# Patient Record
Sex: FEMALE | Race: WHITE | NOT HISPANIC OR LATINO | Employment: OTHER | URBAN - METROPOLITAN AREA
[De-identification: names, ages, dates, MRNs, and addresses within clinical notes are randomized per-mention and may not be internally consistent; named-entity substitution may affect disease eponyms.]

---

## 2017-01-17 ENCOUNTER — OFFICE VISIT (OUTPATIENT)
Dept: URGENT CARE | Facility: PHYSICIAN GROUP | Age: 77
End: 2017-01-17
Payer: MEDICARE

## 2017-01-17 VITALS
HEART RATE: 70 BPM | OXYGEN SATURATION: 95 % | DIASTOLIC BLOOD PRESSURE: 58 MMHG | TEMPERATURE: 98.6 F | RESPIRATION RATE: 16 BRPM | BODY MASS INDEX: 19.63 KG/M2 | WEIGHT: 104 LBS | SYSTOLIC BLOOD PRESSURE: 106 MMHG | HEIGHT: 61 IN

## 2017-01-17 DIAGNOSIS — J20.9 ACUTE BRONCHITIS, UNSPECIFIED ORGANISM: ICD-10-CM

## 2017-01-17 PROCEDURE — 99214 OFFICE O/P EST MOD 30 MIN: CPT | Performed by: NURSE PRACTITIONER

## 2017-01-17 RX ORDER — ALBUTEROL SULFATE 2.5 MG/3ML
2.5 SOLUTION RESPIRATORY (INHALATION) EVERY 4 HOURS PRN
Qty: 75 ML | Refills: 1 | Status: SHIPPED | OUTPATIENT
Start: 2017-01-17 | End: 2017-11-17

## 2017-01-17 RX ORDER — PREDNISONE 20 MG/1
TABLET ORAL
Qty: 10 TAB | Refills: 0 | Status: SHIPPED | OUTPATIENT
Start: 2017-01-17 | End: 2017-02-15

## 2017-01-17 NOTE — PROGRESS NOTES
"Subjective:      Angie Root is a 76 y.o. female who presents with Bronchitis            HPI New problem. 76 year old female with cough for 2 days. She denies nasal congestion, runny nose, sore throat, fever or chills. She states felt a tickle in throat 5 days ago and then cough started. No wheezing or shortness of breath. Not taking anything for this. She is a pack a day smoker, not interested in quitting.  Allergies, medications and history reviewed by me today      ROS  Please see HPI       Objective:     /58 mmHg  Pulse 70  Temp(Src) 37 °C (98.6 °F)  Resp 16  Ht 1.549 m (5' 0.98\")  Wt 47.174 kg (104 lb)  BMI 19.66 kg/m2  SpO2 95%     Physical Exam   Constitutional: She is oriented to person, place, and time. She appears well-developed and well-nourished. No distress.   HENT:   Head: Normocephalic and atraumatic.   Right Ear: External ear and ear canal normal. Tympanic membrane is not injected and not perforated. No middle ear effusion.   Left Ear: External ear and ear canal normal. Tympanic membrane is not injected and not perforated.  No middle ear effusion.   Nose: Mucosal edema present.   Mouth/Throat: Posterior oropharyngeal erythema present. No oropharyngeal exudate.   Eyes: Conjunctivae are normal. Right eye exhibits no discharge. Left eye exhibits no discharge.   Neck: Normal range of motion. Neck supple.   Cardiovascular: Normal rate, regular rhythm and normal heart sounds.    No murmur heard.  Pulmonary/Chest: Effort normal and breath sounds normal. No respiratory distress. She has no wheezes. She has no rales.   Musculoskeletal: Normal range of motion.   Normal movement of all 4 extremities.   Lymphadenopathy:     She has no cervical adenopathy.        Right: No supraclavicular adenopathy present.        Left: No supraclavicular adenopathy present.   Neurological: She is alert and oriented to person, place, and time. Gait normal.   Skin: Skin is warm and dry.   Psychiatric: She has a normal " mood and affect. Her behavior is normal. Thought content normal.   Nursing note and vitals reviewed.              Assessment/Plan:     1. Acute bronchitis, unspecified organism    - predniSONE (DELTASONE) 20 MG Tab; Take 2 tabs daily for 5 days.  Dispense: 10 Tab; Refill: 0  - albuterol (PROVENTIL) 2.5mg/3ml Nebu Soln solution for nebulization; 3 mL by Nebulization route every four hours as needed for Shortness of Breath.  Dispense: 75 mL; Refill: 1  - Hydrocod Polst-CPM Polst ER (TUSSIONEX) 10-8 MG/5ML Suspension Extended Release; Take 5 mL by mouth every 12 hours.  Dispense: 140 mL; Refill: 0  Patient is cautioned on sedation potential of narcotic medication; no drinking, driving or operating heavy machinery while on this medication.  [place]  checked, no red flags. She will take this only at night.  Differential diagnosis, natural history, supportive care, and indications for immediate follow-up discussed at length.

## 2017-02-15 ENCOUNTER — OFFICE VISIT (OUTPATIENT)
Dept: MEDICAL GROUP | Facility: PHYSICIAN GROUP | Age: 77
End: 2017-02-15
Payer: MEDICARE

## 2017-02-15 VITALS
SYSTOLIC BLOOD PRESSURE: 116 MMHG | WEIGHT: 100 LBS | HEIGHT: 61 IN | TEMPERATURE: 98.5 F | RESPIRATION RATE: 16 BRPM | BODY MASS INDEX: 18.88 KG/M2 | DIASTOLIC BLOOD PRESSURE: 68 MMHG | HEART RATE: 66 BPM | OXYGEN SATURATION: 97 %

## 2017-02-15 DIAGNOSIS — F41.9 ANXIETY: ICD-10-CM

## 2017-02-15 PROCEDURE — 4040F PNEUMOC VAC/ADMIN/RCVD: CPT | Mod: 8P | Performed by: INTERNAL MEDICINE

## 2017-02-15 PROCEDURE — 1101F PT FALLS ASSESS-DOCD LE1/YR: CPT | Mod: 8P | Performed by: INTERNAL MEDICINE

## 2017-02-15 PROCEDURE — 4004F PT TOBACCO SCREEN RCVD TLK: CPT | Performed by: INTERNAL MEDICINE

## 2017-02-15 PROCEDURE — G8484 FLU IMMUNIZE NO ADMIN: HCPCS | Performed by: INTERNAL MEDICINE

## 2017-02-15 PROCEDURE — G8432 DEP SCR NOT DOC, RNG: HCPCS | Performed by: INTERNAL MEDICINE

## 2017-02-15 PROCEDURE — G8419 CALC BMI OUT NRM PARAM NOF/U: HCPCS | Performed by: INTERNAL MEDICINE

## 2017-02-15 PROCEDURE — 99213 OFFICE O/P EST LOW 20 MIN: CPT | Performed by: INTERNAL MEDICINE

## 2017-02-15 PROCEDURE — G8598 ASA/ANTIPLAT THER USED: HCPCS | Performed by: INTERNAL MEDICINE

## 2017-02-15 RX ORDER — ALPRAZOLAM 0.25 MG/1
TABLET ORAL
Qty: 30 TAB | Refills: 1 | Status: SHIPPED
Start: 2017-02-15 | End: 2017-04-12 | Stop reason: SDUPTHER

## 2017-02-15 NOTE — MR AVS SNAPSHOT
"Angie Root   2/15/2017 9:20 AM   Office Visit   MRN: [id number]    Department:  [facility]   Dept Phone:  [phone]    Description:  Female : 1940   Provider:  Noris Parker M.D.           Reason for Visit     Anxiety medication management       Allergies as of 2/15/2017     Allergen Noted Reactions    Codeine 2015   Swelling    Iodine 2015   Swelling    Bee Venom 2015       Latex 2015       Pcn [Penicillins] 2015       Shellfish Allergy 2015       Tetanus Antitoxin 2015         You were diagnosed with     Anxiety   [229470]         Vital Signs     Blood Pressure Pulse Temperature Respirations Height Weight    116/68 mmHg 66 36.9 °C (98.5 °F) 16 1.549 m (5' 0.98\") 45.36 kg (100 lb)    Body Mass Index Oxygen Saturation Smoking Status             18.90 kg/m2 97% Current Every Day Smoker         Basic Information     Date Of Birth Sex Race Ethnicity Preferred Language    1940 Female White Non- English      Your appointments     Mar 14, 2017  9:45 AM   FOLLOW UP with Johnathan Morgan M.D.   [facility] (--)    [address], [address]  [place] [address] [phone]            Mar 20, 2017  9:55 AM   NEW TO YOU with PPEE Hurt   [facility]    [address], [address]  [place] [address] [phone]              Problem List              ICD-10-CM Priority Class Noted - Resolved    Gastroesophageal reflux disease without esophagitis K21.9   2016 - Present    Dyslipidemia E78.5   2016 - Present    Hiatal hernia K44.9   2016 - Present    Coronary artery disease due to lipid rich plaque I25.10, I25.83   2016 - Present    Paroxysmal a-fib (CMS-HCC) I48.0   2016 - Present    Prediabetes R73.03   2016 - Present    Health care maintenance Z00.00   2016 - Present    History of panic attacks Z86.59   " 1/20/2016 - Present    PAD (peripheral artery disease) (CMS-HCC) I73.9   3/1/2016 - Present    Cigarette nicotine dependence with nicotine-induced disorder F17.219   3/1/2016 - Present    Left ankle swelling M25.472   5/23/2016 - Present    Vitamin D deficiency E55.9   7/26/2016 - Present    Insomnia G47.00   7/26/2016 - Present    Anxiety F41.9   2/15/2017 - Present      Health Maintenance        Date Due Completion Dates    IMM DTaP/Tdap/Td Vaccine (1 - Tdap) 2/26/1958 ---    MAMMOGRAM 2/26/1979 ---    IMM ZOSTER VACCINE 2/26/1999 ---    BONE DENSITY 2/26/2004 ---    IMM PNEUMOCOCCAL 65+ (ADULT) LOW/MEDIUM RISK SERIES (1 of 2 - PCV13) 2/26/2004 ---    IMM INFLUENZA (1) 9/1/2016 ---    COLONOSCOPY 3/19/2024 3/19/2014            Current Immunizations     No immunizations on file.      Below and/or attached are the medications your provider expects you to take. Review all of your home medications and newly ordered medications with your provider and/or pharmacist. Follow medication instructions as directed by your provider and/or pharmacist. Please keep your medication list with you and share with your provider. Update the information when medications are discontinued, doses are changed, or new medications (including over-the-counter products) are added; and carry medication information at all times in the event of emergency situations     Allergies:  CODEINE - Swelling     IODINE - Swelling     BEE VENOM - (reactions not documented)     LATEX - (reactions not documented)     PCN - (reactions not documented)     SHELLFISH ALLERGY - (reactions not documented)     TETANUS ANTITOXIN - (reactions not documented)               Medications  Valid as of: February 15, 2017 -  9:47 AM    Generic Name Brand Name Tablet Size Instructions for use    Albuterol Sulfate (Aero Soln) albuterol 108 (90 BASE) MCG/ACT Inhale 2 Puffs by mouth every four hours as needed for Shortness of Breath.        Albuterol Sulfate (Nebu Soln)  PROVENTIL 2.5mg/3ml 3 mL by Nebulization route every four hours as needed for Shortness of Breath.        ALPRAZolam (Tab) XANAX 0.25 MG TAKE ONE TABLET BY MOUTH DAILY AS NEEDED FOR SLEEP OR ANXIETY.        Aspirin (Tablet Delayed Response) ECOTRIN 81 MG Take 81 mg by mouth every day.        Cholecalciferol (Cap) Vitamin D 2000 UNITS Take 1 Cap by mouth every day.        Clopidogrel Bisulfate (Tab) PLAVIX 75 MG four times a week        Diclofenac Sodium (Gel) Diclofenac Sodium 1 % Apply over affected area bid prn for pain        DiltiaZEM HCl Coated Beads (CAPSULE SR 24 HR) CARDIZEM  MG Take 1 Cap by mouth every day.        Fluticasone Propionate (Suspension) FLONASE 50 MCG/ACT Spray 1 Spray in nose every day.        Hydrocod Polst-Chlorphen Polst (Suspension Extended Release) TUSSIONEX 10-8 MG/5ML Take 5 mL by mouth every 12 hours.        Lovastatin (TABLET SR 24 HR) ALTOPREV 40 MG Take 40 mg by mouth every evening.        Pantoprazole Sodium (Tablet Delayed Response) PROTONIX 40 MG Take 1 Tab by mouth every day. Indications: Gastroesophageal Reflux Disease        Sotalol HCl (Tab) BETAPACE 80 MG Take 0.5 Tabs by mouth 2 times a day.        Sucralfate (Tab) CARAFATE 1 GM Take 1 g by mouth 2 Times a Day.        .                 Medicines prescribed today were sent to:     [id number] - [place] - [address] [place] [place] [address]    Phone: [phone] Fax: [phone]    Open 24 Hours?: No    [facility] - [facility] [address]    [address] [address]    Phone: [phone] Fax: [phone]    Open 24 Hours?: No      Medication refill instructions:       If your prescription bottle indicates you have medication refills left, it is not necessary to call your provider’s office. Please contact your pharmacy and they will refill your medication.    If your prescription bottle indicates you do not have any refills left, you may request  refills at any time through one of the following ways: The online Branchly system (except Urgent Care), by calling your provider’s office, or by asking your pharmacy to contact your provider’s office with a refill request. Medication refills are processed only during regular business hours and may not be available until the next business day. Your provider may request additional information or to have a follow-up visit with you prior to refilling your medication.   *Please Note: Medication refills are assigned a new Rx number when refilled electronically. Your pharmacy may indicate that no refills were authorized even though a new prescription for the same medication is available at the pharmacy. Please request the medicine by name with the pharmacy before contacting your provider for a refill.           Branchly Access Code: Activation code not generated  Current Branchly Status: Active

## 2017-02-15 NOTE — PROGRESS NOTES
Chief Complaint   Patient presents with   • Anxiety     medication management        HISTORY OF PRESENT ILLNESS: Patient is a 76 y.o. female patient who presents today for refill of her Xanax prescription.    1. Anxiety    Patient has a long history of anxiety and insomnia. She takes Xanax 0.25 mg daily. She is going to be going down to [place] for a wedding in to visit friends and family down there and will be gone for over one month and is quite concerned that she have her prescription with refill prior to leaving. Otherwise patient is doing well she has no other complaints today.      Patient Active Problem List    Diagnosis Date Noted   • Anxiety 02/15/2017   • Vitamin D deficiency 07/26/2016   • Insomnia 07/26/2016   • Left ankle swelling 05/23/2016   • PAD (peripheral artery disease) (CMS-HCC) 03/01/2016   • Cigarette nicotine dependence with nicotine-induced disorder 03/01/2016   • Gastroesophageal reflux disease without esophagitis 01/20/2016   • Dyslipidemia 01/20/2016   • Hiatal hernia 01/20/2016   • Coronary artery disease due to lipid rich plaque 01/20/2016   • Paroxysmal a-fib (CMS-HCC) 01/20/2016   • Prediabetes 01/20/2016   • Health care maintenance 01/20/2016   • History of panic attacks 01/20/2016      Allergies:Codeine; Iodine; Bee venom; Latex; Pcn; Shellfish allergy; and Tetanus antitoxin    Current meds including changes today  Current Outpatient Prescriptions   Medication Sig Dispense Refill   • alprazolam (XANAX) 0.25 MG Tab TAKE ONE TABLET BY MOUTH DAILY AS NEEDED FOR SLEEP OR ANXIETY. 30 Tab 1   • lovastatin (ALTOPREV) 40 MG ER tablet Take 40 mg by mouth every evening.     • diltiazem CD (CARDIZEM CD) 240 MG CAPSULE SR 24 HR Take 1 Cap by mouth every day. 90 Cap 2   • clopidogrel (PLAVIX) 75 MG Tab four times a week 64 Tab 3   • Cholecalciferol (VITAMIN D) 2000 UNITS Cap Take 1 Cap by mouth every day.     • albuterol 108 (90 BASE) MCG/ACT Aero Soln inhalation aerosol Inhale 2 Puffs  by mouth every four hours as needed for Shortness of Breath. 1 Inhaler 1   • pantoprazole (PROTONIX) 40 MG Tablet Delayed Response Take 1 Tab by mouth every day. Indications: Gastroesophageal Reflux Disease 90 Tab 2   • sotalol (BETAPACE) 80 MG Tab Take 0.5 Tabs by mouth 2 times a day. 60 Tab    • sucralfate (CARAFATE) 1 GM Tab Take 1 g by mouth 2 Times a Day.     • aspirin EC (ECOTRIN) 81 MG Tablet Delayed Response Take 81 mg by mouth every day.     • fluticasone (FLONASE) 50 MCG/ACT nasal spray Spray 1 Spray in nose every day.     • albuterol (PROVENTIL) 2.5mg/3ml Nebu Soln solution for nebulization 3 mL by Nebulization route every four hours as needed for Shortness of Breath. 75 mL 1   • Hydrocod Polst-CPM Polst ER (TUSSIONEX) 10-8 MG/5ML Suspension Extended Release Take 5 mL by mouth every 12 hours. 140 mL 0   • Diclofenac Sodium 1 % Gel Apply over affected area bid prn for pain 1 Tube 1     No current facility-administered medications for this visit.     Social History   Substance Use Topics   • Smoking status: Current Every Day Smoker -- 1.00 packs/day for 60 years     Types: Cigarettes   • Smokeless tobacco: Never Used   • Alcohol Use: No     Social History     Social History Narrative    .     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       Family History   Problem Relation Age of Onset   • Cancer Maternal Grandmother      tongue   • Cancer Maternal Uncle      x 3, pancreas   • Cancer Maternal Grandfather      unknown   • Cancer Paternal Grandmother      unknown   • Cancer Paternal Grandfather      unknown   • Diabetes Maternal Uncle    • Heart Disease Maternal Uncle    • Hypertension Sister    • Hypertension Mother    • Hyperlipidemia Mother    • Hyperlipidemia Sister    • Psychiatry Neg Hx    • Stroke Neg Hx    • Thyroid Sister        Review of Systems:  No chest pain, No shortness of breath, No dyspnea on exertion  Gastrointestinal ROS: No abdominal pain, No nausea, vomiting, diarrhea, or  "constipation        Exam:    Pleasant slender female in no distress somewhat pressured speech  Blood pressure 116/68, pulse 66, temperature 36.9 °C (98.5 °F), resp. rate 16, height 1.549 m (5' 0.98\"), weight 45.36 kg (100 lb), SpO2 97 %.  General:  Well nourished, well developed female in NAD affect and mood within normal limits  Head is grossly normal.  Neck: Supple without adenopathy  Pulmonary: Clear to ausculation.  Normal effort. No rales, rhonchi, or wheezing.  Cardiovascular: Regular rate and rhythm without murmur.   Extremities: no clubbing, cyanosis, or edema.  Neuro: moves all extremities symmetrically      Please note that this dictation was created using voice recognition software. I have made every reasonable attempt to correct obvious errors, but I expect that there are errors of grammar and possibly content that I did not discover before finalizing the note.    Assessment/Plan:  1. Anxiety    Patient has an appointment with Renae Trevizo following her return in late March.   - alprazolam (XANAX) 0.25 MG Tab; TAKE ONE TABLET BY MOUTH DAILY AS NEEDED FOR SLEEP OR ANXIETY.  Dispense: 30 Tab; Refill: 1    Followup: No Follow-up on file.            "

## 2017-03-14 ENCOUNTER — OFFICE VISIT (OUTPATIENT)
Dept: CARDIOLOGY | Facility: MEDICAL CENTER | Age: 77
End: 2017-03-14
Payer: MEDICARE

## 2017-03-14 VITALS
WEIGHT: 102 LBS | SYSTOLIC BLOOD PRESSURE: 120 MMHG | BODY MASS INDEX: 18.77 KG/M2 | OXYGEN SATURATION: 96 % | HEIGHT: 62 IN | DIASTOLIC BLOOD PRESSURE: 70 MMHG | HEART RATE: 66 BPM

## 2017-03-14 DIAGNOSIS — I25.83 CORONARY ARTERY DISEASE DUE TO LIPID RICH PLAQUE: ICD-10-CM

## 2017-03-14 DIAGNOSIS — I48.0 PAROXYSMAL A-FIB (HCC): ICD-10-CM

## 2017-03-14 DIAGNOSIS — F17.219 CIGARETTE NICOTINE DEPENDENCE WITH NICOTINE-INDUCED DISORDER: ICD-10-CM

## 2017-03-14 DIAGNOSIS — I25.10 CORONARY ARTERY DISEASE DUE TO LIPID RICH PLAQUE: ICD-10-CM

## 2017-03-14 DIAGNOSIS — I73.9 PAD (PERIPHERAL ARTERY DISEASE) (HCC): ICD-10-CM

## 2017-03-14 LAB — EKG IMPRESSION: NORMAL

## 2017-03-14 PROCEDURE — G8419 CALC BMI OUT NRM PARAM NOF/U: HCPCS | Performed by: INTERNAL MEDICINE

## 2017-03-14 PROCEDURE — 4004F PT TOBACCO SCREEN RCVD TLK: CPT | Performed by: INTERNAL MEDICINE

## 2017-03-14 PROCEDURE — 99214 OFFICE O/P EST MOD 30 MIN: CPT | Performed by: INTERNAL MEDICINE

## 2017-03-14 PROCEDURE — G8432 DEP SCR NOT DOC, RNG: HCPCS | Performed by: INTERNAL MEDICINE

## 2017-03-14 PROCEDURE — 4040F PNEUMOC VAC/ADMIN/RCVD: CPT | Mod: 8P | Performed by: INTERNAL MEDICINE

## 2017-03-14 PROCEDURE — G8598 ASA/ANTIPLAT THER USED: HCPCS | Performed by: INTERNAL MEDICINE

## 2017-03-14 PROCEDURE — 93000 ELECTROCARDIOGRAM COMPLETE: CPT | Performed by: INTERNAL MEDICINE

## 2017-03-14 PROCEDURE — G8484 FLU IMMUNIZE NO ADMIN: HCPCS | Performed by: INTERNAL MEDICINE

## 2017-03-14 PROCEDURE — 1101F PT FALLS ASSESS-DOCD LE1/YR: CPT | Mod: 8P | Performed by: INTERNAL MEDICINE

## 2017-03-14 RX ORDER — SOTALOL HYDROCHLORIDE 80 MG/1
40 TABLET ORAL 2 TIMES DAILY
Qty: 90 TAB | Refills: 3 | Status: SHIPPED | OUTPATIENT
Start: 2017-03-14 | End: 2017-03-14 | Stop reason: SDUPTHER

## 2017-03-14 RX ORDER — CLOPIDOGREL BISULFATE 75 MG/1
75 TABLET ORAL DAILY
COMMUNITY
End: 2017-03-14 | Stop reason: SDUPTHER

## 2017-03-14 RX ORDER — CLOPIDOGREL BISULFATE 75 MG/1
75 TABLET ORAL DAILY
Qty: 90 TAB | Refills: 3 | Status: SHIPPED | OUTPATIENT
Start: 2017-03-14 | End: 2017-11-29

## 2017-03-14 RX ORDER — SOTALOL HYDROCHLORIDE 80 MG/1
40 TABLET ORAL 2 TIMES DAILY
Qty: 90 TAB | Refills: 3 | Status: SHIPPED | OUTPATIENT
Start: 2017-03-14 | End: 2019-03-12 | Stop reason: SDUPTHER

## 2017-03-14 ASSESSMENT — ENCOUNTER SYMPTOMS
LOSS OF CONSCIOUSNESS: 1
PALPITATIONS: 0
PND: 0
CLAUDICATION: 1
ORTHOPNEA: 0
DEPRESSION: 1

## 2017-03-14 NOTE — Clinical Note
Name:          Angie Root   YOB: 1940  Date:     3/14/2017      Renae Trevizo, CINTHIARIvoneNIvone  [address]  [facility] [address]     Johnathan Morgan MD  [address], [address]  [place] [address]  Phone: [phone]  Back Line: [phone]  Fax: [phone]  E-mail: [email or web address]   Dear Dr. Trevizo,    We had the pleasure of seeing your patient, Angie Root, in Cardiology Clinic at [facility] today.    As you know, she is a 77-year-old woman with a history of PAD status post PCI to her left leg in 2008, CAD with 2 stents placed in her LAD in 2009, nicotine dependence, hypertension, dyslipidemia, and paroxysmal atrial fibrillation on rhythm control strategy.    She is doing well today, and has no cardiovascular complaint. I did refill her medications including sotalol, and Plavix. We again discussed at some length her fairly high risk of stroke and she declined oral anticoagulants. She did have an episode of orthostasis with low blood pressure and see systolic of 100 mmHg) a few days ago, which resolved with some hydration. I told her that if she had increasingly frequent episodes of the same I would cut her diltiazem from 240-180 mmHg. She told me she would call her office and let us know if that is the case. Otherwise, I have not changed her cardiovascular regimen.    We will have the patient follow-up in 6 months.    Thank you for the referral and please do not hesitate to contact me at any time. My contact information is listed above.    This note was dictated using Dragon speech recognition software.     A full note including my physical examination and a full list of rectified medications is available in our medical record, and can be faxed as well.    Johnathan Morgan MD  Cardiologist  [facility]

## 2017-03-14 NOTE — PROGRESS NOTES
Name:          Angie Root   YOB: 1940  Date:     3/14/2017      Renae Trevizo, CINTHIARIvoneNIvone  [address]  [place] [facility] [address]     Johnathan Morgan MD  [address], [address]  [place] [address]  Phone: [phone]  Back Line: [phone]  Fax: [phone]  E-mail: [email or web address]   Dear Dr. Trevizo,    We had the pleasure of seeing your patient, Angie Root, in Cardiology Clinic at [facility] today.    As you know, she is a 77-year-old woman with a history of PAD status post PCI to her left leg in 2008, CAD with 2 stents placed in her LAD in 2009, nicotine dependence, hypertension, dyslipidemia, and paroxysmal atrial fibrillation on rhythm control strategy.    She is doing well today, and has no cardiovascular complaint. I did refill her medications including sotalol, and Plavix. We again discussed at some length her fairly high risk of stroke and she declined oral anticoagulants. She did have an episode of orthostasis with low blood pressure and see systolic of 100 mmHg) a few days ago, which resolved with some hydration. I told her that if she had increasingly frequent episodes of the same I would cut her diltiazem from 240-180 mmHg. She told me she would call her office and let us know if that is the case. Otherwise, I have not changed her cardiovascular regimen.    We will have the patient follow-up in 6 months.    Thank you for the referral and please do not hesitate to contact me at any time. My contact information is listed above.    This note was dictated using Dragon speech recognition software.     A full note including my physical examination and a full list of rectified medications is available in our medical record, and can be faxed as well.    Johnathan Morgan MD  Cardiologist  [facility]

## 2017-03-14 NOTE — PROGRESS NOTES
Subjective:   Angie Root is a 77-year-old woman with a history of PAD status post PCI to her left leg in 2008, CAD with 2 stents placed in her LAD in 2009, nicotine dependence, hypertension, dyslipidemia, and paroxysmal atrial fibrillation on rhythm control strategy.    She is doing well today from the cardiovascular perspective with no complaints. Unfortunately, she does appear to have sustained an ankle injury during the holiday season around [place]. Her ankle did get x-rays at that time, but does continue to be swollen still.    She is tolerating her medications well, and comes in for refills.    She brings in her blood pressure log documenting blood pressures mostly in the 120s mmHg systolic range (CT scan block). She did have one episode though 3 days ago when her systolic blood pressure was 100 mmHg at which time she had a bit of orthostasis. She took some Powerade, and the episode resolved. It sounds as though her mail-order pharmacy felt that her dose of diltiazem may be too high.    She has cut back significantly on her smoking, and is currently smoking about 6 cigarettes per day.    Past Medical History   Diagnosis Date   • CAD (coronary artery disease)    • COPD (chronic obstructive pulmonary disease) (CMS-HCC)    • Hiatal hernia    • GERD (gastroesophageal reflux disease)    • Diverticulosis    • Dyslipidemia    • Prediabetes    • PAD (peripheral artery disease) (CMS-HCC)    • Gastroesophageal reflux disease without esophagitis 1/20/2016   • Coronary artery disease due to lipid rich plaque 1/20/2016     Status post PCI ×2 in 3/27/09 ([facility] x 2 to proximal LAD and mid LAD) in [place]    • Paroxysmal a-fib (CMS-HCC) 1/20/2016     Symptomatic, on a rhythm control strategy with sotalol 40 mg by mouth twice a day.      Past Surgical History   Procedure Laterality Date   • Tonsillectomy  1945   • Cholecystectomy  1993   • Cardiac cath, right heart  2008     stent   • Wide excision melanoma, leg,  w/poss.stsg  10/2015     Family History   Problem Relation Age of Onset   • Cancer Maternal Grandmother      tongue   • Cancer Maternal Uncle      x 3, pancreas   • Cancer Maternal Grandfather      unknown   • Cancer Paternal Grandmother      unknown   • Cancer Paternal Grandfather      unknown   • Diabetes Maternal Uncle    • Heart Disease Maternal Uncle    • Hypertension Sister    • Hypertension Mother    • Hyperlipidemia Mother    • Hyperlipidemia Sister    • Psychiatry Neg Hx    • Stroke Neg Hx    • Thyroid Sister      History   Smoking status   • Current Every Day Smoker -- 0.25 packs/day for 60 years   • Types: Cigarettes   Smokeless tobacco   • Never Used     Allergies   Allergen Reactions   • Codeine Swelling   • Iodine Swelling   • Bee Venom    • Latex    • Pcn [Penicillins]    • Shellfish Allergy    • Tetanus Antitoxin      Outpatient Encounter Prescriptions as of 3/14/2017   Medication Sig Dispense Refill   • sotalol (BETAPACE) 80 MG Tab Take 0.5 Tabs by mouth 2 times a day. 90 Tab 3   • clopidogrel (PLAVIX) 75 MG Tab Take 1 Tab by mouth every day. 90 Tab 3   • alprazolam (XANAX) 0.25 MG Tab TAKE ONE TABLET BY MOUTH DAILY AS NEEDED FOR SLEEP OR ANXIETY. 30 Tab 1   • albuterol (PROVENTIL) 2.5mg/3ml Nebu Soln solution for nebulization 3 mL by Nebulization route every four hours as needed for Shortness of Breath. 75 mL 1   • lovastatin (ALTOPREV) 40 MG ER tablet Take 40 mg by mouth every evening.     • Diclofenac Sodium 1 % Gel Apply over affected area bid prn for pain 1 Tube 1   • diltiazem CD (CARDIZEM CD) 240 MG CAPSULE SR 24 HR Take 1 Cap by mouth every day. 90 Cap 2   • Cholecalciferol (VITAMIN D) 2000 UNITS Cap Take 1 Cap by mouth every day.     • albuterol 108 (90 BASE) MCG/ACT Aero Soln inhalation aerosol Inhale 2 Puffs by mouth every four hours as needed for Shortness of Breath. 1 Inhaler 1   • pantoprazole (PROTONIX) 40 MG Tablet Delayed Response Take 1 Tab by mouth every day. Indications:  "Gastroesophageal Reflux Disease 90 Tab 2   • sucralfate (CARAFATE) 1 GM Tab Take 1 g by mouth 2 Times a Day.     • aspirin EC (ECOTRIN) 81 MG Tablet Delayed Response Take 81 mg by mouth every day.     • fluticasone (FLONASE) 50 MCG/ACT nasal spray Spray 1 Spray in nose every day.     • [DISCONTINUED] clopidogrel (PLAVIX) 75 MG Tab Take 75 mg by mouth every day.     • [DISCONTINUED] sotalol (BETAPACE) 80 MG Tab Take 0.5 Tabs by mouth 2 times a day. 90 Tab 3   • Hydrocod Polst-CPM Polst ER (TUSSIONEX) 10-8 MG/5ML Suspension Extended Release Take 5 mL by mouth every 12 hours. (Patient not taking: Reported on 3/14/2017) 140 mL 0   • clopidogrel (PLAVIX) 75 MG Tab four times a week (Patient taking differently: No sig reported) 64 Tab 3   • [DISCONTINUED] sotalol (BETAPACE) 80 MG Tab Take 0.5 Tabs by mouth 2 times a day. 60 Tab      No facility-administered encounter medications on file as of 3/14/2017.     Review of Systems   HENT: Positive for hearing loss.         Prior sudden deafness of unclear etiology. This was worked up with a MRI, no eardrum rupture noted by an ENT who examined her. She was placed on antibiotics and steroids and it resolved.   Cardiovascular: Positive for claudication. Negative for chest pain, palpitations, orthopnea, leg swelling and PND.   Musculoskeletal: Positive for joint pain (resolving right ankle pain after a traumatic episode 2-1/2 months ago).   Neurological: Positive for loss of consciousness (previously diagnosed with vasovagal syncope).   Psychiatric/Behavioral: Positive for depression. Negative for suicidal ideas.   All other systems reviewed and are negative.       Objective:   /70 mmHg  Pulse 66  Ht 1.575 m (5' 2\")  Wt 46.267 kg (102 lb)  BMI 18.65 kg/m2  SpO2 96%    Physical Exam   Constitutional: She is oriented to person, place, and time. She appears well-developed and well-nourished. No distress.   HENT:   Head: Normocephalic and atraumatic.   Eyes: Conjunctivae and " EOM are normal. Pupils are equal, round, and reactive to light. No scleral icterus.   Neck: Neck supple. No JVD present. No tracheal deviation present.   Cardiovascular: Normal rate, regular rhythm, normal heart sounds and intact distal pulses.  Exam reveals no gallop and no friction rub.    No murmur heard.  Pulses:       Dorsalis pedis pulses are 1+ on the right side, and 1+ on the left side.   No carotid bruits   Pulmonary/Chest: Effort normal and breath sounds normal. No stridor. No respiratory distress. She has no wheezes. She has no rales.   Abdominal: Soft. Bowel sounds are normal. She exhibits no distension.   Musculoskeletal: She exhibits edema (trace on the left, 1+ on the right).   1+ woody edema of her lower extremities bilaterally   Neurological: She is alert and oriented to person, place, and time.   Skin: Skin is warm and dry. She is not diaphoretic. No erythema. No pallor.   Senile purpura noted, chronic venous stasis changes of her lower extremities also noted   Psychiatric: She has a normal mood and affect. Judgment and thought content normal.   Vitals reviewed.    Her EKG from today shows sinus bradycardia and a QTC of 448 ms       4/14/2016 08:52 12/1/2016 09:37   Cholesterol,Tot 170 166   Triglycerides 65 61   HDL 80 84   LDL 77 70       Assessment:     1. Paroxysmal a-fib (CMS-HCC)  EKG    sotalol (BETAPACE) 80 MG Tab    DISCONTINUED: sotalol (BETAPACE) 80 MG Tab   2. Coronary artery disease due to lipid rich plaque  EKG   3. Cigarette nicotine dependence with nicotine-induced disorder  REFERRAL TO LUNG CANCER SCREENING PROGRAM   4. PAD (peripheral artery disease) (CMS-HCC)  clopidogrel (PLAVIX) 75 MG Tab       Medical Decision Making:  Today's Assessment / Status / Plan:     Medical Decision Making:    She is doing well today, and has no cardiovascular complaint. I did refill her medications including sotalol, and Plavix. We again discussed at some length her fairly high risk of stroke and she  declined oral anticoagulants. She did have an episode of orthostasis with low blood pressure and see systolic of 100 mmHg) a few days ago, which resolved with some hydration. I told her that if she had increasingly frequent episodes of the same I would cut her diltiazem from 240-180 mmHg. She told me she would call her office and let us know if that is the case. Otherwise, I have not changed her cardiovascular regimen.    Johnathan Morgan MD  Cardiologist, [facility] [place]

## 2017-03-14 NOTE — MR AVS SNAPSHOT
"Angie Root   3/14/2017 9:45 AM   Office Visit   MRN: [id number]    Department:  [facility]   Dept Phone:  [phone]    Description:  Female : 1940   Provider:  Johnathan Morgan M.D.           Reason for Visit     Follow-Up           Allergies as of 3/14/2017     Allergen Noted Reactions    Codeine 2015   Swelling    Iodine 2015   Swelling    Bee Venom 2015       Latex 2015       Pcn [Penicillins] 2015       Shellfish Allergy 2015       Tetanus Antitoxin 2015         You were diagnosed with     Paroxysmal a-fib (CMS-HCC)   [438908]       Coronary artery disease due to lipid rich plaque   [6972096]       Cigarette nicotine dependence with nicotine-induced disorder   [358724]       PAD (peripheral artery disease) (CMS-HCC)   [433042]         Vital Signs     Blood Pressure Pulse Height Weight Body Mass Index Oxygen Saturation    120/70 mmHg 66 1.575 m (5' 2\") 46.267 kg (102 lb) 18.65 kg/m2 96%    Smoking Status                   Current Every Day Smoker           Basic Information     Date Of Birth Sex Race Ethnicity Preferred Language    1940 Female White Non- English      Your appointments     Mar 20, 2017  9:55 AM   NEW TO YOU with PEPE Hurt   [facility])    [address] [place] [address], [address]  [facility] [address] [phone]              Problem List              ICD-10-CM Priority Class Noted - Resolved    Gastroesophageal reflux disease without esophagitis K21.9   2016 - Present    Dyslipidemia E78.5   2016 - Present    Hiatal hernia K44.9   2016 - Present    Coronary artery disease due to lipid rich plaque I25.10, I25.83   2016 - Present    Paroxysmal a-fib (CMS-HCC) I48.0   2016 - Present    Prediabetes R73.03   2016 - Present    Health care maintenance Z00.00   2016 - Present    History of panic attacks Z86.59   2016 - Present    PAD (peripheral " artery disease) (CMS-HCC) I73.9   3/1/2016 - Present    Cigarette nicotine dependence with nicotine-induced disorder F17.219   3/1/2016 - Present    Left ankle swelling M25.472   5/23/2016 - Present    Vitamin D deficiency E55.9   7/26/2016 - Present    Insomnia G47.00   7/26/2016 - Present    Anxiety F41.9   2/15/2017 - Present      Health Maintenance        Date Due Completion Dates    IMM DTaP/Tdap/Td Vaccine (1 - Tdap) 2/26/1958 ---    MAMMOGRAM 2/26/1979 ---    IMM ZOSTER VACCINE 2/26/1999 ---    BONE DENSITY 2/26/2004 ---    IMM PNEUMOCOCCAL 65+ (ADULT) LOW/MEDIUM RISK SERIES (1 of 2 - PCV13) 2/26/2004 ---    IMM INFLUENZA (1) 9/1/2016 ---    COLONOSCOPY 3/19/2024 3/19/2014            Results       Current Immunizations     No immunizations on file.      Below and/or attached are the medications your provider expects you to take. Review all of your home medications and newly ordered medications with your provider and/or pharmacist. Follow medication instructions as directed by your provider and/or pharmacist. Please keep your medication list with you and share with your provider. Update the information when medications are discontinued, doses are changed, or new medications (including over-the-counter products) are added; and carry medication information at all times in the event of emergency situations     Allergies:  CODEINE - Swelling     IODINE - Swelling     BEE VENOM - (reactions not documented)     LATEX - (reactions not documented)     PCN - (reactions not documented)     SHELLFISH ALLERGY - (reactions not documented)     TETANUS ANTITOXIN - (reactions not documented)               Medications  Valid as of: March 14, 2017 - 10:28 AM    Generic Name Brand Name Tablet Size Instructions for use    Albuterol Sulfate (Aero Soln) albuterol 108 (90 BASE) MCG/ACT Inhale 2 Puffs by mouth every four hours as needed for Shortness of Breath.        Albuterol Sulfate (Nebu Soln) PROVENTIL 2.5mg/3ml 3 mL by  Nebulization route every four hours as needed for Shortness of Breath.        ALPRAZolam (Tab) XANAX 0.25 MG TAKE ONE TABLET BY MOUTH DAILY AS NEEDED FOR SLEEP OR ANXIETY.        Aspirin (Tablet Delayed Response) ECOTRIN 81 MG Take 81 mg by mouth every day.        Cholecalciferol (Cap) Vitamin D 2000 UNITS Take 1 Cap by mouth every day.        Clopidogrel Bisulfate (Tab) PLAVIX 75 MG four times a week        Clopidogrel Bisulfate (Tab) PLAVIX 75 MG Take 1 Tab by mouth every day.        Diclofenac Sodium (Gel) Diclofenac Sodium 1 % Apply over affected area bid prn for pain        DiltiaZEM HCl Coated Beads (CAPSULE SR 24 HR) CARDIZEM  MG Take 1 Cap by mouth every day.        Fluticasone Propionate (Suspension) FLONASE 50 MCG/ACT Spray 1 Spray in nose every day.        Hydrocod Polst-Chlorphen Polst (Suspension Extended Release) TUSSIONEX 10-8 MG/5ML Take 5 mL by mouth every 12 hours.        Lovastatin (TABLET SR 24 HR) ALTOPREV 40 MG Take 40 mg by mouth every evening.        Pantoprazole Sodium (Tablet Delayed Response) PROTONIX 40 MG Take 1 Tab by mouth every day. Indications: Gastroesophageal Reflux Disease        Sotalol HCl (Tab) BETAPACE 80 MG Take 0.5 Tabs by mouth 2 times a day.        Sucralfate (Tab) CARAFATE 1 GM Take 1 g by mouth 2 Times a Day.        .                 Medicines prescribed today were sent to:     [facility][id number] [place] [address]    Phone: [phone] Fax: [phone]    Open 24 Hours?: No    [facility] - [facility] [address]    [address] [facility] [address]    Phone: [phone] Fax: [phone]    Open 24 Hours?: No      Medication refill instructions:       If your prescription bottle indicates you have medication refills left, it is not necessary to call your provider’s office. Please contact your pharmacy and they will refill your medication.    If your prescription bottle indicates  you do not have any refills left, you may request refills at any time through one of the following ways: The online [employer] system (except Urgent Care), by calling your provider’s office, or by asking your pharmacy to contact your provider’s office with a refill request. Medication refills are processed only during regular business hours and may not be available until the next business day. Your provider may request additional information or to have a follow-up visit with you prior to refilling your medication.   *Please Note: Medication refills are assigned a new Rx number when refilled electronically. Your pharmacy may indicate that no refills were authorized even though a new prescription for the same medication is available at the pharmacy. Please request the medicine by name with the pharmacy before contacting your provider for a refill.           [employer] Access Code: Activation code not generated  Current [employer] Status: Active          Quit Tobacco Information     Do you want to quit using tobacco?    Quitting tobacco decreases risks of cancer, heart and lung disease, increases life expectancy, improves sense of taste and smell, and increases spending money, among other benefits.    If you are thinking about quitting, we can help.  • [facility] Quit Tobacco Program: [phone]  o Program occurs weekly for four weeks and includes pharmacist consultation on products to support quitting smoking or chewing tobacco. A provider referral is needed for pharmacist consultation.  • Tobacco Users Help Hotline: 5-426-QUIT-NOW (609-6479) or https://[place].quitlogix.org/  o Free, confidential telephone and online coaching for [place] residents. Sessions are designed on a schedule that is convenient for you. Eligible clients receive free nicotine replacement therapy.  • Nationally: www.smokefree.gov  o Information and professional assistance to support both immediate and long-term needs as you become, and remain, a non-smoker.  Smokefree.gov allows you to choose the help that best fits your needs.

## 2017-03-20 ENCOUNTER — OFFICE VISIT (OUTPATIENT)
Dept: MEDICAL GROUP | Facility: PHYSICIAN GROUP | Age: 77
End: 2017-03-20
Payer: MEDICARE

## 2017-03-20 VITALS
WEIGHT: 100 LBS | OXYGEN SATURATION: 96 % | DIASTOLIC BLOOD PRESSURE: 72 MMHG | HEIGHT: 62 IN | HEART RATE: 60 BPM | RESPIRATION RATE: 16 BRPM | SYSTOLIC BLOOD PRESSURE: 122 MMHG | TEMPERATURE: 98.2 F | BODY MASS INDEX: 18.4 KG/M2

## 2017-03-20 DIAGNOSIS — I48.0 PAROXYSMAL A-FIB (HCC): ICD-10-CM

## 2017-03-20 DIAGNOSIS — F17.219 CIGARETTE NICOTINE DEPENDENCE WITH NICOTINE-INDUCED DISORDER: ICD-10-CM

## 2017-03-20 DIAGNOSIS — I25.83 CORONARY ARTERY DISEASE DUE TO LIPID RICH PLAQUE: ICD-10-CM

## 2017-03-20 DIAGNOSIS — Z12.31 ENCOUNTER FOR SCREENING MAMMOGRAM FOR BREAST CANCER: ICD-10-CM

## 2017-03-20 DIAGNOSIS — K21.9 GASTROESOPHAGEAL REFLUX DISEASE WITHOUT ESOPHAGITIS: ICD-10-CM

## 2017-03-20 DIAGNOSIS — Z76.89 ENCOUNTER TO ESTABLISH CARE: ICD-10-CM

## 2017-03-20 DIAGNOSIS — F41.8 DEPRESSION WITH ANXIETY: ICD-10-CM

## 2017-03-20 DIAGNOSIS — R73.03 PREDIABETES: ICD-10-CM

## 2017-03-20 DIAGNOSIS — I73.9 PAD (PERIPHERAL ARTERY DISEASE) (HCC): ICD-10-CM

## 2017-03-20 DIAGNOSIS — G47.9 SLEEP DIFFICULTIES: ICD-10-CM

## 2017-03-20 DIAGNOSIS — K44.9 HIATAL HERNIA: ICD-10-CM

## 2017-03-20 DIAGNOSIS — I25.10 CORONARY ARTERY DISEASE DUE TO LIPID RICH PLAQUE: ICD-10-CM

## 2017-03-20 PROCEDURE — G8484 FLU IMMUNIZE NO ADMIN: HCPCS | Performed by: NURSE PRACTITIONER

## 2017-03-20 PROCEDURE — G8598 ASA/ANTIPLAT THER USED: HCPCS | Performed by: NURSE PRACTITIONER

## 2017-03-20 PROCEDURE — 99215 OFFICE O/P EST HI 40 MIN: CPT | Performed by: NURSE PRACTITIONER

## 2017-03-20 PROCEDURE — 4040F PNEUMOC VAC/ADMIN/RCVD: CPT | Mod: 8P | Performed by: NURSE PRACTITIONER

## 2017-03-20 PROCEDURE — 4004F PT TOBACCO SCREEN RCVD TLK: CPT | Performed by: NURSE PRACTITIONER

## 2017-03-20 PROCEDURE — G8419 CALC BMI OUT NRM PARAM NOF/U: HCPCS | Performed by: NURSE PRACTITIONER

## 2017-03-20 PROCEDURE — 1101F PT FALLS ASSESS-DOCD LE1/YR: CPT | Performed by: NURSE PRACTITIONER

## 2017-03-20 PROCEDURE — G8432 DEP SCR NOT DOC, RNG: HCPCS | Performed by: NURSE PRACTITIONER

## 2017-03-20 RX ORDER — TRAZODONE HYDROCHLORIDE 50 MG/1
25-50 TABLET ORAL NIGHTLY PRN
Qty: 30 TAB | Refills: 1 | Status: SHIPPED | OUTPATIENT
Start: 2017-03-20 | End: 2017-05-08 | Stop reason: SDUPTHER

## 2017-03-20 RX ORDER — PANTOPRAZOLE SODIUM 40 MG/1
40 TABLET, DELAYED RELEASE ORAL DAILY
Qty: 90 TAB | Refills: 2 | Status: SHIPPED | OUTPATIENT
Start: 2017-03-20 | End: 2017-11-08 | Stop reason: SDUPTHER

## 2017-03-20 RX ORDER — SUCRALFATE 1 G/1
1 TABLET ORAL
Qty: 120 TAB | Refills: 5 | Status: SHIPPED | OUTPATIENT
Start: 2017-03-20 | End: 2017-03-20 | Stop reason: SDUPTHER

## 2017-03-20 RX ORDER — SUCRALFATE 1 G/1
1 TABLET ORAL
Qty: 360 TAB | Refills: 1 | Status: SHIPPED | OUTPATIENT
Start: 2017-03-20 | End: 2017-08-08 | Stop reason: SDUPTHER

## 2017-03-20 ASSESSMENT — PATIENT HEALTH QUESTIONNAIRE - PHQ9: CLINICAL INTERPRETATION OF PHQ2 SCORE: 0

## 2017-03-20 NOTE — ASSESSMENT & PLAN NOTE
Started smoking age 16-18. Smoked 1 ppd at most. Stopped twice x 1 year. Now only smokes 5 per day.

## 2017-03-20 NOTE — PROGRESS NOTES
Chief Complaint   Patient presents with   • Establish Care     multiple chronic problems      Angie Root is a 77 y.o. female here to establish care and for evaluation and management of the following:  HPI:    Previous PCP Martina Gonzalez MD   Coronary artery disease due to lipid rich plaque  Status post PCI ×2 in 2009 in [place]  Followed by cardiology.    Paroxysmal a-fib (CMS-[facility])  Symptomatic, on a rhythm control strategy with sotalol 40 mg by mouth twice a day.  Followed by cardiology.      PAD (peripheral artery disease) (CMS-[facility])  Status post PCI to her left leg in 2008 in [place].  Followed by cardiology.    Prediabetes  2016 A1c 6.4 then 6.1    Gastroesophageal reflux disease without esophagitis  Managed with Protonix 40 mg daily and sucralfate 1 g bid, both for many years. Has history of ulcers, hiatal hernia, GERD.  Initially followed very strict diet - Now avoids etoh, limits coffee, soda, acidic juices, spicy food.     Cigarette nicotine dependence with nicotine-induced disorder  Started smoking age 16-18. Smoked 1 ppd at most. Stopped twice x 1 year. Now only smokes 5 per day.        Current medicines (including changes today)  Current Outpatient Prescriptions   Medication Sig Dispense Refill   • pantoprazole (PROTONIX) 40 MG Tablet Delayed Response Take 1 Tab by mouth every day. Indications: Gastroesophageal Reflux Disease 90 Tab 2   • trazodone (DESYREL) 50 MG Tab Take 0.5-1 Tabs by mouth at bedtime as needed for Sleep. 30 Tab 1   • sucralfate (CARAFATE) 1 GM Tab Take 1 Tab by mouth 4 Times a Day,Before Meals and at Bedtime. 360 Tab 1   • sotalol (BETAPACE) 80 MG Tab Take 0.5 Tabs by mouth 2 times a day. 90 Tab 3   • clopidogrel (PLAVIX) 75 MG Tab Take 1 Tab by mouth every day. 90 Tab 3   • alprazolam (XANAX) 0.25 MG Tab TAKE ONE TABLET BY MOUTH DAILY AS NEEDED FOR SLEEP OR ANXIETY. 30 Tab 1   • albuterol (PROVENTIL) 2.5mg/3ml Nebu Soln solution for nebulization 3 mL by Nebulization route  every four hours as needed for Shortness of Breath. 75 mL 1   • lovastatin (ALTOPREV) 40 MG ER tablet Take 40 mg by mouth every evening.     • diltiazem CD (CARDIZEM CD) 240 MG CAPSULE SR 24 HR Take 1 Cap by mouth every day. 90 Cap 2   • Cholecalciferol (VITAMIN D) 2000 UNITS Cap Take 1 Cap by mouth every day.     • albuterol 108 (90 BASE) MCG/ACT Aero Soln inhalation aerosol Inhale 2 Puffs by mouth every four hours as needed for Shortness of Breath. 1 Inhaler 1   • aspirin EC (ECOTRIN) 81 MG Tablet Delayed Response Take 81 mg by mouth every day.     • fluticasone (FLONASE) 50 MCG/ACT nasal spray Spray 1 Spray in nose every day.     • Diclofenac Sodium 1 % Gel Apply over affected area bid prn for pain 1 Tube 1     No current facility-administered medications for this visit.     She  has a past medical history of CAD (coronary artery disease); COPD (chronic obstructive pulmonary disease) (CMS-HCC); Hiatal hernia; GERD (gastroesophageal reflux disease); Diverticulosis; Dyslipidemia; Prediabetes; PAD (peripheral artery disease) (CMS-HCC); Gastroesophageal reflux disease without esophagitis (1/20/2016); Coronary artery disease due to lipid rich plaque (1/20/2016); and Paroxysmal a-fib (CMS-HCC) (1/20/2016).  She  has past surgical history that includes tonsillectomy (1945); cholecystectomy (1993); cardiac cath, right heart (2008); and wide excision melanoma, leg, w/poss.stsg (10/2015).  Social History     Social History   • Marital Status:      Spouse Name: N/A   • Number of Children: 0   • Years of Education: N/A     Social History Main Topics   • Smoking status: Current Every Day Smoker -- 0.25 packs/day for 60 years     Types: Cigarettes     Start date: 03/20/1957   • Smokeless tobacco: Never Used   • Alcohol Use: No   • Drug Use: No   • Sexual Activity:     Partners: Male     Other Topics Concern   • None     Social History Narrative    .     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 [employer]     Family  "History   Problem Relation Age of Onset   • Cancer Maternal Grandmother      tongue   • Cancer Maternal Uncle      x 3, pancreas   • Cancer Maternal Grandfather      unknown   • Cancer Paternal Grandmother      unknown   • Cancer Paternal Grandfather      unknown   • Diabetes Maternal Uncle    • Heart Disease Maternal Uncle    • Hypertension Sister    • Hypertension Mother    • Hyperlipidemia Mother    • Hyperlipidemia Sister    • Psychiatry Neg Hx    • Stroke Neg Hx    • Thyroid Sister      Family Status   Relation Status Death Age   • Mother  94   • Father  56     murdered 1967   • Sister Alive      84 in 2017       Health maintenance reviewed and updated.     ROS  Constitutional: Negative for fever, chills, and unexplained weight loss.   HENT: Negative for ear pain, nosebleeds, and sore throat.  Eyes: Negative for blurred vision.   Respiratory: Negative for cough, sputum production, and wheezing.   Cardiovascular: Negative for chest pain, palpitations.   Gastrointestinal: Negative for nausea, vomiting, abdominal pain, constipation, diarrhea.   Genitourinary: Negative for dysuria, urgency, and frequency.   Musculoskeletal: Negative for myalgias and unusual joint pain.   Skin: Negative for rash and itching.   Neurological: Negative for dizziness, tremors, focal weakness.   Endo/Heme/Allergies: Does not bruise/bleed easily.   All other systems reviewed and are negative except as in HPI.     Objective:   Blood pressure 122/72, pulse 60, temperature 36.8 °C (98.2 °F), resp. rate 16, height 1.581 m (5' 2.25\"), weight 45.36 kg (100 lb), SpO2 96 %. Body mass index is 18.15 kg/(m^2).  Physical Exam:  Constitutional: Alert, oriented, in no acute distress.  Pleasant and cooperative with the examination.  Psych: Eye contact is good, speech goal directed, affect calm, normal judgement and insight.  Skin: Warm, dry, no rashes in visible areas.  HEENT: PERRL, conjunctiva and sclera clear.  Nares patent with no " significant congestion or drainage.  Normal pinnae and external auditory canals.   Neck: Supple, trachea midline.   Lungs: Normal effort and respirations.   CV: Regular rate.  Lower extremities: no edema.      Assessment and Plan:   The following treatment plan was discussed    1. Coronary artery disease due to lipid rich plaque  2. Paroxysmal a-fib (CMS-HCC)  3. PAD (peripheral artery disease) (CMS-[facility])  Stable. Continue current medicines. Monitor labs regularly. Follow-up with specialist as directed.    4. Prediabetes  Reviewed lifestyle modifications, will continue to monitor.    5. Gastroesophageal reflux disease without esophagitis  6. Hiatal hernia  Stable. Continue current medicines. Monitor labs regularly.    - sucralfate (CARAFATE) 1 GM Tab; Take 1 Tab by mouth 4 Times a Day,Before Meals and at Bedtime.  Dispense: 120 Tab; Refill: 5  - pantoprazole (PROTONIX) 40 MG Tablet Delayed Response; Take 1 Tab by mouth every day. Indications: Gastroesophageal Reflux Disease  Dispense: 90 Tab; Refill: 2    7. Cigarette nicotine dependence with nicotine-induced disorder  Advised to quit.    8. Encounter for screening mammogram for breast cancer    - MA-SCREEN MAMMO W/CAD-BILAT    9. Sleep difficulties  Not well controlled. Trial of trazodone 25 mg at hs, if not effective she will take 50 mg.reviewed fall risk reduction.  - trazodone (DESYREL) 50 MG Tab; Take 0.5-1 Tabs by mouth at bedtime as needed for Sleep.  Dispense: 30 Tab; Refill: 1    10. Depression with anxiety  She'll continue current medications and schedule appointment to evaluate depression and anxiety. Meantime she will continue current use of xanax.    11. Encounter to establish care      Followup: Return in about 1 month (around 4/20/2017) for Long anx/dep/sleep.  Sooner if needed or with any new problems.       Patient was seen for 40 minutes, more than 50% of time spent in face to face review, consultation, counseling, and arranging future evaluation  and follow up of medical conditions and care.     Take all medications as directed.  Report any side effects of medications.   Report any new symptoms.  Follow up as advised.  Have labs or other studies as ordered done as directed prior to follow up.  Please keep all appointments for any referrals given.    Please note this dictation was created using voice recognition software. Every reasonable attempt has been made to correct obvious errors, however there may be errors of grammar and possibly content that were not discovered before finalizing the note.

## 2017-03-20 NOTE — MR AVS SNAPSHOT
"        Angie Root   3/20/2017 9:55 AM   Office Visit   MRN: [id number]    Department:  [facility]   Dept Phone:  [phone]    Description:  Female : 1940   Provider:  PEPE Hurt           Reason for Visit     Establish Care multiple chronic problems       Allergies as of 3/20/2017     Allergen Noted Reactions    Codeine 2015   Swelling    Iodine 2015   Swelling    Bee Venom 2015       Latex 2015       Pcn [Penicillins] 2015       Shellfish Allergy 2015       Tetanus Antitoxin 2015         You were diagnosed with     Coronary artery disease due to lipid rich plaque   [0785872]       Paroxysmal a-fib (CMS-[facility])   [959206]       PAD (peripheral artery disease) (CMS-HCC)   [659379]       Prediabetes   [507323]       Gastroesophageal reflux disease without esophagitis   [130185]       Hiatal hernia   [406392]       Cigarette nicotine dependence with nicotine-induced disorder   [641765]       Encounter for screening mammogram for breast cancer   [1445010]       Encounter to establish care   [498591]       Sleep difficulties   [504260]       Depression with anxiety   [256459]         Vital Signs     Blood Pressure Pulse Temperature Respirations Height Weight    122/72 mmHg 60 36.8 °C (98.2 °F) 16 1.581 m (5' 2.25\") 45.36 kg (100 lb)    Body Mass Index Oxygen Saturation Smoking Status             18.15 kg/m2 96% Current Every Day Smoker         Basic Information     Date Of Birth Sex Race Ethnicity Preferred Language    1940 Female White Non- English      Your appointments     2017  8:35 AM   Established Patient with PEPE Hurt   [facility])    [address], [address]  [place] [address] [phone]           You will be receiving a confirmation call a few days before your appointment from our automated call confirmation system.            Sep 15, 2017  9:30 AM   "   FOLLOW UP with Johnathan Morgan M.D.   [facility] (--)    [address], [address]  [place] [id number] [phone]              Problem List              ICD-10-CM Priority Class Noted - Resolved    Gastroesophageal reflux disease without esophagitis K21.9   1/20/2016 - Present    Dyslipidemia E78.5   1/20/2016 - Present    Hiatal hernia K44.9   1/20/2016 - Present    Coronary artery disease due to lipid rich plaque I25.10, I25.83   1/20/2016 - Present    Paroxysmal a-fib (CMS-HCC) I48.0   1/20/2016 - Present    Prediabetes R73.03   1/20/2016 - Present    Health care maintenance Z00.00   1/20/2016 - Present    History of panic attacks Z86.59   1/20/2016 - Present    PAD (peripheral artery disease) (CMS-HCC) I73.9   3/1/2016 - Present    Cigarette nicotine dependence with nicotine-induced disorder F17.219   3/1/2016 - Present    Left ankle swelling M25.472   5/23/2016 - Present    Vitamin D deficiency E55.9   7/26/2016 - Present    Insomnia G47.00   7/26/2016 - Present    Depression with anxiety F41.8   2/15/2017 - Present      Health Maintenance        Date Due Completion Dates    IMM DTaP/Tdap/Td Vaccine (1 - Tdap) 2/26/1958 ---    MAMMOGRAM 2/26/1979 ---    IMM ZOSTER VACCINE 2/26/1999 ---    BONE DENSITY 2/26/2004 ---    IMM PNEUMOCOCCAL 65+ (ADULT) LOW/MEDIUM RISK SERIES (1 of 2 - PCV13) 2/26/2004 ---    IMM INFLUENZA (1) 9/1/2016 ---    COLONOSCOPY 3/19/2024 3/19/2014            Current Immunizations     No immunizations on file.      Below and/or attached are the medications your provider expects you to take. Review all of your home medications and newly ordered medications with your provider and/or pharmacist. Follow medication instructions as directed by your provider and/or pharmacist. Please keep your medication list with you and share with your provider. Update the information when medications are discontinued, doses are changed, or new medications (including  over-the-counter products) are added; and carry medication information at all times in the event of emergency situations     Allergies:  CODEINE - Swelling     IODINE - Swelling     BEE VENOM - (reactions not documented)     LATEX - (reactions not documented)     PCN - (reactions not documented)     SHELLFISH ALLERGY - (reactions not documented)     TETANUS ANTITOXIN - (reactions not documented)               Medications  Valid as of: March 20, 2017 - 11:06 AM    Generic Name Brand Name Tablet Size Instructions for use    Albuterol Sulfate (Aero Soln) albuterol 108 (90 BASE) MCG/ACT Inhale 2 Puffs by mouth every four hours as needed for Shortness of Breath.        Albuterol Sulfate (Nebu Soln) PROVENTIL 2.5mg/3ml 3 mL by Nebulization route every four hours as needed for Shortness of Breath.        ALPRAZolam (Tab) XANAX 0.25 MG TAKE ONE TABLET BY MOUTH DAILY AS NEEDED FOR SLEEP OR ANXIETY.        Aspirin (Tablet Delayed Response) ECOTRIN 81 MG Take 81 mg by mouth every day.        Cholecalciferol (Cap) Vitamin D 2000 UNITS Take 1 Cap by mouth every day.        Clopidogrel Bisulfate (Tab) PLAVIX 75 MG Take 1 Tab by mouth every day.        Diclofenac Sodium (Gel) Diclofenac Sodium 1 % Apply over affected area bid prn for pain        DiltiaZEM HCl Coated Beads (CAPSULE SR 24 HR) CARDIZEM  MG Take 1 Cap by mouth every day.        Fluticasone Propionate (Suspension) FLONASE 50 MCG/ACT Spray 1 Spray in nose every day.        Lovastatin (TABLET SR 24 HR) ALTOPREV 40 MG Take 40 mg by mouth every evening.        Pantoprazole Sodium (Tablet Delayed Response) PROTONIX 40 MG Take 1 Tab by mouth every day. Indications: Gastroesophageal Reflux Disease        Sotalol HCl (Tab) BETAPACE 80 MG Take 0.5 Tabs by mouth 2 times a day.        Sucralfate (Tab) CARAFATE 1 GM Take 1 Tab by mouth 4 Times a Day,Before Meals and at Bedtime.        TraZODone HCl (Tab) DESYREL 50 MG Take 0.5-1 Tabs by mouth at bedtime as needed for Sleep.          .                 Medicines prescribed today were sent to:     [facility] - [place], [place] - [address]    [address] [address]    Phone: [phone] Fax: [phone]    Open 24 Hours?: No    [facility] #[id number] - [place] - [address] [place] [address]    Phone: [phone] Fax: [phone]    Open 24 Hours?: No      Medication refill instructions:       If your prescription bottle indicates you have medication refills left, it is not necessary to call your provider’s office. Please contact your pharmacy and they will refill your medication.    If your prescription bottle indicates you do not have any refills left, you may request refills at any time through one of the following ways: The online [employer] system (except Urgent Care), by calling your provider’s office, or by asking your pharmacy to contact your provider’s office with a refill request. Medication refills are processed only during regular business hours and may not be available until the next business day. Your provider may request additional information or to have a follow-up visit with you prior to refilling your medication.   *Please Note: Medication refills are assigned a new Rx number when refilled electronically. Your pharmacy may indicate that no refills were authorized even though a new prescription for the same medication is available at the pharmacy. Please request the medicine by name with the pharmacy before contacting your provider for a refill.           [employer] Access Code: Activation code not generated  Current [employer] Status: Active          Quit Tobacco Information     Do you want to quit using tobacco?    Quitting tobacco decreases risks of cancer, heart and lung disease, increases life expectancy, improves sense of taste and smell, and increases spending money, among other benefits.    If you are thinking about quitting, we can help.  • [facility] Quit Tobacco  Program: 275.771.3353  o Program occurs weekly for four weeks and includes pharmacist consultation on products to support quitting smoking or chewing tobacco. A provider referral is needed for pharmacist consultation.  • Tobacco Users Help Hotline: 5-800-QUIT-NOW (686-1706) or https://[place].quitlogix.org/  o Free, confidential telephone and online coaching for [place] residents. Sessions are designed on a schedule that is convenient for you. Eligible clients receive free nicotine replacement therapy.  • Nationally: www.smokefree.gov  o Information and professional assistance to support both immediate and long-term needs as you become, and remain, a non-smoker. Smokefree.gov allows you to choose the help that best fits your needs.

## 2017-03-20 NOTE — ASSESSMENT & PLAN NOTE
Managed with Protonix 40 mg daily and sucralfate 1 g bid, both for many years. Has history of ulcers, hiatal hernia, GERD.  Initially followed very strict diet - Now avoids etoh, limits coffee, soda, acidic juices, spicy food.

## 2017-03-20 NOTE — ASSESSMENT & PLAN NOTE
Symptomatic, on a rhythm control strategy with sotalol 40 mg by mouth twice a day.  Followed by cardiology.

## 2017-03-21 ENCOUNTER — DOCUMENTATION (OUTPATIENT)
Dept: HEMATOLOGY ONCOLOGY | Facility: MEDICAL CENTER | Age: 77
End: 2017-03-21

## 2017-03-21 NOTE — PROGRESS NOTES
Received order for LCSP.  Chart review to assess for lung cancer screening program eligibility.   Age 55-80 yrs of age -77  30 pack year hx of smoking, or greater -30 pack year smoking history  Current smoker or if quit, must have quit within last 15 yrs -current  Asymptomatic (no signs or symptoms of lung cancer) -  none  No previous history of lung cancer -none  No Chest CT within past 12 mos. - none  Patient does   meet eligibility criteria - LCSP scheduling notified to schedule the shared decision making visit.       Luisa pérez np

## 2017-03-31 DIAGNOSIS — L03.115 CELLULITIS OF RIGHT LOWER LEG: ICD-10-CM

## 2017-03-31 DIAGNOSIS — S90.31XA CONTUSION OF RIGHT FOOT, INITIAL ENCOUNTER: ICD-10-CM

## 2017-03-31 DIAGNOSIS — E78.5 DYSLIPIDEMIA: ICD-10-CM

## 2017-03-31 DIAGNOSIS — Z76.0 MEDICATION REFILL: ICD-10-CM

## 2017-03-31 DIAGNOSIS — E78.5 HYPERLIPIDEMIA, UNSPECIFIED HYPERLIPIDEMIA TYPE: ICD-10-CM

## 2017-03-31 RX ORDER — DILTIAZEM HYDROCHLORIDE 240 MG/1
240 CAPSULE, COATED, EXTENDED RELEASE ORAL DAILY
Qty: 90 CAP | Refills: 3 | Status: SHIPPED | OUTPATIENT
Start: 2017-03-31 | End: 2018-06-01 | Stop reason: SDUPTHER

## 2017-04-03 ENCOUNTER — APPOINTMENT (OUTPATIENT)
Dept: HEMATOLOGY ONCOLOGY | Facility: MEDICAL CENTER | Age: 77
End: 2017-04-03
Payer: MEDICARE

## 2017-04-04 ENCOUNTER — TELEPHONE (OUTPATIENT)
Dept: CARDIOLOGY | Facility: MEDICAL CENTER | Age: 77
End: 2017-04-04

## 2017-04-04 DIAGNOSIS — E78.5 DYSLIPIDEMIA: ICD-10-CM

## 2017-04-04 DIAGNOSIS — Z00.00 HEALTH CARE MAINTENANCE: ICD-10-CM

## 2017-04-04 DIAGNOSIS — F17.219 CIGARETTE NICOTINE DEPENDENCE WITH NICOTINE-INDUCED DISORDER: ICD-10-CM

## 2017-04-04 RX ORDER — LOVASTATIN 40 MG/1
40 TABLET ORAL EVERY EVENING
Qty: 90 TAB | Refills: 3 | Status: SHIPPED | OUTPATIENT
Start: 2017-04-04 | End: 2018-05-14 | Stop reason: SDUPTHER

## 2017-04-04 NOTE — TELEPHONE ENCOUNTER
----- Message from Teressa Pacheco sent at 4/4/2017  2:04 PM PDT -----  Regarding: can't afford the Losartan prescription  IA/Amy      Patient said she can't take the Altoprev ER (losartan) medication. It's $2000 for a 3 month supply, and she can't afford it. She can be reached at [phone].

## 2017-04-04 NOTE — TELEPHONE ENCOUNTER
CT scan and chest xray ordered. Notified patient via [employer] (formerly [employer])t.    SANDEE KAYE

## 2017-04-04 NOTE — TELEPHONE ENCOUNTER
Called patient. Her co-pay for brand name Altoprev is $2000 for a 90-day supply.    Patient states that there is no reason that she cannot take generic Lovastatin.    Prescription for generic Lovastatin 40 mg every evening sent to [facility], Altoprev discontinued.    SANDEE RN

## 2017-04-04 NOTE — TELEPHONE ENCOUNTER
----- Message from Johnathan Morgan M.D. sent at 4/4/2017  3:30 PM PDT -----  Regarding: RE: MyChart Message from Patient  I think that is fine, then we should do a low-dose CT non-contrast CT chest please.    Thanks    GENE BECKHAM    ----- Message -----     From: Amy Townsend R.N.     Sent: 4/3/2017  12:39 PM       To: Johnathan Morgan M.D.  Subject: MyChart Message from Patient                     Hi Dr. Morgan,    I got a MyChart message from Angie Root, she was told that she doesn't qualify for the Lung Cancer Screening Program that you referred her to. She wanted to know if she should just have a chest xray done for now?    SANDEE KAYE

## 2017-04-06 ENCOUNTER — HOSPITAL ENCOUNTER (OUTPATIENT)
Dept: RADIOLOGY | Facility: MEDICAL CENTER | Age: 77
End: 2017-04-06

## 2017-04-10 ENCOUNTER — RX ONLY (OUTPATIENT)
Age: 77
Setting detail: RX ONLY
End: 2017-04-10

## 2017-04-12 ENCOUNTER — OFFICE VISIT (OUTPATIENT)
Dept: MEDICAL GROUP | Facility: PHYSICIAN GROUP | Age: 77
End: 2017-04-12
Payer: MEDICARE

## 2017-04-12 VITALS
SYSTOLIC BLOOD PRESSURE: 124 MMHG | RESPIRATION RATE: 16 BRPM | TEMPERATURE: 98.2 F | WEIGHT: 100 LBS | BODY MASS INDEX: 18.4 KG/M2 | HEART RATE: 70 BPM | DIASTOLIC BLOOD PRESSURE: 72 MMHG | HEIGHT: 62 IN | OXYGEN SATURATION: 97 %

## 2017-04-12 DIAGNOSIS — F41.8 DEPRESSION WITH ANXIETY: ICD-10-CM

## 2017-04-12 DIAGNOSIS — F17.219 CIGARETTE NICOTINE DEPENDENCE WITH NICOTINE-INDUCED DISORDER: ICD-10-CM

## 2017-04-12 DIAGNOSIS — F41.9 ANXIETY: ICD-10-CM

## 2017-04-12 DIAGNOSIS — Z86.59 HISTORY OF PANIC ATTACKS: ICD-10-CM

## 2017-04-12 DIAGNOSIS — F41.0 PANIC ATTACKS: ICD-10-CM

## 2017-04-12 DIAGNOSIS — S99.921D RIGHT FOOT INJURY, SUBSEQUENT ENCOUNTER: ICD-10-CM

## 2017-04-12 DIAGNOSIS — G47.00 INSOMNIA, UNSPECIFIED TYPE: ICD-10-CM

## 2017-04-12 PROCEDURE — 4004F PT TOBACCO SCREEN RCVD TLK: CPT | Performed by: NURSE PRACTITIONER

## 2017-04-12 PROCEDURE — G8598 ASA/ANTIPLAT THER USED: HCPCS | Performed by: NURSE PRACTITIONER

## 2017-04-12 PROCEDURE — G8432 DEP SCR NOT DOC, RNG: HCPCS | Performed by: NURSE PRACTITIONER

## 2017-04-12 PROCEDURE — 99215 OFFICE O/P EST HI 40 MIN: CPT | Performed by: NURSE PRACTITIONER

## 2017-04-12 PROCEDURE — 1101F PT FALLS ASSESS-DOCD LE1/YR: CPT | Performed by: NURSE PRACTITIONER

## 2017-04-12 PROCEDURE — G8419 CALC BMI OUT NRM PARAM NOF/U: HCPCS | Performed by: NURSE PRACTITIONER

## 2017-04-12 PROCEDURE — 4040F PNEUMOC VAC/ADMIN/RCVD: CPT | Mod: 8P | Performed by: NURSE PRACTITIONER

## 2017-04-12 RX ORDER — ALPRAZOLAM 0.25 MG/1
TABLET ORAL
Qty: 30 TAB | Refills: 1 | Status: SHIPPED | OUTPATIENT
Start: 2017-04-12 | End: 2017-04-12 | Stop reason: SDUPTHER

## 2017-04-12 RX ORDER — ALPRAZOLAM 0.25 MG/1
TABLET ORAL
Qty: 30 TAB | Refills: 1 | Status: SHIPPED
Start: 2017-04-12 | End: 2017-06-22 | Stop reason: SDUPTHER

## 2017-04-12 ASSESSMENT — PATIENT HEALTH QUESTIONNAIRE - PHQ9
2. FEELING DOWN, DEPRESSED, IRRITABLE, OR HOPELESS: 0
9. THOUGHTS THAT YOU WOULD BE BETTER OFF DEAD, OR OF HURTING YOURSELF: 0
3. TROUBLE FALLING OR STAYING ASLEEP OR SLEEPING TOO MUCH: 2
5. POOR APPETITE OR OVEREATING: 0
6. FEELING BAD ABOUT YOURSELF - OR THAT YOU ARE A FAILURE OR HAVE LET YOURSELF OR YOUR FAMILY DOWN: 0
1. LITTLE INTEREST OR PLEASURE IN DOING THINGS: 0
8. MOVING OR SPEAKING SO SLOWLY THAT OTHER PEOPLE COULD HAVE NOTICED. OR THE OPPOSITE, BEING SO FIGETY OR RESTLESS THAT YOU HAVE BEEN MOVING AROUND A LOT MORE THAN USUAL: 0
7. TROUBLE CONCENTRATING ON THINGS, SUCH AS READING THE NEWSPAPER OR WATCHING TELEVISION: 0
4. FEELING TIRED OR HAVING LITTLE ENERGY: 0
SUM OF ALL RESPONSES TO PHQ9 QUESTIONS 1 AND 2: 0
SUM OF ALL RESPONSES TO PHQ QUESTIONS 1-9: 2

## 2017-04-12 NOTE — ASSESSMENT & PLAN NOTE
Falls asleep between 830-900. Dog wakes her at 1 to go out, back to sleep at 3 then up at 530 to 6. Always needed about 6 hours of sleep. This has become her pattern for sleeping for many years.  Tried trazodone 25 mg and 50 mg no improvement.

## 2017-04-12 NOTE — PROGRESS NOTES
"  Subjective:     Chief Complaint   Patient presents with   • Anxiety   • Depression   • Insomnia        Angie Root is a 77 y.o. female here today for evaluation and management of:    Cigarette nicotine dependence with nicotine-induced disorder  Started smoking age [age]. Smoked 1 ppd at most. Stopped twice x 1 year. Now only smokes 5-6 per day.  Did not tolerate chantix, night valdes.      Depression with anxiety  Feels unhappy, not depressed. Keeps busy but is not happy in [place]. Bored  Stopped drinking a few years ago. Denies HI and SI. stopped drinking alcohol years ago since it was causing her to feel down.  She was taking xanax every morning with other meds, has \"attacks\" in elevators, sometimes stores, MRI  Also has trouble sleeping, wakes at 2 am.  Since last visit she tried 1/2 xanax in the morning but the other day had a panic attack and took the other half, now taking the other half 630 to 7 in the evening.  PHQ score was 2 today.      Panic attacks  Had panic attack the other day when she got lost on her way to medical appointment.    Insomnia  Falls asleep between 830-900. Dog wakes her at 1 to go out, back to sleep at 3 then up at 530 to 6. Always needed about 6 hours of sleep. This has become her pattern for sleeping for many years.  Tried trazodone 25 mg and 50 mg no improvement.            Pain and swelling of right foot improving, injured in December, imaging at  negative for fracture.        ROS  Denies HA, chest pain, shortness of breath, abdominal pain, bladder or bowel changes, lower extremity edema.    Current medicines (including changes today)  Current Outpatient Prescriptions   Medication Sig Dispense Refill   • alprazolam (XANAX) 0.25 MG Tab TAKE 1/2 to 1 TABLET BY MOUTH DAILY AS NEEDED FOR PANIC ATTACK. 30 Tab 1   • lovastatin (MEVACOR) 40 MG tablet Take 1 Tab by mouth every evening. 90 Tab 3   • diltiazem CD (CARDIZEM CD) 240 MG CAPSULE SR 24 HR Take 1 Cap by mouth every day. 90 Cap 3 "   • pantoprazole (PROTONIX) 40 MG Tablet Delayed Response Take 1 Tab by mouth every day. Indications: Gastroesophageal Reflux Disease 90 Tab 2   • sucralfate (CARAFATE) 1 GM Tab Take 1 Tab by mouth 4 Times a Day,Before Meals and at Bedtime. 360 Tab 1   • sotalol (BETAPACE) 80 MG Tab Take 0.5 Tabs by mouth 2 times a day. 90 Tab 3   • clopidogrel (PLAVIX) 75 MG Tab Take 1 Tab by mouth every day. 90 Tab 3   • albuterol (PROVENTIL) 2.5mg/3ml Nebu Soln solution for nebulization 3 mL by Nebulization route every four hours as needed for Shortness of Breath. 75 mL 1   • Cholecalciferol (VITAMIN D) 2000 UNITS Cap Take 1 Cap by mouth every day.     • albuterol 108 (90 BASE) MCG/ACT Aero Soln inhalation aerosol Inhale 2 Puffs by mouth every four hours as needed for Shortness of Breath. 1 Inhaler 1   • aspirin EC (ECOTRIN) 81 MG Tablet Delayed Response Take 81 mg by mouth every day.     • fluticasone (FLONASE) 50 MCG/ACT nasal spray Spray 1 Spray in nose every day.     • trazodone (DESYREL) 50 MG Tab Take 0.5-1 Tabs by mouth at bedtime as needed for Sleep. 30 Tab 1   • Diclofenac Sodium 1 % Gel Apply over affected area bid prn for pain 1 Tube 1     No current facility-administered medications for this visit.       She  has a past medical history of CAD (coronary artery disease); COPD (chronic obstructive pulmonary disease) (CMS-HCC); Hiatal hernia; GERD (gastroesophageal reflux disease); Diverticulosis; Dyslipidemia; Prediabetes; PAD (peripheral artery disease) (CMS-HCC); Gastroesophageal reflux disease without esophagitis (1/20/2016); Coronary artery disease due to lipid rich plaque (1/20/2016); and Paroxysmal a-fib (CMS-HCC) (1/20/2016).    Allergies Codeine; Iodine; Bee venom; Latex; Pcn; Shellfish allergy; and Tetanus antitoxin    Current medications, problem list, allergies, past medical history, and tobacco use history reviewed in [facility] today.    Health maintenance reviewed and updated.    Objective:   Blood pressure  "124/72, pulse 70, temperature 36.8 °C (98.2 °F), resp. rate 16, height 1.581 m (5' 2.25\"), weight 45.36 kg (100 lb), SpO2 97 %. Body mass index is 18.15 kg/(m^2).     Physical Exam   Constitutional: Alert, no acute distress. Pleasant and cooperative with the examination.  Skin:   Warm, dry, no rashes in visible areas.    Eyes:   Pupils equal, round. Conjunctiva and sclera clear,    Lids normal.  ENT:  Pinna normal.   Neck:   Supple, trachea midline.  Lungs:  Normal effort and respirations. Clear to auscultation bilaterally.  CV:  Regular rate and rhythm.  MS/Ext:  Steady gait, no edema.  Psych:  Eye contact is good, affect calm.    Assessment and Plan:   The following treatment plan was discussed    1. Insomnia, unspecified type     2. Depression with anxiety     3. Anxiety     4. Panic attacks     5. History of panic attacks  alprazolam (XANAX) 0.25 MG Tab   6. Cigarette nicotine dependence with nicotine-induced disorder     7. Right foot injury, subsequent encounter        Differential diagnosis, natural history, supportive care, and indications for immediate follow-up discussed at length   Counseled regarding medication use and lifestyle modifications.  Advised smoking cessation.  One of the medicines you are prescribed has risks including but not limited to:  BRAIN: sleepiness, difficulty thinking, confusion, impaired balance  LUNG: difficulty breathing, shortness of breath, slowing of breathing rate.  TOLERANCE: with regular use, the same dose may not produce the same pain relief  DEPENDENCE and ADDICTION: physical dependence can develop in 3-4 weeks of taking this medicine regularly. People with a history of drug or alcohol abuse are at increased risk of developing addiction (using drugs in a non-medically necessary manner)  WITHDRAWAL: if this medicine is stopped abruptly, symptoms of withdrawal may occur. These symptoms may include abdominal cramps, abnormal heartbeat, nausea, vomiting, sweating, flu-like " symptoms.    reviewed, she will call for additional medication if needed and f/u in 3 months.    Followup: Return in about 3 months (around 7/12/2017) for anxiety, panic attacks, sleep.  As scheduled, sooner if symptoms don't resolve or with any new problems.       Patient was seen for 40 minutes, more than 50% of time spent in face to face review, consultation, counseling, and arranging future evaluation and follow up of medical conditions and care.     Reviewed side effects, risks, and benefits of medications prescribed today.  Advised to take all medications as instructed and report any side effects.   The patient voices understanding and agrees.  Report any new or worsening symptoms.  Have labs or other diagnostic studies prior to follow up.  Keep all appointments for any referrals given.      Please note this dictation was created using voice recognition software. Every reasonable attempt has been made to correct obvious errors, however there may be errors of grammar and possibly content that were not discovered before finalizing the note.

## 2017-04-12 NOTE — ASSESSMENT & PLAN NOTE
Started smoking age [age]. Smoked 1 ppd at most. Stopped twice x 1 year. Now only smokes 5-6 per day.  Did not tolerate chantix, night valdes.

## 2017-04-12 NOTE — MR AVS SNAPSHOT
"Angie Root   2017 8:35 AM   Office Visit   MRN: [id number]    Department:  [facility]   Dept Phone:  [phone]    Description:  Female : 1940   Provider:  PEPE Hurt           Reason for Visit     Anxiety     Depression     Insomnia           Allergies as of 2017     Allergen Noted Reactions    Codeine 2015   Swelling    Iodine 2015   Swelling    Bee Venom 2015       Latex 2015       Pcn [Penicillins] 2015       Shellfish Allergy 2015       Tetanus Antitoxin 2015         You were diagnosed with     Insomnia, unspecified type   [5255946]       Depression with anxiety   [087845]       History of panic attacks   [2289627]       Cigarette nicotine dependence with nicotine-induced disorder   [531233]       Right foot injury, subsequent encounter   [239247]       Anxiety   [619929]         Vital Signs     Blood Pressure Pulse Temperature Respirations Height Weight    124/72 mmHg 70 36.8 °C (98.2 °F) 16 1.581 m (5' 2.25\") 45.36 kg (100 lb)    Body Mass Index Oxygen Saturation Smoking Status             18.15 kg/m2 97% Current Every Day Smoker         Basic Information     Date Of Birth Sex Race Ethnicity Preferred Language    1940 Female White Non- English      Your appointments     2017 12:50 PM   MA SCRN10 with [facility] MG 2   [facility] ([address])    [address]  [place] [address]   [phone]           No deodorant, powder, perfume or lotion under the arm or breast area.            2017 10:00 AM   CT BODY WO 30 with [address]   [facility] [place] - [address]    [address]  [place] [address] [phone]           Taking medications as regularly scheduled is strongly encouraged.            2017  8:15 AM   Established Patient with PEPE Hurt   [facility] ([place])    [address], [address]  [place] [address]   [phone]           You will be receiving a confirmation call a few days before your appointment from our automated call confirmation system.            Sep 15, 2017  9:30 AM   FOLLOW UP with Johnathan Morgan M.D.   [facility] (--)    [address], [address]  [place] [id number] [phone]              Problem List              ICD-10-CM Priority Class Noted - Resolved    Gastroesophageal reflux disease without esophagitis K21.9   1/20/2016 - Present    Dyslipidemia E78.5   1/20/2016 - Present    Hiatal hernia K44.9   1/20/2016 - Present    Coronary artery disease due to lipid rich plaque I25.10, I25.83   1/20/2016 - Present    Paroxysmal a-fib (CMS-HCC) I48.0   1/20/2016 - Present    Prediabetes R73.03   1/20/2016 - Present    Health care maintenance Z00.00   1/20/2016 - Present    History of panic attacks Z86.59   1/20/2016 - Present    PAD (peripheral artery disease) (CMS-HCC) I73.9   3/1/2016 - Present    Cigarette nicotine dependence with nicotine-induced disorder F17.219   3/1/2016 - Present    Left ankle swelling M25.472   5/23/2016 - Present    Vitamin D deficiency E55.9   7/26/2016 - Present    Insomnia G47.00   7/26/2016 - Present    Depression with anxiety F41.8   2/15/2017 - Present      Health Maintenance        Date Due Completion Dates    IMM DTaP/Tdap/Td Vaccine (1 - Tdap) 2/26/1958 ---    IMM ZOSTER VACCINE 2/26/1999 ---    BONE DENSITY 2/26/2004 ---    IMM PNEUMOCOCCAL 65+ (ADULT) LOW/MEDIUM RISK SERIES (1 of 2 - PCV13) 2/26/2004 ---    MAMMOGRAM 4/6/2018 4/6/2017, 4/6/2017, 4/6/2017    COLONOSCOPY 3/19/2024 3/19/2014            Current Immunizations     No immunizations on file.      Below and/or attached are the medications your provider expects you to take. Review all of your home medications and newly ordered medications with your provider and/or pharmacist. Follow medication instructions as directed by  your provider and/or pharmacist. Please keep your medication list with you and share with your provider. Update the information when medications are discontinued, doses are changed, or new medications (including over-the-counter products) are added; and carry medication information at all times in the event of emergency situations     Allergies:  CODEINE - Swelling     IODINE - Swelling     BEE VENOM - (reactions not documented)     LATEX - (reactions not documented)     PCN - (reactions not documented)     SHELLFISH ALLERGY - (reactions not documented)     TETANUS ANTITOXIN - (reactions not documented)               Medications  Valid as of: April 12, 2017 -  9:36 AM    Generic Name Brand Name Tablet Size Instructions for use    Albuterol Sulfate (Aero Soln) albuterol 108 (90 BASE) MCG/ACT Inhale 2 Puffs by mouth every four hours as needed for Shortness of Breath.        Albuterol Sulfate (Nebu Soln) PROVENTIL 2.5mg/3ml 3 mL by Nebulization route every four hours as needed for Shortness of Breath.        ALPRAZolam (Tab) XANAX 0.25 MG TAKE 1/2 to 1 TABLET BY MOUTH DAILY AS NEEDED FOR PANIC ATTACK.        Aspirin (Tablet Delayed Response) ECOTRIN 81 MG Take 81 mg by mouth every day.        Cholecalciferol (Cap) Vitamin D 2000 UNITS Take 1 Cap by mouth every day.        Clopidogrel Bisulfate (Tab) PLAVIX 75 MG Take 1 Tab by mouth every day.        Diclofenac Sodium (Gel) Diclofenac Sodium 1 % Apply over affected area bid prn for pain        DiltiaZEM HCl Coated Beads (CAPSULE SR 24 HR) CARDIZEM  MG Take 1 Cap by mouth every day.        Fluticasone Propionate (Suspension) FLONASE 50 MCG/ACT Spray 1 Spray in nose every day.        Lovastatin (Tab) MEVACOR 40 MG Take 1 Tab by mouth every evening.        Pantoprazole Sodium (Tablet Delayed Response) PROTONIX 40 MG Take 1 Tab by mouth every day. Indications: Gastroesophageal Reflux Disease        Sotalol HCl (Tab) BETAPACE 80 MG Take 0.5 Tabs by mouth 2 times a day.         Sucralfate (Tab) CARAFATE 1 GM Take 1 Tab by mouth 4 Times a Day,Before Meals and at Bedtime.        TraZODone HCl (Tab) DESYREL 50 MG Take 0.5-1 Tabs by mouth at bedtime as needed for Sleep.        .                 Medicines prescribed today were sent to:     [facility] - [place], [place] - [address]    [address] [address]    Phone: [phone] Fax: [phone]    Open 24 Hours?: No    [facility]"" [facility] #[id number] - [place] - [address] [place] [address]    Phone: [phone] Fax: [phone]    Open 24 Hours?: No      Medication refill instructions:       If your prescription bottle indicates you have medication refills left, it is not necessary to call your provider’s office. Please contact your pharmacy and they will refill your medication.    If your prescription bottle indicates you do not have any refills left, you may request refills at any time through one of the following ways: The online Slidebean system (except Urgent Care), by calling your provider’s office, or by asking your pharmacy to contact your provider’s office with a refill request. Medication refills are processed only during regular business hours and may not be available until the next business day. Your provider may request additional information or to have a follow-up visit with you prior to refilling your medication.   *Please Note: Medication refills are assigned a new Rx number when refilled electronically. Your pharmacy may indicate that no refills were authorized even though a new prescription for the same medication is available at the pharmacy. Please request the medicine by name with the pharmacy before contacting your provider for a refill.           Slidebean Access Code: Activation code not generated  Current Slidebean Status: Active          Quit Tobacco Information     Do you want to quit using tobacco?    Quitting tobacco decreases risks of cancer, heart and lung  disease, increases life expectancy, improves sense of taste and smell, and increases spending money, among other benefits.    If you are thinking about quitting, we can help.  • [facility] Quit Tobacco Program: 106.481.3778  o Program occurs weekly for four weeks and includes pharmacist consultation on products to support quitting smoking or chewing tobacco. A provider referral is needed for pharmacist consultation.  • Tobacco Users Help Hotline: 7-128-QUIT-NOW (554-1681) or https://[place].quitlogix.org/  o Free, confidential telephone and online coaching for [place] residents. Sessions are designed on a schedule that is convenient for you. Eligible clients receive free nicotine replacement therapy.  • Nationally: www.smokefree.gov  o Information and professional assistance to support both immediate and long-term needs as you become, and remain, a non-smoker. Smokefree.gov allows you to choose the help that best fits your needs.

## 2017-04-12 NOTE — ASSESSMENT & PLAN NOTE
"Feels unhappy, not depressed. Keeps busy but is not happy in [place]. Bored  Stopped drinking a few years ago. Denies HI and SI. stopped drinking alcohol years ago since it was causing her to feel down.  She was taking xanax every morning with other meds, has \"attacks\" in elevators, sometimes stores, MRI  Also has trouble sleeping, wakes at 2 am.  Since last visit she tried 1/2 xanax in the morning but the other day had a panic attack and took the other half, now taking the other half 630 to 7 in the evening.  PHQ score was 2 today.    "

## 2017-04-26 ENCOUNTER — TELEPHONE (OUTPATIENT)
Dept: MEDICAL GROUP | Facility: PHYSICIAN GROUP | Age: 77
End: 2017-04-26

## 2017-04-26 NOTE — TELEPHONE ENCOUNTER
Call patient ;  Most recent labs were done in December 2016, nothing due at this time. Does she has specific concern?  KADY Hurt.

## 2017-04-26 NOTE — TELEPHONE ENCOUNTER
1. Caller Name: Angie                                         Call Back Number: [phone]        Patient approves a detailed voicemail message: yes    Patient came in today requesting the usual lab work.   Please advise.

## 2017-04-28 ENCOUNTER — TELEPHONE (OUTPATIENT)
Dept: CARDIOLOGY | Facility: MEDICAL CENTER | Age: 77
End: 2017-04-28

## 2017-04-28 ENCOUNTER — HOSPITAL ENCOUNTER (OUTPATIENT)
Dept: RADIOLOGY | Facility: MEDICAL CENTER | Age: 77
End: 2017-04-28
Attending: INTERNAL MEDICINE
Payer: MEDICARE

## 2017-04-28 DIAGNOSIS — Z00.00 HEALTH CARE MAINTENANCE: ICD-10-CM

## 2017-04-28 DIAGNOSIS — F17.219 CIGARETTE NICOTINE DEPENDENCE WITH NICOTINE-INDUCED DISORDER: ICD-10-CM

## 2017-04-28 PROCEDURE — 71010 DX-CHEST-LIMITED (1 VIEW): CPT

## 2017-04-28 PROCEDURE — 71250 CT THORAX DX C-: CPT

## 2017-04-28 NOTE — TELEPHONE ENCOUNTER
Called patient and advised him of Dr. Johnathan Morgan's CT-chest interpretation. Patient is thankful for the good news.    SANDEE KAYE

## 2017-04-28 NOTE — TELEPHONE ENCOUNTER
----- Message from Johnathan Morgan M.D. sent at 4/28/2017  4:35 PM PDT -----  No lung nodules concerning for cancer!  Great news.    GENE BECKHAM

## 2017-05-01 ENCOUNTER — HOSPITAL ENCOUNTER (OUTPATIENT)
Dept: RADIOLOGY | Facility: MEDICAL CENTER | Age: 77
End: 2017-05-01
Attending: NURSE PRACTITIONER
Payer: MEDICARE

## 2017-05-01 PROCEDURE — 77063 BREAST TOMOSYNTHESIS BI: CPT

## 2017-05-09 RX ORDER — TRAZODONE HYDROCHLORIDE 50 MG/1
TABLET ORAL
Qty: 30 TAB | Refills: 1 | Status: SHIPPED | OUTPATIENT
Start: 2017-05-09 | End: 2017-11-17

## 2017-05-09 NOTE — TELEPHONE ENCOUNTER
Was the patient seen in the last year in this department? Yes     Does patient have an active prescription for medications requested? No     Received Request Via: Pharmacy      Pt met protocol?: Yes pt last ov 4/2017

## 2017-06-05 ENCOUNTER — OFFICE VISIT (OUTPATIENT)
Dept: MEDICAL GROUP | Facility: PHYSICIAN GROUP | Age: 77
End: 2017-06-05
Payer: MEDICARE

## 2017-06-05 ENCOUNTER — HOSPITAL ENCOUNTER (OUTPATIENT)
Dept: LAB | Facility: MEDICAL CENTER | Age: 77
End: 2017-06-05
Attending: FAMILY MEDICINE
Payer: MEDICARE

## 2017-06-05 VITALS
DIASTOLIC BLOOD PRESSURE: 64 MMHG | RESPIRATION RATE: 16 BRPM | HEIGHT: 62 IN | HEART RATE: 72 BPM | WEIGHT: 98 LBS | TEMPERATURE: 97.6 F | BODY MASS INDEX: 18.03 KG/M2 | SYSTOLIC BLOOD PRESSURE: 120 MMHG | OXYGEN SATURATION: 96 %

## 2017-06-05 DIAGNOSIS — Z78.0 MENOPAUSE: ICD-10-CM

## 2017-06-05 DIAGNOSIS — F17.219 CIGARETTE NICOTINE DEPENDENCE WITH NICOTINE-INDUCED DISORDER: ICD-10-CM

## 2017-06-05 DIAGNOSIS — R10.9 ABDOMINAL PAIN, UNSPECIFIED LOCATION: ICD-10-CM

## 2017-06-05 DIAGNOSIS — R73.01 IFG (IMPAIRED FASTING GLUCOSE): ICD-10-CM

## 2017-06-05 LAB
ALBUMIN SERPL BCP-MCNC: 4.1 G/DL (ref 3.2–4.9)
ALBUMIN/GLOB SERPL: 1.4 G/DL
ALP SERPL-CCNC: 91 U/L (ref 30–99)
ALT SERPL-CCNC: 15 U/L (ref 2–50)
ANION GAP SERPL CALC-SCNC: 5 MMOL/L (ref 0–11.9)
AST SERPL-CCNC: 22 U/L (ref 12–45)
BASOPHILS # BLD AUTO: 0.9 % (ref 0–1.8)
BASOPHILS # BLD: 0.09 K/UL (ref 0–0.12)
BILIRUB SERPL-MCNC: 0.5 MG/DL (ref 0.1–1.5)
BUN SERPL-MCNC: 16 MG/DL (ref 8–22)
CALCIUM SERPL-MCNC: 9.6 MG/DL (ref 8.5–10.5)
CHLORIDE SERPL-SCNC: 106 MMOL/L (ref 96–112)
CO2 SERPL-SCNC: 27 MMOL/L (ref 20–33)
CREAT SERPL-MCNC: 0.72 MG/DL (ref 0.5–1.4)
EOSINOPHIL # BLD AUTO: 0.15 K/UL (ref 0–0.51)
EOSINOPHIL NFR BLD: 1.6 % (ref 0–6.9)
ERYTHROCYTE [DISTWIDTH] IN BLOOD BY AUTOMATED COUNT: 47 FL (ref 35.9–50)
EST. AVERAGE GLUCOSE BLD GHB EST-MCNC: 123 MG/DL
GFR SERPL CREATININE-BSD FRML MDRD: >60 ML/MIN/1.73 M 2
GLOBULIN SER CALC-MCNC: 3 G/DL (ref 1.9–3.5)
GLUCOSE SERPL-MCNC: 92 MG/DL (ref 65–99)
HBA1C MFR BLD: 5.9 % (ref 0–5.6)
HCT VFR BLD AUTO: 46.5 % (ref 37–47)
HGB BLD-MCNC: 14.6 G/DL (ref 12–16)
IMM GRANULOCYTES # BLD AUTO: 0.02 K/UL (ref 0–0.11)
IMM GRANULOCYTES NFR BLD AUTO: 0.2 % (ref 0–0.9)
LYMPHOCYTES # BLD AUTO: 3.31 K/UL (ref 1–4.8)
LYMPHOCYTES NFR BLD: 34.6 % (ref 22–41)
MCH RBC QN AUTO: 28.1 PG (ref 27–33)
MCHC RBC AUTO-ENTMCNC: 31.4 G/DL (ref 33.6–35)
MCV RBC AUTO: 89.6 FL (ref 81.4–97.8)
MONOCYTES # BLD AUTO: 0.77 K/UL (ref 0–0.85)
MONOCYTES NFR BLD AUTO: 8 % (ref 0–13.4)
NEUTROPHILS # BLD AUTO: 5.23 K/UL (ref 2–7.15)
NEUTROPHILS NFR BLD: 54.7 % (ref 44–72)
NRBC # BLD AUTO: 0 K/UL
NRBC BLD AUTO-RTO: 0 /100 WBC
PLATELET # BLD AUTO: 260 K/UL (ref 164–446)
PMV BLD AUTO: 10.6 FL (ref 9–12.9)
POTASSIUM SERPL-SCNC: 3.8 MMOL/L (ref 3.6–5.5)
PROT SERPL-MCNC: 7.1 G/DL (ref 6–8.2)
RBC # BLD AUTO: 5.19 M/UL (ref 4.2–5.4)
SODIUM SERPL-SCNC: 138 MMOL/L (ref 135–145)
WBC # BLD AUTO: 9.6 K/UL (ref 4.8–10.8)

## 2017-06-05 PROCEDURE — 99214 OFFICE O/P EST MOD 30 MIN: CPT | Performed by: FAMILY MEDICINE

## 2017-06-05 PROCEDURE — 4040F PNEUMOC VAC/ADMIN/RCVD: CPT | Mod: 8P | Performed by: FAMILY MEDICINE

## 2017-06-05 PROCEDURE — 36415 COLL VENOUS BLD VENIPUNCTURE: CPT

## 2017-06-05 PROCEDURE — G8432 DEP SCR NOT DOC, RNG: HCPCS | Performed by: FAMILY MEDICINE

## 2017-06-05 PROCEDURE — 4004F PT TOBACCO SCREEN RCVD TLK: CPT | Performed by: FAMILY MEDICINE

## 2017-06-05 PROCEDURE — 80053 COMPREHEN METABOLIC PANEL: CPT

## 2017-06-05 PROCEDURE — G8419 CALC BMI OUT NRM PARAM NOF/U: HCPCS | Performed by: FAMILY MEDICINE

## 2017-06-05 PROCEDURE — 1101F PT FALLS ASSESS-DOCD LE1/YR: CPT | Performed by: FAMILY MEDICINE

## 2017-06-05 PROCEDURE — G8598 ASA/ANTIPLAT THER USED: HCPCS | Performed by: FAMILY MEDICINE

## 2017-06-05 PROCEDURE — 83036 HEMOGLOBIN GLYCOSYLATED A1C: CPT | Mod: GA

## 2017-06-05 PROCEDURE — 85025 COMPLETE CBC W/AUTO DIFF WBC: CPT

## 2017-06-05 NOTE — PROGRESS NOTES
"Subjective:     Chief Complaint   Patient presents with   • RLQ Pain     x 5 days     Angie Root is a 77 y.o. female here today for issues listed below   5 days of right sided abd pain. Gallbladder is removed. Still has appendix.  No nausea. Feels like a side stitch. Initially diffuse and now more localized to mid right side..   Has known acid reflux. Sleeps in sitting position at baseline.   Went to  over weekend but wait was too long so went home. Better today. No change with walking, eating, Pressing over are maybe makes a bit worse.   No prior hx of the same.  Has regular BMs.  Has had diverticulitis in past.   Sees GI: Takes PPI and sucralfate every day. Saw GI once in 2015.   No choking or gagging. No pain when swallows.    Has done heel bone density screen and told was \"low\" but has not had DEXA     Metabolic syndrome/impaired fasting glucose.   No current meds or monitoring. Denies post-prandial nausea or unusual fatigue.   Denies unusual polydipsia or polyuria.   Current diet: generally healthy.  Lab Results   Component Value Date/Time    GLYCOHEMOGLOBIN 6.1* 12/01/2016 09:37 AM      Continues to smoke. Not ready to quit. Believes her shingles and pneumonia vaccines are current. Does not have records available.     Allergies: Codeine; Iodine; Bee venom; Latex; Pcn; Shellfish allergy; and Tetanus antitoxin  Current medicines (including changes today)  Current Outpatient Prescriptions   Medication Sig Dispense Refill   • alprazolam (XANAX) 0.25 MG Tab TAKE 1/2 to 1 TABLET BY MOUTH DAILY AS NEEDED FOR PANIC ATTACK. 30 Tab 1   • lovastatin (MEVACOR) 40 MG tablet Take 1 Tab by mouth every evening. 90 Tab 3   • diltiazem CD (CARDIZEM CD) 240 MG CAPSULE SR 24 HR Take 1 Cap by mouth every day. 90 Cap 3   • pantoprazole (PROTONIX) 40 MG Tablet Delayed Response Take 1 Tab by mouth every day. Indications: Gastroesophageal Reflux Disease 90 Tab 2   • sucralfate (CARAFATE) 1 GM Tab Take 1 Tab by mouth 4 Times a " Day,Before Meals and at Bedtime. 360 Tab 1   • sotalol (BETAPACE) 80 MG Tab Take 0.5 Tabs by mouth 2 times a day. 90 Tab 3   • clopidogrel (PLAVIX) 75 MG Tab Take 1 Tab by mouth every day. 90 Tab 3   • albuterol (PROVENTIL) 2.5mg/3ml Nebu Soln solution for nebulization 3 mL by Nebulization route every four hours as needed for Shortness of Breath. 75 mL 1   • Diclofenac Sodium 1 % Gel Apply over affected area bid prn for pain 1 Tube 1   • Cholecalciferol (VITAMIN D) 2000 UNITS Cap Take 1 Cap by mouth every day.     • albuterol 108 (90 BASE) MCG/ACT Aero Soln inhalation aerosol Inhale 2 Puffs by mouth every four hours as needed for Shortness of Breath. 1 Inhaler 1   • aspirin EC (ECOTRIN) 81 MG Tablet Delayed Response Take 81 mg by mouth every day.     • fluticasone (FLONASE) 50 MCG/ACT nasal spray Spray 1 Spray in nose every day.     • trazodone (DESYREL) 50 MG Tab TAKE 1/2 TO 1 TABLET AT BEDTIME AS NEEDED FOR SLEEP (Patient not taking: Reported on 6/5/2017) 30 Tab 1     No current facility-administered medications for this visit.       She  has a past medical history of CAD (coronary artery disease); COPD (chronic obstructive pulmonary disease) (CMS-HCC); Hiatal hernia; GERD (gastroesophageal reflux disease); Diverticulosis; Dyslipidemia; Prediabetes; PAD (peripheral artery disease) (CMS-HCC); Gastroesophageal reflux disease without esophagitis (1/20/2016); Coronary artery disease due to lipid rich plaque (1/20/2016); and Paroxysmal a-fib (CMS-HCC) (1/20/2016).  Health Maintenance: vaccines reported as current. MA will try to obtain more info.  ROS  Constitutional: Negative for fever, chills/sweats   Respiratory: Negative for shortness of breath, VIDAL  Cardiovascular: Negative for chest pain or pressure  GI/: Negative for diarrhea/constipation / urinary difficulty  All other systems reviewed and are negative except as per HPI.        Objective:     Blood pressure 120/64, pulse 72, temperature 36.4 °C (97.6 °F),  "resp. rate 16, height 1.575 m (5' 2.01\"), weight 44.453 kg (98 lb), SpO2 96 %. Body mass index is 17.92 kg/(m^2).  Physical Exam:  Alert, oriented in no acute distress.  Eye contact is good, speech goal directed, affect calm  Oral mucous membranes pink and moist with no lesions.  Neck No adenopathy or masses in the neck or supraclavicular regions.  No carotid bruits. No thyromegaly  Abdomen No CVAT. Soft, ND, mildly tender right mid abdomen. No guarding. No HSM. No suprapubic tenderness.  Ext: no edema, no tenderness to palpation over shins      Assessment and Plan:     Right sided abd pain. Now 5 days out and improving.   Medical decision making: discussed possible appendicitis or colitis. Labs ordered to help determine if add'l imaging is needed.  Diet as tolerated.    Angie was seen today for rlq pain.    Diagnoses and all orders for this visit:    Abdominal pain, unspecified location  Comments:  improving. Check CBC. If elevated WBC or abnormal CMP, imaging will be ordered. ER precautions reviewed.   Orders:  -     CBC WITH DIFFERENTIAL; Future    Menopause  Comments:  at risk for osteoporosis. DEXA ordered.   Orders:  -     DS-BONE DENSITY STUDY (DEXA); Future    IFG (impaired fasting glucose)  Comments:  labs ordered to monitor. Discussed healthy diet.   Orders:  -     HEMOGLOBIN A1C; Future  -     COMP METABOLIC PANEL; Future    Cigarette nicotine dependence with nicotine-induced disorder  Comments:  pt advised to quit smoking        Followup: Return if symptoms worsen or fail to improve. sooner should new symptoms or problems arise.           "

## 2017-06-05 NOTE — MR AVS SNAPSHOT
"        Angie Dornsife   2017 9:40 AM   Office Visit   MRN: [id number]    Department:  [facility]   Dept Phone:  [phone]    Description:  Female : 1940   Provider:  Jenny Fields M.D.           Reason for Visit     RLQ Pain x 5 days      Allergies as of 2017     Allergen Noted Reactions    Codeine 2015   Swelling    Iodine 2015   Swelling    Bee Venom 2015       Chantix [Varenicline] 2017       disoriented    Latex 2015       Pcn [Penicillins] 2015       Shellfish Allergy 2015       Tetanus Antitoxin 2015         You were diagnosed with     Abdominal pain, unspecified location   [8464368]       Menopause   [683747]       IFG (impaired fasting glucose)   [149806]         Vital Signs     Blood Pressure Pulse Temperature Respirations Height Weight    120/64 mmHg 72 36.4 °C (97.6 °F) 16 1.575 m (5' 2.01\") 44.453 kg (98 lb)    Body Mass Index Oxygen Saturation Smoking Status             17.92 kg/m2 96% Current Every Day Smoker         Basic Information     Date Of Birth Sex Race Ethnicity Preferred Language    1940 Female White Non- English      Your appointments     2017  8:15 AM   Established Patient with PEPE Hurt   [facility] [place]    [address], [address]  [place] [address] [phone]           You will be receiving a confirmation call a few days before your appointment from our automated call confirmation system.            Sep 15, 2017  9:30 AM   FOLLOW UP with Johnathan Morgan M.D.   [facility] (--)    [address], [address]  [place] [address] [phone]              Problem List              ICD-10-CM Priority Class Noted - Resolved    Gastroesophageal reflux disease without esophagitis K21.9   2016 - Present    Dyslipidemia E78.5   2016 - Present    Hiatal hernia K44.9   2016 - Present   " Coronary artery disease due to lipid rich plaque I25.10, I25.83   1/20/2016 - Present    Paroxysmal a-fib (CMS-HCC) I48.0   1/20/2016 - Present    Prediabetes R73.03   1/20/2016 - Present    Health care maintenance Z00.00   1/20/2016 - Present    Panic attacks F41.0   1/20/2016 - Present    PAD (peripheral artery disease) (CMS-HCC) I73.9   3/1/2016 - Present    Cigarette nicotine dependence with nicotine-induced disorder F17.219   3/1/2016 - Present    Left ankle swelling M25.472   5/23/2016 - Present    Vitamin D deficiency E55.9   7/26/2016 - Present    Insomnia G47.00   7/26/2016 - Present    Depression with anxiety F41.8   2/15/2017 - Present      Health Maintenance        Date Due Completion Dates    IMM DTaP/Tdap/Td Vaccine (1 - Tdap) 2/26/1958 ---    IMM ZOSTER VACCINE 2/26/1999 ---    BONE DENSITY 2/26/2004 ---    IMM PNEUMOCOCCAL 65+ (ADULT) LOW/MEDIUM RISK SERIES (1 of 2 - PCV13) 2/26/2004 ---    MAMMOGRAM 5/1/2018 5/1/2017    COLONOSCOPY 3/19/2024 3/19/2014            Current Immunizations     No immunizations on file.      Below and/or attached are the medications your provider expects you to take. Review all of your home medications and newly ordered medications with your provider and/or pharmacist. Follow medication instructions as directed by your provider and/or pharmacist. Please keep your medication list with you and share with your provider. Update the information when medications are discontinued, doses are changed, or new medications (including over-the-counter products) are added; and carry medication information at all times in the event of emergency situations     Allergies:  CODEINE - Swelling     IODINE - Swelling     BEE VENOM - (reactions not documented)     CHANTIX - (reactions not documented)     LATEX - (reactions not documented)     PCN - (reactions not documented)     SHELLFISH ALLERGY - (reactions not documented)     TETANUS ANTITOXIN - (reactions not documented)                  Medications  Valid as of: June 05, 2017 - 10:27 AM    Generic Name Brand Name Tablet Size Instructions for use    Albuterol Sulfate (Aero Soln) albuterol 108 (90 BASE) MCG/ACT Inhale 2 Puffs by mouth every four hours as needed for Shortness of Breath.        Albuterol Sulfate (Nebu Soln) PROVENTIL 2.5mg/3ml 3 mL by Nebulization route every four hours as needed for Shortness of Breath.        ALPRAZolam (Tab) XANAX 0.25 MG TAKE 1/2 to 1 TABLET BY MOUTH DAILY AS NEEDED FOR PANIC ATTACK.        Aspirin (Tablet Delayed Response) ECOTRIN 81 MG Take 81 mg by mouth every day.        Cholecalciferol (Cap) Vitamin D 2000 UNITS Take 1 Cap by mouth every day.        Clopidogrel Bisulfate (Tab) PLAVIX 75 MG Take 1 Tab by mouth every day.        Diclofenac Sodium (Gel) Diclofenac Sodium 1 % Apply over affected area bid prn for pain        DilTIAZem HCl Coated Beads (CAPSULE SR 24 HR) CARDIZEM  MG Take 1 Cap by mouth every day.        Fluticasone Propionate (Suspension) FLONASE 50 MCG/ACT Spray 1 Spray in nose every day.        Lovastatin (Tab) MEVACOR 40 MG Take 1 Tab by mouth every evening.        Pantoprazole Sodium (Tablet Delayed Response) PROTONIX 40 MG Take 1 Tab by mouth every day. Indications: Gastroesophageal Reflux Disease        Sotalol HCl (Tab) BETAPACE 80 MG Take 0.5 Tabs by mouth 2 times a day.        Sucralfate (Tab) CARAFATE 1 GM Take 1 Tab by mouth 4 Times a Day,Before Meals and at Bedtime.        TraZODone HCl (Tab) DESYREL 50 MG TAKE 1/2 TO 1 TABLET AT BEDTIME AS NEEDED FOR SLEEP        .                 Medicines prescribed today were sent to:     [facility] - [place], [place] - [address]    [address] [address]    Phone: [phone] Fax: [phone]    Open 24 Hours?: No    [id number] [place] [address]    Phone: [phone] Fax: [phone]    Open 24 Hours?: No      Medication refill instructions:        If your prescription bottle indicates you have medication refills left, it is not necessary to call your provider’s office. Please contact your pharmacy and they will refill your medication.    If your prescription bottle indicates you do not have any refills left, you may request refills at any time through one of the following ways: The online [employer] system (except Urgent Care), by calling your provider’s office, or by asking your pharmacy to contact your provider’s office with a refill request. Medication refills are processed only during regular business hours and may not be available until the next business day. Your provider may request additional information or to have a follow-up visit with you prior to refilling your medication.   *Please Note: Medication refills are assigned a new Rx number when refilled electronically. Your pharmacy may indicate that no refills were authorized even though a new prescription for the same medication is available at the pharmacy. Please request the medicine by name with the pharmacy before contacting your provider for a refill.        Your To Do List     Future Labs/Procedures Complete By Expires    CBC WITH DIFFERENTIAL  As directed 6/6/2018    COMP METABOLIC PANEL  As directed 12/4/2017    DS-BONE DENSITY STUDY (DEXA)  As directed 12/6/2017    HEMOGLOBIN A1C  As directed 6/6/2018         Baobab Access Code: Activation code not generated  Current Baobab Status: Active          Quit Tobacco Information     Do you want to quit using tobacco?    Quitting tobacco decreases risks of cancer, heart and lung disease, increases life expectancy, improves sense of taste and smell, and increases spending money, among other benefits.    If you are thinking about quitting, we can help.  • [facility] Quit Tobacco Program: 511-951-7818  o Program occurs weekly for four weeks and includes pharmacist consultation on products to support quitting smoking or chewing tobacco. A provider  referral is needed for pharmacist consultation.  • Tobacco Users Help Hotline: 2-631-QUIT-NOW (956-6686) or https://[place].quitlogix.org/  o Free, confidential telephone and online coaching for [place] residents. Sessions are designed on a schedule that is convenient for you. Eligible clients receive free nicotine replacement therapy.  • Nationally: www.smokefree.gov  o Information and professional assistance to support both immediate and long-term needs as you become, and remain, a non-smoker. Smokefree.gov allows you to choose the help that best fits your needs.

## 2017-06-09 ENCOUNTER — NON-PROVIDER VISIT (OUTPATIENT)
Dept: MEDICAL GROUP | Facility: PHYSICIAN GROUP | Age: 77
End: 2017-06-09
Payer: MEDICARE

## 2017-06-09 NOTE — MR AVS SNAPSHOT
Angie [place]   2017 9:45 AM   Non-Provider Visit   MRN: [id number]    Department:  [facility]   Dept Phone:  [phone]    Description:  Female : 1940   Provider:  [place] [facility]           Reason for Visit     Hypertension BP check      Allergies as of 2017     Allergen Noted Reactions    Codeine 2015   Swelling    Iodine 2015   Swelling    Bee Venom 2015       Chantix [Varenicline] 2017       disoriented    Latex 2015       Pcn [Penicillins] 2015       Shellfish Allergy 2015       Tetanus Antitoxin 2015         Vital Signs     Smoking Status                   Current Every Day Smoker           Basic Information     Date Of Birth Sex Race Ethnicity Preferred Language    1940 Female White Non- English      Your appointments     2017  9:30 AM   BONE DENSITY (DEXASCAN) with [facility]   [facility] ([address])    [address] [place] [address] [address]  [place] [address] [phone]           No calcium supplements 24 hours prior to exam, including antacids or multivitamins w/calcium.  Pt may eat and drink normally.  No injection procedures prior to Dexa on the same day.  No barium contrast, no CTs with IV or oral contrast, no Pet/CTs and no Nuc Med injections for 7 days prior to a Dexa (including Barium Swallow, Upper GI, Small Bowel Series).  If scheduling a Dexa on the same day as a CT with IV or oral contrast, any test with barium, Pet/CT or a Nuc Med with injection, always schedule the Dexa before the other study.            2017  8:15 AM   Established Patient with PEPE Hurt   [facility] ([place])    [address], [address]  [place] [address] [phone]           You will be receiving a confirmation call a few days before your appointment from our automated call confirmation system.            Sep 15, 2017  9:30 AM   FOLLOW UP with Johnathan  SLY Morgan M.D.   [facility] (--)    [address], [address]  [place] [address] [phone]              Problem List              ICD-10-CM Priority Class Noted - Resolved    Gastroesophageal reflux disease without esophagitis K21.9   1/20/2016 - Present    Dyslipidemia E78.5   1/20/2016 - Present    Hiatal hernia K44.9   1/20/2016 - Present    Coronary artery disease due to lipid rich plaque I25.10, I25.83   1/20/2016 - Present    Paroxysmal a-fib (CMS-HCC) I48.0   1/20/2016 - Present    Prediabetes R73.03   1/20/2016 - Present    Health care maintenance Z00.00   1/20/2016 - Present    Panic attacks F41.0   1/20/2016 - Present    PAD (peripheral artery disease) (CMS-HCC) I73.9   3/1/2016 - Present    Cigarette nicotine dependence with nicotine-induced disorder F17.219   3/1/2016 - Present    Left ankle swelling M25.472   5/23/2016 - Present    Vitamin D deficiency E55.9   7/26/2016 - Present    Insomnia G47.00   7/26/2016 - Present    Depression with anxiety F41.8   2/15/2017 - Present      Health Maintenance        Date Due Completion Dates    IMM DTaP/Tdap/Td Vaccine (1 - Tdap) 2/26/1958 ---    IMM ZOSTER VACCINE 2/26/1999 ---    BONE DENSITY 2/26/2004 ---    IMM PNEUMOCOCCAL 65+ (ADULT) LOW/MEDIUM RISK SERIES (1 of 2 - PCV13) 2/26/2004 ---    MAMMOGRAM 5/1/2018 5/1/2017    COLONOSCOPY 3/19/2024 3/19/2014            Current Immunizations     Influenza Vaccine Adult HD 9/12/2016      Below and/or attached are the medications your provider expects you to take. Review all of your home medications and newly ordered medications with your provider and/or pharmacist. Follow medication instructions as directed by your provider and/or pharmacist. Please keep your medication list with you and share with your provider. Update the information when medications are discontinued, doses are changed, or new medications (including over-the-counter products) are added; and carry  medication information at all times in the event of emergency situations     Allergies:  CODEINE - Swelling     IODINE - Swelling     BEE VENOM - (reactions not documented)     CHANTIX - (reactions not documented)     LATEX - (reactions not documented)     PCN - (reactions not documented)     SHELLFISH ALLERGY - (reactions not documented)     TETANUS ANTITOXIN - (reactions not documented)               Medications  Valid as of: June 09, 2017 - 10:44 AM    Generic Name Brand Name Tablet Size Instructions for use    Albuterol Sulfate (Aero Soln) albuterol 108 (90 BASE) MCG/ACT Inhale 2 Puffs by mouth every four hours as needed for Shortness of Breath.        Albuterol Sulfate (Nebu Soln) PROVENTIL 2.5mg/3ml 3 mL by Nebulization route every four hours as needed for Shortness of Breath.        ALPRAZolam (Tab) XANAX 0.25 MG TAKE 1/2 to 1 TABLET BY MOUTH DAILY AS NEEDED FOR PANIC ATTACK.        Aspirin (Tablet Delayed Response) ECOTRIN 81 MG Take 81 mg by mouth every day.        Cholecalciferol (Cap) Vitamin D 2000 UNITS Take 1 Cap by mouth every day.        Clopidogrel Bisulfate (Tab) PLAVIX 75 MG Take 1 Tab by mouth every day.        Diclofenac Sodium (Gel) Diclofenac Sodium 1 % Apply over affected area bid prn for pain        DilTIAZem HCl Coated Beads (CAPSULE SR 24 HR) CARDIZEM  MG Take 1 Cap by mouth every day.        Fluticasone Propionate (Suspension) FLONASE 50 MCG/ACT Spray 1 Spray in nose every day.        Lovastatin (Tab) MEVACOR 40 MG Take 1 Tab by mouth every evening.        Pantoprazole Sodium (Tablet Delayed Response) PROTONIX 40 MG Take 1 Tab by mouth every day. Indications: Gastroesophageal Reflux Disease        Sotalol HCl (Tab) BETAPACE 80 MG Take 0.5 Tabs by mouth 2 times a day.        Sucralfate (Tab) CARAFATE 1 GM Take 1 Tab by mouth 4 Times a Day,Before Meals and at Bedtime.        TraZODone HCl (Tab) DESYREL 50 MG TAKE 1/2 TO 1 TABLET AT BEDTIME AS NEEDED FOR SLEEP        .                    Medicines prescribed today were sent to:     [facility] - [place], [place] - [address]    [address] [address]    Phone: [phone] Fax: [phone]    Open 24 Hours?: No    [facility] #[id number] - [place] - [address] [place] [address]    Phone: [phone] Fax: [phone]    Open 24 Hours?: No      Medication refill instructions:       If your prescription bottle indicates you have medication refills left, it is not necessary to call your provider’s office. Please contact your pharmacy and they will refill your medication.    If your prescription bottle indicates you do not have any refills left, you may request refills at any time through one of the following ways: The online [employer]KT system (except Urgent Care), by calling your provider’s office, or by asking your pharmacy to contact your provider’s office with a refill request. Medication refills are processed only during regular business hours and may not be available until the next business day. Your provider may request additional information or to have a follow-up visit with you prior to refilling your medication.   *Please Note: Medication refills are assigned a new Rx number when refilled electronically. Your pharmacy may indicate that no refills were authorized even though a new prescription for the same medication is available at the pharmacy. Please request the medicine by name with the pharmacy before contacting your provider for a refill.           YR.MRKT Access Code: Activation code not generated  Current YR.MRKT Status: Active          Quit Tobacco Information     Do you want to quit using tobacco?    Quitting tobacco decreases risks of cancer, heart and lung disease, increases life expectancy, improves sense of taste and smell, and increases spending money, among other benefits.    If you are thinking about quitting, we can help.  • [facility] Quit Tobacco Program:  [phone]  o Program occurs weekly for four weeks and includes pharmacist consultation on products to support quitting smoking or chewing tobacco. A provider referral is needed for pharmacist consultation.  • Tobacco Users Help Hotline: 6-800QUIT-NOW (123-6881) or https://[place].quitlogix.org/  o Free, confidential telephone and online coaching for [place] residents. Sessions are designed on a schedule that is convenient for you. Eligible clients receive free nicotine replacement therapy.  • Nationally: www.smokefree.gov  o Information and professional assistance to support both immediate and long-term needs as you become, and remain, a non-smoker. Smokefree.gov allows you to choose the help that best fits your needs.

## 2017-06-09 NOTE — PROGRESS NOTES
Angie Root is a 77 y.o. female here for a non-provider visit for BP check to compare to machine at home.    There were no vitals filed for this visit.  If abnormal, was an in office provider notified today? Not Indicated  Routed to PCP? No

## 2017-06-22 DIAGNOSIS — Z86.59 HISTORY OF PANIC ATTACKS: ICD-10-CM

## 2017-06-22 RX ORDER — ALPRAZOLAM 0.25 MG/1
TABLET ORAL
Qty: 30 TAB | Refills: 0 | Status: SHIPPED
Start: 2017-06-22 | End: 2017-07-13 | Stop reason: SDUPTHER

## 2017-06-22 NOTE — TELEPHONE ENCOUNTER
viewed  Requested Prescriptions     Signed Prescriptions Disp Refills   • alprazolam (XANAX) 0.25 MG Tab 30 Tab 0     Sig: TAKE 1/2 to 1 TABLET BY MOUTH DAILY AS NEEDED FOR PANIC ATTACK.     Authorizing Provider: MARY RAMIREZ     RX ready to fax to pharmacy.  KADY Hurt.

## 2017-07-07 ENCOUNTER — APPOINTMENT (OUTPATIENT)
Dept: RADIOLOGY | Facility: MEDICAL CENTER | Age: 77
End: 2017-07-07
Attending: FAMILY MEDICINE
Payer: MEDICARE

## 2017-07-13 ENCOUNTER — OFFICE VISIT (OUTPATIENT)
Dept: MEDICAL GROUP | Facility: PHYSICIAN GROUP | Age: 77
End: 2017-07-13
Payer: MEDICARE

## 2017-07-13 VITALS
SYSTOLIC BLOOD PRESSURE: 136 MMHG | BODY MASS INDEX: 17.85 KG/M2 | TEMPERATURE: 98 F | RESPIRATION RATE: 14 BRPM | HEART RATE: 60 BPM | WEIGHT: 97 LBS | DIASTOLIC BLOOD PRESSURE: 62 MMHG | OXYGEN SATURATION: 96 % | HEIGHT: 62 IN

## 2017-07-13 DIAGNOSIS — Z00.00 HEALTH CARE MAINTENANCE: ICD-10-CM

## 2017-07-13 DIAGNOSIS — Z86.59 HISTORY OF PANIC ATTACKS: ICD-10-CM

## 2017-07-13 DIAGNOSIS — I48.0 PAROXYSMAL A-FIB (HCC): ICD-10-CM

## 2017-07-13 DIAGNOSIS — F41.8 DEPRESSION WITH ANXIETY: ICD-10-CM

## 2017-07-13 DIAGNOSIS — I25.83 CORONARY ARTERY DISEASE DUE TO LIPID RICH PLAQUE: ICD-10-CM

## 2017-07-13 DIAGNOSIS — K21.9 GASTROESOPHAGEAL REFLUX DISEASE WITHOUT ESOPHAGITIS: ICD-10-CM

## 2017-07-13 DIAGNOSIS — F43.21 GRIEF REACTION: ICD-10-CM

## 2017-07-13 DIAGNOSIS — F41.0 PANIC ATTACKS: ICD-10-CM

## 2017-07-13 DIAGNOSIS — I25.10 CORONARY ARTERY DISEASE DUE TO LIPID RICH PLAQUE: ICD-10-CM

## 2017-07-13 DIAGNOSIS — R73.03 PREDIABETES: ICD-10-CM

## 2017-07-13 DIAGNOSIS — E55.9 VITAMIN D DEFICIENCY: ICD-10-CM

## 2017-07-13 PROCEDURE — 99215 OFFICE O/P EST HI 40 MIN: CPT | Performed by: NURSE PRACTITIONER

## 2017-07-13 RX ORDER — ALPRAZOLAM 0.25 MG/1
TABLET ORAL
Qty: 30 TAB | Refills: 5 | Status: SHIPPED
Start: 2017-07-13 | End: 2017-11-17

## 2017-07-13 NOTE — MR AVS SNAPSHOT
"        Angie Root   2017 8:15 AM   Office Visit   MRN: [id number]    Department:  [facility]   Dept Phone:  [phone]    Description:  Female : 1940   Provider:  PEPE Hurt           Reason for Visit     Chronic Care Management     Insomnia     Anxiety     Results           Allergies as of 2017     Allergen Noted Reactions    Codeine 2015   Swelling    Iodine 2015   Swelling    Bee Venom 2015       Chantix [Varenicline] 2017       disoriented    Latex 2015       Pcn [Penicillins] 2015       Shellfish Allergy 2015       Tetanus Antitoxin 2015         You were diagnosed with     Grief reaction   [300284]       Panic attacks   [328577]       Depression with anxiety   [081963]       History of panic attacks   [6533397]       Paroxysmal a-fib (CMS-HCC)   [829799]       Coronary artery disease due to lipid rich plaque   [7723200]       Prediabetes   [270035]       History of panic attacks   [2591438]   previous rx did not print    Vitamin D deficiency   [6475082]       Gastroesophageal reflux disease without esophagitis   [817038]       Health care maintenance   [386625]         Vital Signs     Blood Pressure Pulse Temperature Respirations Height Weight    136/62 mmHg 60 36.7 °C (98 °F) 14 1.575 m (5' 2.01\") 43.999 kg (97 lb)    Body Mass Index Oxygen Saturation Smoking Status             17.74 kg/m2 96% Current Every Day Smoker         Basic Information     Date Of Birth Sex Race Ethnicity Preferred Language    1940 Female White Non- English      Your appointments     Aug 22, 2017 10:00 AM   BONE DENSITY (DEXASCAN) with RB BD 1   [facility] ([address])    [address]  [address]  [facility] [address] [phone]           No calcium supplements 24 hours prior to exam, including antacids or multivitamins w/calcium.  Pt may eat and drink normally.  No injection procedures prior to Dexa " on the same day.  No barium contrast, no CTs with IV or oral contrast, no Pet/CTs and no Nuc Med injections for 7 days prior to a Dexa (including Barium Swallow, Upper GI, Small Bowel Series).  If scheduling a Dexa on the same day as a CT with IV or oral contrast, any test with barium, Pet/CT or a Nuc Med with injection, always schedule the Dexa before the other study.            Sep 15, 2017  9:30 AM   FOLLOW UP with Johnathan Morgan M.D.   [facility] (--)    [address], [address]  [place] [address]   [phone]            Nov 16, 2017  8:15 AM   Established Patient with PEPE Hurt   Formerly [facility])    [address]  [place] [address] [phone]           You will be receiving a confirmation call a few days before your appointment from our automated call confirmation system.              Problem List              ICD-10-CM Priority Class Noted - Resolved    Gastroesophageal reflux disease without esophagitis K21.9   1/20/2016 - Present    Dyslipidemia E78.5   1/20/2016 - Present    Hiatal hernia K44.9   1/20/2016 - Present    Coronary artery disease due to lipid rich plaque I25.10, I25.83   1/20/2016 - Present    Paroxysmal a-fib (CMS-HCC) I48.0   1/20/2016 - Present    Prediabetes R73.03   1/20/2016 - Present    Health care maintenance Z00.00   1/20/2016 - Present    Panic attacks F41.0   1/20/2016 - Present    PAD (peripheral artery disease) (CMS-HCC) I73.9   3/1/2016 - Present    Cigarette nicotine dependence with nicotine-induced disorder F17.219   3/1/2016 - Present    Left ankle swelling M25.472   5/23/2016 - Present    Vitamin D deficiency E55.9   7/26/2016 - Present    Insomnia G47.00   7/26/2016 - Present    Depression with anxiety F41.8   2/15/2017 - Present      Health Maintenance        Date Due Completion Dates    IMM DTaP/Tdap/Td Vaccine (1 - Tdap) 2/26/1958 ---    IMM ZOSTER VACCINE  2/26/1999 ---    BONE DENSITY 2/26/2004 ---    IMM PNEUMOCOCCAL 65+ (ADULT) LOW/MEDIUM RISK SERIES (1 of 2 - PCV13) 2/26/2004 ---    IMM INFLUENZA (1) 9/1/2017 9/12/2016    MAMMOGRAM 5/1/2018 5/1/2017    COLONOSCOPY 3/19/2024 3/19/2014            Current Immunizations     Influenza Vaccine Adult HD 9/12/2016      Below and/or attached are the medications your provider expects you to take. Review all of your home medications and newly ordered medications with your provider and/or pharmacist. Follow medication instructions as directed by your provider and/or pharmacist. Please keep your medication list with you and share with your provider. Update the information when medications are discontinued, doses are changed, or new medications (including over-the-counter products) are added; and carry medication information at all times in the event of emergency situations     Allergies:  CODEINE - Swelling     IODINE - Swelling     BEE VENOM - (reactions not documented)     CHANTIX - (reactions not documented)     LATEX - (reactions not documented)     PCN - (reactions not documented)     SHELLFISH ALLERGY - (reactions not documented)     TETANUS ANTITOXIN - (reactions not documented)               Medications  Valid as of: July 13, 2017 -  9:20 AM    Generic Name Brand Name Tablet Size Instructions for use    Albuterol Sulfate (Aero Soln) albuterol 108 (90 BASE) MCG/ACT Inhale 2 Puffs by mouth every four hours as needed for Shortness of Breath.        Albuterol Sulfate (Nebu Soln) PROVENTIL 2.5mg/3ml 3 mL by Nebulization route every four hours as needed for Shortness of Breath.        ALPRAZolam (Tab) XANAX 0.25 MG TAKE 1/2 to 1 TABLET BY MOUTH DAILY AS NEEDED FOR PANIC ATTACK.        Aspirin (Tablet Delayed Response) ECOTRIN 81 MG Take 81 mg by mouth every day.        Cholecalciferol (Cap) Vitamin D 2000 UNITS Take 1 Cap by mouth every day.        Clopidogrel Bisulfate (Tab) PLAVIX 75 MG Take 1 Tab by mouth every day.           Diclofenac Sodium (Gel) Diclofenac Sodium 1 % Apply over affected area bid prn for pain        DilTIAZem HCl Coated Beads (CAPSULE SR 24 HR) CARDIZEM  MG Take 1 Cap by mouth every day.        Fluticasone Propionate (Suspension) FLONASE 50 MCG/ACT Spray 1 Spray in nose every day.        Lovastatin (Tab) MEVACOR 40 MG Take 1 Tab by mouth every evening.        Pantoprazole Sodium (Tablet Delayed Response) PROTONIX 40 MG Take 1 Tab by mouth every day. Indications: Gastroesophageal Reflux Disease        Sotalol HCl (Tab) BETAPACE 80 MG Take 0.5 Tabs by mouth 2 times a day.        Sucralfate (Tab) CARAFATE 1 GM Take 1 Tab by mouth 4 Times a Day,Before Meals and at Bedtime.        TraZODone HCl (Tab) DESYREL 50 MG TAKE 1/2 TO 1 TABLET AT BEDTIME AS NEEDED FOR SLEEP        .                 Medicines prescribed today were sent to:     [facility] - [facility] [address]    [address] [address]    Phone: [phone] Fax: [phone]    Open 24 Hours?: No    [facility][id number] - [place] - [address]    [address] [place] [address]    Phone: [phone] Fax: [phone]    Open 24 Hours?: No      Medication refill instructions:       If your prescription bottle indicates you have medication refills left, it is not necessary to call your provider’s office. Please contact your pharmacy and they will refill your medication.    If your prescription bottle indicates you do not have any refills left, you may request refills at any time through one of the following ways: The online [employer] system (except Urgent Care), by calling your provider’s office, or by asking your pharmacy to contact your provider’s office with a refill request. Medication refills are processed only during regular business hours and may not be available until the next business day. Your provider may request additional information or to have a follow-up visit with you prior to refilling your  medication.   *Please Note: Medication refills are assigned a new Rx number when refilled electronically. Your pharmacy may indicate that no refills were authorized even though a new prescription for the same medication is available at the pharmacy. Please request the medicine by name with the pharmacy before contacting your provider for a refill.           MyChart Access Code: Activation code not generated  Current MyChart Status: Active          Quit Tobacco Information     Do you want to quit using tobacco?    Quitting tobacco decreases risks of cancer, heart and lung disease, increases life expectancy, improves sense of taste and smell, and increases spending money, among other benefits.    If you are thinking about quitting, we can help.  • [facility] Quit Tobacco Program: [phone]  o Program occurs weekly for four weeks and includes pharmacist consultation on products to support quitting smoking or chewing tobacco. A provider referral is needed for pharmacist consultation.  • Tobacco Users Help Hotline: 8-877-QUIT-NOW (276-6521) or https://[place].quitlogix.org/  o Free, confidential telephone and online coaching for [place] residents. Sessions are designed on a schedule that is convenient for you. Eligible clients receive free nicotine replacement therapy.  • Nationally: www.smokefree.gov  o Information and professional assistance to support both immediate and long-term needs as you become, and remain, a non-smoker. Smokefree.gov allows you to choose the help that best fits your needs.

## 2017-07-16 NOTE — PROGRESS NOTES
Subjective:     Chief Complaint   Patient presents with   • Chronic Care Management   • Insomnia   • Anxiety   • Results     Angie Root is a 77 y.o. female here today for evaluation and management of issues listed below.    Prediabetes  2016 A1c 6.4 then 6.1, recent A1c improved to 5.9    Panic attacks  Continues to have panic attacks, worsened anxiety since death of sister and other life stressors. Xanax one daily, no or very rare alcohol use.  reviewed.    Vitamin D deficiency  Managed with vitamin D  1000 iu 2 po qd. Recent level 38    Gastroesophageal reflux disease without esophagitis  Managed with Protonix 40 mg daily and sucralfate 1 g bid, both for many years. Has history of ulcers, hiatal hernia, GERD.  Initially followed very strict diet - Now avoids etoh, limits coffee, soda, acidic juices, spicy food.       grief and increased anxiety since unexpected death of her sister in [place]. Step daughter has been living with her for a while. She does bruise easily, still taking plavix and reports cardiologist wanted her to take an additional  medication but she declined due to bruising.   1. Grief reaction    2. Panic attacks    3. Depression with anxiety    4. History of panic attacks    5. Paroxysmal a-fib (CMS-HCC)    6. Coronary artery disease due to lipid rich plaque    7. Prediabetes    8. History of panic attacks    9. Vitamin D deficiency    10. Gastroesophageal reflux disease without esophagitis    11. Health care maintenance        Allergies: Codeine; Iodine; Bee venom; Chantix; Latex; Pcn; Shellfish allergy; and Tetanus antitoxin  Current medicines (including changes today)  Current Outpatient Prescriptions   Medication Sig Dispense Refill   • alprazolam (XANAX) 0.25 MG Tab TAKE 1/2 to 1 TABLET BY MOUTH DAILY AS NEEDED FOR PANIC ATTACK. 30 Tab 5   • lovastatin (MEVACOR) 40 MG tablet Take 1 Tab by mouth every evening. 90 Tab 3   • diltiazem CD (CARDIZEM CD) 240 MG CAPSULE SR 24 HR Take 1 Cap by  mouth every day. 90 Cap 3   • pantoprazole (PROTONIX) 40 MG Tablet Delayed Response Take 1 Tab by mouth every day. Indications: Gastroesophageal Reflux Disease 90 Tab 2   • sucralfate (CARAFATE) 1 GM Tab Take 1 Tab by mouth 4 Times a Day,Before Meals and at Bedtime. 360 Tab 1   • sotalol (BETAPACE) 80 MG Tab Take 0.5 Tabs by mouth 2 times a day. 90 Tab 3   • clopidogrel (PLAVIX) 75 MG Tab Take 1 Tab by mouth every day. 90 Tab 3   • albuterol (PROVENTIL) 2.5mg/3ml Nebu Soln solution for nebulization 3 mL by Nebulization route every four hours as needed for Shortness of Breath. 75 mL 1   • Cholecalciferol (VITAMIN D) 2000 UNITS Cap Take 1 Cap by mouth every day.     • albuterol 108 (90 BASE) MCG/ACT Aero Soln inhalation aerosol Inhale 2 Puffs by mouth every four hours as needed for Shortness of Breath. 1 Inhaler 1   • aspirin EC (ECOTRIN) 81 MG Tablet Delayed Response Take 81 mg by mouth every day.     • fluticasone (FLONASE) 50 MCG/ACT nasal spray Spray 1 Spray in nose every day.     • trazodone (DESYREL) 50 MG Tab TAKE 1/2 TO 1 TABLET AT BEDTIME AS NEEDED FOR SLEEP (Patient not taking: Reported on 6/5/2017) 30 Tab 1   • Diclofenac Sodium 1 % Gel Apply over affected area bid prn for pain 1 Tube 1     No current facility-administered medications for this visit.       She  has a past medical history of CAD (coronary artery disease); COPD (chronic obstructive pulmonary disease) (CMS-HCC); Hiatal hernia; GERD (gastroesophageal reflux disease); Diverticulosis; Dyslipidemia; Prediabetes; PAD (peripheral artery disease) (CMS-HCC); Gastroesophageal reflux disease without esophagitis (1/20/2016); Coronary artery disease due to lipid rich plaque (1/20/2016); and Paroxysmal a-fib (CMS-HCC) (1/20/2016).    ROS   Denies TRAN, chest pain, shortness of breath, abdominal pain, bladder or bowel changes, lower extremity edema.    Health Maintenance: Reviewed and updated.      Objective:   Blood pressure 136/62, pulse 60, temperature  "36.7 °C (98 °F), resp. rate 14, height 1.575 m (5' 2.01\"), weight 43.999 kg (97 lb), SpO2 96 %. Body mass index is 17.74 kg/(m^2).  Physical Exam   Alert, no acute distress. Pleasant and cooperative with the examination.  Eye contact is good, affect calm.  Skin: Warm, dry, no rashes in visible areas.    Eyes: Pupils equal, round. Conjunctiva and sclera clear, lids normal.  ENT: Pinna normal. Neck: Supple, trachea midline.  Lungs: Normal effort and respirations. Clear to auscultation bilaterally. Abdomen: No CVAT  CV: Regular rate and rhythm.  MS/Ext: Steady gait, no edema.    Results reviewed  Recent labs     Assessment and Plan:   Angie was seen today for chronic care management, insomnia, anxiety and results.    Diagnoses and all orders for this visit:    Grief reaction    Panic attacks    Depression with anxiety    History of panic attacks  -     alprazolam (XANAX) 0.25 MG Tab; TAKE 1/2 to 1 TABLET BY MOUTH DAILY AS NEEDED FOR PANIC ATTACK.    Paroxysmal a-fib (CMS-HCC)    Coronary artery disease due to lipid rich plaque    Prediabetes    History of panic attacks  Comments:  previous rx did not print  Orders:  -     alprazolam (XANAX) 0.25 MG Tab; TAKE 1/2 to 1 TABLET BY MOUTH DAILY AS NEEDED FOR PANIC ATTACK.    Vitamin D deficiency    Gastroesophageal reflux disease without esophagitis    Health care maintenance        Treatment Changes: none    Continue current medications and monitoring. Counseled regarding grief and stress management.      Followup: Return in about 4 months (around 11/13/2017). sooner should new symptoms or problems arise, or as previously scheduled.       Patient was seen for 40 minutes, more than 50% of time spent in face to face review, consultation, counseling, and arranging future evaluation and follow up of medical conditions and care.     Reviewed side effects, risks, and benefits of medications prescribed today.  Advised to take all medications as instructed and report any side " effects.   The patient voices understanding and agrees.  Report any new or worsening symptoms.  Have labs or other diagnostic studies prior to follow up.  Keep all appointments for any referrals given.    Please note this dictation was created using voice recognition software. Every reasonable attempt has been made to correct obvious errors, however there may be errors of grammar and possibly content that were not discovered before finalizing the note.

## 2017-08-08 DIAGNOSIS — K21.9 GASTROESOPHAGEAL REFLUX DISEASE WITHOUT ESOPHAGITIS: ICD-10-CM

## 2017-08-08 RX ORDER — SUCRALFATE 1 G/1
1 TABLET ORAL
Qty: 360 TAB | Refills: 0 | Status: SHIPPED | OUTPATIENT
Start: 2017-08-08 | End: 2017-10-10 | Stop reason: SDUPTHER

## 2017-08-22 ENCOUNTER — HOSPITAL ENCOUNTER (OUTPATIENT)
Dept: RADIOLOGY | Facility: MEDICAL CENTER | Age: 77
End: 2017-08-22
Attending: FAMILY MEDICINE
Payer: MEDICARE

## 2017-08-22 DIAGNOSIS — Z78.0 MENOPAUSE: ICD-10-CM

## 2017-08-22 PROCEDURE — 77080 DXA BONE DENSITY AXIAL: CPT

## 2017-08-24 PROBLEM — M81.0 OSTEOPOROSIS, POSTMENOPAUSAL: Status: ACTIVE | Noted: 2017-08-24

## 2017-09-19 ENCOUNTER — OFFICE VISIT (OUTPATIENT)
Dept: CARDIOLOGY | Facility: MEDICAL CENTER | Age: 77
End: 2017-09-19
Payer: MEDICARE

## 2017-09-19 VITALS
BODY MASS INDEX: 17.48 KG/M2 | OXYGEN SATURATION: 94 % | WEIGHT: 95 LBS | HEART RATE: 68 BPM | SYSTOLIC BLOOD PRESSURE: 160 MMHG | HEIGHT: 62 IN | DIASTOLIC BLOOD PRESSURE: 70 MMHG

## 2017-09-19 DIAGNOSIS — I73.9 PAD (PERIPHERAL ARTERY DISEASE) (HCC): ICD-10-CM

## 2017-09-19 DIAGNOSIS — I25.10 CORONARY ARTERY DISEASE DUE TO LIPID RICH PLAQUE: ICD-10-CM

## 2017-09-19 DIAGNOSIS — K57.32 DIVERTICULITIS OF LARGE INTESTINE WITHOUT PERFORATION OR ABSCESS WITHOUT BLEEDING: ICD-10-CM

## 2017-09-19 DIAGNOSIS — I10 ESSENTIAL HYPERTENSION, BENIGN: ICD-10-CM

## 2017-09-19 DIAGNOSIS — E78.5 DYSLIPIDEMIA: ICD-10-CM

## 2017-09-19 DIAGNOSIS — I48.0 PAROXYSMAL A-FIB (HCC): Primary | ICD-10-CM

## 2017-09-19 DIAGNOSIS — K57.32 DIVERTICULITIS OF LARGE INTESTINE WITHOUT PERFORATION OR ABSCESS WITHOUT BLEEDING: Primary | ICD-10-CM

## 2017-09-19 DIAGNOSIS — I25.83 CORONARY ARTERY DISEASE DUE TO LIPID RICH PLAQUE: ICD-10-CM

## 2017-09-19 DIAGNOSIS — F17.219 CIGARETTE NICOTINE DEPENDENCE WITH NICOTINE-INDUCED DISORDER: ICD-10-CM

## 2017-09-19 PROCEDURE — 99214 OFFICE O/P EST MOD 30 MIN: CPT | Performed by: INTERNAL MEDICINE

## 2017-09-19 ASSESSMENT — ENCOUNTER SYMPTOMS
DEPRESSION: 1
ORTHOPNEA: 0
PALPITATIONS: 0
LOSS OF CONSCIOUSNESS: 1
CLAUDICATION: 1
PND: 0

## 2017-09-19 NOTE — PROGRESS NOTES
Subjective:   Angie Root is a 77 -year-old woman with a history of PAD status post PCI to her left leg in 2008, CAD with 2 stents placed in her LAD in 2009, nicotine dependence, hypertension, dyslipidemia, and paroxysmal atrial fibrillation on rhythm control strategy.    She continues to do well today with no cardiovascular complaints tolerating her medications well. She tells me her home blood pressures are in the 110s to 120s over 70s mmHg range despite her number which is quite a bit higher than that today as below.    She does tell me she has had increased anxiety and frustration around road construction, but mostly around some issues in her family in conflict from that perspective.    Past Medical History:   Diagnosis Date   • CAD (coronary artery disease)    • COPD (chronic obstructive pulmonary disease) (CMS-HCC)    • Coronary artery disease due to lipid rich plaque 1/20/2016    Status post PCI ×2 in 3/27/09 ([place] SEUN x 2 to proximal LAD and mid LAD) in [place]    • Diverticulosis    • Dyslipidemia    • Gastroesophageal reflux disease without esophagitis 1/20/2016   • GERD (gastroesophageal reflux disease)    • Hiatal hernia    • PAD (peripheral artery disease) (CMS-HCC)    • Paroxysmal a-fib (CMS-HCC) 1/20/2016    Symptomatic, on a rhythm control strategy with sotalol 40 mg by mouth twice a day.    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     Family History   Problem Relation Age of Onset   • Hypertension Mother    • Hyperlipidemia Mother    • Cancer Maternal Grandmother      tongue   • Cancer Maternal Uncle      x 3, pancreas   • Cancer Maternal Grandfather      unknown   • Cancer Paternal Grandmother      unknown   • Cancer Paternal Grandfather      unknown   • Diabetes Maternal  Uncle    • Heart Disease Maternal Uncle    • Hypertension Sister    • Hyperlipidemia Sister    • Thyroid Sister    • Psychiatry Neg Hx    • Stroke Neg Hx      History   Smoking Status   • Current Every Day Smoker   • Packs/day: 0.25   • Years: 60.00   • Types: Cigarettes   • Start date: 3/20/1957   Smokeless Tobacco   • Never Used     Comment: 6 per day     Allergies   Allergen Reactions   • Codeine Swelling   • Iodine Swelling   • Bee Venom    • Chantix [Varenicline]      disoriented   • Latex    • Pcn [Penicillins]    • Shellfish Allergy    • Tetanus Antitoxin      Outpatient Encounter Prescriptions as of 9/19/2017   Medication Sig Dispense Refill   • Influenza Vac Split Quad (FLUZONE/FLUARIX) 0.5 ML Suspension Prefilled Syringe injection 0.5 mL by Intramuscular route Once.     • sucralfate (CARAFATE) 1 GM Tab Take 1 Tab by mouth 4 Times a Day,Before Meals and at Bedtime. 360 Tab 0   • alprazolam (XANAX) 0.25 MG Tab TAKE 1/2 to 1 TABLET BY MOUTH DAILY AS NEEDED FOR PANIC ATTACK. 30 Tab 5   • lovastatin (MEVACOR) 40 MG tablet Take 1 Tab by mouth every evening. 90 Tab 3   • diltiazem CD (CARDIZEM CD) 240 MG CAPSULE SR 24 HR Take 1 Cap by mouth every day. 90 Cap 3   • pantoprazole (PROTONIX) 40 MG Tablet Delayed Response Take 1 Tab by mouth every day. Indications: Gastroesophageal Reflux Disease 90 Tab 2   • sotalol (BETAPACE) 80 MG Tab Take 0.5 Tabs by mouth 2 times a day. 90 Tab 3   • clopidogrel (PLAVIX) 75 MG Tab Take 1 Tab by mouth every day. 90 Tab 3   • albuterol (PROVENTIL) 2.5mg/3ml Nebu Soln solution for nebulization 3 mL by Nebulization route every four hours as needed for Shortness of Breath. 75 mL 1   • Diclofenac Sodium 1 % Gel Apply over affected area bid prn for pain 1 Tube 1   • Cholecalciferol (VITAMIN D) 2000 UNITS Cap Take 1 Cap by mouth every day.     • albuterol 108 (90 BASE) MCG/ACT Aero Soln inhalation aerosol Inhale 2 Puffs by mouth every four hours as needed for Shortness of Breath. 1  "Inhaler 1   • aspirin EC (ECOTRIN) 81 MG Tablet Delayed Response Take 81 mg by mouth every day.     • fluticasone (FLONASE) 50 MCG/ACT nasal spray Spray 1 Spray in nose every day.     • trazodone (DESYREL) 50 MG Tab TAKE 1/2 TO 1 TABLET AT BEDTIME AS NEEDED FOR SLEEP (Patient not taking: Reported on 6/5/2017) 30 Tab 1     No facility-administered encounter medications on file as of 9/19/2017.      Review of Systems   HENT: Positive for hearing loss.         Prior sudden deafness of unclear etiology. This was worked up with a MRI, no eardrum rupture noted by an ENT who examined her. She was placed on antibiotics and steroids and it resolved.   Cardiovascular: Positive for claudication. Negative for chest pain, palpitations, orthopnea, leg swelling and PND.   Musculoskeletal: Positive for joint pain (resolving right ankle pain after a traumatic episode 2-1/2 months ago).   Neurological: Positive for loss of consciousness (previously diagnosed with vasovagal syncope).   Psychiatric/Behavioral: Positive for depression. Negative for suicidal ideas.   All other systems reviewed and are negative.       Objective:   /70   Pulse 68   Ht 1.575 m (5' 2\")   Wt 43.1 kg (95 lb)   SpO2 94%   BMI 17.38 kg/m²     Physical Exam   Constitutional: She is oriented to person, place, and time. She appears well-developed and well-nourished. No distress.   HENT:   Head: Normocephalic and atraumatic.   Eyes: Conjunctivae and EOM are normal. Pupils are equal, round, and reactive to light. No scleral icterus.   Neck: Neck supple. No JVD present. No tracheal deviation present.   Cardiovascular: Normal rate, regular rhythm, normal heart sounds and intact distal pulses.  Exam reveals no gallop and no friction rub.    No murmur heard.  Pulses:       Dorsalis pedis pulses are 1+ on the right side, and 1+ on the left side.   No carotid bruits   Pulmonary/Chest: Effort normal and breath sounds normal. No stridor. No respiratory distress. " "She has no wheezes. She has no rales.   Abdominal: Soft. Bowel sounds are normal. She exhibits no distension.   Musculoskeletal: She exhibits edema (trace on the left, 1+ on the right).   1+ woody edema of her lower extremities bilaterally   Neurological: She is alert and oriented to person, place, and time.   Skin: Skin is warm and dry. She is not diaphoretic. No erythema. No pallor.   Senile purpura noted, chronic venous stasis changes of her lower extremities also noted   Psychiatric: She has a normal mood and affect. Judgment and thought content normal.   Vitals reviewed.    Lab Results   Component Value Date/Time    WBC 9.6 06/05/2017 10:36 AM    RBC 5.19 06/05/2017 10:36 AM    HEMOGLOBIN 14.6 06/05/2017 10:36 AM    HEMATOCRIT 46.5 06/05/2017 10:36 AM    MCV 89.6 06/05/2017 10:36 AM    MCH 28.1 06/05/2017 10:36 AM    MCHC 31.4 (L) 06/05/2017 10:36 AM    MPV 10.6 06/05/2017 10:36 AM        Lab Results   Component Value Date/Time    SODIUM 138 06/05/2017 10:36 AM    POTASSIUM 3.8 06/05/2017 10:36 AM    CHLORIDE 106 06/05/2017 10:36 AM    CO2 27 06/05/2017 10:36 AM    GLUCOSE 92 06/05/2017 10:36 AM    BUN 16 06/05/2017 10:36 AM    CREATININE 0.72 06/05/2017 10:36 AM        Lab Results   Component Value Date/Time    ASTSGOT 22 06/05/2017 10:36 AM    ALTSGPT 15 06/05/2017 10:36 AM        Lab Results   Component Value Date/Time    CHOLSTRLTOT 166 12/01/2016 09:37 AM    LDL 70 12/01/2016 09:37 AM    HDL 84 12/01/2016 09:37 AM    TRIGLYCERIDE 61 12/01/2016 09:37 AM       Echocardiogram, 6/27/2016:  \"CONCLUSIONS  Normal left ventricular size, thickness, systolic function EF 60%.   Mitral annular calcification.  Aortic sclerosis without stenosis.   Mild tricuspid regurgitation.  Right ventricular systolic pressure is estimated to be 33 mmHg.   No prior study for comparison\"    Assessment:     1. Paroxysmal a-fib (CMS-HCC)     2. Coronary artery disease due to lipid rich plaque  LIPID PANEL   3. PAD (peripheral artery " disease) (CMS-HCC)     4. Diverticulitis of large intestine without perforation or abscess without bleeding  REFERRAL TO PEDIATRIC GASTROENTEROLOGY   5. Dyslipidemia     6. Cigarette nicotine dependence with nicotine-induced disorder     7. Essential hypertension, benign         Medical Decision Making:  Today's Assessment / Status / Plan:     She is doing well today from a cardiovascular perspective with good control of her blood pressure on low she has not been taking it for the last couple of weeks. She is stable on her medical regimen. I reviewed again with her her high risk of stroke with atrial fibrillation complicated by previous diverticulitis related to which she is extremely hasn't and 2 attempt an oral anticoagulant. I did refer her to gastroenterology to evaluate her diverticulitis as she does not have a local gastroenterologist at this time. Otherwise, I did not change her medical regimen. I did order a lipid panel, which is due in December.    Johnathan Morgan MD  Cardiologist, [facility] [place]     I spent 30 minutes with Ms. Angie Root, over fifty percent was spent counseling the patient on their condition, best management practices, reviewing test results, risks and benefits of treatment and coordinating care.

## 2017-09-19 NOTE — LETTER
Name:          Angie Root   YOB: 1940  Date:     9/19/2017      Renae Trevizo, CINTHIARIvoneNIvone  [address]  [facility] [address]     Johnathan Morgan MD  [address], [address]  [place] [address]  Phone: [phone]  Back Line: [phone]  Fax: [phone]  E-mail: [email or web address]   Dear Dr. Trevizo,    We had the pleasure of seeing your patient, Angie Root, in Cardiology Clinic at [facility] and [facility] today.    As you know, she is a 77-year-old woman with a history of PAD status post PCI to her left leg in 2008, CAD with 2 stents placed in her LAD in 2009, nicotine dependence, hypertension, dyslipidemia, and paroxysmal atrial fibrillation on rhythm control strategy.    She is doing well today from a cardiovascular perspective with good control of her blood pressure on low she has not been taking it for the last couple of weeks. She is stable on her medical regimen. I reviewed again with her her high risk of stroke with atrial fibrillation complicated by previous diverticulitis related to which she is extremely hasn't and 2 attempt an oral anticoagulant. I did refer her to gastroenterology to evaluate her diverticulitis as she does not have a local gastroenterologist at this time. Otherwise, I did not change her medical regimen. I did order a lipid panel, which is due in December.    Return in about 6 months (around 3/19/2018).    Thank you for the referral and please do not hesitate to contact me at any time. My contact information is listed above.    This note was dictated using Dragon speech recognition software.     A full note including my physical examination and a full list of rectified medications is available in our medical record, and can be faxed as well.    Johnathan Morgan MD  Cardiologist  [facility]

## 2017-09-21 ENCOUNTER — TELEPHONE (OUTPATIENT)
Dept: CARDIOLOGY | Facility: MEDICAL CENTER | Age: 77
End: 2017-09-21

## 2017-10-10 DIAGNOSIS — K21.9 GASTROESOPHAGEAL REFLUX DISEASE WITHOUT ESOPHAGITIS: ICD-10-CM

## 2017-10-11 NOTE — TELEPHONE ENCOUNTER
Was the patient seen in the last year in this department? Yes     Does patient have an active prescription for medications requested? No     Received Request Via: Pharmacy      Pt met protocol?: Yes pt last ov 7/17

## 2017-10-12 RX ORDER — SUCRALFATE 1 G/1
TABLET ORAL
Qty: 360 TAB | Refills: 0 | Status: SHIPPED | OUTPATIENT
Start: 2017-10-12 | End: 2017-11-17

## 2017-11-08 DIAGNOSIS — K21.9 GASTROESOPHAGEAL REFLUX DISEASE WITHOUT ESOPHAGITIS: ICD-10-CM

## 2017-11-09 RX ORDER — PANTOPRAZOLE SODIUM 40 MG/1
TABLET, DELAYED RELEASE ORAL
Qty: 90 TAB | Refills: 0 | Status: ON HOLD | OUTPATIENT
Start: 2017-11-09 | End: 2017-12-06

## 2017-11-09 NOTE — TELEPHONE ENCOUNTER
Was the patient seen in the last year in this department? Yes     Does patient have an active prescription for medications requested? No     Received Request Via: Pharmacy      Pt met protocol?: Yes    O.V 7/17

## 2017-11-17 ENCOUNTER — APPOINTMENT (OUTPATIENT)
Dept: RADIOLOGY | Facility: MEDICAL CENTER | Age: 77
DRG: 394 | End: 2017-11-17
Attending: EMERGENCY MEDICINE
Payer: MEDICARE

## 2017-11-17 ENCOUNTER — RESOLUTE PROFESSIONAL BILLING HOSPITAL PROF FEE (OUTPATIENT)
Dept: HOSPITALIST | Facility: MEDICAL CENTER | Age: 77
End: 2017-11-17
Payer: MEDICARE

## 2017-11-17 ENCOUNTER — HOSPITAL ENCOUNTER (INPATIENT)
Facility: MEDICAL CENTER | Age: 77
LOS: 3 days | DRG: 394 | End: 2017-11-20
Attending: EMERGENCY MEDICINE | Admitting: HOSPITALIST
Payer: MEDICARE

## 2017-11-17 DIAGNOSIS — K52.9 COLITIS: ICD-10-CM

## 2017-11-17 DIAGNOSIS — I48.0 PAROXYSMAL A-FIB (HCC): ICD-10-CM

## 2017-11-17 PROBLEM — E87.6 HYPOKALEMIA: Status: ACTIVE | Noted: 2017-11-17

## 2017-11-17 LAB
ALBUMIN SERPL BCP-MCNC: 3.9 G/DL (ref 3.2–4.9)
ALBUMIN/GLOB SERPL: 1.3 G/DL
ALP SERPL-CCNC: 93 U/L (ref 30–99)
ALT SERPL-CCNC: 11 U/L (ref 2–50)
ANION GAP SERPL CALC-SCNC: 8 MMOL/L (ref 0–11.9)
APPEARANCE UR: CLEAR
AST SERPL-CCNC: 18 U/L (ref 12–45)
BACTERIA #/AREA URNS HPF: NEGATIVE /HPF
BASOPHILS # BLD AUTO: 0.3 % (ref 0–1.8)
BASOPHILS # BLD: 0.05 K/UL (ref 0–0.12)
BILIRUB SERPL-MCNC: 0.7 MG/DL (ref 0.1–1.5)
BILIRUB UR QL STRIP.AUTO: NEGATIVE
BUN SERPL-MCNC: 19 MG/DL (ref 8–22)
CALCIUM SERPL-MCNC: 9.8 MG/DL (ref 8.5–10.5)
CHLORIDE SERPL-SCNC: 107 MMOL/L (ref 96–112)
CO2 SERPL-SCNC: 24 MMOL/L (ref 20–33)
COLOR UR: YELLOW
CREAT SERPL-MCNC: 0.64 MG/DL (ref 0.5–1.4)
EKG IMPRESSION: NORMAL
EOSINOPHIL # BLD AUTO: 0.04 K/UL (ref 0–0.51)
EOSINOPHIL NFR BLD: 0.2 % (ref 0–6.9)
EPI CELLS #/AREA URNS HPF: NEGATIVE /HPF
ERYTHROCYTE [DISTWIDTH] IN BLOOD BY AUTOMATED COUNT: 45 FL (ref 35.9–50)
GFR SERPL CREATININE-BSD FRML MDRD: >60 ML/MIN/1.73 M 2
GLOBULIN SER CALC-MCNC: 3.1 G/DL (ref 1.9–3.5)
GLUCOSE SERPL-MCNC: 100 MG/DL (ref 65–99)
GLUCOSE UR STRIP.AUTO-MCNC: NEGATIVE MG/DL
HCT VFR BLD AUTO: 46.5 % (ref 37–47)
HGB BLD-MCNC: 15.3 G/DL (ref 12–16)
HYALINE CASTS #/AREA URNS LPF: ABNORMAL /LPF
IMM GRANULOCYTES # BLD AUTO: 0.07 K/UL (ref 0–0.11)
IMM GRANULOCYTES NFR BLD AUTO: 0.4 % (ref 0–0.9)
KETONES UR STRIP.AUTO-MCNC: NEGATIVE MG/DL
LACTATE BLD-SCNC: 1 MMOL/L (ref 0.5–2)
LACTATE BLD-SCNC: 1.1 MMOL/L (ref 0.5–2)
LDH SERPL-CCNC: 149 U/L (ref 107–266)
LEUKOCYTE ESTERASE UR QL STRIP.AUTO: ABNORMAL
LIPASE SERPL-CCNC: 32 U/L (ref 11–82)
LYMPHOCYTES # BLD AUTO: 3.25 K/UL (ref 1–4.8)
LYMPHOCYTES NFR BLD: 19.9 % (ref 22–41)
MAGNESIUM SERPL-MCNC: 1.8 MG/DL (ref 1.5–2.5)
MCH RBC QN AUTO: 29.2 PG (ref 27–33)
MCHC RBC AUTO-ENTMCNC: 32.9 G/DL (ref 33.6–35)
MCV RBC AUTO: 88.7 FL (ref 81.4–97.8)
MICRO URNS: ABNORMAL
MONOCYTES # BLD AUTO: 1.37 K/UL (ref 0–0.85)
MONOCYTES NFR BLD AUTO: 8.4 % (ref 0–13.4)
NEUTROPHILS # BLD AUTO: 11.55 K/UL (ref 2–7.15)
NEUTROPHILS NFR BLD: 70.8 % (ref 44–72)
NITRITE UR QL STRIP.AUTO: NEGATIVE
NRBC # BLD AUTO: 0 K/UL
NRBC BLD AUTO-RTO: 0 /100 WBC
PH UR STRIP.AUTO: 6.5 [PH]
PLATELET # BLD AUTO: 205 K/UL (ref 164–446)
PMV BLD AUTO: 10.2 FL (ref 9–12.9)
POTASSIUM SERPL-SCNC: 3.1 MMOL/L (ref 3.6–5.5)
PROT SERPL-MCNC: 7 G/DL (ref 6–8.2)
PROT UR QL STRIP: NEGATIVE MG/DL
RBC # BLD AUTO: 5.24 M/UL (ref 4.2–5.4)
RBC # URNS HPF: ABNORMAL /HPF
RBC UR QL AUTO: ABNORMAL
SODIUM SERPL-SCNC: 139 MMOL/L (ref 135–145)
SP GR UR STRIP.AUTO: 1.02
UROBILINOGEN UR STRIP.AUTO-MCNC: 0.2 MG/DL
WBC # BLD AUTO: 16.3 K/UL (ref 4.8–10.8)
WBC #/AREA URNS HPF: ABNORMAL /HPF

## 2017-11-17 PROCEDURE — 93005 ELECTROCARDIOGRAM TRACING: CPT | Performed by: STUDENT IN AN ORGANIZED HEALTH CARE EDUCATION/TRAINING PROGRAM

## 2017-11-17 PROCEDURE — 700105 HCHG RX REV CODE 258: Performed by: INTERNAL MEDICINE

## 2017-11-17 PROCEDURE — 36415 COLL VENOUS BLD VENIPUNCTURE: CPT

## 2017-11-17 PROCEDURE — 80053 COMPREHEN METABOLIC PANEL: CPT

## 2017-11-17 PROCEDURE — 700117 HCHG RX CONTRAST REV CODE 255: Performed by: EMERGENCY MEDICINE

## 2017-11-17 PROCEDURE — 83605 ASSAY OF LACTIC ACID: CPT

## 2017-11-17 PROCEDURE — 700111 HCHG RX REV CODE 636 W/ 250 OVERRIDE (IP): Performed by: EMERGENCY MEDICINE

## 2017-11-17 PROCEDURE — 96365 THER/PROPH/DIAG IV INF INIT: CPT

## 2017-11-17 PROCEDURE — 83735 ASSAY OF MAGNESIUM: CPT

## 2017-11-17 PROCEDURE — 99285 EMERGENCY DEPT VISIT HI MDM: CPT

## 2017-11-17 PROCEDURE — 770020 HCHG ROOM/CARE - TELE (206)

## 2017-11-17 PROCEDURE — 700102 HCHG RX REV CODE 250 W/ 637 OVERRIDE(OP): Performed by: ANESTHESIOLOGY

## 2017-11-17 PROCEDURE — A9270 NON-COVERED ITEM OR SERVICE: HCPCS | Performed by: ANESTHESIOLOGY

## 2017-11-17 PROCEDURE — 700102 HCHG RX REV CODE 250 W/ 637 OVERRIDE(OP): Performed by: STUDENT IN AN ORGANIZED HEALTH CARE EDUCATION/TRAINING PROGRAM

## 2017-11-17 PROCEDURE — 99223 1ST HOSP IP/OBS HIGH 75: CPT | Mod: AI,GC | Performed by: HOSPITALIST

## 2017-11-17 PROCEDURE — 93010 ELECTROCARDIOGRAM REPORT: CPT | Performed by: INTERNAL MEDICINE

## 2017-11-17 PROCEDURE — 83615 LACTATE (LD) (LDH) ENZYME: CPT

## 2017-11-17 PROCEDURE — 93005 ELECTROCARDIOGRAM TRACING: CPT | Performed by: ANESTHESIOLOGY

## 2017-11-17 PROCEDURE — 83690 ASSAY OF LIPASE: CPT

## 2017-11-17 PROCEDURE — 96375 TX/PRO/DX INJ NEW DRUG ADDON: CPT

## 2017-11-17 PROCEDURE — 700111 HCHG RX REV CODE 636 W/ 250 OVERRIDE (IP): Performed by: INTERNAL MEDICINE

## 2017-11-17 PROCEDURE — 700105 HCHG RX REV CODE 258: Performed by: EMERGENCY MEDICINE

## 2017-11-17 PROCEDURE — 85025 COMPLETE CBC W/AUTO DIFF WBC: CPT

## 2017-11-17 PROCEDURE — 93010 ELECTROCARDIOGRAM REPORT: CPT | Mod: 77 | Performed by: INTERNAL MEDICINE

## 2017-11-17 PROCEDURE — 81001 URINALYSIS AUTO W/SCOPE: CPT

## 2017-11-17 PROCEDURE — 74177 CT ABD & PELVIS W/CONTRAST: CPT

## 2017-11-17 PROCEDURE — 700101 HCHG RX REV CODE 250: Performed by: STUDENT IN AN ORGANIZED HEALTH CARE EDUCATION/TRAINING PROGRAM

## 2017-11-17 PROCEDURE — 700101 HCHG RX REV CODE 250: Performed by: EMERGENCY MEDICINE

## 2017-11-17 PROCEDURE — A9270 NON-COVERED ITEM OR SERVICE: HCPCS | Performed by: STUDENT IN AN ORGANIZED HEALTH CARE EDUCATION/TRAINING PROGRAM

## 2017-11-17 RX ORDER — POTASSIUM CHLORIDE 20 MEQ/1
40 TABLET, EXTENDED RELEASE ORAL DAILY
Status: DISCONTINUED | OUTPATIENT
Start: 2017-11-17 | End: 2017-11-19

## 2017-11-17 RX ORDER — ALBUTEROL SULFATE 90 UG/1
2 AEROSOL, METERED RESPIRATORY (INHALATION) EVERY 4 HOURS PRN
Status: DISCONTINUED | OUTPATIENT
Start: 2017-11-17 | End: 2017-11-17

## 2017-11-17 RX ORDER — ALBUTEROL SULFATE 2.5 MG/3ML
2.5 SOLUTION RESPIRATORY (INHALATION) EVERY 4 HOURS PRN
Status: DISCONTINUED | OUTPATIENT
Start: 2017-11-17 | End: 2017-11-17

## 2017-11-17 RX ORDER — LOVASTATIN 20 MG/1
40 TABLET ORAL EVERY EVENING
Status: DISCONTINUED | OUTPATIENT
Start: 2017-11-17 | End: 2017-11-20 | Stop reason: HOSPADM

## 2017-11-17 RX ORDER — SUCRALFATE 1 G/1
1 TABLET ORAL 2 TIMES DAILY
COMMUNITY
End: 2019-03-12

## 2017-11-17 RX ORDER — DIPHENHYDRAMINE HYDROCHLORIDE 50 MG/ML
25 INJECTION INTRAMUSCULAR; INTRAVENOUS ONCE
Status: COMPLETED | OUTPATIENT
Start: 2017-11-17 | End: 2017-11-17

## 2017-11-17 RX ORDER — SODIUM CHLORIDE AND POTASSIUM CHLORIDE 150; 900 MG/100ML; MG/100ML
INJECTION, SOLUTION INTRAVENOUS CONTINUOUS
Status: DISCONTINUED | OUTPATIENT
Start: 2017-11-17 | End: 2017-11-18

## 2017-11-17 RX ORDER — SODIUM CHLORIDE 9 MG/ML
INJECTION, SOLUTION INTRAVENOUS CONTINUOUS
Status: DISCONTINUED | OUTPATIENT
Start: 2017-11-17 | End: 2017-11-17

## 2017-11-17 RX ORDER — SOTALOL HYDROCHLORIDE 80 MG/1
40 TABLET ORAL TWICE DAILY
Status: DISCONTINUED | OUTPATIENT
Start: 2017-11-17 | End: 2017-11-17

## 2017-11-17 RX ORDER — ALPRAZOLAM 0.25 MG/1
0.25 TABLET ORAL
Status: DISCONTINUED | OUTPATIENT
Start: 2017-11-17 | End: 2017-11-20 | Stop reason: HOSPADM

## 2017-11-17 RX ORDER — SOTALOL HYDROCHLORIDE 80 MG/1
40 TABLET ORAL TWICE DAILY
Status: DISCONTINUED | OUTPATIENT
Start: 2017-11-17 | End: 2017-11-20 | Stop reason: HOSPADM

## 2017-11-17 RX ORDER — ALPRAZOLAM 0.25 MG/1
0.25 TABLET ORAL 2 TIMES DAILY PRN
COMMUNITY
End: 2022-02-23 | Stop reason: SDUPTHER

## 2017-11-17 RX ORDER — OMEPRAZOLE 20 MG/1
20 CAPSULE, DELAYED RELEASE ORAL DAILY
Status: DISCONTINUED | OUTPATIENT
Start: 2017-11-18 | End: 2017-11-20 | Stop reason: HOSPADM

## 2017-11-17 RX ORDER — SOTALOL HYDROCHLORIDE 80 MG/1
40 TABLET ORAL 2 TIMES DAILY
Status: DISCONTINUED | OUTPATIENT
Start: 2017-11-17 | End: 2017-11-17

## 2017-11-17 RX ORDER — MORPHINE SULFATE 4 MG/ML
1-2 INJECTION, SOLUTION INTRAMUSCULAR; INTRAVENOUS EVERY 4 HOURS PRN
Status: DISCONTINUED | OUTPATIENT
Start: 2017-11-17 | End: 2017-11-20 | Stop reason: HOSPADM

## 2017-11-17 RX ORDER — DILTIAZEM HYDROCHLORIDE 240 MG/1
240 CAPSULE, COATED, EXTENDED RELEASE ORAL DAILY
Status: DISCONTINUED | OUTPATIENT
Start: 2017-11-18 | End: 2017-11-20 | Stop reason: HOSPADM

## 2017-11-17 RX ORDER — ACETAMINOPHEN 325 MG/1
650 TABLET ORAL EVERY 6 HOURS PRN
Status: DISCONTINUED | OUTPATIENT
Start: 2017-11-17 | End: 2017-11-20 | Stop reason: HOSPADM

## 2017-11-17 RX ORDER — POTASSIUM CHLORIDE 20 MEQ/1
40 TABLET, EXTENDED RELEASE ORAL ONCE
Status: COMPLETED | OUTPATIENT
Start: 2017-11-17 | End: 2017-11-17

## 2017-11-17 RX ORDER — CLOPIDOGREL BISULFATE 75 MG/1
75 TABLET ORAL DAILY
Status: DISCONTINUED | OUTPATIENT
Start: 2017-11-18 | End: 2017-11-18

## 2017-11-17 RX ORDER — NICOTINE 21 MG/24HR
14 PATCH, TRANSDERMAL 24 HOURS TRANSDERMAL
Status: DISCONTINUED | OUTPATIENT
Start: 2017-11-17 | End: 2017-11-20 | Stop reason: HOSPADM

## 2017-11-17 RX ORDER — MAGNESIUM SULFATE HEPTAHYDRATE 40 MG/ML
2 INJECTION, SOLUTION INTRAVENOUS ONCE
Status: COMPLETED | OUTPATIENT
Start: 2017-11-17 | End: 2017-11-17

## 2017-11-17 RX ORDER — SODIUM CHLORIDE 9 MG/ML
1000 INJECTION, SOLUTION INTRAVENOUS ONCE
Status: COMPLETED | OUTPATIENT
Start: 2017-11-17 | End: 2017-11-17

## 2017-11-17 RX ORDER — CEFTRIAXONE 1 G/1
1 INJECTION, POWDER, FOR SOLUTION INTRAMUSCULAR; INTRAVENOUS ONCE
Status: COMPLETED | OUTPATIENT
Start: 2017-11-17 | End: 2017-11-17

## 2017-11-17 RX ORDER — SUCRALFATE 1 G/1
1 TABLET ORAL 2 TIMES DAILY
Status: DISCONTINUED | OUTPATIENT
Start: 2017-11-17 | End: 2017-11-20 | Stop reason: HOSPADM

## 2017-11-17 RX ADMIN — POTASSIUM CHLORIDE AND SODIUM CHLORIDE: 900; 150 INJECTION, SOLUTION INTRAVENOUS at 18:56

## 2017-11-17 RX ADMIN — SODIUM CHLORIDE 1000 ML: 9 INJECTION, SOLUTION INTRAVENOUS at 14:32

## 2017-11-17 RX ADMIN — IOHEXOL 100 ML: 350 INJECTION, SOLUTION INTRAVENOUS at 14:15

## 2017-11-17 RX ADMIN — METRONIDAZOLE 500 MG: 500 INJECTION, SOLUTION INTRAVENOUS at 16:15

## 2017-11-17 RX ADMIN — MAGNESIUM SULFATE IN WATER 2 G: 40 INJECTION, SOLUTION INTRAVENOUS at 20:57

## 2017-11-17 RX ADMIN — POTASSIUM CHLORIDE 40 MEQ: 1500 TABLET, EXTENDED RELEASE ORAL at 17:15

## 2017-11-17 RX ADMIN — HYDROCORTISONE SODIUM SUCCINATE 100 MG: 100 INJECTION, POWDER, FOR SOLUTION INTRAMUSCULAR; INTRAVENOUS at 12:25

## 2017-11-17 RX ADMIN — CEFTRIAXONE SODIUM 1 G: 1 INJECTION, POWDER, FOR SOLUTION INTRAMUSCULAR; INTRAVENOUS at 16:00

## 2017-11-17 RX ADMIN — SOTALOL HYDROCHLORIDE 40 MG: 80 TABLET ORAL at 21:19

## 2017-11-17 RX ADMIN — POTASSIUM CHLORIDE 40 MEQ: 1500 TABLET, EXTENDED RELEASE ORAL at 20:57

## 2017-11-17 RX ADMIN — SODIUM CHLORIDE 1000 ML: 9 INJECTION, SOLUTION INTRAVENOUS at 18:00

## 2017-11-17 RX ADMIN — SUCRALFATE 1 G: 1 TABLET ORAL at 20:57

## 2017-11-17 RX ADMIN — METRONIDAZOLE 500 MG: 500 INJECTION, SOLUTION INTRAVENOUS at 20:57

## 2017-11-17 RX ADMIN — DIPHENHYDRAMINE HYDROCHLORIDE 25 MG: 50 INJECTION, SOLUTION INTRAMUSCULAR; INTRAVENOUS at 12:25

## 2017-11-17 RX ADMIN — LOVASTATIN 40 MG: 20 TABLET ORAL at 20:57

## 2017-11-17 ASSESSMENT — ENCOUNTER SYMPTOMS
DIAPHORESIS: 0
NAUSEA: 0
CHILLS: 0
COUGH: 0
FEVER: 0
BLURRED VISION: 0
DIARRHEA: 0
HEARTBURN: 1
VOMITING: 0
CONSTIPATION: 0
SORE THROAT: 0
ORTHOPNEA: 0
DOUBLE VISION: 0
PHOTOPHOBIA: 0
POLYDIPSIA: 0
SPEECH CHANGE: 0
DIZZINESS: 1
DEPRESSION: 0
FOCAL WEAKNESS: 0
SPUTUM PRODUCTION: 0
NECK PAIN: 0
SHORTNESS OF BREATH: 0
STRIDOR: 0
BLOOD IN STOOL: 0
SENSORY CHANGE: 0
MYALGIAS: 0
HEADACHES: 0
PALPITATIONS: 0
ABDOMINAL PAIN: 1

## 2017-11-17 ASSESSMENT — PATIENT HEALTH QUESTIONNAIRE - PHQ9
SUM OF ALL RESPONSES TO PHQ QUESTIONS 1-9: 0
1. LITTLE INTEREST OR PLEASURE IN DOING THINGS: NOT AT ALL
2. FEELING DOWN, DEPRESSED, IRRITABLE, OR HOPELESS: NOT AT ALL
SUM OF ALL RESPONSES TO PHQ9 QUESTIONS 1 AND 2: 0

## 2017-11-17 ASSESSMENT — LIFESTYLE VARIABLES
SUBSTANCE_ABUSE: 0
ALCOHOL_USE: NO
PACK_YEARS: 300
EVER_SMOKED: YES

## 2017-11-17 ASSESSMENT — PAIN SCALES - GENERAL
PAINLEVEL_OUTOF10: 10
PAINLEVEL_OUTOF10: 6
PAINLEVEL_OUTOF10: 0
PAINLEVEL_OUTOF10: 6

## 2017-11-17 NOTE — ED NOTES
Pt ambulatory to [facility] 17 w/ steady gait accompanied by .  Pt changing into a gown at this time.  Up for ERP evaluation.

## 2017-11-17 NOTE — ED NOTES
".  Chief Complaint   Patient presents with   • RLQ Pain     9/10 RLQ pain since 1100 last night, denies N/V     ./62   Pulse 73   Temp 36.5 °C (97.7 °F) (Temporal)   Resp 14   Ht 1.6 m (5' 3\")   Wt 44.1 kg (97 lb 3.6 oz)   SpO2 97%   BMI 17.22 kg/m²     Ambulatory to triage with above complaints, hx of diverticulosis, educated on triage process, placed in lobby with family member, told to inform staff of any changes in condition.    "

## 2017-11-17 NOTE — ED NOTES
Creatinine resulted.  CT aware and wants pt pre-medicated at this time.  They plan to scan pt in approx 20 minutes.  Report given to Sherin KAYE.

## 2017-11-17 NOTE — ED PROVIDER NOTES
ED Provider Note    Scribed for Bk Dubois M.D. by Yamil Ordonez. 11/17/2017  10:57 AM    Primary care provider: PEPE Hurt  Means of arrival: walk in  History obtained from: patient  History limited by: none    CHIEF COMPLAINT  Chief Complaint   Patient presents with   • RLQ Pain     9/10 RLQ pain since 1100 last night, denies N/V       HPI  Angie Root is a 77 y.o. female who presents to the Emergency Department complaining of sudden and severe right lower quadrant abdominal pain starting 12 hours ago. Patient describes her pain as 10/10 severity that is exacerbated with touch. She states that this pain is not the same as any previous pain. Patient states that her pain came on suddenly waking her from sleep. She reports that her last bowel movement was last night and was normal. She denies hematochezia or melena. Patient reports a history of cardiac stents, vascular dysfunction, GERD, diverticulitis, chronic history of cigarette use, pack per day for several decades. Patient denies nausea, vomiting, headache, diarrhea, fever, bloody stool, history of kidney stone, history of kidney infection.    REVIEW OF SYSTEMS  Pertinent positives include: abdominal pain.  Pertinent negatives include: nausea, vomiting, headache, diarrhea, fever, bloody stool.  10+ systems reviewed and negative.    C.    PAST MEDICAL HISTORY  Past Medical History:   Diagnosis Date   • CAD (coronary artery disease)    • COPD (chronic obstructive pulmonary disease) (CMS-HCC)    • Coronary artery disease due to lipid rich plaque 1/20/2016    Status post PCI ×2 in 3/27/09 ([facility] SEUN x 2 to proximal LAD and mid LAD) in [place]    • Diverticulosis    • Dyslipidemia    • Gastroesophageal reflux disease without esophagitis 1/20/2016   • GERD (gastroesophageal reflux disease)    • Hiatal hernia    • PAD (peripheral artery disease) (CMS-HCC)    • Paroxysmal a-fib (CMS-HCC) 1/20/2016    Symptomatic, on a rhythm control strategy  with sotalol 40 mg by mouth twice a day.    • Prediabetes        FAMILY HISTORY  Family History   Problem Relation Age of Onset   • Hypertension Mother    • Hyperlipidemia Mother    • Cancer Maternal Grandmother      tongue   • Cancer Maternal Uncle      x 3, pancreas   • Cancer Maternal Grandfather      unknown   • Cancer Paternal Grandmother      unknown   • Cancer Paternal Grandfather      unknown   • Diabetes Maternal Uncle    • Heart Disease Maternal Uncle    • Hypertension Sister    • Hyperlipidemia Sister    • Thyroid Sister    • Psychiatry Neg Hx    • Stroke Neg Hx        SOCIAL HISTORY  Social History   Substance Use Topics   • Smoking status: Current Every Day Smoker     Packs/day: 0.25     Years: 60.00     Types: Cigarettes     Start date: 3/20/1957   • Smokeless tobacco: Never Used      Comment: 6 per day   • Alcohol use No     History   Drug Use No       SURGICAL HISTORY  Past Surgical History:   Procedure Laterality Date   • WIDE EXCISION MELANOMA, LEG, W/POSS.STSG  10/2015    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     Current Outpatient Prescriptions:   •  pantoprazole (PROTONIX) 40 MG Tablet Delayed Response, TAKE 1 TABLET BY MOUTH EVERY DAY. INDICATIONS: GASTROESOPHAGEAL REFLUX DISEASE, Disp: 90 Tab, Rfl: 0  •  sucralfate (CARAFATE) 1 GM Tab, TAKE 1 TABLET FOUR TIMES DAILY BEFORE MEALS AND AT BEDTIME., Disp: 360 Tab, Rfl: 0  •  Influenza Vac Split Quad (FLUZONE/FLUARIX) 0.5 ML Suspension Prefilled Syringe injection, 0.5 mL by Intramuscular route Once., Disp: , Rfl:   •  alprazolam (XANAX) 0.25 MG Tab, TAKE 1/2 to 1 TABLET BY MOUTH DAILY AS NEEDED FOR PANIC ATTACK., Disp: 30 Tab, Rfl: 5  •  trazodone (DESYREL) 50 MG Tab, TAKE 1/2 TO 1 TABLET AT BEDTIME AS NEEDED FOR SLEEP (Patient not taking: Reported on 6/5/2017),  "Disp: 30 Tab, Rfl: 1  •  lovastatin (MEVACOR) 40 MG tablet, Take 1 Tab by mouth every evening., Disp: 90 Tab, Rfl: 3  •  diltiazem CD (CARDIZEM CD) 240 MG CAPSULE SR 24 HR, Take 1 Cap by mouth every day., Disp: 90 Cap, Rfl: 3  •  sotalol (BETAPACE) 80 MG Tab, Take 0.5 Tabs by mouth 2 times a day., Disp: 90 Tab, Rfl: 3  •  clopidogrel (PLAVIX) 75 MG Tab, Take 1 Tab by mouth every day., Disp: 90 Tab, Rfl: 3  •  albuterol (PROVENTIL) 2.5mg/3ml Nebu Soln solution for nebulization, 3 mL by Nebulization route every four hours as needed for Shortness of Breath., Disp: 75 mL, Rfl: 1  •  Diclofenac Sodium 1 % Gel, Apply over affected area bid prn for pain, Disp: 1 Tube, Rfl: 1  •  Cholecalciferol (VITAMIN D) 2000 UNITS Cap, Take 1 Cap by mouth every day., Disp: , Rfl:   •  albuterol 108 (90 BASE) MCG/ACT Aero Soln inhalation aerosol, Inhale 2 Puffs by mouth every four hours as needed for Shortness of Breath., Disp: 1 Inhaler, Rfl: 1  •  aspirin EC (ECOTRIN) 81 MG Tablet Delayed Response, Take 81 mg by mouth every day., Disp: , Rfl:   •  fluticasone (FLONASE) 50 MCG/ACT nasal spray, Spray 1 Spray in nose every day., Disp: , Rfl:     ALLERGIES  Allergies   Allergen Reactions   • Codeine Swelling   • Iodine Swelling   • Bee Venom    • Chantix [Varenicline]      disoriented   • Latex    • Pcn [Penicillins]    • Shellfish Allergy    • Tetanus Antitoxin        PHYSICAL EXAM  VITAL SIGNS: /62   Pulse 73   Temp 36.5 °C (97.7 °F) (Temporal)   Resp 14   Ht 1.6 m (5' 3\")   Wt 44.1 kg (97 lb 3.6 oz)   SpO2 97%   BMI 17.22 kg/m²   Reviewed andHypertensive  Constitutional: Well developed, Well nourished, No apparent distress.  HENT: Normocephalic, atraumatic, bilateral external ears normal, oropharynx moist, No exudates or erythema.   Eyes: PERRLA, conjunctiva pink, no scleral icterus.   Cardiovascular: Regular rate and rhythm. No murmurs, rubs or gallops.   Respiratory: Lungs clear to auscultation bilaterally. No wheezes, " rales, or rhonchi.   Abdominal:  Abdomen soft, non distended. No rebound. Guarding on palpation. Right lower quadrant   tenderness.  Positive Rovsing's sign. Hyperactive bowel sounds.   Skin: No erythema, no rash. Skin tears that are healing on lower bilateral shins  Genitourinary: No costovertebral angle tenderness.   Musculoskeletal: None noted edema.   Neurologic: Alert & oriented x 3, cranial nerves 2-12 intact by passive exam.  No focal deficit noted.  Psychiatric: Affect normal, Judgment normal, Mood normal.     DIFFERENTIAL DIAGNOSIS:  Abdominal ischemia, diverticulitis, perforated diverticula, appendicitis.    RADIOLOGY/PROCEDURES  CT-ABDOMEN-PELVIS WITH   Final Result      1.  Marked wall thickening of the cecum and ascending colon with pericolonic inflammatory changes, most likely inflammatory or infectious colitis.  Underlying etiology is uncertain.  Ischemic processes and tumor are not excluded.   2.  No CT evidence for acute appendicitis.   3.  Increased colonic stool.   4.  No bowel obstruction or evidence for perforation.      Radiologist interpretation have been reviewed by me.     LABORATORY:  Results for orders placed or performed during the hospital encounter of 11/17/17   CBC WITH DIFFERENTIAL   Result Value Ref Range    WBC 16.3 (H) 4.8 - 10.8 K/uL    RBC 5.24 4.20 - 5.40 M/uL    Hemoglobin 15.3 12.0 - 16.0 g/dL    Hematocrit 46.5 37.0 - 47.0 %    MCV 88.7 81.4 - 97.8 fL    MCH 29.2 27.0 - 33.0 pg    MCHC 32.9 (L) 33.6 - 35.0 g/dL    RDW 45.0 35.9 - 50.0 fL    Platelet Count 205 164 - 446 K/uL    MPV 10.2 9.0 - 12.9 fL    Neutrophils-Polys 70.80 44.00 - 72.00 %    Lymphocytes 19.90 (L) 22.00 - 41.00 %    Monocytes 8.40 0.00 - 13.40 %    Eosinophils 0.20 0.00 - 6.90 %    Basophils 0.30 0.00 - 1.80 %    Immature Granulocytes 0.40 0.00 - 0.90 %    Nucleated RBC 0.00 /100 WBC    Neutrophils (Absolute) 11.55 (H) 2.00 - 7.15 K/uL    Lymphs (Absolute) 3.25 1.00 - 4.80 K/uL    Monos (Absolute) 1.37 (H)  0.00 - 0.85 K/uL    Eos (Absolute) 0.04 0.00 - 0.51 K/uL    Baso (Absolute) 0.05 0.00 - 0.12 K/uL    Immature Granulocytes (abs) 0.07 0.00 - 0.11 K/uL    NRBC (Absolute) 0.00 K/uL   COMP METABOLIC PANEL   Result Value Ref Range    Sodium 139 135 - 145 mmol/L    Potassium 3.1 (L) 3.6 - 5.5 mmol/L    Chloride 107 96 - 112 mmol/L    Co2 24 20 - 33 mmol/L    Anion Gap 8.0 0.0 - 11.9    Glucose 100 (H) 65 - 99 mg/dL    Bun 19 8 - 22 mg/dL    Creatinine 0.64 0.50 - 1.40 mg/dL    Calcium 9.8 8.5 - 10.5 mg/dL    AST(SGOT) 18 12 - 45 U/L    ALT(SGPT) 11 2 - 50 U/L    Alkaline Phosphatase 93 30 - 99 U/L    Total Bilirubin 0.7 0.1 - 1.5 mg/dL    Albumin 3.9 3.2 - 4.9 g/dL    Total Protein 7.0 6.0 - 8.2 g/dL    Globulin 3.1 1.9 - 3.5 g/dL    A-G Ratio 1.3 g/dL   URINALYSIS   Result Value Ref Range    Color Yellow     Character Clear     Specific Gravity 1.018 <1.035    Ph 6.5 5.0 - 8.0    Glucose Negative Negative mg/dL    Ketones Negative Negative mg/dL    Protein Negative Negative mg/dL    Bilirubin Negative Negative    Urobilinogen, Urine 0.2 Negative    Nitrite Negative Negative    Leukocyte Esterase Trace (A) Negative    Occult Blood Small (A) Negative    Micro Urine Req Microscopic    LIPASE   Result Value Ref Range    Lipase 32 11 - 82 U/L   LACTIC ACID   Result Value Ref Range    Lactic Acid 1.0 0.5 - 2.0 mmol/L   URINE MICROSCOPIC (W/UA)   Result Value Ref Range    WBC 5-10 (A) /hpf    RBC 2-5 (A) /hpf    Bacteria Negative None /hpf    Epithelial Cells Negative /hpf    Hyaline Cast 0-2 /lpf   Lab results reviewed by me.     INTERVENTIONS:  Medications   cefTRIAXone (ROCEPHIN) injection 1 g (not administered)   metronidazole (FLAGYL) IVPB 500 mg (not administered)   hydrocortisone sodium succinate PF (SOLU-CORTEF) 100 MG injection 100 mg (100 mg Intravenous Given 11/17/17 1225)   diphenhydrAMINE (BENADRYL) injection 25 mg (25 mg Intravenous Given 11/17/17 1225)   NS infusion 1,000 mL (1,000 mL Intravenous New Bag  11/17/17 1432)   iohexol (OMNIPAQUE) 350 mg/mL (100 mL Intravenous Given 11/17/17 1415)       ED COURSE:    10:57 AM - Patient seen and examined at bedside. Patient will have urine, blood and cat scan performed to rule out appendicitis and other emergent processes. Patient verbalizes understanding and agreement to this plan of care. Patient will be treated with Benadryl 25 mg, Solu-cortef 100 mg for her symptoms. Ordered CT abdomen pelvis, Urine microscope, Lipase, CBC with differential, CMP, Urinalysis, Estimated GFR, Lactic acid to evaluate.     1:19 PM - Patient reevaluated at bedside. Patient is sleeping. She was roused and reports that she is still in severe pain. Discussed results thus far. Patient will be treated with NS 1000 ml for fluid resuscitation.     3:03 PM - Recheck: Patient re-evaluated at beside. Patient reports feeling continued pain. Discussed admission for antibiotic treatment. Patient verbalizes understanding and agreement to this plan of care. Patient will be treated with Rocephin 1 g, Flagyl 500 mg.     3:07 PM - I, Dr. Beryl Piedra, discussed the patient's case and the above findings with [facility] internal medicine who agrees to admit the patient.     MEDICAL DECISION MAKING:  This patient presents with sudden onset right lower quadrant abdominal pain and has colitis which may be infectious or ischemic given her history. There is no perforation or definite sepsis. There is no appendicitis or diverticulitis. In her age and leukocytosis and risk of bowel ischemia she will require admission for observation and IV antibiotics.    DISPOSITION:  Patient will be admitted to hospital in guarded condition.    FINAL IMPRESSION  1. Colitis       I evaluated the patient with resident Dr. Beryl Piedra. I independently evaluated the patient and repeated the important components of the history and physical. I discussed the management with the resident. I have reviewed and agree with the pertinent clinical  information above including history, exam, study findings, and recommendations.      IYamil (Scribe), am scribing for, and in the presence of, Bk Dubois M.D..    Electronically signed by: Yamil Ordonez (Scribe), 11/17/2017    IBk M.D. personally performed the services described in this documentation, as scribed by Yamil Ordonez in my presence, and it is both accurate and complete.    The note accurately reflects work and decisions made by me.  Bk Dubois  11/17/2017  6:17 PM

## 2017-11-18 ENCOUNTER — APPOINTMENT (OUTPATIENT)
Dept: RADIOLOGY | Facility: MEDICAL CENTER | Age: 77
DRG: 394 | End: 2017-11-18
Attending: STUDENT IN AN ORGANIZED HEALTH CARE EDUCATION/TRAINING PROGRAM
Payer: MEDICARE

## 2017-11-18 LAB
ALBUMIN SERPL BCP-MCNC: 2.9 G/DL (ref 3.2–4.9)
ALBUMIN/GLOB SERPL: 1.3 G/DL
ALP SERPL-CCNC: 62 U/L (ref 30–99)
ALT SERPL-CCNC: 8 U/L (ref 2–50)
ANION GAP SERPL CALC-SCNC: 5 MMOL/L (ref 0–11.9)
AST SERPL-CCNC: 12 U/L (ref 12–45)
BASOPHILS # BLD AUTO: 0.3 % (ref 0–1.8)
BASOPHILS # BLD AUTO: 0.6 % (ref 0–1.8)
BASOPHILS # BLD: 0.03 K/UL (ref 0–0.12)
BASOPHILS # BLD: 0.06 K/UL (ref 0–0.12)
BILIRUB SERPL-MCNC: 0.5 MG/DL (ref 0.1–1.5)
BUN SERPL-MCNC: 12 MG/DL (ref 8–22)
CALCIUM SERPL-MCNC: 8.4 MG/DL (ref 8.5–10.5)
CHLORIDE SERPL-SCNC: 114 MMOL/L (ref 96–112)
CO2 SERPL-SCNC: 20 MMOL/L (ref 20–33)
CREAT SERPL-MCNC: 0.52 MG/DL (ref 0.5–1.4)
EKG IMPRESSION: NORMAL
EKG IMPRESSION: NORMAL
EOSINOPHIL # BLD AUTO: 0.01 K/UL (ref 0–0.51)
EOSINOPHIL # BLD AUTO: 0.14 K/UL (ref 0–0.51)
EOSINOPHIL NFR BLD: 0.1 % (ref 0–6.9)
EOSINOPHIL NFR BLD: 1.4 % (ref 0–6.9)
ERYTHROCYTE [DISTWIDTH] IN BLOOD BY AUTOMATED COUNT: 45.6 FL (ref 35.9–50)
ERYTHROCYTE [DISTWIDTH] IN BLOOD BY AUTOMATED COUNT: 46.4 FL (ref 35.9–50)
GFR SERPL CREATININE-BSD FRML MDRD: >60 ML/MIN/1.73 M 2
GLOBULIN SER CALC-MCNC: 2.2 G/DL (ref 1.9–3.5)
GLUCOSE SERPL-MCNC: 88 MG/DL (ref 65–99)
HCT VFR BLD AUTO: 36.1 % (ref 37–47)
HCT VFR BLD AUTO: 37.2 % (ref 37–47)
HEMOCCULT STL QL: NEGATIVE
HGB BLD-MCNC: 11.9 G/DL (ref 12–16)
HGB BLD-MCNC: 12.2 G/DL (ref 12–16)
IMM GRANULOCYTES # BLD AUTO: 0.03 K/UL (ref 0–0.11)
IMM GRANULOCYTES # BLD AUTO: 0.06 K/UL (ref 0–0.11)
IMM GRANULOCYTES NFR BLD AUTO: 0.3 % (ref 0–0.9)
IMM GRANULOCYTES NFR BLD AUTO: 0.5 % (ref 0–0.9)
LACTATE BLD-SCNC: 0.9 MMOL/L (ref 0.5–2)
LACTATE BLD-SCNC: 1 MMOL/L (ref 0.5–2)
LV EJECT FRACT  99904: 60
LV EJECT FRACT MOD 2C 99903: 55.52
LV EJECT FRACT MOD 4C 99902: 57.7
LV EJECT FRACT MOD BP 99901: 56.16
LYMPHOCYTES # BLD AUTO: 2.33 K/UL (ref 1–4.8)
LYMPHOCYTES # BLD AUTO: 3.57 K/UL (ref 1–4.8)
LYMPHOCYTES NFR BLD: 19.5 % (ref 22–41)
LYMPHOCYTES NFR BLD: 35.2 % (ref 22–41)
MCH RBC QN AUTO: 29.3 PG (ref 27–33)
MCH RBC QN AUTO: 29.5 PG (ref 27–33)
MCHC RBC AUTO-ENTMCNC: 32.8 G/DL (ref 33.6–35)
MCHC RBC AUTO-ENTMCNC: 33 G/DL (ref 33.6–35)
MCV RBC AUTO: 89.4 FL (ref 81.4–97.8)
MCV RBC AUTO: 89.4 FL (ref 81.4–97.8)
MONOCYTES # BLD AUTO: 0.72 K/UL (ref 0–0.85)
MONOCYTES # BLD AUTO: 0.81 K/UL (ref 0–0.85)
MONOCYTES NFR BLD AUTO: 6.8 % (ref 0–13.4)
MONOCYTES NFR BLD AUTO: 7.1 % (ref 0–13.4)
NEUTROPHILS # BLD AUTO: 5.61 K/UL (ref 2–7.15)
NEUTROPHILS # BLD AUTO: 8.73 K/UL (ref 2–7.15)
NEUTROPHILS NFR BLD: 55.4 % (ref 44–72)
NEUTROPHILS NFR BLD: 72.8 % (ref 44–72)
NRBC # BLD AUTO: 0 K/UL
NRBC # BLD AUTO: 0 K/UL
NRBC BLD AUTO-RTO: 0 /100 WBC
NRBC BLD AUTO-RTO: 0 /100 WBC
PLATELET # BLD AUTO: 158 K/UL (ref 164–446)
PLATELET # BLD AUTO: 173 K/UL (ref 164–446)
PMV BLD AUTO: 10.2 FL (ref 9–12.9)
PMV BLD AUTO: 9.8 FL (ref 9–12.9)
POTASSIUM SERPL-SCNC: 4.1 MMOL/L (ref 3.6–5.5)
PROT SERPL-MCNC: 5.1 G/DL (ref 6–8.2)
RBC # BLD AUTO: 4.04 M/UL (ref 4.2–5.4)
RBC # BLD AUTO: 4.16 M/UL (ref 4.2–5.4)
SODIUM SERPL-SCNC: 139 MMOL/L (ref 135–145)
WBC # BLD AUTO: 10.1 K/UL (ref 4.8–10.8)
WBC # BLD AUTO: 12 K/UL (ref 4.8–10.8)
WBC STL QL MICRO: NORMAL

## 2017-11-18 PROCEDURE — 700111 HCHG RX REV CODE 636 W/ 250 OVERRIDE (IP): Performed by: INTERNAL MEDICINE

## 2017-11-18 PROCEDURE — 99233 SBSQ HOSP IP/OBS HIGH 50: CPT | Mod: GC | Performed by: HOSPITALIST

## 2017-11-18 PROCEDURE — 700102 HCHG RX REV CODE 250 W/ 637 OVERRIDE(OP): Performed by: ANESTHESIOLOGY

## 2017-11-18 PROCEDURE — A9577 INJ MULTIHANCE: HCPCS | Performed by: STUDENT IN AN ORGANIZED HEALTH CARE EDUCATION/TRAINING PROGRAM

## 2017-11-18 PROCEDURE — C8902 MRA W/O FOL W/CONT, ABD: HCPCS

## 2017-11-18 PROCEDURE — 700117 HCHG RX CONTRAST REV CODE 255: Performed by: STUDENT IN AN ORGANIZED HEALTH CARE EDUCATION/TRAINING PROGRAM

## 2017-11-18 PROCEDURE — A9270 NON-COVERED ITEM OR SERVICE: HCPCS | Performed by: ANESTHESIOLOGY

## 2017-11-18 PROCEDURE — 83735 ASSAY OF MAGNESIUM: CPT

## 2017-11-18 PROCEDURE — 85025 COMPLETE CBC W/AUTO DIFF WBC: CPT

## 2017-11-18 PROCEDURE — 93010 ELECTROCARDIOGRAM REPORT: CPT | Performed by: INTERNAL MEDICINE

## 2017-11-18 PROCEDURE — 93005 ELECTROCARDIOGRAM TRACING: CPT | Performed by: HOSPITALIST

## 2017-11-18 PROCEDURE — 700102 HCHG RX REV CODE 250 W/ 637 OVERRIDE(OP): Performed by: STUDENT IN AN ORGANIZED HEALTH CARE EDUCATION/TRAINING PROGRAM

## 2017-11-18 PROCEDURE — 700105 HCHG RX REV CODE 258: Performed by: STUDENT IN AN ORGANIZED HEALTH CARE EDUCATION/TRAINING PROGRAM

## 2017-11-18 PROCEDURE — 770020 HCHG ROOM/CARE - TELE (206)

## 2017-11-18 PROCEDURE — 89055 LEUKOCYTE ASSESSMENT FECAL: CPT

## 2017-11-18 PROCEDURE — 700101 HCHG RX REV CODE 250: Performed by: STUDENT IN AN ORGANIZED HEALTH CARE EDUCATION/TRAINING PROGRAM

## 2017-11-18 PROCEDURE — 82272 OCCULT BLD FECES 1-3 TESTS: CPT

## 2017-11-18 PROCEDURE — 80048 BASIC METABOLIC PNL TOTAL CA: CPT

## 2017-11-18 PROCEDURE — 93306 TTE W/DOPPLER COMPLETE: CPT

## 2017-11-18 PROCEDURE — 83605 ASSAY OF LACTIC ACID: CPT

## 2017-11-18 PROCEDURE — 80053 COMPREHEN METABOLIC PANEL: CPT

## 2017-11-18 PROCEDURE — 700111 HCHG RX REV CODE 636 W/ 250 OVERRIDE (IP): Performed by: STUDENT IN AN ORGANIZED HEALTH CARE EDUCATION/TRAINING PROGRAM

## 2017-11-18 PROCEDURE — A9270 NON-COVERED ITEM OR SERVICE: HCPCS | Performed by: STUDENT IN AN ORGANIZED HEALTH CARE EDUCATION/TRAINING PROGRAM

## 2017-11-18 PROCEDURE — 93306 TTE W/DOPPLER COMPLETE: CPT | Mod: 26 | Performed by: INTERNAL MEDICINE

## 2017-11-18 PROCEDURE — 36415 COLL VENOUS BLD VENIPUNCTURE: CPT

## 2017-11-18 RX ORDER — LORAZEPAM 0.5 MG/1
0.5 TABLET ORAL ONCE
Status: COMPLETED | OUTPATIENT
Start: 2017-11-18 | End: 2017-11-18

## 2017-11-18 RX ORDER — MAGNESIUM SULFATE HEPTAHYDRATE 40 MG/ML
2 INJECTION, SOLUTION INTRAVENOUS ONCE
Status: COMPLETED | OUTPATIENT
Start: 2017-11-18 | End: 2017-11-18

## 2017-11-18 RX ORDER — SODIUM CHLORIDE 9 MG/ML
INJECTION, SOLUTION INTRAVENOUS CONTINUOUS
Status: DISCONTINUED | OUTPATIENT
Start: 2017-11-18 | End: 2017-11-20 | Stop reason: HOSPADM

## 2017-11-18 RX ORDER — ONDANSETRON 2 MG/ML
4 INJECTION INTRAMUSCULAR; INTRAVENOUS
Status: DISCONTINUED | OUTPATIENT
Start: 2017-11-18 | End: 2017-11-18

## 2017-11-18 RX ORDER — ONDANSETRON 2 MG/ML
4 INJECTION INTRAMUSCULAR; INTRAVENOUS EVERY 4 HOURS PRN
Status: DISCONTINUED | OUTPATIENT
Start: 2017-11-18 | End: 2017-11-18

## 2017-11-18 RX ADMIN — METRONIDAZOLE 500 MG: 500 INJECTION, SOLUTION INTRAVENOUS at 21:31

## 2017-11-18 RX ADMIN — SUCRALFATE 1 G: 1 TABLET ORAL at 07:52

## 2017-11-18 RX ADMIN — VITAMIN D, TAB 1000IU (100/BT) 2000 UNITS: 25 TAB at 07:51

## 2017-11-18 RX ADMIN — SODIUM CHLORIDE: 9 INJECTION, SOLUTION INTRAVENOUS at 07:47

## 2017-11-18 RX ADMIN — SOTALOL HYDROCHLORIDE 40 MG: 80 TABLET ORAL at 07:51

## 2017-11-18 RX ADMIN — SODIUM CHLORIDE: 9 INJECTION, SOLUTION INTRAVENOUS at 22:45

## 2017-11-18 RX ADMIN — ALPRAZOLAM 0.25 MG: 0.25 TABLET ORAL at 07:52

## 2017-11-18 RX ADMIN — SOTALOL HYDROCHLORIDE 40 MG: 80 TABLET ORAL at 21:28

## 2017-11-18 RX ADMIN — CEFTRIAXONE 1 G: 1 INJECTION, SOLUTION INTRAVENOUS at 07:47

## 2017-11-18 RX ADMIN — DILTIAZEM HYDROCHLORIDE 240 MG: 240 CAPSULE, COATED, EXTENDED RELEASE ORAL at 07:52

## 2017-11-18 RX ADMIN — GADOBENATE DIMEGLUMINE 10 ML: 529 INJECTION, SOLUTION INTRAVENOUS at 14:28

## 2017-11-18 RX ADMIN — METRONIDAZOLE 500 MG: 500 INJECTION, SOLUTION INTRAVENOUS at 05:57

## 2017-11-18 RX ADMIN — OMEPRAZOLE 20 MG: 20 CAPSULE, DELAYED RELEASE ORAL at 07:52

## 2017-11-18 RX ADMIN — MORPHINE SULFATE 2 MG: 4 INJECTION INTRAVENOUS at 13:09

## 2017-11-18 RX ADMIN — ENOXAPARIN SODIUM 40 MG: 100 INJECTION SUBCUTANEOUS at 07:51

## 2017-11-18 RX ADMIN — POTASSIUM CHLORIDE AND SODIUM CHLORIDE: 900; 150 INJECTION, SOLUTION INTRAVENOUS at 03:53

## 2017-11-18 RX ADMIN — LORAZEPAM 0.5 MG: 0.5 TABLET ORAL at 12:45

## 2017-11-18 RX ADMIN — POTASSIUM CHLORIDE 40 MEQ: 1500 TABLET, EXTENDED RELEASE ORAL at 07:53

## 2017-11-18 RX ADMIN — SUCRALFATE 1 G: 1 TABLET ORAL at 21:28

## 2017-11-18 RX ADMIN — MAGNESIUM SULFATE HEPTAHYDRATE 2 G: 40 INJECTION, SOLUTION INTRAVENOUS at 07:50

## 2017-11-18 RX ADMIN — METRONIDAZOLE 500 MG: 500 INJECTION, SOLUTION INTRAVENOUS at 13:10

## 2017-11-18 RX ADMIN — LOVASTATIN 40 MG: 20 TABLET ORAL at 21:29

## 2017-11-18 RX ADMIN — ASPIRIN 81 MG: 81 TABLET, COATED ORAL at 07:51

## 2017-11-18 ASSESSMENT — ENCOUNTER SYMPTOMS
SPEECH CHANGE: 0
VOMITING: 0
CHILLS: 0
HEMOPTYSIS: 0
SPUTUM PRODUCTION: 0
ABDOMINAL PAIN: 1
NAUSEA: 0
FALLS: 0
FEVER: 0
PALPITATIONS: 0
STRIDOR: 0
SENSORY CHANGE: 0
HEARTBURN: 0
DOUBLE VISION: 0
SORE THROAT: 0
BLURRED VISION: 0
SHORTNESS OF BREATH: 0
DEPRESSION: 0
BRUISES/BLEEDS EASILY: 1
DIZZINESS: 0
COUGH: 0
EYE DISCHARGE: 0
MYALGIAS: 0
CONSTIPATION: 0
DIARRHEA: 0
BLOOD IN STOOL: 0
LOSS OF CONSCIOUSNESS: 0

## 2017-11-18 ASSESSMENT — PAIN SCALES - GENERAL
PAINLEVEL_OUTOF10: 0
PAINLEVEL_OUTOF10: 0

## 2017-11-18 ASSESSMENT — LIFESTYLE VARIABLES: SUBSTANCE_ABUSE: 0

## 2017-11-18 NOTE — PROGRESS NOTES
Report received at bedside, assumed care. Family at bedside. Pt is resting in bed. A&O x4. Pain at 0/10. Abdomen has been assessed with no tenderness present when palpated. No other concerns, complains or distress. Tele box on. Chart reviewed. Bed in lowest position, treaded slipper sock on, and call light within reach.

## 2017-11-18 NOTE — ASSESSMENT & PLAN NOTE
- [facility] - 5   - Patient on diltiazem and sotalol . Had not been on anticoagulation, on admission.   - Patient is currently in sinus rhythm .   - Continue diltiazem and sotalol  - adding rivaroxaban (stopping plavix), and continue baby-aspirin.

## 2017-11-18 NOTE — H&P
Internal Medicine Admitting History and Physical    Note Author: Berry Moser M.D.       Name Angie Root 1940   Age/Sex 77 y.o. female   MRN [id number]   Code Status Full code     After 5PM or if no immediate response to page, please call for cross-coverage  Attending/Team: Dr. Shafer / paulo See Patient List for primary contact information  Call [phone] to page    1st Call - Day Intern (R1):   Dr. Moser 2nd Call - Day Sr. Resident (R2/R3):   Dr. Kev Sellers       Chief Complaint:  Right lower quadrant pain    HPI:  Patient is a 77 year old female with past medical history of diverticulitis, CAD with 2 stents placed in LAD (), PAD s/p PCI to left leg () , paroxysmal atrial fibrillation ( not on anticoagulation ), hypertension, dyslipidemia, GERD and COPD who presents with complaints of abdominal pain. Patient reports sudden onset abdominal pain last night around 11 pm which woke her up from sleep. She describes the pain as sharp, 10/10 on onset, non radiating, aggravated by movement, no relieving factors. The pain has been constant since onset. She denies any diarrhea, hematochezia, nausea, vomiting, fever, chills. Patient had a normal bowel movement prior to the onset of pain. Patient reports dizziness and low BP measurements over the past 2 days.  She reports having pain in the same location about 5 months back, but it was not as intense. She had seen her PCP at that time , CBC was ordered which was normal, no imaging was done.  She reports having a colonoscopy in , she was told to return in 10 years.   Patient has a history of atrial fibrillation but is hesitant to try anticoagulants. She is on aspirin and plavix.    Review of Systems   Constitutional: Negative for chills, diaphoresis, fever and malaise/fatigue.   HENT: Negative for hearing loss and sore throat.    Eyes: Negative for blurred vision, double vision and photophobia.   Respiratory: Negative for cough, sputum  production, shortness of breath and stridor.    Cardiovascular: Negative for chest pain, palpitations, orthopnea and leg swelling.   Gastrointestinal: Positive for abdominal pain and heartburn. Negative for blood in stool, constipation, diarrhea, melena, nausea and vomiting.   Genitourinary: Negative for dysuria, frequency and urgency.   Musculoskeletal: Negative for myalgias and neck pain.   Skin: Negative for itching and rash.   Neurological: Positive for dizziness. Negative for sensory change, speech change, focal weakness and headaches.   Endo/Heme/Allergies: Negative for environmental allergies and polydipsia.   Psychiatric/Behavioral: Negative for depression, substance abuse and suicidal ideas.             Past Medical History:   Past Medical History:   Diagnosis Date   • CAD (coronary artery disease)    • COPD (chronic obstructive pulmonary disease) (CMS-HCC)    • Coronary artery disease due to lipid rich plaque 1/20/2016    Status post PCI ×2 in 3/27/09 ([facility] SE[facility] x 2 to proximal LAD and mid LAD) in [place]    • Diverticulosis    • Dyslipidemia    • Gastroesophageal reflux disease without esophagitis 1/20/2016   • GERD (gastroesophageal reflux disease)    • Hiatal hernia    • PAD (peripheral artery disease) (CMS-HCC)    • Paroxysmal a-fib (CMS-HCC) 1/20/2016    Symptomatic, on a rhythm control strategy with sotalol 40 mg by mouth twice a day.    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   • CARDIAC CATH, RIGHT HEART  2008    stent   • CHOLECYSTECTOMY  1993   • TONSILLECTOMY  1945       Current Outpatient Medications:  Home Medications     Reviewed by Jerry De La Paz (Pharmacy Tech) on 11/17/17 at 1637  Med List Status: Complete   Medication Last Dose Status   alprazolam (XANAX) 0.25 MG Tab 11/17/2017 Active   aspirin EC (ECOTRIN) 81 MG Tablet  Delayed Response 11/16/2017 Active   Cholecalciferol (VITAMIN D) 2000 UNITS Cap 11/17/2017 Active   clopidogrel (PLAVIX) 75 MG Tab 11/17/2017 Active   diltiazem CD (CARDIZEM CD) 240 MG CAPSULE SR 24 HR 11/17/2017 Active   lovastatin (MEVACOR) 40 MG tablet 11/16/2017 Active   pantoprazole (PROTONIX) 40 MG Tablet Delayed Response 11/17/2017 Active   sotalol (BETAPACE) 80 MG Tab 11/17/2017 Active   sucralfate (CARAFATE) 1 GM Tab 11/17/2017 Active                Medication Allergy/Sensitivities:  Allergies   Allergen Reactions   • Codeine Swelling   • Iodine Swelling   • Bee Venom    • Chantix [Varenicline]      disoriented   • Latex    • Pcn [Penicillins]    • Shellfish Allergy    • Tetanus Antitoxin          Family History:  Family History   Problem Relation Age of Onset   • Hypertension Mother    • Hyperlipidemia Mother    • Cancer Maternal Grandmother      tongue   • Cancer Maternal Uncle      x 3, pancreas   • Cancer Maternal Grandfather      unknown   • Cancer Paternal Grandmother      unknown   • Cancer Paternal Grandfather      unknown   • Diabetes Maternal Uncle    • Heart Disease Maternal Uncle    • Hypertension Sister    • Hyperlipidemia Sister    • Thyroid Sister    • Psychiatry Neg Hx    • Stroke Neg Hx        Social History:  Social History     Social History   • Marital status:      Spouse name: N/A   • Number of children: 0   • Years of education: N/A     Occupational History   • Not on file.     Social History Main Topics   • Smoking status: Current Every Day Smoker     Packs/day: 0.25     Years: 60.00     Types: Cigarettes     Start date: 3/20/1957   • Smokeless tobacco: Never Used      Comment: 6 per day   • Alcohol use No   • Drug use: No   • Sexual activity: Not Currently     Partners: Male     Other Topics Concern   • Not on file     Social History Narrative    .     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        Physical Exam     Vitals:    11/17/17 1330 11/17/17 1430 11/17/17 1433 11/17/17 1500   BP:       Pulse: 61 64  (!) 59   Resp: 19 (!) 21  18   Temp:   37.6 °C (99.7 °F)    TempSrc:       SpO2: 90% 92%  92%   Weight:       Height:         Body mass index is 17.22 kg/m².  /62   Pulse (!) 59   Temp 37.6 °C (99.7 °F)   Resp 18   Ht 1.6 m (5' 3\")   Wt 44.1 kg (97 lb 3.6 oz)   SpO2 92%   BMI 17.22 kg/m²   O2 therapy: Pulse Oximetry: 92 %, O2 Delivery: None (Room Air)    Physical Exam   Constitutional: She is oriented to person, place, and time. No distress.   Poorly nourished   HENT:   Head: Normocephalic and atraumatic.   Mouth/Throat: No oropharyngeal exudate.   Eyes: EOM are normal. Pupils are equal, round, and reactive to light. No scleral icterus.   Neck: Normal range of motion. Neck supple.   Cardiovascular: Normal rate, regular rhythm and normal heart sounds.    No murmur heard.  Pulmonary/Chest: Effort normal and breath sounds normal. No respiratory distress. She has no wheezes. She has no rales.   Abdominal: Soft. Bowel sounds are normal. She exhibits no distension. There is tenderness. There is rebound.   Tenderness present in the right lower quadrant and suprapubic areas.  Palpation of LLQ results in pain in RLQ- positive Rovsing's sign   Genitourinary: Rectal exam shows guaiac negative stool.   Genitourinary Comments: Rectal exam: No visible hemorrhoids or blood, sphincter tone normal, no mass palpated   Musculoskeletal: She exhibits no edema.   Neurological: She is alert and oriented to person, place, and time. No cranial nerve deficit.   Skin: No rash noted. No erythema.   Psychiatric: Memory, affect and judgment normal.             Data Review       Old Records Request:   Completed  Current Records review and summary: Completed    Lab Data Review:  Recent Results (from the past 24 hour(s))   CBC WITH DIFFERENTIAL    Collection Time: 11/17/17 10:31 AM   Result Value Ref Range    " WBC 16.3 (H) 4.8 - 10.8 K/uL    RBC 5.24 4.20 - 5.40 M/uL    Hemoglobin 15.3 12.0 - 16.0 g/dL    Hematocrit 46.5 37.0 - 47.0 %    MCV 88.7 81.4 - 97.8 fL    MCH 29.2 27.0 - 33.0 pg    MCHC 32.9 (L) 33.6 - 35.0 g/dL    RDW 45.0 35.9 - 50.0 fL    Platelet Count 205 164 - 446 K/uL    MPV 10.2 9.0 - 12.9 fL    Neutrophils-Polys 70.80 44.00 - 72.00 %    Lymphocytes 19.90 (L) 22.00 - 41.00 %    Monocytes 8.40 0.00 - 13.40 %    Eosinophils 0.20 0.00 - 6.90 %    Basophils 0.30 0.00 - 1.80 %    Immature Granulocytes 0.40 0.00 - 0.90 %    Nucleated RBC 0.00 /100 WBC    Neutrophils (Absolute) 11.55 (H) 2.00 - 7.15 K/uL    Lymphs (Absolute) 3.25 1.00 - 4.80 K/uL    Monos (Absolute) 1.37 (H) 0.00 - 0.85 K/uL    Eos (Absolute) 0.04 0.00 - 0.51 K/uL    Baso (Absolute) 0.05 0.00 - 0.12 K/uL    Immature Granulocytes (abs) 0.07 0.00 - 0.11 K/uL    NRBC (Absolute) 0.00 K/uL   COMP METABOLIC PANEL    Collection Time: 11/17/17 10:31 AM   Result Value Ref Range    Sodium 139 135 - 145 mmol/L    Potassium 3.1 (L) 3.6 - 5.5 mmol/L    Chloride 107 96 - 112 mmol/L    Co2 24 20 - 33 mmol/L    Anion Gap 8.0 0.0 - 11.9    Glucose 100 (H) 65 - 99 mg/dL    Bun 19 8 - 22 mg/dL    Creatinine 0.64 0.50 - 1.40 mg/dL    Calcium 9.8 8.5 - 10.5 mg/dL    AST(SGOT) 18 12 - 45 U/L    ALT(SGPT) 11 2 - 50 U/L    Alkaline Phosphatase 93 30 - 99 U/L    Total Bilirubin 0.7 0.1 - 1.5 mg/dL    Albumin 3.9 3.2 - 4.9 g/dL    Total Protein 7.0 6.0 - 8.2 g/dL    Globulin 3.1 1.9 - 3.5 g/dL    A-G Ratio 1.3 g/dL   LIPASE    Collection Time: 11/17/17 10:31 AM   Result Value Ref Range    Lipase 32 11 - 82 U/L   ESTIMATED GFR    Collection Time: 11/17/17 10:31 AM   Result Value Ref Range    GFR If African American >60 >60 mL/min/1.73 m 2    GFR If Non African American >60 >60 mL/min/1.73 m 2   LACTIC ACID    Collection Time: 11/17/17 12:03 PM   Result Value Ref Range    Lactic Acid 1.0 0.5 - 2.0 mmol/L   URINALYSIS    Collection Time: 11/17/17  1:57 PM   Result Value Ref  Range    Color Yellow     Character Clear     Specific Gravity 1.018 <1.035    Ph 6.5 5.0 - 8.0    Glucose Negative Negative mg/dL    Ketones Negative Negative mg/dL    Protein Negative Negative mg/dL    Bilirubin Negative Negative    Urobilinogen, Urine 0.2 Negative    Nitrite Negative Negative    Leukocyte Esterase Trace (A) Negative    Occult Blood Small (A) Negative    Micro Urine Req Microscopic    URINE MICROSCOPIC (W/UA)    Collection Time: 11/17/17  1:57 PM   Result Value Ref Range    WBC 5-10 (A) /hpf    RBC 2-5 (A) /hpf    Bacteria Negative None /hpf    Epithelial Cells Negative /hpf    Hyaline Cast 0-2 /lpf       Imaging/Procedures Review:    ndependant Imaging Review: Completed  CT-ABDOMEN-PELVIS WITH   Final Result      1.  Marked wall thickening of the cecum and ascending colon with pericolonic inflammatory changes, most likely inflammatory or infectious colitis.  Underlying etiology is uncertain.  Ischemic processes and tumor are not excluded.   2.  No CT evidence for acute appendicitis.   3.  Increased colonic stool.   4.  No bowel obstruction or evidence for perforation.               EKG:   EKG Independant Review: Deferred  Ordered     Records reviewed and summarized in current documentation             Assessment/Plan     * Colitis   Assessment & Plan    77 year old patient with extensive history of vascular disease (CAD s/p stents, PAD s/p stents, hypertension), atrial fibrillation not on any anticoagulation and diverticulosis/ deverticulitis  presents with sudden onset pain in the RLQ. No diarrhea or hematochezia.   - Labs show leukocytosis 16.3 . Normal lactic acid - 1.0  - guaiac negative   - CT abdomen/pelvis with contrast shows marked wall thickening of cecum and ascending colon with pericolonic inflammatory changes, most likely inflammatory or infectious colitis. No signs of bowel obstruction, perforation or appendicitis.  - She received a dose of ceftriaxone and flagyl in the ER and 1  litre bolus NS  Plan:  - We will manage the patient as ischemic colitis given her risk factors and acute onset of symptoms  - Supportive care  - NPO for bowel rest  - IVF 1 L bolus followed by maintenance @125 ml/ hr  - Will continue empiric antibiotics- ceftriaxone and  Flagyl  - Stool WBCs ordered to r/o infectious etiology   - F/u LDH and repeat lactic acid  - If there are clinical signs of deterioration, surgery may need to be consulted            Hypokalemia   Assessment & Plan    Potassium 3.1- repleted  IVF with KCL  Will monitor and replete as needed        Essential hypertension, benign- (present on admission)   Assessment & Plan    - Patient on diltiazem, has already taken today's dose  - Will monitor clinical symptoms and signs and consider holding diltiazem if she deteriorates        Paroxysmal a-fib (CMS-HCC)- (present on admission)   Assessment & Plan    - CHADSVAsc 5   - Patient on diltiazem and sotalol . No anti coagulation as patient is hesitant to try. Followed by cardiology as outpatient  - Patient is currently in sinus rhythm . HR 54-73  - Patient has already taken her diltiazem for the day.  - Will order EKG and magnesium before resuming sotalol.  - Potassium low- 3.1- repleted  - Will  the patient about the need for anti coagulation          Coronary artery disease due to lipid rich plaque- (present on admission)   Assessment & Plan    - History of CAD with 2 stents in LAD (2009)  - Patient on both aspirin and plavix. Asymptomatic at this time  - Continue aspirin, plavix and statin        Dyslipidemia- (present on admission)   Assessment & Plan    - continue lovastatin        Gastroesophageal reflux disease without esophagitis- (present on admission)   Assessment & Plan    - continue omeprazole and sucralfate            Anticipated Hospital stay:  >2 midnights        Quality Measures    Reviewed items::  Labs reviewed, Medications reviewed and Radiology images reviewed  Bhakta catheter::   No Bhakta  DVT prophylaxis pharmacological::  Enoxaparin (Lovenox)  Ulcer Prophylaxis: On omeprazole and sucralfate for GERD.  Antibiotics:  Treating active infection/contamination beyond 24 hours perioperative coverage

## 2017-11-18 NOTE — PROGRESS NOTES
Internal Medicine Interval Note  Note Author: Berry Moser M.D.     Name Angie Root 1940   Age/Sex 77 y.o. female   MRN [id number]   Code Status Full code     After 5PM or if no immediate response to page, please call for cross-coverage  Attending/Team: Dr. Shafer / Clemente See Patient List for primary contact information  Call [phone] to page    1st Call - Day Intern (R1):   Dr. Moser 2nd Call - Day Sr. Resident (R2/R3):   Dr. Kev Sellers         Reason for interval visit  (Principal Problem)   Colitis    Interval Problem Daily Status Update  (24 hours)   Reports improvement in abdominal pain, no diarrhea or hematochezia. RLQ tenderness with rebound present  Vascular surgery consulted  MR angiogram of mesenteric arteries ordered  Continue NPO and IV fluids  Lactic acid Q 12 hours  If there is clinical deterioration, surgery will need to be called.    Review of Systems   Constitutional: Negative for chills, fever and malaise/fatigue.   HENT: Negative for hearing loss and sore throat.    Eyes: Negative for blurred vision, double vision and discharge.   Respiratory: Negative for cough, hemoptysis, sputum production, shortness of breath and stridor.    Cardiovascular: Negative for chest pain, palpitations and leg swelling.   Gastrointestinal: Positive for abdominal pain. Negative for blood in stool, constipation, diarrhea, heartburn, nausea and vomiting.   Genitourinary: Negative for dysuria, frequency and urgency.   Musculoskeletal: Negative for falls and myalgias.   Skin: Negative for itching and rash.   Neurological: Negative for dizziness, sensory change, speech change and loss of consciousness.   Endo/Heme/Allergies: Negative for environmental allergies. Bruises/bleeds easily.   Psychiatric/Behavioral: Negative for depression, substance abuse and suicidal ideas.       Consultants/Specialty  Vascular surgery    Disposition  Inpatient    Quality Measures    Reviewed items::  EKG reviewed,  Labs reviewed and Medications reviewed  Bhakta catheter::  No Bhakta  DVT prophylaxis pharmacological::  Enoxaparin (Lovenox)  Ulcer Prophylaxis: On omeprazole and sucralfate.          Physical Exam       Vitals:    11/18/17 0000 11/18/17 0400 11/18/17 0753 11/18/17 1200   BP: 104/53 112/50 126/69 146/65   Pulse: 87 61 (!) 58 61   Resp: 18 18 18 19   Temp: 36.3 °C (97.3 °F) 36.8 °C (98.3 °F) 36.3 °C (97.3 °F) (!) 35.6 °C (96.1 °F)   TempSrc:       SpO2: 97% 94% 95% 96%   Weight:       Height:         Body mass index is 17.14 kg/m². Weight: 43.9 kg (96 lb 12.5 oz)  Oxygen Therapy:  Pulse Oximetry: 96 %, O2 (LPM): 0, O2 Delivery: None (Room Air)    Physical Exam   Constitutional: She is oriented to person, place, and time.   Poorly nourished   HENT:   Head: Normocephalic and atraumatic.   Mouth/Throat: No oropharyngeal exudate.   Eyes: EOM are normal. Pupils are equal, round, and reactive to light.   Neck: Normal range of motion. Neck supple.   Cardiovascular: Normal rate, regular rhythm and normal heart sounds.    No murmur heard.  Pulmonary/Chest: Effort normal and breath sounds normal. No respiratory distress. She has no wheezes. She has no rales.   Abdominal: Soft. Bowel sounds are normal. She exhibits no distension. There is tenderness. There is rebound. There is no guarding.   RLQ tenderness present   Musculoskeletal: She exhibits no edema.   Lymphadenopathy:     She has no cervical adenopathy.   Neurological: She is alert and oriented to person, place, and time. No cranial nerve deficit.   Skin: No rash noted. No erythema.   Psychiatric: Memory, affect and judgment normal.         Lab Data Review:         11/18/2017  2:02 PM    Recent Labs      11/17/17   1031  11/17/17   1846  11/18/17   0328   SODIUM  139   --   139   POTASSIUM  3.1*   --   4.1   CHLORIDE  107   --   114*   CO2  24   --   20   BUN  19   --   12   CREATININE  0.64   --   0.52   MAGNESIUM   --   1.8   --    CALCIUM  9.8   --   8.4*       Recent  Labs      11/17/17   1031  11/18/17   0328   ALTSGPT  11  8   ASTSGOT  18  12   ALKPHOSPHAT  93  62   TBILIRUBIN  0.7  0.5   LIPASE  32   --    GLUCOSE  100*  88       Recent Labs      11/17/17   1031  11/18/17   0328   RBC  5.24  4.16*   HEMOGLOBIN  15.3  12.2   HEMATOCRIT  46.5  37.2   PLATELETCT  205  173       Recent Labs      11/17/17   1031  11/18/17   0328   WBC  16.3*  12.0*   NEUTSPOLYS  70.80  72.80*   LYMPHOCYTES  19.90*  19.50*   MONOCYTES  8.40  6.80   EOSINOPHILS  0.20  0.10   BASOPHILS  0.30  0.30   ASTSGOT  18  12   ALTSGPT  11  8   ALKPHOSPHAT  93  62   TBILIRUBIN  0.7  0.5           Assessment/Plan     * Colitis   Assessment & Plan    77 year old patient with extensive history of vascular disease (CAD s/p stents, PAD s/p stents, hypertension), atrial fibrillation not on any anticoagulation and diverticulosis/ deverticulitis  presented with sudden onset pain in the RLQ. No diarrhea or hematochezia. Labs on admission showed leukocytosis 16.3 . Normal lactic acid - 1.0, guaiac negative and CT abdomen/pelvis with contrast shows marked wall thickening of cecum and ascending colon with pericolonic inflammatory changes, most likely inflammatory or infectious colitis. No signs of bowel obstruction, perforation or appendicitis.   - Repeat lactic acid remained normal, patient had no events overnight.  - Hemoglobin today decreased to 12, lactic acid 1.0, stool WBCs negative  - reports improvement in abdominal pain, continues to have RLQ tenderness  Plan:  - Vascular surgery consulted- appreciate recs  - MR angiogram of mesenteric vessels ordered- f/u results  - Lactic acid Q12 hours  - continue NPO for bowel rest and IV fluids  - Will continue empiric antibiotics- ceftriaxone and  Flagyl  - If there are clinical signs of deterioration, surgery will need to be called            Hypokalemia   Assessment & Plan    - Resolving  - Potassium today- 4.1  - Will monitor and replete as needed        Essential  hypertension, benign- (present on admission)   Assessment & Plan    - Patient on diltiazem as home medication  - BP has remained stable  - continue diltiazem        Paroxysmal a-fib (CMS-HCC)- (present on admission)   Assessment & Plan    - CHADSVAsc 5   - Patient on diltiazem and sotalol . No anti coagulation as patient is hesitant to try. Followed by cardiology as outpatient  - Patient is currently in sinus rhythm . HR 54-87  - Will  the patient about the need for anti coagulation  - Continue diltiazem and sotalol- potassium 4.1, mag 1.8-repleted          Coronary artery disease due to lipid rich plaque- (present on admission)   Assessment & Plan    - History of CAD with 2 stents in LAD (2009)  - Patient on both aspirin and plavix. Asymptomatic at this time  - Continue aspirin, and statin. Plavix held        Dyslipidemia- (present on admission)   Assessment & Plan    - continue lovastatin        Gastroesophageal reflux disease without esophagitis- (present on admission)   Assessment & Plan    - continue omeprazole and sucralfate

## 2017-11-18 NOTE — PROGRESS NOTES
Pt arrived on floor from ED. Shift report received and assessment completed. Spouse at bedside. Pt indicates no distress. Call light placed within reach, bed in lowest position, and pot educated to call. Will continue to monitor patient.

## 2017-11-18 NOTE — PROGRESS NOTES
Surgical Progress Note    Author: Jamaal Ortiz Date & Time created: 2017   12:58 PM     Interval Events:  ASCVD from smoking and several days of RLQ pain and CT evidence for ischemic colitis.  No acuity to exam at present time  ROS  Hemodynamics:  Temp (24hrs), Av.3 °C (97.4 °F), Min:35.6 °C (96 °F), Max:37.6 °C (99.7 °F)  Temperature: (!) 35.6 °C (96.1 °F)  Pulse  Av.8  Min: 54  Max: 87Heart Rate (Monitored): (!) 55  Blood Pressure : 146/65, NIBP: 112/48     Respiratory:    Respiration: 19, Pulse Oximetry: 96 %           Neuro:  GCS       Fluids:    Intake/Output Summary (Last 24 hours) at 17 1258  Last data filed at 17 0644   Gross per 24 hour   Intake             2650 ml   Output             1000 ml   Net             1650 ml     Weight: 43.9 kg (96 lb 12.5 oz)  Current Diet Order   Procedures   • Diet NPO     Physical Exam  Labs:  Recent Results (from the past 24 hour(s))   URINALYSIS    Collection Time: 17  1:57 PM   Result Value Ref Range    Color Yellow     Character Clear     Specific Gravity 1.018 <1.035    Ph 6.5 5.0 - 8.0    Glucose Negative Negative mg/dL    Ketones Negative Negative mg/dL    Protein Negative Negative mg/dL    Bilirubin Negative Negative    Urobilinogen, Urine 0.2 Negative    Nitrite Negative Negative    Leukocyte Esterase Trace (A) Negative    Occult Blood Small (A) Negative    Micro Urine Req Microscopic    URINE MICROSCOPIC (W/UA)    Collection Time: 17  1:57 PM   Result Value Ref Range    WBC 5-10 (A) /hpf    RBC 2-5 (A) /hpf    Bacteria Negative None /hpf    Epithelial Cells Negative /hpf    Hyaline Cast 0-2 /lpf   LACTIC ACID    Collection Time: 17  6:46 PM   Result Value Ref Range    Lactic Acid 1.1 0.5 - 2.0 mmol/L   LDH    Collection Time: 17  6:46 PM   Result Value Ref Range    LDH Total 149 107 - 266 U/L   MAGNESIUM    Collection Time: 17  6:46 PM   Result Value Ref Range    Magnesium 1.8 1.5 - 2.5 mg/dL   EKG     Collection Time: 17  6:57 PM   Result Value Ref Range    Report       [facility]    Test Date:  2017  Pt Name:    MAY GOMEZ                   Department: ER  MRN:        [id number]                      Room:       T724  Gender:     F                            Technician: WILL  :        [id number]                   Requested By:ANN-MARIE BALL  Order #:    [id number]                    Reading MD: Edward Philip MD    Measurements  Intervals                                Axis  Rate:       56                           P:          81  SD:         172                          QRS:        -53  QRSD:       96                           T:          53  QT:         476  QTc:        460    Interpretive Statements  SINUS BRADYCARDIA  PROBABLE LEFT ATRIAL ABNORMALITY  LAD, CONSIDER LEFT ANTERIOR FASCICULAR BLOCK  RSR' IN V1 OR V2, PROBABLY NORMAL VARIANT  ABNRM R PROG, CONSIDER ASMI OR LEAD PLACEMENT  Compared to ECG 2017 09:22:32  RSR' in V1 or V2 now present  Myocardial infarct finding now present  Left-axis deviation no longer present    El ectronically Signed On 2017 21:59:13 PST by Edward Philip MD     EKG    Collection Time: 17  9:34 PM   Result Value Ref Range    Report       [facility]    Test Date:  2017  Pt Name:    MAY GOMEZ                   Department: 171  MRN:        [id number]                      Room:       T724  Gender:     F                            Technician: [facility]  :        [id number]                   Requested By:JEANNA RANGEL  Order #:    [id number]                    Reading MD: Verito Combs MD    Measurements  Intervals                                Axis  Rate:       57                           P:          81  SD:         160                          QRS:        -28  QRSD:       96                           T:          56  QT:         468  QTc:        456    Interpretive Statements  SINUS BRADYCARDIA  PROBABLE LEFT ATRIAL  ABNORMALITY  BORDERLINE LEFT AXIS DEVIATION  RSR' IN V1 OR V2, PROBABLY NORMAL VARIANT  Compared to ECG 11/17/2017 18:57:01  Myocardial infarct finding no longer present    Electronically Signed On 11- 8:40:25 PST by Verito Combs MD     Comp Metabolic Panel (CMP)    Collection Time: 11/18/17  3:28 AM   Result Value Ref Range    Sodium 139 135 - 145 mmol/L    Potassium 4.1 3.6 - 5.5 mmol/L    Chloride 114 (H) 96 - 112 mmol/L    Co2 20 20 - 33 mmol/L    Anion Gap 5.0 0.0 - 11.9    Glucose 88 65 - 99 mg/dL    Bun 12 8 - 22 mg/dL    Creatinine 0.52 0.50 - 1.40 mg/dL    Calcium 8.4 (L) 8.5 - 10.5 mg/dL    AST(SGOT) 12 12 - 45 U/L    ALT(SGPT) 8 2 - 50 U/L    Alkaline Phosphatase 62 30 - 99 U/L    Total Bilirubin 0.5 0.1 - 1.5 mg/dL    Albumin 2.9 (L) 3.2 - 4.9 g/dL    Total Protein 5.1 (L) 6.0 - 8.2 g/dL    Globulin 2.2 1.9 - 3.5 g/dL    A-G Ratio 1.3 g/dL   CBC with Differential    Collection Time: 11/18/17  3:28 AM   Result Value Ref Range    WBC 12.0 (H) 4.8 - 10.8 K/uL    RBC 4.16 (L) 4.20 - 5.40 M/uL    Hemoglobin 12.2 12.0 - 16.0 g/dL    Hematocrit 37.2 37.0 - 47.0 %    MCV 89.4 81.4 - 97.8 fL    MCH 29.3 27.0 - 33.0 pg    MCHC 32.8 (L) 33.6 - 35.0 g/dL    RDW 45.6 35.9 - 50.0 fL    Platelet Count 173 164 - 446 K/uL    MPV 10.2 9.0 - 12.9 fL    Neutrophils-Polys 72.80 (H) 44.00 - 72.00 %    Lymphocytes 19.50 (L) 22.00 - 41.00 %    Monocytes 6.80 0.00 - 13.40 %    Eosinophils 0.10 0.00 - 6.90 %    Basophils 0.30 0.00 - 1.80 %    Immature Granulocytes 0.50 0.00 - 0.90 %    Nucleated RBC 0.00 /100 WBC    Neutrophils (Absolute) 8.73 (H) 2.00 - 7.15 K/uL    Lymphs (Absolute) 2.33 1.00 - 4.80 K/uL    Monos (Absolute) 0.81 0.00 - 0.85 K/uL    Eos (Absolute) 0.01 0.00 - 0.51 K/uL    Baso (Absolute) 0.03 0.00 - 0.12 K/uL    Immature Granulocytes (abs) 0.06 0.00 - 0.11 K/uL    NRBC (Absolute) 0.00 K/uL   ESTIMATED GFR    Collection Time: 11/18/17  3:28 AM   Result Value Ref Range    GFR If  >60 >60  mL/min/1.73 m 2    GFR If Non African American >60 >60 mL/min/1.73 m 2   OCCULT BLOOD STOOL    Collection Time: 17  9:23 AM   Result Value Ref Range    Occult Blood Feces Negative Negative   STOOL WBC'S    Collection Time: 17  9:23 AM   Result Value Ref Range    Stool WBC's None seen None seen   TIKOSYN EKG    Collection Time: 17 10:15 AM   Result Value Ref Range    Report       [facility]    Test Date:  2017  Pt Name:    MAY GOMEZ                   Department: 171  MRN:        [id number]                      Room:       24  Gender:     F                            Technician: LUIS  :        1940                   Requested By:ANNABELLE RODRIGUEZ  Order #:    [id number]                    Reading MD:    Measurements  Intervals                                Axis  Rate:       52                           P:          74  AL:         172                          QRS:        -3  QRSD:       88                           T:          46  QT:         476  QTc:        443    Interpretive Statements  SINUS BRADYCARDIA  Compared to ECG 2017 21:34:46  No significant changes     LACTIC ACID    Collection Time: 17 11:45 AM   Result Value Ref Range    Lactic Acid 1.0 0.5 - 2.0 mmol/L     Medical Decision Making, by Problem:  Active Hospital Problems    Diagnosis   • Colitis [K52.9]   • Hypokalemia [E87.6]   • Essential hypertension, benign [I10]   • Gastroesophageal reflux disease without esophagitis [K21.9]     Managed with Protonix 40 mg daily and sucralfate 1 g bid, both for many years. Has history of ulcers, hiatal hernia, GERD.  Initially followed very strict diet - Now avoids etoh, limits coffee, soda, acidic juices, spicy food.     • Paroxysmal a-fib (CMS-HCC) [I48.0]     Symptomatic, on a rhythm control strategy with sotalol 40 mg by mouth twice a day.  Followed by cardiology.     • Dyslipidemia [E78.5]   • Coronary artery disease due to lipid rich plaque [I25.10, I25.83]      Status post PCI ×2 in 2009 in [place].  Followed by cardiology.       Plan:  Antibiotics; fluids; NPO.  Will follow further testing    Quality Measures:  Quality-Core Measures    Discussed patient condition with RN

## 2017-11-18 NOTE — ASSESSMENT & PLAN NOTE
77 year old patient with extensive history of vascular disease (CAD s/p stents, PAD s/p stents, hypertension), atrial fibrillation not on any anticoagulation and diverticulosis/ deverticulitis  presented with sudden onset pain in the RLQ. No diarrhea or hematochezia. Labs on admission showed leukocytosis 16.3 . Normal lactic acid - 1.0, guaiac negative and CT abdomen/pelvis with contrast shows marked wall thickening of cecum and ascending colon with pericolonic inflammatory changes, most likely inflammatory or infectious colitis. No signs of bowel obstruction, perforation or appendicitis.   - WBC today was normal , lactic acid was in normal limits, stool WBCs negative  - reports improvement / resolution of abdominal pain  - MR angiogram showed grossly patent celiac and superior mesenteric artery origins.         But Distal portions of these vessels, however, were not well visualized.  - Vascular surgery on board  Plan:  - Was on clear liquids / advanced,  And was on IVF.  - Lactic acid remained in normal range.   - Was on empiric antibiotics- ceftriaxone and  Flagyl  - discharge on ciprofloxacin and flagyl.

## 2017-11-18 NOTE — ED NOTES
Pt report to receiving nurse, all questions answered, bedside report to nurse transporting pt, pt transported to room at this time in stable condition

## 2017-11-18 NOTE — SENIOR ADMIT NOTE
HPI: Patient is a 77-year-old female with past medical history of coronary artery disease, peripheral vascular disease, atrial fibrillation, tobacco abuse and GERD who presents with right lower quadrant abdominal pain onset 12 hours prior to presentation as a sudden sharp pain with no radiation and no associated nausea vomiting or diarrhea. Patient reported a bowel movement few hours before the pain that showed no blood. Pain is 10/10 in severity at the onset but by the time of the evaluation was about 5. Patient denied any other symptoms and denied any similar history in the past.    Physical exam:  Vital signs stable  Gen: Patient lying comfortable in bed in no acute distress.  Cardiovascular: Regular rate and rhythm. No murmurs rubs or gallops  Abdomen: Soft, nondistended, tenderness of the right lower quadrant to superficial palpation. No guarding or rigidity  Rectal: Guaiac-negative stool    Workup: White blood cells 16.3, hemoglobin 15.3, hematocrit 46.5, sodium 139, potassium 3.1, chloride 107, CO2 24, BUN 19, creatinine 0.64, lactic acid one. CT abdomen with contrast showed marked wall thickening of the cecum and ascending colon with pericolonic inflammatory changes. Possible etiologies include inflammatory, infectious or ischemic colitis.   EKG pending.    Assessment and plan: 77-year-old female with past medical history significant for peripheral vascular disease, atrial fibrillation (on no anticoagulation) and tobacco abuse presents with sudden onset right-sided lower quadrant pain. CT scan showed colonic wall thickening concerning for infection, inflammation or ischemia. Ischemia is highly possible in this patient with multiple risk factors and imaging evidence. No evidence of perforation or necrosis at this point. We'll treat conservatively    1- abdominal pain likely secondary to ischemic colitis  2- leukocytosis  3- hypokalemia    Plan:  -Telemetry monitoring  -IV fluids, patient given a bolus in the  ED. Will continue on maintenance at 125 per hour.  -Pain control  -Bowel rest with nothing by mouth status  -We'll consider NG tube insertion if illus   -Empiric antibiotics  -We'll also obtain echocardiogram  -We'll continue home medications except antihypertensives to maintain perfusion pressures  - Patient needs to consider anticoagulation for her afib  -We'll check FOBT and stool white blood cells  -We'll consider consulting surgery if any signs of perforation, bowel gangrene or necrosis. No evidence of any at this point.  -Patient is full code

## 2017-11-18 NOTE — PROGRESS NOTES
2 RN skin check completed. Back of head, ears, shoulders, elbows, wrists, spinal column, sacrum, knees, and heels all assessed with no signs of excoriation or redness. Minimal scabs on bilateral legs.

## 2017-11-18 NOTE — ED NOTES
Med rec complete per pt at bedside  Allergies reviewed   No ABX in last 30 days   Pt knows she took her morning medications today, but does not remember what time

## 2017-11-18 NOTE — CARE PLAN
Problem: Safety  Goal: Will remain free from falls  Outcome: PROGRESSING AS EXPECTED  Pt was educated appropriately on indication of bed alarm. Pt understood education and refuses use of bed alarm. Pt understands proper use of call light when needing assistance.    Problem: Bowel/Gastric:  Goal: Normal bowel function is maintained or improved  Outcome: PROGRESSING SLOWER THAN EXPECTED  Pt states she has not had a bowel movement since hospital admission. Pt believes that this may be due to her NPO status. Pt was educated on the reason for her NPO status and the MD was also consulted regarding status and plan for tomorrow. Pt understands and agrees with plan of care.    Problem: Knowledge Deficit  Goal: Knowledge of disease process/condition, treatment plan, diagnostic tests, and medications will improve  Outcome: PROGRESSING AS EXPECTED  Pt was educated on first dose of Magnesium and Potassium supplements. Pt also educated on what risks are included when these lab values are not within normal limits. Pt understood and agrees with plan of care.

## 2017-11-18 NOTE — ASSESSMENT & PLAN NOTE
- History of CAD with 2 stents in LAD (2009)  - Patient had been on both aspirin and plavix. No heart symptoms.   - Replacing plavix with rivaroxaban.   - also take baby-aspirin.   - continue lovastatin.

## 2017-11-19 LAB
ANION GAP SERPL CALC-SCNC: 6 MMOL/L (ref 0–11.9)
BUN SERPL-MCNC: 9 MG/DL (ref 8–22)
CALCIUM SERPL-MCNC: 8.6 MG/DL (ref 8.5–10.5)
CHLORIDE SERPL-SCNC: 106 MMOL/L (ref 96–112)
CO2 SERPL-SCNC: 22 MMOL/L (ref 20–33)
CREAT SERPL-MCNC: 0.61 MG/DL (ref 0.5–1.4)
GFR SERPL CREATININE-BSD FRML MDRD: >60 ML/MIN/1.73 M 2
GLUCOSE SERPL-MCNC: 81 MG/DL (ref 65–99)
LACTATE BLD-SCNC: 1 MMOL/L (ref 0.5–2)
MAGNESIUM SERPL-MCNC: 2 MG/DL (ref 1.5–2.5)
POTASSIUM SERPL-SCNC: 4.4 MMOL/L (ref 3.6–5.5)
SODIUM SERPL-SCNC: 134 MMOL/L (ref 135–145)

## 2017-11-19 PROCEDURE — A9270 NON-COVERED ITEM OR SERVICE: HCPCS | Performed by: STUDENT IN AN ORGANIZED HEALTH CARE EDUCATION/TRAINING PROGRAM

## 2017-11-19 PROCEDURE — 700102 HCHG RX REV CODE 250 W/ 637 OVERRIDE(OP): Performed by: ANESTHESIOLOGY

## 2017-11-19 PROCEDURE — 85025 COMPLETE CBC W/AUTO DIFF WBC: CPT

## 2017-11-19 PROCEDURE — 36415 COLL VENOUS BLD VENIPUNCTURE: CPT

## 2017-11-19 PROCEDURE — 700101 HCHG RX REV CODE 250: Performed by: STUDENT IN AN ORGANIZED HEALTH CARE EDUCATION/TRAINING PROGRAM

## 2017-11-19 PROCEDURE — 83605 ASSAY OF LACTIC ACID: CPT | Mod: 91

## 2017-11-19 PROCEDURE — A9270 NON-COVERED ITEM OR SERVICE: HCPCS | Performed by: ANESTHESIOLOGY

## 2017-11-19 PROCEDURE — 80048 BASIC METABOLIC PNL TOTAL CA: CPT

## 2017-11-19 PROCEDURE — 83735 ASSAY OF MAGNESIUM: CPT

## 2017-11-19 PROCEDURE — 700111 HCHG RX REV CODE 636 W/ 250 OVERRIDE (IP): Performed by: STUDENT IN AN ORGANIZED HEALTH CARE EDUCATION/TRAINING PROGRAM

## 2017-11-19 PROCEDURE — 770020 HCHG ROOM/CARE - TELE (206)

## 2017-11-19 PROCEDURE — 99233 SBSQ HOSP IP/OBS HIGH 50: CPT | Mod: GC | Performed by: HOSPITALIST

## 2017-11-19 PROCEDURE — 700102 HCHG RX REV CODE 250 W/ 637 OVERRIDE(OP): Performed by: STUDENT IN AN ORGANIZED HEALTH CARE EDUCATION/TRAINING PROGRAM

## 2017-11-19 PROCEDURE — 700105 HCHG RX REV CODE 258: Performed by: STUDENT IN AN ORGANIZED HEALTH CARE EDUCATION/TRAINING PROGRAM

## 2017-11-19 RX ADMIN — SOTALOL HYDROCHLORIDE 40 MG: 80 TABLET ORAL at 08:37

## 2017-11-19 RX ADMIN — OMEPRAZOLE 20 MG: 20 CAPSULE, DELAYED RELEASE ORAL at 08:28

## 2017-11-19 RX ADMIN — METRONIDAZOLE 500 MG: 500 INJECTION, SOLUTION INTRAVENOUS at 13:48

## 2017-11-19 RX ADMIN — METRONIDAZOLE 500 MG: 500 INJECTION, SOLUTION INTRAVENOUS at 04:57

## 2017-11-19 RX ADMIN — ENOXAPARIN SODIUM 40 MG: 100 INJECTION SUBCUTANEOUS at 08:27

## 2017-11-19 RX ADMIN — SUCRALFATE 1 G: 1 TABLET ORAL at 08:27

## 2017-11-19 RX ADMIN — SUCRALFATE 1 G: 1 TABLET ORAL at 21:35

## 2017-11-19 RX ADMIN — LOVASTATIN 40 MG: 20 TABLET ORAL at 21:35

## 2017-11-19 RX ADMIN — CEFTRIAXONE 1 G: 1 INJECTION, SOLUTION INTRAVENOUS at 08:32

## 2017-11-19 RX ADMIN — VITAMIN D, TAB 1000IU (100/BT) 2000 UNITS: 25 TAB at 08:28

## 2017-11-19 RX ADMIN — DILTIAZEM HYDROCHLORIDE 240 MG: 240 CAPSULE, COATED, EXTENDED RELEASE ORAL at 08:28

## 2017-11-19 RX ADMIN — ASPIRIN 81 MG: 81 TABLET, COATED ORAL at 08:27

## 2017-11-19 RX ADMIN — SODIUM CHLORIDE: 9 INJECTION, SOLUTION INTRAVENOUS at 08:26

## 2017-11-19 RX ADMIN — SODIUM CHLORIDE: 9 INJECTION, SOLUTION INTRAVENOUS at 17:27

## 2017-11-19 RX ADMIN — SOTALOL HYDROCHLORIDE 40 MG: 80 TABLET ORAL at 21:36

## 2017-11-19 RX ADMIN — METRONIDAZOLE 500 MG: 500 INJECTION, SOLUTION INTRAVENOUS at 21:35

## 2017-11-19 RX ADMIN — ALPRAZOLAM 0.25 MG: 0.25 TABLET ORAL at 08:37

## 2017-11-19 ASSESSMENT — ENCOUNTER SYMPTOMS
DIARRHEA: 0
CONSTIPATION: 0
EYE DISCHARGE: 0
CHILLS: 0
FALLS: 0
SHORTNESS OF BREATH: 0
SPEECH CHANGE: 0
BLURRED VISION: 0
COUGH: 0
HEMOPTYSIS: 0
BRUISES/BLEEDS EASILY: 1
PALPITATIONS: 0
MYALGIAS: 0
BLOOD IN STOOL: 0
SENSORY CHANGE: 0
SORE THROAT: 0
DOUBLE VISION: 0
STRIDOR: 0
SPUTUM PRODUCTION: 0
DEPRESSION: 0
LOSS OF CONSCIOUSNESS: 0
ABDOMINAL PAIN: 1
NAUSEA: 0
HEARTBURN: 0
VOMITING: 0
DIZZINESS: 0
FEVER: 0

## 2017-11-19 ASSESSMENT — PAIN SCALES - GENERAL
PAINLEVEL_OUTOF10: 1
PAINLEVEL_OUTOF10: 0
PAINLEVEL_OUTOF10: 2
PAINLEVEL_OUTOF10: 0

## 2017-11-19 ASSESSMENT — LIFESTYLE VARIABLES: SUBSTANCE_ABUSE: 0

## 2017-11-19 NOTE — PROGRESS NOTES
Received bedside report on patient. Patient is sitting up in bed has minor complaints of abdominal pain but denies any intervention. No indications of distress, has NS infusing 125/hr, and call light and personal belongings within reach.

## 2017-11-19 NOTE — PROGRESS NOTES
Internal Medicine Interval Note  Note Author: Berry Moser M.D.     Name Angie Root 1940   Age/Sex 77 y.o. female   MRN [id number]   Code Status Full code     After 5PM or if no immediate response to page, please call for cross-coverage  Attending/Team: Dr. Shafer / Clement[place] See Patient List for primary contact information  Call [phone] to page    1st Call - Day Intern (R1):   Dr. Moser 2nd Call - Day Sr. Resident (R2/R3):   Dr. Kev Sellers         Reason for interval visit  (Principal Problem)   Colitis    Interval Problem Daily Status Update  (24 hours)   Reports improvement in abdominal pain, no diarrhea or hematochezia. RLQ tenderness still present  Vascular surgery on board  Clear liquid diet started  Lactic acid Q12 hours    Review of Systems   Constitutional: Negative for chills, fever and malaise/fatigue.   HENT: Negative for hearing loss and sore throat.    Eyes: Negative for blurred vision, double vision and discharge.   Respiratory: Negative for cough, hemoptysis, sputum production, shortness of breath and stridor.    Cardiovascular: Negative for chest pain, palpitations and leg swelling.   Gastrointestinal: Positive for abdominal pain. Negative for blood in stool, constipation, diarrhea, heartburn, nausea and vomiting.   Genitourinary: Negative for dysuria, frequency and urgency.   Musculoskeletal: Negative for falls and myalgias.   Skin: Negative for itching and rash.   Neurological: Negative for dizziness, sensory change, speech change and loss of consciousness.   Endo/Heme/Allergies: Negative for environmental allergies. Bruises/bleeds easily.   Psychiatric/Behavioral: Negative for depression, substance abuse and suicidal ideas.       Consultants/Specialty  Vascular surgery    Disposition  Inpatient    Quality Measures    Reviewed items::  EKG reviewed, Labs reviewed and Medications reviewed  Bhakta catheter::  No Bhakta  DVT prophylaxis pharmacological::  Enoxaparin  (Lovenox)  Ulcer Prophylaxis: On omeprazole and sucralfate.          Physical Exam       Vitals:    11/18/17 2000 11/19/17 0000 11/19/17 0400 11/19/17 0800   BP: 118/61 121/59 122/77 121/59   Pulse: 61 (!) 53 64 (!) 56   Resp: 20 18 18 12   Temp: (!) 35.7 °C (96.2 °F) 36 °C (96.8 °F) 36.3 °C (97.4 °F) 36.6 °C (97.9 °F)   TempSrc:       SpO2: 95% 94% 95% 95%   Weight: 48.5 kg (106 lb 14.8 oz)      Height:         Body mass index is 18.94 kg/m². Weight: 48.5 kg (106 lb 14.8 oz)  Oxygen Therapy:  Pulse Oximetry: 95 %, O2 (LPM): 0, O2 Delivery: None (Room Air)    Physical Exam   Constitutional: She is oriented to person, place, and time.   Poorly nourished   HENT:   Head: Normocephalic and atraumatic.   Mouth/Throat: No oropharyngeal exudate.   Eyes: EOM are normal. Pupils are equal, round, and reactive to light.   Neck: Normal range of motion. Neck supple.   Cardiovascular: Normal rate, regular rhythm and normal heart sounds.    No murmur heard.  Pulmonary/Chest: Effort normal and breath sounds normal. No respiratory distress. She has no wheezes. She has no rales.   Abdominal: Soft. Bowel sounds are normal. She exhibits no distension. There is tenderness. There is no rebound and no guarding.   RLQ tenderness present   Musculoskeletal: She exhibits no edema.   Lymphadenopathy:     She has no cervical adenopathy.   Neurological: She is alert and oriented to person, place, and time. No cranial nerve deficit.   Skin: No rash noted. No erythema.   Psychiatric: Memory, affect and judgment normal.         Lab Data Review:         11/18/2017  2:02 PM    Recent Labs      11/17/17   1031  11/17/17   1846  11/18/17   0328  11/18/17   2341   SODIUM  139   --   139  134*   POTASSIUM  3.1*   --   4.1  4.4   CHLORIDE  107   --   114*  106   CO2  24   --   20  22   BUN  19   --   12  9   CREATININE  0.64   --   0.52  0.61   MAGNESIUM   --   1.8   --   2.0   CALCIUM  9.8   --   8.4*  8.6       Recent Labs      11/17/17   1386   11/18/17   0328  11/18/17   2341   ALTSGPT  11  8   --    ASTSGOT  18  12   --    ALKPHOSPHAT  93  62   --    TBILIRUBIN  0.7  0.5   --    LIPASE  32   --    --    GLUCOSE  100*  88  81       Recent Labs      11/17/17   1031  11/18/17   0328  11/18/17   2341   RBC  5.24  4.16*  4.04*   HEMOGLOBIN  15.3  12.2  11.9*   HEMATOCRIT  46.5  37.2  36.1*   PLATELETCT  205  173  158*       Recent Labs      11/17/17   1031  11/18/17   0328  11/18/17   2341   WBC  16.3*  12.0*  10.1   NEUTSPOLYS  70.80  72.80*  55.40   LYMPHOCYTES  19.90*  19.50*  35.20   MONOCYTES  8.40  6.80  7.10   EOSINOPHILS  0.20  0.10  1.40   BASOPHILS  0.30  0.30  0.60   ASTSGOT  18  12   --    ALTSGPT  11  8   --    ALKPHOSPHAT  93  62   --    TBILIRUBIN  0.7  0.5   --            Assessment/Plan     * Colitis   Assessment & Plan    77 year old patient with extensive history of vascular disease (CAD s/p stents, PAD s/p stents, hypertension), atrial fibrillation not on any anticoagulation and diverticulosis/ deverticulitis  presented with sudden onset pain in the RLQ. No diarrhea or hematochezia. Labs on admission showed leukocytosis 16.3 . Normal lactic acid - 1.0, guaiac negative and CT abdomen/pelvis with contrast shows marked wall thickening of cecum and ascending colon with pericolonic inflammatory changes, most likely inflammatory or infectious colitis. No signs of bowel obstruction, perforation or appendicitis.   - WBC today is normal , lactic acid 0.9, stool WBCs negative  - reports improvement in abdominal pain, continues to have RLQ tenderness  - MR angiogram showed grossly patent celiac and superior mesenteric artery origins. Distal portions of these vessels however were not well visualized.  - Vascular surgery on board  Plan:  - Will start clear liquids today  - continue IVF  - Lactic acid Q12 hours  - Will continue empiric antibiotics- ceftriaxone and  Flagyl            Hypokalemia   Assessment & Plan    - Resolved  - Potassium today-  4.4  - Will monitor and replete as needed        Essential hypertension, benign- (present on admission)   Assessment & Plan    - Patient on diltiazem as home medication  - BP has remained stable  - continue diltiazem        Paroxysmal a-fib (CMS-HCC)- (present on admission)   Assessment & Plan    - CHADSVAsc 5   - Patient on diltiazem and sotalol . No anti coagulation as patient is hesitant to try. Followed by cardiology as outpatient  - Patient is currently in sinus rhythm . HR 53-78  - Will  the patient about the need for anti coagulation  - Continue diltiazem and sotalol- potassium 4.4, mag 2          Coronary artery disease due to lipid rich plaque- (present on admission)   Assessment & Plan    - History of CAD with 2 stents in LAD (2009)  - Patient on both aspirin and plavix. Asymptomatic at this time  - Continue aspirin, and statin. Plavix held        Dyslipidemia- (present on admission)   Assessment & Plan    - continue lovastatin        Gastroesophageal reflux disease without esophagitis- (present on admission)   Assessment & Plan    - continue omeprazole and sucralfate

## 2017-11-19 NOTE — CARE PLAN
Problem: Communication  Goal: The ability to communicate needs accurately and effectively will improve  Outcome: PROGRESSING AS EXPECTED  Patient effectively communicates any questions or concerns that she has with her care, treatment, or medications.     Problem: Safety  Goal: Will remain free from injury  Outcome: PROGRESSING AS EXPECTED  Patient calls for assistance, is steady on her feet, wears threaded socks, bed in the lowest position, and call light and personal belongings within reach.

## 2017-11-19 NOTE — PROGRESS NOTES
Pt observed. Pt c/o mild abd pain at this time  Pt AAOx4. NPO at this time. No other signs or symptoms of distress, fall precautions in place, call light within reach, all questions answered, will continue to monitor.

## 2017-11-19 NOTE — DISCHARGE SUMMARY
Internal Medicine Discharge Summary      Admit Date:  11/17/2017       Discharge Date:   11/20/2017     Service:   [facility] Internal Medicine Green Team  Attending Physician(s):   Dr. Shafer / Dr. Mackey       Senior Resident(s):   Dr. Figueroa  Jose Resident(s):   Dr. Moser / Dr. Sneed      Primary Diagnosis:   Colitis    Secondary Diagnoses:                Principal Problem:    Colitis POA: Unknown  Active Problems:    Gastroesophageal reflux disease without esophagitis POA: Yes      Overview: Managed with Protonix 40 mg daily and sucralfate 1 g bid, both for many       years. Has history of ulcers, hiatal hernia, GERD.      Initially followed very strict diet - Now avoids etoh, limits coffee,       soda, acidic juices, spicy food.    Dyslipidemia POA: Yes    Coronary artery disease due to lipid rich plaque POA: Yes      Overview: Status post PCI ×2 in 2009 in [place].      Followed by cardiology.    Paroxysmal a-fib (CMS-HCC) POA: Yes      Overview: Symptomatic, on a rhythm control strategy with sotalol 40 mg by mouth       twice a day.      Followed by cardiology.    Essential hypertension, benign POA: Yes    Hypokalemia POA: Unknown  Resolved Problems:    * No resolved hospital problems. *      Hospital Summary (Brief Narrative):       Patient is a 77 year old female with past medical history of diverticulitis, CAD with 2 stents placed in LAD (2009), PAD s/p PCI to left leg (2008) , paroxysmal atrial fibrillation (not on anticoagulation), hypertension, dyslipidemia, GERD and COPD who presented with complaints of right lower quadrant abdominal pain which started acutely on the night prior to admission. No diarrhea, hematochezia, nausea, vomiting, fever or chills. On exam she had tenderness in the right lower quadrant with rebound and tenderness in RLQ on palpation of LLQ. Guaiac negative. Vitals were stable on arrival. Basic labs showed leukocytosis (16.3) and hypokalemia ( 3.1). Lactic acid  was normal (1.0). CT abdomen/ pelvis showed thickening of cecum and ascending colon with pericolonic inflammatory changes, findings suggestive of inflammatory or infectious colitis. However ischemic process could not be excluded. Given her risk factors , patient was managed as ischemic colitis with supportive care- bowel rest, IV fluids and empiric antibiotics. Vascular surgery was consulted. MR angiogram of mesenteric arteries showed patent origins of celiac and superior mesenteric arteries, but distal portions were not well visualized. She was continued on supportive care with improvement. Diet was advanced.  Patient continued to improve, and had resolution of her abdominal pain (and no further pain on palpation or rebound).  In light of her past stents (2 coronary, 1 peripheral), and history of A-fib, she will be discharged on statin, rivaroxaban, and baby aspirin.            Patient /Hospital Summary (Details -- Problem Oriented) :          * Colitis   Assessment & Plan    77 year old patient with extensive history of vascular disease (CAD s/p stents, PAD s/p stents, hypertension), atrial fibrillation not on any anticoagulation and diverticulosis/ deverticulitis  presented with sudden onset pain in the RLQ. No diarrhea or hematochezia. Labs on admission showed leukocytosis 16.3 . Normal lactic acid - 1.0, guaiac negative and CT abdomen/pelvis with contrast shows marked wall thickening of cecum and ascending colon with pericolonic inflammatory changes, most likely inflammatory or infectious colitis. No signs of bowel obstruction, perforation or appendicitis.   - WBC today was normal , lactic acid was in normal limits, stool WBCs negative  - reports improvement / resolution of abdominal pain  - MR angiogram showed grossly patent celiac and superior mesenteric artery origins.         But Distal portions of these vessels, however, were not well visualized.  - Vascular surgery on board  Plan:  - Was on clear liquids  / advanced,  And was on IVF.  - Lactic acid remained in normal range.   - Was on empiric antibiotics- ceftriaxone and  Flagyl  - discharge on ciprofloxacin and flagyl.           Paroxysmal a-fib (CMS-HCC)   Assessment & Plan    - CHADSVAsc - 5   - Patient on diltiazem and sotalol . Had not been on anticoagulation, on admission.   - Patient is currently in sinus rhythm .   - Continue diltiazem and sotalol  - adding rivaroxaban (stopping plavix), and continue baby-aspirin.           Coronary artery disease due to lipid rich plaque   Assessment & Plan    - History of CAD with 2 stents in [place] (2009)  - Patient had been on both aspirin and plavix. No heart symptoms.   - Replacing plavix with rivaroxaban.   - also take baby-aspirin.   - continue lovastatin.         Dyslipidemia   Assessment & Plan    - continue lovastatin        Hypokalemia   Assessment & Plan    - Resolved          Essential hypertension, benign   Assessment & Plan    - Patient on diltiazem as home medication  - BP has remained stable  - continue diltiazem        Gastroesophageal reflux disease without esophagitis   Assessment & Plan    - continue omeprazole and sucralfate            Consultants:     cardiology    Procedures:        none    Imaging/ Testing:      MRA - abdomen - w wo (11/18/17):  1.  Limited study secondary to respiratory motion artifact.  2.  Marked atherosclerotic changes of the aorta with apparent occlusion of the origin of the left common iliac artery which appears to reconstitute via collaterals. There is also apparent high-grade stenosis of the right common iliac artery.  3.  The celiac and superior mesenteric artery origins appear grossly patent. Distal portions of these vessels are not well visualized, occlusion and stenosis in these regions cannot be excluded.    Echo - (11/18/17):  Left ventricular ejection fraction is visually estimated to be 60%.   Grade II diastolic dysfunction.   Aortic sclerosis without stenosis.   Mitral  annular calcification.   Heavy calcification of the posterior mitral valve leaflet.   Mild mitral regurgitation.   Trace tricuspid regurgitation.   Normal pericardium without effusion.   Ascending aorta diameter is 2.4 cm.    CT - Abd/Pelvis - (11/17/17):  1.  Marked wall thickening of the cecum and ascending colon with pericolonic inflammatory changes, most likely inflammatory or infectious colitis.  Underlying etiology is uncertain.  Ischemic processes and tumor are not excluded.  2.  No CT evidence for acute appendicitis.  3.  Increased colonic stool.  4.  No bowel obstruction or evidence for perforation.      Discharge Medications:         Medication Reconciliation: Completed       Medication List      START taking these medications      Instructions   ciprofloxacin 750 MG Tabs  Commonly known as:  CIPRO   Take 1 Tab by mouth 2 times a day for 4 days.  Dose:  750 mg     metronidazole 500 MG Tabs  Commonly known as:  FLAGYL   Take 1 Tab by mouth every 8 hours for 4 days.  Dose:  500 mg     rivaroxaban 20 MG Tabs tablet  Commonly known as:  XARELTO   Take 1 Tab by mouth with dinner for 90 days.  Dose:  20 mg        CONTINUE taking these medications      Instructions   alprazolam 0.25 MG Tabs  Commonly known as:  XANAX   Take 0.25 mg by mouth every morning.  Dose:  0.25 mg     aspirin EC 81 MG Tbec  Commonly known as:  ECOTRIN   Take 81 mg by mouth every evening.  Dose:  81 mg       clopidogrel 75 MG Tabs  Commonly known as:  PLAVIX  !!!  Until you get the Rivaroxaban,        Then stop taking Plavix.    !!!       Take 1 Tab by mouth every day.  Dose:  75 mg  !!!  Until you get Rivaroxaban,     Then stop taking Plavix.    !!!   diltiazem  MG Cp24  Commonly known as:  CARDIZEM CD   Doctor's comments:  Ref#[id number]  Take 1 Cap by mouth every day.  Dose:  240 mg     lovastatin 40 MG tablet  Commonly known as:  MEVACOR   Take 1 Tab by mouth every evening.  Dose:  40 mg     pantoprazole 40 MG Tbec  Commonly  known as:  PROTONIX   TAKE 1 TABLET BY MOUTH EVERY DAY. INDICATIONS: GASTROESOPHAGEAL REFLUX DISEASE     sotalol 80 MG Tabs  Commonly known as:  BETAPACE   Take 0.5 Tabs by mouth 2 times a day.  Dose:  40 mg     sucralfate 1 GM Tabs  Commonly known as:  CARAFATE   Take 1 g by mouth 2 Times a Day.  Dose:  1 g     Vitamin D 2000 units Caps   Take 1 Cap by mouth every morning.  Dose:  1 Cap          Please note, that you are to only continue taking the Plavix (clopidogrel), until you get your prescription for rivaroxaban.  Then you are to take the rivaroxaban (with evening meal) and a baby-aspirin daily.       Disposition:   Discharge home    Diet:   cardiac    Activity:   moderate    Instructions:       Please note, that you are to only continue taking the Plavix (clopidogrel), until you get your prescription for rivaroxaban.  Then you are to take the rivaroxaban (with evening meal) and a baby-aspirin daily.     The patient was instructed to return to the ER in the event of worsening symptoms. I have counseled the patient on the importance of compliance and the patient has agreed to proceed with all medical recommendations and follow up plan indicated above.   The patient understands that all medications come with benefits and risks. Risks may include permanent injury or death and these risks can be minimized with close reassessment and monitoring.      She was specifically advised that her medications may cause a risk of bleeding, but that it was belt that the benefit of preventing strokes or other emboli, outweighed the risks of bleeding.        Primary Care Provider:    Patient want to establish a new PCP  She is requesting an appointment with Dr. Beryl aPng ([facility]),  Since she had a good experience with her in the ER.       Follow up appointment details :      She is to follow-up with a PCP within 1-2 weeks.  (She prefers Dr. Pang, but advised to see other PCP if not able to schedule in that  time-frame).   She is to follow-up with her regular cardiologist.  (preferably within 1-2 weeks).     Pending Studies:        none    Summary of follow up issues:   She should have long-term anticoagulation.  However, she has not wanted warfarin, and the novel oral anticoagulation medications may be expensive.  She was given a prescription for rivaroxaban (and working with  to try to find discounts), and provided with a discount card for 1-month free rivaroxaban.  This should provide enough coverage, to see PCP and/or cardiology to discuss further options.    She should also continue with a baby-aspirin and statin.     She should be monitored for further signs of ischemia or colitis.  If any sudden signs of ischemic colitis, she should return to the ER / hospital.      Time spent on discharge day patient visit, preparing discharge paperwork and arranging for patient follow up.  Discharge Time (Minutes) :    45 min.   Hospital Course Type:  Inpatient Stay >2 midnights      Condition on Discharge  - fair - good.   ______________________________________________________________________    Interval history/exam for day of discharge:     Patient states her abdominal pain has resolved.  No further pain or discomfort.    Passed bowel movement. Tolerating diet.    No nausea, vomiting, diarrhea.      Vitals:    11/18/17 1551 11/18/17 2000 11/19/17 0000 11/19/17 0400   BP: 111/55 118/61 121/59 122/77   Pulse: 78 61 (!) 53 64   Resp: 20 20 18 18   Temp: 35.9 °C (96.6 °F) (!) 35.7 °C (96.2 °F) 36 °C (96.8 °F) 36.3 °C (97.4 °F)   TempSrc:       SpO2: 93% 95% 94% 95%   Weight:  48.5 kg (106 lb 14.8 oz)     Height:         Weight/BMI: Body mass index is 18.94 kg/m².  Pulse Oximetry: 95 %, O2 (LPM): 0, O2 Delivery: None (Room Air)    General: AOx4.  Pleasant 77 y.o. Female, who appears younger than her age.   CVS: Regular rate and rhythm (during exam).  No murmur.   PULM: CTAB.  No wheezes or crackles.   GI:  +BS,   +BM,  No distension or tenderness.  Non-tender.  No rebound. No guarding.       Most Recent Labs:    Lab Results   Component Value Date/Time    WBC 6.8 11/19/2017 11:48 PM    RBC 4.13 (L) 11/19/2017 11:48 PM    HEMOGLOBIN 11.9 (L) 11/19/2017 11:48 PM    HEMATOCRIT 36.5 (L) 11/19/2017 11:48 PM    MCV 88.4 11/19/2017 11:48 PM    MCH 28.8 11/19/2017 11:48 PM    MCHC 32.6 (L) 11/19/2017 11:48 PM    MPV 9.7 11/19/2017 11:48 PM    NEUTSPOLYS 42.30 (L) 11/19/2017 11:48 PM    LYMPHOCYTES 43.50 (H) 11/19/2017 11:48 PM    MONOCYTES 11.00 11/19/2017 11:48 PM    EOSINOPHILS 2.20 11/19/2017 11:48 PM    BASOPHILS 0.90 11/19/2017 11:48 PM      Lab Results   Component Value Date/Time    SODIUM 137 11/19/2017 11:49 PM    POTASSIUM 3.9 11/19/2017 11:49 PM    CHLORIDE 110 11/19/2017 11:49 PM    CO2 22 11/19/2017 11:49 PM    GLUCOSE 93 11/19/2017 11:49 PM    BUN 7 (L) 11/19/2017 11:49 PM    CREATININE 0.63 11/19/2017 11:49 PM      Lab Results   Component Value Date/Time    ALTSGPT 8 11/18/2017 03:28 AM    ASTSGOT 12 11/18/2017 03:28 AM    ALKPHOSPHAT 62 11/18/2017 03:28 AM    TBILIRUBIN 0.5 11/18/2017 03:28 AM    LIPASE 32 11/17/2017 10:31 AM    ALBUMIN 2.9 (L) 11/18/2017 03:28 AM    GLOBULIN 2.2 11/18/2017 03:28 AM     No results found for: PROTHROMBTM, INR

## 2017-11-19 NOTE — PROGRESS NOTES
Patient is sleeping in bed no indications of pain and no indications of distress. Will continue to monitor.

## 2017-11-20 VITALS
BODY MASS INDEX: 18.95 KG/M2 | SYSTOLIC BLOOD PRESSURE: 121 MMHG | WEIGHT: 106.92 LBS | HEIGHT: 63 IN | RESPIRATION RATE: 18 BRPM | DIASTOLIC BLOOD PRESSURE: 60 MMHG | TEMPERATURE: 98 F | HEART RATE: 58 BPM | OXYGEN SATURATION: 98 %

## 2017-11-20 LAB
ANION GAP SERPL CALC-SCNC: 5 MMOL/L (ref 0–11.9)
BASOPHILS # BLD AUTO: 0.9 % (ref 0–1.8)
BASOPHILS # BLD: 0.06 K/UL (ref 0–0.12)
BUN SERPL-MCNC: 7 MG/DL (ref 8–22)
CALCIUM SERPL-MCNC: 8.4 MG/DL (ref 8.5–10.5)
CHLORIDE SERPL-SCNC: 110 MMOL/L (ref 96–112)
CO2 SERPL-SCNC: 22 MMOL/L (ref 20–33)
CREAT SERPL-MCNC: 0.63 MG/DL (ref 0.5–1.4)
EOSINOPHIL # BLD AUTO: 0.15 K/UL (ref 0–0.51)
EOSINOPHIL NFR BLD: 2.2 % (ref 0–6.9)
ERYTHROCYTE [DISTWIDTH] IN BLOOD BY AUTOMATED COUNT: 44.7 FL (ref 35.9–50)
GFR SERPL CREATININE-BSD FRML MDRD: >60 ML/MIN/1.73 M 2
GLUCOSE SERPL-MCNC: 93 MG/DL (ref 65–99)
HCT VFR BLD AUTO: 36.5 % (ref 37–47)
HGB BLD-MCNC: 11.9 G/DL (ref 12–16)
IMM GRANULOCYTES # BLD AUTO: 0.01 K/UL (ref 0–0.11)
IMM GRANULOCYTES NFR BLD AUTO: 0.1 % (ref 0–0.9)
LACTATE BLD-SCNC: 1.3 MMOL/L (ref 0.5–2)
LACTATE BLD-SCNC: 1.6 MMOL/L (ref 0.5–2)
LYMPHOCYTES # BLD AUTO: 2.96 K/UL (ref 1–4.8)
LYMPHOCYTES NFR BLD: 43.5 % (ref 22–41)
MAGNESIUM SERPL-MCNC: 1.7 MG/DL (ref 1.5–2.5)
MCH RBC QN AUTO: 28.8 PG (ref 27–33)
MCHC RBC AUTO-ENTMCNC: 32.6 G/DL (ref 33.6–35)
MCV RBC AUTO: 88.4 FL (ref 81.4–97.8)
MONOCYTES # BLD AUTO: 0.75 K/UL (ref 0–0.85)
MONOCYTES NFR BLD AUTO: 11 % (ref 0–13.4)
NEUTROPHILS # BLD AUTO: 2.87 K/UL (ref 2–7.15)
NEUTROPHILS NFR BLD: 42.3 % (ref 44–72)
NRBC # BLD AUTO: 0 K/UL
NRBC BLD AUTO-RTO: 0 /100 WBC
PLATELET # BLD AUTO: 174 K/UL (ref 164–446)
PMV BLD AUTO: 9.7 FL (ref 9–12.9)
POTASSIUM SERPL-SCNC: 3.9 MMOL/L (ref 3.6–5.5)
RBC # BLD AUTO: 4.13 M/UL (ref 4.2–5.4)
SODIUM SERPL-SCNC: 137 MMOL/L (ref 135–145)
WBC # BLD AUTO: 6.8 K/UL (ref 4.8–10.8)

## 2017-11-20 PROCEDURE — 700111 HCHG RX REV CODE 636 W/ 250 OVERRIDE (IP): Performed by: STUDENT IN AN ORGANIZED HEALTH CARE EDUCATION/TRAINING PROGRAM

## 2017-11-20 PROCEDURE — 700102 HCHG RX REV CODE 250 W/ 637 OVERRIDE(OP): Performed by: ANESTHESIOLOGY

## 2017-11-20 PROCEDURE — 700105 HCHG RX REV CODE 258: Performed by: STUDENT IN AN ORGANIZED HEALTH CARE EDUCATION/TRAINING PROGRAM

## 2017-11-20 PROCEDURE — A9270 NON-COVERED ITEM OR SERVICE: HCPCS | Performed by: ANESTHESIOLOGY

## 2017-11-20 PROCEDURE — A9270 NON-COVERED ITEM OR SERVICE: HCPCS | Performed by: STUDENT IN AN ORGANIZED HEALTH CARE EDUCATION/TRAINING PROGRAM

## 2017-11-20 PROCEDURE — 83605 ASSAY OF LACTIC ACID: CPT

## 2017-11-20 PROCEDURE — 700102 HCHG RX REV CODE 250 W/ 637 OVERRIDE(OP): Performed by: STUDENT IN AN ORGANIZED HEALTH CARE EDUCATION/TRAINING PROGRAM

## 2017-11-20 PROCEDURE — 36415 COLL VENOUS BLD VENIPUNCTURE: CPT

## 2017-11-20 PROCEDURE — 99239 HOSP IP/OBS DSCHRG MGMT >30: CPT | Mod: GC | Performed by: INTERNAL MEDICINE

## 2017-11-20 PROCEDURE — 700101 HCHG RX REV CODE 250: Performed by: STUDENT IN AN ORGANIZED HEALTH CARE EDUCATION/TRAINING PROGRAM

## 2017-11-20 RX ORDER — CIPROFLOXACIN 750 MG/1
750 TABLET, FILM COATED ORAL 2 TIMES DAILY
Qty: 8 TAB | Refills: 0 | Status: SHIPPED | OUTPATIENT
Start: 2017-11-20 | End: 2017-11-24

## 2017-11-20 RX ORDER — METRONIDAZOLE 500 MG/1
500 TABLET ORAL EVERY 8 HOURS
Qty: 12 TAB | Refills: 0 | Status: SHIPPED | OUTPATIENT
Start: 2017-11-20 | End: 2017-11-24

## 2017-11-20 RX ADMIN — ENOXAPARIN SODIUM 40 MG: 100 INJECTION SUBCUTANEOUS at 08:20

## 2017-11-20 RX ADMIN — METRONIDAZOLE 500 MG: 500 INJECTION, SOLUTION INTRAVENOUS at 13:27

## 2017-11-20 RX ADMIN — METRONIDAZOLE 500 MG: 500 INJECTION, SOLUTION INTRAVENOUS at 05:52

## 2017-11-20 RX ADMIN — ALPRAZOLAM 0.25 MG: 0.25 TABLET ORAL at 08:20

## 2017-11-20 RX ADMIN — OMEPRAZOLE 20 MG: 20 CAPSULE, DELAYED RELEASE ORAL at 08:20

## 2017-11-20 RX ADMIN — VITAMIN D, TAB 1000IU (100/BT) 2000 UNITS: 25 TAB at 08:20

## 2017-11-20 RX ADMIN — DILTIAZEM HYDROCHLORIDE 240 MG: 240 CAPSULE, COATED, EXTENDED RELEASE ORAL at 08:20

## 2017-11-20 RX ADMIN — CEFTRIAXONE 1 G: 1 INJECTION, SOLUTION INTRAVENOUS at 08:20

## 2017-11-20 RX ADMIN — SOTALOL HYDROCHLORIDE 40 MG: 80 TABLET ORAL at 08:20

## 2017-11-20 RX ADMIN — SODIUM CHLORIDE: 9 INJECTION, SOLUTION INTRAVENOUS at 02:51

## 2017-11-20 RX ADMIN — SUCRALFATE 1 G: 1 TABLET ORAL at 08:20

## 2017-11-20 ASSESSMENT — ENCOUNTER SYMPTOMS
FALLS: 0
FLANK PAIN: 0
DIAPHORESIS: 0
DEPRESSION: 0
PALPITATIONS: 0
DIZZINESS: 0
SHORTNESS OF BREATH: 0
ABDOMINAL PAIN: 1
SORE THROAT: 0
FEVER: 0
NAUSEA: 0
LOSS OF CONSCIOUSNESS: 0
CHILLS: 0
WEIGHT LOSS: 0
BLOOD IN STOOL: 0
HEADACHES: 0
FOCAL WEAKNESS: 0
VOMITING: 0
HALLUCINATIONS: 0
SPUTUM PRODUCTION: 0
CONSTIPATION: 0
MYALGIAS: 0
COUGH: 0
DIARRHEA: 0

## 2017-11-20 ASSESSMENT — PAIN SCALES - GENERAL
PAINLEVEL_OUTOF10: 0

## 2017-11-20 ASSESSMENT — LIFESTYLE VARIABLES: SUBSTANCE_ABUSE: 0

## 2017-11-20 NOTE — NON-PROVIDER
Internal Medicine Medical Student Note  Note Author: Atilio MIvone Henry, Student    Name Angie Root 1940   Age/Sex 77 y.o. female   MRN [id number]   Code Status Full code     After 5PM or if no immediate response to page, please call for cross-coverage  Attending/Team: Dr. Mackey/ Clemente See Patient List for primary contact information  Call [phone] to page after hours   1st Call - Day Intern (R1):   Dr. Sneed 2nd Call - Day Sr. Resident (R2/R3):   Dr. Figueroa     Reason for interval visit  (Principal Problem)   Colitis    Interval Problem Daily Status Update  (24 hours)   Patient reports no acute overnight events. She states that her abdomen is improving and reports having occasional pain with movement, but denies pain at rest. Patient was advanced to clear liquid diet yesterday and states that she is tolerating it well. She denies nausea, vomiting, diarrhea, hematochezia, and melena. No chest pain, palpitations, shortness of breath and dizziness.      Review of Systems   Constitutional: Negative for chills, diaphoresis, fever, malaise/fatigue and weight loss.   HENT: Negative for congestion and sore throat.    Respiratory: Negative for cough, sputum production and shortness of breath.    Cardiovascular: Negative for chest pain, palpitations and leg swelling.   Gastrointestinal: Positive for abdominal pain. Negative for blood in stool, constipation, diarrhea, melena, nausea and vomiting.   Genitourinary: Negative for flank pain and hematuria.   Musculoskeletal: Negative for falls and myalgias.   Skin: Negative for itching and rash.   Neurological: Negative for dizziness, focal weakness, loss of consciousness and headaches.   Psychiatric/Behavioral: Negative for depression, hallucinations, substance abuse and suicidal ideas.     Physical Exam     Vitals:    17 2359 17 0305 17 0800 17 1157   BP: 107/59 114/62 139/66 121/60   Pulse: (!) 58 62 69 (!) 58   Resp: 16  18 18 18   Temp: 36.4 °C (97.6 °F) 36.4 °C (97.5 °F) 37.2 °C (98.9 °F) 36.7 °C (98 °F)   TempSrc:       SpO2: 96% 92% 93% 98%   Weight:       Height:         Body mass index is 18.94 kg/m².    Oxygen Therapy:  Pulse Oximetry: 98 %, O2 (LPM): 0, O2 Delivery: None (Room Air)       Physical Exam   Constitutional: She is oriented to person, place, and time.   HENT:   Head: Normocephalic and atraumatic.   Eyes: Conjunctivae and EOM are normal. Pupils are equal, round, and reactive to light.   Neck: Normal range of motion. Neck supple.   Cardiovascular: Normal rate, regular rhythm and normal heart sounds.    Pulmonary/Chest: Effort normal and breath sounds normal.   Abdominal: Soft. She exhibits no distension. There is tenderness in the right lower quadrant. There is no rigidity, no rebound and no guarding.   Neurological: She is alert and oriented to person, place, and time. No cranial nerve deficit.   Skin: No rash noted. No erythema. No pallor.     Most Recent Labs:    Lab Results   Component Value Date/Time    WBC 6.8 11/19/2017 11:48 PM    RBC 4.13 (L) 11/19/2017 11:48 PM    HEMOGLOBIN 11.9 (L) 11/19/2017 11:48 PM    HEMATOCRIT 36.5 (L) 11/19/2017 11:48 PM    MCV 88.4 11/19/2017 11:48 PM    MCH 28.8 11/19/2017 11:48 PM    MCHC 32.6 (L) 11/19/2017 11:48 PM    MPV 9.7 11/19/2017 11:48 PM    NEUTSPOLYS 42.30 (L) 11/19/2017 11:48 PM    LYMPHOCYTES 43.50 (H) 11/19/2017 11:48 PM    MONOCYTES 11.00 11/19/2017 11:48 PM    EOSINOPHILS 2.20 11/19/2017 11:48 PM    BASOPHILS 0.90 11/19/2017 11:48 PM      Lab Results   Component Value Date/Time    SODIUM 137 11/19/2017 11:49 PM    POTASSIUM 3.9 11/19/2017 11:49 PM    CHLORIDE 110 11/19/2017 11:49 PM    CO2 22 11/19/2017 11:49 PM    GLUCOSE 93 11/19/2017 11:49 PM    BUN 7 (L) 11/19/2017 11:49 PM    CREATININE 0.63 11/19/2017 11:49 PM      Lab Results   Component Value Date/Time    ALTSGPT 8 11/18/2017 03:28 AM    ASTSGOT 12 11/18/2017 03:28 AM    ALKPHOSPHAT 62 11/18/2017 03:28 AM     TBILIRUBIN 0.5 11/18/2017 03:28 AM    LIPASE 32 11/17/2017 10:31 AM    ALBUMIN 2.9 (L) 11/18/2017 03:28 AM    GLOBULIN 2.2 11/18/2017 03:28 AM     Assessment/Plan     #Colitis  -MRI - showed evidence to stenosis in common iliac arteries; distal portions of celiac and SMA not well visualized, possibly due to occlusion  -Vascular surgery recommends discharge with follow up and oral antibiotics  -Patient tolerating clear liquid diet, will advance to soft solids  -Lactic acid q 12 hours  -Lactic acid - 1.6  -Discharge with Flagyl 500 mg tid x 4 days  -Discharge with Ciprofloxacin bid x 4 days    #Paroxysmal Atrial Fibrillation  -[id number] score - 5  -Patient on sotalol at home   -Patient currently in sinus rhythm  -Counseled patient about risks of not taking anticoagulation  -Discharge with Eliquis 5 mg bid    #Essential hypertension  -Patient on diltiazem at home  -Continue diltiazem  -Blood pressures have remained stable during admission    #CAD  -S/P placement of 2 stents in LAD - 2002/2003  -Continue home aspirin; patient's home Plavix held  -Patient reports being asymptomatic    #Dyslipidemia  -Continue home lovastatin    #GERD  -Continue home omeprazole and sucralfate

## 2017-11-20 NOTE — PROGRESS NOTES
Surgical Progress Note    Author: Jamaal Ortiz Date & Time created: 2017   5:51 AM     Interval Events:  No pain.  Eating; stooling  ROS  Hemodynamics:  Temp (24hrs), Av.6 °C (97.9 °F), Min:36.4 °C (97.5 °F), Max:36.9 °C (98.4 °F)  Temperature: 36.4 °C (97.5 °F)  Pulse  Av.1  Min: 53  Max: 87  Blood Pressure : 114/62     Respiratory:    Respiration: 18, Pulse Oximetry: 92 %        RUL Breath Sounds: Clear, RML Breath Sounds: Clear, RLL Breath Sounds: Diminished, ISRRAEL Breath Sounds: Clear, LLL Breath Sounds: Diminished  Neuro:  GCS       Fluids:    Intake/Output Summary (Last 24 hours) at 17 0551  Last data filed at 17 2000   Gross per 24 hour   Intake             2200 ml   Output                0 ml   Net             2200 ml        Current Diet Order   Procedures   • DIET ORDER     Physical Exam  Labs:  Recent Results (from the past 24 hour(s))   LACTIC ACID    Collection Time: 17 11:38 AM   Result Value Ref Range    Lactic Acid 1.0 0.5 - 2.0 mmol/L   CBC WITH DIFFERENTIAL    Collection Time: 17 11:48 PM   Result Value Ref Range    WBC 6.8 4.8 - 10.8 K/uL    RBC 4.13 (L) 4.20 - 5.40 M/uL    Hemoglobin 11.9 (L) 12.0 - 16.0 g/dL    Hematocrit 36.5 (L) 37.0 - 47.0 %    MCV 88.4 81.4 - 97.8 fL    MCH 28.8 27.0 - 33.0 pg    MCHC 32.6 (L) 33.6 - 35.0 g/dL    RDW 44.7 35.9 - 50.0 fL    Platelet Count 174 164 - 446 K/uL    MPV 9.7 9.0 - 12.9 fL    Neutrophils-Polys 42.30 (L) 44.00 - 72.00 %    Lymphocytes 43.50 (H) 22.00 - 41.00 %    Monocytes 11.00 0.00 - 13.40 %    Eosinophils 2.20 0.00 - 6.90 %    Basophils 0.90 0.00 - 1.80 %    Immature Granulocytes 0.10 0.00 - 0.90 %    Nucleated RBC 0.00 /100 WBC    Neutrophils (Absolute) 2.87 2.00 - 7.15 K/uL    Lymphs (Absolute) 2.96 1.00 - 4.80 K/uL    Monos (Absolute) 0.75 0.00 - 0.85 K/uL    Eos (Absolute) 0.15 0.00 - 0.51 K/uL    Baso (Absolute) 0.06 0.00 - 0.12 K/uL    Immature Granulocytes (abs) 0.01 0.00 - 0.11 K/uL    NRBC  (Absolute) 0.00 K/uL   BASIC METABOLIC PANEL    Collection Time: 11/19/17 11:49 PM   Result Value Ref Range    Sodium 137 135 - 145 mmol/L    Potassium 3.9 3.6 - 5.5 mmol/L    Chloride 110 96 - 112 mmol/L    Co2 22 20 - 33 mmol/L    Glucose 93 65 - 99 mg/dL    Bun 7 (L) 8 - 22 mg/dL    Creatinine 0.63 0.50 - 1.40 mg/dL    Calcium 8.4 (L) 8.5 - 10.5 mg/dL    Anion Gap 5.0 0.0 - 11.9   MAGNESIUM    Collection Time: 11/19/17 11:49 PM   Result Value Ref Range    Magnesium 1.7 1.5 - 2.5 mg/dL   LACTIC ACID    Collection Time: 11/19/17 11:49 PM   Result Value Ref Range    Lactic Acid 1.3 0.5 - 2.0 mmol/L   ESTIMATED GFR    Collection Time: 11/19/17 11:49 PM   Result Value Ref Range    GFR If African American >60 >60 mL/min/1.73 m 2    GFR If Non African American >60 >60 mL/min/1.73 m 2     Medical Decision Making, by Problem:  Active Hospital Problems    Diagnosis   • Colitis [K52.9]     Priority: High   • Hypokalemia [E87.6]   • Essential hypertension, benign [I10]   • Gastroesophageal reflux disease without esophagitis [K21.9]     Managed with Protonix 40 mg daily and sucralfate 1 g bid, both for many years. Has history of ulcers, hiatal hernia, GERD.  Initially followed very strict diet - Now avoids etoh, limits coffee, soda, acidic juices, spicy food.     • Paroxysmal a-fib (CMS-HCC) [I48.0]     Symptomatic, on a rhythm control strategy with sotalol 40 mg by mouth twice a day.  Followed by cardiology.     • Dyslipidemia [E78.5]   • Coronary artery disease due to lipid rich plaque [I25.10, I25.83]     Status post PCI ×2 in 2009 in [place].  Followed by cardiology.       Plan:  Ok for discharge to f/u with me; probably discharge on oral antibiotics.  Thx    Quality Measures:  Quality-Core Measures

## 2017-11-20 NOTE — PROGRESS NOTES
Received report on patient. She is is sitting up in bed eating dinner has no complaints of pain and no indications of distress. Bed in the lowest position and call light and personal belongings within reach.

## 2017-11-20 NOTE — CARE PLAN
Problem: Bowel/Gastric:  Goal: Normal bowel function is maintained or improved  Outcome: PROGRESSING AS EXPECTED  Patient has been having small bowel movements with no constipation or indications of bleeding.     Problem: Pain Management  Goal: Pain level will decrease to patient's comfort goal  Outcome: PROGRESSING AS EXPECTED  Patient has had no complaints of abdominal pain just mild discomfort with certain movements.

## 2017-11-20 NOTE — PROGRESS NOTES
Notified Dr. Sneed that pt will have to pay $483 out of pocket for Eliquis per .     Also, pt has an appt with Yaima CARSON in 2 weeks or would you like to have the pt see a PCP sooner? MD okay with the appt with Yaima CARSON in 2 weeks. Also, pt needs to have an appt with Dr. Thomas (cardiology) and with family medicine per MD. Read back by Ilsa KAYE.

## 2017-11-21 ENCOUNTER — PATIENT OUTREACH (OUTPATIENT)
Dept: HEALTH INFORMATION MANAGEMENT | Facility: OTHER | Age: 77
End: 2017-11-21

## 2017-11-21 ENCOUNTER — TELEPHONE (OUTPATIENT)
Dept: VASCULAR LAB | Facility: MEDICAL CENTER | Age: 77
End: 2017-11-21

## 2017-11-21 ENCOUNTER — TELEPHONE (OUTPATIENT)
Dept: CARDIOLOGY | Facility: MEDICAL CENTER | Age: 77
End: 2017-11-21

## 2017-11-21 NOTE — DISCHARGE PLANNING
Care Transition Team Assessment    Information Source  Orientation : Oriented x 4         Elopement Risk  Legal Hold: No  Ambulatory or Self Mobile in Wheelchair: Yes  Disoriented: No  Psychiatric Symptoms: None  History of Wandering: No  Elopement this Admit: No  Vocalizing Wanting to Leave: No  Displays Behaviors, Body Language Wanting to Leave: No-Not at Risk for Elopement  Elopement Risk: Not at Risk for Elopement    Interdisciplinary Discharge Planning  Does Admitting Nurse Feel This Could be a Complex Discharge?: No  Primary Care Physician: Jessica  Lives with - Patient's Self Care Capacity: Spouse  Patient or legal guardian wants to designate a caregiver (see row info): No  Support Systems: Family Member(s), Friends / Neighbors  Housing / Facility: [address]  Do You Take your Prescribed Medications Regularly: Yes  Able to Return to Previous ADL's: Yes  Mobility Issues: No  Prior Services: None  Assistance Needed: No  Durable Medical Equipment: Not Applicable    Discharge Preparedness  What is your plan after discharge?: Home with help  What are your discharge supports?: Spouse, Child  Prior Functional Level: Independent with Activities of Daily Living, Independent with Medication Management  Difficulity with ADLs: None  Difficulity with IADLs: None    Functional Assesment  Prior Functional Level: Independent with Activities of Daily Living, Independent with Medication Management    Finances  Financial Barriers to Discharge: No  Prescription Coverage: Yes    Vision / Hearing Impairment  Vision Impairment : No  Hearing Impairment : No    Values / Beliefs / Concerns  Values / Beliefs Concerns : No         Domestic Abuse  Have you ever been the victim of abuse or violence?: No  Physical Abuse or Sexual Abuse: No  Verbal Abuse or Emotional Abuse: No  Possible Abuse Reported to:: Not Applicable    Psychological Assessment  History of Substance Abuse: None  History of Psychiatric Problems: No  Non-compliant with  Treatment: No  Newly Diagnosed Illness: No    Discharge Risks or Barriers  Discharge risks or barriers?: No    Anticipated Discharge Information  Anticipated discharge disposition: Home

## 2017-11-21 NOTE — TELEPHONE ENCOUNTER
Message to Dr. Morgan to review D/C summary. Recommended she stop Plavix & start Xarelto. She couldn't afford Xarelto & refused warfarin. She plans on staying on Plavix until hospital [facility] with YAMILETH Bonilla on 11/29. Do You agree?

## 2017-11-21 NOTE — TELEPHONE ENCOUNTER
Called pt regarding referral for anticoagulation management. Pt states Xarelto is not affordable and she will NOT take warfarin. She is refusing anticoagulation at this time until she sees cardiology next week. Will await her visit with cardiology to see what they decide.    Odalis Gaines, Pharm D

## 2017-11-21 NOTE — DISCHARGE INSTRUCTIONS
Discharge Instructions    Discharged to home by car with relative. Discharged via wheelchair, hospital escort: Yes.  Special equipment needed: Not Applicable    Be sure to schedule a follow-up appointment with your primary care doctor or any specialists as instructed.     Discharge Plan:   Smoking Cessation Offered: Patient Refused  Influenza Vaccine Indication: Not indicated: Previously immunized this influenza season and > 8 years of age    I understand that a diet low in cholesterol, fat, and sodium is recommended for good health. Unless I have been given specific instructions below for another diet, I accept this instruction as my diet prescription.   Other diet: Low fiber, diabetic diet    Special Instructions: None    · Is patient discharged on Warfarin / Coumadin?   No     · Is patient Post Blood Transfusion?  No    Depression / Suicide Risk    As you are discharged from this [facility] facility, it is important to learn how to keep safe from harming yourself.    Recognize the warning signs:  · Abrupt changes in personality, positive or negative- including increase in energy   · Giving away possessions  · Change in eating patterns- significant weight changes-  positive or negative  · Change in sleeping patterns- unable to sleep or sleeping all the time   · Unwillingness or inability to communicate  · Depression  · Unusual sadness, discouragement and loneliness  · Talk of wanting to die  · Neglect of personal appearance   · Rebelliousness- reckless behavior  · Withdrawal from people/activities they love  · Confusion- inability to concentrate     If you or a loved one observes any of these behaviors or has concerns about self-harm, here's what you can do:  · Talk about it- your feelings and reasons for harming yourself  · Remove any means that you might use to hurt yourself (examples: pills, rope, extension cords, firearm)  · Get professional help from the community (Mental Health, Substance Abuse,  psychological counseling)  · Do not be alone:Call your Safe Contact- someone whom you trust who will be there for you.  · Call your local CRISIS HOTLINE 315-6706 or 838-329-8609  · Call your local Children's Mobile Crisis Response Team [place] (358) 513-1456 or www.MindFuse  · Call the toll free National Suicide Prevention Hotlines   · National Suicide Prevention Lifeline 883-342-FQPD (4551)  · [place] [facility] Line Network 800-SUICIDE (634-3332)    Rivaroxaban oral tablets  What is this medicine?  RIVAROXABAN (ri va NICOLASA a ban) is an anticoagulant (blood thinner). It is used to treat blood clots in the lungs or in the veins. It is also used after knee or hip surgeries to prevent blood clots. It is also used to lower the chance of stroke in people with a medical condition called atrial fibrillation.  This medicine may be used for other purposes; ask your health care provider or pharmacist if you have questions.  COMMON BRAND NAME(S): Xarelto  What should I tell my health care provider before I take this medicine?  They need to know if you have any of these conditions:  -bleeding disorders  -bleeding in the brain  -blood in your stools (black or tarry stools) or if you have blood in your vomit  -history of stomach bleeding  -kidney disease  -liver disease  -low blood counts, like low white cell, platelet, or red cell counts  -recent or planned spinal or epidural procedure  -take medicines that treat or prevent blood clots  -an unusual or allergic reaction to rivaroxaban, other medicines, foods, dyes, or preservatives  -pregnant or trying to get pregnant  -breast-feeding  How should I use this medicine?  Take this medicine by mouth with a glass of water. Follow the directions on the prescription label. Take your medicine at regular intervals. Do not take it more often than directed. Do not stop taking except on your doctor's advice.  If you are taking this medicine after hip or knee replacement surgery, take  it with or without food.  If you are taking this medicine for atrial fibrillation, take it with your evening meal. If you are taking this medicine to treat blood clots, take it with food at the same time each day. If you are unable to swallow your tablet, you may crush the tablet and mix it in applesauce. Then, immediately eat the applesauce. You should eat more food right after you eat the applesauce containing the crushed tablet.  Talk to your pediatrician regarding the use of this medicine in children. Special care may be needed.  Overdosage: If you think you've taken too much of this medicine contact a poison control center or emergency room at once.  Overdosage: If you think you have taken too much of this medicine contact a poison control center or emergency room at once.  NOTE: This medicine is only for you. Do not share this medicine with others.  What if I miss a dose?  If you take your medicine once a day and miss a dose, take the missed dose as soon as you remember. If you take your medicine twice a day and miss a dose, take the missed dose immediately. In this instance, 2 tablets may be taken at the same time. The next day you should take 1 tablet twice a day as directed.  What may interact with this medicine?  -aspirin and aspirin-like medicines  -certain antibiotics like erythromycin, azithromycin, and clarithromycin  -certain medicines for fungal infections like ketoconazole and itraconazole  -certain medicines for irregular heart beat like amiodarone, quinidine, dronedarone  -certain medicines for seizures like carbamazepine, phenytoin  -certain medicines that treat or prevent blood clots like warfarin, enoxaparin, and dalteparin   -conivaptan  -diltiazem  -felodipine  -indinavir  -lopinavir; ritonavir  -NSAIDS, medicines for pain and inflammation, like ibuprofen or naproxen  -ranolazine  -rifampin  -ritonavir  -Beaver's wort  -verapamil  This list may not describe all possible interactions. Give  your health care provider a list of all the medicines, herbs, non-prescription drugs, or dietary supplements you use. Also tell them if you smoke, drink alcohol, or use illegal drugs. Some items may interact with your medicine.  What should I watch for while using this medicine?  Do not stop taking this medicine without first talking to your doctor. Stopping this medicine may increase your risk of having a stroke. Be sure to refill your prescription before you run out of medicine.  This medicine may increase your risk to bruise or bleed. Call your doctor or health care professional if you notice any unusual bleeding.  Be careful brushing and flossing your teeth or using a toothpick because you may bleed more easily. If you have any dental work done, tell your dentist you are receiving this medicine.  What side effects may I notice from receiving this medicine?  Side effects that you should report to your doctor or health care professional as soon as possible:  -allergic reactions like skin rash, itching or hives, swelling of the face, lips, or tongue  -back pain  -bloody or black, tarry stools  -changes in vision  -confusion, trouble speaking or understanding  -red or dark-brown urine  -redness, blistering, peeling or loosening of the skin, including inside the mouth  -severe headaches  -spitting up blood or brown material that looks like coffee grounds  -sudden numbness or weakness of the face, arm or leg  -trouble walking, dizziness, loss of balance or coordination  -unusual bruising or bleeding from the eye, gums, or nose   Side effects that usually do not require medical attention (Report these to your doctor or health care professional if they continue or are bothersome.):  -dizziness  -muscle pain  This list may not describe all possible side effects. Call your doctor for medical advice about side effects. You may report side effects to FDA at 4-208-FDA-5266.  Where should I keep my medicine?  Keep out of the  reach of children.  Store at room temperature between 15 and 30 degrees C (59 and 86 degrees F). Throw away any unused medicine after the expiration date.  NOTE: This sheet is a summary. It may not cover all possible information. If you have questions about this medicine, talk to your doctor, pharmacist, or health care provider.  © 2014, Elsevier/Gold Standard. (3/17/2014 3:32:09 PM)    Gastroesophageal Reflux Disease, Adult  Gastroesophageal reflux disease (GERD) happens when acid from your stomach flows up into the esophagus. When acid comes in contact with the esophagus, the acid causes soreness (inflammation) in the esophagus. Over time, GERD may create small holes (ulcers) in the lining of the esophagus.  CAUSES   · Increased body weight. This puts pressure on the stomach, making acid rise from the stomach into the esophagus.  · Smoking. This increases acid production in the stomach.  · Drinking alcohol. This causes decreased pressure in the lower esophageal sphincter (valve or ring of muscle between the esophagus and stomach), allowing acid from the stomach into the esophagus.  · Late evening meals and a full stomach. This increases pressure and acid production in the stomach.  · A malformed lower esophageal sphincter.  Sometimes, no cause is found.  SYMPTOMS   · Burning pain in the lower part of the mid-chest behind the breastbone and in the mid-stomach area. This may occur twice a week or more often.  · Trouble swallowing.  · Sore throat.  · Dry cough.  · Asthma-like symptoms including chest tightness, shortness of breath, or wheezing.  DIAGNOSIS   Your caregiver may be able to diagnose GERD based on your symptoms. In some cases, X-rays and other tests may be done to check for complications or to check the condition of your stomach and esophagus.  TREATMENT   Your caregiver may recommend over-the-counter or prescription medicines to help decrease acid production. Ask your caregiver before starting or adding  any new medicines.   HOME CARE INSTRUCTIONS   · Change the factors that you can control. Ask your caregiver for guidance concerning weight loss, quitting smoking, and alcohol consumption.  · Avoid foods and drinks that make your symptoms worse, such as:  ¨ Caffeine or alcoholic drinks.  ¨ Chocolate.  ¨ Peppermint or mint flavorings.  ¨ Garlic and onions.  ¨ Spicy foods.  ¨ Citrus fruits, such as oranges, adrianna, or limes.  ¨ Tomato-based foods such as sauce, chili, salsa, and pizza.  ¨ Fried and fatty foods.  · Avoid lying down for the 3 hours prior to your bedtime or prior to taking a nap.  · Eat small, frequent meals instead of large meals.  · Wear loose-fitting clothing. Do not wear anything tight around your waist that causes pressure on your stomach.  · Raise the head of your bed 6 to 8 inches with wood blocks to help you sleep. Extra pillows will not help.  · Only take over-the-counter or prescription medicines for pain, discomfort, or fever as directed by your caregiver.  · Do not take aspirin, ibuprofen, or other nonsteroidal anti-inflammatory drugs (NSAIDs).  SEEK IMMEDIATE MEDICAL CARE IF:   · You have pain in your arms, neck, jaw, teeth, or back.  · Your pain increases or changes in intensity or duration.  · You develop nausea, vomiting, or sweating (diaphoresis).  · You develop shortness of breath, or you faint.  · Your vomit is green, yellow, black, or looks like coffee grounds or blood.  · Your stool is red, bloody, or black.  These symptoms could be signs of other problems, such as heart disease, gastric bleeding, or esophageal bleeding.  MAKE SURE YOU:   · Understand these instructions.  · Will watch your condition.  · Will get help right away if you are not doing well or get worse.     This information is not intended to replace advice given to you by your health care provider. Make sure you discuss any questions you have with your health care provider.     Document Released: 09/27/2006 Document  Revised: 01/08/2016 Document Reviewed: 04/13/2016  [employer] Interactive Patient Education ©2016 Elsevier Inc.  Colitis  Colitis is inflammation of the colon. Colitis can be a short-term or long-standing (chronic) illness. Crohn's disease and ulcerative colitis are 2 types of colitis which are chronic. They usually require lifelong treatment.  CAUSES   There are many different causes of colitis, including:  · Viruses.  · Germs (bacteria).  · Medicine reactions.  SYMPTOMS   · Diarrhea.  · Intestinal bleeding.  · Pain.  · Fever.  · Throwing up (vomiting).  · Tiredness (fatigue).  · Weight loss.  · Bowel blockage.  DIAGNOSIS   The diagnosis of colitis is based on examination and stool or blood tests. X-rays, CT scan, and colonoscopy may also be needed.  TREATMENT   Treatment may include:  · Fluids given through the vein (intravenously).  · Bowel rest (nothing to eat or drink for a period of time).  · Medicine for pain and diarrhea.  · Medicines (antibiotics) that kill germs.  · Cortisone medicines.  · Surgery.  HOME CARE INSTRUCTIONS   · Get plenty of rest.  · Drink enough water and fluids to keep your urine clear or pale yellow.  · Eat a well-balanced diet.  · Call your caregiver for follow-up as recommended.  SEEK IMMEDIATE MEDICAL CARE IF:   · You develop chills.  · You have an oral temperature above 102° F (38.9° C), not controlled by medicine.  · You have extreme weakness, fainting, or dehydration.  · You have repeated vomiting.  · You develop severe belly (abdominal) pain or are passing bloody or tarry stools.  MAKE SURE YOU:   · Understand these instructions.  · Will watch your condition.  · Will get help right away if you are not doing well or get worse.     This information is not intended to replace advice given to you by your health care provider. Make sure you discuss any questions you have with your health care provider.     Document Released: 01/25/2006 Document Revised: 03/11/2013 Document Reviewed:  04/11/2016  Elsevier Interactive Patient Education ©2016 Elsevier Inc.

## 2017-11-21 NOTE — TELEPHONE ENCOUNTER
----- Message from Teressa Pacheco sent at 11/21/2017 12:19 PM PST -----  Regarding: patient was discharged from the hospital  IA/Samreen      Patient was in ER, and was told she needs to stop the Plavix. She wants Dr. Morgan's opinion. She can be reached at [phone].

## 2017-11-21 NOTE — PROGRESS NOTES
Pt to be discharged home. Discharge instructions given and explained to pt. Medication scripts given to pt. IV and Telemetry monitor taken off. All belongings with pt. Pt waiting for ride from .

## 2017-11-21 NOTE — CONSULTS
DATE OF SERVICE:  11/18/2017    CHIEF COMPLAINT:  Abdominal pain.    HISTORY OF PRESENT ILLNESS:  This 77-year-old female is seen at the request of   the medical service.    This patient is a heavy smoker and has a longstanding history of  ASCVD.    She has had abdominal pain for several days and had a CT scan, which shows   thickening of the cecum with mild pneumatosis and no obvious perforation   consistent with ischemic colitis.  The patient complains of pain and denies   fever or chills.  She has not had a bowel movement in 2 days.    OPERATIONS:  Coronary stent placement, stent of lower extremity.    MEDICAL DISEASES:  COPD, ASCVD, history of diverticular disease, coronary   artery disease.    MEDICATIONS:  At the time of admission, diltiazem, sotalol.    ALLERGIES:  MULTIPLE.    FAMILY HISTORY:  Positive for heart disease.    SOCIAL HISTORY:  The patient continues to smoke and is retired.    REVIEW OF SYSTEMS:  The patient denies chest pain or shortness of breath.    PHYSICAL EXAMINATION:  VITAL SIGNS:  Temperature 98.8, blood pressure 142/82, pulse 75.  HEENT:  Without icterus.  Pupils are equal, reactive to light and   accommodation.  NECK:  Without masses.  No JVD or supraclavicular adenopathy.  No thyromegaly.  CHEST:  Coarse breath sounds.  CARDIAC:  Auscultation unremarkable.  ABDOMEN:  Scaphoid, mild tenderness with minimal guarding and no rebound,   right lower quadrant.  RECTAL AND GENITALIA:  Deferred.  EXTREMITIES:  Pulses 2+.    IMPRESSION:  1.  Probable ischemic colitis secondary to peripheral vascular disease.  2.  Atherosclerotic cardiovascular disease.  3.  Coronary stent placement.  4.  Lower extremity stent placement.  5.  Chronic obstructive pulmonary disease.  6.  Hypertension.  7.  Anticoagulation.    PLAN:  The patient was started on antibiotics, I think this is most   appropriate.    There is no sign on the CT scan, which I reviewed, or perforation necessary   for surgical intervention  at this time.    She has an MRA of her visceral vessels pending for tomorrow.    I would be happy to follow this patient with you.       ____________________________________     MD ROSINA SNYDER / JULIUS    DD:  11/21/2017 13:28:52  DT:  11/21/2017 14:51:22    D#:  [id number]  Job#:  [id number]

## 2017-11-21 NOTE — PROGRESS NOTES
Pt able to get a month supply of Xarelto free of charge (program) from . Pt to  med at her pharmacy.

## 2017-11-29 ENCOUNTER — OFFICE VISIT (OUTPATIENT)
Dept: CARDIOLOGY | Facility: MEDICAL CENTER | Age: 77
End: 2017-11-29
Payer: MEDICARE

## 2017-11-29 VITALS
BODY MASS INDEX: 18.4 KG/M2 | HEART RATE: 60 BPM | SYSTOLIC BLOOD PRESSURE: 118 MMHG | HEIGHT: 62 IN | DIASTOLIC BLOOD PRESSURE: 60 MMHG | WEIGHT: 100 LBS | OXYGEN SATURATION: 96 %

## 2017-11-29 DIAGNOSIS — I25.10 CORONARY ARTERY DISEASE DUE TO LIPID RICH PLAQUE: ICD-10-CM

## 2017-11-29 DIAGNOSIS — I25.83 CORONARY ARTERY DISEASE DUE TO LIPID RICH PLAQUE: ICD-10-CM

## 2017-11-29 DIAGNOSIS — I48.0 PAROXYSMAL A-FIB (HCC): ICD-10-CM

## 2017-11-29 DIAGNOSIS — E78.5 DYSLIPIDEMIA: ICD-10-CM

## 2017-11-29 DIAGNOSIS — I73.9 PAD (PERIPHERAL ARTERY DISEASE) (HCC): ICD-10-CM

## 2017-11-29 DIAGNOSIS — R73.03 PREDIABETES: ICD-10-CM

## 2017-11-29 PROBLEM — K52.9 COLITIS: Status: RESOLVED | Noted: 2017-11-17 | Resolved: 2017-11-29

## 2017-11-29 PROCEDURE — 99214 OFFICE O/P EST MOD 30 MIN: CPT | Performed by: NURSE PRACTITIONER

## 2017-11-29 RX ORDER — WARFARIN SODIUM 5 MG/1
5 TABLET ORAL DAILY
Qty: 30 TAB | Refills: 3 | Status: SHIPPED | OUTPATIENT
Start: 2017-11-29 | End: 2017-12-03

## 2017-11-29 ASSESSMENT — ENCOUNTER SYMPTOMS
ORTHOPNEA: 0
COUGH: 0
FEVER: 0
SHORTNESS OF BREATH: 0
CLAUDICATION: 1
PND: 0
DIZZINESS: 0
MYALGIAS: 0
ABDOMINAL PAIN: 0
PALPITATIONS: 0

## 2017-11-29 NOTE — LETTER
[facility]   [address] [facility] [address]  [place] [address]  Phone: [phone]  Fax: [phone]              Angie Root  1940    Encounter Date: 11/29/2017    PEPE Becker          PROGRESS NOTE:  Subjective:   Angie Root is a 77 y.o. female who presents today for hospital follow up and to discuss anticoagulation with PAF and [id number] score of 5.    She is a patient of Dr. Johnathan Morgan in our office. Hx of CAD with previous stenting to her LAD in '09, smoking hx, HTN, HLD, PAF, and PAD with L iliac stent in '08.    She feels good. She was admitted for abdominal pain and was found to have ischemic colitis with PAD. Not a surgical candidate per Dr. Ortiz at that time, he recommended outpatient follow up. She would rather see a different vascular surgeon, as she didn't like him very much.    After long discussion, she is agreeable to starting warfarin for her PAF. No recurrences since '09 she states.     She has no other symptoms but does get short of breath with heavy exertion. She was told she may have COPD before she moved to [place]. She has not been established with a doctor to manage this here yet.    She states she has some leg pain with walking but it gets better from time to time. She does have some edema in her lower extremities as well with a lorraine appearance.    She has had no episodes of chest pain, palpitations, dizziness/lightheadedness, or orthopnea.    Past Medical History:   Diagnosis Date   • CAD (coronary artery disease)    • COPD (chronic obstructive pulmonary disease) (CMS-HCC)    • Diverticulosis    • Dyslipidemia    • GERD (gastroesophageal reflux disease)    • Hiatal hernia    • Hypertension    • Paroxysmal a-fib (CMS-HCC) 1/20/2016    Symptomatic, on a rhythm control strategy with sotalol 40 mg by mouth twice a day.    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   • CARDIAC CATH, RIGHT HEART  2008    stent   • CHOLECYSTECTOMY  1993   • TONSILLECTOMY  1945   • STENT PLACEMENT      L iliac stent     Family History   Problem Relation Age of Onset   • Hypertension Mother    • Hyperlipidemia Mother    • Cancer Maternal Grandmother      tongue   • Cancer Maternal Uncle      x 3, pancreas   • Cancer Maternal Grandfather      unknown   • Cancer Paternal Grandmother      unknown   • Cancer Paternal Grandfather      unknown   • Diabetes Maternal Uncle    • Heart Disease Maternal Uncle    • Hypertension Sister    • Hyperlipidemia Sister    • Thyroid Sister    • Psychiatry Neg Hx    • Stroke Neg Hx      History   Smoking Status   • Current Every Day Smoker   • Packs/day: 0.25   • Years: 60.00   • Types: Cigarettes   • Start date: 3/20/1957   Smokeless Tobacco   • Never Used     Comment: 6 per day     Allergies   Allergen Reactions   • Codeine Swelling   • Iodine Swelling   • Bee Venom    • Chantix [Varenicline]      disoriented   • Latex    • Pcn [Penicillins]    • Shellfish Allergy    • Tetanus Antitoxin      Outpatient Encounter Prescriptions as of 11/29/2017   Medication Sig Dispense Refill   • warfarin (COUMADIN) 5 MG Tab Take 1 Tab by mouth every day. 30 Tab 3   • alprazolam (XANAX) 0.25 MG Tab Take 0.25 mg by mouth every morning.     • sucralfate (CARAFATE) 1 GM Tab Take 1 g by mouth 2 Times a Day.     • pantoprazole (PROTONIX) 40 MG Tablet Delayed Response TAKE 1 TABLET BY MOUTH EVERY DAY. INDICATIONS: GASTROESOPHAGEAL REFLUX DISEASE 90 Tab 0   • lovastatin (MEVACOR) 40 MG tablet Take 1 Tab by mouth every evening. 90 Tab 3   • diltiazem CD (CARDIZEM CD) 240 MG CAPSULE SR 24 HR Take 1 Cap by mouth every day. 90 Cap 3   • sotalol (BETAPACE) 80 MG Tab Take 0.5 Tabs by mouth 2 times a day. 90 Tab 3   • Cholecalciferol  "(VITAMIN D) 2000 UNITS Cap Take 1 Cap by mouth every morning.     • aspirin EC (ECOTRIN) 81 MG Tablet Delayed Response Take 81 mg by mouth every evening.     • [DISCONTINUED] rivaroxaban (XARELTO) 20 MG Tab tablet Take 1 Tab by mouth with dinner for 30 days. (Patient not taking: Reported on 11/29/2017) 30 Tab 0   • [DISCONTINUED] clopidogrel (PLAVIX) 75 MG Tab Take 1 Tab by mouth every day. 90 Tab 3     No facility-administered encounter medications on file as of 11/29/2017.      Review of Systems   Constitutional: Negative for fever and malaise/fatigue.   Respiratory: Negative for cough and shortness of breath.    Cardiovascular: Positive for claudication. Negative for chest pain, palpitations, orthopnea, leg swelling and PND.   Gastrointestinal: Negative for abdominal pain.   Musculoskeletal: Negative for myalgias.   Neurological: Negative for dizziness.   All other systems reviewed and are negative.       Objective:   /60   Pulse 60   Ht 1.575 m (5' 2\")   Wt 45.4 kg (100 lb)   SpO2 96%   BMI 18.29 kg/m²      Physical Exam   Constitutional: She is oriented to person, place, and time. She appears well-developed and well-nourished. No distress.   HENT:   Head: Normocephalic and atraumatic.   Eyes: EOM are normal.   Neck: Normal range of motion. No JVD present.   Cardiovascular: Normal rate, regular rhythm, normal heart sounds and intact distal pulses.    No murmur heard.  Pulmonary/Chest: Effort normal. No respiratory distress. She has decreased breath sounds in the right lower field and the left lower field.   Abdominal: Soft. Bowel sounds are normal.   Musculoskeletal: Normal range of motion. She exhibits edema.   Slight edema and lorraine appearance to bilateral LE   Neurological: She is alert and oriented to person, place, and time.   Skin: Skin is warm and dry.   Psychiatric: She has a normal mood and affect.   Nursing note and vitals reviewed.      Assessment:     1. Coronary artery disease due to " lipid rich plaque     2. Dyslipidemia  REFERRAL TO VASCULAR SURGERY   3. Paroxysmal a-fib (CMS-HCC)  REFERRAL TO ANTICOAGULATION MONITORING    PROTHROMBIN TIME   4. Prediabetes     5. PAD (peripheral artery disease) (CMS-HCC)  REFERRAL TO VASCULAR SURGERY     Medical Decision Making:  Today's Assessment / Status / Plan:     1. CAD with previous stenting to LAD in '09, no angina or VIDAL. Continue ASA and statin. Okay to stop plavix to start coumadin. Continue to follow clinically.    2. HLD, pending lab work. Recommend LDL <70 with vascular disease/CAD. Continue statin.    3. PAF, no recurrences per patient. Sotalol 40 mg BID. Now agreeable to OAC, start coumadin 5 mg QPM. INR tomorrow for baseline. Coumadin referral made for monitoring.     4. PAD, previous iliac stenting in '08. More stenosis noted on R iliac and mesenteric artery on MRI abdomen. Recommend vascular surgery review, she would like to be referred to Dr. Olmos. This referral was made today. Continue ASA, statin.    Recommend PCP review of GERD and medications alongside pulmonary disease    FU in clinic in 3 months with IA with review of labs    Patient verbalizes understanding and agrees with the plan of care.     Collaborating MD: To MD        No Recipients

## 2017-11-29 NOTE — PROGRESS NOTES
Subjective:   Angie Root is a 77 y.o. female who presents today for hospital follow up and to discuss anticoagulation with PAF and [id number] score of 5.    She is a patient of Dr. Johnathan Morgan in our office. Hx of CAD with previous stenting to her LAD in '09, smoking hx, HTN, HLD, PAF, and PAD with L iliac stent in '08.    She feels good. She was admitted for abdominal pain and was found to have ischemic colitis with PAD. Not a surgical candidate per Dr. Ortiz at that time, he recommended outpatient follow up. She would rather see a different vascular surgeon, as she didn't like him very much.    After long discussion, she is agreeable to starting warfarin for her PAF. No recurrences since '09 she states.     She has no other symptoms but does get short of breath with heavy exertion. She was told she may have COPD before she moved to [place]. She has not been established with a doctor to manage this here yet.    She states she has some leg pain with walking but it gets better from time to time. She does have some edema in her lower extremities as well with a lorraine appearance.    She has had no episodes of chest pain, palpitations, dizziness/lightheadedness, or orthopnea.    Past Medical History:   Diagnosis Date   • CAD (coronary artery disease)    • COPD (chronic obstructive pulmonary disease) (CMS-HCC)    • Diverticulosis    • Dyslipidemia    • GERD (gastroesophageal reflux disease)    • Hiatal hernia    • Hypertension    • Paroxysmal a-fib (CMS-HCC) 1/20/2016    Symptomatic, on a rhythm control strategy with sotalol 40 mg by mouth twice a day.    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   • CARDIAC CATH, RIGHT HEART  2008    stent   • CHOLECYSTECTOMY  1993   • TONSILLECTOMY  1945   • STENT  PLACEMENT      L iliac stent     Family History   Problem Relation Age of Onset   • Hypertension Mother    • Hyperlipidemia Mother    • Cancer Maternal Grandmother      tongue   • Cancer Maternal Uncle      x 3, pancreas   • Cancer Maternal Grandfather      unknown   • Cancer Paternal Grandmother      unknown   • Cancer Paternal Grandfather      unknown   • Diabetes Maternal Uncle    • Heart Disease Maternal Uncle    • Hypertension Sister    • Hyperlipidemia Sister    • Thyroid Sister    • Psychiatry Neg Hx    • Stroke Neg Hx      History   Smoking Status   • Current Every Day Smoker   • Packs/day: 0.25   • Years: 60.00   • Types: Cigarettes   • Start date: 3/20/1957   Smokeless Tobacco   • Never Used     Comment: 6 per day     Allergies   Allergen Reactions   • Codeine Swelling   • Iodine Swelling   • Bee Venom    • Chantix [Varenicline]      disoriented   • Latex    • Pcn [Penicillins]    • Shellfish Allergy    • Tetanus Antitoxin      Outpatient Encounter Prescriptions as of 11/29/2017   Medication Sig Dispense Refill   • warfarin (COUMADIN) 5 MG Tab Take 1 Tab by mouth every day. 30 Tab 3   • alprazolam (XANAX) 0.25 MG Tab Take 0.25 mg by mouth every morning.     • sucralfate (CARAFATE) 1 GM Tab Take 1 g by mouth 2 Times a Day.     • pantoprazole (PROTONIX) 40 MG Tablet Delayed Response TAKE 1 TABLET BY MOUTH EVERY DAY. INDICATIONS: GASTROESOPHAGEAL REFLUX DISEASE 90 Tab 0   • lovastatin (MEVACOR) 40 MG tablet Take 1 Tab by mouth every evening. 90 Tab 3   • diltiazem CD (CARDIZEM CD) 240 MG CAPSULE SR 24 HR Take 1 Cap by mouth every day. 90 Cap 3   • sotalol (BETAPACE) 80 MG Tab Take 0.5 Tabs by mouth 2 times a day. 90 Tab 3   • Cholecalciferol (VITAMIN D) 2000 UNITS Cap Take 1 Cap by mouth every morning.     • aspirin EC (ECOTRIN) 81 MG Tablet Delayed Response Take 81 mg by mouth every evening.     • [DISCONTINUED] rivaroxaban (XARELTO) 20 MG Tab tablet Take 1 Tab by mouth with dinner for 30 days. (Patient not  "taking: Reported on 11/29/2017) 30 Tab 0   • [DISCONTINUED] clopidogrel (PLAVIX) 75 MG Tab Take 1 Tab by mouth every day. 90 Tab 3     No facility-administered encounter medications on file as of 11/29/2017.      Review of Systems   Constitutional: Negative for fever and malaise/fatigue.   Respiratory: Negative for cough and shortness of breath.    Cardiovascular: Positive for claudication. Negative for chest pain, palpitations, orthopnea, leg swelling and PND.   Gastrointestinal: Negative for abdominal pain.   Musculoskeletal: Negative for myalgias.   Neurological: Negative for dizziness.   All other systems reviewed and are negative.       Objective:   /60   Pulse 60   Ht 1.575 m (5' 2\")   Wt 45.4 kg (100 lb)   SpO2 96%   BMI 18.29 kg/m²     Physical Exam   Constitutional: She is oriented to person, place, and time. She appears well-developed and well-nourished. No distress.   HENT:   Head: Normocephalic and atraumatic.   Eyes: EOM are normal.   Neck: Normal range of motion. No JVD present.   Cardiovascular: Normal rate, regular rhythm, normal heart sounds and intact distal pulses.    No murmur heard.  Pulmonary/Chest: Effort normal. No respiratory distress. She has decreased breath sounds in the right lower field and the left lower field.   Abdominal: Soft. Bowel sounds are normal.   Musculoskeletal: Normal range of motion. She exhibits edema.   Slight edema and lorraine appearance to bilateral LE   Neurological: She is alert and oriented to person, place, and time.   Skin: Skin is warm and dry.   Psychiatric: She has a normal mood and affect.   Nursing note and vitals reviewed.      Assessment:     1. Coronary artery disease due to lipid rich plaque     2. Dyslipidemia  REFERRAL TO VASCULAR SURGERY   3. Paroxysmal a-fib (CMS-HCC)  REFERRAL TO ANTICOAGULATION MONITORING    PROTHROMBIN TIME   4. Prediabetes     5. PAD (peripheral artery disease) (CMS-HCC)  REFERRAL TO VASCULAR SURGERY     Medical Decision " Making:  Today's Assessment / Status / Plan:     1. CAD with previous stenting to LAD in '09, no angina or VIDAL. Continue ASA and statin. Okay to stop plavix to start coumadin. Continue to follow clinically.    2. HLD, pending lab work. Recommend LDL <70 with vascular disease/CAD. Continue statin.    3. PAF, no recurrences per patient. Sotalol 40 mg BID. Now agreeable to OAC, start coumadin 5 mg QPM. INR tomorrow for baseline. Coumadin referral made for monitoring.     4. PAD, previous iliac stenting in '08. More stenosis noted on R iliac and mesenteric artery on MRI abdomen. Recommend vascular surgery review, she would like to be referred to Dr. Olmos. This referral was made today. Continue ASA, statin.    Recommend PCP review of GERD and medications alongside pulmonary disease    FU in clinic in 3 months with IA with review of labs    Patient verbalizes understanding and agrees with the plan of care.     Collaborating MD: To MD

## 2017-11-30 ENCOUNTER — HOSPITAL ENCOUNTER (OUTPATIENT)
Dept: LAB | Facility: MEDICAL CENTER | Age: 77
DRG: 371 | End: 2017-11-30
Attending: NURSE PRACTITIONER
Payer: MEDICARE

## 2017-11-30 ENCOUNTER — HOSPITAL ENCOUNTER (OUTPATIENT)
Dept: LAB | Facility: MEDICAL CENTER | Age: 77
DRG: 371 | End: 2017-11-30
Attending: INTERNAL MEDICINE
Payer: MEDICARE

## 2017-11-30 DIAGNOSIS — I48.0 PAROXYSMAL A-FIB (HCC): ICD-10-CM

## 2017-11-30 LAB
CHOLEST SERPL-MCNC: 176 MG/DL (ref 100–199)
HDLC SERPL-MCNC: 74 MG/DL
INR PPP: 0.99 (ref 0.87–1.13)
LDLC SERPL CALC-MCNC: 80 MG/DL
PROTHROMBIN TIME: 12.8 SEC (ref 12–14.6)
TRIGL SERPL-MCNC: 111 MG/DL (ref 0–149)

## 2017-11-30 PROCEDURE — 80061 LIPID PANEL: CPT

## 2017-11-30 PROCEDURE — 85610 PROTHROMBIN TIME: CPT

## 2017-11-30 PROCEDURE — 36415 COLL VENOUS BLD VENIPUNCTURE: CPT

## 2017-12-01 ENCOUNTER — TELEPHONE (OUTPATIENT)
Dept: CARDIOLOGY | Facility: MEDICAL CENTER | Age: 77
End: 2017-12-01

## 2017-12-01 DIAGNOSIS — I48.0 PAROXYSMAL A-FIB (HCC): ICD-10-CM

## 2017-12-01 NOTE — TELEPHONE ENCOUNTER
----- Message from Johnathan Morgan M.D. sent at 12/1/2017  8:13 AM PST -----  Reasonable control of her cholesterol, we will have to discuss further in light of her recent ischemic colitis goals for cholesterol at our next visit.    GENE BECKHAM

## 2017-12-01 NOTE — TELEPHONE ENCOUNTER
Pt informed via [employer].     Message     The referral has been faxed to       [facility]      Their number is [phone]

## 2017-12-03 ENCOUNTER — RESOLUTE PROFESSIONAL BILLING HOSPITAL PROF FEE (OUTPATIENT)
Dept: HOSPITALIST | Facility: MEDICAL CENTER | Age: 77
End: 2017-12-03
Payer: MEDICARE

## 2017-12-03 ENCOUNTER — HOSPITAL ENCOUNTER (INPATIENT)
Facility: MEDICAL CENTER | Age: 77
LOS: 3 days | DRG: 371 | End: 2017-12-06
Attending: EMERGENCY MEDICINE | Admitting: HOSPITALIST
Payer: MEDICARE

## 2017-12-03 ENCOUNTER — APPOINTMENT (OUTPATIENT)
Dept: RADIOLOGY | Facility: MEDICAL CENTER | Age: 77
DRG: 371 | End: 2017-12-03
Attending: EMERGENCY MEDICINE
Payer: MEDICARE

## 2017-12-03 DIAGNOSIS — D72.829 LEUKOCYTOSIS, UNSPECIFIED TYPE: ICD-10-CM

## 2017-12-03 DIAGNOSIS — R53.1 WEAKNESS: ICD-10-CM

## 2017-12-03 DIAGNOSIS — R10.30 LOWER ABDOMINAL PAIN: ICD-10-CM

## 2017-12-03 DIAGNOSIS — D68.32 WARFARIN-INDUCED COAGULOPATHY (HCC): ICD-10-CM

## 2017-12-03 DIAGNOSIS — N39.0 URINARY TRACT INFECTION WITHOUT HEMATURIA, SITE UNSPECIFIED: ICD-10-CM

## 2017-12-03 DIAGNOSIS — R19.7 DIARRHEA, UNSPECIFIED TYPE: ICD-10-CM

## 2017-12-03 DIAGNOSIS — I48.0 PAROXYSMAL A-FIB (HCC): ICD-10-CM

## 2017-12-03 DIAGNOSIS — T45.515A WARFARIN-INDUCED COAGULOPATHY (HCC): ICD-10-CM

## 2017-12-03 PROBLEM — K55.9 ISCHEMIC COLITIS (HCC): Status: ACTIVE | Noted: 2017-12-03

## 2017-12-03 LAB
ALBUMIN SERPL BCP-MCNC: 3.4 G/DL (ref 3.2–4.9)
ALBUMIN/GLOB SERPL: 1.3 G/DL
ALP SERPL-CCNC: 75 U/L (ref 30–99)
ALT SERPL-CCNC: 11 U/L (ref 2–50)
ANION GAP SERPL CALC-SCNC: 10 MMOL/L (ref 0–11.9)
APPEARANCE UR: CLEAR
AST SERPL-CCNC: 16 U/L (ref 12–45)
BACTERIA #/AREA URNS HPF: NEGATIVE /HPF
BASOPHILS # BLD AUTO: 0.6 % (ref 0–1.8)
BASOPHILS # BLD: 0.1 K/UL (ref 0–0.12)
BILIRUB SERPL-MCNC: 0.7 MG/DL (ref 0.1–1.5)
BILIRUB UR QL STRIP.AUTO: NEGATIVE
BUN SERPL-MCNC: 14 MG/DL (ref 8–22)
CALCIUM SERPL-MCNC: 9.1 MG/DL (ref 8.5–10.5)
CHLORIDE SERPL-SCNC: 104 MMOL/L (ref 96–112)
CO2 SERPL-SCNC: 22 MMOL/L (ref 20–33)
COLOR UR: YELLOW
CREAT SERPL-MCNC: 0.9 MG/DL (ref 0.5–1.4)
CULTURE IF INDICATED INDCX: YES UA CULTURE
EKG IMPRESSION: NORMAL
EOSINOPHIL # BLD AUTO: 0.01 K/UL (ref 0–0.51)
EOSINOPHIL NFR BLD: 0.1 % (ref 0–6.9)
EPI CELLS #/AREA URNS HPF: ABNORMAL /HPF
ERYTHROCYTE [DISTWIDTH] IN BLOOD BY AUTOMATED COUNT: 45.8 FL (ref 35.9–50)
EST. AVERAGE GLUCOSE BLD GHB EST-MCNC: 131 MG/DL
G LAMBLIA+C PARVUM AG STL QL RAPID: NORMAL
GFR SERPL CREATININE-BSD FRML MDRD: >60 ML/MIN/1.73 M 2
GLOBULIN SER CALC-MCNC: 2.6 G/DL (ref 1.9–3.5)
GLUCOSE SERPL-MCNC: 101 MG/DL (ref 65–99)
GLUCOSE UR STRIP.AUTO-MCNC: NEGATIVE MG/DL
HBA1C MFR BLD: 6.2 % (ref 0–5.6)
HCT VFR BLD AUTO: 41.1 % (ref 37–47)
HGB BLD-MCNC: 13.2 G/DL (ref 12–16)
HYALINE CASTS #/AREA URNS LPF: ABNORMAL /LPF
IMM GRANULOCYTES # BLD AUTO: 0.06 K/UL (ref 0–0.11)
IMM GRANULOCYTES NFR BLD AUTO: 0.4 % (ref 0–0.9)
INR PPP: 5.68 (ref 0.87–1.13)
KETONES UR STRIP.AUTO-MCNC: ABNORMAL MG/DL
LEUKOCYTE ESTERASE UR QL STRIP.AUTO: ABNORMAL
LIPASE SERPL-CCNC: 9 U/L (ref 11–82)
LYMPHOCYTES # BLD AUTO: 1.81 K/UL (ref 1–4.8)
LYMPHOCYTES NFR BLD: 11.5 % (ref 22–41)
MCH RBC QN AUTO: 28.6 PG (ref 27–33)
MCHC RBC AUTO-ENTMCNC: 32.1 G/DL (ref 33.6–35)
MCV RBC AUTO: 89 FL (ref 81.4–97.8)
MICRO URNS: ABNORMAL
MONOCYTES # BLD AUTO: 1.39 K/UL (ref 0–0.85)
MONOCYTES NFR BLD AUTO: 8.8 % (ref 0–13.4)
NEUTROPHILS # BLD AUTO: 12.39 K/UL (ref 2–7.15)
NEUTROPHILS NFR BLD: 78.6 % (ref 44–72)
NITRITE UR QL STRIP.AUTO: NEGATIVE
NRBC # BLD AUTO: 0 K/UL
NRBC BLD AUTO-RTO: 0 /100 WBC
PH UR STRIP.AUTO: 5.5 [PH]
PLATELET # BLD AUTO: 249 K/UL (ref 164–446)
PMV BLD AUTO: 9.9 FL (ref 9–12.9)
POTASSIUM SERPL-SCNC: 3.1 MMOL/L (ref 3.6–5.5)
PROT SERPL-MCNC: 6 G/DL (ref 6–8.2)
PROT UR QL STRIP: 30 MG/DL
PROTHROMBIN TIME: 51.2 SEC (ref 12–14.6)
RBC # BLD AUTO: 4.62 M/UL (ref 4.2–5.4)
RBC # URNS HPF: ABNORMAL /HPF
RBC UR QL AUTO: ABNORMAL
SIGNIFICANT IND 70042: NORMAL
SITE SITE: NORMAL
SODIUM SERPL-SCNC: 136 MMOL/L (ref 135–145)
SOURCE SOURCE: NORMAL
SP GR UR STRIP.AUTO: 1.02
TRANS CELLS #/AREA URNS HPF: ABNORMAL /HPF
UROBILINOGEN UR STRIP.AUTO-MCNC: 0.2 MG/DL
WBC # BLD AUTO: 15.8 K/UL (ref 4.8–10.8)
WBC #/AREA URNS HPF: ABNORMAL /HPF
WBC STL QL MICRO: ABNORMAL

## 2017-12-03 PROCEDURE — 87329 GIARDIA AG IA: CPT

## 2017-12-03 PROCEDURE — 80053 COMPREHEN METABOLIC PANEL: CPT

## 2017-12-03 PROCEDURE — 83690 ASSAY OF LIPASE: CPT

## 2017-12-03 PROCEDURE — 96375 TX/PRO/DX INJ NEW DRUG ADDON: CPT

## 2017-12-03 PROCEDURE — A9270 NON-COVERED ITEM OR SERVICE: HCPCS | Performed by: HOSPITALIST

## 2017-12-03 PROCEDURE — 700102 HCHG RX REV CODE 250 W/ 637 OVERRIDE(OP): Performed by: HOSPITALIST

## 2017-12-03 PROCEDURE — 87324 CLOSTRIDIUM AG IA: CPT

## 2017-12-03 PROCEDURE — 700117 HCHG RX CONTRAST REV CODE 255: Performed by: EMERGENCY MEDICINE

## 2017-12-03 PROCEDURE — 99223 1ST HOSP IP/OBS HIGH 75: CPT | Mod: AI | Performed by: HOSPITALIST

## 2017-12-03 PROCEDURE — 81001 URINALYSIS AUTO W/SCOPE: CPT

## 2017-12-03 PROCEDURE — 87086 URINE CULTURE/COLONY COUNT: CPT

## 2017-12-03 PROCEDURE — 770021 HCHG ROOM/CARE - ISO PRIVATE

## 2017-12-03 PROCEDURE — 85025 COMPLETE CBC W/AUTO DIFF WBC: CPT

## 2017-12-03 PROCEDURE — 85610 PROTHROMBIN TIME: CPT

## 2017-12-03 PROCEDURE — 87493 C DIFF AMPLIFIED PROBE: CPT

## 2017-12-03 PROCEDURE — 96374 THER/PROPH/DIAG INJ IV PUSH: CPT

## 2017-12-03 PROCEDURE — 770001 HCHG ROOM/CARE - MED/SURG/GYN PRIV*

## 2017-12-03 PROCEDURE — 89055 LEUKOCYTE ASSESSMENT FECAL: CPT

## 2017-12-03 PROCEDURE — 99285 EMERGENCY DEPT VISIT HI MDM: CPT

## 2017-12-03 PROCEDURE — 87328 CRYPTOSPORIDIUM AG IA: CPT

## 2017-12-03 PROCEDURE — 87230 TOXIN/ANTITOXIN ASY TISS CUL: CPT

## 2017-12-03 PROCEDURE — 83036 HEMOGLOBIN GLYCOSYLATED A1C: CPT

## 2017-12-03 PROCEDURE — 74177 CT ABD & PELVIS W/CONTRAST: CPT

## 2017-12-03 PROCEDURE — 93005 ELECTROCARDIOGRAM TRACING: CPT | Performed by: EMERGENCY MEDICINE

## 2017-12-03 PROCEDURE — 87046 STOOL CULTR AEROBIC BACT EA: CPT

## 2017-12-03 PROCEDURE — 700111 HCHG RX REV CODE 636 W/ 250 OVERRIDE (IP): Performed by: EMERGENCY MEDICINE

## 2017-12-03 PROCEDURE — 700105 HCHG RX REV CODE 258: Performed by: HOSPITALIST

## 2017-12-03 PROCEDURE — 87045 FECES CULTURE AEROBIC BACT: CPT

## 2017-12-03 RX ORDER — AMOXICILLIN 250 MG
2 CAPSULE ORAL 2 TIMES DAILY
Status: DISCONTINUED | OUTPATIENT
Start: 2017-12-03 | End: 2017-12-03

## 2017-12-03 RX ORDER — ALPRAZOLAM 0.25 MG/1
0.25 TABLET ORAL EVERY MORNING
Status: DISCONTINUED | OUTPATIENT
Start: 2017-12-04 | End: 2017-12-06 | Stop reason: HOSPADM

## 2017-12-03 RX ORDER — MORPHINE SULFATE 4 MG/ML
2 INJECTION, SOLUTION INTRAMUSCULAR; INTRAVENOUS
Status: DISCONTINUED | OUTPATIENT
Start: 2017-12-03 | End: 2017-12-06 | Stop reason: HOSPADM

## 2017-12-03 RX ORDER — DIPHENHYDRAMINE HYDROCHLORIDE 50 MG/ML
50 INJECTION INTRAMUSCULAR; INTRAVENOUS ONCE
Status: COMPLETED | OUTPATIENT
Start: 2017-12-03 | End: 2017-12-03

## 2017-12-03 RX ORDER — BISACODYL 10 MG
10 SUPPOSITORY, RECTAL RECTAL
Status: DISCONTINUED | OUTPATIENT
Start: 2017-12-03 | End: 2017-12-03

## 2017-12-03 RX ORDER — WARFARIN SODIUM 5 MG/1
5 TABLET ORAL EVERY EVENING
Status: ON HOLD | COMMUNITY
End: 2017-12-06

## 2017-12-03 RX ORDER — ONDANSETRON 2 MG/ML
4 INJECTION INTRAMUSCULAR; INTRAVENOUS EVERY 4 HOURS PRN
Status: DISCONTINUED | OUTPATIENT
Start: 2017-12-03 | End: 2017-12-06 | Stop reason: HOSPADM

## 2017-12-03 RX ORDER — POTASSIUM CHLORIDE 20 MEQ/1
40 TABLET, EXTENDED RELEASE ORAL ONCE
Status: COMPLETED | OUTPATIENT
Start: 2017-12-03 | End: 2017-12-03

## 2017-12-03 RX ORDER — ONDANSETRON 4 MG/1
4 TABLET, ORALLY DISINTEGRATING ORAL EVERY 4 HOURS PRN
Status: DISCONTINUED | OUTPATIENT
Start: 2017-12-03 | End: 2017-12-06 | Stop reason: HOSPADM

## 2017-12-03 RX ORDER — DILTIAZEM HYDROCHLORIDE 120 MG/1
240 CAPSULE, COATED, EXTENDED RELEASE ORAL DAILY
Status: DISCONTINUED | OUTPATIENT
Start: 2017-12-03 | End: 2017-12-06 | Stop reason: HOSPADM

## 2017-12-03 RX ORDER — METRONIDAZOLE 500 MG/1
500 TABLET ORAL 3 TIMES DAILY
Status: ON HOLD | COMMUNITY
End: 2017-12-06

## 2017-12-03 RX ORDER — ACETAMINOPHEN 325 MG/1
650 TABLET ORAL EVERY 6 HOURS PRN
Status: DISCONTINUED | OUTPATIENT
Start: 2017-12-03 | End: 2017-12-05

## 2017-12-03 RX ORDER — CIPROFLOXACIN 750 MG/1
750 TABLET, FILM COATED ORAL 2 TIMES DAILY
Status: ON HOLD | COMMUNITY
End: 2017-12-06

## 2017-12-03 RX ORDER — LOVASTATIN 20 MG/1
40 TABLET ORAL EVERY EVENING
Status: DISCONTINUED | OUTPATIENT
Start: 2017-12-04 | End: 2017-12-06 | Stop reason: HOSPADM

## 2017-12-03 RX ORDER — SODIUM CHLORIDE 9 MG/ML
INJECTION, SOLUTION INTRAVENOUS CONTINUOUS
Status: DISCONTINUED | OUTPATIENT
Start: 2017-12-03 | End: 2017-12-06 | Stop reason: HOSPADM

## 2017-12-03 RX ORDER — ONDANSETRON 2 MG/ML
4 INJECTION INTRAMUSCULAR; INTRAVENOUS ONCE
Status: COMPLETED | OUTPATIENT
Start: 2017-12-03 | End: 2017-12-03

## 2017-12-03 RX ORDER — OXYCODONE HYDROCHLORIDE 5 MG/1
5 TABLET ORAL
Status: DISCONTINUED | OUTPATIENT
Start: 2017-12-03 | End: 2017-12-06 | Stop reason: HOSPADM

## 2017-12-03 RX ORDER — POLYETHYLENE GLYCOL 3350 17 G/17G
1 POWDER, FOR SOLUTION ORAL
Status: DISCONTINUED | OUTPATIENT
Start: 2017-12-03 | End: 2017-12-03

## 2017-12-03 RX ORDER — HALOPERIDOL 5 MG/ML
5 INJECTION INTRAMUSCULAR EVERY 4 HOURS PRN
Status: DISCONTINUED | OUTPATIENT
Start: 2017-12-03 | End: 2017-12-03

## 2017-12-03 RX ORDER — SOTALOL HYDROCHLORIDE 80 MG/1
40 TABLET ORAL 2 TIMES DAILY
Status: DISCONTINUED | OUTPATIENT
Start: 2017-12-03 | End: 2017-12-04

## 2017-12-03 RX ORDER — OXYCODONE HYDROCHLORIDE 5 MG/1
2.5 TABLET ORAL
Status: DISCONTINUED | OUTPATIENT
Start: 2017-12-03 | End: 2017-12-06 | Stop reason: HOSPADM

## 2017-12-03 RX ADMIN — DIPHENHYDRAMINE HYDROCHLORIDE 50 MG: 50 INJECTION, SOLUTION INTRAMUSCULAR; INTRAVENOUS at 14:42

## 2017-12-03 RX ADMIN — ONDANSETRON 4 MG: 2 INJECTION INTRAMUSCULAR; INTRAVENOUS at 12:17

## 2017-12-03 RX ADMIN — HYDROCORTISONE SODIUM SUCCINATE 200 MG: 100 INJECTION, POWDER, FOR SOLUTION INTRAMUSCULAR; INTRAVENOUS at 14:42

## 2017-12-03 RX ADMIN — FENTANYL CITRATE 25 MCG: 50 INJECTION, SOLUTION INTRAMUSCULAR; INTRAVENOUS at 12:17

## 2017-12-03 RX ADMIN — IOHEXOL 100 ML: 350 INJECTION, SOLUTION INTRAVENOUS at 15:54

## 2017-12-03 RX ADMIN — ASPIRIN 81 MG: 81 TABLET, COATED ORAL at 23:01

## 2017-12-03 RX ADMIN — POTASSIUM CHLORIDE 40 MEQ: 1500 TABLET, EXTENDED RELEASE ORAL at 23:01

## 2017-12-03 RX ADMIN — SODIUM CHLORIDE: 9 INJECTION, SOLUTION INTRAVENOUS at 23:01

## 2017-12-03 ASSESSMENT — ENCOUNTER SYMPTOMS
COUGH: 0
DOUBLE VISION: 0
SPUTUM PRODUCTION: 0
SHORTNESS OF BREATH: 0
PALPITATIONS: 0
CHILLS: 0
DIARRHEA: 1
BLURRED VISION: 0
VOMITING: 0
NAUSEA: 0
FEVER: 0
HEADACHES: 0
CONSTIPATION: 0
ABDOMINAL PAIN: 1

## 2017-12-03 ASSESSMENT — PAIN SCALES - GENERAL: PAINLEVEL_OUTOF10: 1

## 2017-12-03 ASSESSMENT — PATIENT HEALTH QUESTIONNAIRE - PHQ9
SUM OF ALL RESPONSES TO PHQ QUESTIONS 1-9: 0
SUM OF ALL RESPONSES TO PHQ9 QUESTIONS 1 AND 2: 0
1. LITTLE INTEREST OR PLEASURE IN DOING THINGS: NOT AT ALL
2. FEELING DOWN, DEPRESSED, IRRITABLE, OR HOPELESS: NOT AT ALL

## 2017-12-03 ASSESSMENT — LIFESTYLE VARIABLES
ALCOHOL_USE: NO
EVER_SMOKED: YES

## 2017-12-03 NOTE — ED NOTES
C/o diarrhea x 7 days x 10-12/d s/p discharge for infection and reduced blood flow to colon, also lower abdominal cramping, no appetite , no vomiting feeling weaker and weaker. Hx of stent to lower abdo in the 2002 due to reduced flow, hx of afib ' heart problems'

## 2017-12-03 NOTE — ED PROVIDER NOTES
ED Provider Note    CHIEF COMPLAINT  Chief Complaint   Patient presents with   • Diarrhea   • Abdominal Pain       HPI  Angie Root is a 77 y.o. female who presentsWith ongoing diarrhea over the past 2 weeks.  She was admitted 2 weeks ago, received antibiotics for possible abdominal infection she states.  Upon discharge home she developed diarrhea.  This has progressed to where she has no appetite, difficulty eating.  There has been no vomiting.  She developed abdominal soreness greatest in the left lower quadrant of her abdomen.  She states this pain is similar to previous admission.  Patient denies blood in her stool.  She became weak enough today to where she could not get up without her 's help.  He states she cannot go home secondary to her condition.  No chest pain or shortness of breath.  She denies headache or neck stiffness.  Symptoms are gradual onset at home while at rest.    REVIEW OF SYSTEMS    Constitutional: Generalized weakness, no fever  Respiratory: Mild shortness of breath with exertion  Cardiac: No chest pain or syncope  Gastrointestinal: Lower abdominal pain.  Diarrhea.  Loss of appetite  Musculoskeletal: No acute neck or back pain  Neurologic: No headache.  No focal numbness or weakness       All other systems are negative.       PAST MEDICAL HISTORY  Past Medical History:   Diagnosis Date   • CAD (coronary artery disease)    • COPD (chronic obstructive pulmonary disease) (CMS-HCC)    • Diverticulosis    • Dyslipidemia    • GERD (gastroesophageal reflux disease)    • Hiatal hernia    • Hypertension    • Paroxysmal a-fib (CMS-HCC) 1/20/2016    Symptomatic, on a rhythm control strategy with sotalol 40 mg by mouth twice a day.    • Paroxysmal atrial fibrillation (CMS-HCC)    • Prediabetes    • PVD (peripheral vascular disease) ([facility] [place]        FAMILY HISTORY  Family History   Problem Relation Age of Onset   • Hypertension Mother    • Hyperlipidemia Mother    • Cancer Maternal Grandmother       tongue   • Cancer Maternal Uncle      x 3, pancreas   • Cancer Maternal Grandfather      unknown   • Cancer Paternal Grandmother      unknown   • Cancer Paternal Grandfather      unknown   • Diabetes Maternal Uncle    • Heart Disease Maternal Uncle    • Hypertension Sister    • Hyperlipidemia Sister    • Thyroid Sister    • Psychiatry Neg Hx    • Stroke Neg Hx        SOCIAL HISTORY  Social History     Social History   • Marital status:      Spouse name: N/A   • Number of children: 0   • Years of education: N/A     Social History Main Topics   • Smoking status: Current Every Day Smoker     Packs/day: 0.25     Years: 60.00     Types: Cigarettes     Start date: 3/20/1957   • Smokeless tobacco: Never Used      Comment: 6 per day   • Alcohol use No   • Drug use: No   • Sexual activity: Not Currently     Partners: Male     Other Topics Concern   • Not on file     Social History Narrative    .     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 [employer]       SURGICAL HISTORY  Past Surgical History:   Procedure Laterality Date   • WIDE EXCISION MELANOMA, LEG, W/POSS.STSG  10/2015    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   • CARDIAC CATH, RIGHT HEART  2008    stent   • CHOLECYSTECTOMY  1993   • TONSILLECTOMY  1945   • STENT PLACEMENT      L iliac stent       CURRENT MEDICATIONS  Home Medications     Reviewed by Jerry Chahal (Pharmacy Tech) on 12/03/17 at 1522  Med List Status: Complete   Medication Last Dose Status   alprazolam (XANAX) 0.25 MG Tab 12/3/2017 Active   aspirin EC (ECOTRIN) 81 MG Tablet Delayed Response 12/2/2017 Active   Cholecalciferol (VITAMIN D) 2000 UNITS Cap 12/3/2017 Active   ciprofloxacin (CIPRO) 750 MG Tab 11/24/2017 Active   diltiazem CD (CARDIZEM CD) 240 MG CAPSULE SR 24 HR 12/3/2017 Active   lovastatin (MEVACOR) 40 MG tablet 12/2/2017 Active   metronidazole (FLAGYL) 500 MG Tab 11/24/2017 Active   pantoprazole (PROTONIX) 40 MG Tablet Delayed Response 12/3/2017 Active   sotalol (BETAPACE) 80  "MG Tab 12/3/2017 Active   sucralfate (CARAFATE) 1 GM Tab 12/3/2017 Active   warfarin (COUMADIN) 5 MG Tab 12/2/2017 Active                ALLERGIES  Allergies   Allergen Reactions   • Codeine Swelling   • Iodine Swelling   • Bee Venom    • Chantix [Varenicline]      disoriented   • Latex    • Pcn [Penicillins]    • Shellfish Allergy    • Tetanus Antitoxin        PHYSICAL EXAM  VITAL SIGNS: /48   Pulse 80   Temp 36.8 °C (98.2 °F)   Resp (!) 26   Ht 1.575 m (5' 2\")   Wt 43.7 kg (96 lb 5.5 oz)   SpO2 90%   BMI 17.62 kg/m²   Constitutional:Ill appearance.   HENT: No facial swelling.  His memory and dry  Eyes: Anicteric, no conjunctivitis.     Cardiovascular: Normal heart rate,Regular rhythm  Pulmonary:  No wheezing, No rales.  Diminished air movement in the bases bilateral   Gastrointestinal: Soft, left lower quadrant tenderness, No masses.  No peritoneal signs.  Bowel sounds active  Skin: Warm, Dry, No cyanosis.  No jaundice  Neurologic: Speech is clear, follows commands, facial expression is symmetrical.  Strength is equal however diminished bilateral.  Sensation intact  Psychiatric: Affect normal,  Mood normal.  Patient is calm and cooperative  Musculoskeletal: No chest wall or flank tenderness    EKG/Labs  Results for orders placed or performed during the hospital encounter of 12/03/17   CBC WITH DIFFERENTIAL   Result Value Ref Range    WBC 15.8 (H) 4.8 - 10.8 K/uL    RBC 4.62 4.20 - 5.40 M/uL    Hemoglobin 13.2 12.0 - 16.0 g/dL    Hematocrit 41.1 37.0 - 47.0 %    MCV 89.0 81.4 - 97.8 fL    MCH 28.6 27.0 - 33.0 pg    MCHC 32.1 (L) 33.6 - 35.0 g/dL    RDW 45.8 35.9 - 50.0 fL    Platelet Count 249 164 - 446 K/uL    MPV 9.9 9.0 - 12.9 fL    Neutrophils-Polys 78.60 (H) 44.00 - 72.00 %    Lymphocytes 11.50 (L) 22.00 - 41.00 %    Monocytes 8.80 0.00 - 13.40 %    Eosinophils 0.10 0.00 - 6.90 %    Basophils 0.60 0.00 - 1.80 %    Immature Granulocytes 0.40 0.00 - 0.90 %    Nucleated RBC 0.00 /100 WBC    " Neutrophils (Absolute) 12.39 (H) 2.00 - 7.15 K/uL    Lymphs (Absolute) 1.81 1.00 - 4.80 K/uL    Monos (Absolute) 1.39 (H) 0.00 - 0.85 K/uL    Eos (Absolute) 0.01 0.00 - 0.51 K/uL    Baso (Absolute) 0.10 0.00 - 0.12 K/uL    Immature Granulocytes (abs) 0.06 0.00 - 0.11 K/uL    NRBC (Absolute) 0.00 K/uL   COMP METABOLIC PANEL   Result Value Ref Range    Sodium 136 135 - 145 mmol/L    Potassium 3.1 (L) 3.6 - 5.5 mmol/L    Chloride 104 96 - 112 mmol/L    Co2 22 20 - 33 mmol/L    Anion Gap 10.0 0.0 - 11.9    Glucose 101 (H) 65 - 99 mg/dL    Bun 14 8 - 22 mg/dL    Creatinine 0.90 0.50 - 1.40 mg/dL    Calcium 9.1 8.5 - 10.5 mg/dL    AST(SGOT) 16 12 - 45 U/L    ALT(SGPT) 11 2 - 50 U/L    Alkaline Phosphatase 75 30 - 99 U/L    Total Bilirubin 0.7 0.1 - 1.5 mg/dL    Albumin 3.4 3.2 - 4.9 g/dL    Total Protein 6.0 6.0 - 8.2 g/dL    Globulin 2.6 1.9 - 3.5 g/dL    A-G Ratio 1.3 g/dL   LIPASE   Result Value Ref Range    Lipase 9 (L) 11 - 82 U/L   ESTIMATED GFR   Result Value Ref Range    GFR If African American >60 >60 mL/min/1.73 m 2    GFR If Non African American >60 >60 mL/min/1.73 m 2   STOOL WBC'S   Result Value Ref Range    Stool WBC's Many (A) None seen   URINALYSIS,CULTURE IF INDICATED   Result Value Ref Range    Color Yellow     Character Clear     Specific Gravity 1.018 <1.035    Ph 5.5 5.0 - 8.0    Glucose Negative Negative mg/dL    Ketones Trace (A) Negative mg/dL    Protein 30 (A) Negative mg/dL    Bilirubin Negative Negative    Urobilinogen, Urine 0.2 Negative    Nitrite Negative Negative    Leukocyte Esterase Small (A) Negative    Occult Blood Moderate (A) Negative    Micro Urine Req Microscopic     Culture Indicated Yes UA Culture   URINE MICROSCOPIC (W/UA)   Result Value Ref Range    WBC 10-20 (A) /hpf    RBC 10-20 (A) /hpf    Bacteria Negative None /hpf    Epithelial Cells Moderate (A) /hpf    Trans Epithelial Cells Rare /hpf    Hyaline Cast 11-20 (A) /lpf   PROTHROMBIN TIME   Result Value Ref Range    PT 51.2  (H) 12.0 - 14.6 sec    INR 5.68 (H) 0.87 - 1.13         RADIOLOGY/PROCEDURES  CT-ABDOMEN-PELVIS WITH   Final Result      1.  Mild diffuse thickening in the colonic wall with mucosal hyperenhancement. Wall thickening remains most pronounced in the segment of ascending colon described previously. There are surrounding pericolonic inflammatory changes. No abscess or    pneumoperitoneum is identified. Findings are nonspecific and may be related to an infectious or inflammatory process.      2.  Diverticulosis.      3.  Severe atherosclerosis.            COURSE & MEDICAL DECISION MAKING  Pertinent Labs & Imaging studies reviewed. (See chart for details)  Patient has urinary tract infection however it is asymptomatic without dysuria.  This is one possible etiology for the elevated white blood cell count.  With diarrhea, C. diff infection is concerning.  Stool tests have been sent.  Patient is given IV fluid.  At her request she received a dose of fentanyl.  Patient's INR is supratherapeutic at 5.6.  Given the only White blood cell count and low 4 quadrant abdominal pain, CT scan has been ordered to assess for the possibility of diverticulitis or other intra-abdominal pathology.CT is largely unchanged from 2 weeks ago, with evidence of colitis greatest in the descending colon.  Diverticulitis part of the etiology.  There was no evidence of abscess or perforation.    FINAL IMPRESSION     1. Lower abdominal pain    2. Diarrhea, unspecified type    3. Leukocytosis, unspecified type    4. Urinary tract infection without hematuria, site unspecified    5. Weakness    6. Warfarin-induced coagulopathy (CMS-HCC)                    Electronically signed by: Jamaal Horne, 12/3/2017 3:51 PM

## 2017-12-03 NOTE — ED NOTES
Med rec updated and complete.  Allergies reviewed.  Pt finished a course of ciprofloxacin and metronidazole ON 11/24/17.  Pt denies taking xarelto as she was started on coumadin.    Pt stated that he insurance would not pay for xarelto.

## 2017-12-04 PROBLEM — I95.9 HYPOTENSION: Status: ACTIVE | Noted: 2017-12-04

## 2017-12-04 PROBLEM — D68.318 CIRCULATING ANTICOAGULANTS (HCC): Status: ACTIVE | Noted: 2017-12-04

## 2017-12-04 PROBLEM — R79.1 SUPRATHERAPEUTIC INR: Status: ACTIVE | Noted: 2017-12-04

## 2017-12-04 PROBLEM — E87.1 HYPONATREMIA: Status: ACTIVE | Noted: 2017-12-04

## 2017-12-04 PROBLEM — R62.7 FAILURE TO THRIVE IN ADULT: Status: ACTIVE | Noted: 2017-12-04

## 2017-12-04 LAB
ANION GAP SERPL CALC-SCNC: 7 MMOL/L (ref 0–11.9)
BASOPHILS # BLD AUTO: 0.4 % (ref 0–1.8)
BASOPHILS # BLD: 0.06 K/UL (ref 0–0.12)
BUN SERPL-MCNC: 25 MG/DL (ref 8–22)
CALCIUM SERPL-MCNC: 9.3 MG/DL (ref 8.5–10.5)
CHLORIDE SERPL-SCNC: 102 MMOL/L (ref 96–112)
CO2 SERPL-SCNC: 24 MMOL/L (ref 20–33)
CREAT SERPL-MCNC: 0.92 MG/DL (ref 0.5–1.4)
EOSINOPHIL # BLD AUTO: 0 K/UL (ref 0–0.51)
EOSINOPHIL NFR BLD: 0 % (ref 0–6.9)
ERYTHROCYTE [DISTWIDTH] IN BLOOD BY AUTOMATED COUNT: 45.8 FL (ref 35.9–50)
GFR SERPL CREATININE-BSD FRML MDRD: 59 ML/MIN/1.73 M 2
GLUCOSE SERPL-MCNC: 149 MG/DL (ref 65–99)
HCT VFR BLD AUTO: 39.5 % (ref 37–47)
HGB BLD-MCNC: 12.7 G/DL (ref 12–16)
IMM GRANULOCYTES # BLD AUTO: 0.12 K/UL (ref 0–0.11)
IMM GRANULOCYTES NFR BLD AUTO: 0.7 % (ref 0–0.9)
INR PPP: 7.55 (ref 0.87–1.13)
LYMPHOCYTES # BLD AUTO: 1.35 K/UL (ref 1–4.8)
LYMPHOCYTES NFR BLD: 8.3 % (ref 22–41)
MAGNESIUM SERPL-MCNC: 1.8 MG/DL (ref 1.5–2.5)
MCH RBC QN AUTO: 28.6 PG (ref 27–33)
MCHC RBC AUTO-ENTMCNC: 32.2 G/DL (ref 33.6–35)
MCV RBC AUTO: 89 FL (ref 81.4–97.8)
MONOCYTES # BLD AUTO: 1.4 K/UL (ref 0–0.85)
MONOCYTES NFR BLD AUTO: 8.6 % (ref 0–13.4)
NEUTROPHILS # BLD AUTO: 13.27 K/UL (ref 2–7.15)
NEUTROPHILS NFR BLD: 82 % (ref 44–72)
NRBC # BLD AUTO: 0 K/UL
NRBC BLD AUTO-RTO: 0 /100 WBC
PLATELET # BLD AUTO: 248 K/UL (ref 164–446)
PMV BLD AUTO: 9.9 FL (ref 9–12.9)
POTASSIUM SERPL-SCNC: 3.5 MMOL/L (ref 3.6–5.5)
PROTHROMBIN TIME: 64.2 SEC (ref 12–14.6)
RBC # BLD AUTO: 4.44 M/UL (ref 4.2–5.4)
SODIUM SERPL-SCNC: 133 MMOL/L (ref 135–145)
WBC # BLD AUTO: 16.2 K/UL (ref 4.8–10.8)

## 2017-12-04 PROCEDURE — 99233 SBSQ HOSP IP/OBS HIGH 50: CPT | Performed by: HOSPITALIST

## 2017-12-04 PROCEDURE — A9270 NON-COVERED ITEM OR SERVICE: HCPCS | Performed by: HOSPITALIST

## 2017-12-04 PROCEDURE — 700111 HCHG RX REV CODE 636 W/ 250 OVERRIDE (IP): Performed by: HOSPITALIST

## 2017-12-04 PROCEDURE — 80048 BASIC METABOLIC PNL TOTAL CA: CPT

## 2017-12-04 PROCEDURE — 83735 ASSAY OF MAGNESIUM: CPT

## 2017-12-04 PROCEDURE — 700102 HCHG RX REV CODE 250 W/ 637 OVERRIDE(OP): Performed by: HOSPITALIST

## 2017-12-04 PROCEDURE — 85025 COMPLETE CBC W/AUTO DIFF WBC: CPT

## 2017-12-04 PROCEDURE — 700105 HCHG RX REV CODE 258: Performed by: HOSPITALIST

## 2017-12-04 PROCEDURE — 36415 COLL VENOUS BLD VENIPUNCTURE: CPT

## 2017-12-04 PROCEDURE — 90471 IMMUNIZATION ADMIN: CPT

## 2017-12-04 PROCEDURE — 770021 HCHG ROOM/CARE - ISO PRIVATE

## 2017-12-04 PROCEDURE — 770001 HCHG ROOM/CARE - MED/SURG/GYN PRIV*

## 2017-12-04 PROCEDURE — 85610 PROTHROMBIN TIME: CPT

## 2017-12-04 PROCEDURE — 90670 PCV13 VACCINE IM: CPT | Performed by: HOSPITALIST

## 2017-12-04 RX ORDER — POTASSIUM CHLORIDE 20 MEQ/1
40 TABLET, EXTENDED RELEASE ORAL ONCE
Status: COMPLETED | OUTPATIENT
Start: 2017-12-04 | End: 2017-12-04

## 2017-12-04 RX ADMIN — VANCOMYCIN HYDROCHLORIDE 125 MG: 10 INJECTION, POWDER, LYOPHILIZED, FOR SOLUTION INTRAVENOUS at 17:35

## 2017-12-04 RX ADMIN — ASPIRIN 81 MG: 81 TABLET, COATED ORAL at 20:30

## 2017-12-04 RX ADMIN — SODIUM CHLORIDE: 9 INJECTION, SOLUTION INTRAVENOUS at 17:37

## 2017-12-04 RX ADMIN — SOTALOL HYDROCHLORIDE 40 MG: 80 TABLET ORAL at 08:41

## 2017-12-04 RX ADMIN — DILTIAZEM HYDROCHLORIDE 240 MG: 120 CAPSULE, COATED, EXTENDED RELEASE ORAL at 08:40

## 2017-12-04 RX ADMIN — SODIUM CHLORIDE: 9 INJECTION, SOLUTION INTRAVENOUS at 06:33

## 2017-12-04 RX ADMIN — OXYCODONE HYDROCHLORIDE 5 MG: 5 TABLET ORAL at 20:31

## 2017-12-04 RX ADMIN — OXYCODONE HYDROCHLORIDE 5 MG: 5 TABLET ORAL at 06:37

## 2017-12-04 RX ADMIN — OXYCODONE HYDROCHLORIDE 5 MG: 5 TABLET ORAL at 15:21

## 2017-12-04 RX ADMIN — POTASSIUM CHLORIDE 40 MEQ: 1500 TABLET, EXTENDED RELEASE ORAL at 08:40

## 2017-12-04 RX ADMIN — ALPRAZOLAM 0.25 MG: 0.25 TABLET ORAL at 08:40

## 2017-12-04 RX ADMIN — PNEUMOCOCCAL 13-VALENT CONJUGATE VACCINE 0.5 ML: 2.2; 2.2; 2.2; 2.2; 2.2; 4.4; 2.2; 2.2; 2.2; 2.2; 2.2; 2.2; 2.2 INJECTION, SUSPENSION INTRAMUSCULAR at 17:35

## 2017-12-04 RX ADMIN — LOVASTATIN 40 MG: 20 TABLET ORAL at 20:30

## 2017-12-04 ASSESSMENT — PAIN SCALES - GENERAL
PAINLEVEL_OUTOF10: 5
PAINLEVEL_OUTOF10: 4
PAINLEVEL_OUTOF10: 8

## 2017-12-04 ASSESSMENT — ENCOUNTER SYMPTOMS
MYALGIAS: 0
COUGH: 0
SHORTNESS OF BREATH: 0
FEVER: 0
EYES NEGATIVE: 1
WEAKNESS: 1
PSYCHIATRIC NEGATIVE: 1
NAUSEA: 0
DIZZINESS: 0
VOMITING: 0
ABDOMINAL PAIN: 1
DIARRHEA: 1
FLANK PAIN: 0
CONSTIPATION: 0
CHILLS: 0

## 2017-12-04 ASSESSMENT — LIFESTYLE VARIABLES: DO YOU DRINK ALCOHOL: NO

## 2017-12-04 NOTE — ASSESSMENT & PLAN NOTE
Hypotensive overnight, hold BB given creatinine clearance discussed with pharmacy; will need to monitor HR

## 2017-12-04 NOTE — CARE PLAN
Problem: Safety  Goal: Will remain free from injury  Outcome: PROGRESSING AS EXPECTED  Safety precautions in place. Bed in locked and in lowest position, treaded socks on. Call light and personal items within reach, pt calls appropriately. Hourly rounding practiced.     Problem: Knowledge Deficit  Goal: Knowledge of disease process/condition, treatment plan, diagnostic tests, and medications will improve  Outcome: PROGRESSING AS EXPECTED  Plan of care discussed. All questions and concerns addressed.

## 2017-12-04 NOTE — PROGRESS NOTES
[facility] Hospitalist Progress Note    Date of Service: 2017    Chief Complaint  77 y.o. female admitted 12/3/2017 with abd pain and diarrhea.    Interval Problem Update  Continued diarrhea, becoming more solid. Still w/ abd pain right sided radiating to the left. Not improved abd pain. Crampy pain comes and goes.     Consultants/Specialty  None    Disposition  Anticipate d/c in next 24 to 48hr        Review of Systems   Constitutional: Negative for chills and fever.   HENT: Negative.    Eyes: Negative.    Respiratory: Negative for cough and shortness of breath.    Cardiovascular: Negative for chest pain and leg swelling.   Gastrointestinal: Positive for abdominal pain and diarrhea. Negative for constipation, nausea and vomiting.   Genitourinary: Negative for dysuria and flank pain.   Musculoskeletal: Negative for joint pain and myalgias.   Skin: Negative.    Neurological: Positive for weakness. Negative for dizziness.   Endo/Heme/Allergies: Negative.    Psychiatric/Behavioral: Negative.       Physical Exam  Laboratory/Imaging   Hemodynamics  Temp (24hrs), Av.6 °C (97.8 °F), Min:35.7 °C (96.3 °F), Max:37.5 °C (99.5 °F)   Temperature: 37.5 °C (99.5 °F) (nurse nitifed)  Pulse  Av.5  Min: 59  Max: 97    Blood Pressure : 131/55, NIBP: 102/41      Respiratory      Respiration: 18, Pulse Oximetry: 98 %        RUL Breath Sounds: Clear, RML Breath Sounds: Diminished, RLL Breath Sounds: Diminished, ISRRAEL Breath Sounds: Clear, LLL Breath Sounds: Diminished    Fluids    Intake/Output Summary (Last 24 hours) at 17 1211  Last data filed at 17 0810   Gross per 24 hour   Intake             1476 ml   Output                0 ml   Net             1476 ml       Nutrition  Orders Placed This Encounter   Procedures   • Diet Order     Standing Status:   Standing     Number of Occurrences:   1     Order Specific Question:   Diet:     Answer:   Cardiac [6]     Physical Exam   Constitutional: She is oriented to person,  place, and time. She appears well-developed and well-nourished.   HENT:   Head: Normocephalic and atraumatic.   Eyes: Conjunctivae are normal. Pupils are equal, round, and reactive to light.   Neck: Normal range of motion. Neck supple. No JVD present.   Cardiovascular: Normal rate, regular rhythm, normal heart sounds and intact distal pulses.    Pulmonary/Chest: Effort normal and breath sounds normal. She exhibits no tenderness.   Abdominal: Soft.   TTP RLQ and LLQ worse on right side   Musculoskeletal: Normal range of motion. She exhibits no edema.   Neurological: She is alert and oriented to person, place, and time. She exhibits normal muscle tone.   Skin: Skin is warm and dry.   Psychiatric: She has a normal mood and affect. Her behavior is normal. Judgment and thought content normal.       Recent Labs      12/03/17   1119  12/04/17   0237   WBC  15.8*  16.2*   RBC  4.62  4.44   HEMOGLOBIN  13.2  12.7   HEMATOCRIT  41.1  39.5   MCV  89.0  89.0   MCH  28.6  28.6   MCHC  32.1*  32.2*   RDW  45.8  45.8   PLATELETCT  249  248   MPV  9.9  9.9     Recent Labs      12/03/17   1119  12/04/17   0237   SODIUM  136  133*   POTASSIUM  3.1*  3.5*   CHLORIDE  104  102   CO2  22  24   GLUCOSE  101*  149*   BUN  14  25*   CREATININE  0.90  0.92   CALCIUM  9.1  9.3     Recent Labs      12/03/17   1119  12/04/17   0237   INR  5.68*  7.55*                  Assessment/Plan     * Diarrhea   Assessment & Plan    Question infectious diarrhea, especially in light of receiving antibiotic therapy in the last several weeks.   Concern for Clostridium difficile. Patient will be placed on isolation for the same, test results are pending. Wbc's in stool, culture pending.  We will hold antibiotic therapy pending these results.        Paroxysmal a-fib (CMS-HCC)- (present on admission)   Assessment & Plan    Rate currently controlled, stopped sotalol given Cr clearance discussed with pharmacy   Will need to continue cardizem may have to tirate  after stopping sotalol  Hold warfarin         Coronary artery disease due to lipid rich plaque- (present on admission)   Assessment & Plan    Continue statin therapy, anticoagulation, monitor.        Dyslipidemia- (present on admission)   Assessment & Plan    Continue statin therapy        Failure to thrive in adult   Assessment & Plan    Nutrition consult        Hypotension   Assessment & Plan    Improving  Cont IVF for now         Hyponatremia   Assessment & Plan    Monitor repeat bmp am        Supratherapeutic INR   Assessment & Plan    High risk hold warfarin, monitor INR         Circulating anticoagulants (CMS-HCC)   Assessment & Plan    High risk, supratheraputic INR   Hold warfarin monitor daily INR         Ischemic colitis (CMS-HCC)   Assessment & Plan    With recent admission for the same, versus infectious colitis.   Patient was treated with anticoagulation as well as with antibiotics.          Hypokalemia- (present on admission)   Assessment & Plan    Replace repeat bmp am        Essential hypertension, benign- (present on admission)   Assessment & Plan    Hypotensive overnight, hold BB given creatinine clearance discussed with pharmacy; will need to monitor HR             Quality-Core Measures

## 2017-12-04 NOTE — ASSESSMENT & PLAN NOTE
Rate currently controlled, stopped sotalol given Cr clearance discussed with pharmacy   Will need to continue cardizem may have to tirate after stopping sotalol  Hold warfarin

## 2017-12-04 NOTE — H&P
Hospital Medicine History and Physical    Date of Service  12/3/2017    Chief Complaint  Chief Complaint   Patient presents with   • Diarrhea   • Abdominal Pain       History of Presenting Illness  77 y.o. female With recent admission for infectious versus ischemic colitis, was discharged, was in her usual state of health until the week prior to this admission, she began to notice weakness, as well as the development of hypogastric pain. She noted decreased ability to ambulate due to the pain, as well as decreased oral intake. She began also to have episodes of diarrhea, on some days having more than 4 or 5 bowel movements. Due to these symptoms she was brought to the emergency department, as her  was having trouble being able to care for her. She currently denies any headache or vision changes, she has no chest pain or shortness of breath, she does have the hypogastric abdominal pain, without nausea or vomiting currently. Her diarrhea has subsided since her admission, she denies any fever or chills. No other complaints.      Primary Care Physician  KADY Hurt.    Consultants  None    Code Status  Full code    Review of Systems  Review of Systems   Constitutional: Negative for chills and fever.   Eyes: Negative for blurred vision and double vision.   Respiratory: Negative for cough, sputum production and shortness of breath.    Cardiovascular: Negative for chest pain and palpitations.   Gastrointestinal: Positive for abdominal pain and diarrhea. Negative for constipation, nausea and vomiting.   Genitourinary: Negative for dysuria.   Neurological: Negative for headaches.        Past Medical History  Past Medical History:   Diagnosis Date   • Paroxysmal a-fib (CMS-HCC) 1/20/2016    Symptomatic, on a rhythm control strategy with sotalol 40 mg by mouth twice a day.    • CAD (coronary artery disease)    • COPD (chronic obstructive pulmonary disease) (CMS-HCC)    • Diverticulosis    • Dyslipidemia     • GERD (gastroesophageal reflux disease)    • Hiatal hernia    • Hypertension    • Paroxysmal atrial fibrillation (CMS-HCC)    • Prediabetes    • PVD (peripheral vascular disease) (CMS-HCC)        Surgical History  Past Surgical History:   Procedure Laterality Date   • WIDE EXCISION MELANOMA, LEG, W/POSS.STSG  10/2015    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   • CARDIAC CATH, RIGHT HEART  2008    stent   • CHOLECYSTECTOMY  1993   • TONSILLECTOMY  1945   • STENT PLACEMENT      L iliac stent       Medications  No current facility-administered medications on file prior to encounter.      Current Outpatient Prescriptions on File Prior to Encounter   Medication Sig Dispense Refill   • alprazolam (XANAX) 0.25 MG Tab Take 0.25 mg by mouth every morning.     • sucralfate (CARAFATE) 1 GM Tab Take 1 g by mouth 2 Times a Day.     • pantoprazole (PROTONIX) 40 MG Tablet Delayed Response TAKE 1 TABLET BY MOUTH EVERY DAY. INDICATIONS: GASTROESOPHAGEAL REFLUX DISEASE 90 Tab 0   • lovastatin (MEVACOR) 40 MG tablet Take 1 Tab by mouth every evening. 90 Tab 3   • diltiazem CD (CARDIZEM CD) 240 MG CAPSULE SR 24 HR Take 1 Cap by mouth every day. 90 Cap 3   • sotalol (BETAPACE) 80 MG Tab Take 0.5 Tabs by mouth 2 times a day. 90 Tab 3   • Cholecalciferol (VITAMIN D) 2000 UNITS Cap Take 1 Cap by mouth every morning.     • aspirin EC (ECOTRIN) 81 MG Tablet Delayed Response Take 81 mg by mouth every evening.         Family History  Family History   Problem Relation Age of Onset   • Hypertension Mother    • Hyperlipidemia Mother    • Cancer Maternal Grandmother      tongue   • Cancer Maternal Uncle      x 3, pancreas   • Cancer Maternal Grandfather      unknown   • Cancer Paternal Grandmother      unknown   • Cancer Paternal Grandfather      unknown   • Diabetes Maternal Uncle    • Heart Disease Maternal Uncle    • Hypertension Sister    • Hyperlipidemia Sister    • Thyroid Sister    • Psychiatry Neg Hx    • Stroke Neg Hx        Social  History  Social History   Substance Use Topics   • Smoking status: Current Every Day Smoker     Packs/day: 0.25     Years: 60.00     Types: Cigarettes     Start date: 3/20/1957   • Smokeless tobacco: Never Used      Comment: 6 per day   • Alcohol use No       Allergies  Allergies   Allergen Reactions   • Codeine Swelling   • Iodine Swelling   • Bee Venom    • Chantix [Varenicline]      disoriented   • Latex    • Pcn [Penicillins]    • Shellfish Allergy    • Tetanus Antitoxin         Physical Exam  Laboratory   Hemodynamics  Temp (24hrs), Av.4 °C (97.5 °F), Min:35.7 °C (96.3 °F), Max:36.8 °C (98.2 °F)   Temperature: 36.4 °C (97.6 °F)  Pulse  Av.5  Min: 59  Max: 82 Heart Rate (Monitored): 79  Blood Pressure : (!) 92/58, NIBP: 102/41      Respiratory      Respiration: 18, Pulse Oximetry: 94 %             Physical Exam   Constitutional: She is oriented to person, place, and time. She appears well-developed and well-nourished. No distress.   HENT:   Head: Normocephalic and atraumatic.   Eyes: Pupils are equal, round, and reactive to light.   Neck: Normal range of motion. Neck supple.   Cardiovascular: Normal rate, regular rhythm and normal heart sounds.  Exam reveals no gallop and no friction rub.    No murmur heard.  Pulmonary/Chest: Effort normal and breath sounds normal. No respiratory distress. She has no wheezes. She has no rales.   Abdominal: Soft. Bowel sounds are normal. She exhibits no distension. There is tenderness. There is no rebound.   Musculoskeletal: Normal range of motion. She exhibits no edema.   Neurological: She is alert and oriented to person, place, and time. No cranial nerve deficit.   Skin: Skin is warm and dry. She is not diaphoretic.   Nursing note and vitals reviewed.      Recent Labs      17   1119   WBC  15.8*   RBC  4.62   HEMOGLOBIN  13.2   HEMATOCRIT  41.1   MCV  89.0   MCH  28.6   MCHC  32.1*   RDW  45.8   PLATELETCT  249   MPV  9.9     Recent Labs      17   1119    SODIUM  136   POTASSIUM  3.1*   CHLORIDE  104   CO2  22   GLUCOSE  101*   BUN  14   CREATININE  0.90   CALCIUM  9.1     Recent Labs      12/03/17   1119   ALTSGPT  11   ASTSGOT  16   ALKPHOSPHAT  75   TBILIRUBIN  0.7   LIPASE  9*   GLUCOSE  101*     Recent Labs      12/03/17   1119   INR  5.68*             No results found for: TROPONINI  Urinalysis:    Lab Results  Component Value Date/Time   SPECGRAVITY 1.018 12/03/2017 1353   GLUCOSEUR Negative 12/03/2017 1353   KETONES Trace (A) 12/03/2017 1353   NITRITE Negative 12/03/2017 1353   WBCURINE 10-20 (A) 12/03/2017 1353   RBCURINE 10-20 (A) 12/03/2017 1353   BACTERIA Negative 12/03/2017 1353   EPITHELCELL Moderate (A) 12/03/2017 1353        Imaging  CT abdomen and pelvis with mild diffuse thickening of the colonic wall and mucosal hyperenhancement no evidence of abscess or pneumoperitoneum    Assessment/Plan     I anticipate this patient will require at least two midnights for appropriate medical management, necessitating inpatient admission.    * Diarrhea   Assessment & Plan    Question infectious diarrhea, especially in light of receiving antibiotic therapy in the last several weeks. Concern for Clostridium difficile. Patient will be placed on isolation for the same, test results are pending. We will hold antibiotic therapy pending these results.        Paroxysmal a-fib (CMS-HCC)- (present on admission)   Assessment & Plan    Rate currently controlled, anticoagulated with Coumadin, which will be dosed per pharmacy        Coronary artery disease due to lipid rich plaque- (present on admission)   Assessment & Plan    Continue statin therapy, anticoagulation, monitor.        Dyslipidemia- (present on admission)   Assessment & Plan    Continue statin therapy        Ischemic colitis (CMS-HCC)   Assessment & Plan    With recent admission for the same, versus infectious colitis. Patient was treated with anticoagulation as well as with antibiotics.        Essential  hypertension, benign- (present on admission)   Assessment & Plan    Currently controlled, monitor, continue home medication regimen            VTE prophylaxis: SCDS, on coumadin .

## 2017-12-04 NOTE — CARE PLAN
Problem: Safety  Goal: Will remain free from injury  Outcome: PROGRESSING AS EXPECTED  Safety precautions in place. Bed in locked/low position. 2 side rails up. Treaded socks. Call light in reach, calls appropriately. Hourly rounding practiced.    Problem: Knowledge Deficit  Goal: Knowledge of disease process/condition, treatment plan, diagnostic tests, and medications will improve  Outcome: PROGRESSING AS EXPECTED  Plan of care discussed. All questions and concerns addressed.

## 2017-12-04 NOTE — PROGRESS NOTES
Patient is A&Ox4.   Denies pain.   Generalized weakness, ALANIS, CMS intact, denies numbness and tingling.   On RA, denies SOB, chest pain.  Normoactive BS x 4. Denies nausea/vomiting. Tolerating cardiac diet.    + flatus, - BM at this time, positive void.   Updated on plan of care. Belongings and call light within reach. All needs met at this time.     Admit profile and med rec completed.   Requires PNA vaccines     2 RN skin check.   Scabbing noted on BLE, otherwise skin is intact.     Refuses BA, educated on risk to fall, verbalizes understanding and still declines. Call light in reach, calls appropriately for assistance.

## 2017-12-04 NOTE — DISCHARGE PLANNING
"Care Transition Team Assessment    IHD met with patient bedside. She stated she lives with her  and adult daughter. She is normally able to drive herself and does not use any DME, O2 or HHC. She does not expect to discharge with any new services.     Information Source  Orientation : Oriented x 4  Information Given By: Patient  Informant's Name: Angie  Who is responsible for making decisions for patient? : Patient    Readmission Evaluation  Is this a readmission?: Yes - unplanned readmission  Why do you think you were readmitted?: \"C. Diff, maybe\"  Was an appointment arranged for you prior to discharge?: Yes, attended appointment  Were there new prescriptions you were supposed to fill after you were discharged?: Yes, prescriptions filled  Did you understand your discharge instructions?: Yes  Did you have enough support after your last discharge?: Yes    Elopement Risk  Legal Hold: No  Ambulatory or Self Mobile in Wheelchair: Yes  Disoriented: No  Psychiatric Symptoms: None  History of Wandering: No  Elopement this Admit: No  Vocalizing Wanting to Leave: No  Displays Behaviors, Body Language Wanting to Leave: No-Not at Risk for Elopement  Elopement Risk: Not at Risk for Elopement    Interdisciplinary Discharge Planning  Does Admitting Nurse Feel This Could be a Complex Discharge?: No  Primary Care Physician:  (Dr. Beryl Piedra)  Lives with - Patient's Self Care Capacity: Spouse  Patient or legal guardian wants to designate a caregiver (see row info): No  Support Systems: Friends / Neighbors, Family Member(s)  Housing / Facility: [address]  Do You Take your Prescribed Medications Regularly: Yes  Able to Return to Previous ADL's: Yes  Mobility Issues: No  Prior Services: None  Assistance Needed: No  Durable Medical Equipment: Not Applicable    Discharge Preparedness  What is your plan after discharge?: Home with help  What are your discharge supports?: Spouse, Child  Prior Functional Level: Ambulatory, Drives " Self, Independent with Activities of Daily Living, Independent with Medication Management  Difficulity with ADLs: None  Difficulity with IADLs: None    Functional Assesment  Prior Functional Level: Ambulatory, Drives Self, Independent with Activities of Daily Living, Independent with Medication Management    Finances  Financial Barriers to Discharge: No  Prescription Coverage: Yes ([facility])    Vision / Hearing Impairment  Vision Impairment : Yes  Right Eye Vision: Wears Glasses  Left Eye Vision: Wears Glasses  Hearing Impairment : No    Values / Beliefs / Concerns  Values / Beliefs Concerns : No    Advance Directive  Advance Directive?: None    Domestic Abuse  Have you ever been the victim of abuse or violence?: No  Physical Abuse or Sexual Abuse: No  Verbal Abuse or Emotional Abuse: No  Possible Abuse Reported to:: Not Applicable    Psychological Assessment  History of Substance Abuse: None  History of Psychiatric Problems: No  Non-compliant with Treatment: No  Newly Diagnosed Illness: No    Discharge Risks or Barriers  Discharge risks or barriers?: No  Patient risk factors: Readmission    Anticipated Discharge Information  Anticipated discharge disposition: Discharge needs currently unknown  Discharge Address: [address] [place] [address] [place] [id number]  Discharge Contact Phone Number: [phone]

## 2017-12-04 NOTE — PROGRESS NOTES
Lab called with critical INR result at 7.55 . Critical lab result read back.  Dr. Lewis notified of critical lab result.  Ordered to hold Coumadin.

## 2017-12-04 NOTE — PROGRESS NOTES
Pt A&O x's 4, VSS, pain is at a 5/10 per pain scale, medicated per MAR. Normoactive BS, + flatus, + BM, more solid than previous, voiding with no complications and tolerating diet. Pt is unaware if she has received her PNA vaccine, her pharmacy was contacted and said that they would look into it and to call them back at a later time. POC has been discussed,  is at bedside, bed in lowest position, refusing bed alarm, educated on fall risks, pt calls appropriately, will continue monitoring.

## 2017-12-05 PROBLEM — R53.81 PHYSICAL DEBILITY: Status: ACTIVE | Noted: 2017-12-05

## 2017-12-05 PROBLEM — Z87.09 HISTORY OF COPD: Status: ACTIVE | Noted: 2017-12-05

## 2017-12-05 PROBLEM — I95.9 SEPSIS ASSOCIATED HYPOTENSION (HCC): Status: ACTIVE | Noted: 2017-12-05

## 2017-12-05 PROBLEM — A41.9 SEPSIS ASSOCIATED HYPOTENSION (HCC): Status: ACTIVE | Noted: 2017-12-05

## 2017-12-05 PROBLEM — E87.1 HYPONATREMIA: Status: RESOLVED | Noted: 2017-12-04 | Resolved: 2017-12-05

## 2017-12-05 PROBLEM — E87.6 HYPOKALEMIA: Status: RESOLVED | Noted: 2017-11-17 | Resolved: 2017-12-05

## 2017-12-05 LAB
ANION GAP SERPL CALC-SCNC: 8 MMOL/L (ref 0–11.9)
BACTERIA UR CULT: NORMAL
BUN SERPL-MCNC: 17 MG/DL (ref 8–22)
C DIFF DNA SPEC QL NAA+PROBE: NEGATIVE
C DIFF TOX A+B STL QL IA: POSITIVE
C DIFF TOX GENS STL QL NAA+PROBE: NORMAL
CALCIUM SERPL-MCNC: 8.8 MG/DL (ref 8.5–10.5)
CHLORIDE SERPL-SCNC: 108 MMOL/L (ref 96–112)
CO2 SERPL-SCNC: 22 MMOL/L (ref 20–33)
CREAT SERPL-MCNC: 0.92 MG/DL (ref 0.5–1.4)
ERYTHROCYTE [DISTWIDTH] IN BLOOD BY AUTOMATED COUNT: 49.1 FL (ref 35.9–50)
GFR SERPL CREATININE-BSD FRML MDRD: 59 ML/MIN/1.73 M 2
GLUCOSE SERPL-MCNC: 105 MG/DL (ref 65–99)
HCT VFR BLD AUTO: 33.8 % (ref 37–47)
HGB BLD-MCNC: 10.7 G/DL (ref 12–16)
INR PPP: 7.48 (ref 0.87–1.13)
LACTATE BLD-SCNC: 0.8 MMOL/L (ref 0.5–2)
LACTATE BLD-SCNC: 0.9 MMOL/L (ref 0.5–2)
MAGNESIUM SERPL-MCNC: 1.5 MG/DL (ref 1.5–2.5)
MCH RBC QN AUTO: 29.2 PG (ref 27–33)
MCHC RBC AUTO-ENTMCNC: 31.7 G/DL (ref 33.6–35)
MCV RBC AUTO: 92.1 FL (ref 81.4–97.8)
PLATELET # BLD AUTO: 217 K/UL (ref 164–446)
PMV BLD AUTO: 10.4 FL (ref 9–12.9)
POTASSIUM SERPL-SCNC: 4.8 MMOL/L (ref 3.6–5.5)
PROTHROMBIN TIME: 63.7 SEC (ref 12–14.6)
RBC # BLD AUTO: 3.67 M/UL (ref 4.2–5.4)
SIGNIFICANT IND 70042: NORMAL
SITE SITE: NORMAL
SODIUM SERPL-SCNC: 138 MMOL/L (ref 135–145)
SOURCE SOURCE: NORMAL
WBC # BLD AUTO: 11.3 K/UL (ref 4.8–10.8)

## 2017-12-05 PROCEDURE — 770021 HCHG ROOM/CARE - ISO PRIVATE

## 2017-12-05 PROCEDURE — 80048 BASIC METABOLIC PNL TOTAL CA: CPT

## 2017-12-05 PROCEDURE — 700111 HCHG RX REV CODE 636 W/ 250 OVERRIDE (IP): Performed by: HOSPITALIST

## 2017-12-05 PROCEDURE — A9270 NON-COVERED ITEM OR SERVICE: HCPCS | Performed by: HOSPITALIST

## 2017-12-05 PROCEDURE — 700102 HCHG RX REV CODE 250 W/ 637 OVERRIDE(OP): Performed by: HOSPITALIST

## 2017-12-05 PROCEDURE — 83735 ASSAY OF MAGNESIUM: CPT

## 2017-12-05 PROCEDURE — 85610 PROTHROMBIN TIME: CPT

## 2017-12-05 PROCEDURE — 83605 ASSAY OF LACTIC ACID: CPT

## 2017-12-05 PROCEDURE — 85027 COMPLETE CBC AUTOMATED: CPT

## 2017-12-05 PROCEDURE — 700105 HCHG RX REV CODE 258: Performed by: HOSPITALIST

## 2017-12-05 PROCEDURE — 99232 SBSQ HOSP IP/OBS MODERATE 35: CPT | Performed by: HOSPITALIST

## 2017-12-05 PROCEDURE — 36415 COLL VENOUS BLD VENIPUNCTURE: CPT

## 2017-12-05 RX ORDER — ACETAMINOPHEN 325 MG/1
650 TABLET ORAL EVERY 6 HOURS PRN
Status: DISCONTINUED | OUTPATIENT
Start: 2017-12-05 | End: 2017-12-06 | Stop reason: HOSPADM

## 2017-12-05 RX ORDER — SODIUM CHLORIDE 9 MG/ML
30 INJECTION, SOLUTION INTRAVENOUS
Status: DISCONTINUED | OUTPATIENT
Start: 2017-12-05 | End: 2017-12-06 | Stop reason: HOSPADM

## 2017-12-05 RX ORDER — SODIUM CHLORIDE 9 MG/ML
500 INJECTION, SOLUTION INTRAVENOUS
Status: DISCONTINUED | OUTPATIENT
Start: 2017-12-05 | End: 2017-12-06 | Stop reason: HOSPADM

## 2017-12-05 RX ORDER — MAGNESIUM SULFATE HEPTAHYDRATE 40 MG/ML
4 INJECTION, SOLUTION INTRAVENOUS ONCE
Status: COMPLETED | OUTPATIENT
Start: 2017-12-05 | End: 2017-12-05

## 2017-12-05 RX ORDER — L. ACIDOPHILUS/L.BULGARICUS 100MM CELL
1 GRANULES IN PACKET (EA) ORAL
Status: DISCONTINUED | OUTPATIENT
Start: 2017-12-05 | End: 2017-12-06 | Stop reason: HOSPADM

## 2017-12-05 RX ORDER — LOPERAMIDE HYDROCHLORIDE 2 MG/1
2 CAPSULE ORAL 4 TIMES DAILY PRN
Status: DISCONTINUED | OUTPATIENT
Start: 2017-12-05 | End: 2017-12-05

## 2017-12-05 RX ADMIN — ALPRAZOLAM 0.25 MG: 0.25 TABLET ORAL at 08:48

## 2017-12-05 RX ADMIN — LACTOBACILLUS ACIDOPHILUS / LACTOBACILLUS BULGARICUS 1 PACKET: 100 MILLION CFU STRENGTH GRANULES at 11:43

## 2017-12-05 RX ADMIN — ASPIRIN 81 MG: 81 TABLET, COATED ORAL at 20:20

## 2017-12-05 RX ADMIN — OXYCODONE HYDROCHLORIDE 5 MG: 5 TABLET ORAL at 11:43

## 2017-12-05 RX ADMIN — VANCOMYCIN HYDROCHLORIDE 125 MG: 10 INJECTION, POWDER, LYOPHILIZED, FOR SOLUTION INTRAVENOUS at 12:00

## 2017-12-05 RX ADMIN — LACTOBACILLUS ACIDOPHILUS / LACTOBACILLUS BULGARICUS 1 PACKET: 100 MILLION CFU STRENGTH GRANULES at 18:08

## 2017-12-05 RX ADMIN — VANCOMYCIN HYDROCHLORIDE 125 MG: 10 INJECTION, POWDER, LYOPHILIZED, FOR SOLUTION INTRAVENOUS at 18:07

## 2017-12-05 RX ADMIN — OXYCODONE HYDROCHLORIDE 2.5 MG: 5 TABLET ORAL at 20:20

## 2017-12-05 RX ADMIN — VANCOMYCIN HYDROCHLORIDE 125 MG: 10 INJECTION, POWDER, LYOPHILIZED, FOR SOLUTION INTRAVENOUS at 00:00

## 2017-12-05 RX ADMIN — DILTIAZEM HYDROCHLORIDE 240 MG: 120 CAPSULE, COATED, EXTENDED RELEASE ORAL at 08:48

## 2017-12-05 RX ADMIN — LOVASTATIN 40 MG: 20 TABLET ORAL at 20:20

## 2017-12-05 RX ADMIN — VANCOMYCIN HYDROCHLORIDE 125 MG: 10 INJECTION, POWDER, LYOPHILIZED, FOR SOLUTION INTRAVENOUS at 06:21

## 2017-12-05 RX ADMIN — MAGNESIUM SULFATE IN WATER 4 G: 40 INJECTION, SOLUTION INTRAVENOUS at 08:48

## 2017-12-05 ASSESSMENT — ENCOUNTER SYMPTOMS
DIARRHEA: 1
ABDOMINAL PAIN: 1
FEVER: 0
VOMITING: 0
BLOOD IN STOOL: 0
CHILLS: 0
NAUSEA: 0
PALPITATIONS: 0

## 2017-12-05 ASSESSMENT — PAIN SCALES - GENERAL
PAINLEVEL_OUTOF10: 3
PAINLEVEL_OUTOF10: 3
PAINLEVEL_OUTOF10: 5
PAINLEVEL_OUTOF10: 3

## 2017-12-05 ASSESSMENT — LIFESTYLE VARIABLES: DO YOU DRINK ALCOHOL: NO

## 2017-12-05 NOTE — PROGRESS NOTES
Chelo from Lab called with critical result of PT 63.7 at 0337. Critical lab result read back to Chelo.     Critical lab result trending towards WDL. Provider notification not required per protocol.

## 2017-12-05 NOTE — CARE PLAN
Problem: Nutritional:  Goal: Achieve adequate nutritional intake  Patient will consume >50% of meals and snacks   Outcome: PROGRESSING AS EXPECTED  Pt reports consuming ~85% of meals; no meals recorded in ADLs at this time   Intervention: Monitor PO intake, weights, and laboratory values  Record percentage of meals conusmed in ADLs to help monitor po adequacy    Intervention: Collaborate with transitional care team and interdisciplinary team to meet patient's needs  RD following

## 2017-12-05 NOTE — PROGRESS NOTES
"Pt A&O x4.    Vitals: /62   Pulse 72   Temp 36.2 °C (97.2 °F)   Resp 18   Ht 1.575 m (5' 2\")   Wt 43.7 kg (96 lb 5.5 oz)   SpO2 95%   Breastfeeding? No   BMI 17.62 kg/m²     Pt rates pain 8 out of 10 with movement. Medicated for pain.    Neuro: ALANIS. Denies new onset of numbness/ tingling.    Cardiac: Denies new onset of chest pain. Pt has a previous diagnosis of Afib.     Vascular: No edema noted.    Respiratory: Lungs sound diminished in bases. Pulling 1250 on IS, weak effort, needs encouragement. Currently on RA.  on, satting in 90's. Denies SOB.    GI: Abdomen tender, painful. + bowel sounds, + flatus, + BM today. On cardiac diet, tolerating well. - nausea/ vomiting.    : Pt voiding adequately.      MSK: Pt up to bathroom SBA, tolerating well.    Integumentary: Intact.    Labs noted.    Fall precautions in place: Bed locked in lowest position, Upper bed rails up, treaded socks in place, personal belongings within reach, call light within reach, appropriate mobility signs in place, + bed alarm. Pt calls appropriately.     Pt updated on POC.   "

## 2017-12-05 NOTE — PROGRESS NOTES
Lab called to confirm that stool sample that was sent out is positive for CDiff, oral abx ordered.

## 2017-12-05 NOTE — DIETARY
"Nutrition Services    Pt seen per FTT consult, Low BMI <19 (17.6) and nutrition admit screen trigger: poor po PTA     Pt is a 76 yo female with adm dx: Diarrhea  Past Medical History:   Diagnosis Date   • CAD (coronary artery disease)    • COPD (chronic obstructive pulmonary disease) (CMS-HCC)    • Diverticulosis    • Dyslipidemia    • GERD (gastroesophageal reflux disease)    • Hiatal hernia    • Hypertension    • Paroxysmal a-fib (CMS-HCC) 1/20/2016    Symptomatic, on a rhythm control strategy with sotalol 40 mg by mouth twice a day.    • Paroxysmal atrial fibrillation (CMS-HCC)    • Prediabetes    • PVD (peripheral vascular disease) (CMS-HCC)      Spoke w/pt at bedside, she appears elderly and thin but appears nourished. She states that she has a very good appetite but PTA her po intake was very poor for approximately 2 weeks PTA d/t nothing sounding good and many foods irritated her mouth. She denies having any issues chewing or swallowing. Discussed snacks, pt agreed and preferences obtained.     Discussed wt hx, pt reports her usual wt is around 45.4 kg (100#). Pt's current wt of 43.7 kg is 3.7% below her usual wt.  Pt denied having any recent wt changes.  She states all of her clothes have been fitting her usual too.      Current diet: Cardiac - no meals recorded in ADLs at this time.  Per pt reports she states she has been consuming ~85% of her meals thus far.   Ht: 62\"  Wt: (12/3) 43.7 kg via stand up scale   Body mass index is 17.62 kg/m². - underweight   Pertinent labs: Serum Glucose 149, BUN 25, creat 0.92, Na 133, K 3.5  Pertinent meds: reviewed   Fluids: IVF NS at 83 mL/hr   Skin: no skin breakdown noted at this time   GI: per ADLs, last BM ? - pt reports having abd pain and discomfort but denies having difficulties moving her bowels     Recommendations:   · Encourage po intake of meals and snacks   · Sending snacks BID   · Record percentage of meals and snacks conusmed in ADLs to help monitor po " adequacy  · Nutrition Rep to see pt daily for preferences   · Obtain weekly weights via stand up scale to help monitor fluid and nutritional status   · Monitor wt and lab trends     RD following

## 2017-12-05 NOTE — PROGRESS NOTES
Pt A&O x's 4, VSS, pain is at a 5/10 per pain scale, medicated per MAR. Lung sounds are clear, denies any SOB or chest pain, INR is still high, PURNIMA hose placed per active order. Normoactive BS, + flatus, - BM, voiding with no complications and tolerating diet. POC has been discussed,  is at bedside, bed in lowest position, refusing bed alarm, educated on fall risks, pt calls appropriately, will continue monitoring.

## 2017-12-06 VITALS
HEIGHT: 62 IN | WEIGHT: 96.34 LBS | HEART RATE: 61 BPM | TEMPERATURE: 98.3 F | SYSTOLIC BLOOD PRESSURE: 129 MMHG | OXYGEN SATURATION: 97 % | BODY MASS INDEX: 17.73 KG/M2 | DIASTOLIC BLOOD PRESSURE: 48 MMHG | RESPIRATION RATE: 18 BRPM

## 2017-12-06 LAB
ANION GAP SERPL CALC-SCNC: 4 MMOL/L (ref 0–11.9)
BACTERIA STL CULT: NORMAL
BUN SERPL-MCNC: 12 MG/DL (ref 8–22)
CALCIUM SERPL-MCNC: 8.4 MG/DL (ref 8.5–10.5)
CHLORIDE SERPL-SCNC: 105 MMOL/L (ref 96–112)
CO2 SERPL-SCNC: 27 MMOL/L (ref 20–33)
CREAT SERPL-MCNC: 0.69 MG/DL (ref 0.5–1.4)
ERYTHROCYTE [DISTWIDTH] IN BLOOD BY AUTOMATED COUNT: 46.1 FL (ref 35.9–50)
GFR SERPL CREATININE-BSD FRML MDRD: >60 ML/MIN/1.73 M 2
GLUCOSE SERPL-MCNC: 99 MG/DL (ref 65–99)
HCT VFR BLD AUTO: 34.3 % (ref 37–47)
HGB BLD-MCNC: 11.1 G/DL (ref 12–16)
MAGNESIUM SERPL-MCNC: 1.9 MG/DL (ref 1.5–2.5)
MCH RBC QN AUTO: 28.5 PG (ref 27–33)
MCHC RBC AUTO-ENTMCNC: 32.4 G/DL (ref 33.6–35)
MCV RBC AUTO: 88.2 FL (ref 81.4–97.8)
PLATELET # BLD AUTO: 190 K/UL (ref 164–446)
PMV BLD AUTO: 10 FL (ref 9–12.9)
POTASSIUM SERPL-SCNC: 4.8 MMOL/L (ref 3.6–5.5)
RBC # BLD AUTO: 3.89 M/UL (ref 4.2–5.4)
SIGNIFICANT IND 70042: NORMAL
SITE SITE: NORMAL
SODIUM SERPL-SCNC: 136 MMOL/L (ref 135–145)
SOURCE SOURCE: NORMAL
WBC # BLD AUTO: 7.8 K/UL (ref 4.8–10.8)

## 2017-12-06 PROCEDURE — 700105 HCHG RX REV CODE 258: Performed by: HOSPITALIST

## 2017-12-06 PROCEDURE — A9270 NON-COVERED ITEM OR SERVICE: HCPCS | Performed by: HOSPITALIST

## 2017-12-06 PROCEDURE — 83735 ASSAY OF MAGNESIUM: CPT

## 2017-12-06 PROCEDURE — 85027 COMPLETE CBC AUTOMATED: CPT

## 2017-12-06 PROCEDURE — 700102 HCHG RX REV CODE 250 W/ 637 OVERRIDE(OP): Performed by: HOSPITALIST

## 2017-12-06 PROCEDURE — 36415 COLL VENOUS BLD VENIPUNCTURE: CPT

## 2017-12-06 PROCEDURE — 99239 HOSP IP/OBS DSCHRG MGMT >30: CPT | Performed by: HOSPITALIST

## 2017-12-06 PROCEDURE — 80048 BASIC METABOLIC PNL TOTAL CA: CPT

## 2017-12-06 RX ORDER — WITCH HAZEL 50 %
2 PADS, MEDICATED (EA) TOPICAL
Status: ON HOLD | COMMUNITY
Start: 2017-12-06 | End: 2018-04-09

## 2017-12-06 RX ORDER — WARFARIN SODIUM 5 MG/1
5 TABLET ORAL EVERY EVENING
Qty: 30 TAB | Refills: 3 | Status: ON HOLD | OUTPATIENT
Start: 2017-12-06 | End: 2018-04-09

## 2017-12-06 RX ORDER — WARFARIN SODIUM 5 MG/1
5 TABLET ORAL EVERY EVENING
Qty: 30 TAB | Refills: 3 | Status: SHIPPED | OUTPATIENT
Start: 2017-12-06 | End: 2017-12-06

## 2017-12-06 RX ORDER — ACETAMINOPHEN 325 MG/1
650 TABLET ORAL EVERY 6 HOURS PRN
Qty: 30 TAB | Refills: 0 | Status: ON HOLD | COMMUNITY
Start: 2017-12-06 | End: 2018-04-09

## 2017-12-06 RX ORDER — WARFARIN SODIUM 5 MG/1
5 TABLET ORAL EVERY EVENING
Qty: 30 TAB | Refills: 3
Start: 2017-12-06 | End: 2017-12-06

## 2017-12-06 RX ADMIN — VANCOMYCIN HYDROCHLORIDE 125 MG: 10 INJECTION, POWDER, LYOPHILIZED, FOR SOLUTION INTRAVENOUS at 00:41

## 2017-12-06 RX ADMIN — OXYCODONE HYDROCHLORIDE 2.5 MG: 5 TABLET ORAL at 05:38

## 2017-12-06 RX ADMIN — SODIUM CHLORIDE: 9 INJECTION, SOLUTION INTRAVENOUS at 05:38

## 2017-12-06 RX ADMIN — VANCOMYCIN HYDROCHLORIDE 125 MG: 10 INJECTION, POWDER, LYOPHILIZED, FOR SOLUTION INTRAVENOUS at 12:23

## 2017-12-06 RX ADMIN — ALPRAZOLAM 0.25 MG: 0.25 TABLET ORAL at 08:24

## 2017-12-06 RX ADMIN — DILTIAZEM HYDROCHLORIDE 240 MG: 120 CAPSULE, COATED, EXTENDED RELEASE ORAL at 08:26

## 2017-12-06 RX ADMIN — LACTOBACILLUS ACIDOPHILUS / LACTOBACILLUS BULGARICUS 1 PACKET: 100 MILLION CFU STRENGTH GRANULES at 08:25

## 2017-12-06 RX ADMIN — VANCOMYCIN HYDROCHLORIDE 125 MG: 10 INJECTION, POWDER, LYOPHILIZED, FOR SOLUTION INTRAVENOUS at 05:37

## 2017-12-06 ASSESSMENT — PAIN SCALES - GENERAL: PAINLEVEL_OUTOF10: 3

## 2017-12-06 NOTE — PROGRESS NOTES
Assumed care at 1845. Pt resting in bed. A&ox 4  Ambulating with standby assist  Tolerating diet  No bm today. Isolation prec for cdiff. On oral vanco  Pain controlled  O2 on Ra  Voiding well  Pt refused bed alarm despite education.  Calls appropriately  Call within reach. Hourly rounding in place

## 2017-12-06 NOTE — PROGRESS NOTES
[facility] mail delivery was called to cancel the Vancomycin order. She needs to pick the RX up today so than she can not have a stop in the medication.

## 2017-12-06 NOTE — CARE PLAN
Problem: Safety  Goal: Will remain free from injury  Updated about POC. Reinforced call light use. Pt acknowledged understanding    Problem: Pain Management  Goal: Pain level will decrease to patient's comfort goal  Pain controlled for now with oxycodone 2.5mg     Problem: Respiratory:  Goal: Respiratory status will improve  Encouraged IS use. Pulls about 1500ml

## 2017-12-06 NOTE — DISCHARGE SUMMARY
CHIEF COMPLAINT ON ADMISSION  Chief Complaint   Patient presents with   • Diarrhea   • Abdominal Pain       CODE STATUS  Full Code    HPI & HOSPITAL COURSE  77 y.o. female With recent admission for infectious versus ischemic colitis, was discharged, was in her usual state of health until the week prior to this admission, she began to notice weakness, as well as the development of hypogastric pain. She noted decreased ability to ambulate due to the pain, as well as decreased oral intake. She began also to have episodes of diarrhea, on some days having more than 4 or 5 bowel movements. Due to these symptoms she was brought to the emergency department, as her  was having trouble being able to care for her. She currently denies any headache or vision changes, she has no chest pain or shortness of breath, she does have the hypogastric abdominal pain, without nausea or vomiting currently. Her diarrhea has subsided since her admission, she denies any fever or chills. No other complaints.    Because of concern for C diff colitis she was started on PO vancomycin.  IVF was given and stool checked for C diff toxin, which turned out positive.  The coumadin was held due to elevated INR.  With these measures her diarrhea and abd pain resolved.  At the time of this summary the patient was eating and drinking well c resolution of diarrhea and was hemodynamically stable.      Therefore, she is discharged in improved and stable condition with close outpatient follow-up.      DISCHARGE PROBLEM LIST  Principal Problem:    Diarrhea POA: Unknown  Active Problems:    Paroxysmal a-fib (CMS-HCC) POA: Yes      Overview: Symptomatic, on a rhythm control strategy with sotalol 40 mg by mouth       twice a day.      Followed by cardiology.    Cigarette nicotine dependence with nicotine-induced disorder POA: Yes      Overview: Started smoking age 16-18. Smoked 1 ppd at most. Stopped twice x 1 year.       Now only smokes 5-6 per day.       Did not tolerate chantix, night valdes.    Ischemic colitis (CMS-HCC) POA: Unknown    Circulating anticoagulants (CMS-HCC) POA: Unknown    Supratherapeutic INR POA: Unknown    Hypotension POA: Unknown    Failure to thrive in adult POA: Unknown    Physical debility POA: Unknown    History of COPD POA: Unknown    Sepsis associated hypotension (CMS-HCC) POA: Unknown  Resolved Problems:    Hypokalemia POA: Yes    Hyponatremia POA: Unknown      FOLLOW UP  Future Appointments  Date Time Provider Department Center   3/6/2018 9:00 AM Johanthan Morgan M.D. [facility] None     Beryl Pang M.D.  [address]   [address]  [facility] [address]  [phone]            MEDICATIONS ON DISCHARGE   Angie Root   Home Medication Instructions INA:[id number]    Printed on:12/06/17 1405   Medication Information                      acetaminophen (TYLENOL) 325 MG Tab  Take 2 Tabs by mouth every 6 hours as needed for Fever or Moderate Pain (Temp >101.5F).             acidophilus/bulgaricus (LACTINEX) Tab tablet  Take 2 Tabs by mouth 3 times a day, with meals.             alprazolam (XANAX) 0.25 MG Tab  Take 0.25 mg by mouth every morning.             Cholecalciferol (VITAMIN D) 2000 UNITS Cap  Take 1 Cap by mouth every morning.             diltiazem CD (CARDIZEM CD) 240 MG CAPSULE SR 24 HR  Take 1 Cap by mouth every day.             lovastatin (MEVACOR) 40 MG tablet  Take 1 Tab by mouth every evening.             sotalol (BETAPACE) 80 MG Tab  Take 0.5 Tabs by mouth 2 times a day.             sucralfate (CARAFATE) 1 GM Tab  Take 1 g by mouth 2 Times a Day.             Vancomycin HCl (VANCOMYCIN 50 MG/ML) 50 mg/mL Solution  Take 2.5 mL by mouth every 6 hours for 5 days.             warfarin (COUMADIN) 5 MG Tab  Take 1 Tab by mouth every evening.                 DIET  Orders Placed This Encounter   Procedures   • Diet Order     Standing Status:   Standing     Number of Occurrences:   1     Order Specific Question:   Diet:     Answer:   Cardiac [6]        ACTIVITY  Gradual increase in activity.    PROCEDURES  CT-ABDOMEN-PELVIS WITH   Final Result      1.  Mild diffuse thickening in the colonic wall with mucosal hyperenhancement. Wall thickening remains most pronounced in the segment of ascending colon described previously. There are surrounding pericolonic inflammatory changes. No abscess or    pneumoperitoneum is identified. Findings are nonspecific and may be related to an infectious or inflammatory process.      2.  Diverticulosis.      3.  Severe atherosclerosis.            LABORATORY  Lab Results   Component Value Date/Time    SODIUM 136 12/06/2017 03:04 AM    POTASSIUM 4.8 12/06/2017 03:04 AM    CHLORIDE 105 12/06/2017 03:04 AM    CO2 27 12/06/2017 03:04 AM    GLUCOSE 99 12/06/2017 03:04 AM    BUN 12 12/06/2017 03:04 AM    CREATININE 0.69 12/06/2017 03:04 AM        Lab Results   Component Value Date/Time    WBC 7.8 12/06/2017 03:04 AM    HEMOGLOBIN 11.1 (L) 12/06/2017 03:04 AM    HEMATOCRIT 34.3 (L) 12/06/2017 03:04 AM    PLATELETCT 190 12/06/2017 03:04 AM        Total time of the discharge process exceeds 35 min.

## 2017-12-06 NOTE — DISCHARGE INSTRUCTIONS
Discharge Instructions    Discharged to home by car with relative. Discharged via wheelchair, hospital escort: Yes.  Special equipment needed: Not Applicable    Be sure to schedule a follow-up appointment with your primary care doctor or any specialists as instructed.     Discharge Plan:   Diet Plan: Discussed (Cardiac)  Activity Level: Discussed (Gradually increase activity as tolerated)  Smoking Cessation Offered: Patient Refused  Confirmed Follow up Appointment: Patient to Call and Schedule Appointment  Confirmed Symptoms Management: Discussed  Medication Reconciliation Updated: Yes  Pneumococcal Vaccine Given - only chart on this line when given: Given (See MAR)  Influenza Vaccine Indication: Not indicated: Previously immunized this influenza season and > 8 years of age (Sept 2017)    I understand that a diet low in cholesterol, fat, and sodium is recommended for good health. Unless I have been given specific instructions below for another diet, I accept this instruction as my diet prescription.   Other diet: Cardiac    Special Instructions: None    · Is patient discharged on Warfarin / Coumadin?   Yes    You are receiving the drug warfarin. Please understand the importance of monitoring warfarin with scheduled PT/INR blood draws.  Follow-up with the [facility] in 2-3 days for INR lab..    IMPORTANT: HOW TO USE THIS INFORMATION:  This is a summary and does NOT have all possible information about this product. This information does not assure that this product is safe, effective, or appropriate for you. This information is not individual medical advice and does not substitute for the advice of your health care professional. Always ask your health care professional for complete information about this product and your specific health needs.      WARFARIN - ORAL (WARF-uh-rin)      COMMON BRAND NAME(S): Coumadin      WARNING:  Warfarin can cause very serious (possibly fatal) bleeding. This is more likely to  "occur when you first start taking this medication or if you take too much warfarin. To decrease your risk for bleeding, your doctor or other health care provider will monitor you closely and check your lab results (INR test) to make sure you are not taking too much warfarin. Keep all medical and laboratory appointments. Tell your doctor right away if you notice any signs of serious bleeding. See also Side Effects section.      USES:  This medication is used to treat blood clots (such as in deep vein thrombosis-DVT or pulmonary embolus-PE) and/or to prevent new clots from forming in your body. Preventing harmful blood clots helps to reduce the risk of a stroke or heart attack. Conditions that increase your risk of developing blood clots include a certain type of irregular heart rhythm (atrial fibrillation), heart valve replacement, recent heart attack, and certain surgeries (such as hip/knee replacement). Warfarin is commonly called a \"blood thinner,\" but the more correct term is \"anticoagulant.\" It helps to keep blood flowing smoothly in your body by decreasing the amount of certain substances (clotting proteins) in your blood.      HOW TO USE:  Read the Medication Guide provided by your pharmacist before you start taking warfarin and each time you get a refill. If you have any questions, ask your doctor or pharmacist. Take this medication by mouth with or without food as directed by your doctor or other health care professional, usually once a day. It is very important to take it exactly as directed. Do not increase the dose, take it more frequently, or stop using it unless directed by your doctor. Dosage is based on your medical condition, laboratory tests (such as INR), and response to treatment. Your doctor or other health care provider will monitor you closely while you are taking this medication to determine the right dose for you. Use this medication regularly to get the most benefit from it. To help you " remember, take it at the same time each day. It is important to eat a balanced, consistent diet while taking warfarin. Some foods can affect how warfarin works in your body and may affect your treatment and dose. Avoid sudden large increases or decreases in your intake of foods high in vitamin K (such as broccoli, cauliflower, cabbage, brussels sprouts, kale, spinach, and other green leafy vegetables, liver, green tea, certain vitamin supplements). If you are trying to lose weight, check with your doctor before you try to go on a diet. Cranberry products may also affect how your warfarin works. Limit the amount of cranberry juice (16 ounces/480 milliliters a day) or other cranberry products you may drink or eat.      SIDE EFFECTS:  Nausea, loss of appetite, or stomach/abdominal pain may occur. If any of these effects persist or worsen, tell your doctor or pharmacist promptly. Remember that your doctor has prescribed this medication because he or she has judged that the benefit to you is greater than the risk of side effects. Many people using this medication do not have serious side effects. This medication can cause serious bleeding if it affects your blood clotting proteins too much (shown by unusually high INR lab results). Even if your doctor stops your medication, this risk of bleeding can continue for up to a week. Tell your doctor right away if you have any signs of serious bleeding, including: unusual pain/swelling/discomfort, unusual/easy bruising, prolonged bleeding from cuts or gums, persistent/frequent nosebleeds, unusually heavy/prolonged menstrual flow, pink/dark urine, coughing up blood, vomit that is bloody or looks like coffee grounds, severe headache, dizziness/fainting, unusual or persistent tiredness/weakness, bloody/black/tarry stools, chest pain, shortness of breath, difficulty swallowing. Tell your doctor right away if any of these unlikely but serious side effects occur: persistent  nausea/vomiting, severe stomach/abdominal pain, yellowing eyes/skin. This drug rarely has caused very serious (possibly fatal) problems if its effects lead to small blood clots (usually at the beginning of treatment). This can lead to severe skin/tissue damage that may require surgery or amputation if left untreated. Patients with certain blood conditions (protein C or S deficiency) may be at greater risk. Get medical help right away if any of these rare but serious side effects occur: painful/red/purplish patches on the skin (such as on the toe, breast, abdomen), change in the amount of urine, vision changes, confusion, slurred speech, weakness on one side of the body. A very serious allergic reaction to this drug is rare. However, get medical help right away if you notice any symptoms of a serious allergic reaction, including: rash, itching/swelling (especially of the face/tongue/throat), severe dizziness, trouble breathing. This is not a complete list of possible side effects. If you notice other effects not listed above, contact your doctor or pharmacist. In the [place] - Call your doctor for medical advice about side effects. You may report side effects to FDA at 6-496-CGZ-2410. In [place] - Call your doctor for medical advice about side effects. You may report side effects to [facility] [place] at [phone].      PRECAUTIONS:  Before taking warfarin, tell your doctor or pharmacist if you are allergic to it; or if you have any other allergies. This product may contain inactive ingredients, which can cause allergic reactions or other problems. Talk to your pharmacist for more details. Before using this medication, tell your doctor or pharmacist your medical history, especially of: blood disorders (such as anemia, hemophilia), bleeding problems (such as bleeding of the stomach/intestines, bleeding in the brain), blood vessel disorders (such as aneurysms), recent major injury/surgery, liver disease, alcohol use,  mental/mood disorders (including memory problems), frequent falls/injuries. It is important that all your doctors and dentists know that you take warfarin. Before having surgery or any medical/dental procedures, tell your doctor or dentist that you are taking this medication and about all the products you use (including prescription drugs, nonprescription drugs, and herbal products). Avoid getting injections into the muscles. If you must have an injection into a muscle (for example, a flu shot), it should be given in the arm. This way, it will be easier to check for bleeding and/or apply pressure bandages. This medication may cause stomach bleeding. Daily use of alcohol while using this medicine will increase your risk for stomach bleeding and may also affect how this medication works. Limit or avoid alcoholic beverages. If you have not been eating well, if you have an illness or infection that causes fever, vomiting, or diarrhea for more than 2 days, or if you start using any antibiotic medications, contact your doctor or pharmacist immediately because these conditions can affect how warfarin works. This medication can cause heavy bleeding. To lower the chance of getting cut, bruised, or injured, use great caution with sharp objects like safety razors and nail cutters. Use an electric razor when shaving and a soft toothbrush when brushing your teeth. Avoid activities such as contact sports. If you fall or injure yourself, especially if you hit your head, call your doctor immediately. Your doctor may need to check you. The Food & Drug Administration has stated that generic warfarin products are interchangeable. However, consult your doctor or pharmacist before switching warfarin products. Be careful not to take more than one medication that contains warfarin unless specifically directed by the doctor or health care provider who is monitoring your warfarin treatment. Older adults may be at greater risk for bleeding  "while using this drug. This medication is not recommended for use during pregnancy because of serious (possibly fatal) harm to an unborn baby. Discuss the use of reliable forms of birth control with your doctor. If you become pregnant or think you may be pregnant, tell your doctor immediately. If you are planning pregnancy, discuss a plan for managing your condition with your doctor before you become pregnant. Your doctor may switch the type of medication you use during pregnancy. Very small amounts of this medication may pass into breast milk but is unlikely to harm a nursing infant. Consult your doctor before breast-feeding.      DRUG INTERACTIONS:  Drug interactions may change how your medications work or increase your risk for serious side effects. This document does not contain all possible drug interactions. Keep a list of all the products you use (including prescription/nonprescription drugs and herbal products) and share it with your doctor and pharmacist. Do not start, stop, or change the dosage of any medicines without your doctor's approval. Warfarin interacts with many prescription, nonprescription, vitamin, and herbal products. This includes medications that are applied to the skin or inside the vagina or rectum. The interactions with warfarin usually result in an increase or decrease in the \"blood-thinning\" (anticoagulant) effect. Your doctor or other health care professional should closely monitor you to prevent serious bleeding or clotting problems. While taking warfarin, it is very important to tell your doctor or pharmacist of any changes in medications, vitamins, or herbal products that you are taking. Some products that may interact with this drug include: capecitabine, imatinib, mifepristone. Aspirin, aspirin-like drugs (salicylates), and nonsteroidal anti-inflammatory drugs (NSAIDs such as ibuprofen, naproxen, celecoxib) may have effects similar to warfarin. These drugs may increase the risk of " bleeding problems if taken during treatment with warfarin. Carefully check all prescription/nonprescription product labels (including drugs applied to the skin such as pain-relieving creams) since the products may contain NSAIDs or salicylates. Talk to your doctor about using a different medication (such as acetaminophen) to treat pain/fever. Low-dose aspirin and related drugs (such as clopidogrel, ticlopidine) should be continued if prescribed by your doctor for specific medical reasons such as heart attack or stroke prevention. Consult your doctor or pharmacist for more details. Many herbal products interact with warfarin. Tell your doctor before taking any herbal products, especially bromelains, coenzyme Q10, cranberry, danshen, dong quai, fenugreek, garlic, ginkgo biloba, ginseng, and Jayden's wort, among others. This medication may interfere with a certain laboratory test to measure theophylline levels, possibly causing false test results. Make sure laboratory personnel and all your doctors know you use this drug.      OVERDOSE:  If overdose is suspected, contact a poison control center or emergency room immediately. [place] residents can call the [place] National Poison Hotline at 1-998.267.7525. [place] residents can call a provincial poison control center. Symptoms of overdose may include: bloody/black/tarry stools, pink/dark urine, unusual/prolonged bleeding.      NOTES:  Do not share this medication with others. Laboratory and/or medical tests (such as INR, complete blood count) must be performed periodically to monitor your progress or check for side effects. Consult your doctor for more details.      MISSED DOSE:  For the best possible benefit, do not miss any doses. If you do miss a dose and remember on the same day, take it as soon as you remember. If you remember on the next day, skip the missed dose and resume your usual dosing schedule. Do not double the dose to catch up because this could increase your risk  for bleeding. Keep a record of missed doses to give to your doctor or pharmacist. Contact your doctor or pharmacist if you miss 2 or more doses in a row.      STORAGE:  Store at room temperature away from light and moisture. Do not store in the bathroom. Keep all medications away from children and pets. Do not flush medications down the toilet or pour them into a drain unless instructed to do so. Properly discard this product when it is  or no longer needed. Consult your pharmacist or local waste disposal company for more details about how to safely discard your product.      MEDICAL ALERT:  Your condition and medication can cause complications in a medical emergency. For information about enrolling in MedicAlert, call 1-727.434.6473 ([place]) or [phone] ([place]).      Information last revised 2010 Copyright(c)  [employer]             · Is patient Post Blood Transfusion?  No    Depression / Suicide Risk    As you are discharged from this [facility] facility, it is important to learn how to keep safe from harming yourself.    Recognize the warning signs:  · Abrupt changes in personality, positive or negative- including increase in energy   · Giving away possessions  · Change in eating patterns- significant weight changes-  positive or negative  · Change in sleeping patterns- unable to sleep or sleeping all the time   · Unwillingness or inability to communicate  · Depression  · Unusual sadness, discouragement and loneliness  · Talk of wanting to die  · Neglect of personal appearance   · Rebelliousness- reckless behavior  · Withdrawal from people/activities they love  · Confusion- inability to concentrate     If you or a loved one observes any of these behaviors or has concerns about self-harm, here's what you can do:  · Talk about it- your feelings and reasons for harming yourself  · Remove any means that you might use to hurt yourself (examples: pills, rope, extension cords,  "firearm)  · Get professional help from the community (Mental Health, Substance Abuse, psychological counseling)  · Do not be alone:Call your Safe Contact- someone whom you trust who will be there for you.  · Call your local CRISIS HOTLINE 944-4582 or 922-590-5221  · Call your local Children's Mobile Crisis Response Team [place] (235) 923-1489 or www.[employer]  · Call the toll free National Suicide Prevention Hotlines   · National Suicide Prevention Lifeline 853-793-EFRH (2638)  · [employer] 800-SUICIDE (305-8019)    FAQs  What is Clostridium difficile infection?   Clostridium difficile [pronounced Xcw-VDAKN-ay-um dif-uh-SEEL], also known as \"C. diff\" [See-dif], is a germ that can cause diarrhea. Most cases of C. diff infection occur in patients taking antibiotics. The most common symptoms of a C. diff infection include:  · Watery diarrhea  · Fever  · Loss of appetite  · Nausea  · Belly pain and tenderness  Who is most likely to get C. diff infection?  The elderly and people with certain medical problems have the greatest chance of getting C. diff. C. diff spores can live outside the human body for a very long time and may be found on things in the environment such as bed linens, bed rails, bathroom fixtures, and medical equipment. C. diff infection can spread from person-to-person on contaminated equipment and on the hands of doctors, nurses, other healthcare providers and visitors.  Can C. diff infection be treated?  Yes, there are antibiotics that can be used to treat C. diff. In some severe cases, a person might have to have surgery to remove the infected part of the intestines. This surgery is needed in only 1 or 2 out of every 100 persons with C. diff.  What are some of the things that hospitals are doing to prevent C. diff infections?  To prevent C. diff infections, doctors, nurses, and other healthcare providers:  · Clean their hands with soap and water or an alcohol-based hand " rub before and after caring for every patient. This can prevent C. diff and other germs from being passed from one patient to another on their hands.  · Carefully clean hospital rooms and medical equipment that have been used for patients with C. diff.  · Use Contact Precautions to prevent C. diff from spreading to other patients. Contact Precautions mean:  ¨ Whenever possible, patients with C. diff will have a single room or share a room only with someone else who also has C. diff.  ¨ Healthcare providers will put on gloves and wear a gown over their clothing while taking care of patients with C. diff.  ¨ Visitors may also be asked to wear a gown and gloves.  ¨ When leaving the room, hospital providers and visitors remove their gown and gloves and clean their hands.  ¨ Patients on Contact Precautions are asked to stay in their hospital rooms as much as possible. They should not go to common areas, such as the gift shop or cafeteria. They can go to other areas of the hospital for treatments and tests.  · Only give patients antibiotics when it is necessary.  What can I do to help prevent C. diff infections?  · Make sure that all doctors, nurses, and other healthcare providers clean their hands with soap and water or an alcohol-based hand rub before and after caring for you.  ¨ If you do not see your providers clean their hands, please ask them to do so.  · Only take antibiotics as prescribed by your doctor.  · Be sure to clean your own hands often, especially after using the bathroom and before eating.  Can my friends and family get C. diff when they visit me?  C. diff infection usually does not occur in persons who are not taking antibiotics. Visitors are not likely to get C. diff. Still, to make it safer for visitors, they should:  · Clean their hands before they enter your room and as they leave your room  · Ask the nurse if they need to wear protective gowns and gloves when they visit you.  What do I need to do  when I go home from the hospital?  Once you are back at home, you can return to your normal routine. Often, the diarrhea will be better or completely gone before you go home. This makes giving C. diff to other people much less likely. There are a few things you should do, however, to lower the chances of developing C. diff infection again or of spreading it to others.  · If you are given a prescription to treat C. diff, take the medicine exactly as prescribed by your doctor and pharmacist. Do not take half-doses or stop before you run out.  · Wash your hands often, especially after going to the bathroom and before preparing food.  · People who live with you should wash their hands often as well.  · If you develop more diarrhea after you get home, tell your doctor immediately.  · Your doctor may give you additional instructions.  If you have questions, please ask your doctor or nurse.  Developed and co-sponsored by The Society for Healthcare Epidemiology of [place] (SHEA); Infectious Diseases Society of [place] (IDSA); American Hospital Association; Association for Professionals in Infection Control and Epidemiology (APIC); Centers for Disease Control and Prevention (CDC); and The Joint Commission.     This information is not intended to replace advice given to you by your health care provider. Make sure you discuss any questions you have with your health care provider.     Document Released: 12/23/2014 Document Revised: 05/03/2016 Document Reviewed: 03/02/2016  [employer] Interactive Patient Education ©2016 [employer]

## 2017-12-06 NOTE — PROGRESS NOTES
Assumed care of patient. Patient sitting up in bed with meal tray set up on side table. Assessment complete, scheduled medications given and discussed. All needs met, will continue to monitor.

## 2017-12-06 NOTE — PROGRESS NOTES
[facility] Progress [facility]    Date of Service: 2017    Chief Complaint  77 y.o. female admitted 12/3/2017 with diarrhea.    Interval Problem Update  Feeling better c decreased abd pain and diarrhea.  C diff tox pos.  Family at beside.    Consultants/Specialty      Review of Systems   Constitutional: Negative for chills and fever.   Cardiovascular: Negative for chest pain and palpitations.   Gastrointestinal: Positive for abdominal pain and diarrhea. Negative for blood in stool, nausea and vomiting.   All other systems reviewed and are negative.     Physical Exam  Laboratory/Imaging   Hemodynamics  Temp (24hrs), Av.7 °C (98.1 °F), Min:36.1 °C (97 °F), Max:37.7 °C (99.8 °F)   Temperature: 37 °C (98.6 °F)  Pulse  Av.8  Min: 59  Max: 97    Blood Pressure : (!) 96/42      Respiratory      Respiration: 16, Pulse Oximetry: 94 %        RUL Breath Sounds: Clear, RML Breath Sounds: Diminished, RLL Breath Sounds: Diminished, ISRRAEL Breath Sounds: Clear, LLL Breath Sounds: Diminished    Fluids    Intake/Output Summary (Last 24 hours) at 17 1623  Last data filed at 17 1000   Gross per 24 hour   Intake             3310 ml   Output                0 ml   Net             3310 ml       Nutrition  Orders Placed This Encounter   Procedures   • Diet Order     Standing Status:   Standing     Number of Occurrences:   1     Order Specific Question:   Diet:     Answer:   Cardiac [6]     Physical Exam  Nursing note and vitals reviewed.  Constitutional: She is oriented to person, place, and time. She appears well-developed and   well-nourished.   HENT:   Head: Normocephalic and atraumatic.   Right Ear: External ear normal.   Left Ear: External ear normal.   Nose: Nose normal.   Mouth/Throat: Oropharynx is small with Mallanpati score of 3-4.  Mucosa is clear and moist.   Eyes: Conjunctivae and extraocular motions are normal. Pupils are equal, round, and reactive to light.   Neck: Normal range of motion. Neck  supple.   Cardiovascular: Normal rate, regular rhythm, normal heart sounds and intact distal pulses.    Pulmonary/Chest: Effort normal and breath sounds normal.   Abdominal: Bowel sounds are normal.  Palpation deferred.  Musculoskeletal: Normal range of motion.   Neurological: She is alert and oriented to person, place, and time.   Skin: Skin is warm and dry.       Recent Labs      12/03/17   1119  12/04/17   0237  12/05/17   0257   WBC  15.8*  16.2*  11.3*   RBC  4.62  4.44  3.67*   HEMOGLOBIN  13.2  12.7  10.7*   HEMATOCRIT  41.1  39.5  33.8*   MCV  89.0  89.0  92.1   MCH  28.6  28.6  29.2   MCHC  32.1*  32.2*  31.7*   RDW  45.8  45.8  49.1   PLATELETCT  249  248  217   MPV  9.9  9.9  10.4     Recent Labs      12/03/17   1119  12/04/17   0237  12/05/17   0257   SODIUM  136  133*  138   POTASSIUM  3.1*  3.5*  4.8   CHLORIDE  104  102  108   CO2  22  24  22   GLUCOSE  101*  149*  105*   BUN  14  25*  17   CREATININE  0.90  0.92  0.92   CALCIUM  9.1  9.3  8.8     Recent Labs      12/03/17   1119  12/04/17   0237  12/05/17   0257   INR  5.68*  7.55*  7.48*                  Assessment/Plan     * Diarrhea   Assessment & Plan    C diff tox positive.  Cont PO vanc and start Lactinex.        Paroxysmal a-fib (CMS-HCC)- (present on admission)   Assessment & Plan    Rate currently controlled, stopped sotalol given Cr clearance discussed with pharmacy   Will need to continue cardizem may have to tirate after stopping sotalol  Hold warfarin         Sepsis associated hypotension (CMS-HCC)   Assessment & Plan    Follow c IVF, TEDs and medical management.        History of COPD   Assessment & Plan    PRN O2, RT, IS, Vax.        Physical debility   Assessment & Plan    PT/OT/home safety eval when diarrhea resolved.        Failure to thrive in adult   Assessment & Plan    Nutrition consult        Hypotension   Assessment & Plan    Improving.  Start PURNIMA's and decrease IVF rate due to patient's size and age.          Supratherapeutic  INR   Assessment & Plan    As above.        Circulating anticoagulants (CMS-HCC)   Assessment & Plan    Supratheraputic INR.  Cont to hold warfarin and monitor daily INR .        Ischemic colitis (CMS-[facility])   Assessment & Plan    Follow c conservative management.  DC PO vancomycin in light of neg C diff tox.          Cigarette nicotine dependence with nicotine-induced disorder- (present on admission)   Assessment & Plan    Cessation ed.  Nicoderm.        Stable issues - med hx (hiatal hernia, HTN, CAD/PAD, DLD), hyponatremia, hypokalemia    Preventives - IS, Vax, Lactinex.    Dispo - complex/guarded.      Reviewed items::  EKG reviewed, Radiology images reviewed, Labs reviewed and Medications reviewed  Bhakta catheter::  No Bhakta  DVT prophylaxis pharmacological::  Warfarin (Coumadin)  Ulcer Prophylaxis::  Yes  Antibiotics:  Treating active infection/contamination beyond 24 hours perioperative coverage

## 2017-12-07 ENCOUNTER — PATIENT OUTREACH (OUTPATIENT)
Dept: HEALTH INFORMATION MANAGEMENT | Facility: OTHER | Age: 77
End: 2017-12-07

## 2017-12-07 NOTE — PROGRESS NOTES
Discharge instructions gone over with pt and , more than once explained how to take the oral vancomycin, and to take for 5 more days. Also explained the coumadin, not to take until cleared by the MD, and to get a lab draw in the next 2 days and to follow up with the results. Also old meds gone over with patient and which meds to not continue and which meds to continue.

## 2017-12-08 ENCOUNTER — HOSPITAL ENCOUNTER (OUTPATIENT)
Dept: LAB | Facility: MEDICAL CENTER | Age: 77
End: 2017-12-08
Attending: NURSE PRACTITIONER
Payer: MEDICARE

## 2017-12-08 DIAGNOSIS — I48.0 PAROXYSMAL A-FIB (HCC): ICD-10-CM

## 2017-12-08 LAB
INR PPP: 2.54 (ref 0.87–1.13)
PROTHROMBIN TIME: 27 SEC (ref 12–14.6)

## 2017-12-08 PROCEDURE — 36415 COLL VENOUS BLD VENIPUNCTURE: CPT

## 2017-12-08 PROCEDURE — 85610 PROTHROMBIN TIME: CPT

## 2017-12-11 ENCOUNTER — TELEPHONE (OUTPATIENT)
Dept: VASCULAR LAB | Facility: MEDICAL CENTER | Age: 77
End: 2017-12-11

## 2017-12-11 NOTE — TELEPHONE ENCOUNTER
Spoke with pt on the phone regarding referral, she reports she is currently off her warfarin. This is also noted at discharge. She sees her PCP tomorrow who will decide if she can resume her warfarin or not. Will follow up with pt via phone in two days regarding the disposition of her anticoagulation    Odalis Gaines, Pharm D

## 2017-12-14 ENCOUNTER — TELEPHONE (OUTPATIENT)
Dept: VASCULAR LAB | Facility: MEDICAL CENTER | Age: 77
End: 2017-12-14

## 2017-12-14 ENCOUNTER — ANTICOAGULATION VISIT (OUTPATIENT)
Dept: MEDICAL GROUP | Facility: PHYSICIAN GROUP | Age: 77
End: 2017-12-14
Payer: MEDICARE

## 2017-12-14 DIAGNOSIS — I48.0 PAROXYSMAL A-FIB (HCC): ICD-10-CM

## 2017-12-14 LAB — INR PPP: 2.8 (ref 2–3.5)

## 2017-12-14 PROCEDURE — 85610 PROTHROMBIN TIME: CPT | Performed by: PHYSICIAN ASSISTANT

## 2017-12-14 PROCEDURE — 99211 OFF/OP EST MAY X REQ PHY/QHP: CPT | Performed by: PHYSICIAN ASSISTANT

## 2017-12-14 NOTE — TELEPHONE ENCOUNTER
Initial anticoag note and most recent d/c summary reviewed.  Patient with afib and chads vasc = at least 4    Pending further recommendations, we will continue with indefinite anticoagulation with warfarin as directed by serg  Will defer all management of rhythm, rate, and other cv issues, aside from anticoagulation, to cards Michael Bloch, MD  Anticoagulation Clinic    Cc:   SHELIA Pang

## 2017-12-14 NOTE — PROGRESS NOTES
Anticoagulation Summary  As of 12/14/2017    INR goal:   2.0-3.0   TTR:   --   Today's INR:   2.8   Maintenance plan:   2.5 mg (5 mg x 0.5) every day   Weekly total:   17.5 mg   Plan last modified:   Pieter Covarrubias, PharmD (12/14/2017)   Next INR check:   12/21/2017   Target end date:       Indications    Paroxysmal a-fib (CMS-HCC) [I48.0]             Anticoagulation Episode Summary     INR check location:   [facility]    Preferred lab:       Send INR reminders to:       Comments:         Anticoagulation Care Providers     Provider Role Specialty Phone number    Evelio Tejeda M.D. Referring Internal Medicine [phone]    [facility] Responsible  [phone]    Beryl Pang M.D. Responsible Family Medicine [phone]        Anticoagulation Patient Findings      Angie Root referred to the [facility] for afib, she is being referred by Dr. Fraser. She was recently in the hospital with C.Dif and started on po Vanco. She was told to hold her Warfarin at the time of DC due to INRs of 7.55 she had only been on it for 2 weeks when she went into the hospital. She had a appt with her PCP on 12/12 to determine if she can restart her warfarin, she was told she can restart warfarin.   She has had Afib since 1998, only on ASA and or Plavix.      CHADSVAS=4-5 (possible HTN)     Started on Warfarin 12/12.     INR  Therapeutic, but much too high after 2 day    Pt gave verbal consent  to leave a  with detailed medical information regarding INR and dose of warfarin.    Pt tolerating medication well, no s/s of bleeding.     Med rec done with pt, She is off Vanco. She was on Plavix since 2002 but was told to stop during the hospital visit, she does have 2 stents. Also off ASA     Pt education done (s/s of bleeding, when to f/u with clinic vs ER, foods with vitamin K, etc)    Follow up appointment in 4 days via the lab then 1 week with me     Decrease weekly warfarin dose as noted    Pieter KEEN  Satish, PharmD

## 2017-12-18 ENCOUNTER — ANTICOAGULATION MONITORING (OUTPATIENT)
Dept: VASCULAR LAB | Facility: MEDICAL CENTER | Age: 77
End: 2017-12-18

## 2017-12-18 ENCOUNTER — HOSPITAL ENCOUNTER (OUTPATIENT)
Dept: LAB | Facility: MEDICAL CENTER | Age: 77
End: 2017-12-18
Attending: NURSE PRACTITIONER
Payer: MEDICARE

## 2017-12-18 DIAGNOSIS — I48.0 PAROXYSMAL A-FIB (HCC): ICD-10-CM

## 2017-12-18 LAB
INR PPP: 5.55 (ref 0.87–1.13)
PROTHROMBIN TIME: 50.3 SEC (ref 12–14.6)

## 2017-12-18 PROCEDURE — 36415 COLL VENOUS BLD VENIPUNCTURE: CPT

## 2017-12-18 PROCEDURE — 85610 PROTHROMBIN TIME: CPT

## 2017-12-18 NOTE — PROGRESS NOTES
Anticoagulation Summary  As of 12/18/2017    INR goal:   2.0-3.0   TTR:   --   Today's INR:   5.55!   Maintenance plan:   2.5 mg (5 mg x 0.5) every day   Weekly total:   17.5 mg   Plan last modified:   Pieter Covarrubias, PharmD (12/14/2017)   Next INR check:   12/21/2017   Target end date:   Indefinite    Indications    Paroxysmal a-fib (CMS-HCC) [I48.0]             Anticoagulation Episode Summary     INR check location:   [facility]    Preferred lab:       Send INR reminders to:       Comments:         Anticoagulation Care Providers     Provider Role Specialty Phone number    Evelio Tejeda M.D. Referring Internal Medicine [phone]    [facility] Responsible  [phone]    Beryl Pang M.D. Responsible Family Medicine [phone]        Anticoagulation Patient Findings      HPI:  Angie Root, on anticoagulation therapy with warfarin for PAF.   Changes to current medical/health status since last appt: none  Denies signs/symptoms of bleeding and/or thrombosis since the last appt.    Denies any interval changes to diet  Denies any interval changes to medications since last appt.   Denies any complications or cost restrictions with current therapy.   Confirmed dosing regimen. No known additional doses.  Denies alcohol or cranberry use.     A/P   INR  SUPRA-therapeutic.   Hold x2 days then Pt is to continue with current warfarin dosing regimen.     Next INR in 3 days.    Lux Parkinson, PharmD

## 2017-12-21 ENCOUNTER — ANTICOAGULATION VISIT (OUTPATIENT)
Dept: MEDICAL GROUP | Facility: PHYSICIAN GROUP | Age: 77
End: 2017-12-21
Payer: MEDICARE

## 2017-12-21 VITALS
BODY MASS INDEX: 17.56 KG/M2 | DIASTOLIC BLOOD PRESSURE: 80 MMHG | SYSTOLIC BLOOD PRESSURE: 120 MMHG | HEART RATE: 80 BPM | WEIGHT: 96 LBS

## 2017-12-21 DIAGNOSIS — I48.0 PAROXYSMAL A-FIB (HCC): ICD-10-CM

## 2017-12-21 LAB — INR PPP: 5.5 (ref 2–3.5)

## 2017-12-21 PROCEDURE — 99211 OFF/OP EST MAY X REQ PHY/QHP: CPT | Performed by: PHYSICIAN ASSISTANT

## 2017-12-21 PROCEDURE — 85610 PROTHROMBIN TIME: CPT | Performed by: PHYSICIAN ASSISTANT

## 2017-12-21 RX ORDER — WARFARIN SODIUM 1 MG/1
1-2 TABLET ORAL DAILY
Qty: 90 TAB | Refills: 1 | Status: ON HOLD | OUTPATIENT
Start: 2017-12-21 | End: 2018-04-09

## 2017-12-21 NOTE — PROGRESS NOTES
OP Anticoagulation Service Note    Date: 12/21/2017  Vitals:    12/21/17 0848   BP: 120/80   Pulse: 80   Weight: 43.5 kg (96 lb)       Anticoagulation Summary  As of 12/21/2017    INR goal:   2.0-3.0   TTR:   --   Today's INR:   5.5!   Maintenance plan:   1 mg (1 mg x 1) every day   Weekly total:   7 mg   Plan last modified:   Pieter Covarrubias, PharmD (12/21/2017)   Next INR check:   12/29/2017   Target end date:   Indefinite    Indications    Paroxysmal a-fib (CMS-HCC) [I48.0]             Anticoagulation Episode Summary     INR check location[facility]   [facility]    Preferred lab:       Send INR reminders to:       Comments:         Anticoagulation Care Providers     Provider Role Specialty Phone number    Evelio Tejeda M.D. Referring Internal Medicine [phone]    [facility] Responsible  [phone]    Beryl Pang M.D. Responsible Family Medicine [phone]        Anticoagulation Patient Findings      HPI:   Angie Root seen in clinic today, they are here today for a INR check on anticoagulation therapy with warfarin because they have a afib    The reason for today's visit is to prevent morbidity and mortality from a stroke  and to reduce the risk of bleeding while on a anticoagulant.     Reason for today's visit (per our collaborative practice policy) is because their last INR was 5.5 on 12/21.  Intervention at the last visit: she held two days then went to 2.5mg once daily     Additional education provided today regarding reducing bleed risk and dietary constraints: Yes, about diet     Any upcoming  procedures: none    CHADS-VASC = 5    Stroke risk moderate, as seen below      [id number]   SCORE PATIENTS (y=1764) ADJUSTED STROKE RATE (% year)   0 1 0%   1 422 1,3%   2 1230 2,2%   3 1730 3,2%   4 1718 4,0%   5 1159 6,7%   6 679 9,8%   7 294 9,6%   8 82 6,7%   9 14 15,2%       Confirmed warfarin dosing regimen  Interval Changes with foods rich in vitamin K:No  Interval Changes in  ETOH:  No  Interval Changes in smoking status: No  Interval Changes in medication: No   Cost restriction: No    S/S of bleeding or bruising:  No  Signs/symptoms  thrombosis since the last appt: No  Bleed risk is: moderate,     3 vitals included with today's appt:Yes  (BP, HR, weight, ht, RR)     Assessment:   INR  supra-therapeutic.     Possible reason(s) INR is not in range today:   Diet and some N/v/d. She is very sensitive to warfarin. She wants to try to use 1/2 of 1/2 or 1.25mg daily until the 1mg tab from the mail order gets to her.  I did not want to do this as the dose might not be accurate.     Intervention required today to prevent:    Bleeding complications      Plan:  Hold two days then decrease weekly warfarin dose as noted      Follow up:  Follow up appointment in 4 days via the lab then with me in 1 week   They are at a increased risk of bleeding because INR above goal and new to warfarin, we have not found her weekly dose yet.       Other info:  Pt educated to contact our clinic with any changes in medications or s/s of bleeding or thrombosis    CHEST guidelines recommend frequent INR monitoring at regular intervals (a few days up to a max of 12 weeks) to ensure they are on the proper dose of warfarin and not having any complications from therapy.  INRs can dramatically change over a short time period due to diet, medications, and medical conditions.

## 2017-12-26 ENCOUNTER — HOSPITAL ENCOUNTER (OUTPATIENT)
Dept: LAB | Facility: MEDICAL CENTER | Age: 77
End: 2017-12-26
Attending: NURSE PRACTITIONER
Payer: MEDICARE

## 2017-12-26 LAB
INR PPP: 3.38 (ref 0.87–1.13)
PROTHROMBIN TIME: 33.9 SEC (ref 12–14.6)

## 2017-12-26 PROCEDURE — 36415 COLL VENOUS BLD VENIPUNCTURE: CPT

## 2017-12-26 PROCEDURE — 85610 PROTHROMBIN TIME: CPT

## 2017-12-27 ENCOUNTER — ANTICOAGULATION MONITORING (OUTPATIENT)
Dept: VASCULAR LAB | Facility: MEDICAL CENTER | Age: 77
End: 2017-12-27

## 2017-12-27 DIAGNOSIS — I48.0 PAROXYSMAL A-FIB (HCC): ICD-10-CM

## 2017-12-28 ENCOUNTER — ANTICOAGULATION VISIT (OUTPATIENT)
Dept: MEDICAL GROUP | Facility: PHYSICIAN GROUP | Age: 77
End: 2017-12-28
Payer: MEDICARE

## 2017-12-28 DIAGNOSIS — I48.0 PAROXYSMAL A-FIB (HCC): ICD-10-CM

## 2017-12-28 LAB — INR PPP: 5 (ref 2–3.5)

## 2017-12-28 PROCEDURE — 99211 OFF/OP EST MAY X REQ PHY/QHP: CPT | Performed by: PHYSICIAN ASSISTANT

## 2017-12-28 PROCEDURE — 85610 PROTHROMBIN TIME: CPT | Performed by: PHYSICIAN ASSISTANT

## 2017-12-28 RX ORDER — WARFARIN SODIUM 1 MG/1
1 TABLET ORAL DAILY
Qty: 30 TAB | Refills: 3 | Status: ON HOLD | OUTPATIENT
Start: 2017-12-28 | End: 2018-04-09

## 2017-12-28 NOTE — PROGRESS NOTES
OP Anticoagulation Service Note    Date: 12/28/2017  There were no vitals filed for this visit.    Anticoagulation Summary  As of 12/28/2017    INR goal:   2.0-3.0   TTR:   0.0 % (4 d)   Today's INR:   5.0!   Maintenance plan:   1 mg (1 mg x 1) every day   Weekly total:   7 mg   Plan last modified:   Pieter Covarrubias, PharmD (12/21/2017)   Next INR check:   1/4/2018   Target end date:   Indefinite    Indications    Paroxysmal a-fib (CMS-HCC) [I48.0]             Anticoagulation Episode Summary     INR check location:   [facility]    Preferred lab:       Send INR reminders to:       Comments:   [phone] (H)      Anticoagulation Care Providers     Provider Role Specialty Phone number    Evelio Tejeda M.D. Referring Internal Medicine [phone]    [facility] Responsible  [phone]    Beryl Pang M.D. Responsible Family Medicine [phone]        Anticoagulation Patient Findings      HPI:   Angie Root seen in clinic today, they are here today for a INR check on anticoagulation therapy with warfarin because they have a afib    The reason for today's visit is to prevent morbidity and mortality from a  stroke and to reduce the risk of bleeding while on a anticoagulant.     Reason for today's visit (per our collaborative practice policy) is because their last INR was 3.38 on 12/26.  Intervention at the last visit: held one dose     Additional education provided today regarding reducing bleed risk and dietary constraints: No    Any upcoming  procedures: none    CHADS-VASC = 3    Stroke risk low, as seen below      [id number]   SCORE PATIENTS (v=6200) ADJUSTED STROKE RATE (% year)   0 1 0%   1 422 1,3%   2 1230 2,2%   3 1730 3,2%   4 1718 4,0%   5 1159 6,7%   6 679 9,8%   7 294 9,6%   8 82 6,7%   9 14 15,2%       Confirmed warfarin dosing regimen  Interval Changes with foods rich in vitamin K:yes, not able to eat much due to diarrhea   Interval Changes in ETOH:  No  Interval  Changes in smoking status: No  Interval Changes in medication: No   Cost restriction: No    S/S of bleeding or bruising:  No  Signs/symptoms  thrombosis since the last appt: No  Bleed risk is: low,     3 vitals included with today's appt:No  She will see her MD today     Assessment:   INR  supra-therapeutic.     Possible reason(s) INR is not in range today:   Diet and diarrhea, she has a appt today with her PCP to talk about it. She also has a PMH of Cdif.     Intervention required today to prevent:    Bleeding complications        Plan:  Hold two days then decrease weekly warfarin dose as noted      Follow up:  Follow up appointment in 1 week(s) in clinic and 4 days in the lab   They have a TTR of 12 which is not at target (TTR target/goal is 100%) and requires close follow up to prevent a adverse event (the lower the TTR the higher risk of clots, strokes, or bleeding).       Other info:  Pt educated to contact our clinic with any changes in medications or s/s of bleeding or thrombosis    CHEST guidelines recommend frequent INR monitoring at regular intervals (a few days up to a max of 12 weeks) to ensure they are on the proper dose of warfarin and not having any complications from therapy.  INRs can dramatically change over a short time period due to diet, medications, and medical conditions.

## 2018-01-02 ENCOUNTER — HOSPITAL ENCOUNTER (OUTPATIENT)
Dept: LAB | Facility: MEDICAL CENTER | Age: 78
End: 2018-01-02
Attending: NURSE PRACTITIONER
Payer: MEDICARE

## 2018-01-02 LAB
INR PPP: 2.23 (ref 0.87–1.13)
PROTHROMBIN TIME: 24.4 SEC (ref 12–14.6)

## 2018-01-02 PROCEDURE — 36415 COLL VENOUS BLD VENIPUNCTURE: CPT

## 2018-01-02 PROCEDURE — 85610 PROTHROMBIN TIME: CPT

## 2018-01-04 ENCOUNTER — ANTICOAGULATION VISIT (OUTPATIENT)
Dept: MEDICAL GROUP | Facility: PHYSICIAN GROUP | Age: 78
End: 2018-01-04
Payer: MEDICARE

## 2018-01-04 DIAGNOSIS — I48.0 PAROXYSMAL A-FIB (HCC): ICD-10-CM

## 2018-01-04 LAB — INR PPP: 2.7 (ref 2–3.5)

## 2018-01-04 PROCEDURE — 85610 PROTHROMBIN TIME: CPT | Performed by: PHYSICIAN ASSISTANT

## 2018-01-04 NOTE — PROGRESS NOTES
OP Anticoagulation Service Note    Date: 1/4/2018  There were no vitals filed for this visit.    Anticoagulation Summary  As of 1/4/2018    INR goal:   2.0-3.0   TTR:   29.2 % (1.6 wk)   Today's INR:   2.7   Maintenance plan:   1 mg (1 mg x 1) every day   Weekly total:   7 mg   Plan last modified:   Pieter Covarrubias, PharmD (12/21/2017)   Next INR check:   1/11/2018   Target end date:   Indefinite    Indications    Paroxysmal a-fib (CMS-HCC) [I48.0]             Anticoagulation Episode Summary     INR check location:   [facility]    Preferred lab:       Send INR reminders to:       Comments:   [phone] (H)      Anticoagulation Care Providers     Provider Role Specialty Phone number    Evelio Tejeda M.D. Referring Internal Medicine [phone]    [facility] Responsible  [phone]    Beryl Pang M.D. Responsible Family Medicine [phone]        Anticoagulation Patient Findings      HPI:   Angie Root seen in clinic today, they are here today for a INR check on anticoagulation therapy with warfarin because they have a PMH of afib     The reason for today's visit is to prevent morbidity and mortality from a stroke and to reduce the risk of bleeding while on a anticoagulant.     Reason for today's visit (per our collaborative practice policy) is because their last INR was 2.23 on 1/2/2018  Intervention at the last visit:none     Additional education provided today regarding reducing bleed risk and dietary constraints: Yes    Any upcoming  procedures: none    Confirmed warfarin dosing regimen  Interval Changes with foods rich in vitamin K:No  Interval Changes in ETOH:  No  Interval Changes in smoking status: No  Interval Changes in medication: Yes, Vanco for CDif  Cost restriction: No3    S/S of bleeding or bruising:  No  Signs/symptoms  thrombosis since the last appt: No  Bleed risk is: moderate,     3 vitals included with today's appt:No  She has another  appt after this  appt     Assessment:   INR  therapeutic, diarrhea has improved since she started to take the Vanco    Intervention required today to prevent:    No change in dose needed today, they will need to continue with the same dose and diet to prevent adverse events while on a anticoagulant.        Plan:  Continue weekly warfarin dose as noted      Follow up:  Follow up appointment in 1 week(s) because  :  They have a TTR of 29.2 which is not at target (TTR target/goal is 100%) and requires close follow up to prevent a adverse event (the lower the TTR the higher risk of clots, strokes, or bleeding).       Other info:  Pt educated to contact our clinic with any changes in medications or s/s of bleeding or thrombosis    CHEST guidelines recommend frequent INR monitoring at regular intervals (a few days up to a max of 12 weeks) to ensure they are on the proper dose of warfarin and not having any complications from therapy.  INRs can dramatically change over a short time period due to diet, medications, and medical conditions.

## 2018-01-08 ENCOUNTER — PATIENT OUTREACH (OUTPATIENT)
Dept: HEALTH INFORMATION MANAGEMENT | Facility: OTHER | Age: 78
End: 2018-01-08

## 2018-01-11 ENCOUNTER — ANTICOAGULATION VISIT (OUTPATIENT)
Dept: MEDICAL GROUP | Facility: PHYSICIAN GROUP | Age: 78
End: 2018-01-11
Payer: MEDICARE

## 2018-01-11 VITALS
DIASTOLIC BLOOD PRESSURE: 77 MMHG | SYSTOLIC BLOOD PRESSURE: 135 MMHG | HEART RATE: 59 BPM | BODY MASS INDEX: 17.56 KG/M2 | WEIGHT: 96 LBS

## 2018-01-11 DIAGNOSIS — I48.0 PAROXYSMAL A-FIB (HCC): ICD-10-CM

## 2018-01-11 LAB — INR PPP: 2.3 (ref 2–3.5)

## 2018-01-11 PROCEDURE — 99999 PR NO CHARGE: CPT | Performed by: PHYSICIAN ASSISTANT

## 2018-01-11 PROCEDURE — 85610 PROTHROMBIN TIME: CPT | Performed by: PHYSICIAN ASSISTANT

## 2018-01-11 NOTE — PROGRESS NOTES
OP Anticoagulation Service Note    Date: 1/11/2018  Vitals:    01/11/18 0847   BP: 135/77   Pulse: (!) 59   Weight: 43.5 kg (96 lb)       Anticoagulation Summary  As of 1/11/2018    INR goal:   2.0-3.0   TTR:   56.2 % (2.6 wk)   Today's INR:   2.3   Maintenance plan:   1 mg (1 mg x 1) every day   Weekly total:   7 mg   Plan last modified:   Pieter Covarrubias, PharmD (12/21/2017)   Next INR check:   1/25/2018   Target end date:   Indefinite    Indications    Paroxysmal a-fib (CMS-HCC) [I48.0]             Anticoagulation Episode Summary     INR check location:   [facility]    Preferred lab:       Send INR reminders to:       Comments:   [phone] (H)      Anticoagulation Care Providers     Provider Role Specialty Phone number    Evelio Tejeda M.D. Referring Internal Medicine [phone]    [facility]  [phone]    Beryl Pang M.D. Responsible Family Medicine [phone]        Anticoagulation Patient Findings      HPI:   Angie Root seen in clinic today, they are here today for a INR check on anticoagulation therapy with warfarin because they have afib    The reason for today's visit is to prevent morbidity and mortality from a  stroke and to reduce the risk of bleeding while on a anticoagulant.     Reason for today's visit (per our collaborative practice policy) is because their last INR was 2.7 on 1/4/2018.  Intervention at the last visit: none needed    Additional education provided today regarding reducing bleed risk and dietary constraints: No    Any upcoming  procedures: none    Confirmed warfarin dosing regimen  Interval Changes with foods rich in vitamin K:No  Interval Changes in ETOH:  No  Interval Changes in smoking status: No  Interval Changes in medication: No   Cost restriction: No    S/S of bleeding or bruising:  No  Signs/symptoms  thrombosis since the last appt: No  Bleed risk is: high,     3 vitals included with today's appt:Yes  (BP, HR, weight,  ht, RR)     Assessment:   INR  therapeutic.     Intervention required today to prevent:   No change in dose needed today, they will need to continue with the same dose and diet to prevent adverse events while on a anticoagulant.     They have a TTR of 56.2 which is not at target (TTR target/goal is 100%) and requires close follow up to prevent a adverse event (the lower the TTR the higher risk of clots, strokes, or bleeding).       Plan:  Continue weekly warfarin dose as noted      Follow up:  Follow up appointment in 2 week(s)       Other info:  Pt educated to contact our clinic with any changes in medications or s/s of bleeding or thrombosis    CHEST guidelines recommend frequent INR monitoring at regular intervals (a few days up to a max of 12 weeks) to ensure they are on the proper dose of warfarin and not having any complications from therapy.  INRs can dramatically change over a short time period due to diet, medications, and medical conditions.

## 2018-01-22 NOTE — PROGRESS NOTES
Patient Angie Root was originally discharge on 11/20/17 for Colitis. [facility] assisted with multiply discharge orders including 1- Cardiologist visit which patient kept. Patient readmitted to [facility] on 12/03/2017  for Diarrhea patient was discharge on 12/06/2017, where [facility] assisted with multiple discharge orders including 4 appointments, 2- Primary care physician visits, and 2- Coumadin clinic appointments. Patient kept all four of these appointments. Patient has 3 future appointments 2- Primary care physician follow ups and 1- Cardiologist appointment scheduled.  PPS score was conducted 85%

## 2018-01-25 ENCOUNTER — ANTICOAGULATION VISIT (OUTPATIENT)
Dept: MEDICAL GROUP | Facility: PHYSICIAN GROUP | Age: 78
End: 2018-01-25
Payer: MEDICARE

## 2018-01-25 DIAGNOSIS — I48.0 PAROXYSMAL A-FIB (HCC): ICD-10-CM

## 2018-01-25 LAB — INR PPP: 2.8 (ref 2–3.5)

## 2018-01-25 PROCEDURE — 85610 PROTHROMBIN TIME: CPT | Performed by: PHYSICIAN ASSISTANT

## 2018-01-25 PROCEDURE — 99999 PR NO CHARGE: CPT | Performed by: PHYSICIAN ASSISTANT

## 2018-01-25 NOTE — PROGRESS NOTES
OP Anticoagulation Service Note    Date: 1/25/2018  There were no vitals filed for this visit.    Anticoagulation Summary  As of 1/25/2018    INR goal:   2.0-3.0   TTR:   75.1 % (1.1 mo)   Today's INR:   2.8   Maintenance plan:   1 mg (1 mg x 1) every day   Weekly total:   7 mg   Plan last modified:   Pieter Covarrubias, PharmD (12/21/2017)   Next INR check:   2/15/2018   Target end date:   Indefinite    Indications    Paroxysmal a-fib (CMS-HCC) [I48.0]             Anticoagulation Episode Summary     INR check location:   [facility]    Preferred lab:       Send INR reminders to:       Comments:   [phone] (H)      Anticoagulation Care Providers     Provider Role Specialty Phone number    Evelio Tejeda M.D. Referring Internal Medicine [phone]    [facility] Responsible  [phone]    Beryl Pang M.D. Responsible Family Medicine [phone]        Anticoagulation Patient Findings      HPI:   Angie Root seen in clinic today, they are here today for a INR check on anticoagulation therapy with warfarin because they have afib    The reason for today's visit is to prevent morbidity and mortality from a  stroke and to reduce the risk of bleeding while on a anticoagulant.     Reason for today's visit (per our collaborative practice policy) is because their last INR was 2.3 on 1/11.  Intervention at the last visit:none needed, we kept the dose the same     Additional education provided today regarding reducing bleed risk and dietary constraints: Yes    Any upcoming  procedures: none    Confirmed warfarin dosing regimen  Interval Changes with foods rich in vitamin K:No  Interval Changes in ETOH:  No  Interval Changes in smoking status: No  Interval Changes in medication: No   Cost restriction: No    S/S of bleeding or bruising:  No  Signs/symptoms  thrombosis since the last appt: No  Bleed risk is: moderate,     3 vitals included with today's appt:No  She declined today      Assessment:   INR  therapeutic.       Intervention required today to prevent:    No change in dose needed today, they will need to continue with the same dose and diet to prevent adverse events while on a anticoagulant.     They have a TTR of 75.1 which is not at target (TTR target/goal is 100%) and requires close follow up to prevent a adverse event (the lower the TTR the higher risk of clots, strokes, or bleeding).       Plan:  Continue weekly warfarin dose as noted      Follow up:  Follow up appointment in 3 week(s)       Other info:  Pt educated to contact our clinic with any changes in medications or s/s of bleeding or thrombosis    CHEST guidelines recommend frequent INR monitoring at regular intervals (a few days up to a max of 12 weeks) to ensure they are on the proper dose of warfarin and not having any complications from therapy.  INRs can dramatically change over a short time period due to diet, medications, and medical conditions.

## 2018-01-26 ENCOUNTER — HOSPITAL ENCOUNTER (OUTPATIENT)
Facility: MEDICAL CENTER | Age: 78
End: 2018-01-26
Attending: FAMILY MEDICINE
Payer: MEDICARE

## 2018-01-26 ENCOUNTER — HOSPITAL ENCOUNTER (OUTPATIENT)
Dept: LAB | Facility: MEDICAL CENTER | Age: 78
End: 2018-01-26
Attending: FAMILY MEDICINE
Payer: MEDICARE

## 2018-01-26 LAB
C DIFF DNA SPEC QL NAA+PROBE: NEGATIVE
C DIFF TOX A+B STL QL IA: POSITIVE
C DIFF TOX GENS STL QL NAA+PROBE: NORMAL
EST. AVERAGE GLUCOSE BLD GHB EST-MCNC: 120 MG/DL
HBA1C MFR BLD: 5.8 % (ref 0–5.6)

## 2018-01-26 PROCEDURE — 36415 COLL VENOUS BLD VENIPUNCTURE: CPT

## 2018-01-26 PROCEDURE — 83036 HEMOGLOBIN GLYCOSYLATED A1C: CPT

## 2018-01-26 PROCEDURE — 87493 C DIFF AMPLIFIED PROBE: CPT

## 2018-01-26 PROCEDURE — 87324 CLOSTRIDIUM AG IA: CPT

## 2018-01-29 ENCOUNTER — TELEPHONE (OUTPATIENT)
Dept: CARDIOLOGY | Facility: MEDICAL CENTER | Age: 78
End: 2018-01-29

## 2018-01-29 NOTE — TELEPHONE ENCOUNTER
----- Message from Johnathan Morgan M.D. sent at 1/29/2018  8:15 AM PST -----  Still prediabetes level he blood sugars, but improved from previous.    IBA MD    ======================================================    Called pt, discussed lab result per Dr Morgan, pt verbalizes understanding.     Pt requested for copy of her lab test that Dr Morgan ordered from her last OV mailed to her, labs reprinted and mailed to pt.

## 2018-02-15 ENCOUNTER — ANTICOAGULATION VISIT (OUTPATIENT)
Dept: MEDICAL GROUP | Facility: PHYSICIAN GROUP | Age: 78
End: 2018-02-15
Payer: MEDICARE

## 2018-02-15 VITALS — HEART RATE: 53 BPM | SYSTOLIC BLOOD PRESSURE: 120 MMHG | DIASTOLIC BLOOD PRESSURE: 67 MMHG

## 2018-02-15 DIAGNOSIS — I48.0 PAROXYSMAL A-FIB (HCC): ICD-10-CM

## 2018-02-15 LAB — INR PPP: 2.1 (ref 2–3.5)

## 2018-02-15 PROCEDURE — 85610 PROTHROMBIN TIME: CPT | Performed by: PHYSICIAN ASSISTANT

## 2018-02-15 PROCEDURE — 99999 PR NO CHARGE: CPT | Performed by: PHYSICIAN ASSISTANT

## 2018-02-15 NOTE — PROGRESS NOTES
OP Anticoagulation Service Note    Date: 2/15/2018  Vitals:    02/15/18 0905   BP: 120/67   Pulse: (!) 53       Anticoagulation Summary  As of 2/15/2018    INR goal:   2.0-3.0   TTR:   84.9 % (1.8 mo)   Today's INR:   2.1   Maintenance plan:   1 mg (1 mg x 1) every day   Weekly total:   7 mg   Plan last modified:   Pieter Covarrubias, PharmD (12/21/2017)   Next INR check:   3/22/2018   Target end date:   Indefinite    Indications    Paroxysmal a-fib (CMS-HCC) [I48.0]             Anticoagulation Episode Summary     [facility] check location[facility]   [facility]    Preferred lab:       Send INR reminders to:       Comments:   [phone] (H)      Anticoagulation Care Providers     Provider Role Specialty Phone number    Evelio Tejeda M.D. Referring Internal Medicine [phone]    [facility] Responsible  [phone]    Beryl Pang M.D. Responsible Family Medicine [phone]        Anticoagulation Patient Findings      HPI:   Angie Root seen in clinic today, they are here today for a INR check on anticoagulation therapy with warfarin because they have atrial fibrillation     The reason for today's visit is to prevent morbidity and mortality from a stroke  and to reduce the risk of bleeding while on a anticoagulant.     Reason for today's visit (per our collaborative practice policy) is because their last INR was 2.8  on  1/25/2018.   Intervention at the last visit:  None needed    Additional education provided today regarding reducing bleed risk and dietary constraints:  About diet and calling us to get in sooner if she has more diarrhea from her recurrent c.dif    Any upcoming procedures:   None     Confirmed warfarin dosing regimen  Interval Changes with foods rich in vitamin K: No  Interval Changes in ETOH:   No  Interval Changes in smoking status:  No  Interval Changes in medication:  No   Cost restriction:  No    S/S of bleeding or bruising:  No  Signs/symptoms  thrombosis since the last  appt:  No  Bleed risk is:  moderate,     3 vitals included with today's appt :Yes  (BP, HR, weight, ht, RR)     Assessment:   INR  therapeutic.     No change in dose needed today, they will need to continue with the same dose and diet to prevent adverse events while on a anticoagulant.     They have a TTR of 84.9  which is not at target (TTR target/goal is 100%) and requires close follow up to prevent a adverse event (the lower the TTR the higher risk of clots, strokes, or bleeding).       Plan:  Continue weekly warfarin dose as noted      Follow up:  Follow up appointment in 4 week(s)       Other info:  Pt educated to contact our clinic with any changes in medications or s/s of bleeding or thrombosis    CHEST guidelines recommend frequent INR monitoring at regular intervals (a few days up to a max of 12 weeks) to ensure they are on the proper dose of warfarin and not having any complications from therapy.  INRs can dramatically change over a short time period due to diet, medications, and medical conditions.

## 2018-02-26 ENCOUNTER — TELEPHONE (OUTPATIENT)
Dept: CARDIOLOGY | Facility: MEDICAL CENTER | Age: 78
End: 2018-02-26

## 2018-02-26 ENCOUNTER — HOSPITAL ENCOUNTER (OUTPATIENT)
Dept: LAB | Facility: MEDICAL CENTER | Age: 78
End: 2018-02-26
Attending: INTERNAL MEDICINE
Payer: MEDICARE

## 2018-02-26 DIAGNOSIS — I48.0 PAROXYSMAL A-FIB (HCC): ICD-10-CM

## 2018-02-26 DIAGNOSIS — R62.7 FAILURE TO THRIVE IN ADULT: ICD-10-CM

## 2018-02-26 LAB
CHOLEST SERPL-MCNC: 191 MG/DL (ref 100–199)
HDLC SERPL-MCNC: 82 MG/DL
LDLC SERPL CALC-MCNC: 93 MG/DL
TRIGL SERPL-MCNC: 80 MG/DL (ref 0–149)

## 2018-02-26 PROCEDURE — 36415 COLL VENOUS BLD VENIPUNCTURE: CPT

## 2018-02-26 PROCEDURE — 80053 COMPREHEN METABOLIC PANEL: CPT

## 2018-02-26 PROCEDURE — 80061 LIPID PANEL: CPT

## 2018-02-26 NOTE — TELEPHONE ENCOUNTER
Pt called back, pt reports she went to have her blood test today that was ordered by Dr Morgan from her last OV but she was told that it is only for Lipid Profile, pt reports she was just recently in the hosp end of Nov 2017 for Ischemic colitis and Cdiff recently and past few days she does not feel good and feels tired all the time so she wanted to know if Dr Morgan could get a Thyroid function and CMP added to her labs prior to her appt on 3/9/18, informed pt that I could call and get the CMP add on but will have to double check w/ Dr Morgan in regards to her Thyroid function.     Called [facility] to add on CMP.

## 2018-02-26 NOTE — TELEPHONE ENCOUNTER
----- Message from Adamaris Lynn sent at 2/26/2018  9:50 AM PST -----  Regarding: lab add ons  Contact: [phone]  IA/waldemar    Pt had blood draw this morning at [facility].  Pt reporting phleb michelle extra blood so thyroid testing and others can be added on.  Please call pt to discuss what you will be adding on .  Barbra is the phleb who michelle patient.      =====================================================    Attempted to call pt, no answer, unable to leave ,   is not set up yet, will try again     To Dr Johnathan Morgan if ok to add Thyroid function per pt request and CMP? Thanks!

## 2018-02-27 LAB
ALBUMIN SERPL BCP-MCNC: 4 G/DL (ref 3.2–4.9)
ALBUMIN/GLOB SERPL: 1.4 G/DL
ALP SERPL-CCNC: 68 U/L (ref 30–99)
ALT SERPL-CCNC: 21 U/L (ref 2–50)
ANION GAP SERPL CALC-SCNC: 13 MMOL/L (ref 0–11.9)
AST SERPL-CCNC: 31 U/L (ref 12–45)
BILIRUB SERPL-MCNC: 0.5 MG/DL (ref 0.1–1.5)
BUN SERPL-MCNC: 15 MG/DL (ref 8–22)
CALCIUM SERPL-MCNC: 9.9 MG/DL (ref 8.5–10.5)
CHLORIDE SERPL-SCNC: 106 MMOL/L (ref 96–112)
CO2 SERPL-SCNC: 20 MMOL/L (ref 20–33)
CREAT SERPL-MCNC: 0.74 MG/DL (ref 0.5–1.4)
GLOBULIN SER CALC-MCNC: 2.9 G/DL (ref 1.9–3.5)
GLUCOSE SERPL-MCNC: 69 MG/DL (ref 65–99)
POTASSIUM SERPL-SCNC: 4.5 MMOL/L (ref 3.6–5.5)
PROT SERPL-MCNC: 6.9 G/DL (ref 6–8.2)
SODIUM SERPL-SCNC: 139 MMOL/L (ref 135–145)

## 2018-02-28 ENCOUNTER — HOSPITAL ENCOUNTER (OUTPATIENT)
Dept: LAB | Facility: MEDICAL CENTER | Age: 78
End: 2018-02-28
Attending: INTERNAL MEDICINE
Payer: MEDICARE

## 2018-02-28 ENCOUNTER — TELEPHONE (OUTPATIENT)
Dept: CARDIOLOGY | Facility: MEDICAL CENTER | Age: 78
End: 2018-02-28

## 2018-02-28 DIAGNOSIS — I48.0 PAROXYSMAL A-FIB (HCC): ICD-10-CM

## 2018-02-28 DIAGNOSIS — I73.9 PAD (PERIPHERAL ARTERY DISEASE) (HCC): ICD-10-CM

## 2018-02-28 DIAGNOSIS — R62.7 FAILURE TO THRIVE IN ADULT: ICD-10-CM

## 2018-02-28 LAB
BASOPHILS # BLD AUTO: 1.3 % (ref 0–1.8)
BASOPHILS # BLD: 0.12 K/UL (ref 0–0.12)
EOSINOPHIL # BLD AUTO: 0.21 K/UL (ref 0–0.51)
EOSINOPHIL NFR BLD: 2.3 % (ref 0–6.9)
ERYTHROCYTE [DISTWIDTH] IN BLOOD BY AUTOMATED COUNT: 50.9 FL (ref 35.9–50)
HCT VFR BLD AUTO: 47.1 % (ref 37–47)
HGB BLD-MCNC: 14.4 G/DL (ref 12–16)
IMM GRANULOCYTES # BLD AUTO: 0.01 K/UL (ref 0–0.11)
IMM GRANULOCYTES NFR BLD AUTO: 0.1 % (ref 0–0.9)
LYMPHOCYTES # BLD AUTO: 3.78 K/UL (ref 1–4.8)
LYMPHOCYTES NFR BLD: 42.2 % (ref 22–41)
MCH RBC QN AUTO: 28.1 PG (ref 27–33)
MCHC RBC AUTO-ENTMCNC: 30.6 G/DL (ref 33.6–35)
MCV RBC AUTO: 91.8 FL (ref 81.4–97.8)
MONOCYTES # BLD AUTO: 0.78 K/UL (ref 0–0.85)
MONOCYTES NFR BLD AUTO: 8.7 % (ref 0–13.4)
NEUTROPHILS # BLD AUTO: 4.06 K/UL (ref 2–7.15)
NEUTROPHILS NFR BLD: 45.4 % (ref 44–72)
NRBC # BLD AUTO: 0 K/UL
NRBC BLD-RTO: 0 /100 WBC
PLATELET # BLD AUTO: 219 K/UL (ref 164–446)
PMV BLD AUTO: 10.6 FL (ref 9–12.9)
RBC # BLD AUTO: 5.13 M/UL (ref 4.2–5.4)
TSH SERPL DL<=0.005 MIU/L-ACNC: 1.34 UIU/ML (ref 0.38–5.33)
WBC # BLD AUTO: 9 K/UL (ref 4.8–10.8)

## 2018-02-28 PROCEDURE — 84443 ASSAY THYROID STIM HORMONE: CPT

## 2018-02-28 PROCEDURE — 36415 COLL VENOUS BLD VENIPUNCTURE: CPT

## 2018-02-28 PROCEDURE — 85025 COMPLETE CBC W/AUTO DIFF WBC: CPT

## 2018-02-28 NOTE — TELEPHONE ENCOUNTER
CBC w/diff orders placed and faxed to [facility]. See earlier notes re: TSH add on as well.

## 2018-02-28 NOTE — TELEPHONE ENCOUNTER
"Called [facility] labs to add on \"TSH w/reflex to free T4\" as per Dr. Johnathan Morgan. Lab unable to add on at this time as sample out of 48 hr window. New order placed and lab slip faxed to [facility]. Called pt to let her know and she will go over today for draw.  "

## 2018-02-28 NOTE — TELEPHONE ENCOUNTER
Yes, please add on a TSH with reflex to free T4 lab for her. Also have her follow-up expedited with one of our nurse practitioners.    Thanks,    GENE BECKHAM

## 2018-02-28 NOTE — TELEPHONE ENCOUNTER
----- Message from Johnathan Morgan M.D. sent at 2/27/2018  4:42 PM PST -----  Cholesterol slightly higher than previous. Normal kidney function. Elevated anion gap could be a sign of infection or dehydration. Please order a CBC with differential.    GENE BECKHAM

## 2018-03-06 ENCOUNTER — OFFICE VISIT (OUTPATIENT)
Dept: CARDIOLOGY | Facility: MEDICAL CENTER | Age: 78
End: 2018-03-06
Payer: MEDICARE

## 2018-03-06 VITALS
HEIGHT: 62 IN | HEART RATE: 58 BPM | SYSTOLIC BLOOD PRESSURE: 110 MMHG | WEIGHT: 99 LBS | OXYGEN SATURATION: 96 % | BODY MASS INDEX: 18.22 KG/M2 | DIASTOLIC BLOOD PRESSURE: 70 MMHG

## 2018-03-06 DIAGNOSIS — F17.219 CIGARETTE NICOTINE DEPENDENCE WITH NICOTINE-INDUCED DISORDER: ICD-10-CM

## 2018-03-06 DIAGNOSIS — I48.0 PAROXYSMAL A-FIB (HCC): Primary | ICD-10-CM

## 2018-03-06 DIAGNOSIS — A04.71 RECURRENT COLITIS DUE TO CLOSTRIDIUM DIFFICILE: ICD-10-CM

## 2018-03-06 DIAGNOSIS — D68.318 CIRCULATING ANTICOAGULANTS (HCC): ICD-10-CM

## 2018-03-06 DIAGNOSIS — I73.9 PAD (PERIPHERAL ARTERY DISEASE) (HCC): ICD-10-CM

## 2018-03-06 PROBLEM — A41.9 SEPSIS ASSOCIATED HYPOTENSION (HCC): Status: RESOLVED | Noted: 2017-12-05 | Resolved: 2018-03-06

## 2018-03-06 PROBLEM — R79.1 SUPRATHERAPEUTIC INR: Status: RESOLVED | Noted: 2017-12-04 | Resolved: 2018-03-06

## 2018-03-06 PROBLEM — I95.9 SEPSIS ASSOCIATED HYPOTENSION (HCC): Status: RESOLVED | Noted: 2017-12-05 | Resolved: 2018-03-06

## 2018-03-06 PROBLEM — R19.7 DIARRHEA: Status: RESOLVED | Noted: 2017-12-03 | Resolved: 2018-03-06

## 2018-03-06 PROBLEM — R62.7 FAILURE TO THRIVE IN ADULT: Status: RESOLVED | Noted: 2017-12-04 | Resolved: 2018-03-06

## 2018-03-06 PROCEDURE — 99214 OFFICE O/P EST MOD 30 MIN: CPT | Performed by: INTERNAL MEDICINE

## 2018-03-06 ASSESSMENT — ENCOUNTER SYMPTOMS
DEPRESSION: 1
PND: 0
CLAUDICATION: 1
ORTHOPNEA: 0
PALPITATIONS: 0
LOSS OF CONSCIOUSNESS: 0

## 2018-03-06 NOTE — PROGRESS NOTES
Subjective:   Angie Root is a 78 -year-old woman with a history of PAD status post PCI to her left leg in 2008, and more recently diagnosed ischemic colitis, CAD with 2 stents placed in her LAD in 2009, nicotine dependence, hypertension, dyslipidemia, recurrent C diff colitis, and paroxysmal atrial fibrillation on rhythm control strategy and now anticoagulated with Warfarin.    She tells me today about the recurrent Clostridium difficile colitis that she has had. She was also diagnosed with ischemic colitis, and has a follow-up appointment with vascular surgery in the near future. I'm doubtful that they will favor intervention especially with alternative etiologies of her colitis.    In the aftermath of all of that, however, she did acquiesce to oral anticoagulants given her very high risk of stroke that I previously reviewed with her at some length. She is on Coumadin presently as Elquis (apixaban) and Xarelto (rivaroxaban) had very high co-pay is associated with them.    Apart from that, she has no cardio vascular complaint, and no palpitations continuing on sotalol and a rhythm control strategy for her paroxysmal atrial fibrillation.    She discussed with me at some length her other providers including dermatology, her new primary care physician, and again vascular surgery. She will be following up with Angie Olmos having had some relationship discord with other vascular surgeons who had seen her.    Past Medical History:   Diagnosis Date   • CAD (coronary artery disease)    • COPD (chronic obstructive pulmonary disease) (CMS-HCC)    • Diverticulosis    • Dyslipidemia    • GERD (gastroesophageal reflux disease)    • Hiatal hernia    • Hypertension    • Paroxysmal a-fib (CMS-HCC) 1/20/2016    Symptomatic, on a rhythm control strategy with sotalol 40 mg by mouth twice a day.    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     Family History   Problem Relation Age of Onset   • Hypertension Mother    • Hyperlipidemia Mother    • Cancer Maternal Grandmother      tongue   • Cancer Maternal Uncle      x 3, pancreas   • Cancer Maternal Grandfather      unknown   • Cancer Paternal Grandmother      unknown   • Cancer Paternal Grandfather      unknown   • Diabetes Maternal Uncle    • Heart Disease Maternal Uncle    • Hypertension Sister    • Hyperlipidemia Sister    • Thyroid Sister    • Psychiatry Neg Hx    • Stroke Neg Hx      History   Smoking Status   • Current Every Day Smoker   • Packs/day: 0.25   • Years: 60.00   • Types: Cigarettes   • Start date: 3/20/1957   Smokeless Tobacco   • Never Used     Comment: 6 per day     Allergies   Allergen Reactions   • Codeine Swelling   • Iodine Swelling   • Bee Venom    • Chantix [Varenicline]      disoriented   • Latex    • Pcn [Penicillins]    • Shellfish Allergy    • Tetanus Antitoxin      Outpatient Encounter Prescriptions as of 3/6/2018   Medication Sig Dispense Refill   • Vancomycin HCl (VANCOMYCIN 50 MG/ML) Take 10 mL by mouth every 6 hours.     • edoxaban (SAVAYSA) 60 MG Tab tablet Take 1 Tab by mouth every day. 30 Tab 11   • warfarin (COUMADIN) 1 MG Tab Take 1 Tab by mouth every day. 30 Tab 3   • warfarin (COUMADIN) 1 MG Tab Take 1-2 Tabs by mouth every day. 90 Tab 1   • acidophilus/bulgaricus (LACTINEX) Tab tablet Take 2 Tabs by mouth 3 times a day, with meals.     • warfarin (COUMADIN) 5 MG Tab Take 1 Tab by mouth every evening. 30 Tab 3   • alprazolam (XANAX) 0.25 MG Tab Take 0.25 mg by mouth every morning.     • lovastatin (MEVACOR) 40 MG tablet Take 1 Tab by mouth every evening. 90 Tab 3   • diltiazem CD (CARDIZEM CD) 240 MG  "CAPSULE SR 24 HR Take 1 Cap by mouth every day. 90 Cap 3   • sotalol (BETAPACE) 80 MG Tab Take 0.5 Tabs by mouth 2 times a day. 90 Tab 3   • Cholecalciferol (VITAMIN D) 2000 UNITS Cap Take 1 Cap by mouth every morning.     • acetaminophen (TYLENOL) 325 MG Tab Take 2 Tabs by mouth every 6 hours as needed for Fever or Moderate Pain (Temp >101.5F). 30 Tab 0   • sucralfate (CARAFATE) 1 GM Tab Take 1 g by mouth 2 Times a Day.       No facility-administered encounter medications on file as of 3/6/2018.      Review of Systems   HENT: Positive for hearing loss.         Prior sudden deafness of unclear etiology. This was worked up with a MRI, no eardrum rupture noted by an ENT who examined her. She was placed on antibiotics and steroids and it resolved.   Cardiovascular: Positive for claudication (minimal, mild). Negative for chest pain, palpitations, orthopnea, leg swelling and PND.   Gastrointestinal:        Recurrent diarrhea with c diff has resolved, but still on vancomycin   Neurological: Negative for loss of consciousness (none recently).   Psychiatric/Behavioral: Positive for depression (in the past, none recent). Negative for suicidal ideas.   All other systems reviewed and are negative.       Objective:   /70   Pulse (!) 58   Ht 1.575 m (5' 2\")   Wt 44.9 kg (99 lb)   SpO2 96%   BMI 18.11 kg/m²     Physical Exam   Constitutional: She is oriented to person, place, and time. She appears well-developed and well-nourished. No distress.   Very pleasant, thin, elderly woman in no distress. As usual, she has an earpiece in for her cell phone that she wears including during our appointment.   HENT:   Head: Normocephalic and atraumatic.   Eyes: Conjunctivae and EOM are normal. Pupils are equal, round, and reactive to light. No scleral icterus.   Neck: Neck supple. No JVD present. No tracheal deviation present.   Cardiovascular: Normal rate, regular rhythm, normal heart sounds and intact distal pulses.  Exam reveals " "no gallop and no friction rub.    No murmur heard.  Pulses:       Dorsalis pedis pulses are 1+ on the right side, and 1+ on the left side.   No carotid bruits   Pulmonary/Chest: Effort normal and breath sounds normal. No stridor. No respiratory distress. She has no wheezes. She has no rales.   Abdominal: Soft. Bowel sounds are normal. She exhibits no distension.   Musculoskeletal: She exhibits edema (trace on the left, 1+ on the right).   1+ woody edema of her lower extremities bilaterally   Neurological: She is alert and oriented to person, place, and time.   Skin: Skin is warm and dry. She is not diaphoretic. No erythema. No pallor.   Senile purpura noted, chronic venous stasis changes of her lower extremities also noted   Psychiatric: She has a normal mood and affect. Judgment and thought content normal.   Vitals reviewed.    Lab Results   Component Value Date/Time    WBC 9.0 02/28/2018 10:35 AM    RBC 5.13 02/28/2018 10:35 AM    HEMOGLOBIN 14.4 02/28/2018 10:35 AM    HEMATOCRIT 47.1 (H) 02/28/2018 10:35 AM    MCV 91.8 02/28/2018 10:35 AM    MCH 28.1 02/28/2018 10:35 AM    MCHC 30.6 (L) 02/28/2018 10:35 AM    MPV 10.6 02/28/2018 10:35 AM        Lab Results   Component Value Date/Time    SODIUM 139 02/26/2018 07:21 AM    POTASSIUM 4.5 02/26/2018 07:21 AM    CHLORIDE 106 02/26/2018 07:21 AM    CO2 20 02/26/2018 07:21 AM    GLUCOSE 69 02/26/2018 07:21 AM    BUN 15 02/26/2018 07:21 AM    CREATININE 0.74 02/26/2018 07:21 AM        Lab Results   Component Value Date/Time    ASTSGOT 31 02/26/2018 07:21 AM    ALTSGPT 21 02/26/2018 07:21 AM        Lab Results   Component Value Date/Time    CHOLSTRLTOT 191 02/26/2018 07:21 AM    LDL 93 02/26/2018 07:21 AM    HDL 82 02/26/2018 07:21 AM    TRIGLYCERIDE 80 02/26/2018 07:21 AM       Echocardiogram, 6/27/2016:  \"CONCLUSIONS  Normal left ventricular size, thickness, systolic function EF 60%.   Mitral annular calcification.  Aortic sclerosis without stenosis.   Mild tricuspid " "regurgitation.  Right ventricular systolic pressure is estimated to be 33 mmHg.   No prior study for comparison\"    Echocardiogram, 11/18/2017:  \"CONCLUSIONS  Prior echocardiogram 6/27/2016.Normal left ventricular chamber size.  Left ventricular ejection fraction is visually estimated to be 60%.  Grade II diastolic dysfunction.  Aortic sclerosis without stenosis.  Mitral annular calcification.  Heavy calcification of the posterior mitral valve leaflet.  Mild mitral regurgitation.  Trace tricuspid regurgitation.  Normal pericardium without effusion.  Ascending aorta diameter is 2.4 cm\"    Assessment:     1. Paroxysmal a-fib (CMS-HCC)  edoxaban (SAVAYSA) 60 MG Tab tablet   2. 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 Cigarette nicotine dependence with nicotine-induced disorder     4. PAD (peripheral artery disease) (CMS-HCC)     5. Circulating anticoagulants (CMS-HCC)         Medical Decision Making:  Today's Assessment / Status / Plan:     She is doing well today from a cardiovascular perspective, and has no complaints. Her blood pressure is well-controlled, and her lipids are reasonably controlled area and she is smoking less (now 2 cigarettes per day). I do not think that in light of her complex struggles with Clostridium difficile colitis that it is the right time to set a quit date.    In light of her multiple recurrent courses of antibiotics and fluctuation in INR levels I do think she would benefit from an oral anticoagulant. She has been unable to afford either Elquis (apixaban) or Xarelto (rivaroxaban). I do not think that Pradaxa (dabigatran) is appropriate in light of her risk of bleeding and the fact that she is no longer on a PPI for reflux/hiatal hernia (I would worry about dyspepsia with Pradaxa (dabigatran)). Related to all of that I prescribed edoxiban at 60 mg by mouth daily giving her a hand written prescription to run by her pharmacy in order to see " what her co-pay would be.    She reviewed with me that she has now had 3 bouts of Clostridium difficile colitis. They have all been treated with vancomycin, and she tells me that her side effects with Flagyl is only photosensitivity. I would think that would be a reasonable alternative though of course I will defer treatment of her C. difficile colitis to the primary care and/or infectious disease setting.    Johnathan Morgan MD  Cardiologist, [facility] [place]     Return in about 6 months (around 9/6/2018).      I spent 30 minutes with Ms. Angie Root, over fifty percent was spent counseling the patient on their condition, best management practices, reviewing test results, risks and benefits of treatment and coordinating care.

## 2018-03-06 NOTE — LETTER
Name:          Angie Root   YOB: 1940  Date:     3/6/2018      Beryl Pang M.D.  [address]  [address]  [facility] [address]     Johnathan Morgan MD  [address] , [address]  [place] [address]  Phone: [phone]  Back Line: [phone]  Fax: [phone]  E-mail: [email or web address]   Dear Dr. Pang,    We had the pleasure of seeing your patient, Angie Root, in Cardiology Clinic at [facility] today.    As you know, she is a 78-year-old woman with a history of PAD status post PCI to her left leg in 2008, and more recently diagnosed ischemic colitis, CAD with 2 stents placed in her LAD in 2009, nicotine dependence, hypertension, dyslipidemia, recurrent C diff colitis, and paroxysmal atrial fibrillation on rhythm control strategy and now anticoagulated with Warfarin.    She is doing well today from a cardiovascular perspective, and has no complaints. Her blood pressure is well-controlled, and her lipids are reasonably controlled area and she is smoking less (now 2 cigarettes per day). I do not think that in light of her complex struggles with Clostridium difficile colitis that it is the right time to set a quit date.    In light of her multiple recurrent courses of antibiotics and fluctuation in INR levels I do think she would benefit from an oral anticoagulant. She has been unable to afford either Elquis (apixaban) or Xarelto (rivaroxaban). I do not think that Pradaxa (dabigatran) is appropriate in light of her risk of bleeding and the fact that she is no longer on a PPI for reflux/hiatal hernia (I would worry about dyspepsia with Pradaxa (dabigatran)). Related to all of that I prescribed edoxiban at 60 mg by mouth daily giving her a hand written prescription to run by her pharmacy in order to see what her co-pay would be.    She reviewed with me that she has now had 3 bouts of Clostridium difficile colitis. They have all been treated with vancomycin, and she tells me that  her side effects with Flagyl is only photosensitivity. I would think that would be a reasonable alternative though of course I will defer treatment of her C. difficile colitis to the primary care and/or infectious disease setting.    Return in about 6 months (around 9/6/2018).    Thank you for the referral and please do not hesitate to contact me at any time. My contact information is listed above.    This note was dictated using Dragon speech recognition software.     A full note including my physical examination and a full list of rectified medications is available in our medical record, and can be faxed as well.    Johnathan Morgan MD  Cardiologist  [facility]

## 2018-03-15 ENCOUNTER — ANTICOAGULATION VISIT (OUTPATIENT)
Dept: MEDICAL GROUP | Facility: PHYSICIAN GROUP | Age: 78
End: 2018-03-15
Payer: MEDICARE

## 2018-03-15 VITALS
BODY MASS INDEX: 18.11 KG/M2 | DIASTOLIC BLOOD PRESSURE: 58 MMHG | WEIGHT: 99 LBS | SYSTOLIC BLOOD PRESSURE: 128 MMHG | HEART RATE: 61 BPM

## 2018-03-15 DIAGNOSIS — Z79.01 CHRONIC ANTICOAGULATION: ICD-10-CM

## 2018-03-15 DIAGNOSIS — I48.0 PAROXYSMAL A-FIB (HCC): ICD-10-CM

## 2018-03-15 LAB — INR PPP: 2.6 (ref 2–3.5)

## 2018-03-15 PROCEDURE — 85610 PROTHROMBIN TIME: CPT | Performed by: PHYSICIAN ASSISTANT

## 2018-03-15 PROCEDURE — 99211 OFF/OP EST MAY X REQ PHY/QHP: CPT | Performed by: PHYSICIAN ASSISTANT

## 2018-03-15 NOTE — PROGRESS NOTES
OP Anticoagulation Service Note    Date: 3/15/2018  Vitals:    03/15/18 0909   BP: 128/58   Pulse: 61   Weight: 44.9 kg (99 lb)       Anticoagulation Summary  As of 3/15/2018    INR goal:   2.0-3.0   TTR:   90.1 % (2.7 mo)   Today's INR:   2.6   Maintenance plan:   1 mg (1 mg x 1) every day   Weekly total:   7 mg   Plan last modified:   Pieter Covarrubias, PharmD (12/21/2017)   Next INR check:   5/3/2018   Target end date:   Indefinite    Indications    Paroxysmal a-fib (CMS-HCC) [I48.0]             Anticoagulation Episode Summary     INR check location:   [facility]    Preferred lab:       Send INR reminders to:       Comments:   [phone] (H)      Anticoagulation Care Providers     Provider Role Specialty Phone number    Evelio Tejeda M.D. Referring Internal Medicine [phone]    [facility]  [phone]    Beryl Pang M.D. Responsible Family Medicine [phone]        Anticoagulation Patient Findings      HPI:   Angie Root seen in clinic today, they are here today for a INR check on anticoagulation therapy with warfarin because they have atrial fibrillation     The reason for today's visit is to prevent morbidity and mortality from a stroke  and to reduce the risk of bleeding while on a anticoagulant.     Reason for today's visit (per our collaborative practice policy) is because their last INR was 2.8  on  1/25/2018.   Intervention at the last visit:  None needed     Additional education provided today regarding reducing bleed risk and dietary constraints:  About diet and INRs    Any upcoming procedures:   none    Confirmed warfarin dosing regimen  Interval Changes with foods rich in vitamin K: No  Interval Changes in ETOH:   No  Interval Changes in smoking status:  No  Interval Changes in medication:  No   Cost restriction:  No    S/S of bleeding or bruising:  No  Signs/symptoms  thrombosis since the last appt:  No  Bleed risk is:  moderate,     3 vitals  included with today's appt :  (BP, HR, weight, ht, RR)     Assessment:   INR  therapeutic.     No change in dose needed today, they will need to continue with the same dose and diet to prevent adverse events while on a anticoagulant.     They have a TTR of 90.1  which is not at target (TTR target/goal is 100%) and requires close follow up to prevent a adverse event (the lower the TTR the higher risk of clots, strokes, or bleeding).       Plan:  Continue weekly warfarin dose as noted      Follow up:  Follow up appointment in 7 week(s)       Other info:  Pt educated to contact our clinic with any changes in medications or s/s of bleeding or thrombosis    CHEST guidelines recommend frequent INR monitoring at regular intervals (a few days up to a max of 12 weeks) to ensure they are on the proper dose of warfarin and not having any complications from therapy.  INRs can dramatically change over a short time period due to diet, medications, and medical conditions.

## 2018-04-02 ENCOUNTER — HOSPITAL ENCOUNTER (EMERGENCY)
Facility: MEDICAL CENTER | Age: 78
End: 2018-04-03
Attending: EMERGENCY MEDICINE
Payer: MEDICARE

## 2018-04-02 ENCOUNTER — HOSPITAL ENCOUNTER (OUTPATIENT)
Dept: LAB | Facility: MEDICAL CENTER | Age: 78
End: 2018-04-02
Attending: NURSE PRACTITIONER
Payer: MEDICARE

## 2018-04-02 DIAGNOSIS — R19.7 DIARRHEA, UNSPECIFIED TYPE: ICD-10-CM

## 2018-04-02 LAB
BASOPHILS # BLD AUTO: 0.5 % (ref 0–1.8)
BASOPHILS # BLD: 0.07 K/UL (ref 0–0.12)
EOSINOPHIL # BLD AUTO: 0.12 K/UL (ref 0–0.51)
EOSINOPHIL NFR BLD: 0.9 % (ref 0–6.9)
ERYTHROCYTE [DISTWIDTH] IN BLOOD BY AUTOMATED COUNT: 47.4 FL (ref 35.9–50)
HCT VFR BLD AUTO: 44.4 % (ref 37–47)
HGB BLD-MCNC: 14.8 G/DL (ref 12–16)
IMM GRANULOCYTES # BLD AUTO: 0.04 K/UL (ref 0–0.11)
IMM GRANULOCYTES NFR BLD AUTO: 0.3 % (ref 0–0.9)
INR PPP: 2.36 (ref 0.87–1.13)
LYMPHOCYTES # BLD AUTO: 2.1 K/UL (ref 1–4.8)
LYMPHOCYTES NFR BLD: 15.6 % (ref 22–41)
MCH RBC QN AUTO: 29.6 PG (ref 27–33)
MCHC RBC AUTO-ENTMCNC: 33.3 G/DL (ref 33.6–35)
MCV RBC AUTO: 88.8 FL (ref 81.4–97.8)
MONOCYTES # BLD AUTO: 1.44 K/UL (ref 0–0.85)
MONOCYTES NFR BLD AUTO: 10.7 % (ref 0–13.4)
NEUTROPHILS # BLD AUTO: 9.72 K/UL (ref 2–7.15)
NEUTROPHILS NFR BLD: 72 % (ref 44–72)
NRBC # BLD AUTO: 0 K/UL
NRBC BLD-RTO: 0 /100 WBC
PLATELET # BLD AUTO: 183 K/UL (ref 164–446)
PMV BLD AUTO: 10.1 FL (ref 9–12.9)
PROTHROMBIN TIME: 25.5 SEC (ref 12–14.6)
RBC # BLD AUTO: 5 M/UL (ref 4.2–5.4)
WBC # BLD AUTO: 13.5 K/UL (ref 4.8–10.8)

## 2018-04-02 PROCEDURE — 87899 AGENT NOS ASSAY W/OPTIC: CPT

## 2018-04-02 PROCEDURE — 87046 STOOL CULTR AEROBIC BACT EA: CPT

## 2018-04-02 PROCEDURE — 80053 COMPREHEN METABOLIC PANEL: CPT

## 2018-04-02 PROCEDURE — 87324 CLOSTRIDIUM AG IA: CPT | Mod: XU

## 2018-04-02 PROCEDURE — 36415 COLL VENOUS BLD VENIPUNCTURE: CPT

## 2018-04-02 PROCEDURE — 85610 PROTHROMBIN TIME: CPT

## 2018-04-02 PROCEDURE — 99284 EMERGENCY DEPT VISIT MOD MDM: CPT

## 2018-04-02 PROCEDURE — 87493 C DIFF AMPLIFIED PROBE: CPT

## 2018-04-02 PROCEDURE — 87045 FECES CULTURE AEROBIC BACT: CPT

## 2018-04-02 PROCEDURE — 85025 COMPLETE CBC W/AUTO DIFF WBC: CPT

## 2018-04-02 PROCEDURE — 83690 ASSAY OF LIPASE: CPT

## 2018-04-02 ASSESSMENT — LIFESTYLE VARIABLES: DO YOU DRINK ALCOHOL: NO

## 2018-04-02 NOTE — LETTER
4/4/2018               Angie Root  [address] [facility] [address]        Dear Angie (MR#[id number])    This letter is sent in regards to your, recent visit to the [facility] Emergency Department on 4/2/2018.  During the visit, tests were performed to assist the physician in a medical diagnosis.  A review of those tests requires that we notify you of the following:    Your stool culture was POSITIVE for a bacteria called Clostridium difficile. No antibiotics were prescribed for you. Based on these results, it is recommended that you receive antibiotic treatment.      Please call me at the number below at your earliest convenience. Thank you for your cooperation in the matter.    Sincerely,  ED Culture Follow-Up Staff  Daniel Pike, PharmD, [facility]    [facility], Emergency Department  [address] [place] [id number] [phone]

## 2018-04-03 ENCOUNTER — ANTICOAGULATION MONITORING (OUTPATIENT)
Dept: VASCULAR LAB | Facility: MEDICAL CENTER | Age: 78
End: 2018-04-03

## 2018-04-03 VITALS
SYSTOLIC BLOOD PRESSURE: 108 MMHG | WEIGHT: 98.33 LBS | RESPIRATION RATE: 16 BRPM | OXYGEN SATURATION: 96 % | HEIGHT: 62 IN | DIASTOLIC BLOOD PRESSURE: 57 MMHG | BODY MASS INDEX: 18.09 KG/M2 | HEART RATE: 97 BPM | TEMPERATURE: 98.6 F

## 2018-04-03 DIAGNOSIS — I48.0 PAROXYSMAL A-FIB (HCC): ICD-10-CM

## 2018-04-03 LAB
ALBUMIN SERPL BCP-MCNC: 3.8 G/DL (ref 3.2–4.9)
ALBUMIN/GLOB SERPL: 1.3 G/DL
ALP SERPL-CCNC: 74 U/L (ref 30–99)
ALT SERPL-CCNC: 18 U/L (ref 2–50)
ANION GAP SERPL CALC-SCNC: 9 MMOL/L (ref 0–11.9)
AST SERPL-CCNC: 29 U/L (ref 12–45)
BILIRUB SERPL-MCNC: 0.4 MG/DL (ref 0.1–1.5)
BUN BLD-MCNC: 16 MG/DL (ref 8–22)
BUN SERPL-MCNC: 15 MG/DL (ref 8–22)
C DIFF DNA SPEC QL NAA+PROBE: NEGATIVE
C DIFF TOX A+B STL QL IA: POSITIVE
C DIFF TOX GENS STL QL NAA+PROBE: NORMAL
CALCIUM SERPL-MCNC: 9.3 MG/DL (ref 8.4–10.2)
CHLORIDE BLD-SCNC: 105 MMOL/L (ref 96–112)
CHLORIDE SERPL-SCNC: 107 MMOL/L (ref 96–112)
CO2 BLD-SCNC: 24 MMOL/L (ref 20–33)
CO2 SERPL-SCNC: 21 MMOL/L (ref 20–33)
CREAT BLD-MCNC: 0.7 MG/DL (ref 0.5–1.4)
CREAT SERPL-MCNC: 0.81 MG/DL (ref 0.5–1.4)
GLOBULIN SER CALC-MCNC: 2.9 G/DL (ref 1.9–3.5)
GLUCOSE BLD-MCNC: 97 MG/DL (ref 65–99)
GLUCOSE SERPL-MCNC: 91 MG/DL (ref 65–99)
LIPASE SERPL-CCNC: 30 U/L (ref 7–58)
POTASSIUM BLD-SCNC: 4 MMOL/L (ref 3.6–5.5)
POTASSIUM SERPL-SCNC: 4 MMOL/L (ref 3.6–5.5)
PROT SERPL-MCNC: 6.7 G/DL (ref 6–8.2)
SODIUM BLD-SCNC: 138 MMOL/L (ref 135–145)
SODIUM SERPL-SCNC: 137 MMOL/L (ref 135–145)

## 2018-04-03 PROCEDURE — 84520 ASSAY OF UREA NITROGEN: CPT

## 2018-04-03 PROCEDURE — 96360 HYDRATION IV INFUSION INIT: CPT

## 2018-04-03 PROCEDURE — 82565 ASSAY OF CREATININE: CPT

## 2018-04-03 PROCEDURE — 84295 ASSAY OF SERUM SODIUM: CPT

## 2018-04-03 PROCEDURE — 84132 ASSAY OF SERUM POTASSIUM: CPT

## 2018-04-03 PROCEDURE — 82435 ASSAY OF BLOOD CHLORIDE: CPT

## 2018-04-03 PROCEDURE — 82947 ASSAY GLUCOSE BLOOD QUANT: CPT

## 2018-04-03 PROCEDURE — 700105 HCHG RX REV CODE 258: Performed by: EMERGENCY MEDICINE

## 2018-04-03 PROCEDURE — 82374 ASSAY BLOOD CARBON DIOXIDE: CPT

## 2018-04-03 RX ORDER — SODIUM CHLORIDE 9 MG/ML
500 INJECTION, SOLUTION INTRAVENOUS ONCE
Status: COMPLETED | OUTPATIENT
Start: 2018-04-03 | End: 2018-04-03

## 2018-04-03 RX ADMIN — SODIUM CHLORIDE 500 ML: 9 INJECTION, SOLUTION INTRAVENOUS at 02:43

## 2018-04-03 NOTE — PROGRESS NOTES
OP Telephone Anticoagulation Service Note    Date: 4/3/2018      Anticoagulation Summary  As of 4/3/2018    INR goal:   2.0-3.0   TTR:   91.9 % (3.3 mo)   Today's INR:   2.36 (4/2/2018)   Maintenance plan:   1 mg (1 mg x 1) every day   Weekly total:   7 mg   Plan last modified:   Pieter Covarrubias PharmD (12/21/2017)   Next INR check:   4/30/2018   Target end date:   Indefinite    Indications    Paroxysmal a-fib (CMS-HCC) [I48.0]             Anticoagulation Episode Summary     INR check location:   [facility]    Preferred lab:       Send INR reminders to:       Comments:   [phone] (H)      Anticoagulation Care Providers     Provider Role Specialty Phone number    Evelio Tejeda M.D. Referring Internal Medicine [phone]    [facility] Anticoagulation Services Responsible  [phone]    Beryl Pang M.D. Responsible Family Medicine [phone]        Anticoagulation Patient Findings        Plan: Spoke with patient on the phone. Patient is therapeutic today. Confirmed dosing. No missed tablets in the last week. She thinks she has c diff again has had bad diarrhea and this is why she had her INR checked, will probably be restarted on vancomycin.  Instructed patient to call clinic if any unusual bleeding or bruising occurs. Will continue dosing as outlined. Will follow-up with patient in 4 week(s).              Odalis Gaines, PharmD

## 2018-04-03 NOTE — ED NOTES
Pt ambulatory  Vital signs stable  Pt handed d/c paperwork with understanding stated  Pt states will follow up with PCP. And GI  Pt states safe way home

## 2018-04-03 NOTE — ED TRIAGE NOTES
Chief Complaint   Patient presents with   • Diarrhea     hx c diff     Pt has had 4 bouts of c diff

## 2018-04-03 NOTE — ED NOTES
"Pt V/S reassessed, Pt V/S within normal limits. Advised Pt of wait times , Pt stated \"I understand\". Pt denied any new pain or symptoms at this moment.   "

## 2018-04-03 NOTE — ED PROVIDER NOTES
CHIEF COMPLAINT  Chief Complaint   Patient presents with   • Diarrhea     hx c diff       HPI  Angie Root is a 78 y.o. female who presents with diarrheal stool since earlier today. Also with mucoid discharge. Approximately 6 bowel movements today. States that she recently finished 8 weeks of oral vancomycin for C. diff infection. Has been struggling with C. diff since December 2017. Has been on 2 rounds of antibiotics including oral vancomycin most recently. Has been off of this medicine for the past 10 days without any issues until today. No bloody stool. No vomiting. No significant abdominal pains.    Denies chest pain or shortness of breath.    Has a history of cholecystectomy.    REVIEW OF SYSTEMS  See HPI for further details. All other systems are negative.     PAST MEDICAL HISTORY   has a past medical history of CAD (coronary artery disease); COPD (chronic obstructive pulmonary disease) (CMS-HCC); Diverticulosis; Dyslipidemia; GERD (gastroesophageal reflux disease); Hiatal hernia; Hypertension; Paroxysmal a-fib (CMS-HCC) (1/20/2016); Paroxysmal atrial fibrillation (CMS-HCC); Prediabetes; and PVD (peripheral vascular disease) (CMS-HCC).    SOCIAL HISTORY  Social History     Social History Main Topics   • Smoking status: Current Every Day Smoker     Packs/day: 0.25     Years: 60.00     Types: Cigarettes     Start date: 3/20/1957   • Smokeless tobacco: Never Used      Comment: 6 per day   • Alcohol use No   • Drug use: No   • Sexual activity: Not Currently     Partners: Male       SURGICAL HISTORY   has a past surgical history that includes tonsillectomy (1945); cholecystectomy (1993); cardiac cath, right heart (2008); wide excision melanoma, leg, w/poss.stsg (10/2015); and stent placement.    CURRENT MEDICATIONS  Home Medications    **Home medications have not yet been reviewed for this encounter**         ALLERGIES  Allergies   Allergen Reactions   • Codeine Swelling   • Iodine Swelling   • Bee Venom    •  "Chantix [Varenicline]      disoriented   • Latex    • Pcn [Penicillins]    • Shellfish Allergy    • Tetanus Antitoxin        PHYSICAL EXAM  VITAL SIGNS: /57   Pulse 73   Temp 37 °C (98.6 °F) (Temporal)   Resp 16   Ht 1.575 m (5' 2\")   Wt 44.6 kg (98 lb 5.2 oz)   SpO2 95%   BMI 17.98 kg/m²   Pulse ox interpretation: I interpret this pulse ox as normal.  Constitutional: Alert in no apparent distress.  HENT: No signs of trauma, Bilateral external ears normal, Nose normal.   Eyes: Pupils are equal and reactive, Conjunctiva normal, Non-icteric.   Neck: Normal range of motion, No tenderness, Supple, No stridor.   Lymphatic: No lymphadenopathy noted.   Cardiovascular: Regular rate and rhythm, no murmurs.   Thorax & Lungs: Normal breath sounds, No respiratory distress, No wheezing, No chest tenderness.   Abdomen: Bowel sounds normal, Soft, No tenderness, No masses, No pulsatile masses. No peritoneal signs.  Skin: Warm, Dry, No erythema, No rash.   Back: No bony tenderness, No CVA tenderness.   Extremities: Intact distal pulses, No edema, No tenderness, No cyanosis  Musculoskeletal: Good range of motion in all major joints. No tenderness to palpation or major deformities noted.   Neurologic: Alert , Normal motor function and gait, Normal sensory function, No focal deficits noted.   Psychiatric: Affect normal, Judgment normal, Mood normal.       DIAGNOSTIC STUDIES / PROCEDURES      LABS  Labs Reviewed   CBC WITH DIFFERENTIAL - Abnormal; Notable for the following:        Result Value    WBC 13.5 (*)     MCHC 33.3 (*)     Lymphocytes 15.60 (*)     Neutrophils (Absolute) 9.72 (*)     Monos (Absolute) 1.44 (*)     All other components within normal limits    Narrative:     Indicate which anticoagulants the patient is on:->UNKNOWN   C DIFF TOXIN - Abnormal; Notable for the following:     C.Diff Toxin A&B      All other components within normal limits    Narrative:     Does this patient have risk factors for " C-diff?->Yes  C-Diff Risk Factors->advanced age   CULTURE STOOL   CDIFF BY PCR RFLX TOXIN    Narrative:     Does this patient have risk factors for C-diff?->Yes  C-Diff Risk Factors->advanced age   COMP METABOLIC PANEL    Narrative:     Indicate which anticoagulants the patient is on:->UNKNOWN   LIPASE    Narrative:     Indicate which anticoagulants the patient is on:->UNKNOWN   ESTIMATED GFR    Narrative:     Indicate which anticoagulants the patient is on:->UNKNOWN   ISTAT CO2   ISTAT GLUCOSE   ISTAT BUN   ISTAT CREATININE   ISTAT SODIUM   ISTAT POTASSIUM   ISTAT CHLORIDE       RADIOLOGY  No orders to display         COURSE & MEDICAL DECISION MAKING  Pertinent Labs & Imaging studies reviewed. (See chart for details)  78 y.o. In all a history of recent C. Difficile infection, just finished an 8 week course of vancomycin 10 days ago, presenting with recurrence of diarrhea along with mucoid discharge. No fevers, abdominal pain, bloody stool. Patient is in no distress.    Has had about 6 episodes of diarrhea throughout the day. Has not had much oral intake. Has dry mucous membranes on physical exam. Concern for dehydration. Was given IV fluid hydration for this.    Stool culture and C. Difficile stool studies were ordered. No results prior to the patient leaving. There does not appear to be indication for admission at this time. She was instructed to follow up tomorrow however with her C. Difficile reports with her primary care physician. Will likely require another round of oral treatment and associated positive. If this does not improve her symptoms, may require fecal transplant consideration. Referred to GI physician for further monitoring and management.    Patient was discharged home in stable condition. Instructed to follow-up with her lab studies in the next day or 2 to confirm C. Difficile toxin presents and to follow-up with her primary care physician to see if she should repeat vancomycin for continued  "treatment.     The patient was discharged home in stable condition. Reports very improved after fluid hydration.    /57   Pulse 97   Temp 37 °C (98.6 °F) (Temporal)   Resp 16   Ht 1.575 m (5' 2\")   Wt 44.6 kg (98 lb 5.2 oz)   SpO2 96%   BMI 17.98 kg/m²     The patient will return for worsening symptoms or failure of improvement and is stable at the time of discharge. The patient verbalizes understanding in their own words.    FINAL IMPRESSION  1. Diarrhea, unspecified type            Electronically signed by: Jamaal Roland, 4/2/2018 9:57 PM    "

## 2018-04-03 NOTE — DISCHARGE INSTRUCTIONS
Chronic Diarrhea  Diarrhea is frequent loose and watery bowel movements. It can cause you to feel weak and dehydrated. Dehydration can cause you to become tired and thirsty and to have a dry mouth, decreased urination, and dark yellow urine. Diarrhea is a sign of another problem, most often an infection that will not last long. In most cases, diarrhea lasts 2-3 days. Diarrhea that lasts longer than 4 weeks is called long-lasting (chronic) diarrhea. It is important to treat your diarrhea as directed by your health care provider to lessen or prevent future episodes of diarrhea.   CAUSES   There are many causes of chronic diarrhea. The following are some possible causes:   · Gastrointestinal infections caused by viruses, bacteria, or parasites.    · Food poisoning or food allergies.    · Certain medicines, such as antibiotics, chemotherapy, and laxatives.    · Artificial sweeteners and fructose.    · Digestive disorders, such as celiac disease and inflammatory bowel diseases.    · Irritable bowel syndrome.  · Some disorders of the pancreas.  · Disorders of the thyroid.  · Reduced blood flow to the intestines.  · Cancer.  Sometimes the cause of chronic diarrhea is unknown.  RISK FACTORS  · Having a severely weakened immune system, such as from HIV or AIDS.    · Taking certain types of cancer-fighting drugs (such as with chemotherapy) or other medicines.    · Having had a recent organ transplant.    · Having a portion of the stomach or small bowel removed.    · Traveling to countries where food and water supplies are often contaminated.    SYMPTOMS   In addition to frequent, loose stools, diarrhea may cause:   · Cramping.    · Abdominal pain.    · Nausea.    · Fever.  · Fatigue.  · Urgent need to use the bathroom.  · Loss of bowel control.  DIAGNOSIS   Your health care provider must take a careful history and perform a physical exam. Tests given are based on your symptoms and history. Tests may include:   · Blood or  stool tests. Three or more stool samples may be examined. Stool cultures may be used to test for bacteria or parasites.    · X-rays.    · A procedure in which a thin tube is inserted into the mouth or rectum (endoscopy). This allows the health care provider to look inside the intestine.    TREATMENT   · Treatment is aimed at correcting the cause of the diarrhea when possible.  · Diarrhea caused by an infection can often be treated with antibiotic medicines.  · Diarrhea not caused by an infection may require you to take long-term medicine or have surgery. Specific treatment should be discussed with your health care provider.  · If the cause cannot be determined, treatment aims to relieve symptoms and prevent dehydration. Serious health problems can occur if you do not maintain proper fluid levels. Treatment may include:  ¨ Taking an oral rehydration solution (ORS).  ¨ Not drinking beverages that contain caffeine (such as tea, coffee, and soft drinks).  ¨ Not drinking alcohol.  ¨ Maintaining well-balanced nutrition to help you recover faster.  HOME CARE INSTRUCTIONS   · Drink enough fluids to keep urine clear or pale yellow. Drink 1 cup (8 oz) of fluid for each diarrhea episode. Avoid fluids that contain simple sugars, fruit juices, whole milk products, and sodas. Hydrate with an ORS. You may purchase the ORS or prepare it at home by mixing the following ingredients together:  ¨ ?-? tsp (1.7-3 ? mL) table salt.  ¨ ¾ tsp (3 ¾ mL) baking soda.  ¨ ? tsp (1.7 mL) salt substitute containing potassium chloride.  ¨ 1 ? tbsp (20 mL) sugar.  ¨ 4.2 c (1 L) of water.    · Certain foods and beverages may increase the speed at which food moves through the gastrointestinal (GI) tract. These foods and beverages should be avoided. They include:  ¨ Caffeinated and alcoholic beverages.  ¨ High-fiber foods, such as raw fruits and vegetables, nuts, seeds, and whole grain breads and cereals.  ¨ Foods and beverages sweetened with sugar  alcohols, such as xylitol, sorbitol, and mannitol.    · Some foods may be well tolerated and may help thicken stool. These include:  ¨ Starchy foods, such as rice, toast, pasta, low-sugar cereal, oatmeal, grits, baked potatoes, crackers, and bagels.  ¨ Bananas.  ¨ Applesauce.  · Add probiotic-rich foods to help increase healthy bacteria in the GI tract. These include yogurt and fermented milk products.  · Wash your hands well after each diarrhea episode.  · Only take over-the-counter or prescription medicines as directed by your health care provider.  · Take a warm bath to relieve any burning or pain from frequent diarrhea episodes.  SEEK MEDICAL CARE IF:   · You are not urinating as often.  · Your urine is a dark color.  · You become very tired or dizzy.  · You have severe pain in the abdomen or rectum.  · Your have blood or pus in your stools.  · Your stools look black and tarry.  SEEK IMMEDIATE MEDICAL CARE IF:   · You are unable to keep fluids down.  · You have persistent vomiting.  · You have blood in your stool.  · Your stools are black and tarry.  · You do not urinate in 6-8 hours, or there is only a small amount of very dark urine.  · You have abdominal pain that increases or localizes.  · You have weakness, dizziness, confusion, or lightheadedness.  · You have a severe headache.  · Your diarrhea gets worse or does not get better.  · You have a fever or persistent symptoms for more than 2-3 days.  · You have a fever and your symptoms suddenly get worse.  MAKE SURE YOU:   · Understand these instructions.  · Will watch your condition.  · Will get help right away if you are not doing well or get worse.  This information is not intended to replace advice given to you by your health care provider. Make sure you discuss any questions you have with your health care provider.  Document Released: 03/09/2005 Document Revised: 12/23/2014 Document Reviewed: 06/12/2014  [employer] Interactive Patient Education © 2017  [employer]

## 2018-04-03 NOTE — ED TRIAGE NOTES
"Pt. Reports diarrhea for a couple of days. C/O \"mucus in stool.\" PT . Placed on BP cuff and pulse ox.  "

## 2018-04-04 ENCOUNTER — PATIENT OUTREACH (OUTPATIENT)
Dept: HEALTH INFORMATION MANAGEMENT | Facility: OTHER | Age: 78
End: 2018-04-04

## 2018-04-04 LAB
E COLI SXT1+2 STL IA: NORMAL
SIGNIFICANT IND 70042: NORMAL
SITE SITE: NORMAL
SOURCE SOURCE: NORMAL

## 2018-04-04 NOTE — ED NOTES
ED Positive Culture Follow-up/Notification Note:    Date: 04/04/2018     Patient seen in the ED on 4/2/2018 for diarrhea with mucoid discharge that began the day of arrival. Patient reports having approximately 6 bowel movements before coming to emergency department. Patient was diagnosed with Clostridium difficile infection in 12/2017 and again in 3/2018, for which she recently completed a course of PO vancomycin. Patient has been off vancomycin for approximately 10 days.   1. Diarrhea, unspecified type       Discharge Medication List as of 4/3/2018  3:22 AM          Allergies: Codeine; Iodine; Bee venom; Chantix [varenicline]; Latex; Pcn [penicillins]; Shellfish allergy; and Tetanus antitoxin     Vitals:    04/03/18 0130 04/03/18 0201 04/03/18 0230 04/03/18 0300   BP:       Pulse: 80 84 89 97   Resp: 16 16 16 16   Temp:       TempSrc:       SpO2: 95% 95% 92% 96%   Weight:       Height:           Final cultures:   Results     Procedure Component Value Units Date/Time    EHEC(SIGA TOXIN)DETECTION [[id number]] Collected:  04/02/18 2235    Order Status:  Completed Specimen:  Stool Updated:  04/04/18 1306     Significant Indicator NEG     Source STL     Site STOOL     EHEC Negative for Shiga Toxin 1 and 2.    CULTURE STOOL [[id number]] Collected:  04/02/18 2235    Order Status:  Completed Specimen:  Stool from Stool Updated:  04/04/18 1305     Significant Indicator NEG     Source STL     Site STOOL     Culture Result Stool Culture in progress.    NOTE:    Stool cultures are screened for Shiga Toxins 1 and 2,    Salmonella, Shigella, Campylobacter, Aeromonas,    Plesiomonas, and Vibrio.       EHEC Negative for Shiga Toxin 1 and 2.    C DIFF TOXIN [[id number]]  (Abnormal) Collected:  04/02/18 2235    Order Status:  Completed Updated:  04/03/18 1205     C.Diff Toxin A&B Positive (A)     Comment: TOXIN POSITIVE  Toxin detected by EIA; C. difficile detected by PCR.  If clinically correlated, treatment indicated per  guidelines.  Test of cure is not recommended.         Narrative:       Does this patient have risk factors for C-diff?->Yes  C-Diff Risk Factors->advanced age    CDIFF BY PCR WITH TOXIN [[id number]] Collected:  04/02/18 2235    Order Status:  Completed Specimen:  Stool from Stool Updated:  04/03/18 1204     C Diff by PCR See Toxin     027-NAP1-BI Presumptive Negative     Comment: Presumptive 027/NAP1/BI target DNA sequences are NOT DETECTED.       Narrative:       Does this patient have risk factors for C-diff?->Yes  C-Diff Risk Factors->advanced age          Plan:   Diagnosed with diarrhea of unspecified type.  Positive for Clostridium difficile by PCR and for toxin by EIA.    PO vancomycin was not prescribed to patient upon discharge from the ED, was instructed to follow-up with primary care physician.  I called the patient and left a voicemail x 2 and highly encouraged that they return my call as soon as possible.  With this being her second recurrence, the new Clostridium difficile guidelines either recommend PO vancomycin treatment with taper, PO vancomycin with a rifaximin chaser, fidaxomicin, or a fecal microbiota transplant.  If phone call is returned, I will discuss all of these options with her.   Given the fact that she has already received two courses of PO vancomycin and this is her second recurrence, I personally think her most valuable option would be referred to GI for evaluation for fecal microbiota transplant as it has the best success for recurrent Clostridium difficile infection.  Will continue to try to reach patient and send letter notifying her of results in the meantime. With instructions to return my call at her earliest convenience.      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 Despite all that has happened, she is still very pleasant and in great spirits. I informed her of her test results,  which wasn't a surprise to her. She has an appointment with a GI specialist tomorrow morning. She is very well educated on Clostridium difficile and plans to discuss about fecal microbiota transplant with him tomorrow. She has previously gotten her PO vancomycin at [facility]. I offered to call in a prescription for her tonight, however, since she has the appointment tomorrow she wanted to defer treatment options to them. I advised patient to call me anytime in the future should she have any questions or concerns.    Daniel Pike, YaneliD, BCPS

## 2018-04-06 LAB
BACTERIA STL CULT: NORMAL
E COLI SXT1+2 STL IA: NORMAL
SIGNIFICANT IND 70042: NORMAL
SITE SITE: NORMAL
SOURCE SOURCE: NORMAL

## 2018-04-09 ENCOUNTER — HOSPITAL ENCOUNTER (OUTPATIENT)
Facility: MEDICAL CENTER | Age: 78
End: 2018-04-09
Attending: INTERNAL MEDICINE | Admitting: INTERNAL MEDICINE
Payer: MEDICARE

## 2018-04-09 VITALS
TEMPERATURE: 97 F | HEART RATE: 91 BPM | DIASTOLIC BLOOD PRESSURE: 63 MMHG | HEIGHT: 62 IN | BODY MASS INDEX: 17.69 KG/M2 | RESPIRATION RATE: 16 BRPM | OXYGEN SATURATION: 94 % | SYSTOLIC BLOOD PRESSURE: 118 MMHG | WEIGHT: 96.12 LBS

## 2018-04-09 LAB
INR PPP: 4.63 (ref 0.87–1.13)
PROTHROMBIN TIME: 43.5 SEC (ref 12–14.6)

## 2018-04-09 PROCEDURE — 700111 HCHG RX REV CODE 636 W/ 250 OVERRIDE (IP)

## 2018-04-09 PROCEDURE — 99152 MOD SED SAME PHYS/QHP 5/>YRS: CPT | Performed by: INTERNAL MEDICINE

## 2018-04-09 PROCEDURE — 160002 HCHG RECOVERY MINUTES (STAT)

## 2018-04-09 PROCEDURE — 85610 PROTHROMBIN TIME: CPT

## 2018-04-09 PROCEDURE — 160048 HCHG OR STATISTICAL LEVEL 1-5: Performed by: INTERNAL MEDICINE

## 2018-04-09 PROCEDURE — 99153 MOD SED SAME PHYS/QHP EA: CPT | Performed by: INTERNAL MEDICINE

## 2018-04-09 PROCEDURE — 700101 HCHG RX REV CODE 250

## 2018-04-09 PROCEDURE — 502000 HCHG MISC OR IMPLANTS RC 0278: Performed by: INTERNAL MEDICINE

## 2018-04-09 PROCEDURE — 160208 HCHG ENDO MINUTES - EA ADDL 1 MIN LEVEL 4: Performed by: INTERNAL MEDICINE

## 2018-04-09 PROCEDURE — 160203 HCHG ENDO MINUTES - 1ST 30 MINS LEVEL 4: Performed by: INTERNAL MEDICINE

## 2018-04-09 RX ORDER — SODIUM CHLORIDE 9 MG/ML
500 INJECTION, SOLUTION INTRAVENOUS
Status: DISCONTINUED | OUTPATIENT
Start: 2018-04-09 | End: 2018-04-09 | Stop reason: HOSPADM

## 2018-04-09 RX ORDER — MIDAZOLAM HYDROCHLORIDE 1 MG/ML
INJECTION INTRAMUSCULAR; INTRAVENOUS
Status: DISCONTINUED | OUTPATIENT
Start: 2018-04-09 | End: 2018-04-09 | Stop reason: HOSPADM

## 2018-04-09 RX ORDER — WARFARIN SODIUM 1 MG/1
1 TABLET ORAL EVERY EVENING
COMMUNITY
End: 2018-04-19 | Stop reason: SDUPTHER

## 2018-04-09 RX ORDER — LIDOCAINE HYDROCHLORIDE 10 MG/ML
0.5 INJECTION, SOLUTION INFILTRATION; PERINEURAL
Status: DISCONTINUED | OUTPATIENT
Start: 2018-04-09 | End: 2018-04-09 | Stop reason: HOSPADM

## 2018-04-09 RX ORDER — MIDAZOLAM HYDROCHLORIDE 1 MG/ML
.5-2 INJECTION INTRAMUSCULAR; INTRAVENOUS PRN
Status: DISCONTINUED | OUTPATIENT
Start: 2018-04-09 | End: 2018-04-09 | Stop reason: HOSPADM

## 2018-04-09 RX ORDER — LIDOCAINE HYDROCHLORIDE 10 MG/ML
INJECTION, SOLUTION INFILTRATION; PERINEURAL
Status: COMPLETED
Start: 2018-04-09 | End: 2018-04-09

## 2018-04-09 RX ORDER — SODIUM CHLORIDE, SODIUM LACTATE, POTASSIUM CHLORIDE, CALCIUM CHLORIDE 600; 310; 30; 20 MG/100ML; MG/100ML; MG/100ML; MG/100ML
INJECTION, SOLUTION INTRAVENOUS CONTINUOUS
Status: DISCONTINUED | OUTPATIENT
Start: 2018-04-09 | End: 2018-04-09 | Stop reason: HOSPADM

## 2018-04-09 RX ADMIN — SODIUM CHLORIDE, SODIUM LACTATE, POTASSIUM CHLORIDE, CALCIUM CHLORIDE: 600; 310; 30; 20 INJECTION, SOLUTION INTRAVENOUS at 08:50

## 2018-04-09 RX ADMIN — LIDOCAINE HYDROCHLORIDE 0.5 ML: 10 INJECTION, SOLUTION INFILTRATION; PERINEURAL at 08:50

## 2018-04-09 ASSESSMENT — PAIN SCALES - GENERAL
PAINLEVEL_OUTOF10: 0
PAINLEVEL_OUTOF10: 2

## 2018-04-09 NOTE — OP REPORT
DATE OF SERVICE:  04/09/2018    PROCEDURE PERFORMED:  Colonoscopy for fecal microbiota transplantation.    INDICATION FOR PROCEDURE:  A 78-year-old female with recurrent and   recalcitrant Clostridium difficile colitis.    CONSENT:  Procedure risks and benefits reviewed thoroughly with the patient.    Risks including, but not limited to bleeding, perforation, side effects of   medication were informed.  Patient voiced understanding and agreed to proceed.    Additional risks inherent to fecal microbiota transplantation was reviewed   thoroughly.  Consent was obtained, patient signed, and present in chart both   here as well as within my trial at [facility].  Risks and   benefits of fecal microbiota transplantation were reviewed according to   guidelines as established by committees here at [facility].    MEDICATIONS:  1.  A 6 mg of Versed and 150 mcg of fentanyl.  2.  Approximately 500 mL of OpenBiome stool instilled into the right side of   colon.    PREPROCEDURE DIAGNOSIS:  Recurrent Clostridium difficile colitis.    POSTPROCEDURE DIAGNOSES:  1.  Severe sigmoid diverticulosis.  2.  Pseudomembranes throughout the colon, indicative of Clostridium difficile   colitis.  3.  Successful fecal microbiota transplantation with instillation of stool   into the right side of colon.  4.  Complete colonoscopy not performed due to severity of inflammation, but   ascending colon intubation achieved.    DESCRIPTION OF PROCEDURE:  The patient was placed in left lateral decubitus   position.  Rectal examination revealed no palpable abnormalities.  The   colonoscope was inserted in the rectum and advanced to the ascending colon.    On our way to the ascending colon, there was significant severe diverticulosis   of the sigmoid and descending colon.  Traversing this area was exceedingly   challenging, but careful maneuvering of the scope allowed for intubation of   the ascending colon.  Because of the  significant amount of pseudomembranes   appreciated throughout the entire colon, I did not feel comfortable advancing   the scope to the cecum due to risk for complication.  Therefore, all the   OpenBiome stools instilled into the right colon tilting the patient's body to   allow the stool to achieve cecal presence.  The scope was carefully retracted.    No complications were appreciated.  No obvious lesions, polyps, or masses   were appreciated.  Rectum desufflated and scope was removed.    COMPLICATIONS:  None.    BLOOD LOSS:  None.    SPECIMENS:  None.    RECOMMENDATIONS:  1.  Successful fecal microbiota transplantation.  2.  Patient's INR was elevated beyond 4.  Risks and benefits of proceeding   with the evaluation and treatment with fecal microbiota transportation versus   cancelling the procedure was reviewed with the patient.  Patient and    want to proceed with procedure despite INR.  I did advise them to hold their   Coumadin this evening and to obtain an INR per Coumadin clinic tomorrow and   adjust Coumadin dosing based on that result.  They voiced understanding of   this.  3.  The importance of achieving a successful stool transplantation and   response was reviewed thoroughly.  To improve this, recommendations for   complete tobacco abstinence were recommended.  In addition, dietary changes to   improve the viability of the stool transplanted in a bacterial growth to   supplant the C. diff infection was also discussed thoroughly both in the   clinic and here again in the hospital.  All questions were answered to their   satisfaction.       ____________________________________     MD GABY Waggoner / JULIUS    DD:  04/09/2018 11:01:52  DT:  04/09/2018 11:27:49    D#:  [id number]  Job#:  [id number]

## 2018-04-09 NOTE — PROGRESS NOTES
Dr. Cruz made aware of pt's INR, Dr. Cruz states that pt does not have to have redraw INR, RN cancelled INR lab draw

## 2018-04-09 NOTE — DISCHARGE INSTRUCTIONS
ACTIVITY: Rest and take it easy for the first 24 hours.  A responsible adult is recommended to remain with you during that time.  It is normal to feel sleepy.  We encourage you to not do anything that requires balance, judgment or coordination.    MILD FLU-LIKE SYMPTOMS ARE NORMAL. YOU MAY EXPERIENCE GENERALIZED MUSCLE ACHES, THROAT IRRITATION, HEADACHE AND/OR SOME NAUSEA.    FOR 24 HOURS DO NOT:  Drive, operate machinery or run household appliances.  Drink beer or alcoholic beverages.   Make important decisions or sign legal documents.    SPECIAL INSTRUCTIONS:   No special instructions, make follow up appt.    DIET: To avoid nausea, slowly advance diet as tolerated, avoiding spicy or greasy foods for the first day.  Add more substantial food to your diet according to your physician's instructions.  Babies can be fed formula or breast milk as soon as they are hungry.  INCREASE FLUIDS AND FIBER TO AVOID CONSTIPATION.    SURGICAL DRESSING/BATHING: NA    FOLLOW-UP APPOINTMENT:  A follow-up appointment should be arranged with your doctor in 1-2 weeks; call to schedule.    You should CALL YOUR PHYSICIAN if you develop:  Fever greater than 101 degrees F.  Pain not relieved by medication, or persistent nausea or vomiting.  Excessive bleeding (blood soaking through dressing) or unexpected drainage from the wound.  Extreme redness or swelling around the incision site, drainage of pus or foul smelling drainage.  Inability to urinate or empty your bladder within 8 hours.  Problems with breathing or chest pain.    You should call 911 if you develop problems with breathing or chest pain.  If you are unable to contact your doctor or surgical center, you should go to the nearest emergency room or urgent care center.  Physician's telephone #: [phone]    If any questions arise, call your doctor.  If your doctor is not available, please feel free to call the Surgical Center at [phone].  The Center is open Monday through  Friday from 7AM to 7PM.  You can also call the HEALTH HOTLINE open 24 hours/day, 7 days/week and speak to a nurse at [phone], or toll free at [phone].    A registered nurse may call you a few days after your surgery to see how you are doing after your procedure.    MEDICATIONS: Resume taking daily medication.  Take prescribed pain medication with food.  If no medication is prescribed, you may take non-aspirin pain medication if needed.  PAIN MEDICATION CAN BE VERY CONSTIPATING.  Take a stool softener or laxative such as senokot, pericolace, or milk of magnesia if needed.    Can take medications anytime    If your physician has prescribed pain medication that includes Acetaminophen (Tylenol), do not take additional Acetaminophen (Tylenol) while taking the prescribed medication.    Depression / Suicide Risk    As you are discharged from this [facility] facility, it is important to learn how to keep safe from harming yourself.    Recognize the warning signs:  · Abrupt changes in personality, positive or negative- including increase in energy   · Giving away possessions  · Change in eating patterns- significant weight changes-  positive or negative  · Change in sleeping patterns- unable to sleep or sleeping all the time   · Unwillingness or inability to communicate  · Depression  · Unusual sadness, discouragement and loneliness  · Talk of wanting to die  · Neglect of personal appearance   · Rebelliousness- reckless behavior  · Withdrawal from people/activities they love  · Confusion- inability to concentrate     If you or a loved one observes any of these behaviors or has concerns about self-harm, here's what you can do:  · Talk about it- your feelings and reasons for harming yourself  · Remove any means that you might use to hurt yourself (examples: pills, rope, extension cords, firearm)  · Get professional help from the community (Mental Health, Substance Abuse, psychological counseling)  · Do not be  alone:Call your Safe Contact- someone whom you trust who will be there for you.  · Call your local CRISIS HOTLINE 971-0753 or 827-701-8860  · Call your local Children's Mobile Crisis Response Team [place] (898) 045-3988 or www.Luxodo  · Call the toll free National Suicide Prevention Hotlines   · National Suicide Prevention Lifeline 863-090-LMUH (7893)  · National [facility] 800-SUICIDE (836-3620)

## 2018-04-10 ENCOUNTER — ANTICOAGULATION MONITORING (OUTPATIENT)
Dept: VASCULAR LAB | Facility: MEDICAL CENTER | Age: 78
End: 2018-04-10

## 2018-04-10 ENCOUNTER — HOSPITAL ENCOUNTER (OUTPATIENT)
Dept: LAB | Facility: MEDICAL CENTER | Age: 78
End: 2018-04-10
Attending: NURSE PRACTITIONER
Payer: MEDICARE

## 2018-04-10 DIAGNOSIS — I48.0 PAROXYSMAL A-FIB (HCC): ICD-10-CM

## 2018-04-10 LAB
INR PPP: 4.02 (ref 0.87–1.13)
PROTHROMBIN TIME: 38.9 SEC (ref 12–14.6)

## 2018-04-10 PROCEDURE — 36415 COLL VENOUS BLD VENIPUNCTURE: CPT

## 2018-04-10 PROCEDURE — 85610 PROTHROMBIN TIME: CPT

## 2018-04-10 NOTE — PROGRESS NOTES
OP Telephone Anticoagulation Service Note    Date: 4/10/2018      Anticoagulation Summary  As of 4/10/2018    INR goal:   2.0-3.0   TTR:   86.9 % (3.6 mo)   Today's INR:   4.02!   Maintenance plan:   1 mg (1 mg x 1) every day   Weekly total:   7 mg   Plan last modified:   Pieter Covarrubias PharmD (12/21/2017)   Next INR check:   4/12/2018   Target end date:   Indefinite    Indications    Paroxysmal a-fib (CMS-HCC) [I48.0]             Anticoagulation Episode Summary     INR check location[facility]   [facility]    Preferred lab:       Send INR reminders to:       Comments:   [phone] (H)      Anticoagulation Care Providers     Provider Role Specialty Phone number    Evelio Tejeda M.D. Referring Internal Medicine [phone]    [facility] Responsible  [phone]    Beryl Pang M.D. Responsible Family Medicine [phone]        Anticoagulation Patient Findings        Plan: INR is high today. It was high yesterday, she had a fecal transplant.  Confirmed dosing regimen.She held her dose yesterday.  Patient denies sign/symptoms of bleeding/clotting. No recent medication changes and patient has been eating a consistent diet.  She has had bad diarrhea d/t C diff but should resolve now that pt had fecal transplant. Instructed pt to call clinic with any concerns of bleeding or thrombosis. Instructed pt to HOLD today then resume 1 mg daily.  Follow up in 2 day(s)      Odalis Gaines PharmD

## 2018-04-12 ENCOUNTER — ANTICOAGULATION VISIT (OUTPATIENT)
Dept: MEDICAL GROUP | Facility: PHYSICIAN GROUP | Age: 78
End: 2018-04-12
Payer: MEDICARE

## 2018-04-12 DIAGNOSIS — I48.0 PAROXYSMAL A-FIB (HCC): ICD-10-CM

## 2018-04-12 LAB — INR PPP: 2.9 (ref 2–3.5)

## 2018-04-12 PROCEDURE — 99211 OFF/OP EST MAY X REQ PHY/QHP: CPT | Performed by: INTERNAL MEDICINE

## 2018-04-12 PROCEDURE — 85610 PROTHROMBIN TIME: CPT | Performed by: INTERNAL MEDICINE

## 2018-04-12 NOTE — PROGRESS NOTES
OP Anticoagulation Service Note    Date: 4/12/2018  There were no vitals filed for this visit.    Anticoagulation Summary  As of 4/12/2018    INR goal:   2.0-3.0   TTR:   85.5 % (3.6 mo)   Today's INR:   2.9   Maintenance plan:   0.5 mg (1 mg x 0.5) on Mon, Fri; 1 mg (1 mg x 1) all other days   Weekly total:   6 mg   Plan last modified:   Pieter Covarrubias, PharmD (4/12/2018)   Next INR check:   4/19/2018   Target end date:   Indefinite    Indications    Paroxysmal a-fib (CMS-HCC) [I48.0]             Anticoagulation Episode Summary     IN[facility] check location[facility]   [facility]    Preferred lab:       Send INR reminders to:       Comments:   [phone] (H)      Anticoagulation Care Providers     Provider Role Specialty Phone number    Evelio Tejeda M.D. Referring Internal Medicine [phone]    [facility] Responsible  [phone]    Beryl Pang M.D. Responsible Family Medicine [phone]        Anticoagulation Patient Findings      HPI:   Angie Root seen in clinic today, they are here today for a INR check on anticoagulation therapy with warfarin because they have atrial fib    The reason for today's visit is to prevent morbidity and mortality from a stroke  and to reduce the risk of bleeding while on a anticoagulant.     Reason for today's visit (per our collaborative practice policy) is because their last INR was 4.02  on  4/10/2018.   Intervention at the last visit:  Held a dose     Additional education provided today regarding reducing bleed risk and dietary constraints:  About diarrhea and INRs    Any upcoming procedures:   none    Confirmed warfarin dosing regimen  Interval Changes with foods rich in vitamin K: No  Interval Changes in ETOH:   No  Interval Changes in smoking status:  No  Interval Changes in medication:  No   Cost restriction:  No    S/S of bleeding or bruising:  No  Signs/symptoms  thrombosis since the last appt:  No  Bleed risk is:  high,     3 vitals included  with today's appt :declined today   (BP, HR, weight, ht, RR)     Assessment:   INR  -therapeutic.     Possible reason(s) for INR today:   due to holding dose I will need to decrease the dose to 6mg per week       They have a TTR of 85.5  which is not at target (TTR target/goal is 100%) and requires close follow up to prevent a adverse event (the lower the TTR the higher risk of clots, strokes, or bleeding).       Plan:  Decrease weekly warfarin dose as noted        Follow up:  Follow up appointment in 1 week(s)       Other info:  Pt educated to contact our clinic with any changes in medications or s/s of bleeding or thrombosis    CHEST guidelines recommend frequent INR monitoring at regular intervals (a few days up to a max of 12 weeks) to ensure they are on the proper dose of warfarin and not having any complications from therapy.  INRs can dramatically change over a short time period due to diet, medications, and medical conditions.

## 2018-04-17 PROBLEM — Z79.01 CHRONIC ANTICOAGULATION: Status: ACTIVE | Noted: 2018-04-17

## 2018-04-19 ENCOUNTER — ANTICOAGULATION VISIT (OUTPATIENT)
Dept: MEDICAL GROUP | Facility: PHYSICIAN GROUP | Age: 78
End: 2018-04-19
Payer: MEDICARE

## 2018-04-19 DIAGNOSIS — I48.0 PAROXYSMAL A-FIB (HCC): ICD-10-CM

## 2018-04-19 LAB — INR PPP: 2.9 (ref 2–3.5)

## 2018-04-19 PROCEDURE — 85610 PROTHROMBIN TIME: CPT | Performed by: INTERNAL MEDICINE

## 2018-04-19 RX ORDER — WARFARIN SODIUM 1 MG/1
1 TABLET ORAL EVERY EVENING
Qty: 90 TAB | Refills: 1 | Status: SHIPPED | OUTPATIENT
Start: 2018-04-19 | End: 2018-11-29 | Stop reason: SDUPTHER

## 2018-04-19 NOTE — PROGRESS NOTES
OP Anticoagulation Service Note    Date: 4/19/2018  There were no vitals filed for this visit.    Anticoagulation Summary  As of 4/19/2018    INR goal:   2.0-3.0   TTR:   86.4 % (3.9 mo)   Today's INR:   2.9   Maintenance plan:   0.5 mg (1 mg x 0.5) on Mon, Fri; 1 mg (1 mg x 1) all other days   Weekly total:   6 mg   Plan last modified:   Pieter Covarrubias, PharmD (4/12/2018)   Next INR check:   5/3/2018   Target end date:   Indefinite    Indications    Paroxysmal a-fib (CMS-HCC) [I48.0]             Anticoagulation Episode Summary     INR check location[facility]   [facility]    Preferred lab:       Send INR reminders to:       Comments:   [phone] (H)      Anticoagulation Care Providers     Provider Role Specialty Phone number    Evleio Tejeda M.D. Referring Internal Medicine [phone]    [facility] Responsible  [phone]    Beryl Pang M.D. Responsible Family Medicine [phone]        Anticoagulation Patient Findings      HPI:   Angie Root seen in clinic today, they are here today for a INR check on anticoagulation therapy with warfarin because they have afib    The reason for today's visit is to prevent morbidity and mortality from a stroke  and to reduce the risk of bleeding while on a anticoagulant.     Reason for today's visit (per our collaborative practice policy) is because their last INR was 2.9  on  4/12/2018.   Intervention at the last visit:  none    Additional education provided today regarding reducing bleed risk and dietary constraints:  About bleeding risk with warfarin     Any upcoming procedures:   no    Confirmed warfarin dosing regimen  Interval Changes with foods rich in vitamin K: No  Interval Changes in ETOH:   No  Interval Changes in smoking status:  No  Interval Changes in medication:  No   Cost restriction:  No    S/S of bleeding or bruising:  No  Signs/symptoms  thrombosis since the last appt:  No  Bleed risk is:  moderate,     3 vitals included with  today's appt :None today   (BP, HR, weight, ht, RR)     Assessment:   INR  therapeutic.     No change in dose needed today, they will need to continue with the same dose and diet to prevent adverse events while on a anticoagulant.     They have a TTR of 86.4  which is not at target (TTR target/goal is 100%) and requires close follow up to prevent a adverse event (the lower the TTR the higher risk of clots, strokes, or bleeding).       Plan:  Continue weekly warfarin dose as noted      Follow up:  Follow up appointment in 2 week(s)       Other info:  Pt educated to contact our clinic with any changes in medications or s/s of bleeding or thrombosis    CHEST guidelines recommend frequent INR monitoring at regular intervals (a few days up to a max of 12 weeks) to ensure they are on the proper dose of warfarin and not having any complications from therapy.  INRs can dramatically change over a short time period due to diet, medications, and medical conditions.

## 2018-04-26 ENCOUNTER — HOSPITAL ENCOUNTER (OUTPATIENT)
Facility: MEDICAL CENTER | Age: 78
End: 2018-04-26
Attending: INTERNAL MEDICINE
Payer: MEDICARE

## 2018-04-26 PROCEDURE — 87324 CLOSTRIDIUM AG IA: CPT

## 2018-04-26 PROCEDURE — 87493 C DIFF AMPLIFIED PROBE: CPT

## 2018-04-27 LAB
C DIFF DNA SPEC QL NAA+PROBE: NEGATIVE
C DIFF TOX A+B STL QL IA: NEGATIVE
C DIFF TOX GENS STL QL NAA+PROBE: NORMAL

## 2018-05-03 ENCOUNTER — ANTICOAGULATION VISIT (OUTPATIENT)
Dept: MEDICAL GROUP | Facility: PHYSICIAN GROUP | Age: 78
End: 2018-05-03
Payer: MEDICARE

## 2018-05-03 DIAGNOSIS — Z79.01 CHRONIC ANTICOAGULATION: Primary | ICD-10-CM

## 2018-05-03 DIAGNOSIS — I48.0 PAROXYSMAL A-FIB (HCC): ICD-10-CM

## 2018-05-03 LAB — INR PPP: 2.7 (ref 2–3.5)

## 2018-05-03 PROCEDURE — 99999 POCT PROTIME: CPT | Performed by: INTERNAL MEDICINE

## 2018-05-03 PROCEDURE — 85610 PROTHROMBIN TIME: CPT | Performed by: INTERNAL MEDICINE

## 2018-05-03 NOTE — PROGRESS NOTES
OP Anticoagulation Service Note    Date: 5/3/2018  There were no vitals filed for this visit.    Anticoagulation Summary  As of 5/3/2018    INR goal:   2.0-3.0   TTR:   87.9 % (4.3 mo)   Today's INR:   2.7   Warfarin maintenance plan:   0.5 mg (1 mg x 0.5) on Mon, Fri; 1 mg (1 mg x 1) all other days   Weekly warfarin total:   6 mg   Plan last modified:   Pieter Covarrubias, PharmD (4/12/2018)   Next INR check:   5/24/2018   Target end date:   Indefinite    Indications    Paroxysmal a-fib (CMS-HCC) [I48.0]  Chronic anticoagulation [Z79.01]             Anticoagulation Episode Summary     INR check location[facility]   [facility]    Preferred lab:       Send INR reminders to:       Comments:   [phone] (H)      Anticoagulation Care Providers     Provider Role Specialty Phone number    Evelio Tejeda M.D. Referring Internal Medicine [phone]    [facility] Responsible  [phone]    Beryl Pang M.D. Responsible Family Medicine [phone]        Anticoagulation Patient Findings      HPI:   Angie Root seen in clinic today, they are here today for a INR check on anticoagulation therapy with warfarin because they have atrial fib    The reason for today's visit is to prevent morbidity and mortality from a stroke  and to reduce the risk of bleeding while on a anticoagulant.     Reason for today's visit (per our collaborative practice policy) is because their last INR was 2.2  on  4/19.   Intervention at the last visit:  none    Additional education provided today regarding reducing bleed risk and dietary constraints:  About diet    Any upcoming procedures:   no    Confirmed warfarin dosing regimen  Interval Changes with foods rich in vitamin K: No  Interval Changes in ETOH:   No  Interval Changes in smoking status:  No  Interval Changes in medication:  No   Cost restriction:  No    S/S of bleeding or bruising:  No  Signs/symptoms  thrombosis since the last appt:  No  Bleed risk is:  moderate,      3 vitals included with today's appt :No  (BP, HR, weight, ht, RR)     Assessment:   INR  therapeutic.     No change in dose needed today, they will need to continue with the same dose and diet to prevent adverse events while on a anticoagulant.     They have a TTR of 87.9  which is not at target (TTR target/goal is 100%) and requires close follow up to prevent a adverse event (the lower the TTR the higher risk of clots, strokes, or bleeding).       Plan:  Continue weekly warfarin dose as noted      Follow up:  Follow up appointment in 3 week(s)       Other info:  Pt educated to contact our clinic with any changes in medications or s/s of bleeding or thrombosis    CHEST guidelines recommend frequent INR monitoring at regular intervals (a few days up to a max of 12 weeks) to ensure they are on the proper dose of warfarin and not having any complications from therapy.  INRs can dramatically change over a short time period due to diet, medications, and medical conditions.

## 2018-05-14 DIAGNOSIS — E78.5 DYSLIPIDEMIA: ICD-10-CM

## 2018-05-15 RX ORDER — LOVASTATIN 40 MG/1
40 TABLET ORAL EVERY EVENING
Qty: 90 TAB | Refills: 3 | Status: SHIPPED | OUTPATIENT
Start: 2018-05-15 | End: 2019-03-12 | Stop reason: SDUPTHER

## 2018-05-24 ENCOUNTER — ANTICOAGULATION VISIT (OUTPATIENT)
Dept: MEDICAL GROUP | Facility: PHYSICIAN GROUP | Age: 78
End: 2018-05-24
Payer: MEDICARE

## 2018-05-24 DIAGNOSIS — Z79.01 CHRONIC ANTICOAGULATION: Primary | ICD-10-CM

## 2018-05-24 DIAGNOSIS — I48.0 PAROXYSMAL A-FIB (HCC): ICD-10-CM

## 2018-05-24 LAB — INR PPP: 2.4 (ref 2–3.5)

## 2018-05-24 PROCEDURE — 85610 PROTHROMBIN TIME: CPT | Performed by: PHYSICIAN ASSISTANT

## 2018-05-24 PROCEDURE — 85610 PROTHROMBIN TIME: CPT | Performed by: INTERNAL MEDICINE

## 2018-05-24 NOTE — PROGRESS NOTES
OP Anticoagulation Service Note    Date: 5/24/2018  There were no vitals filed for this visit.    Anticoagulation Summary  As of 5/24/2018    INR goal:   2.0-3.0   TTR:   89.5 % (5 mo)   Today's INR:   2.4   Warfarin maintenance plan:   0.5 mg (1 mg x 0.5) on Mon, Fri; 1 mg (1 mg x 1) all other days   Weekly warfarin total:   6 mg   Plan last modified:   Pieter Covarrubias, PharmD (4/12/2018)   Next INR check:   6/28/2018   Target end date:   Indefinite    Indications    Paroxysmal a-fib (CMS-HCC) [I48.0]  Chronic anticoagulation [Z79.01]             Anticoagulation Episode Summary     INR check location:   [facility]    Preferred lab:       Send INR reminders to:       Comments:   [phone] (H)      Anticoagulation Care Providers     Provider Role Specialty Phone number    Evelio Tejeda M.D. Referring Internal Medicine [phone]    [facility] Responsible  [phone]    Beryl Pang M.D. Responsible Family Medicine [phone]        Anticoagulation Patient Findings      HPI:   Angie Root seen in clinic today, they are here today for a INR check on anticoagulation therapy with warfarin because they have atrial fib    The reason for today's visit is to prevent morbidity and mortality from a stroke  and to reduce the risk of bleeding while on a anticoagulant.     Reason for today's visit (per our collaborative practice policy) is because their last INR was 2.7  on  5/3.   Intervention at the last visit:  none    Additional education provided today regarding reducing bleed risk and dietary constraints:  About diet    Any upcoming procedures:   none    Confirmed warfarin dosing regimen  Interval Changes with foods rich in vitamin K: No  Interval Changes in ETOH:   No  Interval Changes in smoking status:  No  Interval Changes in medication:  No   Cost restriction:  No    S/S of bleeding or bruising:  No  Signs/symptoms  thrombosis since the last appt:  No  Bleed risk is:   moderate,     3 vitals included with today's appt :No  (BP, HR, weight, ht, RR)     Assessment:   INR  therapeutic.     No change in dose needed today, they will need to continue with the same dose and diet to prevent adverse events while on a anticoagulant.     They have a TTR of 87.9  which is not at target (TTR target/goal is 100%) and requires close follow up to prevent a adverse event (the lower the TTR the higher risk of clots, strokes, or bleeding).       Plan:  Continue weekly warfarin dose as noted      Follow up:  Follow up appointment in 5 week(s)       Other info:  Pt educated to contact our clinic with any changes in medications or s/s of bleeding or thrombosis    CHEST guidelines recommend frequent INR monitoring at regular intervals (a few days up to a max of 12 weeks) to ensure they are on the proper dose of warfarin and not having any complications from therapy.  INRs can dramatically change over a short time period due to diet, medications, and medical conditions.

## 2018-06-28 ENCOUNTER — ANTICOAGULATION VISIT (OUTPATIENT)
Dept: MEDICAL GROUP | Facility: PHYSICIAN GROUP | Age: 78
End: 2018-06-28
Payer: MEDICARE

## 2018-06-28 DIAGNOSIS — I48.0 PAROXYSMAL A-FIB (HCC): ICD-10-CM

## 2018-06-28 DIAGNOSIS — Z79.01 CHRONIC ANTICOAGULATION: ICD-10-CM

## 2018-06-28 LAB — INR PPP: 1.9 (ref 2–3.5)

## 2018-06-28 PROCEDURE — 85610 PROTHROMBIN TIME: CPT | Performed by: INTERNAL MEDICINE

## 2018-06-28 PROCEDURE — 99211 OFF/OP EST MAY X REQ PHY/QHP: CPT | Performed by: INTERNAL MEDICINE

## 2018-06-28 NOTE — PROGRESS NOTES
OP Anticoagulation Service Note    Date: 6/28/2018  There were no vitals filed for this visit.    Anticoagulation Summary  As of 6/28/2018    INR goal:   2.0-3.0   TTR:   87.7 % (6.2 mo)   Today's INR:   1.9!   Warfarin maintenance plan:   0.5 mg (1 mg x 0.5) on Mon, Fri; 1 mg (1 mg x 1) all other days   Weekly warfarin total:   6 mg   Plan last modified:   Pieter Covarrubias, PharmD (4/12/2018)   Next INR check:   7/26/2018   Target end date:   Indefinite    Indications    Paroxysmal a-fib (CMS-HCC) [I48.0]  Chronic anticoagulation [Z79.01]             Anticoagulation Episode Summary     INR check location:   [facility]    Preferred lab:       Send INR reminders to:       Comments:   [phone] (H)      Anticoagulation Care Providers     Provider Role Specialty Phone number    Evelio Tejeda M.D. Referring Internal Medicine [phone]    [facility] Responsible  [phone]    Beryl Pang M.D. Responsible Family Medicine [phone]        Anticoagulation Patient Findings      HPI:   Angie Root seen in clinic today, they are here today for a INR check on anticoagulation therapy with warfarin because they have atrial fib    The reason for today's visit is to prevent morbidity and mortality from a stroke  and to reduce the risk of bleeding while on a anticoagulant.     Additional education provided today regarding reducing bleed risk and dietary constraints:  About how vitamin K and foods work with warfarin and the bleeding risk on a anticoagulant     Any upcoming procedures:   none    Confirmed warfarin dosing regimen  Interval Changes with foods rich in vitamin K: No  Interval Changes in ETOH:   No  Interval Changes in smoking status:  No  Interval Changes in medication:  No   Cost restriction:  No    S/S of bleeding or bruising:  No  Signs/symptoms  thrombosis since the last appt:  No  Bleed risk is:  moderate,     3 vitals included with today's appt :  (BP, HR, weight, ht, RR)      Assessment:   INR  sub-therapeutic.        a change is needed today because INR is out of  range.  Might be diet     They have a TTR of 87.7  which is not at target (TTR target/goal is 100%) and requires close follow up to prevent a adverse event (the lower the TTR the higher risk of clots, strokes, or bleeding).       Plan:  1mg tomorrow then continue weekly warfarin dose as noted      Follow up:  Follow up appointment in 4 week(s)       Other info:  Pt educated to contact our clinic with any changes in medications or s/s of bleeding or thrombosis    CHEST guidelines recommend frequent INR monitoring at regular intervals (a few days up to a max of 12 weeks) to ensure they are on the proper dose of warfarin and not having any complications from therapy.  INRs can dramatically change over a short time period due to diet, medications, and medical conditions.

## 2018-07-23 ENCOUNTER — TELEPHONE (OUTPATIENT)
Dept: CARDIOLOGY | Facility: MEDICAL CENTER | Age: 78
End: 2018-07-23

## 2018-07-23 DIAGNOSIS — R06.02 SHORTNESS OF BREATH: ICD-10-CM

## 2018-07-23 NOTE — TELEPHONE ENCOUNTER
----- Message from Teressa Pacheco sent at 7/23/2018 11:01 AM PDT -----  Regarding: calling for dental clearance and chest x-ray  IA/Maria L      Patient is calling for dental clearance, she is asking to not be pre-medicated. Also, she is also having breathing issues and asking for a chest x-ray. She can be reached at [phone] or on her cell at [phone].    ================================================================    Called pt, pt reports that last week she went camping and since then she developed SOB w/ exertion, feeling fatigue, also she feels pressure on her breast area which she is relating that it could be from her hernia, she reports some mild swelling also which she thinks is from high altitude, she wants to know if Dr Morgan would order CXR as she is scheduled for Mammogram next week and she want it to get done at the same time, she also would like Dr Morgan to know that she was hospitalized x 2 since her last OV because of Cdiff 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

## 2018-07-24 NOTE — TELEPHONE ENCOUNTER
Discussed w/ Dr Morgan, per Dr Morgan, please order MPI and Echo. Pt ok for dental work, low risk, no pre-med needed.     Called pt, discussed Dr Morgan recommendations, pt agreed and verbalizes understanding. She requested for us to call her dental office, Dr Jodi Padilla, P#[phone]    Called pt's Dental office, s/w , she will have the dental assistant give me a call back

## 2018-07-24 NOTE — TELEPHONE ENCOUNTER
Received clearance request from Dr Jodi Padilla dental office, pt is scheduled for cleanings, fillings, extractions, crowns, root canals.     Clearance form faxed back to pt's dental office w/ Dr Morgan recommendations, F#[phone]    Copy of clearance to scanning

## 2018-07-24 NOTE — TELEPHONE ENCOUNTER
Called [facility] office, S/w Cari, she confirmed that pt is only scheduled for teeth cleaning and xray, notified Cari that pt does not need pre-med on that, she verbalizes understanding

## 2018-07-26 ENCOUNTER — ANTICOAGULATION VISIT (OUTPATIENT)
Dept: MEDICAL GROUP | Facility: PHYSICIAN GROUP | Age: 78
End: 2018-07-26
Payer: MEDICARE

## 2018-07-26 VITALS
SYSTOLIC BLOOD PRESSURE: 123 MMHG | BODY MASS INDEX: 17.56 KG/M2 | DIASTOLIC BLOOD PRESSURE: 73 MMHG | WEIGHT: 96 LBS | HEART RATE: 59 BPM

## 2018-07-26 DIAGNOSIS — I48.0 PAROXYSMAL A-FIB (HCC): ICD-10-CM

## 2018-07-26 DIAGNOSIS — Z79.01 CHRONIC ANTICOAGULATION: ICD-10-CM

## 2018-07-26 LAB — INR PPP: 2.7 (ref 2–3.5)

## 2018-07-26 PROCEDURE — 99211 OFF/OP EST MAY X REQ PHY/QHP: CPT | Performed by: FAMILY MEDICINE

## 2018-07-26 PROCEDURE — 85610 PROTHROMBIN TIME: CPT | Performed by: FAMILY MEDICINE

## 2018-07-26 NOTE — PROGRESS NOTES
OP Anticoagulation Service Note    Date: 7/26/2018  There were no vitals filed for this visit.    Anticoagulation Summary  As of 7/26/2018    INR goal:   2.0-3.0   TTR:   87.7 % (6.2 mo)   Today's INR:      Warfarin maintenance plan:   0.5 mg (1 mg x 0.5) on Mon, Fri; 1 mg (1 mg x 1) all other days   Weekly warfarin total:   6 mg   Plan last modified:   Pieter Covarrubias, PharmD (4/12/2018)   Next INR check:      Target end date:   Indefinite    Indications    Paroxysmal a-fib (CMS-HCC) [I48.0]  Chronic anticoagulation [Z79.01]             Anticoagulation Episode Summary     INR check location:   [facility]    Preferred lab:       Send INR reminders to:       Comments:   [phone] (H)      Anticoagulation Care Providers     Provider Role Specialty Phone number    Evelio Tejeda M.D. Referring Internal Medicine [phone]    [facility] Responsible  [phone]    Beryl Pang M.D. Responsible Family Medicine [phone]        Anticoagulation Patient Findings      HPI:   Angie Root seen in clinic today, they are here today for a INR check on anticoagulation therapy with warfarin because they have atrial fib    The reason for today's visit is to prevent morbidity and mortality from a stroke  and to reduce the risk of bleeding while on a anticoagulant.     Additional education provided today regarding reducing bleed risk and dietary constraints:  About how vitamin K and foods work with warfarin and the bleeding risk on a anticoagulant     Any upcoming procedures:   none    Confirmed warfarin dosing regimen  Interval Changes with foods rich in vitamin K: No  Interval Changes in ETOH:   No  Interval Changes in smoking status:  No  Interval Changes in medication:  No   Cost restriction:  No    S/S of bleeding or bruising:  No  Signs/symptoms  thrombosis since the last appt:  No  Bleed risk is:  moderate,     3 vitals included with today's appt :  (BP, HR, weight, ht, RR)      Assessment:   INR  therapeutic.        no change is needed today because INR is in range.      They have a TTR of 87.7  which is not at target (TTR target/goal is 100%) and requires close follow up to prevent a adverse event (the lower the TTR the higher risk of clots, strokes, or bleeding).       Plan:  Continue weekly warfarin dose as noted      Follow up:  Follow up appointment in 6 week(s)       Other info:  Pt educated to contact our clinic with any changes in medications or s/s of bleeding or thrombosis    CHEST guidelines recommend frequent INR monitoring at regular intervals (a few days up to a max of 12 weeks) to ensure they are on the proper dose of warfarin and not having any complications from therapy.  INRs can dramatically change over a short time period due to diet, medications, and medical conditions.

## 2018-08-01 ENCOUNTER — HOSPITAL ENCOUNTER (OUTPATIENT)
Dept: LAB | Facility: MEDICAL CENTER | Age: 78
End: 2018-08-01
Attending: NURSE PRACTITIONER
Payer: MEDICARE

## 2018-08-01 LAB
INR PPP: 1.98 (ref 0.87–1.13)
PROTHROMBIN TIME: 22.2 SEC (ref 12–14.6)

## 2018-08-01 PROCEDURE — 85610 PROTHROMBIN TIME: CPT

## 2018-08-01 PROCEDURE — 36415 COLL VENOUS BLD VENIPUNCTURE: CPT

## 2018-08-02 ENCOUNTER — HOSPITAL ENCOUNTER (OUTPATIENT)
Dept: RADIOLOGY | Facility: MEDICAL CENTER | Age: 78
End: 2018-08-02
Attending: FAMILY MEDICINE
Payer: MEDICARE

## 2018-08-02 ENCOUNTER — ANTICOAGULATION MONITORING (OUTPATIENT)
Dept: VASCULAR LAB | Facility: MEDICAL CENTER | Age: 78
End: 2018-08-02

## 2018-08-02 DIAGNOSIS — I48.0 PAROXYSMAL A-FIB (HCC): ICD-10-CM

## 2018-08-02 DIAGNOSIS — Z12.31 VISIT FOR SCREENING MAMMOGRAM: ICD-10-CM

## 2018-08-02 DIAGNOSIS — Z79.01 CHRONIC ANTICOAGULATION: ICD-10-CM

## 2018-08-02 PROCEDURE — 77067 SCR MAMMO BI INCL CAD: CPT

## 2018-08-02 NOTE — PROGRESS NOTES
Anticoagulation Summary  As of 8/2/2018    INR goal:   2.0-3.0   TTR:   88.0 % (7.3 mo)   Today's INR:   1.98! (8/1/2018)   Warfarin maintenance plan:   0.5 mg (1 mg x 0.5) on Mon, Fri; 1 mg (1 mg x 1) all other days   Weekly warfarin total:   6 mg   Plan last modified:   Pieter Covarrubias, PharmD (4/12/2018)   Next INR check:   8/22/2018   Target end date:   Indefinite    Indications    Paroxysmal a-fib (CMS-HCC) [I48.0]  Chronic anticoagulation [Z79.01]             Anticoagulation Episode Summary     INR check location:   [facility]    Preferred lab:       Send INR reminders to:       Comments:   [phone] (H)      Anticoagulation Care Providers     Provider Role Specialty Phone number    Evelio Tejeda M.D. Referring Internal Medicine [phone]    [facility] Responsible  [phone]    Beryl Pang M.D. Responsible Family Medicine [phone]        Anticoagulation Patient Findings          LM on  with dosing and testing instructions as well as when to call clinic.    INR  sub-therapeutic.    Changes to current medical/health status since last appt: none.   Denies signs/symptoms of bleeding and/or thrombosis since the last appt.   Denies any interval changes to diet  Denies any interval changes to medications since last appt.   Denies any complications or cost restrictions with current therapy  Follow up appointment in 3 week(s).      Go back to alternating dose of .5 mg mon with 1 mg rest of the week alternating with .5 mg mon & Friday and 1 mg rest of the week. Test again in 3 weeks.   The patient is on a high risk medication and is sub- therapeutic. This could lead to clot formation or risk of stroke. Therefore this patient requires close monitoring and follow up.          CHEST guidelines recommend frequent INR monitoring at regular intervals (a few days up to a max of 12 weeks) to ensure they are on the proper dose of warfarin and not having any complications from  therapy.  INRs can dramatically change over a short time period due to diet, medications, and medical conditions.   The patient instructed to go to the ER for falls with a head injury,  blood in urine or stool or any bleeding that last longer than 20 min.        JAG Denins.

## 2018-08-06 ENCOUNTER — HOSPITAL ENCOUNTER (OUTPATIENT)
Dept: RADIOLOGY | Facility: MEDICAL CENTER | Age: 78
End: 2018-08-06
Attending: INTERNAL MEDICINE
Payer: MEDICARE

## 2018-08-06 ENCOUNTER — HOSPITAL ENCOUNTER (OUTPATIENT)
Dept: CARDIOLOGY | Facility: MEDICAL CENTER | Age: 78
End: 2018-08-06
Attending: INTERNAL MEDICINE
Payer: MEDICARE

## 2018-08-06 DIAGNOSIS — R06.02 SHORTNESS OF BREATH: ICD-10-CM

## 2018-08-06 PROCEDURE — A9502 TC99M TETROFOSMIN: HCPCS

## 2018-08-06 PROCEDURE — 93306 TTE W/DOPPLER COMPLETE: CPT | Mod: 26 | Performed by: INTERNAL MEDICINE

## 2018-08-06 PROCEDURE — 93306 TTE W/DOPPLER COMPLETE: CPT

## 2018-08-06 PROCEDURE — 700111 HCHG RX REV CODE 636 W/ 250 OVERRIDE (IP)

## 2018-08-06 RX ORDER — REGADENOSON 0.08 MG/ML
INJECTION, SOLUTION INTRAVENOUS
Status: COMPLETED
Start: 2018-08-06 | End: 2018-08-06

## 2018-08-06 RX ADMIN — REGADENOSON 0.4 MG: 0.08 INJECTION, SOLUTION INTRAVENOUS at 11:06

## 2018-08-08 LAB
LV EJECT FRACT  99904: 60
LV EJECT FRACT MOD 2C 99903: 45.8
LV EJECT FRACT MOD 4C 99902: 51.7
LV EJECT FRACT MOD BP 99901: 51.26

## 2018-08-10 ENCOUNTER — APPOINTMENT (RX ONLY)
Dept: URBAN - METROPOLITAN AREA CLINIC 4 | Facility: CLINIC | Age: 78
Setting detail: DERMATOLOGY
End: 2018-08-10

## 2018-08-10 DIAGNOSIS — D22 MELANOCYTIC NEVI: ICD-10-CM

## 2018-08-10 DIAGNOSIS — I87.2 VENOUS INSUFFICIENCY (CHRONIC) (PERIPHERAL): ICD-10-CM

## 2018-08-10 DIAGNOSIS — L57.0 ACTINIC KERATOSIS: ICD-10-CM

## 2018-08-10 DIAGNOSIS — D18.0 HEMANGIOMA: ICD-10-CM

## 2018-08-10 DIAGNOSIS — L82.1 OTHER SEBORRHEIC KERATOSIS: ICD-10-CM

## 2018-08-10 DIAGNOSIS — L81.4 OTHER MELANIN HYPERPIGMENTATION: ICD-10-CM

## 2018-08-10 PROBLEM — D22.72 MELANOCYTIC NEVI OF LEFT LOWER LIMB, INCLUDING HIP: Status: ACTIVE | Noted: 2018-08-10

## 2018-08-10 PROBLEM — D22.39 MELANOCYTIC NEVI OF OTHER PARTS OF FACE: Status: ACTIVE | Noted: 2018-08-10

## 2018-08-10 PROBLEM — D22.61 MELANOCYTIC NEVI OF RIGHT UPPER LIMB, INCLUDING SHOULDER: Status: ACTIVE | Noted: 2018-08-10

## 2018-08-10 PROBLEM — D22.62 MELANOCYTIC NEVI OF LEFT UPPER LIMB, INCLUDING SHOULDER: Status: ACTIVE | Noted: 2018-08-10

## 2018-08-10 PROBLEM — D22.71 MELANOCYTIC NEVI OF RIGHT LOWER LIMB, INCLUDING HIP: Status: ACTIVE | Noted: 2018-08-10

## 2018-08-10 PROBLEM — D22.5 MELANOCYTIC NEVI OF TRUNK: Status: ACTIVE | Noted: 2018-08-10

## 2018-08-10 PROBLEM — D18.01 HEMANGIOMA OF SKIN AND SUBCUTANEOUS TISSUE: Status: ACTIVE | Noted: 2018-08-10

## 2018-08-10 PROCEDURE — 17003 DESTRUCT PREMALG LES 2-14: CPT

## 2018-08-10 PROCEDURE — ? COUNSELING

## 2018-08-10 PROCEDURE — ? OBSERVATION

## 2018-08-10 PROCEDURE — 99213 OFFICE O/P EST LOW 20 MIN: CPT | Mod: 25

## 2018-08-10 PROCEDURE — ? LIQUID NITROGEN

## 2018-08-10 PROCEDURE — ? SUNSCREEN RECOMMENDATIONS

## 2018-08-10 PROCEDURE — 17000 DESTRUCT PREMALG LESION: CPT

## 2018-08-10 ASSESSMENT — LOCATION DETAILED DESCRIPTION DERM
LOCATION DETAILED: RIGHT PROXIMAL DORSAL FOREARM
LOCATION DETAILED: SUPERIOR THORACIC SPINE
LOCATION DETAILED: RIGHT CENTRAL MALAR CHEEK
LOCATION DETAILED: RIGHT RIB CAGE
LOCATION DETAILED: LEFT CENTRAL MALAR CHEEK
LOCATION DETAILED: LEFT SUPERIOR MEDIAL UPPER BACK
LOCATION DETAILED: LEFT SUPERIOR FOREHEAD
LOCATION DETAILED: RIGHT PROXIMAL CALF
LOCATION DETAILED: RIGHT SUPERIOR MEDIAL MIDBACK
LOCATION DETAILED: LEFT RIB CAGE
LOCATION DETAILED: LEFT MEDIAL SUPERIOR CHEST
LOCATION DETAILED: LEFT PROXIMAL POSTERIOR UPPER ARM
LOCATION DETAILED: LEFT INFERIOR ANTERIOR NECK
LOCATION DETAILED: LEFT PROXIMAL PRETIBIAL REGION
LOCATION DETAILED: LEFT ULNAR DORSAL HAND
LOCATION DETAILED: RIGHT AXILLARY TAIL OF BREAST
LOCATION DETAILED: RIGHT ANTERIOR PROXIMAL THIGH
LOCATION DETAILED: PERIUMBILICAL SKIN
LOCATION DETAILED: LEFT PROXIMAL CALF
LOCATION DETAILED: LEFT PROXIMAL DORSAL FOREARM
LOCATION DETAILED: RIGHT PROXIMAL PRETIBIAL REGION
LOCATION DETAILED: LEFT ANTERIOR PROXIMAL THIGH
LOCATION DETAILED: RIGHT DISTAL POSTERIOR UPPER ARM
LOCATION DETAILED: RIGHT RADIAL DORSAL HAND

## 2018-08-10 ASSESSMENT — LOCATION ZONE DERM
LOCATION ZONE: FACE
LOCATION ZONE: ARM
LOCATION ZONE: TRUNK
LOCATION ZONE: LEG
LOCATION ZONE: NECK
LOCATION ZONE: HAND

## 2018-08-10 ASSESSMENT — LOCATION SIMPLE DESCRIPTION DERM
LOCATION SIMPLE: RIGHT POSTERIOR UPPER ARM
LOCATION SIMPLE: CHEST
LOCATION SIMPLE: LEFT HAND
LOCATION SIMPLE: LEFT PRETIBIAL REGION
LOCATION SIMPLE: RIGHT HAND
LOCATION SIMPLE: RIGHT CALF
LOCATION SIMPLE: LEFT UPPER BACK
LOCATION SIMPLE: LEFT FOREHEAD
LOCATION SIMPLE: ABDOMEN
LOCATION SIMPLE: RIGHT THIGH
LOCATION SIMPLE: RIGHT BREAST
LOCATION SIMPLE: LEFT ANTERIOR NECK
LOCATION SIMPLE: RIGHT PRETIBIAL REGION
LOCATION SIMPLE: RIGHT FOREARM
LOCATION SIMPLE: UPPER BACK
LOCATION SIMPLE: LEFT POSTERIOR UPPER ARM
LOCATION SIMPLE: LEFT FOREARM
LOCATION SIMPLE: LEFT CALF
LOCATION SIMPLE: RIGHT LOWER BACK
LOCATION SIMPLE: RIGHT CHEEK
LOCATION SIMPLE: LEFT THIGH
LOCATION SIMPLE: LEFT CHEEK

## 2018-08-15 ENCOUNTER — HOSPITAL ENCOUNTER (OUTPATIENT)
Dept: LAB | Facility: MEDICAL CENTER | Age: 78
End: 2018-08-15
Attending: NURSE PRACTITIONER
Payer: MEDICARE

## 2018-08-15 ENCOUNTER — ANTICOAGULATION MONITORING (OUTPATIENT)
Dept: VASCULAR LAB | Facility: MEDICAL CENTER | Age: 78
End: 2018-08-15

## 2018-08-15 DIAGNOSIS — I48.0 PAROXYSMAL A-FIB (HCC): ICD-10-CM

## 2018-08-15 DIAGNOSIS — Z79.01 CHRONIC ANTICOAGULATION: ICD-10-CM

## 2018-08-15 LAB
INR PPP: 1.7 (ref 0.87–1.13)
PROTHROMBIN TIME: 19.7 SEC (ref 12–14.6)

## 2018-08-15 PROCEDURE — 36415 COLL VENOUS BLD VENIPUNCTURE: CPT

## 2018-08-15 PROCEDURE — 85610 PROTHROMBIN TIME: CPT

## 2018-09-06 ENCOUNTER — ANTICOAGULATION VISIT (OUTPATIENT)
Dept: MEDICAL GROUP | Facility: PHYSICIAN GROUP | Age: 78
End: 2018-09-06
Payer: MEDICARE

## 2018-09-06 VITALS
SYSTOLIC BLOOD PRESSURE: 99 MMHG | WEIGHT: 96 LBS | DIASTOLIC BLOOD PRESSURE: 52 MMHG | HEART RATE: 58 BPM | BODY MASS INDEX: 17.56 KG/M2

## 2018-09-06 DIAGNOSIS — I48.0 PAROXYSMAL A-FIB (HCC): ICD-10-CM

## 2018-09-06 DIAGNOSIS — Z79.01 CHRONIC ANTICOAGULATION: ICD-10-CM

## 2018-09-06 LAB — INR PPP: 2.5 (ref 2–3.5)

## 2018-09-06 PROCEDURE — 85610 PROTHROMBIN TIME: CPT | Performed by: INTERNAL MEDICINE

## 2018-09-06 PROCEDURE — 99211 OFF/OP EST MAY X REQ PHY/QHP: CPT | Performed by: INTERNAL MEDICINE

## 2018-09-06 NOTE — PROGRESS NOTES
OP Anticoagulation Service Note    Date: 9/6/2018  Vitals:    09/06/18 0933   BP: (!) 99/52   Pulse: (!) 58   Weight: 43.5 kg (96 lb)       Anticoagulation Summary  As of 9/6/2018    INR goal:   2.0-3.0   TTR:   81.0 % (8.5 mo)   Today's INR:   2.5   Warfarin maintenance plan:   0.5 mg (1 mg x 0.5) on Mon, Fri; 1 mg (1 mg x 1) all other days   Weekly warfarin total:   6 mg   Plan last modified:   Pieter Covarrubias, PharmD (4/12/2018)   Next INR check:   10/11/2018   Target end date:   Indefinite    Indications    Paroxysmal a-fib (CMS-HCC) [I48.0]  Chronic anticoagulation [Z79.01]             Anticoagulation Episode Summary     INR check location:   [facility]    Preferred lab:       Send INR reminders to:       Comments:   [phone] (H)      Anticoagulation Care Providers     Provider Role Specialty Phone number    Evelio Tejeda M.D. Referring Internal Medicine [phone]    [facility] Responsible  [phone]    Beryl Pang M.D. Responsible Family Medicine [phone]        Anticoagulation Patient Findings      HPI:   Angie Root seen in clinic today, they are here today for a INR check on anticoagulation therapy with warfarin because they have atrial fib    The reason for today's visit is to prevent morbidity and mortality from a stroke  and to reduce the risk of bleeding while on a anticoagulant.     Additional education provided today regarding reducing bleed risk and dietary constraints:  About how vitamin K and foods work with warfarin and the bleeding risk on a anticoagulant     Any upcoming procedures:   none    Confirmed warfarin dosing regimen  Interval Changes with foods rich in vitamin K: No  Interval Changes in ETOH:   No  Interval Changes in smoking status:  No  Interval Changes in medication:  No   Cost restriction:  No    S/S of bleeding or bruising:  No  Signs/symptoms  thrombosis since the last appt:  No  Bleed risk is:  moderate,     3 vitals included with  today's appt :   (BP, HR, weight, ht, RR)     Assessment:   INR  therapeutic.      no change is needed today because INR is in range.      They have a TTR of 81.0  which is not at target (TTR target/goal is 100%) and requires close follow up to prevent a adverse event (the lower the TTR the higher risk of clots, strokes, or bleeding).       Plan:  Continue weekly warfarin dose as noted      Follow up:  Follow up appointment in 5 week(s)       Other info:  Pt educated to contact our clinic with any changes in medications or s/s of bleeding or thrombosis    CHEST guidelines recommend frequent INR monitoring at regular intervals (a few days up to a max of 12 weeks) to ensure they are on the proper dose of warfarin and not having any complications from therapy.  INRs can dramatically change over a short time period due to diet, medications, and medical conditions.

## 2018-09-20 ENCOUNTER — OFFICE VISIT (OUTPATIENT)
Dept: CARDIOLOGY | Facility: MEDICAL CENTER | Age: 78
End: 2018-09-20
Payer: MEDICARE

## 2018-09-20 VITALS
DIASTOLIC BLOOD PRESSURE: 62 MMHG | SYSTOLIC BLOOD PRESSURE: 116 MMHG | OXYGEN SATURATION: 97 % | BODY MASS INDEX: 17.7 KG/M2 | HEART RATE: 63 BPM | WEIGHT: 96.78 LBS

## 2018-09-20 DIAGNOSIS — F17.219 CIGARETTE NICOTINE DEPENDENCE WITH NICOTINE-INDUCED DISORDER: ICD-10-CM

## 2018-09-20 DIAGNOSIS — I73.9 PAD (PERIPHERAL ARTERY DISEASE) (HCC): ICD-10-CM

## 2018-09-20 DIAGNOSIS — E78.5 DYSLIPIDEMIA: ICD-10-CM

## 2018-09-20 DIAGNOSIS — Z79.01 CHRONIC ANTICOAGULATION: ICD-10-CM

## 2018-09-20 DIAGNOSIS — I48.0 PAROXYSMAL A-FIB (HCC): Primary | ICD-10-CM

## 2018-09-20 PROCEDURE — 99214 OFFICE O/P EST MOD 30 MIN: CPT | Performed by: INTERNAL MEDICINE

## 2018-09-20 RX ORDER — FLUTICASONE PROPIONATE 50 MCG
1 SPRAY, SUSPENSION (ML) NASAL DAILY
COMMUNITY
End: 2019-03-12

## 2018-09-20 RX ORDER — ALBUTEROL SULFATE 90 UG/1
2 AEROSOL, METERED RESPIRATORY (INHALATION) EVERY 6 HOURS PRN
COMMUNITY
End: 2019-03-12

## 2018-09-20 ASSESSMENT — ENCOUNTER SYMPTOMS
ORTHOPNEA: 0
DIARRHEA: 0
PND: 0
CLAUDICATION: 1
LOSS OF CONSCIOUSNESS: 0
DEPRESSION: 1
PALPITATIONS: 0

## 2018-09-20 NOTE — LETTER
Name:          Angie Root   YOB: 1940  Date:     09/20/2018      Beryl Pang M.D.  [address]  [facility] [address]     Johnathan Morgan MD  [address], [address]  [place] [address]  Phone: [phone]  Back Line: [phone]  Fax: [phone]  E-mail: [email or web address]   Dear Dr. Pang,    We had the pleasure of seeing your patient, Angie Root, in Cardiology Clinic at [facility] today.    As you know, she is a 78-year-old woman with a history of PAD status post PCI to her left leg in 2008, and more recently diagnosed ischemic colitis, CAD with 2 stents placed in her LAD in 2009, nicotine dependence, hypertension, dyslipidemia, recurrent C diff colitis, and paroxysmal atrial fibrillation on rhythm control strategy and now anticoagulated with Warfarin.    She has no cardiovascular complaints today, and good control of her blood pressure.  She has no bleeding complications with warfarin.    She again details mostly non-cardiovascular complaints in our office visit today.    I made no changes to her medical regimen.    I did order routine labs including a chemistry panel, CBC, lipids and hemoglobin A1c.    Return in about 6 months (around 3/20/2019).    Thank you for the referral and please do not hesitate to contact me at any time. My contact information is listed above.    This note was dictated using Dragon speech recognition software.     A full note including my physical examination and a full list of rectified medications is available in our medical record, and can be faxed as well.    Johnathan Morgan MD  Cardiologist  [facility]

## 2018-09-21 NOTE — PROGRESS NOTES
Subjective:   Angie Root is a 78 -year-old woman with a history of PAD status post PCI to her left leg in , and more recently diagnosed ischemic colitis, CAD with 2 stents placed in her LAD in , nicotine dependence, hypertension, dyslipidemia, recurrent C diff colitis, and paroxysmal atrial fibrillation on rhythm control strategy and now anticoagulated with Warfarin.    She is doing well today, and again has no cardiovascular complaints with the exception of mild claudication.  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    She also tells me about epigastric abdominal pain for which she attempted Zantac with very poor relief, and went back to sucralfate related to that.    She has no side effects with her cardiovascular regimen of claudication with again no other cardiovascular symptoms.    She also has had a fair amount of emotional stress related to the passing of her brother-in-law.  She tells me again about her sister who  from heart disease and her paternal aunt who had a heart attack.    She tells me about the very high co-pay for direct oral anticoagulants.    Finally, she tells me about some potential neuropathic foot pain in conjunction with a family member who had gone on dialysis related to untreated diabetes mostly in the setting of medication nonadherence.  Out of that experience she is hoping that I will check hemoglobin A 1C.    Past Medical History:   Diagnosis Date   • Arthritis     BL hands   • CAD (coronary artery disease)    • Cancer (HCC)     melanoma   • COPD (chronic obstructive pulmonary disease) (HCC)    • Croup 3 years old   • Diverticulosis    • Dyslipidemia    • GERD (gastroesophageal reflux disease)    • Hiatal hernia    • High cholesterol    • Hypertension    • Infectious disease 2018    C Diff   • Measles     5 years old    • Paroxysmal A-fib (HCC) 1/20/2016    Symptomatic, on a rhythm control strategy with sotalol 40 mg by mouth twice a day.    • Paroxysmal atrial fibrillation (HCC)    • Prediabetes    • PVD (peripheral vascular disease) (HCC)      Past Surgical History:   Procedure Laterality Date   • FECAL TRANSPLANT N/A 4/9/2018    Procedure: FECAL TRANSPLANT;  Surgeon: Gonzalez Cruz M.D.;  Location: ENDOSCOPY [facility];  Service: Gastroenterology   • COLONOSCOPY - ENDO N/A 4/9/2018    Procedure: COLONOSCOPY - ENDO;  Surgeon: Gonzalez Cruz M.D.;  Location: ENDOSCOPY [facility];  Service: Gastroenterology   • WIDE EXCISION MELANOMA, LEG, W/POSS.STSG  10/2015    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   • CARDIAC CATH, RIGHT HEART  2008    stent   • OTHER ORTHOPEDIC SURGERY Left 2008    hand repair   • CHOLECYSTECTOMY  1993   • TONSILLECTOMY  1945   • OTHER CARDIAC SURGERY      r heart cath stents   • STENT PLACEMENT      L iliac stent     Family History   Problem Relation Age of Onset   • Hypertension Mother    • Hyperlipidemia Mother    • Cancer Maternal Grandmother         tongue   • Cancer Maternal Uncle         x 3, pancreas   • Cancer Maternal Grandfather         unknown   • Cancer Paternal Grandmother         unknown   • Cancer Paternal Grandfather         unknown   • Diabetes Maternal Uncle    • Heart Disease Maternal Uncle    • Hypertension Sister    • Hyperlipidemia Sister    • Heart Disease Sister    • Alcohol/Drug Sister    • Thyroid Sister    • Psychiatry Neg Hx    • Stroke Neg Hx      History   Smoking Status   • Current Every Day Smoker   • Packs/day: 0.25   • Years: 60.00   • Types: Cigarettes   • Start date: 3/20/1957   Smokeless Tobacco   • Never Used     Comment: 6 per day     Allergies   Allergen Reactions   • Codeine Swelling   • Iodine Swelling   • Bee Venom Anaphylaxis   • Chantix [Varenicline]      disoriented   • Ciprofloxacin      Causes C diff, recurrent and very difficult   • Latex Hives    • Pcn [Penicillins] Anaphylaxis and Hives   • Shellfish Allergy      unknown   • Tetanus Antitoxin Swelling     unknown     Outpatient Encounter Prescriptions as of 9/20/2018   Medication Sig Dispense Refill   • Probiotic Product (SOLUBLE FIBER/PROBIOTICS PO) Take  by mouth.     • albuterol 108 (90 Base) MCG/ACT Aero Soln inhalation aerosol Inhale 2 Puffs by mouth every 6 hours as needed for Shortness of Breath.     • fluticasone (FLONASE) 50 MCG/ACT nasal spray Spray 1 Spray in nose every day.     • DILTIAZem CD (CARTIA XT) 240 MG CAPSULE SR 24 HR Take 1 Cap by mouth every day. 90 Cap 3   • lovastatin (MEVACOR) 40 MG tablet Take 1 Tab by mouth every evening. 90 Tab 3   • warfarin (COUMADIN) 1 MG Tab Take 1 Tab by mouth every evening. 90 Tab 1   • alprazolam (XANAX) 0.25 MG Tab Take 0.25 mg by mouth every morning.     • sucralfate (CARAFATE) 1 GM Tab Take 1 g by mouth 2 Times a Day.     • sotalol (BETAPACE) 80 MG Tab Take 0.5 Tabs by mouth 2 times a day. 90 Tab 3   • Cholecalciferol (VITAMIN D) 2000 UNITS Cap Take 1 Cap by mouth every morning.     • [DISCONTINUED] Vancomycin HCl (VANCOMYCIN 50 MG/ML) Take 10 mL by mouth every 6 hours.       No facility-administered encounter medications on file as of 9/20/2018.      Review of Systems   HENT: Negative for hearing loss.    Cardiovascular: Positive for claudication (minimal, mild). Negative for chest pain, palpitations, orthopnea, leg swelling and PND.   Gastrointestinal: Negative for diarrhea.   Neurological: Negative for loss of consciousness (none recently).   Psychiatric/Behavioral: Positive for depression (in the past, none recent). Negative for suicidal ideas.   All other systems reviewed and are negative.       Objective:   /62 (BP Location: Right arm, Patient Position: Sitting, BP Cuff Size: Adult)   Pulse 63   Wt 43.9 kg (96 lb 12.5 oz)   SpO2 97%   BMI 17.70 kg/m²     Physical Exam   Constitutional: She is oriented to person, place, and time. She  appears well-developed and well-nourished. No distress.   Very pleasant, thin, elderly woman in no distress. As usual, she has an earpiece in for her cell phone that she wears including during our appointment.  Physical examination is unchanged except where specified.   HENT:   Head: Normocephalic and atraumatic.   Eyes: Pupils are equal, round, and reactive to light. Conjunctivae and EOM are normal. No scleral icterus.   Neck: Neck supple. No JVD present. No tracheal deviation present.   Cardiovascular: Normal rate, regular rhythm, normal heart sounds and intact distal pulses.  Exam reveals no gallop and no friction rub.    No murmur heard.  Pulses:       Dorsalis pedis pulses are 1+ on the right side, and 1+ on the left side.   No carotid bruits   Pulmonary/Chest: Effort normal and breath sounds normal. No stridor. No respiratory distress. She has no wheezes. She has no rales.   Abdominal: Soft. Bowel sounds are normal. She exhibits no distension.   Musculoskeletal: She exhibits edema (trace on the left, 1+ on the right).   1+ woody edema of her lower extremities bilaterally   Neurological: She is alert and oriented to person, place, and time.   Skin: Skin is warm and dry. She is not diaphoretic. No erythema. No pallor.   Senile purpura noted, chronic venous stasis changes of her lower extremities also noted   Psychiatric: She has a normal mood and affect. Judgment and thought content normal.   Vitals reviewed.    Lab Results   Component Value Date/Time    WBC 13.5 (H) 04/02/2018 11:20 PM    RBC 5.00 04/02/2018 11:20 PM    HEMOGLOBIN 14.8 04/02/2018 11:20 PM    HEMATOCRIT 44.4 04/02/2018 11:20 PM    MCV 88.8 04/02/2018 11:20 PM    MCH 29.6 04/02/2018 11:20 PM    MCHC 33.3 (L) 04/02/2018 11:20 PM    MPV 10.1 04/02/2018 11:20 PM        Lab Results   Component Value Date/Time    SODIUM 137 04/02/2018 11:20 PM    POTASSIUM 4.0 04/02/2018 11:20 PM    CHLORIDE 107 04/02/2018 11:20 PM    CO2 21 04/02/2018 11:20 PM     "GLUCOSE 91 04/02/2018 11:20 PM    BUN 15 04/02/2018 11:20 PM    CREATININE 0.81 04/02/2018 11:20 PM        Lab Results   Component Value Date/Time    ASTSGOT 29 04/02/2018 11:20 PM    ALTSGPT 18 04/02/2018 11:20 PM        Lab Results   Component Value Date/Time    CHOLSTRLTOT 191 02/26/2018 07:21 AM    LDL 93 02/26/2018 07:21 AM    HDL 82 02/26/2018 07:21 AM    TRIGLYCERIDE 80 02/26/2018 07:21 AM       Echocardiogram, 6/27/2016:  \"CONCLUSIONS  Normal left ventricular size, thickness, systolic function EF 60%.   Mitral annular calcification.  Aortic sclerosis without stenosis.   Mild tricuspid regurgitation.  Right ventricular systolic pressure is estimated to be 33 mmHg.   No prior study for comparison\"    Echocardiogram, 11/18/2017:  \"CONCLUSIONS  Prior echocardiogram 6/27/2016.Normal left ventricular chamber size.  Left ventricular ejection fraction is visually estimated to be 60%.  Grade II diastolic dysfunction.  Aortic sclerosis without stenosis.  Mitral annular calcification.  Heavy calcification of the posterior mitral valve leaflet.  Mild mitral regurgitation.  Trace tricuspid regurgitation.  Normal pericardium without effusion.  Ascending aorta diameter is 2.4 cm\"    Echocardiogram, 8/6/2018:  \"CONCLUSIONS  Normal left ventricular systolic function.  Left ventricular ejection fraction is visually estimated to be 60%.  Indeterminate  The right ventricle was normal in size and function.  Mild mitral regurgitation.  Mild tricuspid regurgitation.  Estimated right ventricular systolic pressure is 45 mmHg.  Compared to the images of the study done 11/18/2017 - there has been an   improvement in the estimate of left atrial pressure\"    Myocardial perfusion imaging, 8/6/2018:  \" NUCLEAR IMAGING INTERPRETATION   Normal left ventricular perfusion with no fixed or reversible defects.    Normal left ventricular size, ejection fraction, and wall motion.    ECG INTERPRETATION   Negative stress ECG for " "ischemia\"    Assessment:     1. Paroxysmal a-fib (CMS-HCC)  CBC WITH DIFFERENTIAL    BASIC METABOLIC PANEL   2. Chronic anticoagulation     3. Dyslipidemia  LIPID PANEL   4. PAD (peripheral artery disease) ([facility])  HEMOGLOBIN A1C (Glycohemoglobin GHB Total/A1C with MBG Estimate)   5. Cigarette nicotine dependence with nicotine-induced disorder         Medical Decision Making:  Today's Assessment / Status / Plan:     She has no cardiovascular complaints today, and good control of her blood pressure.  She has no bleeding complications with warfarin.    She again details mostly non-cardiovascular complaints in our office visit today.    I made no changes to her medical regimen.    I did order routine labs including a chemistry panel, CBC, lipids and hemoglobin A1c.    Johnathan Morgan MD  Cardiologist, [facility]     Return in about 6 months (around 3/20/2019).    I spent 30 minutes with Ms. Angie Root, over fifty percent was spent counseling the patient on their condition, best management practices, reviewing test results, risks and benefits of treatment and coordinating care.    Physical Exam   Constitutional: She is oriented to person, place, and time. She appears well-developed and well-nourished. No distress.   Very pleasant, thin, elderly woman in no distress. As usual, she has an earpiece in for her cell phone that she wears including during our appointment.  Physical examination is unchanged except where specified.   HENT:   Head: Normocephalic and atraumatic.   Eyes: Pupils are equal, round, and reactive to light. Conjunctivae and EOM are normal. No scleral icterus.   Neck: Neck supple. No JVD present. No tracheal deviation present.   Cardiovascular: Normal rate, regular rhythm, normal heart sounds and intact distal pulses.  Exam reveals no gallop and no friction rub.    No murmur heard.  Pulses:       Dorsalis pedis pulses are 1+ on the right side, and 1+ on the left side.   No carotid " bruits   Pulmonary/Chest: Effort normal and breath sounds normal. No stridor. No respiratory distress. She has no wheezes. She has no rales.   Abdominal: Soft. Bowel sounds are normal. She exhibits no distension.   Musculoskeletal: She exhibits edema (trace on the left, 1+ on the right).   1+ woody edema of her lower extremities bilaterally   Neurological: She is alert and oriented to person, place, and time.   Skin: Skin is warm and dry. She is not diaphoretic. No erythema. No pallor.   Senile purpura noted, chronic venous stasis changes of her lower extremities also noted   Psychiatric: She has a normal mood and affect. Judgment and thought content normal.   Vitals reviewed.

## 2018-09-24 ENCOUNTER — HOSPITAL ENCOUNTER (OUTPATIENT)
Dept: LAB | Facility: MEDICAL CENTER | Age: 78
End: 2018-09-24
Attending: INTERNAL MEDICINE
Payer: MEDICARE

## 2018-09-24 DIAGNOSIS — I48.0 PAROXYSMAL A-FIB (HCC): ICD-10-CM

## 2018-09-24 DIAGNOSIS — I73.9 PAD (PERIPHERAL ARTERY DISEASE) (HCC): ICD-10-CM

## 2018-09-24 LAB
ANION GAP SERPL CALC-SCNC: 8 MMOL/L (ref 0–11.9)
BASOPHILS # BLD AUTO: 0.9 % (ref 0–1.8)
BASOPHILS # BLD: 0.08 K/UL (ref 0–0.12)
BUN SERPL-MCNC: 15 MG/DL (ref 8–22)
CALCIUM SERPL-MCNC: 10.3 MG/DL (ref 8.5–10.5)
CHLORIDE SERPL-SCNC: 105 MMOL/L (ref 96–112)
CHOLEST SERPL-MCNC: 161 MG/DL (ref 100–199)
CO2 SERPL-SCNC: 28 MMOL/L (ref 20–33)
CREAT SERPL-MCNC: 0.81 MG/DL (ref 0.5–1.4)
EOSINOPHIL # BLD AUTO: 0.24 K/UL (ref 0–0.51)
EOSINOPHIL NFR BLD: 2.7 % (ref 0–6.9)
ERYTHROCYTE [DISTWIDTH] IN BLOOD BY AUTOMATED COUNT: 47 FL (ref 35.9–50)
FASTING STATUS PATIENT QL REPORTED: NORMAL
GLUCOSE SERPL-MCNC: 89 MG/DL (ref 65–99)
HCT VFR BLD AUTO: 47.3 % (ref 37–47)
HDLC SERPL-MCNC: 80 MG/DL
HGB BLD-MCNC: 15.4 G/DL (ref 12–16)
IMM GRANULOCYTES # BLD AUTO: 0.01 K/UL (ref 0–0.11)
IMM GRANULOCYTES NFR BLD AUTO: 0.1 % (ref 0–0.9)
LDLC SERPL CALC-MCNC: 62 MG/DL
LYMPHOCYTES # BLD AUTO: 3.27 K/UL (ref 1–4.8)
LYMPHOCYTES NFR BLD: 36.5 % (ref 22–41)
MCH RBC QN AUTO: 29.6 PG (ref 27–33)
MCHC RBC AUTO-ENTMCNC: 32.6 G/DL (ref 33.6–35)
MCV RBC AUTO: 91 FL (ref 81.4–97.8)
MONOCYTES # BLD AUTO: 0.95 K/UL (ref 0–0.85)
MONOCYTES NFR BLD AUTO: 10.6 % (ref 0–13.4)
NEUTROPHILS # BLD AUTO: 4.42 K/UL (ref 2–7.15)
NEUTROPHILS NFR BLD: 49.2 % (ref 44–72)
NRBC # BLD AUTO: 0 K/UL
NRBC BLD-RTO: 0 /100 WBC
PLATELET # BLD AUTO: 209 K/UL (ref 164–446)
PMV BLD AUTO: 10.9 FL (ref 9–12.9)
POTASSIUM SERPL-SCNC: 3.4 MMOL/L (ref 3.6–5.5)
RBC # BLD AUTO: 5.2 M/UL (ref 4.2–5.4)
SODIUM SERPL-SCNC: 141 MMOL/L (ref 135–145)
TRIGL SERPL-MCNC: 93 MG/DL (ref 0–149)
WBC # BLD AUTO: 9 K/UL (ref 4.8–10.8)

## 2018-09-24 PROCEDURE — 36415 COLL VENOUS BLD VENIPUNCTURE: CPT

## 2018-09-24 PROCEDURE — 80061 LIPID PANEL: CPT

## 2018-09-24 PROCEDURE — 83036 HEMOGLOBIN GLYCOSYLATED A1C: CPT | Mod: GA

## 2018-09-24 PROCEDURE — 85025 COMPLETE CBC W/AUTO DIFF WBC: CPT

## 2018-09-24 PROCEDURE — 80048 BASIC METABOLIC PNL TOTAL CA: CPT

## 2018-09-25 ENCOUNTER — TELEPHONE (OUTPATIENT)
Dept: CARDIOLOGY | Facility: MEDICAL CENTER | Age: 78
End: 2018-09-25

## 2018-09-25 DIAGNOSIS — E87.6 HYPOKALEMIA: ICD-10-CM

## 2018-09-25 LAB
EST. AVERAGE GLUCOSE BLD GHB EST-MCNC: 140 MG/DL
HBA1C MFR BLD: 6.5 % (ref 0–5.6)

## 2018-09-25 RX ORDER — POTASSIUM CHLORIDE 750 MG/1
10 TABLET, FILM COATED, EXTENDED RELEASE ORAL DAILY
Qty: 30 TAB | Refills: 2 | Status: SHIPPED | OUTPATIENT
Start: 2018-09-25 | End: 2018-10-30 | Stop reason: SDUPTHER

## 2018-09-25 NOTE — TELEPHONE ENCOUNTER
Discussed recent labs w/ Dr Morgan, K-3.4, per Dr Morgan, please have pt start taking KCL 10meq PO daily and repeat BMP in one month. Please forward A1C result to her PCP and have pt follow up with them.     A1C result routed to Dr Beryl Pang, pt's PCP.     Called pt, discussed lab results per Dr Morgan and his recommendations, pt agreed and verbalizes understanding    New Rx for KCL 10meq PO daily sent to [facility] per pt's request    BMP ordered, lab slip mailed to pt.

## 2018-09-26 NOTE — TELEPHONE ENCOUNTER
Beryl Pang M.D.  Maria L Edmond R.N.             I have contacted pt and she is scheduled for appointment, thank you.   Beryl      Noted.

## 2018-10-03 ENCOUNTER — TELEPHONE (OUTPATIENT)
Dept: CARDIOLOGY | Facility: MEDICAL CENTER | Age: 78
End: 2018-10-03

## 2018-10-03 NOTE — TELEPHONE ENCOUNTER
Received a clearance request from [facility] PAREDES for pt's upcoming EGD scheduled on 12/5/18, they are also requesting to hold pt's Coumadin x 7 days prior to procedure.     To Dr Morgan

## 2018-10-08 NOTE — TELEPHONE ENCOUNTER
Discussed w/ Dr Morgan, per Dr Morgan, pt is low risk, ok to hold Coumadin x 7 days prior to procedure.     Clearance form faxed back to [facility] w/ Dr Morgan recommendations, F#[phone].     Called pt and notified, pt verbalizes understanding    FYI to [facility]     Copy of clearance to scanning

## 2018-10-09 ENCOUNTER — ANTICOAGULATION MONITORING (OUTPATIENT)
Dept: MEDICAL GROUP | Facility: MEDICAL CENTER | Age: 78
End: 2018-10-09

## 2018-10-09 DIAGNOSIS — Z79.01 CHRONIC ANTICOAGULATION: ICD-10-CM

## 2018-10-09 DIAGNOSIS — I48.0 PAROXYSMAL A-FIB (HCC): ICD-10-CM

## 2018-10-09 NOTE — PROGRESS NOTES
Pt scheduled for EGD 12-5-18. Ok to hold warfarin x 7 days per cardiolgy. CHADS = 1 (age)    Odalis Gaines, Pharm D

## 2018-10-11 ENCOUNTER — ANTICOAGULATION VISIT (OUTPATIENT)
Dept: MEDICAL GROUP | Facility: PHYSICIAN GROUP | Age: 78
End: 2018-10-11
Payer: MEDICARE

## 2018-10-11 DIAGNOSIS — Z79.01 CHRONIC ANTICOAGULATION: ICD-10-CM

## 2018-10-11 DIAGNOSIS — I48.0 PAROXYSMAL A-FIB (HCC): ICD-10-CM

## 2018-10-11 LAB — INR PPP: 2.1 (ref 2–3.5)

## 2018-10-11 PROCEDURE — 85610 PROTHROMBIN TIME: CPT | Performed by: INTERNAL MEDICINE

## 2018-10-11 PROCEDURE — 99211 OFF/OP EST MAY X REQ PHY/QHP: CPT | Performed by: INTERNAL MEDICINE

## 2018-10-11 NOTE — PROGRESS NOTES
OP Anticoagulation Service Note    Date: 10/11/2018  There were no vitals filed for this visit.    Anticoagulation Summary  As of 10/11/2018    INR goal:   2.0-3.0   TTR:   83.3 % (9.7 mo)   Today's INR:   2.1   Warfarin maintenance plan:   0.5 mg (1 mg x 0.5) on Mon, Fri; 1 mg (1 mg x 1) all other days   Weekly warfarin total:   6 mg   Plan last modified:   Pieter Covarrubias, PharmD (4/12/2018)   Next INR check:   11/15/2018   Target end date:   Indefinite    Indications    Paroxysmal a-fib (CMS-HCC) [I48.0]  Chronic anticoagulation [Z79.01]             Anticoagulation Episode Summary     INR check location:   [facility]    Preferred lab:       Send INR reminders to:       Comments:   [phone] (H)      Anticoagulation Care Providers     Provider Role Specialty Phone number    Evelio Tejeda M.D. Referring Internal Medicine [phone]    [facility] Responsible  [phone]    Beryl Pang M.D. Responsible Family Medicine [phone]        Anticoagulation Patient Findings      HPI:   Angie Root seen in clinic today, they are here today for a INR check on anticoagulation therapy with warfarin because they have atrial fibrillation    The reason for today's visit is to prevent morbidity and mortality from a stroke and to reduce the risk of bleeding while on a anticoagulant.  Her last INR was 2.5 on 9/6/2018.    Additional education provided today regarding reducing bleed risk and dietary constraints:  About how vitamin K and foods work with warfarin and the bleeding risk on a anticoagulant     Any upcoming procedures:   She has a procedure scheduled in December for an EGD, per cardiology she is to stop her warfarin for 7 days without bridge.    Confirmed warfarin dosing regimen  Interval Changes with foods rich in vitamin K: No  Interval Changes in ETOH:   No  Interval Changes in smoking status:  No  Interval Changes in medication:  No   Cost restriction:  No    S/S of bleeding  or bruising:  No  Signs/symptoms  thrombosis since the last appt:  No  Bleed risk is:  moderate,     3 vitals included with today's appt :  (BP, HR, weight, ht, RR)     Assessment:   INR  -therapeutic.       No change is needed today because INR is in range.      They have a TTR of 83.3 which is not at target (TTR target/goal is 100%) and requires close follow up to prevent a adverse event (the lower the TTR the higher risk of clots, strokes, or bleeding).       Plan:  Continue warfarin as noted.      Follow up:  Follow up appointment in 5 week(s)       Other info:  Pt educated to contact our clinic with any changes in medications or s/s of bleeding or thrombosis    CHEST guidelines recommend frequent INR monitoring at regular intervals (a few days up to a max of 12 weeks) to ensure they are on the proper dose of warfarin and not having any complications from therapy.  INRs can dramatically change over a short time period due to diet, medications, and medical conditions.

## 2018-10-25 ENCOUNTER — HOSPITAL ENCOUNTER (OUTPATIENT)
Dept: LAB | Facility: MEDICAL CENTER | Age: 78
End: 2018-10-25
Attending: INTERNAL MEDICINE
Payer: MEDICARE

## 2018-10-25 DIAGNOSIS — E87.6 HYPOKALEMIA: ICD-10-CM

## 2018-10-25 LAB
ANION GAP SERPL CALC-SCNC: 5 MMOL/L (ref 0–11.9)
BUN SERPL-MCNC: 16 MG/DL (ref 8–22)
CALCIUM SERPL-MCNC: 10.1 MG/DL (ref 8.5–10.5)
CHLORIDE SERPL-SCNC: 108 MMOL/L (ref 96–112)
CO2 SERPL-SCNC: 29 MMOL/L (ref 20–33)
CREAT SERPL-MCNC: 0.96 MG/DL (ref 0.5–1.4)
GLUCOSE SERPL-MCNC: 84 MG/DL (ref 65–99)
POTASSIUM SERPL-SCNC: 4.2 MMOL/L (ref 3.6–5.5)
SODIUM SERPL-SCNC: 142 MMOL/L (ref 135–145)

## 2018-10-25 PROCEDURE — 36415 COLL VENOUS BLD VENIPUNCTURE: CPT

## 2018-10-25 PROCEDURE — 80048 BASIC METABOLIC PNL TOTAL CA: CPT

## 2018-10-29 ENCOUNTER — TELEPHONE (OUTPATIENT)
Dept: CARDIOLOGY | Facility: MEDICAL CENTER | Age: 78
End: 2018-10-29

## 2018-10-29 DIAGNOSIS — E87.6 HYPOKALEMIA: ICD-10-CM

## 2018-10-29 NOTE — TELEPHONE ENCOUNTER
Pt notified BMP result WNL. She would like to know if she should continue taking KCL 10meq PO daily, pt reports it made her nauseous so she has been alternating it between 1/2-1 tab daily.     To Dr Morgan

## 2018-10-30 RX ORDER — POTASSIUM CHLORIDE 750 MG/1
10 TABLET, FILM COATED, EXTENDED RELEASE ORAL DAILY
Qty: 90 TAB | Refills: 2 | Status: SHIPPED | OUTPATIENT
Start: 2018-10-30 | End: 2019-03-12

## 2018-10-30 RX ORDER — POTASSIUM CHLORIDE 750 MG/1
10 TABLET, FILM COATED, EXTENDED RELEASE ORAL DAILY
Qty: 90 TAB | Refills: 2 | Status: SHIPPED | OUTPATIENT
Start: 2018-10-30 | End: 2018-10-30 | Stop reason: SDUPTHER

## 2018-10-30 NOTE — TELEPHONE ENCOUNTER
Per Dr Morgan, please have pt cont KCL 10meq PO daily.     Called pt, discussed above recommendations by Dr Morgan, she verbalizes understanding and requested a refill to be sent to [facility]    Refill Rx sent

## 2018-11-06 DIAGNOSIS — I48.0 PAROXYSMAL ATRIAL FIBRILLATION (HCC): ICD-10-CM

## 2018-11-15 ENCOUNTER — ANTICOAGULATION VISIT (OUTPATIENT)
Dept: MEDICAL GROUP | Facility: PHYSICIAN GROUP | Age: 78
End: 2018-11-15
Payer: MEDICARE

## 2018-11-15 DIAGNOSIS — Z79.01 CHRONIC ANTICOAGULATION: Primary | ICD-10-CM

## 2018-11-15 DIAGNOSIS — I48.0 PAROXYSMAL A-FIB (HCC): ICD-10-CM

## 2018-11-15 LAB — INR PPP: 1.6 (ref 2–3.5)

## 2018-11-15 PROCEDURE — 99211 OFF/OP EST MAY X REQ PHY/QHP: CPT | Performed by: FAMILY MEDICINE

## 2018-11-15 PROCEDURE — 85610 PROTHROMBIN TIME: CPT | Performed by: FAMILY MEDICINE

## 2018-11-15 NOTE — PROGRESS NOTES
OP Anticoagulation Service Note    Date: 11/15/2018  There were no vitals filed for this visit.    Anticoagulation Summary  As of 11/15/2018    INR goal:   2.0-3.0   TTR:   76.5 % (10.9 mo)   Today's INR:   1.6!   Warfarin maintenance plan:   0.5 mg (1 mg x 0.5) on Mon, Fri; 1 mg (1 mg x 1) all other days   Weekly warfarin total:   6 mg   Plan last modified:   Pieter Covarrubias, PharmD (4/12/2018)   Next INR check:   11/26/2018   Target end date:   Indefinite    Indications    Paroxysmal a-fib (CMS-HCC) [I48.0]  Chronic anticoagulation [Z79.01]             Anticoagulation Episode Summary     [facility] check [facility]   [facility]    Preferred lab:       Send INR reminders to:       Comments:   [phone] (H)      Anticoagulation Care Providers     Provider Role Specialty Phone number    Evelio Tejeda M.D. Referring Internal Medicine [phone]    [facility] Responsible  [phone]    Beryl Pang M.D. Responsible Family Medicine [phone]        Anticoagulation Patient Findings      HPI:   Angie Root seen in clinic today, they are here today for a INR check on anticoagulation therapy with warfarin because they have afib    The reason for today's visit is to prevent morbidity and mortality from a stroke  and to reduce the risk of bleeding while on a anticoagulant.     Additional education provided today regarding reducing bleed risk and dietary constraints:  About how vitamin K and foods work with warfarin and the bleeding risk on a anticoagulant     Any upcoming procedures:   EGD scheduled on 12/5/18 ok to hold coumadin x 7 days per cardiolgoy without lovenox     Confirmed warfarin dosing regimen  Interval Changes with foods rich in vitamin K: No  Interval Changes in ETOH:   No  Interval Changes in smoking status:  No  Interval Changes in medication:  No   Cost restriction:  No    S/S of bleeding or bruising:  No  Signs/symptoms  thrombosis since the last appt:  No  Bleed risk is:   moderate,     3 vitals included with today's appt :  (BP, HR, weight, ht, RR)     Assessment:   INR  sub-therapeutic.      a change is needed today because INR is out of  range.      They have a TTR of 76.5  which is not at target (TTR target/goal is 100%) and requires close follow up to prevent a adverse event (the lower the TTR the higher risk of clots, strokes, or bleeding).       Plan:  1mg tomorrow then continue weekly warfarin dose as noted      Follow up:  Follow up appointment in 2 week(s) via lab       Other info:  Pt educated to contact our clinic with any changes in medications or s/s of bleeding or thrombosis    CHEST guidelines recommend frequent INR monitoring at regular intervals (a few days up to a max of 12 weeks) to ensure they are on the proper dose of warfarin and not having any complications from therapy.  INRs can dramatically change over a short time period due to diet, medications, and medical conditions.

## 2018-11-26 ENCOUNTER — HOSPITAL ENCOUNTER (OUTPATIENT)
Dept: LAB | Facility: MEDICAL CENTER | Age: 78
End: 2018-11-26
Attending: NURSE PRACTITIONER
Payer: MEDICARE

## 2018-11-26 ENCOUNTER — TELEPHONE (OUTPATIENT)
Dept: CARDIOLOGY | Facility: MEDICAL CENTER | Age: 78
End: 2018-11-26

## 2018-11-26 ENCOUNTER — ANTICOAGULATION MONITORING (OUTPATIENT)
Dept: VASCULAR LAB | Facility: MEDICAL CENTER | Age: 78
End: 2018-11-26

## 2018-11-26 ENCOUNTER — PATIENT MESSAGE (OUTPATIENT)
Dept: CARDIOLOGY | Facility: MEDICAL CENTER | Age: 78
End: 2018-11-26

## 2018-11-26 DIAGNOSIS — I48.0 PAROXYSMAL A-FIB (HCC): ICD-10-CM

## 2018-11-26 DIAGNOSIS — Z79.01 CHRONIC ANTICOAGULATION: ICD-10-CM

## 2018-11-26 LAB
INR PPP: 1.96 (ref 0.87–1.13)
PROTHROMBIN TIME: 22.4 SEC (ref 12–14.6)

## 2018-11-26 PROCEDURE — 85610 PROTHROMBIN TIME: CPT

## 2018-11-26 PROCEDURE — 36415 COLL VENOUS BLD VENIPUNCTURE: CPT

## 2018-11-26 NOTE — PATIENT COMMUNICATION
Marlene Edmond R.N.             IA/Maria L     Patient wants a call back at [phone] about medications that are conflicting and needs to get approval from doctor.      Called pt, pt reports she was informed by [facility] that her Diltiazem have interaction with her Lovastatin, pt reports she's been taking this two meds and tolerating it well, she requested for me to call [facility] at [phone] and give the ok to dispense it.     Called [facility], they actually need Prior Auth.     To [facility], please send Prior Auth for pt's Diltiazem. Thank You!

## 2018-11-27 ENCOUNTER — TELEPHONE (OUTPATIENT)
Dept: CARDIOLOGY | Facility: MEDICAL CENTER | Age: 78
End: 2018-11-27

## 2018-11-27 NOTE — TELEPHONE ENCOUNTER
PAR sent to plan:  Angie Ivett Key: [id number] - PA Case ID: [id number] - Rx #: [id number]   Status   Sent to [facility]   DrugCartia XT 240MG OR CP24   [employer] Electronic PA Form      One thing I noticed, the med list has:  diltiazem CD (CARDIZEM CD) 240 MG CAPSULE SR 24 HR     Listed since 2016    Recently the med list shows:  DILTIAZem CD (CARTIA XT) 240 MG CAPSULE SR 24 HR  So I am not sure if that has anything to do with the PA need

## 2018-11-27 NOTE — TELEPHONE ENCOUNTER
PAR approved:  Angie Root Sharp: [id number] - PA Case ID: [id number] - Rx #: [id number]   Outcome   Approvedtoday   PA Case: [id number], Status: Approved, Coverage Starts on: 11/27/2018 12:00:00 AM, Coverage Ends on: 11/27/2019 12:00:00 AM. Questions? Contact [phone].   DrugCartia XT 240MG OR CP24   [facility] Electronic PA Form

## 2018-11-27 NOTE — PROGRESS NOTES
Anticoagulation Summary  As of 11/26/2018    INR goal:   2.0-3.0   TTR:   74.0 % (11.2 mo)   Today's INR:   1.96!   Warfarin maintenance plan:   0.5 mg (1 mg x 0.5) on Mon, Fri; 1 mg (1 mg x 1) all other days   Weekly warfarin total:   6 mg   Plan last modified:   Pieter Covarrubias, PharmD (4/12/2018)   Next INR check:   12/12/2018   Target end date:   Indefinite    Indications    Paroxysmal a-fib (CMS-HCC) [I48.0]  Chronic anticoagulation [Z79.01]             Anticoagulation Episode Summary     INR check location[facility]   [facility]    Preferred lab:       Send INR reminders to:       Comments:   [phone] (H)      Anticoagulation Care Providers     Provider Role Specialty Phone number    Evelio Tejeda M.D. Referring Internal Medicine [phone]    [facility] Responsible  [phone]    Beryl Pang M.D. Responsible Family Medicine [phone]        Anticoagulation Patient Findings    Spoke with pt on the phone. Pt is slightly subtherapeutic today.  She will be holding her warfarin for a endoscopy starting this week.  Denies any changes in medications or diet. Patient denies any s/sx of bleeding or clotting. Will continue dosing as outlined in calendar. Will follow-up with pt as scheduled on 11/6/18.    Linda PAREDES  [facility]  .

## 2018-11-27 NOTE — TELEPHONE ENCOUNTER
Marlene Edmond R.N.             IA/Maria L     Patient wants a call back at [phone] about medications that are conflicting and needs to get approval from doctor.      Called pt, pt reports she was informed by [facility] that her Diltiazem have interaction with her Lovastatin, pt reports she's been taking this two meds and tolerating it well, she requested for me to call [facility] at [phone] and give the ok to dispense it.     Called [facility], they actually need Prior Auth.     To [facility], please send Prior Auth for pt's Diltiazem. Thank You!

## 2018-11-27 NOTE — TELEPHONE ENCOUNTER
Dayami Ashley, Med Ass't   You 3 minutes ago (4:19 PM)      PA submitted today (Routing comment)        Noted.

## 2018-11-29 DIAGNOSIS — I48.0 PAROXYSMAL A-FIB (HCC): ICD-10-CM

## 2018-11-29 RX ORDER — WARFARIN SODIUM 1 MG/1
1 TABLET ORAL EVERY EVENING
Qty: 90 TAB | Refills: 3 | Status: SHIPPED | OUTPATIENT
Start: 2018-11-29 | End: 2019-12-02 | Stop reason: SDUPTHER

## 2018-12-04 ENCOUNTER — HOSPITAL ENCOUNTER (OUTPATIENT)
Dept: LAB | Facility: MEDICAL CENTER | Age: 78
End: 2018-12-04
Attending: INTERNAL MEDICINE
Payer: MEDICARE

## 2018-12-04 LAB
INR PPP: 1.01 (ref 0.87–1.13)
PROTHROMBIN TIME: 13.4 SEC (ref 12–14.6)

## 2018-12-04 PROCEDURE — 85610 PROTHROMBIN TIME: CPT | Mod: GA

## 2018-12-04 PROCEDURE — 36415 COLL VENOUS BLD VENIPUNCTURE: CPT | Mod: GA

## 2018-12-05 ENCOUNTER — ANTICOAGULATION MONITORING (OUTPATIENT)
Dept: VASCULAR LAB | Facility: MEDICAL CENTER | Age: 78
End: 2018-12-05

## 2018-12-05 DIAGNOSIS — Z79.01 CHRONIC ANTICOAGULATION: ICD-10-CM

## 2018-12-05 DIAGNOSIS — I48.0 PAROXYSMAL A-FIB (HCC): ICD-10-CM

## 2018-12-05 NOTE — PROGRESS NOTES
Anticoagulation Summary  As of 12/5/2018    INR goal:   2.0-3.0   TTR:   72.3 % (11.5 mo)   Today's INR:   1.01! (12/4/2018)   Warfarin maintenance plan:   0.5 mg (1 mg x 0.5) on Mon, Fri; 1 mg (1 mg x 1) all other days   Weekly warfarin total:   6 mg   Plan last modified:   Yaneli KruseD (4/12/2018)   Next INR check:   12/13/2018   Target end date:   Indefinite    Indications    Paroxysmal a-fib (CMS-HCC) [I48.0]  Chronic anticoagulation [Z79.01]             Anticoagulation Episode Summary     INR check location:   [facility]    Preferred lab:       Send INR reminders to:       Comments:   [phone] (H)      Anticoagulation Care Providers     Provider Role Specialty Phone number    Evelio Tejeda M.D. Referring Internal Medicine [phone]    [facility] Responsible  [phone]    Beryl Pang M.D. Responsible Family Medicine [phone]        Anticoagulation Patient Findings    Spoke with patient.  INR was baseline yesterday.    Pt's procedure was cancelled for today, due to the weather. At this point it is unclear when it will be rescheduled. Advised pt if needed we could use vitK to reverse warfarin, although this isn't optimal. Will have pt resume warfarin today at 1mg daily, and recheck INR in 1 week at [place].      Soco Rowland, YaneliD

## 2018-12-08 NOTE — TELEPHONE ENCOUNTER
Referral to GI   Carroll Cornejo   Sent: Thu September 21, 2017  4:54 PM           Message     The referral to gastroenterology has been sent to the office of [facility].   If the patient does not hear from them in a timely manner, they should call [phone]     ============================================================    Called patient and advised her of the above information.    SANDEE RN   Telephone Encounter by Herb Herrera MD at 05/15/17 02:35 PM     Author:  Herb Herrera MD Service:  (none) Author Type:  Physician     Filed:  05/15/17 02:35 PM Encounter Date:  5/15/2017 Status:  Signed     :  Herb Herrera MD (Physician)            Needs to be seen   marlyn appt[MM1.1M]       Revision History        User Key Date/Time User Provider Type Action    > MM1.1 05/15/17 02:35 PM Herb Herrera MD Physician Sign    M - Manual

## 2018-12-13 ENCOUNTER — ANTICOAGULATION VISIT (OUTPATIENT)
Dept: MEDICAL GROUP | Facility: PHYSICIAN GROUP | Age: 78
End: 2018-12-13
Payer: MEDICARE

## 2018-12-13 DIAGNOSIS — I48.0 PAROXYSMAL A-FIB (HCC): ICD-10-CM

## 2018-12-13 DIAGNOSIS — Z79.01 CHRONIC ANTICOAGULATION: Primary | ICD-10-CM

## 2018-12-13 LAB — INR PPP: 1.5 (ref 2–3.5)

## 2018-12-13 PROCEDURE — 99211 OFF/OP EST MAY X REQ PHY/QHP: CPT | Performed by: FAMILY MEDICINE

## 2018-12-13 PROCEDURE — 85610 PROTHROMBIN TIME: CPT | Performed by: FAMILY MEDICINE

## 2018-12-13 NOTE — PROGRESS NOTES
OP Anticoagulation Service Note    Date: 12/13/2018  There were no vitals filed for this visit.    Anticoagulation Summary  As of 12/13/2018    INR goal:   2.0-3.0   TTR:   70.4 % (11.8 mo)   Today's INR:   1.5!   Warfarin maintenance plan:   0.5 mg (1 mg x 0.5) on Mon, Fri; 1 mg (1 mg x 1) all other days   Weekly warfarin total:   6 mg   Plan last modified:   Pieter Covarrubias, PharmD (4/12/2018)   Next INR check:   12/20/2018   Target end date:   Indefinite    Indications    Paroxysmal a-fib (CMS-HCC) [I48.0]  Chronic anticoagulation [Z79.01]             Anticoagulation Episode Summary     INR check location:   [facility]    Preferred lab:       Send INR reminders to:       Comments:   [phone] (H)      Anticoagulation Care Providers     Provider Role Specialty Phone number    Evelio Tejeda M.D. Referring Internal Medicine [phone]    [facility] Responsible  [phone]    Beryl Pang M.D. Responsible Family Medicine [phone]        Anticoagulation Patient Findings      HPI:   Angie Root seen in clinic today, they are here today for a INR check on anticoagulation therapy with warfarin because they have afib    The reason for today's visit is to prevent morbidity and mortality from a stroke  and to reduce the risk of bleeding while on a anticoagulant.     Additional education provided today regarding reducing bleed risk and dietary constraints:  About how vitamin K and foods work with warfarin and the bleeding risk on a anticoagulant     Any upcoming procedures:   none    Confirmed warfarin dosing regimen  Interval Changes with foods rich in vitamin K: No  Interval Changes in ETOH:   No  Interval Changes in smoking status:  No  Interval Changes in medication:  No   Cost restriction:  No    S/S of bleeding or bruising:  No  Signs/symptoms  thrombosis since the last appt:  No  Bleed risk is:  moderate,     3 vitals included with today's appt :  (BP, HR, weight, ht, RR)      Assessment:   INR  sub-therapeutic.      a change is needed today because INR is out of range.      They have a TTR of 70.4  which is not at target (TTR target/goal is 100%) and requires close follow up to prevent a adverse event (the lower the TTR the higher risk of clots, strokes, or bleeding).       Plan:  Increase weekly warfarin dose as noted    Follow up:  Follow up appointment in 1 week(s)       Other info:  Pt educated to contact our clinic with any changes in medications or s/s of bleeding or thrombosis    CHEST guidelines recommend frequent INR monitoring at regular intervals (a few days up to a max of 12 weeks) to ensure they are on the proper dose of warfarin and not having any complications from therapy.  INRs can dramatically change over a short time period due to diet, medications, and medical conditions.

## 2018-12-20 ENCOUNTER — ANTICOAGULATION VISIT (OUTPATIENT)
Dept: MEDICAL GROUP | Facility: PHYSICIAN GROUP | Age: 78
End: 2018-12-20
Payer: MEDICARE

## 2018-12-20 DIAGNOSIS — I48.0 PAROXYSMAL A-FIB (HCC): ICD-10-CM

## 2018-12-20 DIAGNOSIS — Z79.01 CHRONIC ANTICOAGULATION: ICD-10-CM

## 2018-12-20 LAB — INR PPP: 2.3 (ref 2–3.5)

## 2018-12-20 PROCEDURE — 85610 PROTHROMBIN TIME: CPT | Performed by: FAMILY MEDICINE

## 2018-12-20 PROCEDURE — 99211 OFF/OP EST MAY X REQ PHY/QHP: CPT | Performed by: INTERNAL MEDICINE

## 2018-12-20 NOTE — PROGRESS NOTES
OP Anticoagulation Service Note    Date: 12/20/2018  There were no vitals filed for this visit.    Anticoagulation Summary  As of 12/20/2018    INR goal:   2.0-3.0   TTR:   69.8 % (1 y)   Today's INR:   2.3   Warfarin maintenance plan:   0.5 mg (1 mg x 0.5) on Mon, Fri; 1 mg (1 mg x 1) all other days   Weekly warfarin total:   6 mg   Plan last modified:   Pieter Covarrubias, PharmD (4/12/2018)   Next INR check:   1/3/2019   Target end date:   Indefinite    Indications    Paroxysmal a-fib (CMS-HCC) [I48.0]  Chronic anticoagulation [Z79.01]             Anticoagulation Episode Summary     INR check location:   [facility]    Preferred lab:       Send INR reminders to:       Comments:   [phone] (H)      Anticoagulation Care Providers     Provider Role Specialty Phone number    Evelio Tejeda M.D. Referring Internal Medicine [phone]    [facility] Responsible  [phone]    Beryl Pang M.D. Responsible Family Medicine [phone]        Anticoagulation Patient Findings      HPI:   Angie Root seen in clinic today, they are here today for a INR check on anticoagulation therapy with warfarin because they have afib    The reason for today's visit is to prevent morbidity and mortality from a stroke  and to reduce the risk of bleeding while on a anticoagulant.     Additional education provided today regarding reducing bleed risk and dietary constraints:  About how vitamin K and foods work with warfarin and the bleeding risk on a anticoagulant     Any upcoming procedures:   none    Confirmed warfarin dosing regimen  Interval Changes with foods rich in vitamin K: No  Interval Changes in ETOH:   No  Interval Changes in smoking status:  No  Interval Changes in medication:  No   Cost restriction:  No    S/S of bleeding or bruising:  No  Signs/symptoms  thrombosis since the last appt:  No  Bleed risk is:  moderate,     3 vitals included with today's appt :  (BP, HR, weight, ht, RR)      Assessment:   INR  therapeutic.     no change is needed today because INR is in range.      They have a TTR of 69  which is not at target (TTR target/goal is 100%) and requires close follow up to prevent a adverse event (the lower the TTR the higher risk of clots, strokes, or bleeding).       Plan:  Continue weekly warfarin dose as noted      Follow up:  Follow up appointment in 2 week(s)       Other info:  Pt educated to contact our clinic with any changes in medications or s/s of bleeding or thrombosis    CHEST guidelines recommend frequent INR monitoring at regular intervals (a few days up to a max of 12 weeks) to ensure they are on the proper dose of warfarin and not having any complications from therapy.  INRs can dramatically change over a short time period due to diet, medications, and medical conditions.

## 2019-01-03 ENCOUNTER — ANTICOAGULATION VISIT (OUTPATIENT)
Dept: MEDICAL GROUP | Facility: PHYSICIAN GROUP | Age: 79
End: 2019-01-03
Payer: MEDICARE

## 2019-01-03 DIAGNOSIS — Z79.01 CHRONIC ANTICOAGULATION: ICD-10-CM

## 2019-01-03 DIAGNOSIS — I48.0 PAROXYSMAL A-FIB (HCC): ICD-10-CM

## 2019-01-03 LAB — INR PPP: 2.2 (ref 2–3.5)

## 2019-01-03 PROCEDURE — 85610 PROTHROMBIN TIME: CPT | Performed by: INTERNAL MEDICINE

## 2019-01-03 PROCEDURE — 99211 OFF/OP EST MAY X REQ PHY/QHP: CPT | Performed by: INTERNAL MEDICINE

## 2019-01-03 NOTE — PROGRESS NOTES
OP Anticoagulation Service Note    Date: 1/3/2019  There were no vitals filed for this visit.    Anticoagulation Summary  As of 1/3/2019    INR goal:   2.0-3.0   TTR:   70.9 % (1 y)   Today's INR:   2.2   Warfarin maintenance plan:   0.5 mg (1 mg x 0.5) on Mon, Fri; 1 mg (1 mg x 1) all other days   Weekly warfarin total:   6 mg   Plan last modified:   Pieter Covarrubias, PharmD (4/12/2018)   Next INR check:   1/31/2019   Target end date:   Indefinite    Indications    Paroxysmal a-fib (CMS-HCC) [I48.0]  Chronic anticoagulation [Z79.01]             Anticoagulation Episode Summary     INR check location:   [facility]    Preferred lab:       Send INR reminders to:       Comments:   [phone] (H)      Anticoagulation Care Providers     Provider Role Specialty Phone number    Evelio Tejeda M.D. Referring Internal Medicine [phone]    [facility] Responsible  [phone]    Beryl Pang M.D. Responsible Family Medicine [phone]        Anticoagulation Patient Findings      HPI:   Angie Root seen in clinic today, they are here today for a INR check on anticoagulation therapy with warfarin because they have afib    The reason for today's visit is to prevent morbidity and mortality from a stroke  and to reduce the risk of bleeding while on a anticoagulant.     Additional education provided today regarding reducing bleed risk and dietary constraints:  About how vitamin K and foods work with warfarin and the bleeding risk on a anticoagulant     Any upcoming procedures:   none    Confirmed warfarin dosing regimen  Interval Changes with foods rich in vitamin K: No  Interval Changes in ETOH:   No  Interval Changes in smoking status:  No  Interval Changes in medication:  No   Cost restriction:  No    S/S of bleeding or bruising:  No  Signs/symptoms  thrombosis since the last appt:  No  Bleed risk is:  moderate,     3 vitals included with today's appt :  (BP, HR, weight, ht, RR)      Assessment:   INR  therapeutic.     no change is needed today because INR is in range.      They have a TTR of 70  which is not at target (TTR target/goal is 100%) and requires close follow up to prevent a adverse event (the lower the TTR the higher risk of clots, strokes, or bleeding).       Plan:  Continue weekly warfarin dose as noted    Follow up:  Follow up appointment in 4 week(s)       Other info:  Pt educated to contact our clinic with any changes in medications or s/s of bleeding or thrombosis    CHEST guidelines recommend frequent INR monitoring at regular intervals (a few days up to a max of 12 weeks) to ensure they are on the proper dose of warfarin and not having any complications from therapy.  INRs can dramatically change over a short time period due to diet, medications, and medical conditions.

## 2019-01-09 ENCOUNTER — HOSPITAL ENCOUNTER (OUTPATIENT)
Dept: LAB | Facility: MEDICAL CENTER | Age: 79
End: 2019-01-09
Attending: FAMILY MEDICINE
Payer: MEDICARE

## 2019-01-09 LAB
EST. AVERAGE GLUCOSE BLD GHB EST-MCNC: 134 MG/DL
HBA1C MFR BLD: 6.3 % (ref 0–5.6)

## 2019-01-09 PROCEDURE — 36415 COLL VENOUS BLD VENIPUNCTURE: CPT

## 2019-01-09 PROCEDURE — 83036 HEMOGLOBIN GLYCOSYLATED A1C: CPT

## 2019-01-19 NOTE — ED NOTES
Patient: Pallavi Benton Date: 2019   : 1951 Attending: Prince BETH Harkins MD   67 year old female      GI CONSULT    Consult Diagnosis:  Anemia, melena, heme positive stool     Subjective: No acute issues, reports she feels well, tolerating po intake. 1 BM yesterday.     HPI: 67 year old female, known to our service, history of CKD on list for kidney transplant, DM, HTN, COPD, hypothyroidism, Bipolar disorder, major depression, obesity, colon polyps, hx of low anterior horizontal colon resection for tubulovillous adenomatous polyp in rectum in . Pt reports over the weekend developed an anorectal abscess over the weekend with rectal pain and drainage. Was seen earlier today for I&D as outpatient. Complained of SOB on exertion, weakness and fatigue. Hgb was checked and noted to be 6.5. BUN/creat ratio normal. Pt admitted through ED. Hgb was 9.5 on . Pt reports chronic black stools due to being on iron, but over the weekend stools became more black and tarry. Denies abd pain. Reports increased heartburn which causes nausea and vomiting. Denies hematemesis or coffee ground emesis. Denies dysphagia or odynophagia. Not on anticoagulation. Does not use NSAID's. No hx of EGD.   Screening colonoscopy done 2018 for transplant work up. Noted was poor prep, diverticulosis, IH and small TA polyp was removed.      Past Medical History:    Seasonal allergies                                            Colon polyp                                               Hypothyroid                                               High cholesterol                                              Hypertension                                                  Major depressive disorder                                     Bipolar disorder (CMS/HCC)                                    Chronic kidney disease, stage III (moderate) (*               Vitamin D deficiency                                          Tobacco use       Report to Jarrett KAYE                                                  Comment: quit 12/10/12 per pt    Fracture                                                        Comment: broken left leg as a child    COPD (chronic obstructive pulmonary disease) (*                 Comment: 4/30/2018: \"one  told me I have COPD\"    Diabetes mellitus (CMS/[facility])                     2000            Comment: type 2 on insulin uncontrolled and non                compliant     Anxiety                                                       Arthritis                                                       Comment: knees & hands    Snoring                                                       Fall                                            07/06/2018      Comment: Bruise above left eye, lip, and left Hand    Bronchitis                                                    Sinusitis, chronic                                            Anemia                                                        Past Surgical History:   Procedure Laterality Date   • Av fistula placement      Left-  No bld pressure, bld wk, or iv's place on Left Arm   • Colon surgery      villous tumor of rectal area, surgical resection   • Colonoscopy w/ polypectomy  05/26/2009    diverticulosis, colon polyps X 2   • Colonoscopy w/ polypectomy  06/21/2007    polyps X 2   • Colonoscopy w/ polypectomy  06/22/2006    large sessile polyp probably villous adenoma- too large to remove endoscopically. Benign polyps   • Eye surgery Bilateral prior to 2006    lasik   • Joint replacement Left 2007-approx    knee   • Joint replacement Right 2009-approx    knee   • Rotator cuff repair Left     left shoulder   • Tonsillectomy and adenoidectomy      as a child   • Total knee replacement      right   • Total knee replacement      left   • [place] tooth extraction         Family History   Problem Relation Age of Onset   • Diabetes Mother    • Heart disease Mother    • Kidney disease Mother    • Cancer Father    •  Heart disease Father    • Diabetes Father    • Mental retardation Sister    • Depression Sister    • Diabetes Sister    • COPD Sister    • Heart disease Sister    • High blood pressure Sister    • Thyroid Sister    • High cholesterol Sister        ALLERGIES:   Allergen Reactions   • Doxycycline Other (See Comments)     \"Makes sickness worse\"       Social History:  Social History     Tobacco Use   • Smoking status: Current Every Day Smoker     Packs/day: 1.00     Years: 12.00     Pack years: 12.00     Types: Cigarettes   • Smokeless tobacco: Never Used   • Tobacco comment: e-cigarettes   Substance Use Topics   • Alcohol use: Yes     Alcohol/week: 0.6 oz     Types: 1 Standard drinks or equivalent per week     Comment: very rare       Scheduled Inpatient Meds  • azithromycin  500 mg Oral Daily   • cefdinir  300 mg Oral 2 times per day   • allopurinol  100 mg Oral Daily   • ascorbic acid  1,000 mg Oral Daily   • atorvastatin  10 mg Oral Daily   • B complex-vitamin C-folic acid  1 tablet Oral Daily   • busPIRone  15 mg Oral 2 times per day   • calcium carbonate  500 mg Oral 2 times per day   • cholecalciferol  2,000 Units Oral 2 times per day   • furosemide  60 mg Oral BID   • gabapentin  300 mg Oral TID   • gemfibrozil  600 mg Oral 2 times per day   • levothyroxine  175 mcg Oral QAM AC   • sertraline  200 mg Oral Daily   • sodium bicarbonate  650 mg Oral 2 times per day   • ziprasidone  60 mg Oral BID WC   • ferrous sulfate  325 mg Oral BID WC   • insulin lispro   Subcutaneous TID AC   • guaiFENesin  1,200 mg Oral 2 times per day   • albuterol-ipratropium 2.5 mg/0.5 mg  3 mL Nebulization 4x Daily Resp   • pantoprazole  40 mg Intravenous 2 times per day   • nicotine  1 patch Transdermal Daily    And   • nicotine  1 patch Transdermal Daily   • ziprasidone  80 mg Oral Nightly     Medications Prior to Admission   Medication Sig Dispense Refill   • ziprasidone (GEODON) 80 MG capsule Take 80 mg by mouth nightly.     •  omeprazole (PRILOSEC) 40 MG capsule Take 1 capsule by mouth daily. Take 1/2 hour before first meal of day 30 capsule 0   • amoxicillin-clavulanate (AUGMENTIN) 250-125 MG per tablet Take 1 tablet by mouth 3 times daily for 10 days. 30 tablet 0   • LORazepam (ATIVAN) 0.5 MG tablet TAKE 1 TABLET BY MOUTH EVERY 8 HOURS AS NEEDED FOR ANXIETY 180 tablet 0   • levothyroxine (SYNTHROID, LEVOTHROID) 175 MCG tablet TAKE 1 TABLET BY MOUTH ONCE DAILY 90 tablet 3   • hydrALAZINE (APRESOLINE) 10 MG tablet TAKE 1 TABLET BY MOUTH THREE TIMES DAILY 270 tablet 1   • sodium bicarbonate 650 MG tablet TAKE 1 TABLET BY MOUTH TWICE DAILY 180 tablet 1   • furosemide (LASIX) 40 MG tablet Take 1.5 tablets by mouth 2 times daily. 90 tablet 5   • gemfibrozil (LOPID) 600 MG tablet TAKE ONE TABLET BY MOUTH TWICE DAILY 180 tablet 1   • gabapentin (NEURONTIN) 300 MG capsule TAKE 1 CAPSULE BY MOUTH THREE TIMES DAILY 270 capsule 1   • zolpidem (AMBIEN) 10 MG tablet TAKE 1 TABLET BY MOUTH IN THE EVENING AS NEEDED FOR SLEEP 30 tablet 1   • ferrous sulfate 324 (65 Fe) MG EC tablet Take 1 tablet by mouth 2 times daily. 60 tablet 5   • calcium carbonate (TUMS) 500 MG chewable tablet Chew 1 tablet by mouth 2 times daily. 60 tablet 5   • insulin regular 70-30 (HUMULIN 70/30) (70-30) 100 UNIT/ML injectable suspension Take 16 u with breakfast and dinner; < 100 -4 u; 151-200 +2 u; 201-250 +4 u; 251-300 +6 u; >300 +8 u Bid 20 mL 3   • atorvastatin (LIPITOR) 10 MG tablet TAKE ONE TABLET BY MOUTH ONCE DAILY 90 tablet 3   • Omega-3 Fatty Acids (FISH OIL ADULT GUMMIES PO) Take by mouth 2 times daily. Pt states she takes [employer] Made brand and each gummie [id number] mg of fish oil + 57 mg of Omega III     • Coenzyme Q10 (CO Q 10 PO) Take by mouth daily.      • allopurinol (ZYLOPRIM) 100 MG tablet TAKE ONE TABLET BY MOUTH ONCE DAILY (Patient taking differently: TAKE ONE TABLET BY MOUTH ONCE daily in the evening) 90 tablet 3   • B complex-vitamin C-folic acid  (NEPHRO-YUNIEL) 0.8 MG Tab Take 0.8 mg by mouth daily.     • ziprasidone (GEODON) 60 MG capsule Take 60 mg by mouth 2 times daily (with meals). With breakfast and lunch     • cholecalciferol (VITAMIN D3) 1000 UNITS tablet Take 2,000 Units by mouth 2 times daily.      • sertraline (ZOLOFT) 100 MG tablet Take 2 tablets by mouth daily. 180 tablet 3   • busPIRone (BUSPAR) 15 MG tablet Take 1 tablet by mouth 2 times daily. Indications: Anxiety Disorder 180 tablet 3   • ascorbic acid (VITAMIN C) 1000 MG tablet Take 1,000 mg by mouth daily.     • Blood Glucose Monitoring Suppl w/Device Kit To test four times a day. Dx code E11.9 insulin dependant. Accu-chek Malka Smartview Meter 1 kit 0   • Insulin Syringe-Needle U-100 30G X 5/16\" 0.3 ML Misc Injecting twice a day 180 each 3   • blood glucose (ACCU-CHEK SMARTVIEW) test strip 4 each by Other route 4 times daily. Test blood sugar 4 times daily as directed. Diagnosis: DXE11.9. Meter: 300 strip 11   • amoxicillin (AMOXIL) 500 MG tablet Take 2,000 mg by mouth 2 times daily. Take before having dental work     • Lancets Misc. (ACCU-CHEK FASTCLIX LANCET) KIT To test three times a day. .00 insulin dependant 300 each 4   • Super Thin Lancets MISC TEST BLOOD SUGAR FOUR TIMES DAILY 400 each 1         Vital 24 Hour Range Last Value   Temperature Temp  Min: 97.7 °F (36.5 °C)  Max: 99.3 °F (37.4 °C) 98.6 °F (37 °C) (01/19/19 0610)   Pulse Pulse  Min: 80  Max: 116 86 (01/19/19 0610)   Respiratory Resp  Min: 15  Max: 29 20 (01/19/19 0610)   Non-Invasive  Blood Pressure BP  Min: 111/56  Max: 217/85 144/66 (01/19/19 0610)   Arterial   Blood Pressure No Data Recorded       Vital First Value Last Value   Weight Weight: 88 kg (01/17/19 1412) 91.7 kg (01/19/19 0610)   Height N/A 5' 3\" (160 cm) (01/17/19 1412)   BMI N/A 35.8 (01/19/19 0610)      Intake/Output:  Last stool occurrence: 1 (01/19/19 0500)1/17/2019      Review of Systems:  General-  Denies chills, sweats, wt loss, reports fatigue,  generalized weakness  HEENT-  Denies headache, vision changes  Respiratory- +SOB with exertion, denies productive cough  Cardiac- Denies chest pains  GI- as per HPI  Genitourinary-  Does make urine, denies dysuria  Skin- Denies rash, pruritis  Psych- +major depression/anxiety, bipolar disorder  Neuro-  +dizziness, denies focal weakness, paresthesias  Hematologic- Denies history of bleeding disorder or bleeding problems  Musculoskeletal- + right arm pain    Physical Exam:   General appearance: awake, alert, NAD  Lungs: clear to auscultation anterior chest  Heart: regular rate and rhythm, S1, S2 normal  Abdomen: non distended, +BS, soft, non tender, no masses or HSM  Extremities: no edema  Skin: No rashes, pale  Neurologic: Grossly normal, no focal deficits     Labs:  Recent Labs   Lab 01/19/19  0635 01/18/19  1009 01/18/19  0411  01/17/19  1412 01/17/19  1303   WBC 10.8  --  8.2  --   --  10.7   HCT 24.1* 23.4* 20.3*   < >  --  20.2*   HGB 7.7* 7.6* 6.6*   < >  --  6.5*   MCV 93.1  --  91.9  --   --  92.2     --  173  --   --  225   INR  --   --   --   --  1.0  --    SODIUM  --   --  141  --   --  137   POTASSIUM  --   --  4.4  --   --  4.3   GLUCOSE  --   --  66  --   --  129*   BUN  --   --  80*  --   --  86*   CREATININE  --   --  4.72*  --   --  5.07*   AST  --   --   --   --   --  17   GPT  --   --   --   --   --  27   ALKPT  --   --   --   --   --  85   BILIRUBIN  --   --   --   --   --  0.2   ALBUMIN  --   --   --   --   --  2.7*    < > = values in this interval not displayed.       Radiology Findings:  N/A      Pathology Findings:  N/A    Assessment:  1. 67 year old female with CKD on transplant list for kidney transplant. Admitted from PCP office due to complaints of dyspnea on exertion and fatigue. Hgb checked and was 6.5. This is down from 9.5 on 12/4/2018. Rectal exam in ED noting black, heme positive stool. DDx: UGI bleed secondary to PUD, gastritis, esophagitis, AVM's, MWT, etc.   2. Worsening  GERD recently with nausea and vomiting. Denies hematemesis and coffee ground emesis. No hx of EGD.   EGD 1/18/2019  Small erosion in the proximal body of the stomach with angiectasia and active oozing of blood. This area was about 1 cm long and needed hemoclips for complete hemostasis     3. Colonoscopy 7/2018 with removal TA polyp, diverticulosis, IH, poor prep  4. Anorectal abscess with rectal pain and drainage for the last 5-6 days.   5. Hx low anterior colon resection 2006 for tubulovillous adenomatous polyp in 2006  6. DM, HTN, COPD, HLD  7. Bipolar disorder, anxiety/depression.      Plans/Recommendations:  1. Monitor Hb and stool output  2. Diet as tolerated  3. Continue PPI    Brown Gutierrez MD

## 2019-01-31 ENCOUNTER — ANTICOAGULATION VISIT (OUTPATIENT)
Dept: MEDICAL GROUP | Facility: PHYSICIAN GROUP | Age: 79
End: 2019-01-31
Payer: MEDICARE

## 2019-01-31 VITALS
SYSTOLIC BLOOD PRESSURE: 118 MMHG | WEIGHT: 96 LBS | BODY MASS INDEX: 17.56 KG/M2 | HEART RATE: 68 BPM | DIASTOLIC BLOOD PRESSURE: 67 MMHG

## 2019-01-31 DIAGNOSIS — Z79.01 CHRONIC ANTICOAGULATION: ICD-10-CM

## 2019-01-31 DIAGNOSIS — I48.0 PAROXYSMAL A-FIB (HCC): ICD-10-CM

## 2019-01-31 LAB — INR PPP: 1.9 (ref 2–3.5)

## 2019-01-31 PROCEDURE — 85610 PROTHROMBIN TIME: CPT | Performed by: INTERNAL MEDICINE

## 2019-01-31 PROCEDURE — 99211 OFF/OP EST MAY X REQ PHY/QHP: CPT | Performed by: INTERNAL MEDICINE

## 2019-01-31 NOTE — PROGRESS NOTES
Anticoagulation Summary  As of 2019    INR goal:   2.0-3.0   TTR:   70.6 % (1.1 y)   INR used for dosin.9! (2019)   Warfarin maintenance plan:   0.5 mg (1 mg x 0.5) every Mon; 1 mg (1 mg x 1) all other days   Weekly warfarin total:   6.5 mg   Plan last modified:   Lux Parkinson, PharmD (2019)   Next INR check:   2019   Target end date:   Indefinite    Indications    Paroxysmal a-fib (CMS-HCC) [I48.0]  Chronic anticoagulation [Z79.01]             Anticoagulation Episode Summary     INR check location:   [facility]    Preferred lab:       Send INR reminders to:       Comments:   [phone] (H)      Anticoagulation Care Providers     Provider Role Specialty Phone number    Evelio Tejeda M.D. Referring Internal Medicine [phone]    [facility] Anticoagulation Services Responsible  [phone]    Beryl Pang M.D. Responsible Family Medicine [phone]        Anticoagulation Patient Findings       HPI:  Angie Root seen in clinic today, on anticoagulation therapy with warfarin for PAF.   Reason for today's visit (per our collaborative practice policy) is because their last INR was 2.2 on 1/3/19. Intervention at the last visit:none  Changes to current medical/health status since last appt: none  No signs/symptoms of bleeding and/or thrombosis since the last appt.    No interval changes to diet or any interval changes to medications since last appt.   No complications or cost restrictions with current therapy.   BP recorded in vitals.  Confirmed dosing regimen.     A/P   INR  SUB-therapeutic.   Possible reason(s) INR is not in range today: INR has been trending low, likely needs maintenance adjustment.     Begin 8% increased regimen.     Follow up appointment in 2 weeks to reduce risk of adverse events related to this high risk medication, Warfarin.    Purpose of next visit:  They are at a increased risk of clots because INR is below goal.    Other info:  Pt educated to contact our  clinic with any changes in medications or s/s of bleeding or thrombosis  CHEST guidelines recommend frequent INR monitoring at regular intervals (a few days up to a max of 12 weeks) to ensure they are on the proper dose of warfarin and not having any complications from therapy. INRs can dramatically change over a short time period due to diet, medications, and medical conditions.     Lux Parkinson, PharmD

## 2019-02-14 ENCOUNTER — ANTICOAGULATION VISIT (OUTPATIENT)
Dept: MEDICAL GROUP | Facility: PHYSICIAN GROUP | Age: 79
End: 2019-02-14
Payer: MEDICARE

## 2019-02-14 DIAGNOSIS — I48.0 PAROXYSMAL A-FIB (HCC): ICD-10-CM

## 2019-02-14 DIAGNOSIS — Z79.01 CHRONIC ANTICOAGULATION: Primary | ICD-10-CM

## 2019-02-14 LAB — INR PPP: 1.8 (ref 2–3.5)

## 2019-02-14 PROCEDURE — 93793 ANTICOAG MGMT PT WARFARIN: CPT | Performed by: INTERNAL MEDICINE

## 2019-02-14 PROCEDURE — 85610 PROTHROMBIN TIME: CPT | Performed by: INTERNAL MEDICINE

## 2019-02-14 NOTE — PROGRESS NOTES
Anticoagulation Summary  As of 2019    INR goal:   2.0-3.0   TTR:   68.3 % (1.1 y)   INR used for dosin.8! (2019)   Warfarin maintenance plan:   0.5 mg (1 mg x 0.5) every Mon; 1 mg (1 mg x 1) all other days   Weekly warfarin total:   6.5 mg   Plan last modified:   Lux Parkinson, PharmD (2019)   Next INR check:   2019   Target end date:   Indefinite    Indications    Paroxysmal a-fib (CMS-HCC) [I48.0]  Chronic anticoagulation [Z79.01]             Anticoagulation Episode Summary     INR check location:   [facility]    Preferred lab:       Send INR reminders to:       Comments:   [phone] (H)      Anticoagulation Care Providers     Provider Role Specialty Phone number    Evelio Tejeda M.D. Referring Internal Medicine [phone]    [facility] Anticoagulation Services Responsible  [phone]    Beryl Pang M.D. Responsible Family Medicine [phone]        Anticoagulation Patient Findings      HPI:  Angie Root seen in clinic today, on anticoagulation therapy with warfarin for PAF  Reason for today's visit (per our collaborative practice policy) is because their last INR was 1.9 on 19. Intervention at the last visit: warfarin dose was increased  Changes to current medical/health status since last appt: pt had some GI issues over the past few days.  No - signs/symptoms of bleeding and/or thrombosis since the last appt.    No - interval changes to diet or any interval changes to medications since last appt.   No - complications or cost restrictions with current therapy.   BP declined.     Pt reports that she's had some gastritis over the past couple days.     A/P   INR  is sub-therapeutic.   Possible reason(s) INR is not in range today: unclear, as warfarin dose was increased last visit.  Will have pt take an increased warfarin dose of 1mg daily for the next week.    Follow up appointment in 1 weeks to reduce risk of adverse events related to this high risk medication,  Warfarin.    Purpose of next visit:  They are at a increased risk of clots because INR is below goal.  They are at a increased risk of stroke because INR is below goal.     Other info:  Pt educated to contact our clinic with any changes in medications or s/s of bleeding or thrombosis  CHEST guidelines recommend frequent INR monitoring at regular intervals (a few days up to a max of 12 weeks) to ensure they are on the proper dose of warfarin and not having any complications from therapy. INRs can dramatically change over a short time period due to diet, medications, and medical conditions.     Soco Rowland, PharmD

## 2019-02-21 ENCOUNTER — ANTICOAGULATION VISIT (OUTPATIENT)
Dept: MEDICAL GROUP | Facility: PHYSICIAN GROUP | Age: 79
End: 2019-02-21
Payer: MEDICARE

## 2019-02-21 DIAGNOSIS — I48.0 PAROXYSMAL A-FIB (HCC): ICD-10-CM

## 2019-02-21 DIAGNOSIS — Z79.01 CHRONIC ANTICOAGULATION: Primary | ICD-10-CM

## 2019-02-21 LAB — INR PPP: 2.1 (ref 2–3.5)

## 2019-02-21 PROCEDURE — 85610 PROTHROMBIN TIME: CPT | Performed by: INTERNAL MEDICINE

## 2019-02-21 PROCEDURE — 93793 ANTICOAG MGMT PT WARFARIN: CPT | Performed by: INTERNAL MEDICINE

## 2019-02-21 NOTE — PROGRESS NOTES
Anticoagulation Summary  As of 2019    INR goal:   2.0-3.0   TTR:   67.7 % (1.2 y)   INR used for dosin.1 (2019)   Warfarin maintenance plan:   1 mg (1 mg x 1) every day   Weekly warfarin total:   7 mg   Plan last modified:   Soco Rowland, PharmD (2019)   Next INR check:   3/7/2019   Target end date:   Indefinite    Indications    Paroxysmal a-fib (CMS-HCC) [I48.0]  Chronic anticoagulation [Z79.01]             Anticoagulation Episode Summary     INR check location:   [facility]    Preferred lab:       Send INR reminders to:       Comments:   [phone] (H)      Anticoagulation Care Providers     Provider Role Specialty Phone number    Evelio Tejeda M.D. Referring Internal Medicine [phone]    [facility] Anticoagulation Services Responsible  [phone]    Beryl Pang M.D. Responsible Family Medicine [phone]        Anticoagulation Patient Findings      HPI:  Angie Root seen in clinic today, on anticoagulation therapy with warfarin for PAF  Reason for today's visit (per our collaborative practice policy) is because their last INR was 1.8 on 19. Intervention at the last visit: pt's warfarin dose was increased   Changes to current medical/health status since last appt: denies  No - signs/symptoms of bleeding and/or thrombosis since the last appt.    No - interval changes to diet or any interval changes to medications since last appt.   No - complications or cost restrictions with current therapy.   BP declined    A/P   INR  is-therapeutic.   Possible reason(s) INR is not in range today: NA  Will have pt continue with the same warfarin dosing as last week, 1mg daily.    Follow up appointment in 2 weeks to reduce risk of adverse events related to this high risk medication, Warfarin.      Other info:  Pt educated to contact our clinic with any changes in medications or s/s of bleeding or thrombosis  CHEST guidelines recommend frequent INR monitoring at regular intervals (a  few days up to a max of 12 weeks) to ensure they are on the proper dose of warfarin and not having any complications from therapy. INRs can dramatically change over a short time period due to diet, medications, and medical conditions.     Soco Rowland, PharmD

## 2019-02-22 ENCOUNTER — OFFICE VISIT (OUTPATIENT)
Dept: URGENT CARE | Facility: PHYSICIAN GROUP | Age: 79
End: 2019-02-22
Payer: MEDICARE

## 2019-02-22 VITALS
WEIGHT: 96 LBS | BODY MASS INDEX: 17.01 KG/M2 | HEART RATE: 67 BPM | DIASTOLIC BLOOD PRESSURE: 70 MMHG | SYSTOLIC BLOOD PRESSURE: 116 MMHG | RESPIRATION RATE: 15 BRPM | HEIGHT: 63 IN | OXYGEN SATURATION: 95 % | TEMPERATURE: 99 F

## 2019-02-22 DIAGNOSIS — J44.1 COPD EXACERBATION (HCC): ICD-10-CM

## 2019-02-22 LAB
FLUAV+FLUBV AG SPEC QL IA: NEGATIVE
INT CON NEG: NORMAL
INT CON POS: NORMAL

## 2019-02-22 PROCEDURE — 99214 OFFICE O/P EST MOD 30 MIN: CPT | Performed by: PHYSICIAN ASSISTANT

## 2019-02-22 PROCEDURE — 87804 INFLUENZA ASSAY W/OPTIC: CPT | Performed by: PHYSICIAN ASSISTANT

## 2019-02-22 RX ORDER — PREDNISONE 20 MG/1
TABLET ORAL
Qty: 12 TAB | Refills: 0 | Status: SHIPPED | OUTPATIENT
Start: 2019-02-22 | End: 2019-03-04

## 2019-02-23 NOTE — PROGRESS NOTES
Chief Complaint   Patient presents with   • Cough     started last night        HISTORY OF PRESENT ILLNESS: Patient is a 78 y.o. female who presents today for about 24 hours of worsened cough/wheeze.  Patient states she has had some symptoms for a few days however worsened in last 24 hours. She has used her rescue inhaler a few times a day.  She denies fevers, chills, body aches.  No chest pain or pain with breathing. Does feel an increased SOB from baseline.  Cough is mostly dry but what she has pulled up has been clear.   Hx of COPD. Is current daily smoker.     Patient Active Problem List    Diagnosis Date Noted   • PAD (peripheral artery disease) ([facility]) 03/01/2016     Priority: Medium   • Paroxysmal a-fib (CMS-[facility]) 01/20/2016     Priority: Medium   • Pre-diabetes 01/20/2016     Priority: Medium   • Osteoporosis, postmenopausal 08/24/2017     Priority: Low   • Depression with anxiety 02/15/2017     Priority: Low   • Vitamin D deficiency 07/26/2016     Priority: Low   • Insomnia 07/26/2016     Priority: Low   • Cigarette nicotine dependence with nicotine-induced disorder 03/01/2016     Priority: Low   • Gastroesophageal reflux disease without esophagitis 01/20/2016     Priority: Low   • Hiatal hernia 01/20/2016     Priority: Low   • Health care maintenance 01/20/2016     Priority: Low   • Panic attacks 01/20/2016     Priority: Low   • Chronic anticoagulation 04/17/2018   • Physical debility 12/05/2017   • History of COPD 12/05/2017   • Circulating anticoagulants ([facility]) 12/04/2017   • Hypotension 12/04/2017   • Ischemic colitis ([facility]) 12/03/2017       Allergies:Codeine; Iodine; Bee venom; Chantix [varenicline]; Ciprofloxacin; Latex; Pcn [penicillins]; Shellfish allergy; and Tetanus antitoxin    Current Outpatient Prescriptions Ordered in [facility]   Medication Sig Dispense Refill   • Hydrocod Polst-CPM Polst ER (TUSSIONEX PENNKINETIC ER) 10-8 MG/5ML Suspension Extended Release Take 5 mL by mouth every 12 hours as needed  for Cough or Rhinitis for up to 10 days. 50 mL 0   • predniSONE (DELTASONE) 20 MG Tab 2 tablets x 4 days then 1 tablet x 4 days then off. 12 Tab 0   • warfarin (COUMADIN) 1 MG Tab Take 1 Tab by mouth every evening. 90 Tab 3   • potassium chloride ER (KLOR-CON) 10 MEQ tablet Take 1 Tab by mouth every day. 90 Tab 2   • Probiotic Product (SOLUBLE FIBER/PROBIOTICS PO) Take  by mouth.     • albuterol 108 (90 Base) MCG/ACT Aero Soln inhalation aerosol Inhale 2 Puffs by mouth every 6 hours as needed for Shortness of Breath.     • fluticasone (FLONASE) 50 MCG/ACT nasal spray Spray 1 Spray in nose every day.     • DILTIAZem CD (CARTIA XT) 240 MG CAPSULE SR 24 HR Take 1 Cap by mouth every day. 90 Cap 3   • lovastatin (MEVACOR) 40 MG tablet Take 1 Tab by mouth every evening. 90 Tab 3   • alprazolam (XANAX) 0.25 MG Tab Take 0.25 mg by mouth every morning.     • sucralfate (CARAFATE) 1 GM Tab Take 1 g by mouth 2 Times a Day.     • sotalol (BETAPACE) 80 MG Tab Take 0.5 Tabs by mouth 2 times a day. 90 Tab 3   • Cholecalciferol (VITAMIN D) 2000 UNITS Cap Take 1 Cap by mouth every morning.       No current [facility]-ordered facility-administered medications on file.        Past Medical History:   Diagnosis Date   • Arthritis     BL hands   • CAD (coronary artery disease)    • Cancer ([facility]) 1982    melanoma   • COPD (chronic obstructive pulmonary disease) ([facility])    • Croup 3 years old   • Diverticulosis    • Dyslipidemia    • GERD (gastroesophageal reflux disease)    • Hiatal hernia    • High cholesterol    • Hypertension    • Infectious disease 2018    C Diff   • Measles     5 years old   • Paroxysmal A-fib ([facility]) 1/20/2016    Symptomatic, on a rhythm control strategy with sotalol 40 mg by mouth twice a day.    • Paroxysmal atrial fibrillation ([facility])    • Prediabetes    • PVD (peripheral vascular disease) ([facility])        Social History   Substance Use Topics   • Smoking status: Current Every Day Smoker     Packs/day: 0.25     Years: 60.00      "Types: Cigarettes     Start date: 3/20/1957   • Smokeless tobacco: Never Used      Comment: 6 per day   • Alcohol use No       Family Status   Relation Status   • Mo  at age 94   • Fa  at age 56        murdered    • Sis         vascular disease, pneumonia   • MGMo (Not Specified)   • MUnc (Not Specified)   • MGFa (Not Specified)   • PGMo (Not Specified)   • PGFa (Not Specified)   • MUnc (Not Specified)   • MUnc (Not Specified)   • Sis (Not Specified)   • Sis    • Sis (Not Specified)   • Neg Hx (Not Specified)     Family History   Problem Relation Age of Onset   • Hypertension Mother    • Hyperlipidemia Mother    • Cancer Maternal Grandmother         tongue   • Cancer Maternal Uncle         x 3, pancreas   • Cancer Maternal Grandfather         unknown   • Cancer Paternal Grandmother         unknown   • Cancer Paternal Grandfather         unknown   • Diabetes Maternal Uncle    • Heart Disease Maternal Uncle    • Hypertension Sister    • Hyperlipidemia Sister    • Heart Disease Sister    • Alcohol/Drug Sister    • Thyroid Sister    • Psychiatry Neg Hx    • Stroke Neg Hx        ROS:  Review of Systems   Constitutional: Negative for fever, chills, weight loss and malaise/fatigue.   HENT: Negative for ear pain, nosebleeds, congestion, sore throat and neck pain.    Eyes: Negative for blurred vision.   Respiratory: SEE HPI   Cardiovascular: Negative for chest pain, palpitations, orthopnea and leg swelling.   Gastrointestinal: Negative for heartburn, nausea, vomiting and abdominal pain.       Exam:  Blood pressure 116/70, pulse 67, temperature 37.2 °C (99 °F), temperature source Temporal, resp. rate 15, height 1.588 m (5' 2.5\"), weight 43.5 kg (96 lb), SpO2 95 %, not currently breastfeeding.  General:  Well nourished, well developed female in NAD  Eyes: PERRLA, EOM within normal limits, no conjunctival injection, no scleral icterus, visual fields and acuity grossly intact.  Ears: Normal " shape and symmetry, no tenderness, no discharge. External canals are without any significant edema or erythema. Tympanic membranes are without any inflammation, no effusion. Gross auditory acuity is intact  Nose: Symmetrical, sinuses without tenderness, no discharge.   Mouth: reasonable hygiene, no erythema exudates or tonsillar enlargement.  Neck: no masses, range of motion within normal limits, no tracheal deviation. No lymphadenopathy  Pulmonary: Normal respiratory effort, few faint exp wheezes without crackles or rhonchi.   Cardiovascular: regular rate and rhythm without murmurs, rubs, or gallops.  Skin: No visible rashes or lesion. Warm, pink, dry.   Extremities: no clubbing, cyanosis, or edema.  Neuro: A&O x 3. Speech normal/clear.  Normal gait.         Assessment/Plan:  1. COPD exacerbation (HCC)  POCT Influenza A/B    Hydrocod Polst-CPM Polst ER (TUSSIONEX PENNKINETIC ER) 10-8 MG/5ML Suspension Extended Release    predniSONE (DELTASONE) 20 MG Tab         -influenza NEG  -patient declines breathing treatment and will use her inhaler she states.   -cough syrup as above.  Emphasized drowsy and no driving on this medicine.  Patient expressed understanding of this.  She has taken this before without adverse effective.    -Discussed INR effects with taking Prednisone, she will contact her INR clinic       Supportive care, differential diagnoses, and indications for immediate follow-up discussed with patient.   Pathogenesis of diagnosis discussed including typical length and natural progression.   Instructed to return to clinic or nearest emergency department for any change in condition, further concerns, or worsening of symptoms.  Patient states understanding of the plan of care and discharge instructions.  Instructed to make an appointment, for follow up, with their primary care provider.      Fanny Piña P.A.-C.

## 2019-02-26 ENCOUNTER — TELEPHONE (OUTPATIENT)
Dept: URGENT CARE | Facility: PHYSICIAN GROUP | Age: 79
End: 2019-02-26

## 2019-02-26 NOTE — TELEPHONE ENCOUNTER
Pt was wondering if they can have a refill of the Tussionex Pennkinetic ER? They state that they have been using the medication one time in the morning and once at night. Please advise.

## 2019-02-28 ENCOUNTER — HOSPITAL ENCOUNTER (OUTPATIENT)
Dept: LAB | Facility: MEDICAL CENTER | Age: 79
End: 2019-02-28
Attending: NURSE PRACTITIONER
Payer: MEDICARE

## 2019-02-28 ENCOUNTER — ANTICOAGULATION MONITORING (OUTPATIENT)
Dept: VASCULAR LAB | Facility: MEDICAL CENTER | Age: 79
End: 2019-02-28

## 2019-02-28 DIAGNOSIS — I48.0 PAROXYSMAL A-FIB (HCC): ICD-10-CM

## 2019-02-28 DIAGNOSIS — Z79.01 CHRONIC ANTICOAGULATION: ICD-10-CM

## 2019-02-28 LAB
INR PPP: 2.9 (ref 0.87–1.13)
PROTHROMBIN TIME: 30.4 SEC (ref 12–14.6)

## 2019-02-28 PROCEDURE — 85610 PROTHROMBIN TIME: CPT

## 2019-02-28 PROCEDURE — 36415 COLL VENOUS BLD VENIPUNCTURE: CPT

## 2019-03-01 NOTE — PROGRESS NOTES
Anticoagulation Summary  As of 2019    INR goal:   2.0-3.0   TTR:   68.2 % (1.2 y)   INR used for dosin.90 (2019)   Warfarin maintenance plan:   1 mg (1 mg x 1) every day   Weekly warfarin total:   7 mg   No change documented:   Beck Bowman, Med Ass't   Plan last modified:   Soco Rowland, PharmD (2019)   Next INR check:   3/7/2019   Target end date:   Indefinite    Indications    Paroxysmal a-fib (CMS-HCC) [I48.0]  Chronic anticoagulation [Z79.01]             Anticoagulation Episode Summary     [facility]R check location:   [facility]    Preferred lab:       Send INR reminders to:       Comments:   [phone] (H)      Anticoagulation Care Providers     Provider Role Specialty Phone number    Evelio Tejeda M.D. Referring Internal Medicine [phone]    [facility] Anticoagulation Services Responsible  [phone]    Beryl Pang M.D. Responsible Family Medicine [phone]        Anticoagulation Patient Findings  Patient Findings     Negatives:   Signs/symptoms of thrombosis, Signs/symptoms of bleeding, Laboratory test error suspected, Change in health, Change in alcohol use, Change in activity, Upcoming invasive procedure, Emergency department visit, Upcoming dental procedure, Missed doses, Extra doses, Change in medications, Change in diet/appetite, Hospital admission, Bruising, Other complaints        Left voicemail message to report therapeutic INR of 2.9.  Patient to continue with current warfarin dosing regimen. Requested pt contact the clinic for any change to diet or medication, and to report any signs or symptoms of bleeding, bruising or clotting.  Pt to follow up in 1 week.    Beck Bowman, Med Ass't     I have reviewed and concur with the above plan on 2019.  Sadaf Santillan, Clinical Pharmacist

## 2019-03-04 ENCOUNTER — OFFICE VISIT (OUTPATIENT)
Dept: URGENT CARE | Facility: PHYSICIAN GROUP | Age: 79
End: 2019-03-04
Payer: MEDICARE

## 2019-03-04 ENCOUNTER — APPOINTMENT (OUTPATIENT)
Dept: RADIOLOGY | Facility: IMAGING CENTER | Age: 79
End: 2019-03-04
Attending: NURSE PRACTITIONER
Payer: MEDICARE

## 2019-03-04 VITALS
HEART RATE: 73 BPM | SYSTOLIC BLOOD PRESSURE: 106 MMHG | OXYGEN SATURATION: 90 % | HEIGHT: 63 IN | RESPIRATION RATE: 14 BRPM | WEIGHT: 96 LBS | TEMPERATURE: 97.7 F | BODY MASS INDEX: 17.01 KG/M2 | DIASTOLIC BLOOD PRESSURE: 72 MMHG

## 2019-03-04 DIAGNOSIS — R05.9 COUGH: ICD-10-CM

## 2019-03-04 DIAGNOSIS — B96.89 ACUTE BACTERIAL BRONCHITIS: ICD-10-CM

## 2019-03-04 DIAGNOSIS — J20.8 ACUTE BACTERIAL BRONCHITIS: ICD-10-CM

## 2019-03-04 PROCEDURE — 71046 X-RAY EXAM CHEST 2 VIEWS: CPT | Mod: TC | Performed by: NURSE PRACTITIONER

## 2019-03-04 PROCEDURE — 99214 OFFICE O/P EST MOD 30 MIN: CPT | Performed by: NURSE PRACTITIONER

## 2019-03-04 RX ORDER — METHYLPREDNISOLONE 4 MG/1
4 TABLET ORAL DAILY
Qty: 1 KIT | Refills: 0 | Status: SHIPPED | OUTPATIENT
Start: 2019-03-04 | End: 2019-03-12

## 2019-03-04 RX ORDER — DOXYCYCLINE HYCLATE 100 MG
100 TABLET ORAL 2 TIMES DAILY
Qty: 14 TAB | Refills: 0 | Status: SHIPPED | OUTPATIENT
Start: 2019-03-04 | End: 2019-03-11

## 2019-03-04 ASSESSMENT — COPD QUESTIONNAIRES: COPD: 1

## 2019-03-04 ASSESSMENT — ENCOUNTER SYMPTOMS
WHEEZING: 0
FEVER: 0
COUGH: 1
SHORTNESS OF BREATH: 1

## 2019-03-04 NOTE — PROGRESS NOTES
Subjective:      Angie Root is a 79 y.o. female who presents with Cough (not getting better; worried about C-diff )            Cough   This is a new problem. Episode onset: pt reports she was seen about 10 days ago for a cough. She admits she took the steroids but is not improving. She admits she feels more SOB and her cough is wet now. She denies any recent fevers or chills. The problem has been gradually worsening. The cough is non-productive. Associated symptoms include shortness of breath. Pertinent negatives include no fever or wheezing. Risk factors for lung disease include smoking/tobacco exposure. She has tried rest, prescription cough suppressant and oral steroids for the symptoms. The treatment provided no relief. Her past medical history is significant for COPD. There is no history of asthma.       Review of Systems   Constitutional: Negative for fever.   Respiratory: Positive for cough and shortness of breath. Negative for wheezing.    All other systems reviewed and are negative.    Past Medical History:   Diagnosis Date   • Arthritis     BL hands   • CAD (coronary artery disease)    • Cancer (HCC) 1982    melanoma   • COPD (chronic obstructive pulmonary disease) ([facility])    • Croup 3 years old   • Diverticulosis    • Dyslipidemia    • GERD (gastroesophageal reflux disease)    • Hiatal hernia    • High cholesterol    • Hypertension    • Infectious disease 2018    C Diff   • Measles     5 years old   • Paroxysmal A-fib (HCC) 1/20/2016    Symptomatic, on a rhythm control strategy with sotalol 40 mg by mouth twice a day.    • Paroxysmal atrial fibrillation (HCC)    • Prediabetes    • PVD (peripheral vascular disease) ([facility])       Past Surgical History:   Procedure Laterality Date   • FECAL TRANSPLANT N/A 4/9/2018    Procedure: FECAL TRANSPLANT;  Surgeon: Gonzalez Cruz M.D.;  Location: ENDOSCOPY [facility];  Service: Gastroenterology   • COLONOSCOPY - ENDO N/A 4/9/2018    Procedure: COLONOSCOPY -  "ENDO;  Surgeon: Gonzalez Cruz M.D.;  Location: ENDOSCOPY [facility];  Service: Gastroenterology   • WIDE EXCISION MELANOMA, LEG, W/POSS.STSG  10/2015    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   • CARDIAC CATH, RIGHT HEART  2008    stent   • OTHER ORTHOPEDIC SURGERY Left 2008    hand repair   • CHOLECYSTECTOMY  1993   • TONSILLECTOMY  1945   • OTHER CARDIAC SURGERY      r heart cath stents   • STENT PLACEMENT      L iliac stent      Social History     Social History   • Marital status:      Spouse name: N/A   • Number of children: 0   • Years of education: N/A     Occupational History   • Not on file.     Social History Main Topics   • Smoking status: Current Every Day Smoker     Packs/day: 0.25     Years: 60.00     Types: Cigarettes     Start date: 3/20/1957   • Smokeless tobacco: Never Used      Comment: 6 per day   • Alcohol use No   • Drug use: No   • Sexual activity: Not Currently     Partners: Male     Other Topics Concern   • Not on file     Social History Narrative    .     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 Vital signs are normal. She appears well-developed and well-nourished.   HENT:   Head: Normocephalic and atraumatic.   Eyes: Pupils are equal, round, and reactive to light. EOM are normal.   Neck: Normal range of motion.   Cardiovascular: Normal rate and regular rhythm.    Pulmonary/Chest: Effort normal. She has decreased breath sounds in the right lower field. She has rhonchi in the right upper field and the left upper field.   Musculoskeletal: Normal range of motion.   Neurological: She is alert and oriented to person, place, and time.   Skin: Skin is warm and dry. Capillary refill takes less than 2 seconds.   Psychiatric: She has a " normal mood and affect. Her speech is normal and behavior is normal. Thought content normal.   Vitals reviewed.         CXR: no acute cardiopulmonary process by my read, radiology pending.       3/4/2019 9:40 AM    HISTORY/REASON FOR EXAM:  Cough  Cough for 2 weeks    TECHNIQUE/EXAM DESCRIPTION AND NUMBER OF VIEWS:  Two views of the chest.    COMPARISON:  4/28/2017    FINDINGS:    The cardiac silhouette  and mediastinal contours are normal.    No discrete opacity, pleural fluid or pneumothorax. The lungs are hyperexpanded, similar to the prior exam.    No suspicious bony lesions.    Surgical clips project over the upper abdomen.   Impression       No acute cardiopulmonary findings.       Assessment/Plan:     1. Acute bacterial bronchitis  - doxycycline (VIBRAMYCIN) 100 MG Tab; Take 1 Tab by mouth 2 times a day for 7 days.  Dispense: 14 Tab; Refill: 0  - MethylPREDNISolone (MEDROL DOSEPAK) 4 MG Tablet Therapy Pack; Take 1 Tab by mouth every day.  Dispense: 1 Kit; Refill: 0    2. Cough  - DX-CHEST-2 VIEWS; Future  - MethylPREDNISolone (MEDROL DOSEPAK) 4 MG Tablet Therapy Pack; Take 1 Tab by mouth every day.  Dispense: 1 Kit; Refill: 0    Discussed with patient her CXR appears to be clear  DDX discussed with her include but are not limited to bacterial bronchitis, PNA, PE, pleural effusions  Very strict ER precautions discussed for worsening SOB, new onset of fevers, or any new concerning symptoms  Increase water intake  Smoking cessation discussed  OTC cough syrup as directed  Sleep with HOB elevated to help reduce cough at night  Supportive care, differential diagnoses, and indications for immediate follow-up discussed with patient.    Pathogenesis of diagnosis discussed including typical length and natural progression.      Instructed to return to  or nearest emergency department if symptoms fail to improve, for any change in condition, further concerns, or new concerning symptoms.  Patient states understanding of  the plan of care and discharge instructions.

## 2019-03-07 ENCOUNTER — HOSPITAL ENCOUNTER (OUTPATIENT)
Dept: LAB | Facility: MEDICAL CENTER | Age: 79
End: 2019-03-07
Attending: PHYSICIAN ASSISTANT
Payer: MEDICARE

## 2019-03-07 ENCOUNTER — TELEPHONE (OUTPATIENT)
Dept: CARDIOLOGY | Facility: MEDICAL CENTER | Age: 79
End: 2019-03-07

## 2019-03-07 ENCOUNTER — ANTICOAGULATION MONITORING (OUTPATIENT)
Dept: MEDICAL GROUP | Facility: PHYSICIAN GROUP | Age: 79
End: 2019-03-07

## 2019-03-07 ENCOUNTER — HOSPITAL ENCOUNTER (OUTPATIENT)
Dept: LAB | Facility: MEDICAL CENTER | Age: 79
End: 2019-03-07
Attending: NURSE PRACTITIONER
Payer: MEDICARE

## 2019-03-07 ENCOUNTER — OFFICE VISIT (OUTPATIENT)
Dept: URGENT CARE | Facility: PHYSICIAN GROUP | Age: 79
End: 2019-03-07
Payer: MEDICARE

## 2019-03-07 ENCOUNTER — ANTICOAGULATION VISIT (OUTPATIENT)
Dept: MEDICAL GROUP | Facility: PHYSICIAN GROUP | Age: 79
End: 2019-03-07
Payer: MEDICARE

## 2019-03-07 VITALS
OXYGEN SATURATION: 95 % | HEART RATE: 46 BPM | TEMPERATURE: 97 F | SYSTOLIC BLOOD PRESSURE: 120 MMHG | DIASTOLIC BLOOD PRESSURE: 67 MMHG | BODY MASS INDEX: 16.69 KG/M2 | WEIGHT: 94.2 LBS | HEIGHT: 63 IN

## 2019-03-07 DIAGNOSIS — Z79.01 CHRONIC ANTICOAGULATION: ICD-10-CM

## 2019-03-07 DIAGNOSIS — R00.1 BRADYCARDIA: ICD-10-CM

## 2019-03-07 DIAGNOSIS — I48.0 PAROXYSMAL A-FIB (HCC): ICD-10-CM

## 2019-03-07 LAB
ALBUMIN SERPL BCP-MCNC: 4.1 G/DL (ref 3.2–4.9)
ALBUMIN/GLOB SERPL: 1.9 G/DL
ALP SERPL-CCNC: 64 U/L (ref 30–99)
ALT SERPL-CCNC: 22 U/L (ref 2–50)
ANION GAP SERPL CALC-SCNC: 6 MMOL/L (ref 0–11.9)
AST SERPL-CCNC: 23 U/L (ref 12–45)
BILIRUB SERPL-MCNC: 0.6 MG/DL (ref 0.1–1.5)
BUN SERPL-MCNC: 27 MG/DL (ref 8–22)
CALCIUM SERPL-MCNC: 10.4 MG/DL (ref 8.5–10.5)
CHLORIDE SERPL-SCNC: 107 MMOL/L (ref 96–112)
CO2 SERPL-SCNC: 28 MMOL/L (ref 20–33)
CREAT SERPL-MCNC: 0.84 MG/DL (ref 0.5–1.4)
GLOBULIN SER CALC-MCNC: 2.2 G/DL (ref 1.9–3.5)
GLUCOSE SERPL-MCNC: 110 MG/DL (ref 65–99)
INR PPP: 7.81 (ref 0.87–1.13)
INR PPP: >8 (ref 2–3.5)
POTASSIUM SERPL-SCNC: 5 MMOL/L (ref 3.6–5.5)
PROT SERPL-MCNC: 6.3 G/DL (ref 6–8.2)
PROTHROMBIN TIME: 65.4 SEC (ref 12–14.6)
SODIUM SERPL-SCNC: 141 MMOL/L (ref 135–145)

## 2019-03-07 PROCEDURE — 85610 PROTHROMBIN TIME: CPT | Performed by: PHYSICIAN ASSISTANT

## 2019-03-07 PROCEDURE — 85610 PROTHROMBIN TIME: CPT

## 2019-03-07 PROCEDURE — 36415 COLL VENOUS BLD VENIPUNCTURE: CPT

## 2019-03-07 PROCEDURE — 99214 OFFICE O/P EST MOD 30 MIN: CPT | Performed by: PHYSICIAN ASSISTANT

## 2019-03-07 PROCEDURE — 80053 COMPREHEN METABOLIC PANEL: CPT

## 2019-03-07 PROCEDURE — 99211 OFF/OP EST MAY X REQ PHY/QHP: CPT | Performed by: INTERNAL MEDICINE

## 2019-03-07 PROCEDURE — 93000 ELECTROCARDIOGRAM COMPLETE: CPT | Performed by: PHYSICIAN ASSISTANT

## 2019-03-07 ASSESSMENT — ENCOUNTER SYMPTOMS
FEVER: 0
COUGH: 1
SORE THROAT: 0
MYALGIAS: 0
CHILLS: 0
HEADACHES: 0
VOMITING: 0
NAUSEA: 0
ABDOMINAL PAIN: 0
SHORTNESS OF BREATH: 0

## 2019-03-07 NOTE — PROGRESS NOTES
Anticoagulation Summary  As of 3/7/2019    INR goal:   2.0-3.0   TTR:   67.2 % (1.2 y)   INR used for dosin.81! (3/7/2019)   Warfarin maintenance plan:   1 mg (1 mg x 1) every day   Weekly warfarin total:   7 mg   Plan last modified:   Soco Rowland, PharmD (2019)   Next INR check:      Target end date:   Indefinite    Indications    Paroxysmal a-fib (CMS-HCC) [I48.0]  Chronic anticoagulation [Z79.01]             Anticoagulation Episode Summary     INR check location:   [facility]    Preferred lab:       Send INR reminders to:       Comments:   [phone] (H)      Anticoagulation Care Providers     Provider Role Specialty Phone number    Evelio Tejeda M.D. Referring Internal Medicine [phone]    [facility] Responsible  [phone]    Beryl Pang M.D. Responsible Family Medicine [phone]        Anticoagulation Patient Findings    The confirmatory venipuncture result was 7.81.    Instructed her to not take warfarin for 3 days then just a half a pill on  and repeat her INR in the lab on Monday.  I did also schedule her an appointment with me 1 week from today.    Pieter Covarrubias, PharmD

## 2019-03-07 NOTE — PROGRESS NOTES
Subjective:      Angie Root is a 79 y.o. female who presents with Difficulty Breathing (SOB, fatigue, chest congestion, x13 days)         Patient presents to clinic today for follow-up of her oxygen saturation.  She has been seen in urgent care twice in the past 2 weeks for cough and shortness of breath.  She states her cough and shortness of breath are now improved.  She has been prescribed Prednisone, as well as Doxycycline.  She also has had a negative chest x-ray during this time.    Patient is on Coumadin for paroxsymal atrial fibrillation.  Her most recent INR was 8.  She is working with the Coumadin clinic to normalize her INR.  Her INR was last drawn today, and the Coumadin clinic recommend she leave her dressing on for 20 minutes and have an urgent care provider remove the dressing to ensure no continuous bleeding.    Overall, patient states she is feeling better. She denies chest pain, shortness of breath, lightheadedness, and/or dizziness.     PMH:  has a past medical history of Arthritis; CAD (coronary artery disease); Cancer ([facility]) (1982); COPD (chronic obstructive pulmonary disease) ([facility]); Croup (3 years old); Diverticulosis; Dyslipidemia; GERD (gastroesophageal reflux disease); Hiatal hernia; High cholesterol; Hypertension; Infectious disease (2018); Measles; Paroxysmal A-fib ([facility]) (1/20/2016); Paroxysmal atrial fibrillation ([facility]); Prediabetes; and PVD (peripheral vascular disease) ([facility]).  MEDS:   Current Outpatient Prescriptions:   •  doxycycline (VIBRAMYCIN) 100 MG Tab, Take 1 Tab by mouth 2 times a day for 7 days., Disp: 14 Tab, Rfl: 0  •  MethylPREDNISolone (MEDROL DOSEPAK) 4 MG Tablet Therapy Pack, Take 1 Tab by mouth every day., Disp: 1 Kit, Rfl: 0  •  warfarin (COUMADIN) 1 MG Tab, Take 1 Tab by mouth every evening., Disp: 90 Tab, Rfl: 3  •  potassium chloride ER (KLOR-CON) 10 MEQ tablet, Take 1 Tab by mouth every day., Disp: 90 Tab, Rfl: 2  •  Probiotic Product (SOLUBLE FIBER/PROBIOTICS  PO), Take  by mouth., Disp: , Rfl:   •  albuterol 108 (90 Base) MCG/ACT Aero Soln inhalation aerosol, Inhale 2 Puffs by mouth every 6 hours as needed for Shortness of Breath., Disp: , Rfl:   •  fluticasone (FLONASE) 50 MCG/ACT nasal spray, Spray 1 Spray in nose every day., Disp: , Rfl:   •  DILTIAZem CD (CARTIA XT) 240 MG CAPSULE SR 24 HR, Take 1 Cap by mouth every day., Disp: 90 Cap, Rfl: 3  •  lovastatin (MEVACOR) 40 MG tablet, Take 1 Tab by mouth every evening., Disp: 90 Tab, Rfl: 3  •  alprazolam (XANAX) 0.25 MG Tab, Take 0.25 mg by mouth every morning., Disp: , Rfl:   •  sucralfate (CARAFATE) 1 GM Tab, Take 1 g by mouth 2 Times a Day., Disp: , Rfl:   •  sotalol (BETAPACE) 80 MG Tab, Take 0.5 Tabs by mouth 2 times a day., Disp: 90 Tab, Rfl: 3  •  Cholecalciferol (VITAMIN D) 2000 UNITS Cap, Take 1 Cap by mouth every morning., Disp: , Rfl:   ALLERGIES:   Allergies   Allergen Reactions   • Codeine Swelling   • Iodine Swelling   • Bee Venom Anaphylaxis   • Chantix [Varenicline]      disoriented   • Ciprofloxacin      Causes C diff, recurrent and very difficult   • Latex Hives   • Pcn [Penicillins] Anaphylaxis and Hives   • Shellfish Allergy      unknown   • Tetanus Antitoxin Swelling     unknown     SURGHX:   Past Surgical History:   Procedure Laterality Date   • FECAL TRANSPLANT N/A 4/9/2018    Procedure: FECAL TRANSPLANT;  Surgeon: Gonzlaez Cruz M.D.;  Location: ENDOSCOPY [facility];  Service: Gastroenterology   • COLONOSCOPY - ENDO N/A 4/9/2018    Procedure: COLONOSCOPY - ENDO;  Surgeon: Gonzalez Cruz M.D.;  Location: ENDOSCOPY [facility];  Service: Gastroenterology   • WIDE EXCISION MELANOMA, LEG, W/POSS.STSG  10/2015    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   • CARDIAC CATH, RIGHT HEART  2008    stent   • OTHER ORTHOPEDIC SURGERY Left 2008    hand repair   • CHOLECYSTECTOMY  1993   • TONSILLECTOMY  1945   • OTHER CARDIAC SURGERY      r heart cath stents   • STENT PLACEMENT      L iliac stent      "    SOCHX:  reports that she has been smoking Cigarettes.  She started smoking about 62 years ago. She has a 15.00 pack-year smoking history. She has never used smokeless tobacco. She reports that she does not drink alcohol or use drugs.  FH: Family history was reviewed, no pertinent findings to report      HPI    Review of Systems   Constitutional: Negative for chills and fever.   HENT: Negative for congestion and sore throat.    Respiratory: Positive for cough. Negative for shortness of breath.    Cardiovascular: Negative for chest pain.   Gastrointestinal: Negative for abdominal pain, nausea and vomiting.   Genitourinary: Negative for dysuria, frequency and urgency.   Musculoskeletal: Negative for myalgias.   Skin: Negative for rash.   Neurological: Negative for headaches.          Objective:     /67 (BP Location: Right arm, Patient Position: Sitting, BP Cuff Size: Adult)   Pulse (!) 46   Temp 36.1 °C (97 °F) (Temporal)   Ht 1.588 m (5' 2.5\")   Wt 42.7 kg (94 lb 3.2 oz)   SpO2 95%   BMI 16.95 kg/m²      Physical Exam   Constitutional: She is oriented to person, place, and time. She appears well-developed and well-nourished. No distress.   HENT:   Head: Normocephalic.   Right Ear: External ear normal.   Left Ear: External ear normal.   Mouth/Throat: Oropharynx is clear and moist.   Eyes: Conjunctivae and EOM are normal.   Neck: Normal range of motion. Neck supple.   Cardiovascular: Regular rhythm.  Bradycardia present.    Pulmonary/Chest: No respiratory distress. She has rhonchi in the right lower field.   Neurological: She is alert and oriented to person, place, and time.   Skin: Skin is warm and dry.   Psychiatric: She has a normal mood and affect.           Progress:     Removed co-band from patient's lab draw site. There was no evidence of continued bleeding.     Recheck patient's HR and it was 44.    EKG:   EKG Interpretation-HR is 41 nonspecific ST and T waves changes, sinus bradycardia, mild " peaked T-waves    Spoke with the nurse for the patient's cardiologist who asked for the patient's EKG to be faxed to their office for Dr. Morgan to review.  Fax: [phone]     Receieved a call from Dr. Eddie Mendoza's, nurse who said Dr. Morgan reviewed the EKG and states the patient is only having mild peaked T waves with no conduction abnormalities. Recommend a CMP at this time to check her Potassium level. Will fax results to their office for review.     CMP ordererd - pt sent to lab to have blood drawn. Will call with the results, will also send the results to Dr. Morgan.     CMP  Sodium 141  135 - 145 mmol/L Final   Potassium 5.0  3.6 - 5.5 mmol/L Final   Chloride 107  96 - 112 mmol/L Final   Co2 28  20 - 33 mmol/L Final   Anion Gap 6.0  0.0 - 11.9 Final   Glucose 110   65 - 99 mg/dL Final   Bun 27   8 - 22 mg/dL Final   Creatinine 0.84  0.50 - 1.40 mg/dL Final   Calcium 10.4  8.5 - 10.5 mg/dL Final   AST(SGOT) 23  12 - 45 U/L Final   ALT(SGPT) 22  2 - 50 U/L Final   Alkaline Phosphatase 64  30 - 99 U/L Final   Total Bilirubin 0.6  0.1 - 1.5 mg/dL Final   Albumin 4.1  3.2 - 4.9 g/dL Final   Total Protein 6.3  6.0 - 8.2 g/dL Final   Globulin 2.2  1.9 - 3.5 g/dL Final   A-G Ratio 1.9  g/dL Final     Faxed results to Cardiologist once received.   Asked Cardiology to follow-up with the patient regarding her potassium prescription.   Patient has an appointment with Dr. Morgan on Tuesday, 3/12    Called patient with CMP results, instructed patient to hold her Potassium medication until she talked with her cardiologist.      Assessment/Plan:     1. Bradycardia  The patient presented to clinic for follow-up of her POX given her recent upper respiratory infection. When checking her POX, the patient was found to be bradycardic. She is not have any symptoms of bradycardia at this time. She denies chest pain, shortness of breath, dizziness, and lightheadedness.  An EKG was obtained that showed bradycardia and  peaked T-waves. The patient contacted her cardiologist while she was in the clinic, and I was able to speak with the nurse for the patient's cardiologist. The patient's cardiologist reviewed the patient's EKG from today, and requested a CMP to assess the patient's electrolytes, given she had mild peaked T-waves on her EKG. The patient's CMP resulted with a Potassium of 5.0. I called the patient to give her the results of her CMP, and instructed that she hold her Potassium medication until she hears from her cardiologist, and the patient verbalized understanding. A copy of the patient's CMP was also faxed to the patient's cardiologist as instructed by the nurse. The cause of the patient's asymptomatic bradycardia is known at this time. According to her cardiologist, Dr. Morgan, the patient's EKG showed mild peaked T-waves with no conduction abnormalities. Per his nurse, the patient was okay to be discharged as long as she was asymptomatic. Additionally, the patient has a history of paroxysmal atrial fibrillation, currently taking Diltiazem and Sptalol for her atrial fibrillation. These medication could be contributing to her bradycardia. Given there were no conduction abnormalities seen on EKG, it is less likely that her bradycardia is due to a heart block. Discussed strict return precautions with the patient, and she verbalized understanding.  Plan:   CMP - completed  Hold Potassium medication until she speaks with her cardiologist  Follow-up with cardiologist as scheduled  Continue to hold Coumadin as directed by the Coumadin clinic.  Return to clinic or go to the ED if symptoms worsen or fail to improve, or if patient should develop shortness of breath, chest pain, lightheadedness, dizziness, syncope, and/or palpitations

## 2019-03-07 NOTE — PROGRESS NOTES
OP Anticoagulation Service Note    Date: 3/7/2019  There were no vitals filed for this visit.    Anticoagulation Summary  As of 3/7/2019    INR goal:   2.0-3.0   TTR:   68.2 % (1.2 y)   INR used for dosing:   >8! (3/7/2019)   Warfarin maintenance plan:   1 mg (1 mg x 1) every day   Weekly warfarin total:   7 mg   Plan last modified:   Soco Rowland, PharmD (2/21/2019)   Next INR check:   3/14/2019   Target end date:   Indefinite    Indications    Paroxysmal a-fib (CMS-HCC) [I48.0]  Chronic anticoagulation [Z79.01]             Anticoagulation Episode Summary     INR check location:   [facility]    Preferred lab:       Send INR reminders to:       Comments:   [phone] (H)      Anticoagulation Care Providers     Provider Role Specialty Phone number    Evelio Tejeda M.D. Referring Internal Medicine [phone]    [facility] Responsible  [phone]    Beryl Pang M.D. Responsible Family Medicine [phone]        Anticoagulation Patient Findings      HPI:   Angie Root seen in clinic today, they are here today for a INR check on anticoagulation therapy with warfarin because they have afib    The reason for today's visit is to prevent morbidity and mortality from a stroke  and to reduce the risk of bleeding while on a anticoagulant.     Additional education provided today regarding reducing bleed risk and dietary constraints:  About how vitamin K and foods work with warfarin and the bleeding risk on a anticoagulant     Any upcoming procedures:   none    Confirmed warfarin dosing regimen  Interval Changes with foods rich in vitamin K: No  Interval Changes in ETOH:   No  Interval Changes in smoking status:  No  Interval Changes in medication:  New meds for PNA, including medrol  Cost restriction:  No    S/S of bleeding or bruising:  No  Signs/symptoms  thrombosis since the last appt:  No  Bleed risk is:  moderate,     3 vitals included with today's appt :  (BP, HR, weight, ht, RR)      Assessment:   INR  supra-therapeutic.      a change is needed today because INR is out of range.  She will go to the ER for any bleed.     They have a TTR of .68.2  which is not at target (TTR target/goal is 100%) and requires close follow up to prevent a adverse event (the lower the TTR the higher risk of clots, strokes, or bleeding).       Plan:  Hold until lab results     Follow up:  Follow up appointment in 1 days     Other info:  Pt educated to contact our clinic with any changes in medications or s/s of bleeding or thrombosis    CHEST guidelines recommend frequent INR monitoring at regular intervals (a few days up to a max of 12 weeks) to ensure they are on the proper dose of warfarin and not having any complications from therapy.  INRs can dramatically change over a short time period due to diet, medications, and medical conditions.

## 2019-03-07 NOTE — TELEPHONE ENCOUNTER
Discussed w/ Dr Morgan, he reviewed pt's EKG, per Dr Morgan, SB w/ mild peak T wave, no conduction delay, no concerns as long as pt is asymptomatic. Dr Johnathan Morgan recommend for pt's K level to be checked as pt is high risk for having abnormal K and bradycardia due to her vascular problems.     Called Tiffanie VANCE back, discussed Dr Morgan recommendations, she will order K level and will fax a copy of result to our office, they will send pt home and she has a FV w/ Dr Johnathan Morgan next week 3/12/19

## 2019-03-07 NOTE — TELEPHONE ENCOUNTER
Received a call from Tiffanie VANCE from Urgent Care, per Tiffanie pt is being followed for Bronchitis in urgent care clinic and pt is here to day for oxygen check, she reports that pt's HR 40s, so did EKG and it did show SB HR 40s but with some peaked T wave which is changed from pt's previous EKG. Pt's taking KCL, I've inquired if pt have recent CMP and they report pt don't have recent labs, she had INR test done today which pt's INR level-8, pt is been taking Doxy and Prednisone for Bronchitis. Pt is asymptomatic, denies any dizziness, lightheadedness or CP. Requested to have copy of EKG done sent to our office, will have Dr Morgan review it. Tiffanie requested a call back to [phone] w/ Dr Morgan recommendations.     EKG received, scanned under media.     To Dr Morgan, tiger text sent to him as well

## 2019-03-11 ENCOUNTER — HOSPITAL ENCOUNTER (OUTPATIENT)
Dept: LAB | Facility: MEDICAL CENTER | Age: 79
End: 2019-03-11
Attending: NURSE PRACTITIONER
Payer: MEDICARE

## 2019-03-11 LAB
INR PPP: 1.77 (ref 0.87–1.13)
PROTHROMBIN TIME: 20.7 SEC (ref 12–14.6)

## 2019-03-11 PROCEDURE — 85610 PROTHROMBIN TIME: CPT

## 2019-03-11 PROCEDURE — 36415 COLL VENOUS BLD VENIPUNCTURE: CPT

## 2019-03-12 ENCOUNTER — ANTICOAGULATION MONITORING (OUTPATIENT)
Dept: VASCULAR LAB | Facility: MEDICAL CENTER | Age: 79
End: 2019-03-12

## 2019-03-12 ENCOUNTER — PATIENT MESSAGE (OUTPATIENT)
Dept: CARDIOLOGY | Facility: MEDICAL CENTER | Age: 79
End: 2019-03-12

## 2019-03-12 ENCOUNTER — OFFICE VISIT (OUTPATIENT)
Dept: CARDIOLOGY | Facility: MEDICAL CENTER | Age: 79
End: 2019-03-12
Payer: MEDICARE

## 2019-03-12 VITALS
HEIGHT: 63 IN | BODY MASS INDEX: 16.72 KG/M2 | HEART RATE: 56 BPM | OXYGEN SATURATION: 92 % | DIASTOLIC BLOOD PRESSURE: 72 MMHG | WEIGHT: 94.36 LBS | SYSTOLIC BLOOD PRESSURE: 120 MMHG

## 2019-03-12 DIAGNOSIS — Z79.01 CHRONIC ANTICOAGULATION: ICD-10-CM

## 2019-03-12 DIAGNOSIS — D68.318 CIRCULATING ANTICOAGULANTS (HCC): ICD-10-CM

## 2019-03-12 DIAGNOSIS — E78.5 DYSLIPIDEMIA: ICD-10-CM

## 2019-03-12 DIAGNOSIS — F17.219 CIGARETTE NICOTINE DEPENDENCE WITH NICOTINE-INDUCED DISORDER: ICD-10-CM

## 2019-03-12 DIAGNOSIS — I48.0 PAROXYSMAL A-FIB (HCC): Primary | ICD-10-CM

## 2019-03-12 DIAGNOSIS — I48.0 PAROXYSMAL A-FIB (HCC): ICD-10-CM

## 2019-03-12 DIAGNOSIS — I73.9 PAD (PERIPHERAL ARTERY DISEASE) (HCC): ICD-10-CM

## 2019-03-12 DIAGNOSIS — Z95.5 S/P CORONARY ARTERY STENT PLACEMENT: ICD-10-CM

## 2019-03-12 DIAGNOSIS — I48.0 PAROXYSMAL ATRIAL FIBRILLATION (HCC): ICD-10-CM

## 2019-03-12 DIAGNOSIS — I25.10 CORONARY ARTERY DISEASE INVOLVING NATIVE CORONARY ARTERY OF NATIVE HEART WITHOUT ANGINA PECTORIS: ICD-10-CM

## 2019-03-12 LAB — EKG IMPRESSION: NORMAL

## 2019-03-12 PROCEDURE — 93000 ELECTROCARDIOGRAM COMPLETE: CPT | Performed by: INTERNAL MEDICINE

## 2019-03-12 PROCEDURE — 99214 OFFICE O/P EST MOD 30 MIN: CPT | Performed by: INTERNAL MEDICINE

## 2019-03-12 RX ORDER — DILTIAZEM HYDROCHLORIDE 240 MG/1
240 CAPSULE, COATED, EXTENDED RELEASE ORAL DAILY
Qty: 90 CAP | Refills: 3 | Status: SHIPPED | OUTPATIENT
Start: 2019-03-12 | End: 2019-03-12 | Stop reason: SDUPTHER

## 2019-03-12 RX ORDER — LOVASTATIN 40 MG/1
40 TABLET ORAL EVERY EVENING
Qty: 90 TAB | Refills: 3 | Status: SHIPPED | OUTPATIENT
Start: 2019-03-12 | End: 2019-12-06

## 2019-03-12 RX ORDER — POTASSIUM CHLORIDE 750 MG/1
TABLET, EXTENDED RELEASE ORAL
COMMUNITY
Start: 2019-02-11 | End: 2019-03-12

## 2019-03-12 RX ORDER — SOTALOL HYDROCHLORIDE 80 MG/1
40 TABLET ORAL 2 TIMES DAILY
Qty: 90 TAB | Refills: 3 | Status: SHIPPED | OUTPATIENT
Start: 2019-03-12 | End: 2019-03-12 | Stop reason: SDUPTHER

## 2019-03-12 RX ORDER — SOTALOL HYDROCHLORIDE 80 MG/1
40 TABLET ORAL 2 TIMES DAILY
Qty: 90 TAB | Refills: 3 | Status: SHIPPED | OUTPATIENT
Start: 2019-03-12 | End: 2019-12-12

## 2019-03-12 RX ORDER — DILTIAZEM HYDROCHLORIDE 240 MG/1
240 CAPSULE, COATED, EXTENDED RELEASE ORAL DAILY
Qty: 90 CAP | Refills: 3 | Status: SHIPPED | OUTPATIENT
Start: 2019-03-12 | End: 2019-12-12

## 2019-03-12 ASSESSMENT — ENCOUNTER SYMPTOMS
PALPITATIONS: 0
DEPRESSION: 1
DIARRHEA: 0
LOSS OF CONSCIOUSNESS: 0
PND: 0
ORTHOPNEA: 0
CLAUDICATION: 1

## 2019-03-12 NOTE — LETTER
Name:          Angie Root   YOB: 1940  Date:     03/12/2019      Beryl Pang M.D.  [address]  [address]  [facility] [address]     Johnathan Morgan MD  [address] , [address]  [place] [address]  Phone: [phone]  Back Line: [phone]  Fax: [phone]  E-mail: [email or web address]   Dear Dr. Pang,    We had the pleasure of seeing your patient, Angie Root, in Cardiology Clinic at [facility] and Vascular today.    As you know, she is an 79-year-old woman with a history of PAD status post PCI to her left leg in 2008, and more recently diagnosed ischemic colitis, CAD with 2 stents placed in her LAD in 2009, nicotine dependence, hypertension, dyslipidemia, recurrent C diff colitis, and paroxysmal atrial fibrillation on rhythm control strategy (with sotalol) and now anticoagulated with Warfarin.    She is doing overall quite well, and has no cardia vascular complaints.  I reviewed again with her recent labs that showed a borderline elevated potassium level and asked her previously to stop that medication.  I ordered a repeat nonfasting chemistry panel to be done in a couple of weeks.     I repeated her EKG today in clinic related to questionable peaked T waves previously.  Those are not significantly changed and her QT interval is normal with sotalol.     I refilled some of her heart medications and ordered no additional testing at this time.    Return in about 6 months (around 9/12/2019).    Thank you for the referral and please do not hesitate to contact me at any time. My contact information is listed above.    This note was dictated using Dragon speech recognition software.     A full note including my physical examination and a full list of rectified medications is available in our medical record, and can be faxed as well.    Johnathan Morgan MD  Cardiologist  [facility]

## 2019-03-12 NOTE — PATIENT COMMUNICATION
Angie Root   to Johnathan Morgan M.D. 3/12/19 2:30 PM    Was going to set up prescriptions for 1 year, since he’ll be gone.   Sotolol HCL 80 mg tab...I have no refills.   Lovastatin 40 mg tab...I have no refills   Diltiazem ERC ( cartia xt) 240 mg AB3...1 refill.   These are ordered through [facility] mail order.     Also he wrote blood order in 2 weeks. On order it says no fasting.....but on after visit summary, it says fast 8 to 10 hours. Which one do I do?   Thank you in advance for your answers.   Angie Root         Pt called office as well regarding above concerns, notified pt that her Rx is sent to [facility]. Clarified also that BMP is non-fasting. Pt appreciative and verbalizes understanding

## 2019-03-12 NOTE — PROGRESS NOTES
Subjective:   Angie Root is an 79 -year-old woman with a history of PAD status post PCI to her left leg in 2008, and more recently diagnosed ischemic colitis, CAD with 2 stents placed in her LAD in 2009, nicotine dependence, hypertension, dyslipidemia, recurrent C diff colitis, and paroxysmal atrial fibrillation on rhythm control strategy (with sotalol) and now anticoagulated with Warfarin.    She has no cardiovascular complaints today, and comes in the aftermath of a recent episode where she was fairly bradycardic during the blood draw.  I reviewed her EKG and in the setting of borderline hyperkalemia she had borderline peaked T waves.    She has no cardiovascular complaints again.  She does bring in her blood pressure log showing overall good control of her blood pressures with a fair amount of variability.    She also denies any side effects with her cardiovascular regimen.    Past Medical History:   Diagnosis Date   • Arthritis     BL hands   • CAD (coronary artery disease)    • Cancer (HCC) 1982    melanoma   • COPD (chronic obstructive pulmonary disease) (HCC)    • Croup 3 years old   • Diverticulosis    • Dyslipidemia    • GERD (gastroesophageal reflux disease)    • Hiatal hernia    • High cholesterol    • Hypertension    • Infectious disease 2018    C Diff   • Measles     5 years old   • Paroxysmal A-fib (HCC) 1/20/2016    Symptomatic, on a rhythm control strategy with sotalol 40 mg by mouth twice a day.    • Paroxysmal atrial fibrillation (HCC)    • Prediabetes    • PVD (peripheral vascular disease) ([facility])      Past Surgical History:   Procedure Laterality Date   • FECAL TRANSPLANT N/A 4/9/2018    Procedure: FECAL TRANSPLANT;  Surgeon: Gonzalez Cruz M.D.;  Location: ENDOSCOPY [facility];  Service: Gastroenterology   • COLONOSCOPY - ENDO N/A 4/9/2018    Procedure: COLONOSCOPY - ENDO;  Surgeon: Gonzalez Cruz M.D.;  Location: ENDOSCOPY [facility];  Service: Gastroenterology   • WIDE EXCISION  MELANOMA, LEG, W/POSS.STSG  10/2015    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   • CARDIAC CATH, RIGHT HEART  2008    stent   • OTHER ORTHOPEDIC SURGERY Left 2008    hand repair   • CHOLECYSTECTOMY  1993   • TONSILLECTOMY  1945   • OTHER CARDIAC SURGERY      r heart cath stents   • STENT PLACEMENT      L iliac stent     Family History   Problem Relation Age of Onset   • Hypertension Mother    • Hyperlipidemia Mother    • Cancer Maternal Grandmother         tongue   • Cancer Maternal Uncle         x 3, pancreas   • Cancer Maternal Grandfather         unknown   • Cancer Paternal Grandmother         unknown   • Cancer Paternal Grandfather         unknown   • Diabetes Maternal Uncle    • Heart Disease Maternal Uncle    • Hypertension Sister    • Hyperlipidemia Sister    • Heart Disease Sister    • Alcohol/Drug Sister    • Thyroid Sister    • Psychiatry Neg Hx    • Stroke Neg Hx      History   Smoking Status   • Current Every Day Smoker   • Packs/day: 0.25   • Years: 60.00   • Types: Cigarettes   • Start date: 3/20/1957   Smokeless Tobacco   • Never Used     Comment: 6 per day     Allergies   Allergen Reactions   • Codeine Swelling   • Iodine Swelling   • Bee Venom Anaphylaxis   • Chantix [Varenicline]      disoriented   • Ciprofloxacin      Causes C diff, recurrent and very difficult   • Latex Hives   • Pcn [Penicillins] Anaphylaxis and Hives   • Shellfish Allergy      unknown   • Tetanus Antitoxin Swelling     unknown     Outpatient Encounter Prescriptions as of 3/12/2019   Medication Sig Dispense Refill   • sotalol (BETAPACE) 80 MG Tab Take 0.5 Tabs by mouth 2 times a day. 90 Tab 3   • DILTIAZem CD (CARTIA XT) 240 MG CAPSULE SR 24 HR Take 1 Cap by mouth every day. 90 Cap 3   • warfarin (COUMADIN) 1 MG Tab Take 1 Tab by mouth every evening. 90 Tab 3   • Probiotic Product (SOLUBLE FIBER/PROBIOTICS PO) Take  by mouth.     • lovastatin (MEVACOR) 40 MG tablet Take 1 Tab by mouth every evening. 90 Tab 3   • alprazolam  "(XANAX) 0.25 MG Tab Take 0.25 mg by mouth every morning.     • Cholecalciferol (VITAMIN D) 2000 UNITS Cap Take 1 Cap by mouth every morning.     • [DISCONTINUED] potassium chloride SA (K-DUR) 10 MEQ Tab CR      • [DISCONTINUED] MethylPREDNISolone (MEDROL DOSEPAK) 4 MG Tablet Therapy Pack Take 1 Tab by mouth every day. (Patient not taking: Reported on 3/12/2019) 1 Kit 0   • [DISCONTINUED] potassium chloride ER (KLOR-CON) 10 MEQ tablet Take 1 Tab by mouth every day. (Patient not taking: Reported on 3/12/2019) 90 Tab 2   • [DISCONTINUED] albuterol 108 (90 Base) MCG/ACT Aero Soln inhalation aerosol Inhale 2 Puffs by mouth every 6 hours as needed for Shortness of Breath.     • [DISCONTINUED] fluticasone (FLONASE) 50 MCG/ACT nasal spray Spray 1 Spray in nose every day.     • [DISCONTINUED] DILTIAZem CD (CARTIA XT) 240 MG CAPSULE SR 24 HR Take 1 Cap by mouth every day. 90 Cap 3   • [DISCONTINUED] sucralfate (CARAFATE) 1 GM Tab Take 1 g by mouth 2 Times a Day.     • [DISCONTINUED] sotalol (BETAPACE) 80 MG Tab Take 0.5 Tabs by mouth 2 times a day. 90 Tab 3     No facility-administered encounter medications on file as of 3/12/2019.      Review of Systems   HENT: Negative for hearing loss.    Cardiovascular: Positive for claudication (minimal, mild). Negative for chest pain, palpitations, orthopnea, leg swelling and PND.   Gastrointestinal: Negative for diarrhea.   Neurological: Negative for loss of consciousness (none recently).   Psychiatric/Behavioral: Positive for depression (in the past, none recent). Negative for suicidal ideas.   All other systems reviewed and are negative.       Objective:   /72 (BP Location: Right arm, Patient Position: Sitting, BP Cuff Size: Adult)   Pulse (!) 56   Ht 1.588 m (5' 2.5\")   Wt 42.8 kg (94 lb 5.7 oz)   SpO2 92%   BMI 16.98 kg/m²     Physical Exam   Constitutional: She is oriented to person, place, and time. She appears well-developed and well-nourished. No distress.   Very " pleasant, thin, elderly woman in no distress. Physical examination is unchanged except where specified compared to my previous on 9/20/2018.   HENT:   Head: Normocephalic and atraumatic.   Eyes: Pupils are equal, round, and reactive to light. Conjunctivae and EOM are normal. No scleral icterus.   Neck: Neck supple. No JVD present. No tracheal deviation present.   Cardiovascular: Normal rate, regular rhythm, normal heart sounds and intact distal pulses.  Exam reveals no gallop and no friction rub.    No murmur heard.  Pulses:       Dorsalis pedis pulses are 1+ on the right side, and 1+ on the left side.   No carotid bruits   Pulmonary/Chest: Effort normal and breath sounds normal. No stridor. No respiratory distress. She has no wheezes. She has no rales.   Abdominal: Soft. Bowel sounds are normal. She exhibits no distension.   Musculoskeletal: She exhibits no edema (No significant lower extremity edema bilaterally).   1+ woody edema of her lower extremities bilaterally   Neurological: She is alert and oriented to person, place, and time.   Skin: Skin is warm and dry. She is not diaphoretic. No erythema. No pallor.   Senile purpura noted, chronic venous stasis changes of her lower extremities also noted   Psychiatric: She has a normal mood and affect. Judgment and thought content normal.   Nursing note and vitals reviewed.    Lab Results   Component Value Date/Time    WBC 9.0 09/24/2018 10:33 AM    RBC 5.20 09/24/2018 10:33 AM    HEMOGLOBIN 15.4 09/24/2018 10:33 AM    HEMATOCRIT 47.3 (H) 09/24/2018 10:33 AM    MCV 91.0 09/24/2018 10:33 AM    MCH 29.6 09/24/2018 10:33 AM    MCHC 32.6 (L) 09/24/2018 10:33 AM    MPV 10.9 09/24/2018 10:33 AM        Lab Results   Component Value Date/Time    SODIUM 141 03/07/2019 12:55 PM    POTASSIUM 5.0 03/07/2019 12:55 PM    CHLORIDE 107 03/07/2019 12:55 PM    CO2 28 03/07/2019 12:55 PM    GLUCOSE 110 (H) 03/07/2019 12:55 PM    BUN 27 (H) 03/07/2019 12:55 PM    CREATININE 0.84  "03/07/2019 12:55 PM        Lab Results   Component Value Date/Time    ASTSGOT 23 03/07/2019 12:55 PM    ALTSGPT 22 03/07/2019 12:55 PM        Lab Results   Component Value Date/Time    CHOLSTRLTOT 161 09/24/2018 10:33 AM    LDL 62 09/24/2018 10:33 AM    HDL 80 09/24/2018 10:33 AM    TRIGLYCERIDE 93 09/24/2018 10:33 AM       Echocardiogram, 6/27/2016:  \"CONCLUSIONS  Normal left ventricular size, thickness, systolic function EF 60%.   Mitral annular calcification.  Aortic sclerosis without stenosis.   Mild tricuspid regurgitation.  Right ventricular systolic pressure is estimated to be 33 mmHg.   No prior study for comparison\"    Echocardiogram, 11/18/2017:  \"CONCLUSIONS  Prior echocardiogram 6/27/2016.Normal left ventricular chamber size.  Left ventricular ejection fraction is visually estimated to be 60%.  Grade II diastolic dysfunction.  Aortic sclerosis without stenosis.  Mitral annular calcification.  Heavy calcification of the posterior mitral valve leaflet.  Mild mitral regurgitation.  Trace tricuspid regurgitation.  Normal pericardium without effusion.  Ascending aorta diameter is 2.4 cm\"    Echocardiogram, 8/6/2018:  \"CONCLUSIONS  Normal left ventricular systolic function.  Left ventricular ejection fraction is visually estimated to be 60%.  Indeterminate  The right ventricle was normal in size and function.  Mild mitral regurgitation.  Mild tricuspid regurgitation.  Estimated right ventricular systolic pressure is 45 mmHg.  Compared to the images of the study done 11/18/2017 - there has been an   improvement in the estimate of left atrial pressure\"    Myocardial perfusion imaging, 8/6/2018:  \" NUCLEAR IMAGING INTERPRETATION   Normal left ventricular perfusion with no fixed or reversible defects.    Normal left ventricular size, ejection fraction, and wall motion.    ECG INTERPRETATION   Negative stress ECG for ischemia\"    EKG, 3/12/2019, tracings and report reviewed, my dictation: Sinus rhythm, rate 56 bpm, " left atrial enlargement, no significant conduction system disease    Assessment:     1. Paroxysmal a-fib (CMS-HCC)  EKG   2. PAD (peripheral artery disease) (HCC)     3. Circulating anticoagulants (HCC)     4. Cigarette nicotine dependence with nicotine-induced disorder     5. Coronary artery disease involving native coronary artery of native heart without angina pectoris  Basic Metabolic Panel   6. PCI to her LAD in 2009     7. Paroxysmal A-fib (HCC)  sotalol (BETAPACE) 80 MG Tab   8. Paroxysmal atrial fibrillation (HCC)  DILTIAZem CD (CARTIA XT) 240 MG CAPSULE SR 24 HR       Medical Decision Making:  Today's Assessment / Status / Plan:     She is doing overall quite well, and has no cardia vascular complaints.  I reviewed again with her recent labs that showed a borderline elevated potassium level and asked her previously to stop that medication.  I ordered a repeat nonfasting chemistry panel to be done in a couple of weeks.    I repeated her EKG today in clinic related to questionable peaked T waves previously.  Those are not significantly changed and her QT interval is normal with sotalol.    I refilled some of her heart medications and ordered no additional testing at this time.    Johnathan Morgan MD  Cardiologist, [facility] [place]     Return in about 6 months (around 9/12/2019).    Physical Exam   Constitutional: She is oriented to person, place, and time. She appears well-developed and well-nourished. No distress.   Very pleasant, thin, elderly woman in no distress. Physical examination is unchanged except where specified compared to my previous on 9/20/2018.   HENT:   Head: Normocephalic and atraumatic.   Eyes: Pupils are equal, round, and reactive to light. Conjunctivae and EOM are normal. No scleral icterus.   Neck: Neck supple. No JVD present. No tracheal deviation present.   Cardiovascular: Normal rate, regular rhythm, normal heart sounds and intact distal pulses.  Exam reveals no  gallop and no friction rub.    No murmur heard.  Pulses:       Dorsalis pedis pulses are 1+ on the right side, and 1+ on the left side.   No carotid bruits   Pulmonary/Chest: Effort normal and breath sounds normal. No stridor. No respiratory distress. She has no wheezes. She has no rales.   Abdominal: Soft. Bowel sounds are normal. She exhibits no distension.   Musculoskeletal: She exhibits no edema (No significant lower extremity edema bilaterally).   1+ woody edema of her lower extremities bilaterally   Neurological: She is alert and oriented to person, place, and time.   Skin: Skin is warm and dry. She is not diaphoretic. No erythema. No pallor.   Senile purpura noted, chronic venous stasis changes of her lower extremities also noted   Psychiatric: She has a normal mood and affect. Judgment and thought content normal.   Nursing note and vitals reviewed.

## 2019-03-13 NOTE — PROGRESS NOTES
Anticoagulation Summary  As of 3/12/2019    INR goal:   2.0-3.0   TTR:   66.7 % (1.2 y)   INR used for dosing:      Warfarin maintenance plan:   1 mg (1 mg x 1) every day   Weekly warfarin total:   7 mg   Plan last modified:   Soco Rowland, PharmD (2/21/2019)   Next INR check:   3/18/2019   Target end date:   Indefinite    Indications    Paroxysmal a-fib (CMS-HCC) [I48.0]  Chronic anticoagulation [Z79.01]             Anticoagulation Episode Summary     INR check location:   [facility]    Preferred lab:       Send INR reminders to:       Comments:   [phone] (H)      Anticoagulation Care Providers     Provider Role Specialty Phone number    Evelio Tejeda M.D. Referring Internal Medicine [phone]    [facility]tico[facility] Responsible  [phone]    Beryl Pang M.D. Responsible Family Medicine [phone]        Anticoagulation Patient Findings    Left voicemail message to report a sub therapeutic INR of 1.77.  Pt to continue with current warfarin dosing regimen. Requested pt contact the clinic for any s/s of unusual bleeding, bruising, clotting or any changes to diet or medication.  Follow up in 1 weeks, to reduce the risk of adverse events related to this high risk medication, warfarin.    Sadaf Santillan, Clinical Pharmacist

## 2019-03-18 ENCOUNTER — ANTICOAGULATION MONITORING (OUTPATIENT)
Dept: MEDICAL GROUP | Facility: PHYSICIAN GROUP | Age: 79
End: 2019-03-18

## 2019-03-18 ENCOUNTER — HOSPITAL ENCOUNTER (OUTPATIENT)
Dept: LAB | Facility: MEDICAL CENTER | Age: 79
End: 2019-03-18
Attending: NURSE PRACTITIONER
Payer: MEDICARE

## 2019-03-18 DIAGNOSIS — I48.0 PAROXYSMAL A-FIB (HCC): ICD-10-CM

## 2019-03-18 DIAGNOSIS — Z79.01 CHRONIC ANTICOAGULATION: ICD-10-CM

## 2019-03-18 LAB
INR PPP: 2.39 (ref 0.87–1.13)
PROTHROMBIN TIME: 26.1 SEC (ref 12–14.6)

## 2019-03-18 PROCEDURE — 85610 PROTHROMBIN TIME: CPT

## 2019-03-18 PROCEDURE — 36415 COLL VENOUS BLD VENIPUNCTURE: CPT

## 2019-03-18 NOTE — PROGRESS NOTES
Anticoagulation Summary  As of 3/18/2019    INR goal:   2.0-3.0   TTR:   66.6 % (1.2 y)   INR used for dosin.39 (3/18/2019)   Warfarin maintenance plan:   1 mg (1 mg x 1) every day   Weekly warfarin total:   7 mg   No change documented:   Beck Bowman, Med Ass't   Plan last modified:   Soco Rowland, PharmD (2019)   Next INR check:   3/25/2019   Target end date:   Indefinite    Indications    Paroxysmal a-fib (CMS-HCC) [I48.0]  Chronic anticoagulation [Z79.01]             Anticoagulation Episode Summary     INR check location:   [facility]    Preferred lab:       Send INR reminders to:       Comments:   [phone] (H)      Anticoagulation Care Providers     Provider Role Specialty Phone number    Evelio Tejeda M.D. Referring Internal Medicine [phone]    [facility] Anticoagulation Services Responsible  [phone]    Beryl Pang M.D. Responsible Family Medicine [phone]        Anticoagulation Patient Findings  Patient Findings     Negatives:   Signs/symptoms of thrombosis, Signs/symptoms of bleeding, Laboratory test error suspected, Change in health, Change in alcohol use, Change in activity, Upcoming invasive procedure, Emergency department visit, Upcoming dental procedure, Missed doses, Extra doses, Change in medications, Change in diet/appetite, Hospital admission, Bruising, Other complaints        Spoke with patient to report a therapeutic INR.  Pt instructed to continue with current warfarin dosing regimen. Pt denies any s/s of bleeding, bruising, clotting or any changes to diet or medication.  Will follow up in 1 week.    Beck Bowman, Med Ass't    I have reviewed and am in agreement with the above stated plan on 3-18-19.  Major Hughes, PharmD

## 2019-03-25 ENCOUNTER — HOSPITAL ENCOUNTER (OUTPATIENT)
Dept: LAB | Facility: MEDICAL CENTER | Age: 79
End: 2019-03-25
Attending: INTERNAL MEDICINE
Payer: MEDICARE

## 2019-03-25 ENCOUNTER — HOSPITAL ENCOUNTER (OUTPATIENT)
Dept: LAB | Facility: MEDICAL CENTER | Age: 79
End: 2019-03-25
Attending: NURSE PRACTITIONER
Payer: MEDICARE

## 2019-03-25 ENCOUNTER — ANTICOAGULATION MONITORING (OUTPATIENT)
Dept: VASCULAR LAB | Facility: MEDICAL CENTER | Age: 79
End: 2019-03-25

## 2019-03-25 DIAGNOSIS — I25.10 CORONARY ARTERY DISEASE INVOLVING NATIVE CORONARY ARTERY OF NATIVE HEART WITHOUT ANGINA PECTORIS: ICD-10-CM

## 2019-03-25 DIAGNOSIS — I48.0 PAROXYSMAL A-FIB (HCC): ICD-10-CM

## 2019-03-25 DIAGNOSIS — Z79.01 CHRONIC ANTICOAGULATION: ICD-10-CM

## 2019-03-25 LAB
ANION GAP SERPL CALC-SCNC: 6 MMOL/L (ref 0–11.9)
BUN SERPL-MCNC: 13 MG/DL (ref 8–22)
CALCIUM SERPL-MCNC: 10.4 MG/DL (ref 8.5–10.5)
CHLORIDE SERPL-SCNC: 105 MMOL/L (ref 96–112)
CO2 SERPL-SCNC: 26 MMOL/L (ref 20–33)
CREAT SERPL-MCNC: 0.83 MG/DL (ref 0.5–1.4)
GLUCOSE SERPL-MCNC: 93 MG/DL (ref 65–99)
INR PPP: 2.5 (ref 0.87–1.13)
INR PPP: 2.5 (ref 2–3.5)
POTASSIUM SERPL-SCNC: 4.2 MMOL/L (ref 3.6–5.5)
PROTHROMBIN TIME: 27 SEC (ref 12–14.6)
SODIUM SERPL-SCNC: 137 MMOL/L (ref 135–145)

## 2019-03-25 PROCEDURE — 36415 COLL VENOUS BLD VENIPUNCTURE: CPT

## 2019-03-25 PROCEDURE — 85610 PROTHROMBIN TIME: CPT

## 2019-03-25 PROCEDURE — 80048 BASIC METABOLIC PNL TOTAL CA: CPT

## 2019-03-26 NOTE — PROGRESS NOTES
Anticoagulation Summary  As of 3/25/2019    INR goal:   2.0-3.0   TTR:   67.1 % (1.2 y)   INR used for dosin.50 (3/25/2019)   Warfarin maintenance plan:   1 mg (1 mg x 1) every day   Weekly warfarin total:   7 mg   No change documented:   Alok Barragan Ass't   Plan last modified:   Soco Rowland, PharmD (2019)   Next INR check:   2019   Target end date:   Indefinite    Indications    Paroxysmal a-fib (CMS-HCC) [I48.0]  Chronic anticoagulation [Z79.01]             Anticoagulation Episode Summary     INR check location:   [facility]    Preferred lab:       Send INR reminders to:       Comments:   [phone] (H)      Anticoagulation Care Providers     Provider Role Specialty Phone number    Evelio Tejeda M.D. Referring Internal Medicine [phone]    [facility] Anticoagulation Services Responsible  [phone]    Beryl Pang M.D. Responsible Family Medicine [phone]        Anticoagulation Patient Findings  Patient Findings     Negatives:   Signs/symptoms of thrombosis, Signs/symptoms of bleeding, Laboratory test error suspected, Change in health, Change in alcohol use, Change in activity, Upcoming invasive procedure, Emergency department visit, Upcoming dental procedure, Missed doses, Extra doses, Change in medications, Change in diet/appetite, Hospital admission, Bruising, Other complaints      Spoke with patient to report a therapeutic INR.  Pt instructed to continue with current warfarin dosing regimen. Pt denies any s/s of bleeding, bruising, clotting or any changes to diet or medication.  Will follow up in 1 weeks.  Alok Barragan Ass't     I have reviewed and concur with the above plan on 2019.  Sadaf Santillan, Clinical Pharmacist

## 2019-04-01 ENCOUNTER — HOSPITAL ENCOUNTER (OUTPATIENT)
Dept: LAB | Facility: MEDICAL CENTER | Age: 79
End: 2019-04-01
Attending: NURSE PRACTITIONER
Payer: MEDICARE

## 2019-04-01 ENCOUNTER — OFFICE VISIT (OUTPATIENT)
Dept: URGENT CARE | Facility: PHYSICIAN GROUP | Age: 79
End: 2019-04-01
Payer: MEDICARE

## 2019-04-01 VITALS
OXYGEN SATURATION: 97 % | BODY MASS INDEX: 17.48 KG/M2 | DIASTOLIC BLOOD PRESSURE: 60 MMHG | WEIGHT: 95 LBS | TEMPERATURE: 97.4 F | SYSTOLIC BLOOD PRESSURE: 140 MMHG | HEIGHT: 62 IN | HEART RATE: 66 BPM

## 2019-04-01 DIAGNOSIS — J30.2 SEASONAL ALLERGIES: ICD-10-CM

## 2019-04-01 LAB
INR PPP: 2.73 (ref 0.87–1.13)
PROTHROMBIN TIME: 29 SEC (ref 12–14.6)

## 2019-04-01 PROCEDURE — 99214 OFFICE O/P EST MOD 30 MIN: CPT | Performed by: PHYSICIAN ASSISTANT

## 2019-04-01 PROCEDURE — 36415 COLL VENOUS BLD VENIPUNCTURE: CPT

## 2019-04-01 PROCEDURE — 85610 PROTHROMBIN TIME: CPT

## 2019-04-01 RX ORDER — MONTELUKAST SODIUM 5 MG/1
5 TABLET, CHEWABLE ORAL NIGHTLY PRN
Qty: 30 TAB | Refills: 1 | Status: SHIPPED | OUTPATIENT
Start: 2019-04-01 | End: 2019-07-16

## 2019-04-01 RX ORDER — FLUTICASONE PROPIONATE 50 MCG
1 SPRAY, SUSPENSION (ML) NASAL DAILY
Qty: 16 G | Refills: 0 | Status: SHIPPED
Start: 2019-04-01 | End: 2019-12-12

## 2019-04-01 ASSESSMENT — ENCOUNTER SYMPTOMS
SORE THROAT: 1
COUGH: 1
CARDIOVASCULAR NEGATIVE: 1
WHEEZING: 0
HEADACHES: 1
DIZZINESS: 0
MYALGIAS: 0
FEVER: 0
SWOLLEN GLANDS: 1
GASTROINTESTINAL NEGATIVE: 1
SINUS PAIN: 1
SPUTUM PRODUCTION: 1
SINUS PRESSURE: 1
CHILLS: 0
SHORTNESS OF BREATH: 0

## 2019-04-01 NOTE — PROGRESS NOTES
Subjective:      Angie Root is a 79 y.o. female who presents with Sinus Problem (SInus congestion, cough x 3 wks )            Sinus Problem   This is a new problem. The current episode started 1 to 4 weeks ago. The problem has been gradually worsening since onset. There has been no fever. The fever has been present for less than 1 day. Associated symptoms include congestion, coughing, headaches, sinus pressure, a sore throat and swollen glands. Pertinent negatives include no chills, ear pain or shortness of breath. Past treatments include nothing (Claritin). The treatment provided no relief.       PMH:  has a past medical history of Arthritis; CAD (coronary artery disease); Cancer ([facility]) (1982); COPD (chronic obstructive pulmonary disease) ([facility]); Croup (3 years old); Diverticulosis; Dyslipidemia; GERD (gastroesophageal reflux disease); Hiatal hernia; High cholesterol; Hypertension; Infectious disease (2018); Measles; Paroxysmal A-fib ([facility]) (1/20/2016); Paroxysmal atrial fibrillation ([facility]); Prediabetes; and PVD (peripheral vascular disease) ([facility]).  MEDS:   Current Outpatient Prescriptions:   •  sotalol (BETAPACE) 80 MG Tab, Take 0.5 Tabs by mouth 2 times a day., Disp: 90 Tab, Rfl: 3  •  lovastatin (MEVACOR) 40 MG tablet, Take 1 Tab by mouth every evening., Disp: 90 Tab, Rfl: 3  •  DILTIAZem CD (CARTIA XT) 240 MG CAPSULE SR 24 HR, Take 1 Cap by mouth every day., Disp: 90 Cap, Rfl: 3  •  warfarin (COUMADIN) 1 MG Tab, Take 1 Tab by mouth every evening., Disp: 90 Tab, Rfl: 3  •  Probiotic Product (SOLUBLE FIBER/PROBIOTICS PO), Take  by mouth., Disp: , Rfl:   •  alprazolam (XANAX) 0.25 MG Tab, Take 0.25 mg by mouth every morning., Disp: , Rfl:   •  Cholecalciferol (VITAMIN D) 2000 UNITS Cap, Take 1 Cap by mouth every morning., Disp: , Rfl:   ALLERGIES:   Allergies   Allergen Reactions   • Codeine Swelling   • Iodine Swelling   • Bee Venom Anaphylaxis   • Chantix [Varenicline]      disoriented   • Ciprofloxacin       Causes C diff, recurrent and very difficult   • Latex Hives   • Pcn [Penicillins] Anaphylaxis and Hives   • Shellfish Allergy      unknown   • Tetanus Antitoxin Swelling     unknown     SURGHX:   Past Surgical History:   Procedure Laterality Date   • FECAL TRANSPLANT N/A 4/9/2018    Procedure: FECAL TRANSPLANT;  Surgeon: Gonzalez Cruz M.D.;  Location: ENDOSCOPY [facility];  Service: Gastroenterology   • COLONOSCOPY - ENDO N/A 4/9/2018    Procedure: COLONOSCOPY - ENDO;  Surgeon: Gonzalez Cruz M.D.;  Location: ENDOSCOPY [facility];  Service: Gastroenterology   • WIDE EXCISION MELANOMA, LEG, W/POSS.STSG  10/2015    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   • CARDIAC CATH, RIGHT HEART  2008    stent   • OTHER ORTHOPEDIC SURGERY Left 2008    hand repair   • CHOLECYSTECTOMY  1993   • TONSILLECTOMY  1945   • OTHER CARDIAC SURGERY      r heart cath stents   • STENT PLACEMENT      L iliac stent     SOCHX:  reports that she has been smoking Cigarettes.  She started smoking about 62 years ago. She has a 15.00 pack-year smoking history. She has never used smokeless tobacco. She reports that she does not drink alcohol or use drugs.  FH: family history includes Alcohol/Drug in her sister; Cancer in her maternal grandfather, maternal grandmother, maternal uncle, paternal grandfather, and paternal grandmother; Diabetes in her maternal uncle; Heart Disease in her maternal uncle and sister; Hyperlipidemia in her mother and sister; Hypertension in her mother and sister; Thyroid in her sister.      Review of Systems   Constitutional: Negative for chills and fever.   HENT: Positive for congestion, sinus pain, sinus pressure and sore throat. Negative for ear pain.    Respiratory: Positive for cough and sputum production. Negative for shortness of breath and wheezing.    Cardiovascular: Negative.    Gastrointestinal: Negative.    Musculoskeletal: Negative for joint pain and myalgias.   Neurological: Positive for headaches.  "Negative for dizziness.       Medications, Allergies, and current problem list reviewed today in Epic     Objective:     /60 (BP Location: Right arm, Patient Position: Sitting, BP Cuff Size: Adult)   Pulse 66   Temp 36.3 °C (97.4 °F) (Temporal)   Ht 1.575 m (5' 2\")   Wt 43.1 kg (95 lb)   SpO2 97%   BMI 17.38 kg/m²      Physical Exam   Constitutional: She is oriented to person, place, and time. She appears well-developed and well-nourished. No distress.   HENT:   Head: Normocephalic and atraumatic.   Right Ear: Tympanic membrane and external ear normal.   Left Ear: Tympanic membrane and external ear normal.   Nose: Nose normal.   Mouth/Throat: Oropharynx is clear and moist. No oropharyngeal exudate.   Eyes: Pupils are equal, round, and reactive to light. Conjunctivae and EOM are normal. Right eye exhibits no discharge. Left eye exhibits no discharge.   Neck: Normal range of motion. Neck supple.   Cardiovascular: Normal rate, regular rhythm and normal heart sounds.    Pulmonary/Chest: Effort normal and breath sounds normal. No respiratory distress. She has no wheezes.   Musculoskeletal: Normal range of motion.   Lymphadenopathy:     She has no cervical adenopathy.   Neurological: She is alert and oriented to person, place, and time.   Skin: Skin is warm and dry. She is not diaphoretic.   Psychiatric: She has a normal mood and affect. Her behavior is normal. Judgment and thought content normal.   Nursing note and vitals reviewed.              Assessment/Plan:     1. Seasonal allergies  fluticasone (FLONASE) 50 MCG/ACT nasal spray    montelukast (SINGULAIR) 5 MG Chew Tab     PO2 adequate, vitals normal, exam unremarkable, no signs of bacterial or viral etiology at this time.  OTC meds and conservative measures as discussed  Return to clinic or go to ED if symptoms worsen or persist. Indications for ED discussed at length. Patient voices understanding. Follow-up with your primary care provider in 3-5 days. " Red flags discussed. All side effects of medication discussed including allergic response, GI upset, tendon injury, etc.    Please note that this dictation was created using voice recognition software. I have made every reasonable attempt to correct obvious errors, but I expect that there are errors of grammar and possibly content that I did not discover before finalizing the note.

## 2019-04-02 ENCOUNTER — ANTICOAGULATION MONITORING (OUTPATIENT)
Dept: VASCULAR LAB | Facility: MEDICAL CENTER | Age: 79
End: 2019-04-02

## 2019-04-02 DIAGNOSIS — I48.0 PAROXYSMAL A-FIB (HCC): ICD-10-CM

## 2019-04-02 DIAGNOSIS — Z79.01 CHRONIC ANTICOAGULATION: ICD-10-CM

## 2019-04-02 NOTE — PROGRESS NOTES
Anticoagulation Summary  As of 2019    INR goal:   2.0-3.0   TTR:   67.7 % (1.3 y)   INR used for dosin.73 (2019)   Warfarin maintenance plan:   1 mg (1 mg x 1) every day   Weekly warfarin total:   7 mg   Plan last modified:   Soco Rowland, PharmD (2019)   Next INR check:   2019   Target end date:   Indefinite    Indications    Paroxysmal a-fib (CMS-HCC) [I48.0]  Chronic anticoagulation [Z79.01]             Anticoagulation Episode Summary     INR check location:   [facility]    Preferred lab:       Send INR reminders to:       Comments:   [phone] (H)      Anticoagulation Care Providers     Provider Role Specialty Phone number    Evelio Tejeda M.D. Referring Internal Medicine [phone]    [facility] Anticoagulation Services Responsible  [phone]    Beryl Pang M.D. Responsible Family Medicine [phone]        Anticoagulation Patient Findings    Left voicemail message to report a therapeutic INR of 2.73.  Pt to continue with current warfarin dosing regimen. Requested pt contact the clinic for any s/s of unusual bleeding, bruising, clotting or any changes to diet or medication. FU 2 weeks.  Major Hughes, PharmD

## 2019-04-15 ENCOUNTER — ANTICOAGULATION MONITORING (OUTPATIENT)
Dept: VASCULAR LAB | Facility: MEDICAL CENTER | Age: 79
End: 2019-04-15

## 2019-04-15 ENCOUNTER — HOSPITAL ENCOUNTER (OUTPATIENT)
Dept: LAB | Facility: MEDICAL CENTER | Age: 79
End: 2019-04-15
Attending: NURSE PRACTITIONER
Payer: MEDICARE

## 2019-04-15 DIAGNOSIS — I48.0 PAROXYSMAL A-FIB (HCC): ICD-10-CM

## 2019-04-15 DIAGNOSIS — Z79.01 CHRONIC ANTICOAGULATION: ICD-10-CM

## 2019-04-15 LAB
INR PPP: 3.07 (ref 0.87–1.13)
INR PPP: 3.07 (ref 2–3.5)
PROTHROMBIN TIME: 31.7 SEC (ref 12–14.6)

## 2019-04-15 PROCEDURE — 36415 COLL VENOUS BLD VENIPUNCTURE: CPT

## 2019-04-15 PROCEDURE — 85610 PROTHROMBIN TIME: CPT

## 2019-04-15 NOTE — PROGRESS NOTES
Anticoagulation Summary  As of 4/15/2019    INR goal:   2.0-3.0   TTR:   68.0 % (1.3 y)   INR used for dosing:   3.07! (4/15/2019)   Warfarin maintenance plan:   1 mg (1 mg x 1) every day   Weekly warfarin total:   7 mg   Plan last modified:   Soco Rowland, PharmD (2/21/2019)   Next INR check:   4/29/2019   Target end date:   Indefinite    Indications    Paroxysmal a-fib (CMS-HCC) [I48.0]  Chronic anticoagulation [Z79.01]             Anticoagulation Episode Summary     INR check location:   [facility]    Preferred lab:       Send INR reminders to:       Comments:   [phone] (H)      Anticoagulation Care Providers     Provider Role Specialty Phone number    Evelio Tejeda M.D. Referring Internal Medicine [phone]    [facility] Anticoagulation Services Responsible  [phone]    Beryl Pang M.D. Responsible Family Medicine [phone]        Anticoagulation Patient Findings    Spoke with patient to report a slightly SUPRA-therapeutic INR of 3.07    Patient denies any changes in current medications, diet, or any unusual s/sx of bleeding, bruising, or clotting. Instructed patient to contact clinic with any questions or concerns.     Patient to continue with current warfarin dosing regimen. Follow-up INR in 2 weeks, April 29.     Raven Cuenca, Pharmacy Intern  Discussed warfarin dosing with Celeste Licea, YaneliD

## 2019-04-23 ENCOUNTER — HOSPITAL ENCOUNTER (OUTPATIENT)
Dept: LAB | Facility: MEDICAL CENTER | Age: 79
End: 2019-04-23
Attending: NURSE PRACTITIONER
Payer: MEDICARE

## 2019-04-23 ENCOUNTER — ANTICOAGULATION MONITORING (OUTPATIENT)
Dept: VASCULAR LAB | Facility: MEDICAL CENTER | Age: 79
End: 2019-04-23

## 2019-04-23 DIAGNOSIS — I48.0 PAROXYSMAL A-FIB (HCC): ICD-10-CM

## 2019-04-23 DIAGNOSIS — Z79.01 CHRONIC ANTICOAGULATION: ICD-10-CM

## 2019-04-23 LAB
INR PPP: 2.47 (ref 0.87–1.13)
INR PPP: 2.47 (ref 2–3.5)
PROTHROMBIN TIME: 26.8 SEC (ref 12–14.6)

## 2019-04-23 PROCEDURE — 36415 COLL VENOUS BLD VENIPUNCTURE: CPT

## 2019-04-23 PROCEDURE — 85610 PROTHROMBIN TIME: CPT

## 2019-04-23 NOTE — PROGRESS NOTES
OP Anticoagulation Service Note    Date: 2019  Anticoagulation Summary  As of 2019    INR goal:   2.0-3.0   TTR:   68.3 % (1.3 y)   INR used for dosin.47 (2019)   Warfarin maintenance plan:   1 mg (1 mg x 1) every day   Weekly warfarin total:   7 mg   No change documented:   Alok Harden Ass't   Plan last modified:   Soco Rowland, PharmD (2019)   Next INR check:   2019   Target end date:   Indefinite    Indications    Paroxysmal a-fib (CMS-HCC) [I48.0]  Chronic anticoagulation [Z79.01]             Anticoagulation Episode Summary     INR check location:   [facility]    Preferred lab:       Send INR reminders to:       Comments:   [phone] (H)      Anticoagulation Care Providers     Provider Role Specialty Phone number    Evelio Tejeda M.D. Referring Internal Medicine [phone]    [facility] Anticoagulation Services Responsible  [phone]    Beryl Pang M.D. Responsible Family Medicine [phone]        Anticoagulation Patient Findings  Patient Findings     Negatives:   Signs/symptoms of thrombosis, Signs/symptoms of bleeding, Laboratory test error suspected, Change in health, Change in alcohol use, Change in activity, Upcoming invasive procedure, Emergency department visit, Upcoming dental procedure, Missed doses, Extra doses, Change in medications, Change in diet/appetite, Hospital admission, Bruising, Other complaints        Plan: Spoke to patient. Patient is therapeutic and will remain on the same dose. Patient reports no unusual bleeding or bruising and no changes to medication or diet. Patient is to be checked again in 2 weeks.    Alok Harden. Ass't  [facility]    I have reviewed and concur with the above plan     Pieter Covarrubias, YaneliD

## 2019-05-07 ENCOUNTER — ANTICOAGULATION MONITORING (OUTPATIENT)
Dept: VASCULAR LAB | Facility: MEDICAL CENTER | Age: 79
End: 2019-05-07

## 2019-05-07 ENCOUNTER — HOSPITAL ENCOUNTER (OUTPATIENT)
Dept: LAB | Facility: MEDICAL CENTER | Age: 79
End: 2019-05-07
Attending: NURSE PRACTITIONER
Payer: MEDICARE

## 2019-05-07 DIAGNOSIS — I48.0 PAROXYSMAL A-FIB (HCC): ICD-10-CM

## 2019-05-07 DIAGNOSIS — Z79.01 CHRONIC ANTICOAGULATION: ICD-10-CM

## 2019-05-07 LAB
INR PPP: 1.89 (ref 0.87–1.13)
PROTHROMBIN TIME: 21.7 SEC (ref 12–14.6)

## 2019-05-07 PROCEDURE — 36415 COLL VENOUS BLD VENIPUNCTURE: CPT

## 2019-05-07 PROCEDURE — 85610 PROTHROMBIN TIME: CPT

## 2019-05-07 NOTE — PROGRESS NOTES
OP Telephone Anticoagulation Service Note    Date: 2019      Anticoagulation Summary  As of 2019    INR goal:   2.0-3.0   TTR:   68.7 % (1.4 y)   INR used for dosin.89! (2019)   Warfarin maintenance plan:   1 mg (1 mg x 1) every day   Weekly warfarin total:   7 mg   Plan last modified:   Yaneli SchneiderD (2019)   Next INR check:   2019   Target end date:   Indefinite    Indications    Paroxysmal a-fib (CMS-HCC) [I48.0]  Chronic anticoagulation [Z79.01]             Anticoagulation Episode Summary     INR check location:   [facility]    Preferred lab:       Send INR reminders to:       Comments:   [phone] (H)      Anticoagulation Care Providers     Provider Role Specialty Phone number    Evelio Tejeda M.D. Referring Internal Medicine [phone]    [facility] Anticoagulation Services Responsible  [phone]    Beryl Pang M.D. Responsible Family Medicine [phone]        Anticoagulation Patient Findings        Plan: Spoke with patient on the phone. Patient is sub therapeutic today. Confirmed dosing. Pt reports  Missed a dose last week.  Patient denies any changes in medications or diet. Patient denies any signs or symptoms of bleeding or clotting. Instructed patient to call clinic if any unusual bleeding or bruising occurs. Will have pt take 1.5 mg tonight then continue dosing as outlined. Will follow-up with patient in 2 week(s).              Odalis Gaines, Piter

## 2019-05-20 ENCOUNTER — HOSPITAL ENCOUNTER (OUTPATIENT)
Dept: LAB | Facility: MEDICAL CENTER | Age: 79
End: 2019-05-20
Attending: NURSE PRACTITIONER
Payer: MEDICARE

## 2019-05-20 DIAGNOSIS — I48.0 PAROXYSMAL ATRIAL FIBRILLATION (HCC): ICD-10-CM

## 2019-05-20 DIAGNOSIS — I48.0 PAROXYSMAL A-FIB (HCC): ICD-10-CM

## 2019-05-20 PROCEDURE — 36415 COLL VENOUS BLD VENIPUNCTURE: CPT

## 2019-05-20 PROCEDURE — 85610 PROTHROMBIN TIME: CPT

## 2019-05-21 LAB
INR PPP: 2.43 (ref 0.87–1.13)
PROTHROMBIN TIME: 26.5 SEC (ref 12–14.6)

## 2019-05-22 ENCOUNTER — ANTICOAGULATION MONITORING (OUTPATIENT)
Dept: VASCULAR LAB | Facility: MEDICAL CENTER | Age: 79
End: 2019-05-22

## 2019-05-22 DIAGNOSIS — I48.0 PAROXYSMAL A-FIB (HCC): ICD-10-CM

## 2019-05-22 DIAGNOSIS — Z79.01 CHRONIC ANTICOAGULATION: ICD-10-CM

## 2019-05-22 NOTE — PROGRESS NOTES
Anticoagulation Summary  As of 2019    INR goal:   2.0-3.0   TTR:   69.0 % (1.4 y)   INR used for dosin.43 (2019)   Warfarin maintenance plan:   1 mg (1 mg x 1) every day   Weekly warfarin total:   7 mg   No change documented:   Peggy Velazquez, Med Ass't   Plan last modified:   Soco Rowland, PharmD (2019)   Next INR check:      Target end date:   Indefinite    Indications    Paroxysmal a-fib (CMS-HCC) [I48.0]  Chronic anticoagulation [Z79.01]             Anticoagulation Episode Summary     INR check location:   [facility]    Preferred lab:       Send INR reminders to:       Comments:   [phone] (H)      Anticoagulation Care Providers     Provider Role Specialty Phone number    Evelio Tejeda M.D. Referring Internal Medicine [phone]    [facility] Anticoagulation Services Responsible  [phone]    Beryl Pang M.D. Responsible Family Medicine [phone]        Anticoagulation Patient Findings  Patient Findings     Negatives:   Signs/symptoms of thrombosis, Signs/symptoms of bleeding, Laboratory test error suspected, Change in health, Change in alcohol use, Change in activity, Upcoming invasive procedure, Emergency department visit, Upcoming dental procedure, Missed doses, Extra doses, Change in medications, Change in diet/appetite, Hospital admission, Bruising, Other complaints      Left voicemail message to report 2.43 therapeutic INR of 2.0-3.0.  Patient to continue with current warfarin dosing regimen. Requested pt contact the clinic for any change to diet or medication, and to report any signs or symptoms of bleeding, bruising or clotting.  Pt to follow up in 2 weeks.  Peggy Velazquez, Med Ass't     19  Cosign[employer] - [place]o Rowland, PharmD

## 2019-05-24 ENCOUNTER — OFFICE VISIT (OUTPATIENT)
Dept: URGENT CARE | Facility: PHYSICIAN GROUP | Age: 79
End: 2019-05-24
Payer: MEDICARE

## 2019-05-24 ENCOUNTER — APPOINTMENT (OUTPATIENT)
Dept: RADIOLOGY | Facility: IMAGING CENTER | Age: 79
End: 2019-05-24
Attending: PHYSICIAN ASSISTANT
Payer: MEDICARE

## 2019-05-24 VITALS
WEIGHT: 98 LBS | HEIGHT: 62 IN | SYSTOLIC BLOOD PRESSURE: 144 MMHG | TEMPERATURE: 97.8 F | DIASTOLIC BLOOD PRESSURE: 60 MMHG | OXYGEN SATURATION: 95 % | BODY MASS INDEX: 18.03 KG/M2 | HEART RATE: 60 BPM

## 2019-05-24 DIAGNOSIS — R60.0 BILATERAL LOWER EXTREMITY EDEMA: ICD-10-CM

## 2019-05-24 DIAGNOSIS — M79.602 PAIN OF LEFT UPPER EXTREMITY: ICD-10-CM

## 2019-05-24 PROCEDURE — 71046 X-RAY EXAM CHEST 2 VIEWS: CPT | Mod: TC | Performed by: PHYSICIAN ASSISTANT

## 2019-05-24 PROCEDURE — 99214 OFFICE O/P EST MOD 30 MIN: CPT | Performed by: PHYSICIAN ASSISTANT

## 2019-05-24 PROCEDURE — 93000 ELECTROCARDIOGRAM COMPLETE: CPT | Performed by: PHYSICIAN ASSISTANT

## 2019-05-25 NOTE — PROGRESS NOTES
"Subjective:      Angie Root is a 79 y.o. female who presents with Foot Problem (bilateral foot swelling, redness, pain shooting L arm, x3 days )            Edema   This is a new problem. The current episode started in the past 7 days. The problem occurs constantly. The problem has been waxing and waning. Pertinent negatives include no chest pain, chills, congestion, coughing, fatigue, fever, joint swelling or sore throat. Associated symptoms comments: Pos for left arm ache after sleeping 2 days ago.   . Exacerbated by: walking, or standing. Salty foods.  She has tried nothing for the symptoms.       Review of Systems   Constitutional: Positive for malaise/fatigue. Negative for chills, fatigue and fever.   HENT: Negative for congestion and sore throat.    Eyes: Negative for discharge and redness.   Respiratory: Negative for cough, shortness of breath and wheezing.    Cardiovascular: Positive for leg swelling. Negative for chest pain, palpitations, orthopnea, claudication and PND.   Musculoskeletal: Negative for falls, joint pain and joint swelling.   All other systems reviewed and are negative.         Objective:     /60 (BP Location: Left arm, Patient Position: Sitting, BP Cuff Size: Adult)   Pulse 60   Temp 36.6 °C (97.8 °F) (Temporal)   Ht 1.575 m (5' 2\")   Wt 44.5 kg (98 lb)   SpO2 95%   BMI 17.92 kg/m²    PMH:  has a past medical history of Arthritis; CAD (coronary artery disease); Cancer ([facility]) (1982); COPD (chronic obstructive pulmonary disease) ([facility]); Croup (3 years old); Diverticulosis; Dyslipidemia; GERD (gastroesophageal reflux disease); Hiatal hernia; High cholesterol; Hypertension; Infectious disease (2018); Measles; Paroxysmal A-fib ([facility]) (1/20/2016); Paroxysmal atrial fibrillation ([facility]); Prediabetes; and PVD (peripheral vascular disease) ([facility]).  MEDS:   Current Outpatient Prescriptions:   •  fluticasone (FLONASE) 50 MCG/ACT nasal spray, Spray 1 Spray in nose every day., Disp: 16 g, Rfl: " 0  •  montelukast (SINGULAIR) 5 MG Chew Tab, Take 1 Tab by mouth at bedtime as needed., Disp: 30 Tab, Rfl: 1  •  sotalol (BETAPACE) 80 MG Tab, Take 0.5 Tabs by mouth 2 times a day., Disp: 90 Tab, Rfl: 3  •  lovastatin (MEVACOR) 40 MG tablet, Take 1 Tab by mouth every evening., Disp: 90 Tab, Rfl: 3  •  DILTIAZem CD (CARTIA XT) 240 MG CAPSULE SR 24 HR, Take 1 Cap by mouth every day., Disp: 90 Cap, Rfl: 3  •  warfarin (COUMADIN) 1 MG Tab, Take 1 Tab by mouth every evening., Disp: 90 Tab, Rfl: 3  •  Probiotic Product (SOLUBLE FIBER/PROBIOTICS PO), Take  by mouth., Disp: , Rfl:   •  alprazolam (XANAX) 0.25 MG Tab, Take 0.25 mg by mouth every morning., Disp: , Rfl:   •  Cholecalciferol (VITAMIN D) 2000 UNITS Cap, Take 1 Cap by mouth every morning., Disp: , Rfl:   ALLERGIES:   Allergies   Allergen Reactions   • Codeine Swelling   • Iodine Swelling   • Bee Venom Anaphylaxis   • Chantix [Varenicline]      disoriented   • Ciprofloxacin      Causes C diff, recurrent and very difficult   • Latex Hives   • Pcn [Penicillins] Anaphylaxis and Hives   • Shellfish Allergy      unknown   • Tetanus Antitoxin Swelling     unknown     SURGHX:   Past Surgical History:   Procedure Laterality Date   • FECAL TRANSPLANT N/A 4/9/2018    Procedure: FECAL TRANSPLANT;  Surgeon: Gonzalez Cruz M.D.;  Location: ENDOSCOPY [facility];  Service: Gastroenterology   • COLONOSCOPY - ENDO N/A 4/9/2018    Procedure: COLONOSCOPY - ENDO;  Surgeon: Gonzalez Cruz M.D.;  Location: ENDOSCOPY [facility];  Service: Gastroenterology   • WIDE EXCISION MELANOMA, LEG, W/POSS.STSG  10/2015    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   • CARDIAC CATH, RIGHT HEART  2008    stent   • OTHER ORTHOPEDIC SURGERY Left 2008    hand repair   • CHOLECYSTECTOMY  1993   • TONSILLECTOMY  1945   • OTHER CARDIAC SURGERY      r heart cath stents   • STENT PLACEMENT      L iliac stent     SOCHX:  reports that she has been smoking Cigarettes.  She started smoking about 62  years ago. She has a 15.00 pack-year smoking history. She has never used smokeless tobacco. She reports that she does not drink alcohol or use drugs.  FH: Family history was reviewed, no pertinent findings to report    Physical Exam   Constitutional: She is oriented to person, place, and time. She appears well-developed and well-nourished. No distress.   HENT:   Head: Normocephalic and atraumatic.   Right Ear: External ear normal.   Left Ear: External ear normal.   Mouth/Throat: Oropharynx is clear and moist. No oropharyngeal exudate.   Eyes: Pupils are equal, round, and reactive to light. Conjunctivae and EOM are normal.   Neck: Normal range of motion. Neck supple. No tracheal deviation present.   Cardiovascular: Normal rate and regular rhythm.    No murmur heard.  Pulmonary/Chest: Effort normal and breath sounds normal. No respiratory distress.   Musculoskeletal: Normal range of motion. She exhibits edema.   1+ pitting edema- bilateral lower feet- up to mid tibia.   Left arm- NT on exam- scattered ecchymosis throughout BUE. without discrete deformity, or limited ROM of the shoulder, elbow, or hand.      Neurological: She is alert and oriented to person, place, and time. Coordination normal.   Skin: Skin is warm. No rash noted.   Psychiatric: She has a normal mood and affect. Her behavior is normal. Judgment and thought content normal.   Vitals reviewed.            POC EKG: Sinus rhythm- 56, possible biphasic T in lead II, otherwise without acute ST changes, Compared to 3/12/19- same   CXR:  The heart is normal in size.  No pulmonary infiltrates or consolidations are noted.  No pleural effusions are appreciated.    Assessment/Plan:     1. Bilateral lower extremity edema  - DX-CHEST-2 VIEWS; Future  - EKG - Clinic Performed    2. Pain of left upper extremity- without bony tenderness, encouraged heating pad, light stretching.     - DX-CHEST-2 VIEWS; Future    STRONGLY encouraged pt. To avoid salt at this time,  compression wraps were made today for the patient she is to elevate, and is to follow up with her PCP or Cardiolgoy ASAP.   I am trying to avoid diuretic as pt. Is with slightly low diastolic, also she has had hx of hypokalemia.   Patient given precautionary s/sx that mandate immediate follow up and evaluation in the ED. Advised of risks of not doing so.    DDX, Supportive care, and indications for immediate follow-up discussed with patient.    Instructed to return to clinic or nearest emergency department if we are not available for any change in condition, further concerns, or worsening of symptoms.    The patient demonstrated a good understanding and agreed with the treatment plan.  Please note that this dictation was created using voice recognition software. I have made every reasonable attempt to correct obvious errors, but I expect that there are errors of grammar and possibly content that I did not discover before finalizing the note.

## 2019-05-26 ASSESSMENT — ENCOUNTER SYMPTOMS
ORTHOPNEA: 0
EYE DISCHARGE: 0
EYE REDNESS: 0
WHEEZING: 0
JOINT SWELLING: 0
PALPITATIONS: 0
FALLS: 0
CHILLS: 0
FEVER: 0
PND: 0
SORE THROAT: 0
EDEMA: 1
SHORTNESS OF BREATH: 0
FATIGUE: 0
CLAUDICATION: 0
COUGH: 0

## 2019-06-03 ENCOUNTER — HOSPITAL ENCOUNTER (OUTPATIENT)
Dept: LAB | Facility: MEDICAL CENTER | Age: 79
End: 2019-06-03
Attending: NURSE PRACTITIONER
Payer: MEDICARE

## 2019-06-03 DIAGNOSIS — I48.0 PAROXYSMAL A-FIB (HCC): ICD-10-CM

## 2019-06-03 LAB
INR PPP: 2 (ref 0.87–1.13)
PROTHROMBIN TIME: 23.4 SEC (ref 12–14.6)

## 2019-06-03 PROCEDURE — 36415 COLL VENOUS BLD VENIPUNCTURE: CPT

## 2019-06-03 PROCEDURE — 85610 PROTHROMBIN TIME: CPT

## 2019-06-04 ENCOUNTER — ANTICOAGULATION MONITORING (OUTPATIENT)
Dept: VASCULAR LAB | Facility: MEDICAL CENTER | Age: 79
End: 2019-06-04

## 2019-06-04 DIAGNOSIS — I48.0 PAROXYSMAL A-FIB (HCC): ICD-10-CM

## 2019-06-04 DIAGNOSIS — Z79.01 CHRONIC ANTICOAGULATION: ICD-10-CM

## 2019-06-04 NOTE — PROGRESS NOTES
Anticoagulation Summary  As of 2019    INR goal:   2.0-3.0   TTR:   69.8 % (1.4 y)   INR used for dosin.00 (6/3/2019)   Warfarin maintenance plan:   1 mg (1 mg x 1) every day   Weekly warfarin total:   7 mg   Plan last modified:   Soco Rowland, PharmD (2019)   Next INR check:   2019   Target end date:   Indefinite    Indications    Paroxysmal a-fib (CMS-HCC) [I48.0]  Chronic anticoagulation [Z79.01]             Anticoagulation Episode Summary     [facility]R check location:   [facility]    Preferred lab:       Send INR reminders to:       Comments:   [phone] (H)      Anticoagulation Care Providers     Provider Role Specialty Phone number    Evelio Tejeda M.D. Referring Internal Medicine [phone]    [facility] Anticoagulation Services Responsible  [phone]    Beryl Pang M.D. Responsible Family Medicine [phone]        Anticoagulation Patient Findings  Patient Findings     Negatives:   Signs/symptoms of thrombosis, Signs/symptoms of bleeding, Laboratory test error suspected, Change in health, Change in alcohol use, Change in activity, Upcoming invasive procedure, Emergency department visit, Upcoming dental procedure, Missed doses, Extra doses, Change in medications, Change in diet/appetite, Hospital admission, Bruising, Other complaints              Spoke with pt about therapeutic INR. Pt denied any s/sx of bleeding or bruising as well as changes to medications or diet. Confirmed current regimen.     Pt is to continue with current warfarin dosing regimen.    Follow up in 2 weeks, to reduce risk of adverse events related to this high risk medication,  Warfarin.    Reuben White, Pharmacy Intern

## 2019-06-17 ENCOUNTER — HOSPITAL ENCOUNTER (OUTPATIENT)
Dept: LAB | Facility: MEDICAL CENTER | Age: 79
End: 2019-06-17
Attending: NURSE PRACTITIONER
Payer: MEDICARE

## 2019-06-17 DIAGNOSIS — I48.0 PAROXYSMAL A-FIB (HCC): ICD-10-CM

## 2019-06-17 DIAGNOSIS — I48.0 PAROXYSMAL ATRIAL FIBRILLATION (HCC): ICD-10-CM

## 2019-06-17 LAB
INR PPP: 2.07 (ref 0.87–1.13)
PROTHROMBIN TIME: 24 SEC (ref 12–14.6)

## 2019-06-17 PROCEDURE — 85610 PROTHROMBIN TIME: CPT

## 2019-06-17 PROCEDURE — 36415 COLL VENOUS BLD VENIPUNCTURE: CPT

## 2019-06-19 ENCOUNTER — ANTICOAGULATION MONITORING (OUTPATIENT)
Dept: VASCULAR LAB | Facility: MEDICAL CENTER | Age: 79
End: 2019-06-19

## 2019-06-19 DIAGNOSIS — I48.0 PAROXYSMAL A-FIB (HCC): ICD-10-CM

## 2019-06-19 DIAGNOSIS — Z79.01 CHRONIC ANTICOAGULATION: ICD-10-CM

## 2019-06-19 NOTE — PROGRESS NOTES
Anticoagulation Summary  As of 2019    INR goal:   2.0-3.0   TTR:   70.6 % (1.5 y)   INR used for dosin.07 (2019)   Warfarin maintenance plan:   1 mg (1 mg x 1) every day   Weekly warfarin total:   7 mg   Plan last modified:   Soco Rowland, PharmD (2019)   Next INR check:   2019   Target end date:   Indefinite    Indications    Paroxysmal a-fib (CMS-HCC) [I48.0]  Chronic anticoagulation [Z79.01]             Anticoagulation Episode Summary     [facility]R check location[facility]   [facility]    Preferred lab:       Send INR reminders to:       Comments:   [phone] (H)      Anticoagulation Care Providers     Provider Role Specialty Phone number    Evelio Tejeda M.D. Referring Internal Medicine [phone]    [facility] Anticoagulation Services Responsible  [phone]    Beryl Pang M.D. Responsible Family Medicine [phone]        Anticoagulation Patient Findings      Spoke with patient to report a therapeutic INR.    Pt instructed to continue with current warfarin dosing regimen, confirms dosing.   Pt denies any s/s of bleeding, bruising, clotting or any changes to diet or medication.    Will follow up in 3 week(s).     Soco Rowland, PharmD

## 2019-07-08 ENCOUNTER — HOSPITAL ENCOUNTER (OUTPATIENT)
Dept: LAB | Facility: MEDICAL CENTER | Age: 79
End: 2019-07-08
Attending: NURSE PRACTITIONER
Payer: MEDICARE

## 2019-07-08 LAB
INR PPP: 2.15 (ref 0.87–1.13)
PROTHROMBIN TIME: 24.7 SEC (ref 12–14.6)

## 2019-07-08 PROCEDURE — 36415 COLL VENOUS BLD VENIPUNCTURE: CPT

## 2019-07-08 PROCEDURE — 85610 PROTHROMBIN TIME: CPT

## 2019-07-09 ENCOUNTER — ANTICOAGULATION MONITORING (OUTPATIENT)
Dept: VASCULAR LAB | Facility: MEDICAL CENTER | Age: 79
End: 2019-07-09

## 2019-07-09 DIAGNOSIS — Z79.01 CHRONIC ANTICOAGULATION: ICD-10-CM

## 2019-07-09 DIAGNOSIS — I48.0 PAROXYSMAL A-FIB (HCC): ICD-10-CM

## 2019-07-09 NOTE — PROGRESS NOTES
Anticoagulation Summary  As of 2019    INR goal:   2.0-3.0   TTR:   71.7 % (1.5 y)   INR used for dosin.15 (2019)   Warfarin maintenance plan:   1 mg (1 mg x 1) every day   Weekly warfarin total:   7 mg   Plan last modified:   Soco Rowland, PharmD (2019)   Next INR check:      Target end date:   Indefinite    Indications    Paroxysmal a-fib (CMS-HCC) [I48.0]  Chronic anticoagulation [Z79.01]             Anticoagulation Episode Summary     [facility]R check location[facility]   [facility]    Preferred lab:       Send INR reminders to:       Comments:   [phone] (H)      Anticoagulation Care Providers     Provider Role Specialty Phone number    Evelio Tejeda M.D. Referring Internal Medicine [phone]    [facility] Anticoagulation Services Responsible  [phone]    Beryl Pang M.D. Responsible Family Medicine [phone]        Anticoagulation Patient Findings          Spoke with Angie to report a therapeutic INR of 2.2. Continue current dosing regimen.  Follow up in 6 weeks, to reduce the risk of adverse events related to this high risk medication, warfarin.    Sadaf Santillan, Clinical Pharmacist

## 2019-07-15 ENCOUNTER — TELEPHONE (OUTPATIENT)
Dept: HEMATOLOGY ONCOLOGY | Facility: MEDICAL CENTER | Age: 79
End: 2019-07-15

## 2019-07-16 ENCOUNTER — HOSPITAL ENCOUNTER (EMERGENCY)
Facility: MEDICAL CENTER | Age: 79
End: 2019-07-16
Attending: EMERGENCY MEDICINE
Payer: MEDICARE

## 2019-07-16 ENCOUNTER — APPOINTMENT (OUTPATIENT)
Dept: RADIOLOGY | Facility: MEDICAL CENTER | Age: 79
End: 2019-07-16
Attending: EMERGENCY MEDICINE
Payer: MEDICARE

## 2019-07-16 VITALS
HEART RATE: 61 BPM | DIASTOLIC BLOOD PRESSURE: 79 MMHG | TEMPERATURE: 97.3 F | RESPIRATION RATE: 18 BRPM | OXYGEN SATURATION: 97 % | SYSTOLIC BLOOD PRESSURE: 148 MMHG

## 2019-07-16 DIAGNOSIS — R31.29 OTHER MICROSCOPIC HEMATURIA: ICD-10-CM

## 2019-07-16 LAB
ALBUMIN SERPL BCP-MCNC: 3.8 G/DL (ref 3.2–4.9)
ALBUMIN/GLOB SERPL: 1.4 G/DL
ALP SERPL-CCNC: 74 U/L (ref 30–99)
ALT SERPL-CCNC: 16 U/L (ref 2–50)
ANION GAP SERPL CALC-SCNC: 8 MMOL/L (ref 0–11.9)
APPEARANCE UR: ABNORMAL
AST SERPL-CCNC: 22 U/L (ref 12–45)
BACTERIA #/AREA URNS HPF: NEGATIVE /HPF
BASOPHILS # BLD AUTO: 1.1 % (ref 0–1.8)
BASOPHILS # BLD: 0.09 K/UL (ref 0–0.12)
BILIRUB SERPL-MCNC: 0.6 MG/DL (ref 0.1–1.5)
BILIRUB UR QL STRIP.AUTO: NEGATIVE
BUN SERPL-MCNC: 16 MG/DL (ref 8–22)
CALCIUM SERPL-MCNC: 9.7 MG/DL (ref 8.5–10.5)
CHLORIDE SERPL-SCNC: 105 MMOL/L (ref 96–112)
CO2 SERPL-SCNC: 24 MMOL/L (ref 20–33)
COLOR UR: ABNORMAL
CREAT SERPL-MCNC: 0.83 MG/DL (ref 0.5–1.4)
EOSINOPHIL # BLD AUTO: 0.26 K/UL (ref 0–0.51)
EOSINOPHIL NFR BLD: 3.2 % (ref 0–6.9)
EPI CELLS #/AREA URNS HPF: NEGATIVE /HPF
ERYTHROCYTE [DISTWIDTH] IN BLOOD BY AUTOMATED COUNT: 45.4 FL (ref 35.9–50)
GLOBULIN SER CALC-MCNC: 2.7 G/DL (ref 1.9–3.5)
GLUCOSE SERPL-MCNC: 96 MG/DL (ref 65–99)
GLUCOSE UR STRIP.AUTO-MCNC: NEGATIVE MG/DL
HCT VFR BLD AUTO: 45.6 % (ref 37–47)
HGB BLD-MCNC: 15 G/DL (ref 12–16)
HYALINE CASTS #/AREA URNS LPF: ABNORMAL /LPF
IMM GRANULOCYTES # BLD AUTO: 0.02 K/UL (ref 0–0.11)
IMM GRANULOCYTES NFR BLD AUTO: 0.2 % (ref 0–0.9)
INR PPP: 2.46 (ref 0.87–1.13)
KETONES UR STRIP.AUTO-MCNC: ABNORMAL MG/DL
LEUKOCYTE ESTERASE UR QL STRIP.AUTO: ABNORMAL
LIPASE SERPL-CCNC: 31 U/L (ref 11–82)
LYMPHOCYTES # BLD AUTO: 2.57 K/UL (ref 1–4.8)
LYMPHOCYTES NFR BLD: 31.7 % (ref 22–41)
MCH RBC QN AUTO: 29.8 PG (ref 27–33)
MCHC RBC AUTO-ENTMCNC: 32.9 G/DL (ref 33.6–35)
MCV RBC AUTO: 90.7 FL (ref 81.4–97.8)
MICRO URNS: ABNORMAL
MONOCYTES # BLD AUTO: 0.74 K/UL (ref 0–0.85)
MONOCYTES NFR BLD AUTO: 9.1 % (ref 0–13.4)
NEUTROPHILS # BLD AUTO: 4.44 K/UL (ref 2–7.15)
NEUTROPHILS NFR BLD: 54.7 % (ref 44–72)
NITRITE UR QL STRIP.AUTO: NEGATIVE
NRBC # BLD AUTO: 0 K/UL
NRBC BLD-RTO: 0 /100 WBC
PH UR STRIP.AUTO: 6.5 [PH]
PLATELET # BLD AUTO: 185 K/UL (ref 164–446)
PMV BLD AUTO: 10.3 FL (ref 9–12.9)
POTASSIUM SERPL-SCNC: 3.8 MMOL/L (ref 3.6–5.5)
PROT SERPL-MCNC: 6.5 G/DL (ref 6–8.2)
PROT UR QL STRIP: 30 MG/DL
PROTHROMBIN TIME: 27.5 SEC (ref 12–14.6)
RBC # BLD AUTO: 5.03 M/UL (ref 4.2–5.4)
RBC # URNS HPF: >150 /HPF
RBC UR QL AUTO: ABNORMAL
SODIUM SERPL-SCNC: 137 MMOL/L (ref 135–145)
SP GR UR STRIP.AUTO: 1.01
UROBILINOGEN UR STRIP.AUTO-MCNC: 1 MG/DL
WBC # BLD AUTO: 8.1 K/UL (ref 4.8–10.8)
WBC #/AREA URNS HPF: ABNORMAL /HPF

## 2019-07-16 PROCEDURE — 36415 COLL VENOUS BLD VENIPUNCTURE: CPT

## 2019-07-16 PROCEDURE — 85610 PROTHROMBIN TIME: CPT

## 2019-07-16 PROCEDURE — 81001 URINALYSIS AUTO W/SCOPE: CPT

## 2019-07-16 PROCEDURE — 80053 COMPREHEN METABOLIC PANEL: CPT

## 2019-07-16 PROCEDURE — 85025 COMPLETE CBC W/AUTO DIFF WBC: CPT

## 2019-07-16 PROCEDURE — 99284 EMERGENCY DEPT VISIT MOD MDM: CPT | Mod: 25

## 2019-07-16 PROCEDURE — 83690 ASSAY OF LIPASE: CPT

## 2019-07-16 PROCEDURE — 74176 CT ABD & PELVIS W/O CONTRAST: CPT

## 2019-07-16 RX ORDER — NITROFURANTOIN 25; 75 MG/1; MG/1
100 CAPSULE ORAL 2 TIMES DAILY
Qty: 14 CAP | Refills: 0 | Status: SHIPPED | OUTPATIENT
Start: 2019-07-16 | End: 2019-07-23

## 2019-07-16 RX ORDER — LACTOBACILLUS RHAMNOSUS GG 10B CELL
1 CAPSULE ORAL DAILY
Status: SHIPPED | COMMUNITY
End: 2023-07-13

## 2019-07-16 NOTE — ED PROVIDER NOTES
ED Provider Note    CHIEF COMPLAINT  Chief Complaint   Patient presents with   • Blood in Urine   • Abdominal Pain       HPI  Angie Root is a 79 y.o. female who presents for evaluation of crampy abdominal pain, hematuria.  The patient reports left lower quadrant pain that has been present intermittently for the last 3 to 4 weeks.  She takes Coumadin for atrial fibrillation as well as peripheral vascular disease.  She has never had any hematuria is never had a kidney stone.  She denies nausea vomiting or diarrhea.  No high fevers or chills.  No associated strokelike symptoms.  She gets her Coumadin level checked regularly and was normal.  Most recently it was checked 8 days ago and was noted to be 2.15.  There is no change in her dosage.  No vaginal bleeding no GI bleeding such as hematemesis melena or hematochezia    REVIEW OF SYSTEMS  See HPI for further details.  No night sweats weight loss numbness tingling weakness rash all other systems are negative.     PAST MEDICAL HISTORY  Past Medical History:   Diagnosis Date   • Infectious disease 2018    C Diff   • Paroxysmal A-fib (HCC) 1/20/2016    Symptomatic, on a rhythm control strategy with sotalol 40 mg by mouth twice a day.    • Cancer (HCC) 1982    melanoma   • Arthritis     BL hands   • CAD (coronary artery disease)    • COPD (chronic obstructive pulmonary disease) (HCC)    • Croup 3 years old   • Diverticulosis    • Dyslipidemia    • GERD (gastroesophageal reflux disease)    • Hiatal hernia    • High cholesterol    • Hypertension    • Measles     5 years old   • Paroxysmal atrial fibrillation (HCC)    • Prediabetes    • PVD (peripheral vascular disease) ([facility])        FAMILY HISTORY  Noncontributory    SOCIAL HISTORY  Social History     Social History   • Marital status:      Spouse name: N/A   • Number of children: 0   • Years of education: N/A     Social History Main Topics   • Smoking status: Current Every Day Smoker     Packs/day: 0.25     Years:  60.00     Types: Cigarettes     Start date: 3/20/1957   • Smokeless tobacco: Never Used      Comment: 6 per day   • Alcohol use No   • Drug use: No   • Sexual activity: Not Currently     Partners: Male     Other Topics Concern   • Not on file     Social History Narrative    .     Children: no    Work: [employer]       SURGICAL HISTORY  Past Surgical History:   Procedure Laterality Date   • FECAL TRANSPLANT N/A 4/9/2018    Procedure: FECAL TRANSPLANT;  Surgeon: Gonzalez Cruz M.D.;  Location: ENDOSCOPY [facility];  Service: Gastroenterology   • COLONOSCOPY - ENDO N/A 4/9/2018    Procedure: COLONOSCOPY - ENDO;  Surgeon: Gonzalez Cruz M.D.;  Location: ENDOSCOPY [facility];  Service: Gastroenterology   • WIDE EXCISION MELANOMA, LEG, W/POSS.STSG  10/2015    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   • CARDIAC CATH, RIGHT HEART  2008    stent   • OTHER ORTHOPEDIC SURGERY Left 2008    hand repair   • CHOLECYSTECTOMY  1993   • TONSILLECTOMY  1945   • OTHER CARDIAC SURGERY      r heart cath stents   • STENT PLACEMENT      L iliac stent       CURRENT MEDICATIONS  Home Medications     Reviewed by Jerry Chahal (Pharmacy Tech) on 07/16/19 at 1455  Med List Status: Complete   Medication Last Dose Status   alprazolam (XANAX) 0.25 MG Tab 7/16/2019 Active   Cholecalciferol (VITAMIN D) 2000 UNITS Cap 7/16/2019 Active   DILTIAZem CD (CARTIA XT) 240 MG CAPSULE SR 24 HR 7/16/2019 Active   fluticasone (FLONASE) 50 MCG/ACT nasal spray 7/16/2019 Active   ipratropium (ATROVENT) 0.02 % Solution 7/16/2019 Active   lactobacillus rhamnosus (CULTURELLE) Cap capsule 7/16/2019 Active   lovastatin (MEVACOR) 40 MG tablet 7/15/2019 Active   sotalol (BETAPACE) 80 MG Tab 7/16/2019 Active   warfarin (COUMADIN) 1 MG Tab 7/15/2019 Active                ALLERGIES  Allergies   Allergen Reactions   • Codeine Swelling   • Iodine Swelling   • Bee Venom Anaphylaxis   • Chantix [Varenicline]      disoriented   • Ciprofloxacin       Causes C diff, recurrent and very difficult   • Latex Hives   • Pcn [Penicillins] Anaphylaxis and Hives   • Shellfish Allergy      unknown   • Tetanus Antitoxin Swelling     unknown       PHYSICAL EXAM  VITAL SIGNS: /79   Pulse 61   Temp 36.3 °C (97.3 °F) (Temporal)   Resp 18   SpO2 97%  Room air O2: 98    Constitutional: Well developed, Well nourished, No acute distress, Non-toxic appearance.   HENT: Normocephalic, Atraumatic, Bilateral external ears normal, Oropharynx moist, No oral exudates, Nose normal.   Eyes: PERRLA, EOMI, Conjunctiva normal, No discharge.   Neck: Normal range of motion, No tenderness, Supple, No stridor.   Cardiovascular: Normal heart rate, Normal rhythm, No murmurs, No rubs, No gallops.   Thorax & Lungs: Normal breath sounds, No respiratory distress, No wheezing, No chest tenderness.   Abdomen: Bowel sounds normal, minimal left lower quadrant tenderness no rebound or guarding skin: Warm, Dry, No erythema, No rash.   Back: No tenderness, No CVA tenderness.   Extremities: Intact distal pulses, No edema, No tenderness, No cyanosis, No clubbing.   Musculoskeletal: Good range of motion in all major joints. No tenderness to palpation or major deformities noted.   Neurologic: Alert & oriented x 3, Normal motor function, Normal sensory function, No focal deficits noted.   Psychiatric: Anxious    Results for orders placed or performed during the hospital encounter of 07/16/19   URINALYSIS   Result Value Ref Range    Color Dark Yellow     Character Cloudy (A)     Specific Gravity 1.015 <1.035    Ph 6.5 5.0 - 8.0    Glucose Negative Negative mg/dL    Ketones Trace (A) Negative mg/dL    Protein 30 (A) Negative mg/dL    Bilirubin Negative Negative    Urobilinogen, Urine 1.0 Negative    Nitrite Negative Negative    Leukocyte Esterase Small (A) Negative    Occult Blood Large (A) Negative    Micro Urine Req Microscopic    CBC WITH DIFFERENTIAL   Result Value Ref Range    WBC 8.1 4.8 - 10.8  K/uL    RBC 5.03 4.20 - 5.40 M/uL    Hemoglobin 15.0 12.0 - 16.0 g/dL    Hematocrit 45.6 37.0 - 47.0 %    MCV 90.7 81.4 - 97.8 fL    MCH 29.8 27.0 - 33.0 pg    MCHC 32.9 (L) 33.6 - 35.0 g/dL    RDW 45.4 35.9 - 50.0 fL    Platelet Count 185 164 - 446 K/uL    MPV 10.3 9.0 - 12.9 fL    Neutrophils-Polys 54.70 44.00 - 72.00 %    Lymphocytes 31.70 22.00 - 41.00 %    Monocytes 9.10 0.00 - 13.40 %    Eosinophils 3.20 0.00 - 6.90 %    Basophils 1.10 0.00 - 1.80 %    Immature Granulocytes 0.20 0.00 - 0.90 %    Nucleated RBC 0.00 /100 WBC    Neutrophils (Absolute) 4.44 2.00 - 7.15 K/uL    Lymphs (Absolute) 2.57 1.00 - 4.80 K/uL    Monos (Absolute) 0.74 0.00 - 0.85 K/uL    Eos (Absolute) 0.26 0.00 - 0.51 K/uL    Baso (Absolute) 0.09 0.00 - 0.12 K/uL    Immature Granulocytes (abs) 0.02 0.00 - 0.11 K/uL    NRBC (Absolute) 0.00 K/uL   Comp Metabolic Panel   Result Value Ref Range    Sodium 137 135 - 145 mmol/L    Potassium 3.8 3.6 - 5.5 mmol/L    Chloride 105 96 - 112 mmol/L    Co2 24 20 - 33 mmol/L    Anion Gap 8.0 0.0 - 11.9    Glucose 96 65 - 99 mg/dL    Bun 16 8 - 22 mg/dL    Creatinine 0.83 0.50 - 1.40 mg/dL    Calcium 9.7 8.5 - 10.5 mg/dL    AST(SGOT) 22 12 - 45 U/L    ALT(SGPT) 16 2 - 50 U/L    Alkaline Phosphatase 74 30 - 99 U/L    Total Bilirubin 0.6 0.1 - 1.5 mg/dL    Albumin 3.8 3.2 - 4.9 g/dL    Total Protein 6.5 6.0 - 8.2 g/dL    Globulin 2.7 1.9 - 3.5 g/dL    A-G Ratio 1.4 g/dL   LIPASE   Result Value Ref Range    Lipase 31 11 - 82 U/L   Prothrombin Time   Result Value Ref Range    PT 27.5 (H) 12.0 - 14.6 sec    INR 2.46 (H) 0.87 - 1.13   ESTIMATED GFR   Result Value Ref Range    GFR If African American >60 >60 mL/min/1.73 m 2    GFR If Non African American >60 >60 mL/min/1.73 m 2   URINE MICROSCOPIC (W/UA)   Result Value Ref Range    WBC 5-10 (A) /hpf    RBC >150 (A) /hpf    Bacteria Negative None /hpf    Epithelial Cells Negative /hpf    Hyaline Cast 0-2 /lpf      CT-RENAL COLIC EVALUATION(A/P W/O)   Final Result       Bilateral nonobstructing renal calculi.      No evidence of hydronephrosis.      Status post cholecystectomy.      Prominent atherosclerotic plaque within an ectatic aorta.      Colonic diverticulosis.             COURSE & MEDICAL DECISION MAKING  Pertinent Labs & Imaging studies reviewed. (See chart for details)  Patient presents here with hematuria.  She is on Coumadin.  INR level is at the therapeutic range of 2.46.  She has no significant signs of blood loss.  She primarily has hematuria on microscopy and negative for bacteria.  We will culture this but with her white cells in her urine I do feel that covering her with Macrobid as indicated until the culture is back.  She will be referred to urology for ongoing management of microscopic hematuria as well as kidney stones.    FINAL IMPRESSION  1.   1. Other microscopic hematuria          Electronically signed by: Mark Juárez, 7/16/2019 11:01 AM

## 2019-07-16 NOTE — ED NOTES
Med rec updated and complete. Allergies reviewed.  Met with pt at bedside and dicussed current medications and last doses taken.  Pt denies oral antibiotic use in last 14 days at home.   Home pharmacy [facility]

## 2019-07-16 NOTE — ED TRIAGE NOTES
Pt presents to ER by EMS for LLQ abd pain and hematuria that started this morning. PT denies CP, SOB, changes in bowl/bladder, dizziness.  PMX: afib, stent, on blood thinner. PT AOx4. PT in no distress at this time. Call bell within reach, Bed rails up. All needs addressed. Will continue to monitor.

## 2019-07-24 ENCOUNTER — HOSPITAL ENCOUNTER (OUTPATIENT)
Dept: RADIOLOGY | Facility: MEDICAL CENTER | Age: 79
End: 2019-07-24
Attending: FAMILY MEDICINE
Payer: MEDICARE

## 2019-07-24 DIAGNOSIS — F17.200 TOBACCO USE DISORDER: ICD-10-CM

## 2019-07-24 PROCEDURE — 71250 CT THORAX DX C-: CPT

## 2019-07-29 ENCOUNTER — HOSPITAL ENCOUNTER (OUTPATIENT)
Dept: LAB | Facility: MEDICAL CENTER | Age: 79
End: 2019-07-29
Attending: NURSE PRACTITIONER
Payer: MEDICARE

## 2019-07-29 DIAGNOSIS — I48.0 PAROXYSMAL ATRIAL FIBRILLATION (HCC): ICD-10-CM

## 2019-07-29 LAB
INR PPP: 2.53 (ref 0.87–1.13)
PROTHROMBIN TIME: 28.1 SEC (ref 12–14.6)

## 2019-07-29 PROCEDURE — 36415 COLL VENOUS BLD VENIPUNCTURE: CPT

## 2019-07-29 PROCEDURE — 85610 PROTHROMBIN TIME: CPT

## 2019-07-31 ENCOUNTER — ANTICOAGULATION MONITORING (OUTPATIENT)
Dept: VASCULAR LAB | Facility: MEDICAL CENTER | Age: 79
End: 2019-07-31

## 2019-07-31 DIAGNOSIS — Z79.01 CHRONIC ANTICOAGULATION: ICD-10-CM

## 2019-07-31 DIAGNOSIS — I48.0 PAROXYSMAL A-FIB (HCC): ICD-10-CM

## 2019-07-31 NOTE — PROGRESS NOTES
Anticoagulation Summary  As of 2019    INR goal:   2.0-3.0   TTR:   72.7 % (1.6 y)   INR used for dosin.53 (2019)   Warfarin maintenance plan:   1 mg (1 mg x 1) every day   Weekly warfarin total:   7 mg   No change documented:   Beck Bowman, Med Ass't   Plan last modified:   Soco Rowland, PharmD (2019)   Next INR check:   2019   Target end date:   Indefinite    Indications    Paroxysmal a-fib (CMS-HCC) [I48.0]  Chronic anticoagulation [Z79.01]             Anticoagulation Episode Summary     INR check location:   Anticoagulation Clinic    Preferred lab:       Send INR reminders to:       Comments:   [phone] (H)      Anticoagulation Care Providers     Provider Role Specialty Phone number    Evelio Tejeda M.D. Referring Internal Medicine [phone]    [facility] Responsible  [phone]    Beryl Pang M.D. Responsible Family Medicine [phone]        Anticoagulation Patient Findings  Patient Findings     Negatives:   Signs/symptoms of thrombosis, Signs/symptoms of bleeding, Laboratory test error suspected, Change in health, Change in alcohol use, Change in activity, Upcoming invasive procedure, Emergency department visit, Upcoming dental procedure, Missed doses, Extra doses, Change in medications, Change in diet/appetite, Hospital admission, Bruising, Other complaints        Spoke with patient to report a therapeutic INR.  Pt instructed to continue with current warfarin dosing regimen. Pt denies any s/s of bleeding, bruising, clotting or any changes to diet or medication.  Will follow up in 4 weeks.    Beck Bowman, Med Ass't    I have reviewed and am in agreement with the above stated plan on 19.  Major Hughes, YaneliD, BCACP

## 2019-08-13 ENCOUNTER — APPOINTMENT (RX ONLY)
Dept: URBAN - METROPOLITAN AREA CLINIC 4 | Facility: CLINIC | Age: 79
Setting detail: DERMATOLOGY
End: 2019-08-13

## 2019-08-13 DIAGNOSIS — D22 MELANOCYTIC NEVI: ICD-10-CM

## 2019-08-13 DIAGNOSIS — D18.0 HEMANGIOMA: ICD-10-CM

## 2019-08-13 DIAGNOSIS — L73.8 OTHER SPECIFIED FOLLICULAR DISORDERS: ICD-10-CM

## 2019-08-13 DIAGNOSIS — L82.1 OTHER SEBORRHEIC KERATOSIS: ICD-10-CM

## 2019-08-13 DIAGNOSIS — L81.4 OTHER MELANIN HYPERPIGMENTATION: ICD-10-CM

## 2019-08-13 PROBLEM — D22.71 MELANOCYTIC NEVI OF RIGHT LOWER LIMB, INCLUDING HIP: Status: ACTIVE | Noted: 2019-08-13

## 2019-08-13 PROBLEM — D22.72 MELANOCYTIC NEVI OF LEFT LOWER LIMB, INCLUDING HIP: Status: ACTIVE | Noted: 2019-08-13

## 2019-08-13 PROBLEM — D22.61 MELANOCYTIC NEVI OF RIGHT UPPER LIMB, INCLUDING SHOULDER: Status: ACTIVE | Noted: 2019-08-13

## 2019-08-13 PROBLEM — D22.62 MELANOCYTIC NEVI OF LEFT UPPER LIMB, INCLUDING SHOULDER: Status: ACTIVE | Noted: 2019-08-13

## 2019-08-13 PROBLEM — D18.01 HEMANGIOMA OF SKIN AND SUBCUTANEOUS TISSUE: Status: ACTIVE | Noted: 2019-08-13

## 2019-08-13 PROBLEM — D22.5 MELANOCYTIC NEVI OF TRUNK: Status: ACTIVE | Noted: 2019-08-13

## 2019-08-13 PROCEDURE — ? COUNSELING

## 2019-08-13 PROCEDURE — ? SUNSCREEN RECOMMENDATIONS

## 2019-08-13 PROCEDURE — 99214 OFFICE O/P EST MOD 30 MIN: CPT

## 2019-08-13 ASSESSMENT — LOCATION SIMPLE DESCRIPTION DERM
LOCATION SIMPLE: LEFT POSTERIOR UPPER ARM
LOCATION SIMPLE: RIGHT LOWER BACK
LOCATION SIMPLE: RIGHT CHEEK
LOCATION SIMPLE: CHEST
LOCATION SIMPLE: LEFT ANTERIOR NECK
LOCATION SIMPLE: LEFT THIGH
LOCATION SIMPLE: ABDOMEN
LOCATION SIMPLE: RIGHT THIGH
LOCATION SIMPLE: LEFT CHEEK
LOCATION SIMPLE: UPPER BACK
LOCATION SIMPLE: RIGHT POSTERIOR UPPER ARM
LOCATION SIMPLE: RIGHT FOREARM
LOCATION SIMPLE: LEFT HAND
LOCATION SIMPLE: LEFT FOREARM
LOCATION SIMPLE: RIGHT HAND
LOCATION SIMPLE: LEFT LIP
LOCATION SIMPLE: LEFT NOSE
LOCATION SIMPLE: LEFT UPPER BACK

## 2019-08-13 ASSESSMENT — LOCATION ZONE DERM
LOCATION ZONE: ARM
LOCATION ZONE: FACE
LOCATION ZONE: NOSE
LOCATION ZONE: LIP
LOCATION ZONE: LEG
LOCATION ZONE: TRUNK
LOCATION ZONE: NECK
LOCATION ZONE: HAND

## 2019-08-13 ASSESSMENT — LOCATION DETAILED DESCRIPTION DERM
LOCATION DETAILED: RIGHT DISTAL POSTERIOR UPPER ARM
LOCATION DETAILED: LEFT NASAL ALAR GROOVE
LOCATION DETAILED: LEFT ULNAR DORSAL HAND
LOCATION DETAILED: RIGHT CENTRAL MALAR CHEEK
LOCATION DETAILED: RIGHT RADIAL DORSAL HAND
LOCATION DETAILED: RIGHT SUPERIOR MEDIAL MIDBACK
LOCATION DETAILED: LEFT ANTERIOR PROXIMAL THIGH
LOCATION DETAILED: PERIUMBILICAL SKIN
LOCATION DETAILED: LEFT CENTRAL MALAR CHEEK
LOCATION DETAILED: LEFT SUPERIOR MEDIAL UPPER BACK
LOCATION DETAILED: SUPERIOR THORACIC SPINE
LOCATION DETAILED: RIGHT RIB CAGE
LOCATION DETAILED: LEFT PROXIMAL POSTERIOR UPPER ARM
LOCATION DETAILED: RIGHT PROXIMAL DORSAL FOREARM
LOCATION DETAILED: LEFT INFERIOR ANTERIOR NECK
LOCATION DETAILED: LEFT PROXIMAL DORSAL FOREARM
LOCATION DETAILED: MIDDLE STERNUM
LOCATION DETAILED: RIGHT ANTERIOR PROXIMAL THIGH
LOCATION DETAILED: LEFT UPPER CUTANEOUS LIP

## 2019-08-13 NOTE — HPI: FULL BODY SKIN EXAMINATION
How Severe Are Your Spot(S)?: mild
What Type Of Note Output Would You Prefer (Optional)?: Bullet Format
What Is The Reason For Today's Visit?: Full Body Skin Examination with No Concerns
What Is The Reason For Today's Visit? (Being Monitored For X): concerning skin lesions on an annual basis
Additional History: Full body exam. No concerns, history of SCC right anterior leg

## 2019-08-26 ENCOUNTER — HOSPITAL ENCOUNTER (OUTPATIENT)
Dept: LAB | Facility: MEDICAL CENTER | Age: 79
End: 2019-08-26
Attending: NURSE PRACTITIONER
Payer: MEDICARE

## 2019-08-26 LAB
INR PPP: 2.31 (ref 0.87–1.13)
PROTHROMBIN TIME: 26.2 SEC (ref 12–14.6)

## 2019-08-26 PROCEDURE — 85610 PROTHROMBIN TIME: CPT

## 2019-08-26 PROCEDURE — 36415 COLL VENOUS BLD VENIPUNCTURE: CPT

## 2019-08-27 ENCOUNTER — ANTICOAGULATION MONITORING (OUTPATIENT)
Dept: VASCULAR LAB | Facility: MEDICAL CENTER | Age: 79
End: 2019-08-27

## 2019-08-27 DIAGNOSIS — I48.0 PAROXYSMAL A-FIB (HCC): ICD-10-CM

## 2019-08-27 DIAGNOSIS — Z79.01 CHRONIC ANTICOAGULATION: ICD-10-CM

## 2019-08-27 NOTE — PROGRESS NOTES
Anticoagulation Summary  As of 2019    INR goal:   2.0-3.0   TTR:   73.9 % (1.7 y)   INR used for dosin.31 (2019)   Warfarin maintenance plan:   1 mg (1 mg x 1) every day   Weekly warfarin total:   7 mg   No change documented:   Pastor Marin, Pharmacy Intern   Plan last modified:   Soco Rowland, PharmD (2019)   Next INR check:   2019   Target end date:   Indefinite    Indications    Paroxysmal a-fib (CMS-HCC) [I48.0]  Chronic anticoagulation [Z79.01]             Anticoagulation Episode Summary     INR check location:   Anticoagulation Clinic    Preferred lab:       Send INR reminders to:       Comments:   [phone] (H)      Anticoagulation Care Providers     Provider Role Specialty Phone number    Evelio Tejeda M.D. Referring Internal Medicine [phone]    [facility] Responsible  [phone]    Beryl Pang M.D. Responsible Family Medicine [phone]        Anticoagulation Patient Findings    Left patient a message. Current INR 2.31 which is within goal (2.0-3.0). Continue 1 mg every day. Requested patient to contact the clinic for any s/s of unusual bleeding, bruising or any changes to diet or medication. Follow up INR in 4 weeks.     Pastor Marin, Pharmacy Intern

## 2019-09-17 ENCOUNTER — OFFICE VISIT (OUTPATIENT)
Dept: CARDIOLOGY | Facility: MEDICAL CENTER | Age: 79
End: 2019-09-17
Payer: MEDICARE

## 2019-09-17 VITALS
SYSTOLIC BLOOD PRESSURE: 92 MMHG | OXYGEN SATURATION: 94 % | DIASTOLIC BLOOD PRESSURE: 58 MMHG | WEIGHT: 92.04 LBS | BODY MASS INDEX: 16.83 KG/M2 | HEART RATE: 60 BPM

## 2019-09-17 DIAGNOSIS — F17.219 CIGARETTE NICOTINE DEPENDENCE WITH NICOTINE-INDUCED DISORDER: ICD-10-CM

## 2019-09-17 DIAGNOSIS — I10 ESSENTIAL HYPERTENSION, BENIGN: ICD-10-CM

## 2019-09-17 DIAGNOSIS — Z79.01 CHRONIC ANTICOAGULATION: ICD-10-CM

## 2019-09-17 DIAGNOSIS — I25.10 CORONARY ARTERY DISEASE INVOLVING NATIVE CORONARY ARTERY OF NATIVE HEART WITHOUT ANGINA PECTORIS: ICD-10-CM

## 2019-09-17 DIAGNOSIS — E87.6 HYPOKALEMIA: ICD-10-CM

## 2019-09-17 DIAGNOSIS — Z95.5 S/P CORONARY ARTERY STENT PLACEMENT: ICD-10-CM

## 2019-09-17 DIAGNOSIS — R06.02 SHORTNESS OF BREATH: ICD-10-CM

## 2019-09-17 DIAGNOSIS — I48.0 PAROXYSMAL A-FIB (HCC): ICD-10-CM

## 2019-09-17 DIAGNOSIS — E78.5 DYSLIPIDEMIA: ICD-10-CM

## 2019-09-17 DIAGNOSIS — R73.03 PREDIABETES: ICD-10-CM

## 2019-09-17 DIAGNOSIS — Z79.899 HIGH RISK MEDICATION USE: ICD-10-CM

## 2019-09-17 DIAGNOSIS — R62.7 FAILURE TO THRIVE IN ADULT: ICD-10-CM

## 2019-09-17 PROCEDURE — 99214 OFFICE O/P EST MOD 30 MIN: CPT | Performed by: INTERNAL MEDICINE

## 2019-09-17 RX ORDER — SUCRALFATE 1 G/1
1 TABLET ORAL
COMMUNITY
End: 2020-07-15

## 2019-09-17 NOTE — PROGRESS NOTES
"Subjective:   Chief Complaint:   Chief Complaint   Patient presents with   • Atrial Fibrillation       \"Angie\" Bartolome Root is a 79 y.o. female who returns for PAD status post PCI to her left leg in 2008, and more recently diagnosed ischemic colitis, CAD with 2 stents placed in her LAD in 2009, nicotine dependence, dyslipidemia, recurrent C diff colitis, and paroxysmal atrial fibrillation on rhythm control strategy (with sotalol) and now anticoagulated with Warfarin.    Probably had rheumatic fever as a child.  Sister had some type of autoimmune vascular problem, mult stents to her legs.  She had stents for PAD in 1991.  Then having afib with palpitations.  Also having Cps, had angio, 2 stents, then afib went away but also on sotalol.  Prior K replacement caused problems.    On warfarin, [id number] of 3, no TIA/CVA.    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 No Abx due to CDiff.    Echo 2018, normal EF, normal size LA, RVSP 45 mmhg.    LDL 62, HDL 80, on lovastatin, was on another statin, does not know which, no prior problems.  BP low, no HTN.    Still smoking, since age 15, quit a few times, has used Chantix, hypnosis.    Followed for Pulm Nodule, calcified atherosclerosis on CT.    DATA REVIEWED by me:  ECG 5-24-19  Sinus, 56, QTc 449    Echocardiogram, 8/6/2018:  Normal left ventricular systolic function.  Left ventricular ejection fraction is visually estimated to be 60%.   The right ventricle was normal in size and function.  Mild mitral regurgitation.  Mild tricuspid regurgitation.  Estimated right ventricular systolic pressure is 45 mmHg.  LA normal size.    Echocardiogram, 6/27/2016:  Normal left ventricular size, thickness, systolic function EF 60%.   Mitral annular calcification.  Aortic sclerosis without stenosis.   Mild tricuspid regurgitation.  Right ventricular systolic pressure is estimated to be 33 mmHg.   No prior study for comparison\"     Echocardiogram, 11/18/2017:  Prior " "echocardiogram 6/27/2016.Normal left ventricular chamber size.  Left ventricular ejection fraction is visually estimated to be 60%.  Grade II diastolic dysfunction.  Aortic sclerosis without stenosis.  Mitral annular calcification.  Heavy calcification of the posterior mitral valve leaflet.  Mild mitral regurgitation.  Trace tricuspid regurgitation.  Normal pericardium without effusion.  Ascending aorta diameter is 2.4 cm\"     Myocardial perfusion imaging, 8/6/2018:  Normal left ventricular perfusion with no fixed or reversible defects.   Normal left ventricular size, ejection fraction, and wall motion.     CT chest 7-24-19  1.  No change in single small pulmonary nodule in each lower pulmonary lobe since 2017. These nodules appear benign.  2.  No new pulmonary nodules or masses are identified.  3.  Emphysema is again noted.  4.  Limited areas of pulmonary scarring fibrosis and bronchiectasis are again noted.  5.  Calcific atherosclerosis again noted.  6.  Calculi are again identified within each kidney.    Most recent labs:     7-16-19 hgb 15, p 185, n 137, k 3.8, cr 0.83, lfts normal     9-24-18 ldl 62, hdl 80, tg 93, tc 161    Past Medical History:   Diagnosis Date   • Arthritis     BL hands   • CAD (coronary artery disease)    • Cancer (HCC) 1982    melanoma   • COPD (chronic obstructive pulmonary disease) (HCC)    • Croup 3 years old   • Diverticulosis    • Dyslipidemia    • GERD (gastroesophageal reflux disease)    • Hiatal hernia    • High cholesterol    • Hypertension    • Infectious disease 2018    C Diff   • Measles     5 years old   • Paroxysmal A-fib (HCC) 1/20/2016    Symptomatic, on a rhythm control strategy with sotalol 40 mg by mouth twice a day.    • Paroxysmal atrial fibrillation (HCC)    • Prediabetes    • PVD (peripheral vascular disease) (HCC)      Past Surgical History:   Procedure Laterality Date   • FECAL TRANSPLANT N/A 4/9/2018    Procedure: FECAL TRANSPLANT;  Surgeon: Gonzalez Cruz M.D.;  " Location: ENDOSCOPY [facility];  Service: Gastroenterology   • COLONOSCOPY - ENDO N/A 4/9/2018    Procedure: COLONOSCOPY - ENDO;  Surgeon: Gonzalez Cruz M.D.;  Location: ENDOSCOPY [facility];  Service: Gastroenterology   • WIDE EXCISION MELANOMA, LEG, W/POSS.STSG  10/2015    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   • CARDIAC CATH, RIGHT HEART  2008    stent   • OTHER ORTHOPEDIC SURGERY Left 2008    hand repair   • CHOLECYSTECTOMY  1993   • TONSILLECTOMY  1945   • OTHER CARDIAC SURGERY      r heart cath stents   • STENT PLACEMENT      L iliac stent     Family History   Problem Relation Age of Onset   • Hypertension Mother    • Hyperlipidemia Mother    • Cancer Maternal Grandmother         tongue   • Cancer Maternal Uncle         x 3, pancreas   • Cancer Maternal Grandfather         unknown   • Cancer Paternal Grandmother         unknown   • Cancer Paternal Grandfather         unknown   • Diabetes Maternal Uncle    • Heart Disease Maternal Uncle    • Hypertension Sister    • Hyperlipidemia Sister    • Heart Disease Sister    • Alcohol/Drug Sister    • Thyroid Sister    • Psychiatric Illness Neg Hx    • Stroke Neg Hx      Social History     Socioeconomic History   • Marital status:      Spouse name: Not on file   • Number of children: 0   • Years of education: Not on file   • Highest education level: Not on file   Occupational History   • Not on file   Social Needs   • Financial resource strain: Not on file   • Food insecurity:     Worry: Not on file     Inability: Not on file   • Transportation needs:     Medical: Not on file     Non-medical: Not on file   Tobacco Use   • Smoking status: Current Every Day Smoker     Packs/day: 0.25     Years: 60.00     Pack years: 15.00     Types: Cigarettes     Start date: 3/20/1957   • Smokeless tobacco: Never Used   • Tobacco comment: 6 per day   Substance and Sexual Activity   • Alcohol use: No     Alcohol/week: 0.0 oz   • Drug use: No   • Sexual activity: Not  Currently     Partners: Male   Lifestyle   • Physical activity:     Days per week: Not on file     Minutes per session: Not on file   • Stress: Not on file   Relationships   • Social connections:     Talks on phone: Not on file     Gets together: Not on file     Attends Sikhism service: Not on file     Active member of club or organization: Not on file     Attends meetings of clubs or organizations: Not on file     Relationship status: Not on file   • Intimate partner violence:     Fear of current or ex partner: Not on file     Emotionally abused: Not on file     Physically abused: Not on file     Forced sexual activity: Not on file   Other Topics Concern   • Not on file   Social History Narrative    .     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     Allergies   Allergen Reactions   • Codeine Swelling   • Iodine Swelling   • Bee Venom Anaphylaxis   • Chantix [Varenicline]      disoriented   • Ciprofloxacin      Causes C diff, recurrent and very difficult   • Latex Hives   • Pcn [Penicillins] Anaphylaxis and Hives   • Shellfish Allergy      unknown   • Tetanus Antitoxin Swelling     unknown       Current Outpatient Medications   Medication Sig Dispense Refill   • sucralfate (CARAFATE) 1 GM Tab Take 1 g by mouth 4 Times a Day,Before Meals and at Bedtime.     • ipratropium (ATROVENT) 0.02 % Solution 0.2 mg by Nebulization route 4 times a day.     • lactobacillus rhamnosus (CULTURELLE) Cap capsule Take 1 Cap by mouth 3 times a day.     • fluticasone (FLONASE) 50 MCG/ACT nasal spray Spray 1 Spray in nose every day. 16 g 0   • sotalol (BETAPACE) 80 MG Tab Take 0.5 Tabs by mouth 2 times a day. 90 Tab 3   • lovastatin (MEVACOR) 40 MG tablet Take 1 Tab by mouth every evening. 90 Tab 3   • DILTIAZem CD (CARTIA XT) 240 MG CAPSULE SR 24 HR Take 1 Cap by mouth every day. 90 Cap 3   • warfarin (COUMADIN) 1 MG Tab Take 1 Tab by mouth every evening. 90 Tab 3   • alprazolam (XANAX) 0.25 MG Tab Take 0.25 mg by mouth 2  Times a Day.     • Cholecalciferol (VITAMIN D) 2000 UNITS Cap Take 2,000 Units by mouth every morning.       No current facility-administered medications for this visit.        ROS  All others systems reviewed and negative.     Objective:     BP (!) 92/58 (BP Location: Right arm, Patient Position: Sitting, BP Cuff Size: Adult)   Pulse 60   Wt 41.7 kg (92 lb 0.7 oz)   SpO2 94%  Body mass index is 16.83 kg/m².    Physical Exam   General: No acute distress. Well nourished. Appears younger than age.  HEENT: EOM grossly intact, no scleral icterus, no pharyngeal erythema.   Neck:  No JVD, no bruits, trachea midline  CVS: RRR. Normal S1, S2, =S3. No LE edema.  2+ radial pulses, 2+ PT pulses  Resp. Coarse BS bilaterally, prolonged exp phase, Normal respiratory effort.  Abdomen: Soft, NT, no simon hepatomegaly.  MSK/Ext: No clubbing or cyanosis.  Skin: Warm and dry, no rashes.  Neurological: CN III-XII grossly intact. No focal deficits.   Psych: A&O x 3, appropriate affect, good judgement      Assessment:     1. Paroxysmal a-fib (CMS-HCC)     2. Cigarette nicotine dependence with nicotine-induced disorder     3. Coronary artery disease involving native coronary artery of native heart without angina pectoris     4. PCI to her LAD in 2009     5. Dyslipidemia     6. Hypokalemia     7. Chronic anticoagulation     8. Shortness of breath     9. Failure to thrive in adult     10. Prediabetes     11. Essential hypertension, benign     12. High risk medication use  Basic Metabolic Panel    MAGNESIUM       Medical Decision Making:  Today's Assessment / Status / Plan:     -On warfarin, [id number] of 3, no TIA/CVA.  -Needs Mg and K on sotalol  -ECG for QTc yearly  -K and Mg every 6-12 months  -Her last LDL was 62 9-18, not planning to change/update statin unless she has another event so I don't think we need to change the meds, therefore I don't think we need to check lipids.      Return in about 6 months (around 3/17/2020), or Aury  YAMILETH Thomson 6 months, MD 1 year.    It is my pleasure to participate in the care of Ms. Root.  Please do not hesitate to contact me with questions or concerns.    Marilou Carmona MD, [facility]  Cardiologist [facility]    Please note that this dictation was created using voice recognition software. I have made every reasonable attempt to correct obvious errors, but it is possible there are errors of grammar and possibly content that I did not discover before finalizing the note.

## 2019-09-17 NOTE — LETTER
"     [facility]   [address], [address]  [place] [address]  Phone: [phone]  Fax: [phone]              Angie Root  1940    Encounter Date: 9/17/2019    Marilou Carmona M.D.          PROGRESS NOTE:  Subjective:   Chief Complaint:   Chief Complaint   Patient presents with   • Atrial Fibrillation       \"Angie\" Bartolome Root is a 79 y.o. female who returns for PAD status post PCI to her left leg in 2008, and more recently diagnosed ischemic colitis, CAD with 2 stents placed in her LAD in 2009, nicotine dependence, dyslipidemia, recurrent C diff colitis, and paroxysmal atrial fibrillation on rhythm control strategy (with sotalol) and now anticoagulated with Warfarin.    Probably had rheumatic fever as a child.  Sister had some type of autoimmune vascular problem, mult stents to her legs.  She had stents for PAD in 1991.  Then having afib with palpitations.  Also having Cps, had angio, 2 stents, then afib went away but also on sotalol.  Prior K replacement caused problems.    On warfarin, PINQH0yjis of 3, no TIA/CVA.    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 No Abx due to CDiff.    Echo 2018, normal EF, normal size LA, RVSP 45 mmhg.    LDL 62, HDL 80, on lovastatin, was on another statin, does not know which, no prior problems.  BP low, no HTN.    Still smoking, since age 15, quit a few times, has used Chantix, hypnosis.    Followed for Pulm Nodule, calcified atherosclerosis on CT.    DATA REVIEWED by me:  ECG 5-24-19  Sinus, 56, QTc 449    Echocardiogram, 8/6/2018:  Normal left ventricular systolic function.  Left ventricular ejection fraction is visually estimated to be 60%.   The right ventricle was normal in size and function.  Mild mitral regurgitation.  Mild tricuspid regurgitation.  Estimated right ventricular systolic pressure is 45 mmHg.  LA normal size.    Echocardiogram, 6/27/2016:  Normal left ventricular size, thickness, " "systolic function EF 60%.   Mitral annular calcification.  Aortic sclerosis without stenosis.   Mild tricuspid regurgitation.  Right ventricular systolic pressure is estimated to be 33 mmHg.   No prior study for comparison\"     Echocardiogram, 11/18/2017:  Prior echocardiogram 6/27/2016.Normal left ventricular chamber size.  Left ventricular ejection fraction is visually estimated to be 60%.  Grade II diastolic dysfunction.  Aortic sclerosis without stenosis.  Mitral annular calcification.  Heavy calcification of the posterior mitral valve leaflet.  Mild mitral regurgitation.  Trace tricuspid regurgitation.  Normal pericardium without effusion.  Ascending aorta diameter is 2.4 cm\"     Myocardial perfusion imaging, 8/6/2018:  Normal left ventricular perfusion with no fixed or reversible defects.   Normal left ventricular size, ejection fraction, and wall motion.     CT chest 7-24-19  1.  No change in single small pulmonary nodule in each lower pulmonary lobe since 2017. These nodules appear benign.  2.  No new pulmonary nodules or masses are identified.  3.  Emphysema is again noted.  4.  Limited areas of pulmonary scarring fibrosis and bronchiectasis are again noted.  5.  Calcific atherosclerosis again noted.  6.  Calculi are again identified within each kidney.    Most recent labs:     7-16-19 hgb 15, p 185, n 137, k 3.8, cr 0.83, lfts normal     9-24-18 ldl 62, hdl 80, tg 93, tc 161    Past Medical History:   Diagnosis Date   • Arthritis     BL hands   • CAD (coronary artery disease)    • Cancer (HCC) 1982    melanoma   • COPD (chronic obstructive pulmonary disease) (HCC)    • Croup 3 years old   • Diverticulosis    • Dyslipidemia    • GERD (gastroesophageal reflux disease)    • Hiatal hernia    • High cholesterol    • Hypertension    • Infectious disease 2018    C Diff   • Measles     5 years old   • Paroxysmal A-fib (HCC) 1/20/2016    Symptomatic, on a rhythm control strategy with sotalol 40 mg by mouth twice a " day.    • Paroxysmal atrial fibrillation (HCC)    • Prediabetes    • PVD (peripheral vascular disease) (HCC)      Past Surgical History:   Procedure Laterality Date   • FECAL TRANSPLANT N/A 4/9/2018    Procedure: FECAL TRANSPLANT;  Surgeon: Gonzalez Cruz M.D.;  Location: ENDOSCOPY [facility];  Service: Gastroenterology   • COLONOSCOPY - ENDO N/A 4/9/2018    Procedure: COLONOSCOPY - ENDO;  Surgeon: Gnozalez Cruz M.D.;  Location: ENDOSCOPY [facility];  Service: Gastroenterology   • WIDE EXCISION MELANOMA, LEG, W/POSS.STSG  10/2015    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   • CARDIAC CATH, RIGHT HEART  2008    stent   • OTHER ORTHOPEDIC SURGERY Left 2008    hand repair   • CHOLECYSTECTOMY  1993   • TONSILLECTOMY  1945   • OTHER CARDIAC SURGERY      r heart cath stents   • STENT PLACEMENT      L iliac stent     Family History   Problem Relation Age of Onset   • Hypertension Mother    • Hyperlipidemia Mother    • Cancer Maternal Grandmother         tongue   • Cancer Maternal Uncle         x 3, pancreas   • Cancer Maternal Grandfather         unknown   • Cancer Paternal Grandmother         unknown   • Cancer Paternal Grandfather         unknown   • Diabetes Maternal Uncle    • Heart Disease Maternal Uncle    • Hypertension Sister    • Hyperlipidemia Sister    • Heart Disease Sister    • Alcohol/Drug Sister    • Thyroid Sister    • Psychiatric Illness Neg Hx    • Stroke Neg Hx      Social History     Socioeconomic History   • Marital status:      Spouse name: Not on file   • Number of children: 0   • Years of education: Not on file   • Highest education level: Not on file   Occupational History   • Not on file   Social Needs   • Financial resource strain: Not on file   • Food insecurity:     Worry: Not on file     Inability: Not on file   • Transportation needs:     Medical: Not on file     Non-medical: Not on file   Tobacco Use   • Smoking status: Current Every Day Smoker     Packs/day: 0.25      Years: 60.00     Pack years: 15.00     Types: Cigarettes     Start date: 3/20/1957   • Smokeless tobacco: Never Used   • Tobacco comment: 6 per day   Substance and Sexual Activity   • Alcohol use: No     Alcohol/week: 0.0 oz   • Drug use: No   • Sexual activity: Not Currently     Partners: Male   Lifestyle   • Physical activity:     Days per week: Not on file     Minutes per session: Not on file   • Stress: Not on file   Relationships   • Social connections:     Talks on phone: Not on file     Gets together: Not on file     Attends Latter day service: Not on file     Active member of club or organization: Not on file     Attends meetings of clubs or organizations: Not on file     Relationship status: Not on file   • Intimate partner violence:     Fear of current or ex partner: Not on file     Emotionally abused: Not on file     Physically abused: Not on file     Forced sexual activity: Not on file   Other Topics Concern   • Not on file   Social History Narrative    .     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     Allergies   Allergen Reactions   • Codeine Swelling   • Iodine Swelling   • Bee Venom Anaphylaxis   • Chantix [Varenicline]      disoriented   • Ciprofloxacin      Causes C diff, recurrent and very difficult   • Latex Hives   • Pcn [Penicillins] Anaphylaxis and Hives   • Shellfish Allergy      unknown   • Tetanus Antitoxin Swelling     unknown       Current Outpatient Medications   Medication Sig Dispense Refill   • sucralfate (CARAFATE) 1 GM Tab Take 1 g by mouth 4 Times a Day,Before Meals and at Bedtime.     • ipratropium (ATROVENT) 0.02 % Solution 0.2 mg by Nebulization route 4 times a day.     • lactobacillus rhamnosus (CULTURELLE) Cap capsule Take 1 Cap by mouth 3 times a day.     • fluticasone (FLONASE) 50 MCG/ACT nasal spray Spray 1 Spray in nose every day. 16 g 0   • sotalol (BETAPACE) 80 MG Tab Take 0.5 Tabs by mouth 2 times a day. 90 Tab 3   • lovastatin (MEVACOR) 40 MG tablet Take 1 Tab  by mouth every evening. 90 Tab 3   • DILTIAZem CD (CARTIA XT) 240 MG CAPSULE SR 24 HR Take 1 Cap by mouth every day. 90 Cap 3   • warfarin (COUMADIN) 1 MG Tab Take 1 Tab by mouth every evening. 90 Tab 3   • alprazolam (XANAX) 0.25 MG Tab Take 0.25 mg by mouth 2 Times a Day.     • Cholecalciferol (VITAMIN D) 2000 UNITS Cap Take 2,000 Units by mouth every morning.       No current facility-administered medications for this visit.        ROS  All others systems reviewed and negative.     Objective:     BP (!) 92/58 (BP Location: Right arm, Patient Position: Sitting, BP Cuff Size: Adult)   Pulse 60   Wt 41.7 kg (92 lb 0.7 oz)   SpO2 94%  Body mass index is 16.83 kg/m².    Physical Exam   General: No acute distress. Well nourished. Appears younger than age.  HEENT: EOM grossly intact, no scleral icterus, no pharyngeal erythema.   Neck:  No JVD, no bruits, trachea midline  CVS: RRR. Normal S1, S2, =S3. No LE edema.  2+ radial pulses, 2+ PT pulses  Resp. Coarse BS bilaterally, prolonged exp phase, Normal respiratory effort.  Abdomen: Soft, NT, no simon hepatomegaly.  MSK/Ext: No clubbing or cyanosis.  Skin: Warm and dry, no rashes.  Neurological: CN III-XII grossly intact. No focal deficits.   Psych: A&O x 3, appropriate affect, good judgement      Assessment:     1. Paroxysmal a-fib (CMS-HCC)     2. Cigarette nicotine dependence with nicotine-induced disorder     3. Coronary artery disease involving native coronary artery of native heart without angina pectoris     4. PCI to her LAD in 2009     5. Dyslipidemia     6. Hypokalemia     7. Chronic anticoagulation     8. Shortness of breath     9. Failure to thrive in adult     10. Prediabetes     11. Essential hypertension, benign     12. High risk medication use  Basic Metabolic Panel    MAGNESIUM       Medical Decision Making:  Today's Assessment / Status / Plan:     -On warfarin, [id number] of 3, no TIA/CVA.  -Needs Mg and K on sotalol  -ECG for QTc yearly  -K and Mg  every 6-12 months  -Her last LDL was 62 9-18, not planning to change/update statin unless she has another event so I don't think we need to change the meds, therefore I don't think we need to check lipids.      Return in about 6 months (around 3/17/2020), or YAMILETH Gentile 6 months, MD 1 year.    It is my pleasure to participate in the care of Ms. Root.  Please do not hesitate to contact me with questions or concerns.    Marilou Carmona MD, [facility]  Cardiologist [facility]    Please note that this dictation was created using voice recognition software. I have made every reasonable attempt to correct obvious errors, but it is possible there are errors of grammar and possibly content that I did not discover before finalizing the note.      Beryl Pang M.D.  [address]  [address]  [place] [address]  VIA Facsimile: [phone]

## 2019-09-21 ENCOUNTER — HOSPITAL ENCOUNTER (OUTPATIENT)
Dept: RADIOLOGY | Facility: MEDICAL CENTER | Age: 79
End: 2019-09-21
Attending: FAMILY MEDICINE
Payer: MEDICARE

## 2019-09-21 DIAGNOSIS — Z12.31 SCREENING MAMMOGRAM, ENCOUNTER FOR: ICD-10-CM

## 2019-09-21 PROCEDURE — 77063 BREAST TOMOSYNTHESIS BI: CPT

## 2019-09-24 ENCOUNTER — HOSPITAL ENCOUNTER (OUTPATIENT)
Dept: LAB | Facility: MEDICAL CENTER | Age: 79
End: 2019-09-24
Attending: NURSE PRACTITIONER
Payer: MEDICARE

## 2019-09-24 LAB
INR PPP: 2.69 (ref 0.87–1.13)
PROTHROMBIN TIME: 29.5 SEC (ref 12–14.6)

## 2019-09-24 PROCEDURE — 85610 PROTHROMBIN TIME: CPT

## 2019-09-24 PROCEDURE — 36415 COLL VENOUS BLD VENIPUNCTURE: CPT

## 2019-09-25 ENCOUNTER — ANTICOAGULATION MONITORING (OUTPATIENT)
Dept: VASCULAR LAB | Facility: MEDICAL CENTER | Age: 79
End: 2019-09-25

## 2019-09-25 DIAGNOSIS — I48.0 PAROXYSMAL A-FIB (HCC): ICD-10-CM

## 2019-09-25 DIAGNOSIS — Z79.01 CHRONIC ANTICOAGULATION: ICD-10-CM

## 2019-09-25 NOTE — PROGRESS NOTES
Anticoagulation Summary  As of 2019    INR goal:   2.0-3.0   TTR:   75.1 % (1.8 y)   INR used for dosin.69 (2019)   Warfarin maintenance plan:   1 mg (1 mg x 1) every day   Weekly warfarin total:   7 mg   Plan last modified:   Soco Rowland, PharmD (2019)   Next INR check:      Target end date:   Indefinite    Indications    Paroxysmal a-fib (CMS-HCC) [I48.0]  Chronic anticoagulation [Z79.01]             Anticoagulation Episode Summary     INR check location:   Anticoagulation Clinic    Preferred lab:       Send INR reminders to:       Comments:   [phone] (H)      Anticoagulation Care Providers     Provider Role Specialty Phone number    Evelio Tejeda M.D. Referring Internal Medicine [phone]    [facility] Anticoagulation Services Responsible  [phone]    Beryl Pang M.D. Responsible Family Medicine [phone]        Anticoagulation Patient Findings      Left voicemail message to report a therapeutic INR of 2.69    Pt to continue with current warfarin dosing regimen. Requested pt contact the clinic for any s/s of unusual bleeding, bruising, clotting or any changes to diet or medication.    FU INR in 4 week(s).    Consuelo Carter, Pharmacy Intern

## 2019-10-08 ENCOUNTER — ANTICOAGULATION MONITORING (OUTPATIENT)
Dept: MEDICAL GROUP | Facility: PHYSICIAN GROUP | Age: 79
End: 2019-10-08

## 2019-10-08 DIAGNOSIS — Z79.01 CHRONIC ANTICOAGULATION: ICD-10-CM

## 2019-10-08 DIAGNOSIS — I48.0 PAROXYSMAL A-FIB (HCC): ICD-10-CM

## 2019-10-08 NOTE — PROGRESS NOTES
Anticoagulation clinic    Reminder voice message for patient regarding getting INR done ASAP for anticoagulation clinic.     Pieter Covarrubias, PharmD

## 2019-10-22 ENCOUNTER — HOSPITAL ENCOUNTER (OUTPATIENT)
Dept: LAB | Facility: MEDICAL CENTER | Age: 79
End: 2019-10-22
Attending: NURSE PRACTITIONER
Payer: MEDICARE

## 2019-10-22 PROCEDURE — 36415 COLL VENOUS BLD VENIPUNCTURE: CPT

## 2019-10-22 PROCEDURE — 85610 PROTHROMBIN TIME: CPT

## 2019-10-23 ENCOUNTER — ANTICOAGULATION MONITORING (OUTPATIENT)
Dept: VASCULAR LAB | Facility: MEDICAL CENTER | Age: 79
End: 2019-10-23

## 2019-10-23 DIAGNOSIS — I48.0 PAROXYSMAL A-FIB (HCC): ICD-10-CM

## 2019-10-23 DIAGNOSIS — Z79.01 CHRONIC ANTICOAGULATION: ICD-10-CM

## 2019-10-23 LAB
INR PPP: 2.73 (ref 0.87–1.13)
PROTHROMBIN TIME: 29.9 SEC (ref 12–14.6)

## 2019-10-23 NOTE — PROGRESS NOTES
Anticoagulation Summary  As of 10/23/2019    INR goal:   2.0-3.0   TTR:   76.2 % (1.8 y)   INR used for dosin.73 (10/22/2019)   Warfarin maintenance plan:   1 mg (1 mg x 1) every day   Weekly warfarin total:   7 mg   No change documented:   Beck Bowman, Med Ass't   Plan last modified:   Soco Rowland, PharmD (2019)   Next INR check:   2019   Target end date:   Indefinite    Indications    Paroxysmal a-fib (CMS-HCC) [I48.0]  Chronic anticoagulation [Z79.01]             Anticoagulation Episode Summary     INR check location:   Anticoagulation Clinic    Preferred lab:       Send INR reminders to:       Comments:   [phone] (H)      Anticoagulation Care Providers     Provider Role Specialty Phone number    Evelio Tejeda M.D. Referring Internal Medicine [phone]    [facility] Anticoagulation Services Responsible  [phone]    Beryl Pang M.D. Responsible Family Medicine [phone]        Anticoagulation Patient Findings  Patient Findings     Negatives:   Signs/symptoms of thrombosis, Signs/symptoms of bleeding, Laboratory test error suspected, Change in health, Change in alcohol use, Change in activity, Upcoming invasive procedure, Emergency department visit, Upcoming dental procedure, Missed doses, Extra doses, Change in medications, Change in diet/appetite, Hospital admission, Bruising, Other complaints        Left voicemail message to report therapeutic INR of 2.7.  Patient to continue with current warfarin dosing regimen. Requested pt contact the clinic for any change to diet or medication, and to report any signs or symptoms of bleeding, bruising or clotting.  Pt to follow up in 4 weeks.    Beck ARRIOLA'Rayjesse, Med Ass't    I have reviewed and am in agreement with the above stated plan on 10-23-19.  Major Hughes, YaneliD, BCACP

## 2019-10-24 DIAGNOSIS — Z79.01 CHRONIC ANTICOAGULATION: ICD-10-CM

## 2019-11-12 DIAGNOSIS — Z79.01 CHRONIC ANTICOAGULATION: ICD-10-CM

## 2019-11-19 ENCOUNTER — HOSPITAL ENCOUNTER (OUTPATIENT)
Dept: LAB | Facility: MEDICAL CENTER | Age: 79
End: 2019-11-19
Attending: NURSE PRACTITIONER
Payer: MEDICARE

## 2019-11-19 ENCOUNTER — ANTICOAGULATION MONITORING (OUTPATIENT)
Dept: VASCULAR LAB | Facility: MEDICAL CENTER | Age: 79
End: 2019-11-19

## 2019-11-19 DIAGNOSIS — I48.0 PAROXYSMAL A-FIB (HCC): ICD-10-CM

## 2019-11-19 DIAGNOSIS — Z79.01 CHRONIC ANTICOAGULATION: ICD-10-CM

## 2019-11-19 LAB
INR PPP: 2.16 (ref 0.87–1.13)
PROTHROMBIN TIME: 24.8 SEC (ref 12–14.6)

## 2019-11-19 PROCEDURE — 36415 COLL VENOUS BLD VENIPUNCTURE: CPT

## 2019-11-19 PROCEDURE — 85610 PROTHROMBIN TIME: CPT

## 2019-11-19 NOTE — PROGRESS NOTES
Anticoagulation Summary  As of 2019    INR goal:   2.0-3.0   TTR:   77.1 % (1.9 y)   INR used for dosin.16 (2019)   Warfarin maintenance plan:   1 mg (1 mg x 1) every day   Weekly warfarin total:   7 mg   Plan last modified:   Soco Rowland, PharmD (2019)   Next INR check:   2019   Target end date:   Indefinite    Indications    Paroxysmal a-fib (CMS-HCC) [I48.0]  Chronic anticoagulation [Z79.01]             Anticoagulation Episode Summary     INR check location:   Anticoagulation Clinic    Preferred lab:       Send INR reminders to:       Comments:   [phone] (H)      Anticoagulation Care Providers     Provider Role Specialty Phone number    Evelio Tejeda M.D. Referring Internal Medicine [phone]    [facility] Anticoagulation Services Responsible  [phone]    Beryl Pang M.D. Responsible Family Medicine [phone]        Anticoagulation Patient Findings    Left voicemail message to report a therapeutic INR of 2.16.     Pt to continue with current warfarin dosing regimen. Requested pt contact the clinic for any s/s of unusual bleeding, bruising, clotting or any changes to diet or medication.    FU INR in 6 week(s).    Soco Rowland, PharmD

## 2019-12-02 ENCOUNTER — PATIENT MESSAGE (OUTPATIENT)
Dept: CARDIOLOGY | Facility: MEDICAL CENTER | Age: 79
End: 2019-12-02

## 2019-12-02 RX ORDER — WARFARIN SODIUM 1 MG/1
1 TABLET ORAL EVERY EVENING
Qty: 90 TAB | Refills: 3 | Status: SHIPPED | OUTPATIENT
Start: 2019-12-02 | End: 2019-12-12

## 2019-12-05 DIAGNOSIS — E78.5 DYSLIPIDEMIA: ICD-10-CM

## 2019-12-06 RX ORDER — ATORVASTATIN CALCIUM 20 MG/1
20 TABLET, FILM COATED ORAL DAILY
Qty: 90 TAB | Refills: 3 | Status: SHIPPED | OUTPATIENT
Start: 2019-12-06 | End: 2020-01-02 | Stop reason: SDUPTHER

## 2019-12-12 ENCOUNTER — APPOINTMENT (OUTPATIENT)
Dept: RADIOLOGY | Facility: MEDICAL CENTER | Age: 79
End: 2019-12-12
Attending: EMERGENCY MEDICINE
Payer: MEDICARE

## 2019-12-12 ENCOUNTER — HOSPITAL ENCOUNTER (OUTPATIENT)
Facility: MEDICAL CENTER | Age: 79
End: 2019-12-14
Attending: EMERGENCY MEDICINE | Admitting: HOSPITALIST
Payer: MEDICARE

## 2019-12-12 ENCOUNTER — OFFICE VISIT (OUTPATIENT)
Dept: URGENT CARE | Facility: PHYSICIAN GROUP | Age: 79
End: 2019-12-12
Payer: MEDICARE

## 2019-12-12 VITALS
RESPIRATION RATE: 16 BRPM | HEART RATE: 62 BPM | WEIGHT: 95.6 LBS | DIASTOLIC BLOOD PRESSURE: 54 MMHG | BODY MASS INDEX: 17.59 KG/M2 | TEMPERATURE: 98.3 F | SYSTOLIC BLOOD PRESSURE: 106 MMHG | HEIGHT: 62 IN | OXYGEN SATURATION: 94 %

## 2019-12-12 DIAGNOSIS — Z86.19 HISTORY OF CLOSTRIDIOIDES DIFFICILE COLITIS: ICD-10-CM

## 2019-12-12 DIAGNOSIS — K52.9 COLITIS: ICD-10-CM

## 2019-12-12 DIAGNOSIS — R10.31 RLQ ABDOMINAL PAIN: ICD-10-CM

## 2019-12-12 DIAGNOSIS — Z91.041 ALLERGY TO IODINATED CONTRAST: ICD-10-CM

## 2019-12-12 DIAGNOSIS — R10.31 RIGHT LOWER QUADRANT ABDOMINAL PAIN: ICD-10-CM

## 2019-12-12 DIAGNOSIS — F17.219 CIGARETTE NICOTINE DEPENDENCE WITH NICOTINE-INDUCED DISORDER: ICD-10-CM

## 2019-12-12 DIAGNOSIS — Z79.01 CHRONIC ANTICOAGULATION: ICD-10-CM

## 2019-12-12 DIAGNOSIS — R10.0 ACUTE ABDOMEN: ICD-10-CM

## 2019-12-12 DIAGNOSIS — K55.9 ISCHEMIC COLITIS (HCC): ICD-10-CM

## 2019-12-12 DIAGNOSIS — R79.89 ELEVATED LFTS: ICD-10-CM

## 2019-12-12 DIAGNOSIS — I48.0 PAROXYSMAL A-FIB (HCC): ICD-10-CM

## 2019-12-12 LAB
ALBUMIN SERPL BCP-MCNC: 4.2 G/DL (ref 3.2–4.9)
ALBUMIN/GLOB SERPL: 1.6 G/DL
ALP SERPL-CCNC: 111 U/L (ref 30–99)
ALT SERPL-CCNC: 188 U/L (ref 2–50)
ANION GAP SERPL CALC-SCNC: 5 MMOL/L (ref 0–11.9)
APPEARANCE UR: CLEAR
APPEARANCE UR: NORMAL
APTT PPP: 48.8 SEC (ref 24.7–36)
AST SERPL-CCNC: 200 U/L (ref 12–45)
BACTERIA #/AREA URNS HPF: NEGATIVE /HPF
BASOPHILS # BLD AUTO: 0.8 % (ref 0–1.8)
BASOPHILS # BLD: 0.06 K/UL (ref 0–0.12)
BILIRUB SERPL-MCNC: 0.5 MG/DL (ref 0.1–1.5)
BILIRUB UR QL STRIP.AUTO: NEGATIVE
BILIRUB UR STRIP-MCNC: NEGATIVE MG/DL
BUN SERPL-MCNC: 17 MG/DL (ref 8–22)
CALCIUM SERPL-MCNC: 10.1 MG/DL (ref 8.5–10.5)
CHLORIDE SERPL-SCNC: 104 MMOL/L (ref 96–112)
CO2 SERPL-SCNC: 27 MMOL/L (ref 20–33)
COLOR UR AUTO: NORMAL
COLOR UR: YELLOW
CREAT SERPL-MCNC: 0.87 MG/DL (ref 0.5–1.4)
EOSINOPHIL # BLD AUTO: 0.17 K/UL (ref 0–0.51)
EOSINOPHIL NFR BLD: 2.1 % (ref 0–6.9)
EPI CELLS #/AREA URNS HPF: NEGATIVE /HPF
ERYTHROCYTE [DISTWIDTH] IN BLOOD BY AUTOMATED COUNT: 46.7 FL (ref 35.9–50)
GLOBULIN SER CALC-MCNC: 2.7 G/DL (ref 1.9–3.5)
GLUCOSE SERPL-MCNC: 89 MG/DL (ref 65–99)
GLUCOSE UR STRIP.AUTO-MCNC: NEGATIVE MG/DL
GLUCOSE UR STRIP.AUTO-MCNC: NEGATIVE MG/DL
HCT VFR BLD AUTO: 47.3 % (ref 37–47)
HGB BLD-MCNC: 15.2 G/DL (ref 12–16)
HYALINE CASTS #/AREA URNS LPF: NORMAL /LPF
IMM GRANULOCYTES # BLD AUTO: 0.03 K/UL (ref 0–0.11)
IMM GRANULOCYTES NFR BLD AUTO: 0.4 % (ref 0–0.9)
INR PPP: 2.81 (ref 0.87–1.13)
KETONES UR STRIP.AUTO-MCNC: NEGATIVE MG/DL
KETONES UR STRIP.AUTO-MCNC: NEGATIVE MG/DL
LEUKOCYTE ESTERASE UR QL STRIP.AUTO: ABNORMAL
LEUKOCYTE ESTERASE UR QL STRIP.AUTO: NORMAL
LIPASE SERPL-CCNC: 33 U/L (ref 11–82)
LYMPHOCYTES # BLD AUTO: 3.03 K/UL (ref 1–4.8)
LYMPHOCYTES NFR BLD: 38 % (ref 22–41)
MAGNESIUM SERPL-MCNC: 1.8 MG/DL (ref 1.5–2.5)
MCH RBC QN AUTO: 29.9 PG (ref 27–33)
MCHC RBC AUTO-ENTMCNC: 32.1 G/DL (ref 33.6–35)
MCV RBC AUTO: 93.1 FL (ref 81.4–97.8)
MICRO URNS: ABNORMAL
MONOCYTES # BLD AUTO: 0.79 K/UL (ref 0–0.85)
MONOCYTES NFR BLD AUTO: 9.9 % (ref 0–13.4)
NEUTROPHILS # BLD AUTO: 3.9 K/UL (ref 2–7.15)
NEUTROPHILS NFR BLD: 48.8 % (ref 44–72)
NITRITE UR QL STRIP.AUTO: NEGATIVE
NITRITE UR QL STRIP.AUTO: NEGATIVE
NRBC # BLD AUTO: 0 K/UL
NRBC BLD-RTO: 0 /100 WBC
PH UR STRIP.AUTO: 5.5 [PH] (ref 5–8)
PH UR STRIP.AUTO: 6 [PH] (ref 5–8)
PHOSPHATE SERPL-MCNC: 3.5 MG/DL (ref 2.5–4.5)
PLATELET # BLD AUTO: 176 K/UL (ref 164–446)
PMV BLD AUTO: 10.1 FL (ref 9–12.9)
POTASSIUM SERPL-SCNC: 4.2 MMOL/L (ref 3.6–5.5)
PROT SERPL-MCNC: 6.9 G/DL (ref 6–8.2)
PROT UR QL STRIP: NEGATIVE MG/DL
PROT UR QL STRIP: NORMAL MG/DL
PROTHROMBIN TIME: 30.6 SEC (ref 12–14.6)
RBC # BLD AUTO: 5.08 M/UL (ref 4.2–5.4)
RBC # URNS HPF: NORMAL /HPF
RBC UR QL AUTO: ABNORMAL
RBC UR QL AUTO: NORMAL
SODIUM SERPL-SCNC: 136 MMOL/L (ref 135–145)
SP GR UR STRIP.AUTO: 1.03
SP GR UR STRIP.AUTO: 1.03
UROBILINOGEN UR STRIP-MCNC: 1 MG/DL
UROBILINOGEN UR STRIP.AUTO-MCNC: 1 MG/DL
WBC # BLD AUTO: 8 K/UL (ref 4.8–10.8)
WBC #/AREA URNS HPF: NORMAL /HPF

## 2019-12-12 PROCEDURE — 83735 ASSAY OF MAGNESIUM: CPT

## 2019-12-12 PROCEDURE — 36415 COLL VENOUS BLD VENIPUNCTURE: CPT

## 2019-12-12 PROCEDURE — 700102 HCHG RX REV CODE 250 W/ 637 OVERRIDE(OP): Performed by: HOSPITALIST

## 2019-12-12 PROCEDURE — 74177 CT ABD & PELVIS W/CONTRAST: CPT

## 2019-12-12 PROCEDURE — 99285 EMERGENCY DEPT VISIT HI MDM: CPT

## 2019-12-12 PROCEDURE — 700111 HCHG RX REV CODE 636 W/ 250 OVERRIDE (IP): Performed by: EMERGENCY MEDICINE

## 2019-12-12 PROCEDURE — 700102 HCHG RX REV CODE 250 W/ 637 OVERRIDE(OP): Performed by: EMERGENCY MEDICINE

## 2019-12-12 PROCEDURE — G0378 HOSPITAL OBSERVATION PER HR: HCPCS

## 2019-12-12 PROCEDURE — 83690 ASSAY OF LIPASE: CPT

## 2019-12-12 PROCEDURE — 96374 THER/PROPH/DIAG INJ IV PUSH: CPT

## 2019-12-12 PROCEDURE — 85730 THROMBOPLASTIN TIME PARTIAL: CPT

## 2019-12-12 PROCEDURE — 81001 URINALYSIS AUTO W/SCOPE: CPT

## 2019-12-12 PROCEDURE — 80053 COMPREHEN METABOLIC PANEL: CPT

## 2019-12-12 PROCEDURE — A9270 NON-COVERED ITEM OR SERVICE: HCPCS | Performed by: HOSPITALIST

## 2019-12-12 PROCEDURE — 99219 PR INITIAL OBSERVATION CARE,LEVL II: CPT | Performed by: HOSPITALIST

## 2019-12-12 PROCEDURE — 85025 COMPLETE CBC W/AUTO DIFF WBC: CPT

## 2019-12-12 PROCEDURE — 96375 TX/PRO/DX INJ NEW DRUG ADDON: CPT

## 2019-12-12 PROCEDURE — A9270 NON-COVERED ITEM OR SERVICE: HCPCS | Performed by: EMERGENCY MEDICINE

## 2019-12-12 PROCEDURE — 81002 URINALYSIS NONAUTO W/O SCOPE: CPT | Performed by: PHYSICIAN ASSISTANT

## 2019-12-12 PROCEDURE — 99214 OFFICE O/P EST MOD 30 MIN: CPT | Performed by: PHYSICIAN ASSISTANT

## 2019-12-12 PROCEDURE — 700117 HCHG RX CONTRAST REV CODE 255: Performed by: EMERGENCY MEDICINE

## 2019-12-12 PROCEDURE — 84100 ASSAY OF PHOSPHORUS: CPT

## 2019-12-12 PROCEDURE — 85610 PROTHROMBIN TIME: CPT

## 2019-12-12 RX ORDER — ALPRAZOLAM 0.25 MG/1
0.25 TABLET ORAL 2 TIMES DAILY PRN
Status: DISCONTINUED | OUTPATIENT
Start: 2019-12-12 | End: 2019-12-14 | Stop reason: HOSPADM

## 2019-12-12 RX ORDER — DILTIAZEM HYDROCHLORIDE 240 MG/1
240 CAPSULE, COATED, EXTENDED RELEASE ORAL DAILY
COMMUNITY
End: 2019-12-23 | Stop reason: SDUPTHER

## 2019-12-12 RX ORDER — SOTALOL HYDROCHLORIDE 80 MG/1
40 TABLET ORAL 2 TIMES DAILY
Status: DISCONTINUED | OUTPATIENT
Start: 2019-12-12 | End: 2019-12-14 | Stop reason: HOSPADM

## 2019-12-12 RX ORDER — METHYLPREDNISOLONE SODIUM SUCCINATE 125 MG/2ML
125 INJECTION, POWDER, LYOPHILIZED, FOR SOLUTION INTRAMUSCULAR; INTRAVENOUS ONCE
Status: COMPLETED | OUTPATIENT
Start: 2019-12-12 | End: 2019-12-12

## 2019-12-12 RX ORDER — SULFAMETHOXAZOLE AND TRIMETHOPRIM 800; 160 MG/1; MG/1
1 TABLET ORAL EVERY 12 HOURS
Status: DISCONTINUED | OUTPATIENT
Start: 2019-12-12 | End: 2019-12-13

## 2019-12-12 RX ORDER — METRONIDAZOLE 500 MG/1
500 TABLET ORAL EVERY 8 HOURS
Status: DISCONTINUED | OUTPATIENT
Start: 2019-12-12 | End: 2019-12-12

## 2019-12-12 RX ORDER — WARFARIN SODIUM 1 MG/1
1 TABLET ORAL EVERY EVENING
COMMUNITY
End: 2020-02-28

## 2019-12-12 RX ORDER — ASCORBIC ACID 500 MG
500 TABLET ORAL DAILY
Status: DISCONTINUED | OUTPATIENT
Start: 2019-12-13 | End: 2019-12-14 | Stop reason: HOSPADM

## 2019-12-12 RX ORDER — ONDANSETRON 2 MG/ML
4 INJECTION INTRAMUSCULAR; INTRAVENOUS EVERY 4 HOURS PRN
Status: DISCONTINUED | OUTPATIENT
Start: 2019-12-12 | End: 2019-12-14 | Stop reason: HOSPADM

## 2019-12-12 RX ORDER — ALBUTEROL SULFATE 90 UG/1
2 AEROSOL, METERED RESPIRATORY (INHALATION) EVERY 6 HOURS PRN
Status: SHIPPED | COMMUNITY
End: 2021-10-20

## 2019-12-12 RX ORDER — POLYETHYLENE GLYCOL 3350 17 G/17G
1 POWDER, FOR SOLUTION ORAL
Status: DISCONTINUED | OUTPATIENT
Start: 2019-12-12 | End: 2019-12-12

## 2019-12-12 RX ORDER — PANTOPRAZOLE SODIUM 40 MG/1
40 TABLET, DELAYED RELEASE ORAL DAILY
COMMUNITY
End: 2020-07-15

## 2019-12-12 RX ORDER — SIMVASTATIN 40 MG
40 TABLET ORAL NIGHTLY
Status: DISCONTINUED | OUTPATIENT
Start: 2019-12-12 | End: 2019-12-12

## 2019-12-12 RX ORDER — ALBUTEROL SULFATE 90 UG/1
2 AEROSOL, METERED RESPIRATORY (INHALATION) EVERY 6 HOURS PRN
Status: DISCONTINUED | OUTPATIENT
Start: 2019-12-12 | End: 2019-12-14 | Stop reason: HOSPADM

## 2019-12-12 RX ORDER — ASCORBIC ACID 500 MG
1000 TABLET ORAL DAILY
COMMUNITY

## 2019-12-12 RX ORDER — SOTALOL HYDROCHLORIDE 80 MG/1
40 TABLET ORAL 2 TIMES DAILY
COMMUNITY
End: 2019-12-23 | Stop reason: SDUPTHER

## 2019-12-12 RX ORDER — BISACODYL 10 MG
10 SUPPOSITORY, RECTAL RECTAL
Status: DISCONTINUED | OUTPATIENT
Start: 2019-12-12 | End: 2019-12-12

## 2019-12-12 RX ORDER — LACTOBACILLUS RHAMNOSUS GG 10B CELL
1 CAPSULE ORAL DAILY
Status: DISCONTINUED | OUTPATIENT
Start: 2019-12-13 | End: 2019-12-14 | Stop reason: HOSPADM

## 2019-12-12 RX ORDER — DILTIAZEM HYDROCHLORIDE 240 MG/1
240 CAPSULE, COATED, EXTENDED RELEASE ORAL DAILY
Status: DISCONTINUED | OUTPATIENT
Start: 2019-12-13 | End: 2019-12-14 | Stop reason: HOSPADM

## 2019-12-12 RX ORDER — ONDANSETRON 4 MG/1
4 TABLET, ORALLY DISINTEGRATING ORAL EVERY 4 HOURS PRN
Status: DISCONTINUED | OUTPATIENT
Start: 2019-12-12 | End: 2019-12-14 | Stop reason: HOSPADM

## 2019-12-12 RX ORDER — SULFAMETHOXAZOLE AND TRIMETHOPRIM 800; 160 MG/1; MG/1
1 TABLET ORAL ONCE
Status: COMPLETED | OUTPATIENT
Start: 2019-12-12 | End: 2019-12-12

## 2019-12-12 RX ORDER — TRAMADOL HYDROCHLORIDE 50 MG/1
50 TABLET ORAL EVERY 6 HOURS PRN
Status: DISCONTINUED | OUTPATIENT
Start: 2019-12-12 | End: 2019-12-14 | Stop reason: HOSPADM

## 2019-12-12 RX ORDER — WARFARIN SODIUM 1 MG/1
1 TABLET ORAL DAILY
Status: DISCONTINUED | OUTPATIENT
Start: 2019-12-12 | End: 2019-12-13

## 2019-12-12 RX ORDER — DIPHENHYDRAMINE HYDROCHLORIDE 50 MG/ML
12.5 INJECTION INTRAMUSCULAR; INTRAVENOUS ONCE
Status: COMPLETED | OUTPATIENT
Start: 2019-12-12 | End: 2019-12-12

## 2019-12-12 RX ORDER — AMOXICILLIN 250 MG
2 CAPSULE ORAL 2 TIMES DAILY
Status: DISCONTINUED | OUTPATIENT
Start: 2019-12-12 | End: 2019-12-12

## 2019-12-12 RX ORDER — SIMVASTATIN 40 MG
40 TABLET ORAL NIGHTLY
COMMUNITY
End: 2019-12-20 | Stop reason: SDUPTHER

## 2019-12-12 RX ADMIN — IOHEXOL 85 ML: 350 INJECTION, SOLUTION INTRAVENOUS at 14:44

## 2019-12-12 RX ADMIN — METHYLPREDNISOLONE SODIUM SUCCINATE 125 MG: 125 INJECTION, POWDER, FOR SOLUTION INTRAMUSCULAR; INTRAVENOUS at 14:02

## 2019-12-12 RX ADMIN — SOTALOL HYDROCHLORIDE 40 MG: 80 TABLET ORAL at 18:15

## 2019-12-12 RX ADMIN — MELATONIN 2000 UNITS: at 18:14

## 2019-12-12 RX ADMIN — SULFAMETHOXAZOLE AND TRIMETHOPRIM 1 TABLET: 800; 160 TABLET ORAL at 15:54

## 2019-12-12 RX ADMIN — WARFARIN SODIUM 1 MG: 1 TABLET ORAL at 18:16

## 2019-12-12 RX ADMIN — DIPHENHYDRAMINE HYDROCHLORIDE 12.5 MG: 50 INJECTION INTRAMUSCULAR; INTRAVENOUS at 14:02

## 2019-12-12 ASSESSMENT — ENCOUNTER SYMPTOMS
MYALGIAS: 0
BRUISES/BLEEDS EASILY: 0
CHILLS: 0
NECK PAIN: 0
TINGLING: 0
ABDOMINAL PAIN: 1
BLOOD IN STOOL: 0
NAUSEA: 0
BLURRED VISION: 0
SINUS PAIN: 0
FLANK PAIN: 0
DIARRHEA: 0
SPUTUM PRODUCTION: 0
FLANK PAIN: 0
CHILLS: 0
DEPRESSION: 0
DIAPHORESIS: 0
BLOOD IN STOOL: 0
HEARTBURN: 0
ABDOMINAL PAIN: 1
DIZZINESS: 0
DOUBLE VISION: 0
FEVER: 0
WHEEZING: 0
EYE PAIN: 0
FALLS: 0
NAUSEA: 0
HEMOPTYSIS: 0
MYALGIAS: 0
ORTHOPNEA: 0
CLAUDICATION: 0
CONSTIPATION: 0
HEARTBURN: 0
SHORTNESS OF BREATH: 0
PALPITATIONS: 0
VOMITING: 0
COUGH: 0
FEVER: 0
HEADACHES: 0
TREMORS: 0
PHOTOPHOBIA: 0
VOMITING: 0
WEAKNESS: 0
SORE THROAT: 0
HALLUCINATIONS: 0
CONSTIPATION: 0
DIARRHEA: 0
STRIDOR: 0
PND: 0
BACK PAIN: 0
POLYDIPSIA: 0

## 2019-12-12 ASSESSMENT — CHA2DS2 SCORE
VASCULAR DISEASE: YES
CHA2DS2 VASC SCORE: 4
HYPERTENSION: NO
AGE 65 TO 74: NO
DIABETES: NO
SEX: FEMALE
AGE 75 OR GREATER: YES
PRIOR STROKE OR TIA OR THROMBOEMBOLISM: NO
CHF OR LEFT VENTRICULAR DYSFUNCTION: NO

## 2019-12-12 ASSESSMENT — LIFESTYLE VARIABLES: SUBSTANCE_ABUSE: 0

## 2019-12-12 NOTE — H&P
Hospital Medicine History & Physical Note    Date of Service  12/12/2019    Primary Care Physician  Beryl Pang M.D.    Consultants  None    Code Status  Full    Chief Complaint  Right lower quadrant abdominal pain    History of Presenting Illness  79 y.o. female who presented 12/12/2019 with past medical history of C. difficile, status post fecal transplant, COPD, atrial fibrillation, peripheral vascular disease, stroke with right lower quadrant abdominal pain for the past 2 days.  She describes the pain on the right lower quadrant and radiating to the left side.  Feels like a squeezing pain, non-positional, pain does not change after food.  Patient denies nausea, vomiting, diarrhea, constipation and fevers.  Upon arrival to the emergency room her blood pressure was 146/52, pulse 63  Showed a normal white count, LFTs were elevated, bilirubin is normal  CT scan of the abdomen has inflammation around the right lower cecum.  No fat stranding around the appendix, liver had no structural damage, CBD less than 10 mm.  Review of Systems  Review of Systems   Constitutional: Negative for chills, diaphoresis, fever and malaise/fatigue.   HENT: Negative for congestion, ear discharge, ear pain, hearing loss, nosebleeds, sinus pain, sore throat and tinnitus.    Eyes: Negative for blurred vision, double vision, photophobia and pain.   Respiratory: Negative for cough, hemoptysis, sputum production, shortness of breath, wheezing and stridor.    Cardiovascular: Negative for chest pain, palpitations, orthopnea, claudication, leg swelling and PND.   Gastrointestinal: Positive for abdominal pain. Negative for blood in stool, constipation, diarrhea, heartburn, melena, nausea and vomiting.   Genitourinary: Negative for dysuria, flank pain, frequency, hematuria and urgency.   Musculoskeletal: Negative for back pain, falls, joint pain, myalgias and neck pain.   Skin: Negative for itching and rash.   Neurological: Negative for  dizziness, tingling, tremors, weakness and headaches.   Endo/Heme/Allergies: Negative for environmental allergies and polydipsia. Does not bruise/bleed easily.   Psychiatric/Behavioral: Negative for depression, hallucinations, substance abuse and suicidal ideas.       Past Medical History   has a past medical history of Arthritis, CAD (coronary artery disease), Cancer ([facility]) (1982), COPD (chronic obstructive pulmonary disease) (HCC), Croup (3 years old), Diverticulosis, Dyslipidemia, GERD (gastroesophageal reflux disease), Hiatal hernia, High cholesterol, Hypertension, Infectious disease (2018), Measles, Paroxysmal A-fib (HCC) (1/20/2016), Paroxysmal atrial fibrillation ([facility]), Prediabetes, and PVD (peripheral vascular disease) ([facility]).    Surgical History   has a past surgical history that includes tonsillectomy (1945); cholecystectomy (1993); cardiac cath, right heart (2008); wide excision melanoma, leg, w/poss.stsg (10/2015); stent placement; other orthopedic surgery (Left, 2008); other cardiac surgery; fecal transplant (N/A, 4/9/2018); and colonoscopy - endo (N/A, 4/9/2018).     Family History  family history includes Alcohol/Drug in her sister; Cancer in her maternal grandfather, maternal grandmother, maternal uncle, paternal grandfather, and paternal grandmother; Diabetes in her maternal uncle; Heart Disease in her maternal uncle and sister; Hyperlipidemia in her mother and sister; Hypertension in her mother and sister; Thyroid in her sister.     Social History   reports that she has been smoking cigarettes. She started smoking about 62 years ago. She has a 15.00 pack-year smoking history. She has never used smokeless tobacco. She reports that she does not drink alcohol or use drugs.    Allergies  Allergies   Allergen Reactions   • Codeine Swelling   • Iodine Swelling   • Bee Venom Anaphylaxis   • Chantix [Varenicline]      disoriented   • Ciprofloxacin      Causes C diff, recurrent and very difficult   • Flagyl  [Metronidazole] Rash     C/O flagyl causes rash.    • Latex Hives   • Pcn [Penicillins] Anaphylaxis and Hives   • Shellfish Allergy      unknown   • Tetanus Antitoxin Swelling     unknown       Medications  Prior to Admission Medications   Prescriptions Last Dose Informant Patient Reported? Taking?   DILTIAZem CD (CARDIZEM CD) 240 MG CAPSULE SR 24 HR 2019 at 0700 Patient Yes Yes   Sig: Take 240 mg by mouth every day.   albuterol 108 (90 Base) MCG/ACT Aero Soln inhalation aerosol 2019 at PRN Patient Yes Yes   Sig: Inhale 2 Puffs by mouth every 6 hours as needed for Shortness of Breath.   alprazolam (XANAX) 0.25 MG Tab 2019 at 0700 Patient Yes No   Sig: Take 0.25 mg by mouth 2 times a day as needed for Anxiety.   ascorbic acid (ASCORBIC ACID) 500 MG Tab 2019 at 0700 Patient Yes Yes   Sig: Take 500 mg by mouth every day.   atorvastatin (LIPITOR) 20 MG Tab Not Started Patient No No   Sig: Take 1 Tab by mouth every day.   ipratropium (ATROVENT) 0.02 % Solution 2019 at Unknown time Patient Yes No   Si.2 mg by Nebulization route 4 times a day.   lactobacillus rhamnosus (CULTURELLE) Cap capsule 2019 at 0700 Patient Yes No   Sig: Take 1 Cap by mouth every day.   pantoprazole (PROTONIX) 40 MG Tablet Delayed Response 2019 at 0700 Patient Yes Yes   Sig: Take 40 mg by mouth every day.   simvastatin (ZOCOR) 40 MG Tab 2019 at 1830 Patient Yes Yes   Sig: Take 40 mg by mouth every evening.   sotalol (BETAPACE) 80 MG Tab 2019 at 0700 Patient Yes Yes   Sig: Take 40 mg by mouth 2 times a day.   sucralfate (CARAFATE) 1 GM Tab 2019 at 0700 Patient Yes No   Sig: Take 1 g by mouth 4 Times a Day,Before Meals and at Bedtime.   vitamin D (CHOLECALCIFEROL) 1000 UNIT Tab 2019 at 0700 Patient Yes No   Sig: Take 2,000 Units by mouth every morning.   warfarin (COUMADIN) 1 MG Tab 2019 at 1830 Patient Yes Yes   Sig: Take 1 mg by mouth every evening. Take 1mg every night of  the week.      Facility-Administered Medications: None       Physical Exam  Temp:  [36.4 °C (97.5 °F)] 36.4 °C (97.5 °F)  Pulse:  [68] 68  Resp:  [18] 18  BP: (127)/(47) 127/47  SpO2:  [96 %] 96 %    Physical Exam  Vitals signs and nursing note reviewed.   Constitutional:       General: She is not in acute distress.     Appearance: Normal appearance. She is not ill-appearing, toxic-appearing or diaphoretic.   HENT:      Head: Normocephalic and atraumatic.      Nose: No congestion or rhinorrhea.      Mouth/Throat:      Pharynx: No oropharyngeal exudate or posterior oropharyngeal erythema.   Eyes:      General: No scleral icterus.  Neck:      Musculoskeletal: No neck rigidity or muscular tenderness.      Vascular: No carotid bruit.   Cardiovascular:      Rate and Rhythm: Normal rate and regular rhythm.      Pulses: Normal pulses.      Heart sounds: Normal heart sounds. No murmur. No friction rub. No gallop.    Pulmonary:      Effort: Pulmonary effort is normal. No respiratory distress.      Breath sounds: Normal breath sounds. No stridor. No wheezing or rhonchi.   Abdominal:      General: Abdomen is flat. There is no distension.      Palpations: There is no mass.      Tenderness: There is tenderness. There is no left CVA tenderness, guarding or rebound.      Hernia: No hernia is present.   Musculoskeletal: Normal range of motion.         General: No swelling.      Right lower leg: No edema.      Left lower leg: No edema.   Lymphadenopathy:      Cervical: No cervical adenopathy.   Skin:     General: Skin is warm and dry.      Capillary Refill: Capillary refill takes more than 3 seconds.      Coloration: Skin is not jaundiced or pale.      Findings: No bruising or erythema.   Neurological:      Mental Status: She is alert.         Laboratory:  Recent Labs     12/12/19  1317   WBC 8.0   RBC 5.08   HEMOGLOBIN 15.2   HEMATOCRIT 47.3*   MCV 93.1   MCH 29.9   MCHC 32.1*   RDW 46.7   PLATELETCT 176   MPV 10.1     Recent Labs      12/12/19  1317   SODIUM 136   POTASSIUM 4.2   CHLORIDE 104   CO2 27   GLUCOSE 89   BUN 17   CREATININE 0.87   CALCIUM 10.1     Recent Labs     12/12/19  1317   ALTSGPT 188*   ASTSGOT 200*   ALKPHOSPHAT 111*   TBILIRUBIN 0.5   LIPASE 33   GLUCOSE 89     Recent Labs     12/12/19  1358   APTT 48.8*   INR 2.81*     No results for input(s): NTPROBNP in the last 72 hours.      No results for input(s): TROPONINT in the last 72 hours.    Urinalysis:    Recent Labs     12/12/19  1518   SPECGRAVITY 1.031   GLUCOSEUR Negative   KETONES Negative   NITRITE Negative   LEUKESTERAS Trace*   WBCURINE 2-5   RBCURINE 0-2   BACTERIA Negative   EPITHELCELL Negative        Imaging:  CT-ABDOMEN-PELVIS WITH   Final Result      No evidence of bowel obstruction or acute appendicitis.   Diverticula colon. No evidence diverticulitis. Trace pelvic free fluid.   Bilateral punctate nonobstructing renal stones. No hydronephrosis.   Bilateral renal cysts.   Surgical absence of the gallbladder and dilatation the common duct measured 8.1 mm.   Atherosclerotic changes.            Assessment/Plan:  I anticipate this patient is appropriate for observation status at this time.    * Diverticulitis  Assessment & Plan  Found fat stranding found around the colon likely diverticulitis  Cover gram-negative and gram-positive with Bactrim  Patient is allergic to any antibiotics and has a history of C. Difficile.  I have no coverage for anaerobes.  Continue to monitor and patient deterioration will add meropenem.  Advance diet as tolerated    Colitis  Assessment & Plan  Found in the cecum area  Rule out C. Difficile  Stool cultures  Stool WBCs  Antinausea medications have been ordered    Paroxysmal a-fib (CMS-HCC)- (present on admission)  Assessment & Plan  Rate controlled with sotalol and Cardizem  Continue home warfarin follow INR levels    Transaminitis  Assessment & Plan  Unknown etiology at this point  No structural damage or gallbladder disease found on  CT scan  Likely to patient's take supplements at home that may have caused increase of the liver enzymes  Continue to trend CMP    History of COPD- (present on admission)  Assessment & Plan  Continue home inhalers      VTE prophylaxis: warfarin

## 2019-12-12 NOTE — DISCHARGE PLANNING
Met with pt and . Pt is a good looking 79 year old. She denies using any DME and she still drives. She denies any mobility issues. She looks gorgeous to be in her 80s.  is at the bedside. He will take her home upon discharge.      Care Transition Team Assessment    Information Source  Orientation : Oriented x 4  Who is responsible for making decisions for patient? : Patient    Readmission Evaluation  Is this a readmission?: No    Elopement Risk  Legal Hold: No    Interdisciplinary Discharge Planning  Does Admitting Nurse Feel This Could be a Complex Discharge?: No  Primary Care Physician: Dr. Pang  Lives with - Patient's Self Care Capacity: Spouse  Support Systems: Spouse / Significant Other  Housing / Facility: [address]  Do You Take your Prescribed Medications Regularly: Yes  Able to Return to Previous ADL's: Yes  Mobility Issues: No  Prior Services: None  Patient Expects to be Discharged to:: Home  Assistance Needed: No  Durable Medical Equipment: Not Applicable    Discharge Preparedness  What is your plan after discharge?: Home with help  What are your discharge supports?: Spouse  Prior Functional Level: Ambulatory, Drives Self, Independent with Activities of Daily Living, Independent with Medication Management  Difficulity with ADLs: None  Difficulity with IADLs: None    Functional Assesment  Prior Functional Level: Ambulatory, Drives Self, Independent with Activities of Daily Living, Independent with Medication Management    Finances  Financial Barriers to Discharge: No  Prescription Coverage: Yes              Advance Directive  Advance Directive?: DPOA for Health Care  Durable Power of  Name and Contact : Nav lopez    Domestic Abuse  Have you ever been the victim of abuse or violence?: No              Anticipated Discharge Information  Anticipated discharge disposition: Home

## 2019-12-12 NOTE — ED NOTES
Med Rec Updated and Complete per Pt at bedside  Allergies Reviewed  No PO ABX last 14 days.    Pt reports she is currently taking she will be changing her Simvastatin to Atorvastatin when it comes in the mail.    Pt is on a Warfarin Regimen as follows:    Warfarin 1mg tablets    1. Take 1mg every night of the week.    Last Dose: 1mg 12/11/19 @ 1830.

## 2019-12-12 NOTE — ED PROVIDER NOTES
ED Provider Note    CHIEF COMPLAINT  Chief Complaint   Patient presents with   • Sent from Urgent Care   • Abdominal Pain       HPI  Angie Root is a 79 y.o. female who presents to the emergency department complaint of right lower quadrant abdominal pain.  Patient has had right lower quadrant pain in the past assist with colitis and diverticulitis.  She is again had pain for about the last 2 days.  Pain is worse with movement, and palpation.  She has no associated nausea, vomiting, diarrhea, or constipation.  No anorexia.  No fevers or chills.  No makes it better nothing makes it worse.  No other associated complaints.  She still has her appendix.  She had a cholecystectomy but no other abdominal operations.  She is afebrile reports being compliant with her Coumadin.    REVIEW OF SYSTEMS  See HPI for further details. All other systems are negative.    PAST MEDICAL HISTORY  Past Medical History:   Diagnosis Date   • Arthritis     BL hands   • CAD (coronary artery disease)    • Cancer (HCC) 1982    melanoma   • COPD (chronic obstructive pulmonary disease) (HCC)    • Croup 3 years old   • Diverticulosis    • Dyslipidemia    • GERD (gastroesophageal reflux disease)    • Hiatal hernia    • High cholesterol    • Hypertension    • Infectious disease 2018    C Diff   • Measles     5 years old   • Paroxysmal A-fib (HCC) 1/20/2016    Symptomatic, on a rhythm control strategy with sotalol 40 mg by mouth twice a day.    • Paroxysmal atrial fibrillation (HCC)    • Prediabetes    • PVD (peripheral vascular disease) (HCC)        FAMILY HISTORY  Family History   Problem Relation Age of Onset   • Hypertension Mother    • Hyperlipidemia Mother    • Cancer Maternal Grandmother         tongue   • Cancer Maternal Uncle         x 3, pancreas   • Cancer Maternal Grandfather         unknown   • Cancer Paternal Grandmother         unknown   • Cancer Paternal Grandfather         unknown   • Diabetes Maternal Uncle    • Heart Disease  Maternal Uncle    • Hypertension Sister    • Hyperlipidemia Sister    • Heart Disease Sister    • Alcohol/Drug Sister    • Thyroid Sister    • Psychiatric Illness Neg Hx    • Stroke Neg Hx        SOCIAL HISTORY  Social History     Socioeconomic History   • Marital status:      Spouse name: Not on file   • Number of children: 0   • Years of education: Not on file   • Highest education level: Not on file   Occupational History   • Not on file   Social Needs   • Financial resource strain: Not on file   • Food insecurity:     Worry: Not on file     Inability: Not on file   • Transportation needs:     Medical: Not on file     Non-medical: Not on file   Tobacco Use   • Smoking status: Current Every Day Smoker     Packs/day: 0.25     Years: 60.00     Pack years: 15.00     Types: Cigarettes     Start date: 3/20/1957   • Smokeless tobacco: Never Used   • Tobacco comment: 6 per day   Substance and Sexual Activity   • Alcohol use: No     Alcohol/week: 0.0 oz   • Drug use: No   • Sexual activity: Not Currently     Partners: Male   Lifestyle   • Physical activity:     Days per week: Not on file     Minutes per session: Not on file   • Stress: Not on file   Relationships   • Social connections:     Talks on phone: Not on file     Gets together: Not on file     Attends [employer] service: Not on file     Active member of club or organization: Not on file     Attends meetings of clubs or organizations: Not on file     Relationship status: Not on file   • Intimate partner violence:     Fear of current or ex partner: Not on file     Emotionally abused: Not on file     Physically abused: Not on file     Forced sexual activity: Not on file   Other Topics Concern   • Not on file   Social History Narrative    .     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 [employer]       SURGICAL HISTORY  Past Surgical History:   Procedure Laterality Date   • FECAL TRANSPLANT N/A 4/9/2018    Procedure: FECAL TRANSPLANT;  Surgeon: Gonzalez Cruz,  M.D.;  Location: ENDOSCOPY [facility];  Service: Gastroenterology   • COLONOSCOPY - ENDO N/A 4/9/2018    Procedure: COLONOSCOPY - ENDO;  Surgeon: Gonzalez Cruz M.D.;  Location: ENDOSCOPY [facility];  Service: Gastroenterology   • WIDE EXCISION MELANOMA, LEG, W/POSS.STSG  10/2015    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   • CARDIAC CATH, RIGHT HEART  2008    stent   • OTHER ORTHOPEDIC SURGERY Left 2008    hand repair   • CHOLECYSTECTOMY  1993   • TONSILLECTOMY  1945   • OTHER CARDIAC SURGERY      r heart cath stents   • STENT PLACEMENT      L iliac stent       CURRENT MEDICATIONS  Home Medications     Reviewed by Marilyn Grigsby PhT (Pharmacy Tech) on 12/12/19 at 1436  Med List Status: Complete   Medication Last Dose Status   albuterol 108 (90 Base) MCG/ACT Aero Soln inhalation aerosol 12/12/2019 Active   alprazolam (XANAX) 0.25 MG Tab 12/12/2019 Active   ascorbic acid (ASCORBIC ACID) 500 MG Tab 12/12/2019 Active   atorvastatin (LIPITOR) 20 MG Tab Not Started Active   DILTIAZem CD (CARDIZEM CD) 240 MG CAPSULE SR 24 HR 12/12/2019 Active   ipratropium (ATROVENT) 0.02 % Solution 12/12/2019 Active   lactobacillus rhamnosus (CULTURELLE) Cap capsule 12/12/2019 Active   pantoprazole (PROTONIX) 40 MG Tablet Delayed Response 12/12/2019 Active   simvastatin (ZOCOR) 40 MG Tab 12/11/2019 Active   sotalol (BETAPACE) 80 MG Tab 12/12/2019 Active   sucralfate (CARAFATE) 1 GM Tab 12/12/2019 Active   vitamin D (CHOLECALCIFEROL) 1000 UNIT Tab 12/12/2019 Active   warfarin (COUMADIN) 1 MG Tab 12/11/2019 Active                ALLERGIES  Allergies   Allergen Reactions   • Codeine Swelling   • Iodine Swelling   • Bee Venom Anaphylaxis   • Chantix [Varenicline]      disoriented   • Ciprofloxacin      Causes C diff, recurrent and very difficult   • Latex Hives   • Pcn [Penicillins] Anaphylaxis and Hives   • Shellfish Allergy      unknown   • Tetanus Antitoxin Swelling     unknown       PHYSICAL EXAM  VITAL SIGNS: /57   " Pulse 64   Temp 36.8 °C (98.2 °F) (Temporal)   Resp 16   Ht 1.575 m (5' 2\")   Wt 43.2 kg (95 lb 3.8 oz)   SpO2 94%   BMI 17.42 kg/m²    Constitutional: Well developed, Well nourished, No acute distress, Non-toxic appearance.   HENT: Normocephalic, Atraumatic, Bilateral external ears normal, Oropharynx moist, No oral exudates, Nose normal.   Eyes: PERRL, EOMI, Conjunctiva normal, No discharge.   Neck: Normal range of motion, No tenderness, Supple, No stridor.     Cardiovascular: Normal heart rate, Normal rhythm, No murmurs, No rubs, No gallops.   Thorax & Lungs: Normal breath sounds, No respiratory distress, No wheezing, No chest tenderness.   Abdomen: Bowel sounds normal, Soft, tender right lower quadrant.  Skin: Warm, Dry, No erythema, No rash.   Musculoskeletal: Good range of motion in all major joints.  Strong inguinal pulses.  No hernias  Neurologic: Alert,  No focal deficits noted.   Psychiatric: Affect normal,    Results for orders placed or performed during the hospital encounter of 12/12/19   CBC WITH DIFFERENTIAL   Result Value Ref Range    WBC 8.0 4.8 - 10.8 K/uL    RBC 5.08 4.20 - 5.40 M/uL    Hemoglobin 15.2 12.0 - 16.0 g/dL    Hematocrit 47.3 (H) 37.0 - 47.0 %    MCV 93.1 81.4 - 97.8 fL    MCH 29.9 27.0 - 33.0 pg    MCHC 32.1 (L) 33.6 - 35.0 g/dL    RDW 46.7 35.9 - 50.0 fL    Platelet Count 176 164 - 446 K/uL    MPV 10.1 9.0 - 12.9 fL    Neutrophils-Polys 48.80 44.00 - 72.00 %    Lymphocytes 38.00 22.00 - 41.00 %    Monocytes 9.90 0.00 - 13.40 %    Eosinophils 2.10 0.00 - 6.90 %    Basophils 0.80 0.00 - 1.80 %    Immature Granulocytes 0.40 0.00 - 0.90 %    Nucleated RBC 0.00 /100 WBC    Neutrophils (Absolute) 3.90 2.00 - 7.15 K/uL    Lymphs (Absolute) 3.03 1.00 - 4.80 K/uL    Monos (Absolute) 0.79 0.00 - 0.85 K/uL    Eos (Absolute) 0.17 0.00 - 0.51 K/uL    Baso (Absolute) 0.06 0.00 - 0.12 K/uL    Immature Granulocytes (abs) 0.03 0.00 - 0.11 K/uL    NRBC (Absolute) 0.00 K/uL   COMP METABOLIC " PANEL   Result Value Ref Range    Sodium 136 135 - 145 mmol/L    Potassium 4.2 3.6 - 5.5 mmol/L    Chloride 104 96 - 112 mmol/L    Co2 27 20 - 33 mmol/L    Anion Gap 5.0 0.0 - 11.9    Glucose 89 65 - 99 mg/dL    Bun 17 8 - 22 mg/dL    Creatinine 0.87 0.50 - 1.40 mg/dL    Calcium 10.1 8.5 - 10.5 mg/dL    AST(SGOT) 200 (H) 12 - 45 U/L    ALT(SGPT) 188 (H) 2 - 50 U/L    Alkaline Phosphatase 111 (H) 30 - 99 U/L    Total Bilirubin 0.5 0.1 - 1.5 mg/dL    Albumin 4.2 3.2 - 4.9 g/dL    Total Protein 6.9 6.0 - 8.2 g/dL    Globulin 2.7 1.9 - 3.5 g/dL    A-G Ratio 1.6 g/dL   LIPASE   Result Value Ref Range    Lipase 33 11 - 82 U/L   APTT   Result Value Ref Range    APTT 48.8 (H) 24.7 - 36.0 sec   PROTHROMBIN TIME (INR)   Result Value Ref Range    PT 30.6 (H) 12.0 - 14.6 sec    INR 2.81 (H) 0.87 - 1.13   ESTIMATED GFR   Result Value Ref Range    GFR If African American >60 >60 mL/min/1.73 m 2    GFR If Non African American >60 >60 mL/min/1.73 m 2        RADIOLOGY/PROCEDURES  CT-ABDOMEN-PELVIS WITH   Final Result      No evidence of bowel obstruction or acute appendicitis.   Diverticula colon. No evidence diverticulitis. Trace pelvic free fluid.   Bilateral punctate nonobstructing renal stones. No hydronephrosis.   Bilateral renal cysts.   Surgical absence of the gallbladder and dilatation the common duct measured 8.1 mm.   Atherosclerotic changes.            COURSE & MEDICAL DECISION MAKING  Pertinent Labs & Imaging studies reviewed. (See chart for details)    Patient presents emerged part with right lower quadrant abdominal pain.  A broad differential diagnosis was considered including but not limited to appendicitis, ascending colitis, less likely biliary disease, pyelonephritis, UTI, renal colic,      Patient's old chart is reviewed her previous work-up, and baseline labs.  This is for comparison.  The patient had CT scan the past with IV contrast despite a reported history of contrast allergy.  She is premedicated and CT  without complication.    CT was read as negative but there are some hazy inflammatory change in the right side and her ascending colon think she has an early colitis that she is had in the past.    The patient is started on antibiotics.  I have started her with some IV Flagyl.  She will require additional antibiotics.  She has a complicated history of allergies and interactions as well as C. difficile.  The pharmacist will help select additional antibiotics.      The patient is still acutely tender.  She will be admitted for further work-up and treatment.  Urinalysis remains pending.  The patient does not have dysuria sweating this is unlikely to be positive.  The patient will be hospitalized.  I spoke with the hospitalist care is transferred at that time at about 3:20 PM.            FINAL IMPRESSION  1. Acute abdomen     2. RLQ abdominal pain     3. Colitis     4. Elevated LFTs         2.   3.         Electronically signed by: Mark Schmidt, 12/12/2019 1:46 PM

## 2019-12-12 NOTE — PROGRESS NOTES
Subjective:   Angie Root  is a 79 y.o. female who presents for Abdominal Pain (pt believes its a hernia, Lower abdominal pain, pt felt a pull in lower abdomin, pain radiates from RLQ to LLQ, onset yesterday at 1130AM)    This is a new problem.  Patient presents to urgent care with 2-day history of severe right lower quadrant abdominal pain.  Patient reports that the day prior to the onset of her symptoms she was lifting some heavy bags of dog food.  She felt fine initially.  However, yesterday, she began to experience rather severe right lower quadrant abdominal pain which was radiating into the left lower quadrant.  She had to sleep in an upright position last night due to the pain, pain was rated at severe.  She did take some leftover hydrocodone which was somewhat helpful in order to get some rest.  She reports that the pain in the left lower quadrant has resolved but she has this persistent pain in the right lower quadrant.  Patient denies any associated nausea, vomiting, diarrhea, change in bowels or fever.    Patient has a longstanding history of multiple intestinal issues.  She reports a past history of kidney stone with gross hematuria which did require hospitalization and evaluation by urology.  She states that she has had a previous history of right lower quadrant abdominal pain for which she was seen in the emergency room with concern for some type of possible blood clot.  Patient also reports a history of chronic gastritis and repetitive issues with C. difficile diarrhea.  She states that she is unable to take antibiotics due to past history of C. difficile.    Patient does still have her appendix.    The patient also reports that she has chronic cystitis that is being followed and managed by urology.  She ultimately had some type of antibiotic implant placed into the bladder.  She denies any current dysuria, urgency or frequency with urination.  Denies any hematuria.      Abdominal Pain   Pertinent  "negatives include no constipation, diarrhea, dysuria, fever, frequency, hematuria, melena, myalgias, nausea or vomiting.     Review of Systems   Constitutional: Negative for chills, fever and malaise/fatigue.   Gastrointestinal: Positive for abdominal pain. Negative for blood in stool, constipation, diarrhea, heartburn, melena, nausea and vomiting.   Genitourinary: Negative for dysuria, flank pain, frequency, hematuria and urgency.   Musculoskeletal: Negative for myalgias.   All other systems reviewed and are negative.    Allergies   Allergen Reactions   • Codeine Swelling   • Iodine Swelling   • Bee Venom Anaphylaxis   • Chantix [Varenicline]      disoriented   • Ciprofloxacin      Causes C diff, recurrent and very difficult   • Latex Hives   • Pcn [Penicillins] Anaphylaxis and Hives   • Shellfish Allergy      unknown   • Tetanus Antitoxin Swelling     unknown     Reviewed past medical, surgical and family history.  Reviewed prescription and over-the-counter medications with patient and electronic health record today.     Objective:   /54 (BP Location: Right arm, Patient Position: Sitting, BP Cuff Size: Adult)   Pulse 62   Temp 36.8 °C (98.3 °F) (Temporal)   Resp 16   Ht 1.575 m (5' 2\")   Wt 43.4 kg (95 lb 9.6 oz)   SpO2 94%   BMI 17.49 kg/m²   Physical Exam  Vitals signs reviewed.   Constitutional:       General: She is not in acute distress.     Appearance: She is well-developed. She is not ill-appearing or toxic-appearing.   HENT:      Head: Normocephalic and atraumatic.      Right Ear: Tympanic membrane, ear canal and external ear normal.      Left Ear: Tympanic membrane, ear canal and external ear normal.      Nose: Nose normal.      Mouth/Throat:      Lips: Pink. No lesions.      Mouth: Mucous membranes are moist.      Pharynx: Oropharynx is clear. Uvula midline. No oropharyngeal exudate.   Eyes:      General: Lids are normal.      Extraocular Movements: Extraocular movements intact.      " Conjunctiva/sclera: Conjunctivae normal.      Pupils: Pupils are equal, round, and reactive to light.   Neck:      Musculoskeletal: Normal range of motion and neck supple.   Cardiovascular:      Rate and Rhythm: Normal rate and regular rhythm.      Heart sounds: Normal heart sounds. No murmur. No friction rub. No gallop.    Pulmonary:      Effort: Pulmonary effort is normal. No respiratory distress.      Breath sounds: Normal breath sounds.   Abdominal:      General: Bowel sounds are normal. There is no distension.      Palpations: Abdomen is soft. There is no mass.      Tenderness: There is tenderness in the right lower quadrant. There is no right CVA tenderness, left CVA tenderness, guarding or rebound. Positive signs include McBurney's sign. Negative signs include Sharp's sign, Rovsing's sign, psoas sign and obturator sign.      Hernia: There is no hernia in the umbilical area, right inguinal area or right femoral area.      Comments: Negative heel tap   Musculoskeletal: Normal range of motion.         General: No tenderness or deformity.   Lymphadenopathy:      Head:      Right side of head: No submental, submandibular or tonsillar adenopathy.      Left side of head: No submental, submandibular or tonsillar adenopathy.      Cervical: No cervical adenopathy.      Upper Body:      Right upper body: No supraclavicular adenopathy.      Left upper body: No supraclavicular adenopathy.   Skin:     General: Skin is warm and dry.      Findings: No rash.   Neurological:      Mental Status: She is alert and oriented to person, place, and time.      Cranial Nerves: No cranial nerve deficit.      Sensory: No sensory deficit.      Coordination: Coordination normal.   Psychiatric:         Attention and Perception: Attention normal.         Mood and Affect: Mood and affect normal.         Speech: Speech normal.         Behavior: Behavior normal. Behavior is cooperative.         Thought Content: Thought content normal.          Judgment: Judgment normal.           Assessment/Plan:   1. Right lower quadrant abdominal pain  - POCT Urinalysis: Trace leukocyte esterase, negative nitrites, moderate blood    2. Allergy to iodinated contrast    3. History of Clostridioides difficile colitis    4. Chronic anticoagulation    5. Paroxysmal a-fib (CMS-HCC)    6. Ischemic colitis (HCC)    7. Cigarette nicotine dependence with nicotine-induced disorder    Urine culture will be sent for confirmation.  Patient will not be placed on antibiotic this time at this time as I do not believe her symptoms are from a UTI.    Given the patient's multiple medical conditions, allergy to contrast, past history of GI issues and specific right lower quadrant tenderness with appendix present I believe the patient warrants further evaluation at a higher level of care.  Discussed with the patient that given her past history of diverticulitis her CT would be warranted to be obtained with contrast.  However, unfortunately, she has an allergy to contrast media.  Therefore, I believe it is reasonable for her to present to the emergency room for appropriate evaluation and management where she can obtain premedication if required.  Patient is instructed not to eat or drink anything until cleared to do so by the emergency department.    Differential diagnosis includes but is not limited to appendicitis, diverticulitis, pulled muscle, hernia.    Differential diagnosis, natural history, supportive care, and indications for immediate follow-up discussed.     Red flag warning symptoms and strict ER/follow-up precautions given.  The patient demonstrated a good understanding and agreed with the treatment plan.  Please note that this note was created using voice recognition speech to text software. Every effort has been made to correct obvious errors.  However, I expect there are errors of grammar and possibly context that were not discovered prior to finalizing the note  TIFFANY Worrell  JAY

## 2019-12-13 PROBLEM — K57.92 DIVERTICULITIS: Status: ACTIVE | Noted: 2019-12-13

## 2019-12-13 PROBLEM — R74.01 TRANSAMINITIS: Status: ACTIVE | Noted: 2019-12-13

## 2019-12-13 LAB
ALBUMIN SERPL BCP-MCNC: 3.4 G/DL (ref 3.2–4.9)
ALBUMIN SERPL BCP-MCNC: 3.8 G/DL (ref 3.2–4.9)
ALBUMIN/GLOB SERPL: 1.4 G/DL
ALBUMIN/GLOB SERPL: 1.7 G/DL
ALP SERPL-CCNC: 80 U/L (ref 30–99)
ALP SERPL-CCNC: 93 U/L (ref 30–99)
ALT SERPL-CCNC: 117 U/L (ref 2–50)
ALT SERPL-CCNC: 92 U/L (ref 2–50)
ANION GAP SERPL CALC-SCNC: 7 MMOL/L (ref 0–11.9)
ANION GAP SERPL CALC-SCNC: 8 MMOL/L (ref 0–11.9)
AST SERPL-CCNC: 56 U/L (ref 12–45)
AST SERPL-CCNC: 85 U/L (ref 12–45)
BILIRUB SERPL-MCNC: 0.3 MG/DL (ref 0.1–1.5)
BILIRUB SERPL-MCNC: 0.3 MG/DL (ref 0.1–1.5)
BUN SERPL-MCNC: 22 MG/DL (ref 8–22)
BUN SERPL-MCNC: 27 MG/DL (ref 8–22)
C DIFF DNA SPEC QL NAA+PROBE: NEGATIVE
C DIFF TOX GENS STL QL NAA+PROBE: NEGATIVE
CALCIUM SERPL-MCNC: 9.1 MG/DL (ref 8.5–10.5)
CALCIUM SERPL-MCNC: 9.2 MG/DL (ref 8.5–10.5)
CHLORIDE SERPL-SCNC: 104 MMOL/L (ref 96–112)
CHLORIDE SERPL-SCNC: 106 MMOL/L (ref 96–112)
CO2 SERPL-SCNC: 22 MMOL/L (ref 20–33)
CO2 SERPL-SCNC: 25 MMOL/L (ref 20–33)
CREAT SERPL-MCNC: 0.78 MG/DL (ref 0.5–1.4)
CREAT SERPL-MCNC: 1.05 MG/DL (ref 0.5–1.4)
ERYTHROCYTE [DISTWIDTH] IN BLOOD BY AUTOMATED COUNT: 46.1 FL (ref 35.9–50)
ERYTHROCYTE [DISTWIDTH] IN BLOOD BY AUTOMATED COUNT: 47.1 FL (ref 35.9–50)
GLOBULIN SER CALC-MCNC: 2 G/DL (ref 1.9–3.5)
GLOBULIN SER CALC-MCNC: 2.7 G/DL (ref 1.9–3.5)
GLUCOSE SERPL-MCNC: 152 MG/DL (ref 65–99)
GLUCOSE SERPL-MCNC: 207 MG/DL (ref 65–99)
HCT VFR BLD AUTO: 37.1 % (ref 37–47)
HCT VFR BLD AUTO: 41 % (ref 37–47)
HGB BLD-MCNC: 12.2 G/DL (ref 12–16)
HGB BLD-MCNC: 13.4 G/DL (ref 12–16)
INR PPP: 3.15 (ref 0.87–1.13)
MCH RBC QN AUTO: 29.8 PG (ref 27–33)
MCH RBC QN AUTO: 30.3 PG (ref 27–33)
MCHC RBC AUTO-ENTMCNC: 32.7 G/DL (ref 33.6–35)
MCHC RBC AUTO-ENTMCNC: 32.9 G/DL (ref 33.6–35)
MCV RBC AUTO: 91.3 FL (ref 81.4–97.8)
MCV RBC AUTO: 92.1 FL (ref 81.4–97.8)
PLATELET # BLD AUTO: 157 K/UL (ref 164–446)
PLATELET # BLD AUTO: 162 K/UL (ref 164–446)
PMV BLD AUTO: 10.5 FL (ref 9–12.9)
PMV BLD AUTO: 11 FL (ref 9–12.9)
POTASSIUM SERPL-SCNC: 3.9 MMOL/L (ref 3.6–5.5)
POTASSIUM SERPL-SCNC: 4.5 MMOL/L (ref 3.6–5.5)
PROT SERPL-MCNC: 5.4 G/DL (ref 6–8.2)
PROT SERPL-MCNC: 6.5 G/DL (ref 6–8.2)
PROTHROMBIN TIME: 33.5 SEC (ref 12–14.6)
RBC # BLD AUTO: 4.03 M/UL (ref 4.2–5.4)
RBC # BLD AUTO: 4.49 M/UL (ref 4.2–5.4)
SODIUM SERPL-SCNC: 134 MMOL/L (ref 135–145)
SODIUM SERPL-SCNC: 138 MMOL/L (ref 135–145)
WBC # BLD AUTO: 12.8 K/UL (ref 4.8–10.8)
WBC # BLD AUTO: 16.9 K/UL (ref 4.8–10.8)
WBC STL QL MICRO: NORMAL

## 2019-12-13 PROCEDURE — 89055 LEUKOCYTE ASSESSMENT FECAL: CPT

## 2019-12-13 PROCEDURE — G0378 HOSPITAL OBSERVATION PER HR: HCPCS

## 2019-12-13 PROCEDURE — 87045 FECES CULTURE AEROBIC BACT: CPT

## 2019-12-13 PROCEDURE — 87899 AGENT NOS ASSAY W/OPTIC: CPT

## 2019-12-13 PROCEDURE — 700102 HCHG RX REV CODE 250 W/ 637 OVERRIDE(OP): Performed by: INTERNAL MEDICINE

## 2019-12-13 PROCEDURE — 700102 HCHG RX REV CODE 250 W/ 637 OVERRIDE(OP): Mod: JG

## 2019-12-13 PROCEDURE — 80053 COMPREHEN METABOLIC PANEL: CPT

## 2019-12-13 PROCEDURE — 87046 STOOL CULTR AEROBIC BACT EA: CPT

## 2019-12-13 PROCEDURE — 87040 BLOOD CULTURE FOR BACTERIA: CPT | Mod: 91

## 2019-12-13 PROCEDURE — A9270 NON-COVERED ITEM OR SERVICE: HCPCS | Mod: JG

## 2019-12-13 PROCEDURE — 700105 HCHG RX REV CODE 258: Performed by: INTERNAL MEDICINE

## 2019-12-13 PROCEDURE — 87493 C DIFF AMPLIFIED PROBE: CPT

## 2019-12-13 PROCEDURE — A9270 NON-COVERED ITEM OR SERVICE: HCPCS | Performed by: HOSPITALIST

## 2019-12-13 PROCEDURE — 700102 HCHG RX REV CODE 250 W/ 637 OVERRIDE(OP): Performed by: HOSPITALIST

## 2019-12-13 PROCEDURE — 85610 PROTHROMBIN TIME: CPT

## 2019-12-13 PROCEDURE — 85027 COMPLETE CBC AUTOMATED: CPT

## 2019-12-13 PROCEDURE — A9270 NON-COVERED ITEM OR SERVICE: HCPCS | Performed by: INTERNAL MEDICINE

## 2019-12-13 PROCEDURE — 99225 PR SUBSEQUENT OBSERVATION CARE,LEVEL II: CPT | Performed by: INTERNAL MEDICINE

## 2019-12-13 RX ORDER — WARFARIN SODIUM 1 MG/1
0.5 TABLET ORAL
Status: COMPLETED | OUTPATIENT
Start: 2019-12-13 | End: 2019-12-13

## 2019-12-13 RX ORDER — LOVASTATIN 20 MG/1
40 TABLET ORAL NIGHTLY
COMMUNITY
End: 2019-12-26 | Stop reason: SDUPTHER

## 2019-12-13 RX ORDER — SODIUM CHLORIDE, SODIUM LACTATE, POTASSIUM CHLORIDE, CALCIUM CHLORIDE 600; 310; 30; 20 MG/100ML; MG/100ML; MG/100ML; MG/100ML
INJECTION, SOLUTION INTRAVENOUS CONTINUOUS
Status: DISCONTINUED | OUTPATIENT
Start: 2019-12-13 | End: 2019-12-14 | Stop reason: HOSPADM

## 2019-12-13 RX ADMIN — OXYCODONE HYDROCHLORIDE AND ACETAMINOPHEN 500 MG: 500 TABLET ORAL at 06:00

## 2019-12-13 RX ADMIN — SOTALOL HYDROCHLORIDE 40 MG: 80 TABLET ORAL at 09:30

## 2019-12-13 RX ADMIN — Medication 1 CAPSULE: at 06:00

## 2019-12-13 RX ADMIN — WARFARIN SODIUM 0.5 MG: 1 TABLET ORAL at 20:02

## 2019-12-13 RX ADMIN — ALPRAZOLAM 0.25 MG: 0.25 TABLET ORAL at 08:40

## 2019-12-13 RX ADMIN — SODIUM CHLORIDE, POTASSIUM CHLORIDE, SODIUM LACTATE AND CALCIUM CHLORIDE: 600; 310; 30; 20 INJECTION, SOLUTION INTRAVENOUS at 16:55

## 2019-12-13 RX ADMIN — DILTIAZEM HYDROCHLORIDE 240 MG: 240 CAPSULE, EXTENDED RELEASE ORAL at 06:00

## 2019-12-13 ASSESSMENT — COPD QUESTIONNAIRES
DO YOU EVER COUGH UP ANY MUCUS OR PHLEGM?: NO/ONLY WITH OCCASIONAL COLDS OR INFECTIONS
IN THE PAST 12 MONTHS DO YOU DO LESS THAN YOU USED TO BECAUSE OF YOUR BREATHING PROBLEMS: DISAGREE/UNSURE
HAVE YOU SMOKED AT LEAST 100 CIGARETTES IN YOUR ENTIRE LIFE: YES
DURING THE PAST 4 WEEKS HOW MUCH DID YOU FEEL SHORT OF BREATH: NONE/LITTLE OF THE TIME
COPD SCREENING SCORE: 4

## 2019-12-13 ASSESSMENT — PATIENT HEALTH QUESTIONNAIRE - PHQ9
SUM OF ALL RESPONSES TO PHQ9 QUESTIONS 1 AND 2: 0
1. LITTLE INTEREST OR PLEASURE IN DOING THINGS: NOT AT ALL
2. FEELING DOWN, DEPRESSED, IRRITABLE, OR HOPELESS: NOT AT ALL

## 2019-12-13 ASSESSMENT — LIFESTYLE VARIABLES
EVER FELT BAD OR GUILTY ABOUT YOUR DRINKING: NO
CONSUMPTION TOTAL: NEGATIVE
HOW MANY TIMES IN THE PAST YEAR HAVE YOU HAD 5 OR MORE DRINKS IN A DAY: 0
EVER HAD A DRINK FIRST THING IN THE MORNING TO STEADY YOUR NERVES TO GET RID OF A HANGOVER: NO
DOES PATIENT WANT TO STOP DRINKING: NO
TOTAL SCORE: 0
ON A TYPICAL DAY WHEN YOU DRINK ALCOHOL HOW MANY DRINKS DO YOU HAVE: 0
ALCOHOL_USE: NO
HAVE PEOPLE ANNOYED YOU BY CRITICIZING YOUR DRINKING: NO
AVERAGE NUMBER OF DAYS PER WEEK YOU HAVE A DRINK CONTAINING ALCOHOL: 0
TOTAL SCORE: 0
EVER_SMOKED: YES
HAVE YOU EVER FELT YOU SHOULD CUT DOWN ON YOUR DRINKING: NO
TOTAL SCORE: 0

## 2019-12-13 NOTE — ASSESSMENT & PLAN NOTE
Found in the cecum area  Rule out C. Difficile  Stool cultures  Stool WBCs  Antinausea medications have been ordered

## 2019-12-13 NOTE — PROGRESS NOTES
Inpatient Anticoagulation Service Note    Date: 12/12/2019    Reason for Anticoagulation: Atrial Fibrillation   Target INR: 2.0 to 3.0  [id number] VASc Score: 4  HAS-BLED Score: 2   Hemoglobin Value: 15.2  Hematocrit Value: (!) 47.3  Lab Platelet Value: 176    INR from last 7 days     Date/Time INR Value    12/12/19 1358  (!) 2.81        Dose from last 7 days     Date/Time Dose (mg)    12/12/19 1759  1        Average Dose (mg): 1  Significant Interactions: Bactrim   Bridge Therapy: No     Reversal Agent Administered: Not Applicable    Comments: Mrs. Root is on home warfarin for history of AFib. She is followed by the [facility] anticolagulation clinic. The patient is here for diverticulitis and has been initiated on Bactrim (new DDI with warfarin; Bactrim may increase the plasma concentrations of warfarin). H/H appear stable and no sxs of bleeding noted per chart review.    Plan:  Will continue the patient's home dose of warfarin 1 mg PO daily. An INR will be obtained tomorrow with AM labs. Pharmacy will continue to follow.    Education Material Provided?: No  Pharmacist suggested discharge dosing: TBD based on subsequent INR readings. May be able to resume home dose of warfarin 1 mg PO daily, with a follow up INR within one week of discharge.      Rehana Norwood, PharmD, BCPS

## 2019-12-13 NOTE — ASSESSMENT & PLAN NOTE
Unknown etiology at this point  No structural damage or gallbladder disease found on CT scan  Likely to patient's take supplements at home that may have caused increase of the liver enzymes  Continue to trend CMP

## 2019-12-13 NOTE — ASSESSMENT & PLAN NOTE
Found fat stranding found around the colon likely diverticulitis  Cover gram-negative and gram-positive with Bactrim  Patient is allergic to any antibiotics and has a history of C. Difficile.  I have no coverage for anaerobes.  Continue to monitor and patient deterioration will add meropenem.  Advance diet as tolerated

## 2019-12-13 NOTE — RESPIRATORY CARE
COPD EDUCATION by COPD CLINICAL EDUCATOR  12/13/2019 at 3:45 PM by Adrienne Vivas     Patient interviewed by COPD education team. Patient refused COPD program at this time.

## 2019-12-14 ENCOUNTER — APPOINTMENT (OUTPATIENT)
Dept: RADIOLOGY | Facility: MEDICAL CENTER | Age: 79
End: 2019-12-14
Attending: INTERNAL MEDICINE
Payer: MEDICARE

## 2019-12-14 ENCOUNTER — PATIENT OUTREACH (OUTPATIENT)
Dept: HEALTH INFORMATION MANAGEMENT | Facility: OTHER | Age: 79
End: 2019-12-14

## 2019-12-14 VITALS
WEIGHT: 95.24 LBS | TEMPERATURE: 97.6 F | DIASTOLIC BLOOD PRESSURE: 58 MMHG | OXYGEN SATURATION: 93 % | RESPIRATION RATE: 18 BRPM | SYSTOLIC BLOOD PRESSURE: 121 MMHG | HEART RATE: 53 BPM | BODY MASS INDEX: 17.53 KG/M2 | HEIGHT: 62 IN

## 2019-12-14 PROBLEM — K57.92 DIVERTICULITIS: Status: RESOLVED | Noted: 2019-12-13 | Resolved: 2019-12-14

## 2019-12-14 PROBLEM — R74.01 TRANSAMINITIS: Status: RESOLVED | Noted: 2019-12-13 | Resolved: 2019-12-14

## 2019-12-14 PROBLEM — K52.9 COLITIS: Status: RESOLVED | Noted: 2017-11-17 | Resolved: 2019-12-14

## 2019-12-14 LAB
ALBUMIN SERPL BCP-MCNC: 3.4 G/DL (ref 3.2–4.9)
ALBUMIN/GLOB SERPL: 1.4 G/DL
ALP SERPL-CCNC: 81 U/L (ref 30–99)
ALT SERPL-CCNC: 76 U/L (ref 2–50)
ANION GAP SERPL CALC-SCNC: 7 MMOL/L (ref 0–11.9)
AST SERPL-CCNC: 41 U/L (ref 12–45)
BASOPHILS # BLD AUTO: 0.1 % (ref 0–1.8)
BASOPHILS # BLD: 0.02 K/UL (ref 0–0.12)
BILIRUB SERPL-MCNC: 0.3 MG/DL (ref 0.1–1.5)
BUN SERPL-MCNC: 19 MG/DL (ref 8–22)
CALCIUM SERPL-MCNC: 9.3 MG/DL (ref 8.5–10.5)
CHLORIDE SERPL-SCNC: 110 MMOL/L (ref 96–112)
CO2 SERPL-SCNC: 23 MMOL/L (ref 20–33)
CREAT SERPL-MCNC: 0.85 MG/DL (ref 0.5–1.4)
CRP SERPL HS-MCNC: 0.39 MG/DL (ref 0–0.75)
E COLI SXT1+2 STL IA: NORMAL
EOSINOPHIL # BLD AUTO: 0 K/UL (ref 0–0.51)
EOSINOPHIL NFR BLD: 0 % (ref 0–6.9)
ERYTHROCYTE [DISTWIDTH] IN BLOOD BY AUTOMATED COUNT: 49.1 FL (ref 35.9–50)
ERYTHROCYTE [SEDIMENTATION RATE] IN BLOOD BY WESTERGREN METHOD: 5 MM/HOUR (ref 0–30)
GLOBULIN SER CALC-MCNC: 2.4 G/DL (ref 1.9–3.5)
GLUCOSE SERPL-MCNC: 128 MG/DL (ref 65–99)
HCT VFR BLD AUTO: 38.6 % (ref 37–47)
HGB BLD-MCNC: 12.5 G/DL (ref 12–16)
IMM GRANULOCYTES # BLD AUTO: 0.13 K/UL (ref 0–0.11)
IMM GRANULOCYTES NFR BLD AUTO: 0.6 % (ref 0–0.9)
INR PPP: 4.09 (ref 0.87–1.13)
LACTATE BLD-SCNC: 1.5 MMOL/L (ref 0.5–2)
LYMPHOCYTES # BLD AUTO: 1.49 K/UL (ref 1–4.8)
LYMPHOCYTES NFR BLD: 6.8 % (ref 22–41)
MAGNESIUM SERPL-MCNC: 1.8 MG/DL (ref 1.5–2.5)
MCH RBC QN AUTO: 30.3 PG (ref 27–33)
MCHC RBC AUTO-ENTMCNC: 32.4 G/DL (ref 33.6–35)
MCV RBC AUTO: 93.7 FL (ref 81.4–97.8)
MONOCYTES # BLD AUTO: 1.87 K/UL (ref 0–0.85)
MONOCYTES NFR BLD AUTO: 8.6 % (ref 0–13.4)
NEUTROPHILS # BLD AUTO: 18.25 K/UL (ref 2–7.15)
NEUTROPHILS NFR BLD: 83.9 % (ref 44–72)
NRBC # BLD AUTO: 0 K/UL
NRBC BLD-RTO: 0 /100 WBC
PHOSPHATE SERPL-MCNC: 2.4 MG/DL (ref 2.5–4.5)
PLATELET # BLD AUTO: 154 K/UL (ref 164–446)
PMV BLD AUTO: 10.4 FL (ref 9–12.9)
POTASSIUM SERPL-SCNC: 4.8 MMOL/L (ref 3.6–5.5)
PROCALCITONIN SERPL-MCNC: 0.08 NG/ML
PROT SERPL-MCNC: 5.8 G/DL (ref 6–8.2)
PROTHROMBIN TIME: 41.4 SEC (ref 12–14.6)
RBC # BLD AUTO: 4.12 M/UL (ref 4.2–5.4)
SIGNIFICANT IND 70042: NORMAL
SITE SITE: NORMAL
SODIUM SERPL-SCNC: 140 MMOL/L (ref 135–145)
SOURCE SOURCE: NORMAL
WBC # BLD AUTO: 21.8 K/UL (ref 4.8–10.8)

## 2019-12-14 PROCEDURE — 99217 PR OBSERVATION CARE DISCHARGE: CPT | Performed by: INTERNAL MEDICINE

## 2019-12-14 PROCEDURE — 700105 HCHG RX REV CODE 258: Performed by: INTERNAL MEDICINE

## 2019-12-14 PROCEDURE — A9270 NON-COVERED ITEM OR SERVICE: HCPCS | Performed by: HOSPITALIST

## 2019-12-14 PROCEDURE — 84100 ASSAY OF PHOSPHORUS: CPT

## 2019-12-14 PROCEDURE — 83735 ASSAY OF MAGNESIUM: CPT

## 2019-12-14 PROCEDURE — 85610 PROTHROMBIN TIME: CPT

## 2019-12-14 PROCEDURE — 83605 ASSAY OF LACTIC ACID: CPT

## 2019-12-14 PROCEDURE — 700102 HCHG RX REV CODE 250 W/ 637 OVERRIDE(OP): Performed by: HOSPITALIST

## 2019-12-14 PROCEDURE — 74018 RADEX ABDOMEN 1 VIEW: CPT

## 2019-12-14 PROCEDURE — 85652 RBC SED RATE AUTOMATED: CPT

## 2019-12-14 PROCEDURE — G0378 HOSPITAL OBSERVATION PER HR: HCPCS

## 2019-12-14 PROCEDURE — 84145 PROCALCITONIN (PCT): CPT

## 2019-12-14 PROCEDURE — 86140 C-REACTIVE PROTEIN: CPT

## 2019-12-14 PROCEDURE — 80053 COMPREHEN METABOLIC PANEL: CPT

## 2019-12-14 PROCEDURE — 85025 COMPLETE CBC W/AUTO DIFF WBC: CPT

## 2019-12-14 RX ORDER — BISACODYL 10 MG
10 SUPPOSITORY, RECTAL RECTAL ONCE
Status: DISCONTINUED | OUTPATIENT
Start: 2019-12-14 | End: 2019-12-14 | Stop reason: HOSPADM

## 2019-12-14 RX ADMIN — DILTIAZEM HYDROCHLORIDE 240 MG: 240 CAPSULE, EXTENDED RELEASE ORAL at 10:13

## 2019-12-14 RX ADMIN — SODIUM CHLORIDE, POTASSIUM CHLORIDE, SODIUM LACTATE AND CALCIUM CHLORIDE: 600; 310; 30; 20 INJECTION, SOLUTION INTRAVENOUS at 00:57

## 2019-12-14 RX ADMIN — SOTALOL HYDROCHLORIDE 40 MG: 80 TABLET ORAL at 10:11

## 2019-12-14 RX ADMIN — ALPRAZOLAM 0.25 MG: 0.25 TABLET ORAL at 06:14

## 2019-12-14 RX ADMIN — Medication 1 CAPSULE: at 06:15

## 2019-12-14 ASSESSMENT — LIFESTYLE VARIABLES: REASON UNABLE TO ASSESS: N

## 2019-12-14 NOTE — PROGRESS NOTES
A/o, respirations are even and unlabored on room air ,assessment completed, vital signs stable, IV fluids running per orders, denies any abdominal pain at the moment, updated communication board,  poc discussed and understood, verbalized understanding, tray meal givenall questions answered at this time , fall precautions in place, call button within reach, will continue to monitor

## 2019-12-14 NOTE — PROGRESS NOTES
MD declined to re-order pt's lovastatin at this time dt pt's elevated liver enzymes. Pt updated. Report given to VARGAS Lozoya.

## 2019-12-14 NOTE — DISCHARGE INSTRUCTIONS
Discharge Instructions    Discharged to home by car with relative. Discharged via wheelchair, hospital escort: Yes.  Special equipment needed: Not Applicable    Be sure to schedule a follow-up appointment with your primary care doctor or any specialists as instructed.     Discharge Plan:   Diet Plan: Discussed  Activity Level: Discussed  Smoking Cessation Offered: Patient Refused  Confirmed Follow up Appointment: Patient to Call and Schedule Appointment  Confirmed Symptoms Management: Discussed  Medication Reconciliation Updated: Yes  Influenza Vaccine Indication: Not indicated: Previously immunized this influenza season and > 8 years of age    I understand that a diet low in cholesterol, fat, and sodium is recommended for good health. Unless I have been given specific instructions below for another diet, I accept this instruction as my diet prescription.   Other diet:     Special Instructions: None    · Is patient discharged on Warfarin / Coumadin?   Yes    You are receiving the drug warfarin. Please understand the importance of monitoring warfarin with scheduled PT/INR blood draws.  Follow-up with a call to your personal Doctor's office in 3 days to schedule a PT/INR. .    IMPORTANT: HOW TO USE THIS INFORMATION:  This is a summary and does NOT have all possible information about this product. This information does not assure that this product is safe, effective, or appropriate for you. This information is not individual medical advice and does not substitute for the advice of your health care professional. Always ask your health care professional for complete information about this product and your specific health needs.      WARFARIN - ORAL (WARF-uh-rin)      COMMON BRAND NAME(S): Coumadin      WARNING:  Warfarin can cause very serious (possibly fatal) bleeding. This is more likely to occur when you first start taking this medication or if you take too much warfarin. To decrease your risk for bleeding, your  "doctor or other health care provider will monitor you closely and check your lab results (INR test) to make sure you are not taking too much warfarin. Keep all medical and laboratory appointments. Tell your doctor right away if you notice any signs of serious bleeding. See also Side Effects section.      USES:  This medication is used to treat blood clots (such as in deep vein thrombosis-DVT or pulmonary embolus-PE) and/or to prevent new clots from forming in your body. Preventing harmful blood clots helps to reduce the risk of a stroke or heart attack. Conditions that increase your risk of developing blood clots include a certain type of irregular heart rhythm (atrial fibrillation), heart valve replacement, recent heart attack, and certain surgeries (such as hip/knee replacement). Warfarin is commonly called a \"blood thinner,\" but the more correct term is \"anticoagulant.\" It helps to keep blood flowing smoothly in your body by decreasing the amount of certain substances (clotting proteins) in your blood.      HOW TO USE:  Read the Medication Guide provided by your pharmacist before you start taking warfarin and each time you get a refill. If you have any questions, ask your doctor or pharmacist. Take this medication by mouth with or without food as directed by your doctor or other health care professional, usually once a day. It is very important to take it exactly as directed. Do not increase the dose, take it more frequently, or stop using it unless directed by your doctor. Dosage is based on your medical condition, laboratory tests (such as INR), and response to treatment. Your doctor or other health care provider will monitor you closely while you are taking this medication to determine the right dose for you. Use this medication regularly to get the most benefit from it. To help you remember, take it at the same time each day. It is important to eat a balanced, consistent diet while taking warfarin. Some foods " can affect how warfarin works in your body and may affect your treatment and dose. Avoid sudden large increases or decreases in your intake of foods high in vitamin K (such as broccoli, cauliflower, cabbage, brussels sprouts, kale, spinach, and other green leafy vegetables, liver, green tea, certain vitamin supplements). If you are trying to lose weight, check with your doctor before you try to go on a diet. Cranberry products may also affect how your warfarin works. Limit the amount of cranberry juice (16 ounces/480 milliliters a day) or other cranberry products you may drink or eat.      SIDE EFFECTS:  Nausea, loss of appetite, or stomach/abdominal pain may occur. If any of these effects persist or worsen, tell your doctor or pharmacist promptly. Remember that your doctor has prescribed this medication because he or she has judged that the benefit to you is greater than the risk of side effects. Many people using this medication do not have serious side effects. This medication can cause serious bleeding if it affects your blood clotting proteins too much (shown by unusually high INR lab results). Even if your doctor stops your medication, this risk of bleeding can continue for up to a week. Tell your doctor right away if you have any signs of serious bleeding, including: unusual pain/swelling/discomfort, unusual/easy bruising, prolonged bleeding from cuts or gums, persistent/frequent nosebleeds, unusually heavy/prolonged menstrual flow, pink/dark urine, coughing up blood, vomit that is bloody or looks like coffee grounds, severe headache, dizziness/fainting, unusual or persistent tiredness/weakness, bloody/black/tarry stools, chest pain, shortness of breath, difficulty swallowing. Tell your doctor right away if any of these unlikely but serious side effects occur: persistent nausea/vomiting, severe stomach/abdominal pain, yellowing eyes/skin. This drug rarely has caused very serious (possibly fatal) problems if  its effects lead to small blood clots (usually at the beginning of treatment). This can lead to severe skin/tissue damage that may require surgery or amputation if left untreated. Patients with certain blood conditions (protein C or S deficiency) may be at greater risk. Get medical help right away if any of these rare but serious side effects occur: painful/red/purplish patches on the skin (such as on the toe, breast, abdomen), change in the amount of urine, vision changes, confusion, slurred speech, weakness on one side of the body. A very serious allergic reaction to this drug is rare. However, get medical help right away if you notice any symptoms of a serious allergic reaction, including: rash, itching/swelling (especially of the face/tongue/throat), severe dizziness, trouble breathing. This is not a complete list of possible side effects. If you notice other effects not listed above, contact your doctor or pharmacist. In the [place] - Call your doctor for medical advice about side effects. You may report side effects to FDA at 3-408-PLH-5374. In [place] - Call your doctor for medical advice about side effects. You may report side effects to Health [place] at [phone].      PRECAUTIONS:  Before taking warfarin, tell your doctor or pharmacist if you are allergic to it; or if you have any other allergies. This product may contain inactive ingredients, which can cause allergic reactions or other problems. Talk to your pharmacist for more details. Before using this medication, tell your doctor or pharmacist your medical history, especially of: blood disorders (such as anemia, hemophilia), bleeding problems (such as bleeding of the stomach/intestines, bleeding in the brain), blood vessel disorders (such as aneurysms), recent major injury/surgery, liver disease, alcohol use, mental/mood disorders (including memory problems), frequent falls/injuries. It is important that all your doctors and dentists know that you take  warfarin. Before having surgery or any medical/dental procedures, tell your doctor or dentist that you are taking this medication and about all the products you use (including prescription drugs, nonprescription drugs, and herbal products). Avoid getting injections into the muscles. If you must have an injection into a muscle (for example, a flu shot), it should be given in the arm. This way, it will be easier to check for bleeding and/or apply pressure bandages. This medication may cause stomach bleeding. Daily use of alcohol while using this medicine will increase your risk for stomach bleeding and may also affect how this medication works. Limit or avoid alcoholic beverages. If you have not been eating well, if you have an illness or infection that causes fever, vomiting, or diarrhea for more than 2 days, or if you start using any antibiotic medications, contact your doctor or pharmacist immediately because these conditions can affect how warfarin works. This medication can cause heavy bleeding. To lower the chance of getting cut, bruised, or injured, use great caution with sharp objects like safety razors and nail cutters. Use an electric razor when shaving and a soft toothbrush when brushing your teeth. Avoid activities such as contact sports. If you fall or injure yourself, especially if you hit your head, call your doctor immediately. Your doctor may need to check you. The Food & Drug Administration has stated that generic warfarin products are interchangeable. However, consult your doctor or pharmacist before switching warfarin products. Be careful not to take more than one medication that contains warfarin unless specifically directed by the doctor or health care provider who is monitoring your warfarin treatment. Older adults may be at greater risk for bleeding while using this drug. This medication is not recommended for use during pregnancy because of serious (possibly fatal) harm to an unborn baby.  "Discuss the use of reliable forms of birth control with your doctor. If you become pregnant or think you may be pregnant, tell your doctor immediately. If you are planning pregnancy, discuss a plan for managing your condition with your doctor before you become pregnant. Your doctor may switch the type of medication you use during pregnancy. Very small amounts of this medication may pass into breast milk but is unlikely to harm a nursing infant. Consult your doctor before breast-feeding.      DRUG INTERACTIONS:  Drug interactions may change how your medications work or increase your risk for serious side effects. This document does not contain all possible drug interactions. Keep a list of all the products you use (including prescription/nonprescription drugs and herbal products) and share it with your doctor and pharmacist. Do not start, stop, or change the dosage of any medicines without your doctor's approval. Warfarin interacts with many prescription, nonprescription, vitamin, and herbal products. This includes medications that are applied to the skin or inside the vagina or rectum. The interactions with warfarin usually result in an increase or decrease in the \"blood-thinning\" (anticoagulant) effect. Your doctor or other health care professional should closely monitor you to prevent serious bleeding or clotting problems. While taking warfarin, it is very important to tell your doctor or pharmacist of any changes in medications, vitamins, or herbal products that you are taking. Some products that may interact with this drug include: capecitabine, imatinib, mifepristone. Aspirin, aspirin-like drugs (salicylates), and nonsteroidal anti-inflammatory drugs (NSAIDs such as ibuprofen, naproxen, celecoxib) may have effects similar to warfarin. These drugs may increase the risk of bleeding problems if taken during treatment with warfarin. Carefully check all prescription/nonprescription product labels (including drugs " applied to the skin such as pain-relieving creams) since the products may contain NSAIDs or salicylates. Talk to your doctor about using a different medication (such as acetaminophen) to treat pain/fever. Low-dose aspirin and related drugs (such as clopidogrel, ticlopidine) should be continued if prescribed by your doctor for specific medical reasons such as heart attack or stroke prevention. Consult your doctor or pharmacist for more details. Many herbal products interact with warfarin. Tell your doctor before taking any herbal products, especially bromelains, coenzyme Q10, cranberry, danshen, dong quai, fenugreek, garlic, ginkgo biloba, ginseng, and Jayden's wort, among others. This medication may interfere with a certain laboratory test to measure theophylline levels, possibly causing false test results. Make sure laboratory personnel and all your doctors know you use this drug.      OVERDOSE:  If overdose is suspected, contact a poison control center or emergency room immediately.  residents can call the [employer] Poison Hotline at 1-951.420.5381. [place] residents can call a provincial poison control center. Symptoms of overdose may include: bloody/black/tarry stools, pink/dark urine, unusual/prolonged bleeding.      NOTES:  Do not share this medication with others. Laboratory and/or medical tests (such as INR, complete blood count) must be performed periodically to monitor your progress or check for side effects. Consult your doctor for more details.      MISSED DOSE:  For the best possible benefit, do not miss any doses. If you do miss a dose and remember on the same day, take it as soon as you remember. If you remember on the next day, skip the missed dose and resume your usual dosing schedule. Do not double the dose to catch up because this could increase your risk for bleeding. Keep a record of missed doses to give to your doctor or pharmacist. Contact your doctor or pharmacist if you miss 2 or more  doses in a row.      STORAGE:  Store at room temperature away from light and moisture. Do not store in the bathroom. Keep all medications away from children and pets. Do not flush medications down the toilet or pour them into a drain unless instructed to do so. Properly discard this product when it is  or no longer needed. Consult your pharmacist or local waste disposal company for more details about how to safely discard your product.      MEDICAL ALERT:  Your condition and medication can cause complications in a medical emergency. For information about enrolling in MedicAlert, call 1-197.416.3765 ([place]) or [phone] ([place]).      Information last revised 2010 Copyright(c)  [employer]             Depression / Suicide Risk    As you are discharged from this [facility] facility, it is important to learn how to keep safe from harming yourself.    Recognize the warning signs:  · Abrupt changes in personality, positive or negative- including increase in energy   · Giving away possessions  · Change in eating patterns- significant weight changes-  positive or negative  · Change in sleeping patterns- unable to sleep or sleeping all the time   · Unwillingness or inability to communicate  · Depression  · Unusual sadness, discouragement and loneliness  · Talk of wanting to die  · Neglect of personal appearance   · Rebelliousness- reckless behavior  · Withdrawal from people/activities they love  · Confusion- inability to concentrate     If you or a loved one observes any of these behaviors or has concerns about self-harm, here's what you can do:  · Talk about it- your feelings and reasons for harming yourself  · Remove any means that you might use to hurt yourself (examples: pills, rope, extension cords, firearm)  · Get professional help from the community (Mental Health, Substance Abuse, psychological counseling)  · Do not be alone:Call your Safe Contact- someone whom you trust who will be  there for you.  · Call your local CRISIS HOTLINE 716-4054 or 152-899-7007  · Call your local Children's Mobile Crisis Response Team [place] (515) 816-7866 or www.[employer]  · Call the toll free National Suicide Prevention Hotlines   · National Suicide Prevention Lifeline 507-280-AOXZ (1336)  · [employer] Line Network 800-ACHZATL (286-4460)    Perform CBC, CMP with PCP within 1 week of hospital discharge.  Any recurrent symptoms of abdominal pain, present to the emergency department.  Maintain close follow-up with PCP.

## 2019-12-14 NOTE — PROGRESS NOTES
Initial assessment and admit profile completed. Patient is A&Ox4 and able to make needs known. Patient denies abd pain, RLQ slightly more firm to touch than LLQ. LBM today and WNL. POC discussed with pt: IVF, labs. No further questions at this time. Fall precautions in place. Bed locked and in the lowest position, call light instructions provided, call light within reach. Needs met.

## 2019-12-14 NOTE — DISCHARGE SUMMARY
Discharge Summary    CHIEF COMPLAINT ON ADMISSION  Chief Complaint   Patient presents with   • Sent from Urgent Care   • Abdominal Pain       Reason for Admission  Sent by MD, Abdominal pain     Admission Date  12/12/2019    CODE STATUS  Full Code    HPI & HOSPITAL COURSE  This is a 79 y.o. female here with abdominal pain.      Patient with underlying history of COPD, paroxysmal atrial fibrillation on sotalol/Cardizem and anticoagulated with Coumadin, underlying history of peripheral vascular disease, stroke, previous history of colitis/diverticulitis, previous history of multiple C. difficile infections and history of fecal transplantation presented to the urgent care with complaints of right lower quadrant pain, from there transferred to the emergency department.  On presentation CT of the abdomen and pelvis was negative, minimal leukocytosis was noted.  Patient was admitted to the hospital, Bactrim was initiated given if any concerns for diverticulitis/colitis though none evident on the CT.  Patient was initiated on IV fluids, pain control.  On December 13, 2019, patient felt significantly better, symptoms had completely resolved, abdominal exam completely benign.  Her underlying leukocytosis could not be explained.  Patient reevaluated on December 14, 2019, at this time patient remains completely baseline, no further abdominal pain.  Patient is requesting to be discharged home.  Upon further review, it appears that patient had contrast-induced allergies, with her initial CT abdomen and pelvis she received Solu-Medrol 125 mg which has been causing her worsening steroid-induced leukocytosis.  Otherwise ESR, CRP and pro calcitonin are negative, no infectious concerns are overall apparent.  Do not see the need for ongoing antibiotic therapy especially Bactrim while she is on Coumadin.  Of note, her leukocytosis only worsened after the administration of Solu-Medrol.  On presentation she did not have any evidence of  leukocytosis.  At this time patient is being discharged home with recommendations to maintain close outpatient follow-up with PCP.  If recurrent symptoms to present to the emergency department.  Minimal transaminitis on presentation which is improvement.  Recommend interval CBC, CMP with PCP within 1 week of hospital discharge.    Therefore, she is discharged in good and stable condition to home with close outpatient follow-up.    Discharge Date  12/14/19    FOLLOW UP ITEMS POST DISCHARGE  F/U PCP     DISCHARGE DIAGNOSES  Principal Problem (Resolved):    Diverticulitis POA: Unknown  Active Problems:    Paroxysmal a-fib (CMS-HCC) POA: Yes      Overview: Symptomatic, on a rhythm control strategy with sotalol 40 mg by mouth       twice a day.      Followed by cardiology.    History of COPD POA: Yes  Resolved Problems:    Colitis POA: Unknown    Transaminitis POA: Unknown      FOLLOW UP  Future Appointments   Date Time Provider Department Center   3/3/2020  9:40 AM Marilou Carmona M.D. [facility] None     No follow-up provider specified.    MEDICATIONS ON DISCHARGE     Medication List      CONTINUE taking these medications      Instructions   albuterol 108 (90 Base) MCG/ACT Aers inhalation aerosol   Inhale 2 Puffs by mouth every 6 hours as needed for Shortness of Breath.  Dose:  2 Puff     ALPRAZolam 0.25 MG Tabs  Commonly known as:  XANAX   Take 0.25 mg by mouth 2 times a day as needed for Anxiety.  Dose:  0.25 mg     ascorbic acid 500 MG Tabs  Commonly known as:  ascorbic acid   Take 500 mg by mouth every day.  Dose:  500 mg     atorvastatin 20 MG Tabs  Commonly known as:  LIPITOR   Take 1 Tab by mouth every day.  Dose:  20 mg     DILTIAZem  MG Cp24  Commonly known as:  CARDIZEM CD   Take 240 mg by mouth every day.  Dose:  240 mg     ipratropium 0.02 % Soln  Commonly known as:  ATROVENT   0.2 mg by Nebulization route 4 times a day.  Dose:  0.2 mg     lactobacillus rhamnosus Caps capsule   Take 1 Cap by mouth every  day.  Dose:  1 Cap     lovastatin 20 MG Tabs  Commonly known as:  MEVACOR   Take 40 mg by mouth every evening.  Dose:  40 mg     pantoprazole 40 MG Tbec  Commonly known as:  PROTONIX   Take 40 mg by mouth every day.  Dose:  40 mg     simvastatin 40 MG Tabs  Commonly known as:  ZOCOR   Take 40 mg by mouth every evening.  Dose:  40 mg     sotalol 80 MG Tabs  Commonly known as:  BETAPACE   Take 40 mg by mouth 2 times a day.  Dose:  40 mg     sucralfate 1 GM Tabs  Commonly known as:  CARAFATE   Take 1 g by mouth 4 Times a Day,Before Meals and at Bedtime.  Dose:  1 g     vitamin D 1000 UNIT Tabs  Commonly known as:  cholecalciferol   Take 2,000 Units by mouth every morning.  Dose:  2,000 Units     warfarin 1 MG Tabs  Commonly known as:  COUMADIN   Take 1 mg by mouth every evening. Take 1mg every night of the week.  Dose:  1 mg            Allergies  Allergies   Allergen Reactions   • Codeine Swelling   • Iodine Swelling   • Bee Venom Anaphylaxis   • Chantix [Varenicline]      disoriented   • Ciprofloxacin      Causes C diff, recurrent and very difficult   • Flagyl [Metronidazole] Rash     C/O flagyl causes rash.    • Latex Hives   • Pcn [Penicillins] Anaphylaxis and Hives   • Shellfish Allergy      unknown   • Tetanus Antitoxin Swelling     unknown       DIET  Orders Placed This Encounter   Procedures   • Diet Order Regular     Standing Status:   Standing     Number of Occurrences:   1     Order Specific Question:   Diet:     Answer:   Regular [1]       ACTIVITY  As tolerated.  Weight bearing as tolerated    CONSULTATIONS  None    PROCEDURES  None    LABORATORY  Lab Results   Component Value Date    SODIUM 140 12/14/2019    POTASSIUM 4.8 12/14/2019    CHLORIDE 110 12/14/2019    CO2 23 12/14/2019    GLUCOSE 128 (H) 12/14/2019    BUN 19 12/14/2019    CREATININE 0.85 12/14/2019        Lab Results   Component Value Date    WBC 21.8 (H) 12/14/2019    HEMOGLOBIN 12.5 12/14/2019    HEMATOCRIT 38.6 12/14/2019    PLATELETCT 154  (L) 12/14/2019

## 2019-12-14 NOTE — PROGRESS NOTES
Pt in bed awake a&o x4,poc explained,explained why she has to stay,pt ok with that,awaiting for lab report,ivf to be started.Report given to Kerry KAYE.

## 2019-12-14 NOTE — PROGRESS NOTES
Inpatient Anticoagulation Service Note    Date: 12/13/2019    Reason for Anticoagulation: Atrial Fibrillation   Target INR: 2.0 to 3.0  [id number] VASc Score: 4  HAS-BLED Score: 2   Hemoglobin Value: 15.2  Hematocrit Value: (!) 47.3  Lab Platelet Value: 176    INR from last 7 days     Date/Time INR Value    12/12/19 1358  (!) 2.81        Dose from last 7 days     Date/Time Dose (mg)    12/13/19 1601  0.5    12/12/19 1759  1        Average Dose (mg): 1  Significant Interactions: Septra  Bridge Therapy: No     Reversal Agent Administered: Not Applicable  Comments: INR is slightly supratherapeutic today, likely related to the interaction with the septra administered yesterday. Dosing was discontinued today, but may anticipate continued rise in INR over the next few days. No overt S/S bleeding noted. Will give half dose tonight, INR tomorrow.     Plan:  0.5 mg tonight, INR tomorrow  Education Material Provided?: No  Pharmacist suggested discharge dosing: likely home dose of 1 mg daily     Angela Kevin, PHARMD

## 2019-12-14 NOTE — PROGRESS NOTES
Pt a&ox4,no c/o pain,pt not in distress,discharge instructions &prescriptions given,pt able to follow instructions,questions answered,discharge without any events.

## 2019-12-16 ENCOUNTER — ANTICOAGULATION MONITORING (OUTPATIENT)
Dept: VASCULAR LAB | Facility: MEDICAL CENTER | Age: 79
End: 2019-12-16

## 2019-12-16 ENCOUNTER — HOSPITAL ENCOUNTER (OUTPATIENT)
Dept: LAB | Facility: MEDICAL CENTER | Age: 79
End: 2019-12-16
Attending: NURSE PRACTITIONER
Payer: MEDICARE

## 2019-12-16 DIAGNOSIS — Z79.01 CHRONIC ANTICOAGULATION: ICD-10-CM

## 2019-12-16 DIAGNOSIS — I48.0 PAROXYSMAL A-FIB (HCC): ICD-10-CM

## 2019-12-16 LAB
BACTERIA STL CULT: NORMAL
E COLI SXT1+2 STL IA: NORMAL
INR PPP: 2.66 (ref 0.87–1.13)
INR PPP: 2.66 (ref 2–3.5)
PROTHROMBIN TIME: 29.3 SEC (ref 12–14.6)
SIGNIFICANT IND 70042: NORMAL
SITE SITE: NORMAL
SOURCE SOURCE: NORMAL

## 2019-12-16 PROCEDURE — 36415 COLL VENOUS BLD VENIPUNCTURE: CPT

## 2019-12-16 PROCEDURE — 85610 PROTHROMBIN TIME: CPT

## 2019-12-17 NOTE — PROGRESS NOTES
Anticoagulation Summary  As of 2019    INR goal:   2.0-3.0   TTR:   77.6 % (2 y)   INR used for dosin.66 (2019)   Warfarin maintenance plan:   1 mg (1 mg x 1) every day   Weekly warfarin total:   7 mg   Plan last modified:   Soco Rowland, PharmD (2019)   Next INR check:   2019   Target end date:   Indefinite    Indications    Paroxysmal a-fib (CMS-HCC) [I48.0]  Chronic anticoagulation [Z79.01]             Anticoagulation Episode Summary     INR check location:   Anticoagulation Clinic    Preferred lab:       Send INR reminders to:       Comments:   [phone] (H)      Anticoagulation Care Providers     Provider Role Specialty Phone number    Evelio Tejeda M.D. Referring Internal Medicine [phone]    [facility]agulation [facility] Responsible  [phone]    Beryl Pang M.D. Responsible Family Medicine [phone]        Anticoagulation Patient Findings  Patient Findings     Positives:   Hospital admission    Negatives:   Signs/symptoms of thrombosis, Signs/symptoms of bleeding, Laboratory test error suspected, Change in health, Change in alcohol use, Change in activity, Upcoming invasive procedure, Emergency department visit, Upcoming dental procedure, Missed doses, Extra doses, Change in medications, Change in diet/appetite, Bruising, Other complaints    Comments:   Abdominal pain, suspected leukocytosis         Spoke with patient today regarding therapeutic INR of 2.66.  Patient denies any signs/symptoms of bruising or bleeding or any changes in diet and medications.  Instructed patient to call clinic with any questions or concerns.  Patient recently admitted, INR increased rapidly due to steroid administration.  Warfarin was decreased 12-13 and held on , she restart as normal yesterday.  Given recent INR stability and steroid no longer present, will have patient resume current warfarin regimen.  Follow up in 1 weeks, to reduce risk of adverse events related to this  high risk medication,  Warfarin.    Major Hughes, PharmD, BCACP

## 2019-12-18 LAB
BACTERIA BLD CULT: NORMAL
BACTERIA BLD CULT: NORMAL
SIGNIFICANT IND 70042: NORMAL
SIGNIFICANT IND 70042: NORMAL
SITE SITE: NORMAL
SITE SITE: NORMAL
SOURCE SOURCE: NORMAL
SOURCE SOURCE: NORMAL

## 2019-12-20 ENCOUNTER — HOSPITAL ENCOUNTER (OUTPATIENT)
Dept: LAB | Facility: MEDICAL CENTER | Age: 79
End: 2019-12-20
Attending: STUDENT IN AN ORGANIZED HEALTH CARE EDUCATION/TRAINING PROGRAM
Payer: MEDICARE

## 2019-12-20 ENCOUNTER — PATIENT MESSAGE (OUTPATIENT)
Dept: CARDIOLOGY | Facility: MEDICAL CENTER | Age: 79
End: 2019-12-20

## 2019-12-20 DIAGNOSIS — E78.5 DYSLIPIDEMIA: ICD-10-CM

## 2019-12-20 LAB
ALBUMIN SERPL BCP-MCNC: 4.3 G/DL (ref 3.2–4.9)
ALBUMIN/GLOB SERPL: 1.7 G/DL
ALP SERPL-CCNC: 88 U/L (ref 30–99)
ALT SERPL-CCNC: 29 U/L (ref 2–50)
ANION GAP SERPL CALC-SCNC: 8 MMOL/L (ref 0–11.9)
AST SERPL-CCNC: 23 U/L (ref 12–45)
BASOPHILS # BLD AUTO: 1 % (ref 0–1.8)
BASOPHILS # BLD: 0.13 K/UL (ref 0–0.12)
BILIRUB SERPL-MCNC: 0.5 MG/DL (ref 0.1–1.5)
BUN SERPL-MCNC: 18 MG/DL (ref 8–22)
CALCIUM SERPL-MCNC: 9.5 MG/DL (ref 8.5–10.5)
CHLORIDE SERPL-SCNC: 105 MMOL/L (ref 96–112)
CO2 SERPL-SCNC: 26 MMOL/L (ref 20–33)
CREAT SERPL-MCNC: 0.78 MG/DL (ref 0.5–1.4)
EOSINOPHIL # BLD AUTO: 0.26 K/UL (ref 0–0.51)
EOSINOPHIL NFR BLD: 1.9 % (ref 0–6.9)
ERYTHROCYTE [DISTWIDTH] IN BLOOD BY AUTOMATED COUNT: 48.4 FL (ref 35.9–50)
FASTING STATUS PATIENT QL REPORTED: NORMAL
GLOBULIN SER CALC-MCNC: 2.6 G/DL (ref 1.9–3.5)
GLUCOSE SERPL-MCNC: 88 MG/DL (ref 65–99)
HCT VFR BLD AUTO: 45.1 % (ref 37–47)
HGB BLD-MCNC: 14.2 G/DL (ref 12–16)
IMM GRANULOCYTES # BLD AUTO: 0.11 K/UL (ref 0–0.11)
IMM GRANULOCYTES NFR BLD AUTO: 0.8 % (ref 0–0.9)
LYMPHOCYTES # BLD AUTO: 4.36 K/UL (ref 1–4.8)
LYMPHOCYTES NFR BLD: 32.4 % (ref 22–41)
MCH RBC QN AUTO: 29.6 PG (ref 27–33)
MCHC RBC AUTO-ENTMCNC: 31.5 G/DL (ref 33.6–35)
MCV RBC AUTO: 94 FL (ref 81.4–97.8)
MONOCYTES # BLD AUTO: 1.33 K/UL (ref 0–0.85)
MONOCYTES NFR BLD AUTO: 9.9 % (ref 0–13.4)
NEUTROPHILS # BLD AUTO: 7.27 K/UL (ref 2–7.15)
NEUTROPHILS NFR BLD: 54 % (ref 44–72)
NRBC # BLD AUTO: 0 K/UL
NRBC BLD-RTO: 0 /100 WBC
PLATELET # BLD AUTO: 248 K/UL (ref 164–446)
PMV BLD AUTO: 9.8 FL (ref 9–12.9)
POTASSIUM SERPL-SCNC: 4.2 MMOL/L (ref 3.6–5.5)
PROT SERPL-MCNC: 6.9 G/DL (ref 6–8.2)
RBC # BLD AUTO: 4.8 M/UL (ref 4.2–5.4)
SODIUM SERPL-SCNC: 139 MMOL/L (ref 135–145)
WBC # BLD AUTO: 13.5 K/UL (ref 4.8–10.8)

## 2019-12-20 PROCEDURE — 85025 COMPLETE CBC W/AUTO DIFF WBC: CPT

## 2019-12-20 PROCEDURE — 80053 COMPREHEN METABOLIC PANEL: CPT

## 2019-12-20 PROCEDURE — 36415 COLL VENOUS BLD VENIPUNCTURE: CPT

## 2019-12-23 ENCOUNTER — ANTICOAGULATION MONITORING (OUTPATIENT)
Dept: VASCULAR LAB | Facility: MEDICAL CENTER | Age: 79
End: 2019-12-23

## 2019-12-23 ENCOUNTER — HOSPITAL ENCOUNTER (OUTPATIENT)
Dept: LAB | Facility: MEDICAL CENTER | Age: 79
End: 2019-12-23
Attending: NURSE PRACTITIONER
Payer: MEDICARE

## 2019-12-23 DIAGNOSIS — I48.0 PAROXYSMAL A-FIB (HCC): ICD-10-CM

## 2019-12-23 DIAGNOSIS — Z79.01 CHRONIC ANTICOAGULATION: ICD-10-CM

## 2019-12-23 LAB
INR PPP: 2.11 (ref 0.87–1.13)
PROTHROMBIN TIME: 24.4 SEC (ref 12–14.6)

## 2019-12-23 PROCEDURE — 36415 COLL VENOUS BLD VENIPUNCTURE: CPT

## 2019-12-23 PROCEDURE — 85610 PROTHROMBIN TIME: CPT

## 2019-12-23 NOTE — PROGRESS NOTES
Anticoagulation Summary  As of 2019    INR goal:   2.0-3.0   TTR:   77.8 % (2 y)   INR used for dosin.11 (2019)   Warfarin maintenance plan:   1 mg (1 mg x 1) every day   Weekly warfarin total:   7 mg   Plan last modified:   Yaneli SchneiderD (2019)   Next INR check:   2019   Target end date:   Indefinite    Indications    Paroxysmal a-fib (CMS-HCC) [I48.0]  Chronic anticoagulation [Z79.01]             Anticoagulation Episode Summary     INR check location:   Anticoagulation Clinic    Preferred lab:       Send INR reminders to:       Comments:   [phone] (H)      Anticoagulation Care Providers     Provider Role Specialty Phone number    Evelio Tejeda M.D. Referring Internal Medicine [phone]    [facility] Anticoagulation Services Responsible  [phone]    Beryl Pang M.D. Responsible Family Medicine [phone]        Anticoagulation Patient Findings        Spoke to patient on the phone.   INR  therapeutic.   Denies signs/symptoms of bleeding and/or thrombosis.   Denies changes to diet or medications.   Follow up appointment in 2 week(s).    Continue weekly warfarin dose as noted      Pieter Covarrubias, PharmD, MS, BCACP, LCC    This note was created using voice recognition software (Dragon). The accuracy of the dictation is limited by the abilities of the software. I have reviewed the note prior to signing, however some errors in grammar and context are still possible. If you have any questions related to this note please do not hesitate to contact our office.

## 2019-12-24 ENCOUNTER — TELEPHONE (OUTPATIENT)
Dept: CARDIOLOGY | Facility: MEDICAL CENTER | Age: 79
End: 2019-12-24

## 2019-12-24 RX ORDER — SOTALOL HYDROCHLORIDE 80 MG/1
40 TABLET ORAL 2 TIMES DAILY
Qty: 90 TAB | Refills: 2 | Status: SHIPPED | OUTPATIENT
Start: 2019-12-24 | End: 2020-07-09

## 2019-12-24 RX ORDER — SIMVASTATIN 40 MG
40 TABLET ORAL EVERY EVENING
Qty: 90 TAB | Refills: 3 | Status: SHIPPED | OUTPATIENT
Start: 2019-12-24 | End: 2019-12-24

## 2019-12-24 RX ORDER — DILTIAZEM HYDROCHLORIDE 240 MG/1
240 CAPSULE, COATED, EXTENDED RELEASE ORAL DAILY
Qty: 90 CAP | Refills: 2 | Status: SHIPPED | OUTPATIENT
Start: 2019-12-24 | End: 2020-07-09

## 2019-12-24 NOTE — TELEPHONE ENCOUNTER
Contacted patient.  Discussed RX pending.  Sotalol and Diltiazem submitted.  Patient does not take Simvastatin  Cancelled that medication. Patient denies any other needs

## 2019-12-24 NOTE — TELEPHONE ENCOUNTER
"LS      Patient is calling about her pending refills. She said she called yesterday and is still waiting for a call back, she said she is almost out of her medication and is \"frantic\" for the refills. She can be reached at [phone].  "

## 2019-12-26 DIAGNOSIS — E78.5 HYPERLIPIDEMIA, UNSPECIFIED HYPERLIPIDEMIA TYPE: Primary | ICD-10-CM

## 2019-12-27 RX ORDER — LOVASTATIN 20 MG/1
40 TABLET ORAL EVERY EVENING
Qty: 90 TAB | Refills: 3 | Status: SHIPPED | OUTPATIENT
Start: 2019-12-27 | End: 2019-12-30 | Stop reason: SDUPTHER

## 2019-12-30 DIAGNOSIS — E78.5 HYPERLIPIDEMIA, UNSPECIFIED HYPERLIPIDEMIA TYPE: ICD-10-CM

## 2019-12-30 RX ORDER — LOVASTATIN 40 MG/1
40 TABLET ORAL EVERY EVENING
Qty: 90 TAB | Refills: 3 | Status: SHIPPED | OUTPATIENT
Start: 2019-12-30 | End: 2020-01-02

## 2020-01-02 DIAGNOSIS — E78.5 HYPERLIPIDEMIA, UNSPECIFIED HYPERLIPIDEMIA TYPE: ICD-10-CM

## 2020-01-02 DIAGNOSIS — E78.5 DYSLIPIDEMIA: ICD-10-CM

## 2020-01-02 RX ORDER — ATORVASTATIN CALCIUM 20 MG/1
20 TABLET, FILM COATED ORAL DAILY
Qty: 90 TAB | Refills: 3 | Status: SHIPPED | OUTPATIENT
Start: 2020-01-02 | End: 2020-01-07 | Stop reason: SINTOL

## 2020-01-02 NOTE — PROGRESS NOTES
Current statin should be atorvastatin.  [employer] calling to confirm.  They are requesting a new Rx be sent.      Rx sent

## 2020-01-06 ENCOUNTER — ANTICOAGULATION MONITORING (OUTPATIENT)
Dept: VASCULAR LAB | Facility: MEDICAL CENTER | Age: 80
End: 2020-01-06

## 2020-01-06 ENCOUNTER — HOSPITAL ENCOUNTER (OUTPATIENT)
Dept: LAB | Facility: MEDICAL CENTER | Age: 80
End: 2020-01-06
Attending: NURSE PRACTITIONER
Payer: MEDICARE

## 2020-01-06 ENCOUNTER — HOSPITAL ENCOUNTER (OUTPATIENT)
Dept: LAB | Facility: MEDICAL CENTER | Age: 80
End: 2020-01-06
Attending: INTERNAL MEDICINE
Payer: MEDICARE

## 2020-01-06 ENCOUNTER — TELEPHONE (OUTPATIENT)
Dept: CARDIOLOGY | Facility: MEDICAL CENTER | Age: 80
End: 2020-01-06

## 2020-01-06 DIAGNOSIS — I48.0 PAROXYSMAL A-FIB (HCC): ICD-10-CM

## 2020-01-06 DIAGNOSIS — Z79.01 CHRONIC ANTICOAGULATION: ICD-10-CM

## 2020-01-06 DIAGNOSIS — Z79.899 HIGH RISK MEDICATION USE: ICD-10-CM

## 2020-01-06 DIAGNOSIS — E78.5 HYPERLIPIDEMIA, UNSPECIFIED HYPERLIPIDEMIA TYPE: ICD-10-CM

## 2020-01-06 LAB
ANION GAP SERPL CALC-SCNC: 7 MMOL/L (ref 0–11.9)
BUN SERPL-MCNC: 14 MG/DL (ref 8–22)
CALCIUM SERPL-MCNC: 9.6 MG/DL (ref 8.5–10.5)
CHLORIDE SERPL-SCNC: 108 MMOL/L (ref 96–112)
CHOLEST SERPL-MCNC: 162 MG/DL (ref 100–199)
CO2 SERPL-SCNC: 26 MMOL/L (ref 20–33)
CREAT SERPL-MCNC: 0.81 MG/DL (ref 0.5–1.4)
FASTING STATUS PATIENT QL REPORTED: NORMAL
GLUCOSE SERPL-MCNC: 87 MG/DL (ref 65–99)
HDLC SERPL-MCNC: 86 MG/DL
INR PPP: 1.81 (ref 0.87–1.13)
LDLC SERPL CALC-MCNC: 63 MG/DL
MAGNESIUM SERPL-MCNC: 1.8 MG/DL (ref 1.5–2.5)
POTASSIUM SERPL-SCNC: 4.2 MMOL/L (ref 3.6–5.5)
PROTHROMBIN TIME: 21.5 SEC (ref 12–14.6)
SODIUM SERPL-SCNC: 141 MMOL/L (ref 135–145)
TRIGL SERPL-MCNC: 65 MG/DL (ref 0–149)

## 2020-01-06 PROCEDURE — 36415 COLL VENOUS BLD VENIPUNCTURE: CPT

## 2020-01-06 PROCEDURE — 80048 BASIC METABOLIC PNL TOTAL CA: CPT

## 2020-01-06 PROCEDURE — 80061 LIPID PANEL: CPT

## 2020-01-06 PROCEDURE — 85610 PROTHROMBIN TIME: CPT

## 2020-01-06 PROCEDURE — 83735 ASSAY OF MAGNESIUM: CPT

## 2020-01-06 NOTE — PROGRESS NOTES
Anticoagulation Summary  As of 2020    INR goal:   2.0-3.0   TTR:   77.1 % (2 y)   INR used for dosin.81! (2020)   Warfarin maintenance plan:   1 mg (1 mg x 1) every day   Weekly warfarin total:   7 mg   Plan last modified:   Soco Rowland, PharmD (2019)   Next INR check:   2020   Target end date:   Indefinite    Indications    Paroxysmal a-fib (CMS-HCC) [I48.0]  Chronic anticoagulation [Z79.01]             Anticoagulation Episode Summary     INR check location:   Anticoagulation Clinic    Preferred lab:       Send INR reminders to:       Comments:   [phone] (H)      Anticoagulation Care Providers     Provider Role Specialty Phone number    Evelio Tejeda M.D. Referring Internal Medicine [phone]    [facility] Responsible  [phone]    Beryl Pang M.D. Responsible Family Medicine [phone]        Anticoagulation Patient Findings  Patient Findings     Positives:   Change in medications (switched from lovastatin to atorvastatin; c/o GI upset w/ atorvastatin), Change in diet/appetite (reports not eating as much)    Negatives:   Signs/symptoms of thrombosis, Signs/symptoms of bleeding, Laboratory test error suspected, Change in health, Change in alcohol use, Change in activity, Upcoming invasive procedure, Emergency department visit, Upcoming dental procedure, Missed doses, Extra doses, Hospital admission, Bruising, Other complaints          Spoke with pt.  INR is sub-therapeutic.   Pt denies any unusual s/s of bleeding, bruising, clotting or any changes to diet. Denies any etoh, cranberries, supplements.   Pt verifies warfarin weekly dosing.     Will have pt bolus x 1 day, then continue regimen    Repeat INR in 1 week(s).     Keren Valerio, PharmD

## 2020-01-06 NOTE — TELEPHONE ENCOUNTER
"LS    Pt left a voicemail stating she was given a new Rx by [facility] that made her \"deathly ill\" & will not be taking it (med name not specified). #[phone]  "

## 2020-01-07 ENCOUNTER — ANTICOAGULATION MONITORING (OUTPATIENT)
Dept: VASCULAR LAB | Facility: MEDICAL CENTER | Age: 80
End: 2020-01-07

## 2020-01-07 DIAGNOSIS — I48.0 PAROXYSMAL A-FIB (HCC): ICD-10-CM

## 2020-01-07 DIAGNOSIS — Z79.01 CHRONIC ANTICOAGULATION: ICD-10-CM

## 2020-01-07 RX ORDER — LOVASTATIN 40 MG/1
40 TABLET ORAL EVERY EVENING
Qty: 90 TAB | Refills: 3 | Status: SHIPPED | OUTPATIENT
Start: 2020-01-07 | End: 2020-07-15 | Stop reason: SDUPTHER

## 2020-01-07 NOTE — TELEPHONE ENCOUNTER
Patient states she was changed from Lovastatin to Atorvastatin.  She c/o severe reaction to the atorvastatin.  She was so ill, nausea, couldn't eat, headache.    Advised to discontinue the Atorvastatin and go back on the Lovastatin which patient was taking during last OV note.  Patient reports no issues with lovastatin.     Denies any other needs or issues.      Rx submitted to [employer]

## 2020-01-13 ENCOUNTER — HOSPITAL ENCOUNTER (OUTPATIENT)
Dept: LAB | Facility: MEDICAL CENTER | Age: 80
End: 2020-01-13
Attending: NURSE PRACTITIONER
Payer: MEDICARE

## 2020-01-13 ENCOUNTER — ANTICOAGULATION MONITORING (OUTPATIENT)
Dept: VASCULAR LAB | Facility: MEDICAL CENTER | Age: 80
End: 2020-01-13

## 2020-01-13 DIAGNOSIS — Z79.01 CHRONIC ANTICOAGULATION: ICD-10-CM

## 2020-01-13 DIAGNOSIS — I48.0 PAROXYSMAL A-FIB (HCC): ICD-10-CM

## 2020-01-13 LAB
INR PPP: 2.31 (ref 0.87–1.13)
PROTHROMBIN TIME: 26.2 SEC (ref 12–14.6)

## 2020-01-13 PROCEDURE — 36415 COLL VENOUS BLD VENIPUNCTURE: CPT

## 2020-01-13 PROCEDURE — 85610 PROTHROMBIN TIME: CPT

## 2020-01-14 NOTE — PROGRESS NOTES
Anticoagulation Summary  As of 2020    INR goal:   2.0-3.0   TTR:   76.9 % (2.1 y)   INR used for dosin.31 (2020)   Warfarin maintenance plan:   1 mg (1 mg x 1) every day   Weekly warfarin total:   7 mg   Plan last modified:   Soco Rowland, PharmD (2019)   Next INR check:      Target end date:   Indefinite    Indications    Paroxysmal a-fib (CMS-HCC) [I48.0]  Chronic anticoagulation [Z79.01]             Anticoagulation Episode Summary     INR check location:   Anticoagulation Clinic    Preferred lab:       Send INR reminders to:       Comments:   [phone] (H)      Anticoagulation Care Providers     Provider Role Specialty Phone number    Evelio Tejeda M.D. Referring Internal Medicine [phone]    [facility] Responsible  [phone]    Beryl Pang M.D. Responsible Family Medicine [phone]        Anticoagulation Patient Findings    HPI:  Angie Urbinadinora Root, on anticoagulation therapy with warfarin for AF.  Changes to current medical/health status since last appt: none  Denies signs/symptoms of bleeding and/or thrombosis since the last appt.    Denies any interval changes to diet  Denies any interval changes to medications since last appt.   Denies any complications or cost restrictions with current therapy.   Confirmed dosing regimen.    A/P   INR therapeutic.     Discussed INR with Pt on the phone. Will continue with current regimen.     Next INR in 1 week(s).    Sadi Franklin, Pharmacy Intern

## 2020-01-20 ENCOUNTER — HOSPITAL ENCOUNTER (OUTPATIENT)
Dept: LAB | Facility: MEDICAL CENTER | Age: 80
End: 2020-01-20
Attending: NURSE PRACTITIONER
Payer: MEDICARE

## 2020-01-20 DIAGNOSIS — Z79.01 CHRONIC ANTICOAGULATION: ICD-10-CM

## 2020-01-20 LAB
INR PPP: 2.1 (ref 0.87–1.13)
PROTHROMBIN TIME: 24.3 SEC (ref 12–14.6)

## 2020-01-20 PROCEDURE — 85610 PROTHROMBIN TIME: CPT

## 2020-01-20 PROCEDURE — 36415 COLL VENOUS BLD VENIPUNCTURE: CPT

## 2020-01-21 ENCOUNTER — ANTICOAGULATION MONITORING (OUTPATIENT)
Dept: VASCULAR LAB | Facility: MEDICAL CENTER | Age: 80
End: 2020-01-21

## 2020-01-21 DIAGNOSIS — Z79.01 CHRONIC ANTICOAGULATION: ICD-10-CM

## 2020-01-21 DIAGNOSIS — I48.0 PAROXYSMAL A-FIB (HCC): ICD-10-CM

## 2020-01-22 NOTE — PROGRESS NOTES
Anticoagulation Summary  As of 2020    INR goal:   2.0-3.0   TTR:   77.1 % (2.1 y)   INR used for dosin.10 (2020)   Warfarin maintenance plan:   1 mg (1 mg x 1) every day   Weekly warfarin total:   7 mg   Plan last modified:   Yaneli SchneiderD (2019)   Next INR check:   2020   Target end date:   Indefinite    Indications    Paroxysmal a-fib (CMS-HCC) [I48.0]  Chronic anticoagulation [Z79.01]             Anticoagulation Episode Summary     INR check location:   Anticoagulation Clinic    Preferred lab:       Send INR reminders to:       Comments:   [phone] (H)      Anticoagulation Care Providers     Provider Role Specialty Phone number    Evelio Tejeda M.D. Referring Internal Medicine [phone]    [facility]ticoagulation [facility] Responsible  [phone]    Beryl Pang M.D. Responsible Family Medicine [phone]        Anticoagulation Patient Findings        Spoke to patient on the phone.   INR  therapeutic.   Denies signs/symptoms of bleeding and/or thrombosis.   Denies changes to diet or medications.   Follow up appointment in 2 week(s).    Continue weekly warfarin dose as noted      Pieter Covarrubias, PharmD, MS, BCACP, LCC    This note was created using voice recognition software (Dragon). The accuracy of the dictation is limited by the abilities of the software. I have reviewed the note prior to signing, however some errors in grammar and context are still possible. If you have any questions related to this note please do not hesitate to contact our office.

## 2020-02-03 ENCOUNTER — ANTICOAGULATION MONITORING (OUTPATIENT)
Dept: VASCULAR LAB | Facility: MEDICAL CENTER | Age: 80
End: 2020-02-03

## 2020-02-03 ENCOUNTER — HOSPITAL ENCOUNTER (OUTPATIENT)
Dept: LAB | Facility: MEDICAL CENTER | Age: 80
End: 2020-02-03
Attending: NURSE PRACTITIONER
Payer: MEDICARE

## 2020-02-03 DIAGNOSIS — Z79.01 CHRONIC ANTICOAGULATION: ICD-10-CM

## 2020-02-03 DIAGNOSIS — I48.0 PAROXYSMAL A-FIB (HCC): ICD-10-CM

## 2020-02-03 LAB
INR PPP: 2.17 (ref 0.87–1.13)
PROTHROMBIN TIME: 24.9 SEC (ref 12–14.6)

## 2020-02-03 PROCEDURE — 36415 COLL VENOUS BLD VENIPUNCTURE: CPT

## 2020-02-03 PROCEDURE — 85610 PROTHROMBIN TIME: CPT

## 2020-02-04 NOTE — PROGRESS NOTES
Anticoagulation Summary  As of 2/3/2020    INR goal:   2.0-3.0   TTR:   77.5 % (2.1 y)   INR used for dosin.17 (2/3/2020)   Warfarin maintenance plan:   1 mg (1 mg x 1) every day   Weekly warfarin total:   7 mg   Plan last modified:   Soco Rowland, PharmD (2019)   Next INR check:   3/2/2020   Target end date:   Indefinite    Indications    Paroxysmal a-fib (CMS-HCC) [I48.0]  Chronic anticoagulation [Z79.01]             Anticoagulation Episode Summary     INR check location:   Anticoagulation Clinic    Preferred lab:       Send INR reminders to:       Comments:   [phone] (H)      Anticoagulation Care Providers     Provider Role Specialty Phone number    Evelio Tejeda M.D. Referring Internal Medicine [phone]    [facility] Anticoagulation Services Responsible  [phone]    Breyl Pang M.D. Responsible Family Medicine [phone]        Anticoagulation Patient Findings    HPI:  Angie Root, on anticoagulation therapy with warfarin for AF.  Changes to current medical/health status since last appt: none  Denies signs/symptoms of bleeding and/or thrombosis since the last appt.    Denies any interval changes to diet  Denies any interval changes to medications since last appt.   Denies any complications or cost restrictions with current therapy.   Confirmed dosing regimen.    A/P   INR therapeutic.     Continue current regimen.    Next INR in 4 week(s).    Sadi Franklin, Pharmacy Intern

## 2020-02-28 RX ORDER — WARFARIN SODIUM 1 MG/1
TABLET ORAL
Qty: 90 TAB | Refills: 1 | Status: SHIPPED | OUTPATIENT
Start: 2020-02-28 | End: 2020-02-28

## 2020-02-28 RX ORDER — WARFARIN SODIUM 1 MG/1
TABLET ORAL
Qty: 90 TAB | Refills: 1 | Status: SHIPPED | OUTPATIENT
Start: 2020-02-28 | End: 2020-05-27

## 2020-03-01 NOTE — PROGRESS NOTES
"Subjective:   Chief Complaint:   No chief complaint on file.      \"Angie\" Bartolome Root is a 80 y.o. female who returns for PAD status post PCI to her left leg in 2008, and more recently diagnosed ischemic colitis, CAD with 2 stents placed in her LAD in 2009, nicotine dependence, dyslipidemia, recurrent C diff colitis, and paroxysmal atrial fibrillation on rhythm control strategy (with sotalol) and now anticoagulated with Warfarin.    Probably had rheumatic fever as a child.  Sister had some type of autoimmune vascular problem, mult stents to her legs.  She had stents for PAD in 1991.  Then having afib with palpitations.  Also having Cps, had angio, 2 stents, then afib went away but also on sotalol.  Prior K replacement caused problems.    On warfarin, [id number] of 3, no TIA/CVA.    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 No Abx due to CDiff.    Echo 2018, normal EF, normal size LA, RVSP 45 mmhg.    LDL 62, HDL 80, on lovastatin, was on another statin, does not know which, no prior problems.  BP low, no HTN.    Still smoking, since age 15, quit a few times, has used Chantix, hypnosis.    Followed for Pulm Nodule, calcified atherosclerosis on CT.    DATA REVIEWED by me:  ECG 5-24-19  Sinus, 56, QTc 449    Echocardiogram, 8/6/2018:  Normal left ventricular systolic function.  Left ventricular ejection fraction is visually estimated to be 60%.   The right ventricle was normal in size and function.  Mild mitral regurgitation.  Mild tricuspid regurgitation.  Estimated right ventricular systolic pressure is 45 mmHg.  LA normal size.    Echocardiogram, 6/27/2016:  Normal left ventricular size, thickness, systolic function EF 60%.   Mitral annular calcification.  Aortic sclerosis without stenosis.   Mild tricuspid regurgitation.  Right ventricular systolic pressure is estimated to be 33 mmHg.   No prior study for comparison\"     Echocardiogram, 11/18/2017:  Prior echocardiogram 6/27/2016.Normal left " "ventricular chamber size.  Left ventricular ejection fraction is visually estimated to be 60%.  Grade II diastolic dysfunction.  Aortic sclerosis without stenosis.  Mitral annular calcification.  Heavy calcification of the posterior mitral valve leaflet.  Mild mitral regurgitation.  Trace tricuspid regurgitation.  Normal pericardium without effusion.  Ascending aorta diameter is 2.4 cm\"     Myocardial perfusion imaging, 8/6/2018:  Normal left ventricular perfusion with no fixed or reversible defects.   Normal left ventricular size, ejection fraction, and wall motion.     CT chest 7-24-19  1.  No change in single small pulmonary nodule in each lower pulmonary lobe since 2017. These nodules appear benign.  2.  No new pulmonary nodules or masses are identified.  3.  Emphysema is again noted.  4.  Limited areas of pulmonary scarring fibrosis and bronchiectasis are again noted.  5.  Calcific atherosclerosis again noted.  6.  Calculi are again identified within each kidney.    Most recent labs:     Lab Results   Component Value Date/Time    HEMOGLOBIN 14.2 12/20/2019 02:36 PM    HEMATOCRIT 45.1 12/20/2019 02:36 PM    MCV 94.0 12/20/2019 02:36 PM    INR 2.17 (H) 02/03/2020 09:23 AM      Lab Results   Component Value Date/Time    SODIUM 141 01/06/2020 09:07 AM    POTASSIUM 4.2 01/06/2020 09:07 AM    CHLORIDE 108 01/06/2020 09:07 AM    CO2 26 01/06/2020 09:07 AM    GLUCOSE 87 01/06/2020 09:07 AM    BUN 14 01/06/2020 09:07 AM    CREATININE 0.81 01/06/2020 09:07 AM      Lab Results   Component Value Date/Time    ASTSGOT 23 12/20/2019 02:36 PM    ALTSGPT 29 12/20/2019 02:36 PM    ALBUMIN 4.3 12/20/2019 02:36 PM      Lab Results   Component Value Date/Time    CHOLSTRLTOT 162 01/06/2020 09:07 AM    LDL 63 01/06/2020 09:07 AM    HDL 86 01/06/2020 09:07 AM    TRIGLYCERIDE 65 01/06/2020 09:07 AM         7-16-19 hgb 15, p 185, n 137, k 3.8, cr 0.83, lfts normal     9-24-18 ldl 62, hdl 80, tg 93, tc 161    Past Medical History: "   Diagnosis Date   • Arthritis     BL hands   • CAD (coronary artery disease)    • Cancer (HCC) 1982    melanoma   • COPD (chronic obstructive pulmonary disease) (HCC)    • Croup 3 years old   • Diverticulosis    • Dyslipidemia    • GERD (gastroesophageal reflux disease)    • Hiatal hernia    • High cholesterol    • Hypertension    • Infectious disease 2018    C Diff   • Measles     5 years old   • Paroxysmal A-fib (HCC) 1/20/2016    Symptomatic, on a rhythm control strategy with sotalol 40 mg by mouth twice a day.    • Paroxysmal atrial fibrillation (HCC)    • Prediabetes    • PVD (peripheral vascular disease) (HCC)      Past Surgical History:   Procedure Laterality Date   • FECAL TRANSPLANT N/A 4/9/2018    Procedure: FECAL TRANSPLANT;  Surgeon: Gonzalez Cruz M.D.;  Location: ENDOSCOPY [facility];  Service: Gastroenterology   • COLONOSCOPY - ENDO N/A 4/9/2018    Procedure: COLONOSCOPY - ENDO;  Surgeon: Gonzalez Cruz M.D.;  Location: ENDOSCOPY [facility];  Service: Gastroenterology   • WIDE EXCISION MELANOMA, LEG, W/POSS.STSG  10/2015    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   • CARDIAC CATH, RIGHT HEART  2008    stent   • OTHER ORTHOPEDIC SURGERY Left 2008    hand repair   • CHOLECYSTECTOMY  1993   • TONSILLECTOMY  1945   • OTHER CARDIAC SURGERY      r heart cath stents   • STENT PLACEMENT      L iliac stent     Family History   Problem Relation Age of Onset   • Hypertension Mother    • Hyperlipidemia Mother    • Cancer Maternal Grandmother         tongue   • Cancer Maternal Uncle         x 3, pancreas   • Cancer Maternal Grandfather         unknown   • Cancer Paternal Grandmother         unknown   • Cancer Paternal Grandfather         unknown   • Diabetes Maternal Uncle    • Heart Disease Maternal Uncle    • Hypertension Sister    • Hyperlipidemia Sister    • Heart Disease Sister    • Alcohol/Drug Sister    • Thyroid Sister    • Psychiatric Illness Neg Hx    • Stroke Neg Hx      Social History      Socioeconomic History   • Marital status:      Spouse name: Not on file   • Number of children: 0   • Years of education: Not on file   • Highest education level: Not on file   Occupational History   • Not on file   Social Needs   • Financial resource strain: Not on file   • Food insecurity     Worry: Not on file     Inability: Not on file   • Transportation needs     Medical: Not on file     Non-medical: Not on file   Tobacco Use   • Smoking status: Current Every Day Smoker     Packs/day: 0.25     Years: 60.00     Pack years: 15.00     Types: Cigarettes     Start date: 3/20/1957   • Smokeless tobacco: Never Used   • Tobacco comment: 6 per day   Substance and Sexual Activity   • Alcohol use: No     Alcohol/week: 0.0 oz   • Drug use: No   • Sexual activity: Not Currently     Partners: Male   Lifestyle   • Physical activity     Days per week: Not on file     Minutes per session: Not on file   • Stress: Not on file   Relationships   • Social connections     Talks on phone: Not on file     Gets together: Not on file     Attends Anabaptist service: Not on file     Active member of club or organization: Not on file     Attends meetings of clubs or organizations: Not on file     Relationship status: Not on file   • Intimate partner violence     Fear of current or ex partner: Not on file     Emotionally abused: Not on file     Physically abused: Not on file     Forced sexual activity: Not on file   Other Topics Concern   • Not on file   Social History Narrative    .     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     Allergies   Allergen Reactions   • Codeine Swelling   • Iodine Swelling   • Bee Venom Anaphylaxis   • Chantix [Varenicline]      disoriented   • Ciprofloxacin      Causes C diff, recurrent and very difficult   • Flagyl [Metronidazole] Rash     C/O flagyl causes rash.    • Latex Hives   • Lipitor [Atorvastatin Calcium] Unspecified     Intense Stomach pain   • Pcn [Penicillins] Anaphylaxis and  Hives   • Shellfish Allergy      unknown   • Tetanus Antitoxin Swelling     unknown       Current Outpatient Medications   Medication Sig Dispense Refill   • warfarin (COUMADIN) 1 MG Tab Take one tablet by mouth daily or as directed by anticoagulation clinic 90 Tab 1   • lovastatin (MEVACOR) 40 MG tablet Take 1 Tab by mouth every evening. 90 Tab 3   • sotalol (BETAPACE) 80 MG Tab Take 0.5 Tabs by mouth 2 times a day. 90 Tab 2   • DILTIAZem CD (CARDIZEM CD) 240 MG CAPSULE SR 24 HR Take 1 Cap by mouth every day. 90 Cap 2   • pantoprazole (PROTONIX) 40 MG Tablet Delayed Response Take 40 mg by mouth every day.     • albuterol 108 (90 Base) MCG/ACT Aero Soln inhalation aerosol Inhale 2 Puffs by mouth every 6 hours as needed for Shortness of Breath.     • ascorbic acid (ASCORBIC ACID) 500 MG Tab Take 500 mg by mouth every day.     • sucralfate (CARAFATE) 1 GM Tab Take 1 g by mouth 4 Times a Day,Before Meals and at Bedtime.     • ipratropium (ATROVENT) 0.02 % Solution 0.2 mg by Nebulization route 4 times a day.     • lactobacillus rhamnosus (CULTURELLE) Cap capsule Take 1 Cap by mouth every day.     • alprazolam (XANAX) 0.25 MG Tab Take 0.25 mg by mouth 2 times a day as needed for Anxiety.     • vitamin D (CHOLECALCIFEROL) 1000 UNIT Tab Take 2,000 Units by mouth every morning.       No current facility-administered medications for this visit.        ROS  All others systems reviewed and negative.     Objective:     There were no vitals taken for this visit. There is no height or weight on file to calculate BMI.    Physical Exam   General: No acute distress. Well nourished. Appears younger than age.  HEENT: EOM grossly intact, no scleral icterus, no pharyngeal erythema.   Neck:  No JVD, no bruits, trachea midline  CVS: RRR. Normal S1, S2, =S3. No LE edema.  2+ radial pulses, 2+ PT pulses  Resp. Coarse BS bilaterally, prolonged exp phase, Normal respiratory effort.  Abdomen: Soft, NT, no simon hepatomegaly.  MSK/Ext: No  clubbing or cyanosis.  Skin: Warm and dry, no rashes.  Neurological: CN III-XII grossly intact. No focal deficits.   Psych: A&O x 3, appropriate affect, good judgement    Physical exam performed today and unchanged, except what is noted, compared to 9-7-19      Assessment:     1. Paroxysmal a-fib (CMS-HCC)     2. Coronary artery disease involving native coronary artery of native heart without angina pectoris     3. PCI to her LAD in 2009     4. Hypokalemia     5. Chronic anticoagulation     6. Dyslipidemia     7. Shortness of breath     8. Failure to thrive in adult     9. PAD (peripheral artery disease) ([facility])     10. High risk medication use     11. Essential hypertension, benign     12. Prediabetes     13. Recurrent colitis due to Clostridium difficile     14. Cigarette nicotine dependence with nicotine-induced disorder         Medical Decision Making:  Today's Assessment / Status / Plan:     -On warfarin, NACQN0cgir of 3, no TIA/CVA.  -Needs Mg and K on sotalol  -ECG for QTc yearly  -K and Mg every 6-12 months  -Her last LDL was 62 9-18, not planning to change/update statin unless she has another event so I don't think we need to change the meds, therefore I don't think we need to check lipids.      No follow-ups on file.    It is my pleasure to participate in the care of Ms. Root.  Please do not hesitate to contact me with questions or concerns.    Marilou Carmona MD, [facility]  Cardiologist [facility]    Please note that this dictation was created using voice recognition software. I have made every reasonable attempt to correct obvious errors, but it is possible there are errors of grammar and possibly content that I did not discover before finalizing the note.

## 2020-03-02 ENCOUNTER — ANTICOAGULATION MONITORING (OUTPATIENT)
Dept: MEDICAL GROUP | Facility: MEDICAL CENTER | Age: 80
End: 2020-03-02

## 2020-03-02 ENCOUNTER — HOSPITAL ENCOUNTER (OUTPATIENT)
Dept: LAB | Facility: MEDICAL CENTER | Age: 80
End: 2020-03-02
Attending: NURSE PRACTITIONER
Payer: MEDICARE

## 2020-03-02 DIAGNOSIS — Z79.01 CHRONIC ANTICOAGULATION: ICD-10-CM

## 2020-03-02 DIAGNOSIS — I48.0 PAROXYSMAL A-FIB (HCC): ICD-10-CM

## 2020-03-02 LAB
INR PPP: 2.2 (ref 0.87–1.13)
PROTHROMBIN TIME: 25.2 SEC (ref 12–14.6)

## 2020-03-02 PROCEDURE — 85610 PROTHROMBIN TIME: CPT

## 2020-03-02 PROCEDURE — 36415 COLL VENOUS BLD VENIPUNCTURE: CPT

## 2020-03-02 NOTE — PROGRESS NOTES
Anticoagulation Summary  As of 3/2/2020    INR goal:   2.0-3.0   TTR:   78.3 % (2.2 y)   INR used for dosin.20 (3/2/2020)   Warfarin maintenance plan:   1 mg (1 mg x 1) every day   Weekly warfarin total:   7 mg   No change documented:   Beck Bowman, Med Ass't   Plan last modified:   Soco Rowland, PharmD (2019)   Next INR check:   3/30/2020   Target end date:   Indefinite    Indications    Paroxysmal a-fib (CMS-HCC) [I48.0]  Chronic anticoagulation [Z79.01]             Anticoagulation Episode Summary     INR check location:   Anticoagulation Clinic    Preferred lab:       Send INR reminders to:       Comments:   [phone] (H)      Anticoagulation Care Providers     Provider Role Specialty Phone number    Evelio Tejeda M.D. Referring Internal Medicine [phone]    [facility] Anticoagulation Services Responsible  [phone]    Beryl Pang M.D. Responsible Family Medicine [phone]        Anticoagulation Patient Findings  Patient Findings     Negatives:   Signs/symptoms of thrombosis, Signs/symptoms of bleeding, Laboratory test error suspected, Change in health, Change in alcohol use, Change in activity, Upcoming invasive procedure, Emergency department visit, Upcoming dental procedure, Missed doses, Extra doses, Change in medications, Change in diet/appetite, Hospital admission, Bruising, Other complaints        Left voicemail message to report therapeutic INR of 2.2.  Patient to continue with current warfarin dosing regimen. Requested pt contact the clinic for any change to diet or medication, and to report any signs or symptoms of bleeding, bruising or clotting.  Pt to follow up in 4 weeks.    Beck Bowman, Med Ass't      I have reviewed and concur with the above plan     Pieter Covarrubias, PharmD

## 2020-03-03 ENCOUNTER — APPOINTMENT (OUTPATIENT)
Dept: CARDIOLOGY | Facility: MEDICAL CENTER | Age: 80
End: 2020-03-03
Payer: MEDICARE

## 2020-03-30 ENCOUNTER — HOSPITAL ENCOUNTER (OUTPATIENT)
Dept: LAB | Facility: MEDICAL CENTER | Age: 80
End: 2020-03-30
Attending: NURSE PRACTITIONER
Payer: MEDICARE

## 2020-03-30 ENCOUNTER — ANTICOAGULATION MONITORING (OUTPATIENT)
Dept: VASCULAR LAB | Facility: MEDICAL CENTER | Age: 80
End: 2020-03-30

## 2020-03-30 DIAGNOSIS — Z79.01 CHRONIC ANTICOAGULATION: ICD-10-CM

## 2020-03-30 DIAGNOSIS — I48.0 PAROXYSMAL A-FIB (HCC): ICD-10-CM

## 2020-03-30 LAB
INR PPP: 2.22 (ref 0.87–1.13)
PROTHROMBIN TIME: 25.4 SEC (ref 12–14.6)

## 2020-03-30 PROCEDURE — 36415 COLL VENOUS BLD VENIPUNCTURE: CPT

## 2020-03-30 PROCEDURE — 85610 PROTHROMBIN TIME: CPT

## 2020-03-30 NOTE — PROGRESS NOTES
Anticoagulation Summary  As of 3/30/2020    INR goal:   2.0-3.0   TTR:   79.1 % (2.3 y)   INR used for dosin.22 (3/30/2020)   Warfarin maintenance plan:   1 mg (1 mg x 1) every day   Weekly warfarin total:   7 mg   Plan last modified:   Yaneli SchneiderD (2019)   Next INR check:   2020   Target end date:   Indefinite    Indications    Paroxysmal a-fib (CMS-HCC) [I48.0]  Chronic anticoagulation [Z79.01]             Anticoagulation Episode Summary     INR check location:   Anticoagulation Clinic    Preferred lab:       Send INR reminders to:       Comments:   [phone] (H)      Anticoagulation Care Providers     Provider Role Specialty Phone number    Evelio Tejeda M.D. Referring Internal Medicine [phone]    [facility] Anticoagulation Services Responsible  [phone]    Beryl Pang M.D. Responsible Family Medicine [phone]        Anticoagulation Patient Findings        Spoke to patient on the phone.   INR  therapeutic.   Denies signs/symptoms of bleeding and/or thrombosis.   Denies changes to diet or medications.   Follow up appointment in 6 week(s).    Continue weekly warfarin dose as noted      Pieter Covarrubias, PharmD, MS, BCACP, LCC    This note was created using voice recognition software (Dragon). The accuracy of the dictation is limited by the abilities of the software. I have reviewed the note prior to signing, however some errors in grammar and context are still possible. If you have any questions related to this note please do not hesitate to contact our office.

## 2020-04-27 ENCOUNTER — HOSPITAL ENCOUNTER (OUTPATIENT)
Dept: LAB | Facility: MEDICAL CENTER | Age: 80
End: 2020-04-27
Attending: NURSE PRACTITIONER
Payer: MEDICARE

## 2020-04-27 ENCOUNTER — ANTICOAGULATION MONITORING (OUTPATIENT)
Dept: VASCULAR LAB | Facility: MEDICAL CENTER | Age: 80
End: 2020-04-27

## 2020-04-27 DIAGNOSIS — I48.0 PAROXYSMAL A-FIB (HCC): ICD-10-CM

## 2020-04-27 DIAGNOSIS — Z79.01 CHRONIC ANTICOAGULATION: ICD-10-CM

## 2020-04-27 LAB
INR PPP: 2.57 (ref 0.87–1.13)
PROTHROMBIN TIME: 28.5 SEC (ref 12–14.6)

## 2020-04-27 PROCEDURE — 85610 PROTHROMBIN TIME: CPT

## 2020-04-27 PROCEDURE — 36415 COLL VENOUS BLD VENIPUNCTURE: CPT

## 2020-04-27 NOTE — PROGRESS NOTES
Anticoagulation Summary  As of 2020    INR goal:   2.0-3.0   TTR:   79.7 % (2.3 y)   INR used for dosin.57 (2020)   Warfarin maintenance plan:   1 mg (1 mg x 1) every day   Weekly warfarin total:   7 mg   Plan last modified:   Yaneli SchneiderD (2019)   Next INR check:   2020   Target end date:   Indefinite    Indications    Paroxysmal a-fib (CMS-HCC) [I48.0]  Chronic anticoagulation [Z79.01]             Anticoagulation Episode Summary     INR check location:   Anticoagulation Clinic    Preferred lab:       Send INR reminders to:       Comments:   [phone] (H)      Anticoagulation Care Providers     Provider Role Specialty Phone number    Evelio Tejeda M.D. Referring Internal Medicine [phone]    [facility] Anticoagulation Services Responsible  [phone]    Beryl Pang M.D. Responsible Family Medicine [phone]        Anticoagulation Patient Findings        Spoke to patient on the phone.   INR  therapeutic.   Denies signs/symptoms of bleeding and/or thrombosis.   Denies changes to diet or medications.   Follow up appointment in 6 week(s).    Continue weekly warfarin dose as noted      Pieter Covarrubias, PharmD, MS, BCACP, LCC    This note was created using voice recognition software (Dragon). The accuracy of the dictation is limited by the abilities of the software. I have reviewed the note prior to signing, however some errors in grammar and context are still possible. If you have any questions related to this note please do not hesitate to contact our office.

## 2020-05-01 ENCOUNTER — APPOINTMENT (OUTPATIENT)
Dept: RADIOLOGY | Facility: MEDICAL CENTER | Age: 80
End: 2020-05-01
Attending: PHYSICIAN ASSISTANT
Payer: MEDICARE

## 2020-05-26 ENCOUNTER — HOSPITAL ENCOUNTER (OUTPATIENT)
Dept: LAB | Facility: MEDICAL CENTER | Age: 80
End: 2020-05-26
Attending: NURSE PRACTITIONER
Payer: MEDICARE

## 2020-05-26 ENCOUNTER — ANTICOAGULATION MONITORING (OUTPATIENT)
Dept: MEDICAL GROUP | Facility: PHYSICIAN GROUP | Age: 80
End: 2020-05-26

## 2020-05-26 DIAGNOSIS — I48.0 PAROXYSMAL A-FIB (HCC): ICD-10-CM

## 2020-05-26 DIAGNOSIS — Z79.01 CHRONIC ANTICOAGULATION: ICD-10-CM

## 2020-05-26 LAB
INR PPP: 2.4 (ref 0.87–1.13)
PROTHROMBIN TIME: 27 SEC (ref 12–14.6)

## 2020-05-26 PROCEDURE — 36415 COLL VENOUS BLD VENIPUNCTURE: CPT

## 2020-05-26 PROCEDURE — 85610 PROTHROMBIN TIME: CPT

## 2020-05-27 RX ORDER — WARFARIN SODIUM 1 MG/1
TABLET ORAL
Qty: 90 TAB | Refills: 1 | Status: SHIPPED | OUTPATIENT
Start: 2020-05-27 | End: 2020-12-04

## 2020-05-27 NOTE — PROGRESS NOTES
Anticoagulation Summary  As of 2020    INR goal:   2.0-3.0   TTR:   80.4 % (2.4 y)   INR used for dosin.40 (2020)   Warfarin maintenance plan:   1 mg (1 mg x 1) every day   Weekly warfarin total:   7 mg   Plan last modified:   Yaneli SchneiderD (2019)   Next INR check:   2020   Target end date:   Indefinite    Indications    Paroxysmal a-fib (CMS-HCC) [I48.0]  Chronic anticoagulation [Z79.01]             Anticoagulation Episode Summary     INR check location:   Anticoagulation Clinic    Preferred lab:       Send INR reminders to:       Comments:   [phone] (H)      Anticoagulation Care Providers     Provider Role Specialty Phone number    Evelio Tejeda M.D. Referring Internal Medicine [phone]    [facility] Anticoagulation Services Responsible  [phone]    Beryl Pang M.D. Responsible Family Medicine [phone]        Anticoagulation Patient Findings  Patient Findings     Negatives:   Signs/symptoms of thrombosis, Signs/symptoms of bleeding, Laboratory test error suspected, Change in health, Change in alcohol use, Change in activity, Upcoming invasive procedure, Emergency department visit, Upcoming dental procedure, Missed doses, Extra doses, Change in medications, Change in diet/appetite, Hospital admission, Bruising, Other complaints        Spoke with patient today regarding therapeutic INR of 2.4.  Patient denies any signs/symptoms of bruising or bleeding or any changes in diet and medications.  Instructed patient to call clinic with any questions or concerns.  Pt is to continue with current warfarin dosing regimen.  Follow up in 8 weeks, to reduce risk of adverse events related to this high risk medication,  Warfarin.    Major Hughes, PharmD, BCACP

## 2020-06-17 ENCOUNTER — HOSPITAL ENCOUNTER (OUTPATIENT)
Dept: LAB | Facility: MEDICAL CENTER | Age: 80
End: 2020-06-17
Attending: NURSE PRACTITIONER
Payer: MEDICARE

## 2020-06-17 DIAGNOSIS — Z79.01 CHRONIC ANTICOAGULATION: ICD-10-CM

## 2020-06-17 LAB
INR PPP: 2.67 (ref 0.87–1.13)
PROTHROMBIN TIME: 29.2 SEC (ref 12–14.6)

## 2020-06-17 PROCEDURE — 85610 PROTHROMBIN TIME: CPT

## 2020-06-17 PROCEDURE — 36415 COLL VENOUS BLD VENIPUNCTURE: CPT

## 2020-06-18 ENCOUNTER — ANTICOAGULATION MONITORING (OUTPATIENT)
Dept: VASCULAR LAB | Facility: MEDICAL CENTER | Age: 80
End: 2020-06-18

## 2020-06-18 DIAGNOSIS — Z79.01 CHRONIC ANTICOAGULATION: ICD-10-CM

## 2020-06-18 DIAGNOSIS — I48.0 PAROXYSMAL A-FIB (HCC): ICD-10-CM

## 2020-06-18 NOTE — PROGRESS NOTES
OP   Telephone Anticoagulation Service Note      Anticoagulation Summary  As of 2020    INR goal:   2.0-3.0   TTR:   80.9 % (2.5 y)   INR used for dosin.67 (2020)   Warfarin maintenance plan:   1 mg (1 mg x 1) every day   Weekly warfarin total:   7 mg   No change documented:   Jessika Licea   Plan last modified:   Soco Rowland, PharmD (2019)   Next INR check:   7/15/2020   Target end date:   Indefinite    Indications    Paroxysmal a-fib (CMS-HCC) [I48.0]  Chronic anticoagulation [Z79.01]             Anticoagulation Episode Summary     INR check location:   Anticoagulation Clinic    Preferred lab:       Send INR reminders to:       Comments:   [phone] (H)      Anticoagulation Care Providers     Provider Role Specialty Phone number    Evelio Tejeda M.D. Referring Internal Medicine [phone]    [facility] Anticoagulation Services Responsible  [phone]    Beryl Pang M.D. Responsible Family Medicine [phone]        Anticoagulation Patient Findings     Spoke with patient on the phone today, reporting a therapeutic INR of 2.67. Patient was not due to test for another 3 weeks, but she has kidney stones and had blood in her urine this past week. She just wanted to double check and make sure her INR was not running high. She has follow up appt with urologist in a week or so. Patient denies any interval changes to diet and/or medications. Patient denies any signs/symptoms of bleeding or clotting.  Patient instructed to continue with the current warfarin dosing regimen, and asked to follow up again in 4 weeks.    Celeste GrovesD

## 2020-06-26 ENCOUNTER — HOSPITAL ENCOUNTER (OUTPATIENT)
Dept: RADIOLOGY | Facility: MEDICAL CENTER | Age: 80
End: 2020-06-26
Attending: PHYSICIAN ASSISTANT
Payer: MEDICARE

## 2020-06-26 DIAGNOSIS — N20.0 CALCULUS OF KIDNEY: ICD-10-CM

## 2020-06-26 PROCEDURE — 74018 RADEX ABDOMEN 1 VIEW: CPT

## 2020-06-30 ENCOUNTER — HOSPITAL ENCOUNTER (OUTPATIENT)
Dept: LAB | Facility: MEDICAL CENTER | Age: 80
End: 2020-06-30
Attending: NURSE PRACTITIONER
Payer: MEDICARE

## 2020-06-30 LAB
INR PPP: 2.14 (ref 0.87–1.13)
PROTHROMBIN TIME: 24.6 SEC (ref 12–14.6)

## 2020-06-30 PROCEDURE — 85610 PROTHROMBIN TIME: CPT

## 2020-06-30 PROCEDURE — 36415 COLL VENOUS BLD VENIPUNCTURE: CPT

## 2020-07-01 ENCOUNTER — ANTICOAGULATION MONITORING (OUTPATIENT)
Dept: VASCULAR LAB | Facility: MEDICAL CENTER | Age: 80
End: 2020-07-01

## 2020-07-01 DIAGNOSIS — Z79.01 CHRONIC ANTICOAGULATION: ICD-10-CM

## 2020-07-01 DIAGNOSIS — I48.0 PAROXYSMAL A-FIB (HCC): ICD-10-CM

## 2020-07-01 NOTE — PROGRESS NOTES
Anticoagulation Summary  As of 2020    INR goal:   2.0-3.0   TTR:   81.2 % (2.5 y)   INR used for dosin.14 (2020)   Warfarin maintenance plan:   1 mg (1 mg x 1) every day   Weekly warfarin total:   7 mg   Plan last modified:   Soco Rowland, PharmD (2019)   Next INR check:   2020   Target end date:   Indefinite    Indications    Paroxysmal a-fib (CMS-HCC) [I48.0]  Chronic anticoagulation [Z79.01]             Anticoagulation Episode Summary     INR check location:   Anticoagulation Clinic    Preferred lab:       Send INR reminders to:       Comments:   [phone] (H)      Anticoagulation Care Providers     Provider Role Specialty Phone number    Evelio Tejeda M.D. Referring Internal Medicine [phone]    [facility] Anticoagulation Services Responsible  [phone]    Beryl Pang M.D. Responsible Family Medicine [phone]        Anticoagulation Patient Findings  Patient Findings     Negatives:   Signs/symptoms of thrombosis, Signs/symptoms of bleeding, Laboratory test error suspected, Change in health, Change in alcohol use, Change in activity, Upcoming invasive procedure, Emergency department visit, Upcoming dental procedure, Missed doses, Extra doses, Change in medications, Change in diet/appetite, Hospital admission, Bruising (Pt reports bruising on her legs from the knee down. Pt reports they are red, but no black or blue bruising.), Other complaints          Spoke with patient to report a therapeutic INR.    Pt instructed to continue with current warfarin dosing regimen, confirms dosing.   Pt denies any s/s of bleeding, clotting or any changes to diet or medication.    Will follow up in 6 week(s).     Keyshawn Mendoza, Pharmacy Intern

## 2020-07-07 ENCOUNTER — APPOINTMENT (RX ONLY)
Dept: URBAN - METROPOLITAN AREA CLINIC 4 | Facility: CLINIC | Age: 80
Setting detail: DERMATOLOGY
End: 2020-07-07

## 2020-07-07 DIAGNOSIS — D18.0 HEMANGIOMA: ICD-10-CM

## 2020-07-07 DIAGNOSIS — L82.1 OTHER SEBORRHEIC KERATOSIS: ICD-10-CM

## 2020-07-07 DIAGNOSIS — L57.8 OTHER SKIN CHANGES DUE TO CHRONIC EXPOSURE TO NONIONIZING RADIATION: ICD-10-CM

## 2020-07-07 DIAGNOSIS — L57.0 ACTINIC KERATOSIS: ICD-10-CM

## 2020-07-07 DIAGNOSIS — R23.3 SPONTANEOUS ECCHYMOSES: ICD-10-CM

## 2020-07-07 DIAGNOSIS — D22 MELANOCYTIC NEVI: ICD-10-CM

## 2020-07-07 DIAGNOSIS — L81.4 OTHER MELANIN HYPERPIGMENTATION: ICD-10-CM

## 2020-07-07 DIAGNOSIS — Z71.89 OTHER SPECIFIED COUNSELING: ICD-10-CM

## 2020-07-07 DIAGNOSIS — Z85.828 PERSONAL HISTORY OF OTHER MALIGNANT NEOPLASM OF SKIN: ICD-10-CM

## 2020-07-07 PROBLEM — D18.01 HEMANGIOMA OF SKIN AND SUBCUTANEOUS TISSUE: Status: ACTIVE | Noted: 2020-07-07

## 2020-07-07 PROBLEM — D22.39 MELANOCYTIC NEVI OF OTHER PARTS OF FACE: Status: ACTIVE | Noted: 2020-07-07

## 2020-07-07 PROBLEM — D22.5 MELANOCYTIC NEVI OF TRUNK: Status: ACTIVE | Noted: 2020-07-07

## 2020-07-07 PROCEDURE — ? LIQUID NITROGEN

## 2020-07-07 PROCEDURE — 99214 OFFICE O/P EST MOD 30 MIN: CPT | Mod: 25

## 2020-07-07 PROCEDURE — ? COUNSELING

## 2020-07-07 PROCEDURE — 17003 DESTRUCT PREMALG LES 2-14: CPT

## 2020-07-07 PROCEDURE — 17000 DESTRUCT PREMALG LESION: CPT

## 2020-07-07 PROCEDURE — ? ADDITIONAL NOTES

## 2020-07-07 PROCEDURE — ? OBSERVATION AND MEASURE

## 2020-07-07 ASSESSMENT — LOCATION DETAILED DESCRIPTION DERM
LOCATION DETAILED: UPPER STERNUM
LOCATION DETAILED: LEFT DISTAL PRETIBIAL REGION
LOCATION DETAILED: LEFT RADIAL DORSAL HAND
LOCATION DETAILED: RIGHT PROXIMAL DORSAL FOREARM
LOCATION DETAILED: RIGHT DORSAL WRIST
LOCATION DETAILED: RIGHT DISTAL DORSAL FOREARM
LOCATION DETAILED: INFERIOR THORACIC SPINE
LOCATION DETAILED: RIGHT INFERIOR CENTRAL MALAR CHEEK
LOCATION DETAILED: LEFT INFERIOR CENTRAL MALAR CHEEK
LOCATION DETAILED: LEFT MEDIAL DISTAL PRETIBIAL REGION
LOCATION DETAILED: RIGHT DISTAL PRETIBIAL REGION
LOCATION DETAILED: LEFT SUPERIOR FOREHEAD
LOCATION DETAILED: LEFT DISTAL DORSAL FOREARM
LOCATION DETAILED: RIGHT SUPERIOR MEDIAL MIDBACK
LOCATION DETAILED: LEFT INFERIOR MEDIAL MIDBACK
LOCATION DETAILED: LEFT PROXIMAL DORSAL FOREARM
LOCATION DETAILED: STERNAL NOTCH

## 2020-07-07 ASSESSMENT — LOCATION SIMPLE DESCRIPTION DERM
LOCATION SIMPLE: LEFT FOREARM
LOCATION SIMPLE: RIGHT CHEEK
LOCATION SIMPLE: LEFT FOREHEAD
LOCATION SIMPLE: CHEST
LOCATION SIMPLE: RIGHT LOWER BACK
LOCATION SIMPLE: LEFT CHEEK
LOCATION SIMPLE: UPPER BACK
LOCATION SIMPLE: LEFT HAND
LOCATION SIMPLE: LEFT PRETIBIAL REGION
LOCATION SIMPLE: RIGHT FOREARM
LOCATION SIMPLE: RIGHT PRETIBIAL REGION
LOCATION SIMPLE: LEFT LOWER BACK
LOCATION SIMPLE: RIGHT WRIST

## 2020-07-07 ASSESSMENT — LOCATION ZONE DERM
LOCATION ZONE: HAND
LOCATION ZONE: FACE
LOCATION ZONE: TRUNK
LOCATION ZONE: ARM
LOCATION ZONE: LEG

## 2020-07-07 NOTE — PROCEDURE: LIQUID NITROGEN
Render Note In Bullet Format When Appropriate: No
Duration Of Freeze Thaw-Cycle (Seconds): 0
Detail Level: Detailed
Post-Care Instructions: I reviewed with the patient in detail post-care instructions. Patient is to wear sunprotection, and avoid picking at any of the treated lesions. Pt may apply Vaseline  or Aquaphor to crusted or scabbing areas.
Consent: The patient's consent was obtained including but not limited to risks of crusting, scabbing, blistering, scarring, darker or lighter pigmentary change, recurrence, incomplete removal and infection.

## 2020-07-08 DIAGNOSIS — I25.10 CORONARY ARTERY DISEASE INVOLVING NATIVE CORONARY ARTERY OF NATIVE HEART WITHOUT ANGINA PECTORIS: ICD-10-CM

## 2020-07-08 DIAGNOSIS — I95.9 HYPOTENSION, UNSPECIFIED HYPOTENSION TYPE: ICD-10-CM

## 2020-07-08 DIAGNOSIS — I48.0 PAROXYSMAL A-FIB (HCC): ICD-10-CM

## 2020-07-09 RX ORDER — SOTALOL HYDROCHLORIDE 80 MG/1
TABLET ORAL
Qty: 90 TAB | Refills: 0 | Status: SHIPPED | OUTPATIENT
Start: 2020-07-09 | End: 2020-07-15 | Stop reason: SDUPTHER

## 2020-07-09 RX ORDER — DILTIAZEM HYDROCHLORIDE 240 MG/1
CAPSULE, COATED, EXTENDED RELEASE ORAL
Qty: 90 CAP | Refills: 0 | Status: SHIPPED | OUTPATIENT
Start: 2020-07-09 | End: 2020-07-15 | Stop reason: SDUPTHER

## 2020-07-15 ENCOUNTER — OFFICE VISIT (OUTPATIENT)
Dept: CARDIOLOGY | Facility: MEDICAL CENTER | Age: 80
End: 2020-07-15
Payer: MEDICARE

## 2020-07-15 VITALS
BODY MASS INDEX: 17.16 KG/M2 | OXYGEN SATURATION: 97 % | HEIGHT: 62 IN | WEIGHT: 93.25 LBS | HEART RATE: 60 BPM | SYSTOLIC BLOOD PRESSURE: 102 MMHG | DIASTOLIC BLOOD PRESSURE: 60 MMHG

## 2020-07-15 DIAGNOSIS — Z79.01 CHRONIC ANTICOAGULATION: ICD-10-CM

## 2020-07-15 DIAGNOSIS — Z95.5 S/P CORONARY ARTERY STENT PLACEMENT: ICD-10-CM

## 2020-07-15 DIAGNOSIS — Z79.899 HIGH RISK MEDICATION USE: ICD-10-CM

## 2020-07-15 DIAGNOSIS — I25.10 CORONARY ARTERY DISEASE INVOLVING NATIVE CORONARY ARTERY OF NATIVE HEART WITHOUT ANGINA PECTORIS: ICD-10-CM

## 2020-07-15 DIAGNOSIS — I48.0 PAROXYSMAL A-FIB (HCC): ICD-10-CM

## 2020-07-15 DIAGNOSIS — E78.5 HYPERLIPIDEMIA, UNSPECIFIED HYPERLIPIDEMIA TYPE: ICD-10-CM

## 2020-07-15 PROCEDURE — 99214 OFFICE O/P EST MOD 30 MIN: CPT | Performed by: NURSE PRACTITIONER

## 2020-07-15 PROCEDURE — 93000 ELECTROCARDIOGRAM COMPLETE: CPT | Performed by: INTERNAL MEDICINE

## 2020-07-15 RX ORDER — SOTALOL HYDROCHLORIDE 80 MG/1
TABLET ORAL
COMMUNITY
End: 2020-07-15

## 2020-07-15 RX ORDER — SOTALOL HYDROCHLORIDE 80 MG/1
40 TABLET ORAL 2 TIMES DAILY
Qty: 90 TAB | Refills: 3 | Status: SHIPPED | OUTPATIENT
Start: 2020-07-15 | End: 2021-04-23

## 2020-07-15 RX ORDER — LOVASTATIN 40 MG/1
40 TABLET ORAL EVERY EVENING
Qty: 90 TAB | Refills: 3 | Status: SHIPPED | OUTPATIENT
Start: 2020-07-15 | End: 2021-05-18

## 2020-07-15 RX ORDER — ALPRAZOLAM 0.25 MG/1
TABLET ORAL
COMMUNITY
End: 2020-07-15

## 2020-07-15 RX ORDER — ATORVASTATIN CALCIUM 20 MG/1
TABLET, FILM COATED ORAL
COMMUNITY
Start: 2020-01-07 | End: 2020-07-15

## 2020-07-15 RX ORDER — DILTIAZEM HYDROCHLORIDE 240 MG/1
240 CAPSULE, COATED, EXTENDED RELEASE ORAL DAILY
Qty: 90 CAP | Refills: 3 | Status: SHIPPED | OUTPATIENT
Start: 2020-07-15 | End: 2021-04-23

## 2020-07-15 ASSESSMENT — ENCOUNTER SYMPTOMS
NAUSEA: 0
HEADACHES: 0
SHORTNESS OF BREATH: 0
PALPITATIONS: 0
PND: 0
WHEEZING: 0
CLAUDICATION: 0
SPUTUM PRODUCTION: 0
VOMITING: 0
HEMOPTYSIS: 0
ORTHOPNEA: 0
COUGH: 0
CHILLS: 0
FEVER: 0
DIZZINESS: 0

## 2020-07-15 ASSESSMENT — FIBROSIS 4 INDEX: FIB4 SCORE: 1.39

## 2020-07-15 NOTE — PROGRESS NOTES
Chief Complaint   Patient presents with   • Atrial Fibrillation     Dx: PAF       Subjective:   Angie Root is a 80 y.o. female who presents today for PAD status post PCI to her left leg in 2008, and more recently diagnosed ischemic colitis, CAD with 2 stents placed in her LAD in 2009, nicotine dependence, dyslipidemia, recurrent C diff colitis, and paroxysmal atrial fibrillation on rhythm control strategy (with sotalol) and now anticoagulated with Warfarin.  Patient last seen 9/17/2019 by Dr. Carmona.    7/15/2020: Patient comes in today with no significant complaints.  She denies chest pain, palpitations, orthopnea, claudication, lower extremity edema.  EKG today shows sinus bradycardia with RSR prime in V1 and V2.  QTc 463.     Probably had rheumatic fever as a child.  Sister had some type of autoimmune vascular problem, mult stents to her legs.  She had stents for PAD in 1991.  Then having afib with palpitations.  Also having CPs, had angio, 2 stents, then afib went away but also on sotalol.  Prior K replacement caused problems.     On warfarin, [id number] of 3, no TIA/CVA.     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 No Abx due to CDiff.     Echo 2018, normal EF, normal size LA, RVSP 45 mmhg.     LDL 62, HDL 80, on lovastatin, was on another statin, does not know which, no prior problems.  BP low, no HTN.     Still smoking, since age 15, quit a few times, has used Chantix, hypnosis.     Followed for Pulm Nodule, calcified atherosclerosis on CT.    Past Medical History:   Diagnosis Date   • Arthritis     BL hands   • CAD (coronary artery disease)    • Cancer (HCC) 1982    melanoma   • COPD (chronic obstructive pulmonary disease) (HCC)    • Croup 3 years old   • Diverticulosis    • Dyslipidemia    • GERD (gastroesophageal reflux disease)    • Hiatal hernia    • High cholesterol    • Hypertension    • Infectious disease 2018    C Diff   • Measles     5 years old   • Paroxysmal A-fib (HCC)  1/20/2016    Symptomatic, on a rhythm control strategy with sotalol 40 mg by mouth twice a day.    • Paroxysmal atrial fibrillation (HCC)    • Prediabetes    • PVD (peripheral vascular disease) (HCC)      Past Surgical History:   Procedure Laterality Date   • FECAL TRANSPLANT N/A 4/9/2018    Procedure: FECAL TRANSPLANT;  Surgeon: Gonzalez Cruz M.D.;  Location: ENDOSCOPY [facility];  Service: Gastroenterology   • COLONOSCOPY - ENDO N/A 4/9/2018    Procedure: COLONOSCOPY - ENDO;  Surgeon: Gonzalez Cruz M.D.;  Location: ENDOSCOPY [facility];  Service: Gastroenterology   • WIDE EXCISION MELANOMA, LEG, W/POSS.STSG  10/2015    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   • CARDIAC CATH, RIGHT HEART  2008    stent   • OTHER ORTHOPEDIC SURGERY Left 2008    hand repair   • CHOLECYSTECTOMY  1993   • TONSILLECTOMY  1945   • OTHER CARDIAC SURGERY      r heart cath stents   • STENT PLACEMENT      L iliac stent     Family History   Problem Relation Age of Onset   • Hypertension Mother    • Hyperlipidemia Mother    • Cancer Maternal Grandmother         tongue   • Cancer Maternal Uncle         x 3, pancreas   • Cancer Maternal Grandfather         unknown   • Cancer Paternal Grandmother         unknown   • Cancer Paternal Grandfather         unknown   • Diabetes Maternal Uncle    • Heart Disease Maternal Uncle    • Hypertension Sister    • Hyperlipidemia Sister    • Heart Disease Sister    • Alcohol/Drug Sister    • Thyroid Sister    • Psychiatric Illness Neg Hx    • Stroke Neg Hx      Social History     Socioeconomic History   • Marital status:      Spouse name: Not on file   • Number of children: 0   • Years of education: Not on file   • Highest education level: Not on file   Occupational History   • Not on file   Social Needs   • Financial resource strain: Not on file   • Food insecurity     Worry: Not on file     Inability: Not on file   • Transportation needs     Medical: Not on file     Non-medical: Not on  file   Tobacco Use   • Smoking status: Current Every Day Smoker     Packs/day: 0.25     Years: 60.00     Pack years: 15.00     Types: Cigarettes     Start date: 3/20/1957   • Smokeless tobacco: Never Used   • Tobacco comment: 6 per day   Substance and Sexual Activity   • Alcohol use: No     Alcohol/week: 0.0 oz   • Drug use: No   • Sexual activity: Not Currently     Partners: Male   Lifestyle   • Physical activity     Days per week: Not on file     Minutes per session: Not on file   • Stress: Not on file   Relationships   • Social connections     Talks on phone: Not on file     Gets together: Not on file     Attends Zoroastrian service: Not on file     Active member of club or organization: Not on file     Attends meetings of clubs or organizations: Not on file     Relationship status: Not on file   • Intimate partner violence     Fear of current or ex partner: Not on file     Emotionally abused: Not on file     Physically abused: Not on file     Forced sexual activity: Not on file   Other Topics Concern   • Not on file   Social History Narrative    .     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     Allergies   Allergen Reactions   • Codeine Swelling   • Iodine Swelling   • Bee Venom Anaphylaxis   • Chantix [Varenicline]      disoriented   • Ciprofloxacin      Causes C diff, recurrent and very difficult   • Flagyl [Metronidazole] Rash     C/O flagyl causes rash.    • Latex Hives   • Lipitor [Atorvastatin Calcium] Unspecified     Intense Stomach pain   • Pcn [Penicillins] Anaphylaxis and Hives   • Shellfish Allergy      unknown   • Tetanus Antitoxin Swelling     unknown     Outpatient Encounter Medications as of 7/15/2020   Medication Sig Dispense Refill   • DILTIAZem CD (CARDIZEM CD) 240 MG CAPSULE SR 24 HR Take 1 Cap by mouth every day. 90 Cap 3   • lovastatin (MEVACOR) 40 MG tablet Take 1 Tab by mouth every evening. 90 Tab 3   • sotalol (BETAPACE) 80 MG Tab Take 0.5 Tabs by mouth 2 times a day. 90 Tab 3   •  psyllium (METAMUCIL) 58.12 % Pack Take 1 Packet by mouth every day.     • warfarin (COUMADIN) 1 MG Tab Take one tablet by mouth daily or as directed by anticoagulation clinic 90 Tab 1   • albuterol 108 (90 Base) MCG/ACT Aero Soln inhalation aerosol Inhale 2 Puffs by mouth every 6 hours as needed for Shortness of Breath.     • ascorbic acid (ASCORBIC ACID) 500 MG Tab Take 500 mg by mouth every day.     • lactobacillus rhamnosus (CULTURELLE) Cap capsule Take 1 Cap by mouth every day.     • alprazolam (XANAX) 0.25 MG Tab Take 0.25 mg by mouth 2 times a day as needed for Anxiety.     • vitamin D (CHOLECALCIFEROL) 1000 UNIT Tab Take 2,000 Units by mouth every morning.     • [DISCONTINUED] sotalol (BETAPACE) 80 MG Tab sotalol 80 mg tablet     • [DISCONTINUED] atorvastatin (LIPITOR) 20 MG Tab ATORVASTATIN CALCIUM 20 MG TABS     • [DISCONTINUED] CALCIUM CITRATE-VITAMIN D PO CALCIUM CITRATE-VITAMIN D TABS     • [DISCONTINUED] ALPRAZolam (XANAX) 0.25 MG Tab ALPRAZOLAM 0.25 MG TABS     • [DISCONTINUED] sotalol (BETAPACE) 80 MG Tab TAKE 1/2 TABLET TWICE DAILY 90 Tab 0   • [DISCONTINUED] DILTIAZem CD (CARDIZEM CD) 240 MG CAPSULE SR 24 HR TAKE 1 CAPSULE EVERY DAY 90 Cap 0   • [DISCONTINUED] lovastatin (MEVACOR) 40 MG tablet Take 1 Tab by mouth every evening. 90 Tab 3   • [DISCONTINUED] pantoprazole (PROTONIX) 40 MG Tablet Delayed Response Take 40 mg by mouth every day.     • [DISCONTINUED] sucralfate (CARAFATE) 1 GM Tab Take 1 g by mouth 4 Times a Day,Before Meals and at Bedtime.     • ipratropium (ATROVENT) 0.02 % Solution 0.2 mg by Nebulization route 4 times a day.       No facility-administered encounter medications on file as of 7/15/2020.      Review of Systems   Constitutional: Negative for chills and fever.   Respiratory: Negative for cough, hemoptysis, sputum production, shortness of breath and wheezing.    Cardiovascular: Negative for chest pain, palpitations, orthopnea, claudication, leg swelling and PND.  "  Gastrointestinal: Negative for nausea and vomiting.   Neurological: Negative for dizziness and headaches.   All other systems reviewed and are negative.       Objective:   /60 (BP Location: Left arm, Patient Position: Sitting, BP Cuff Size: Adult)   Pulse 60   Ht 1.575 m (5' 2\")   Wt 42.3 kg (93 lb 4.1 oz)   SpO2 97%   BMI 17.06 kg/m²     Physical Exam   Constitutional: She appears well-developed and well-nourished.   Eyes: EOM are normal.   Neck: Neck supple. No JVD present.   Cardiovascular: Normal rate, regular rhythm and normal heart sounds.   No murmur heard.  Pulmonary/Chest: Effort normal and breath sounds normal.   Abdominal: Soft.   Neurological:   Cranial nerves II-XII WNL   Skin: Skin is warm and dry.   Psychiatric: She has a normal mood and affect. Her behavior is normal. Judgment and thought content normal.   Nursing note and vitals reviewed.    ECG 5-24-19  Sinus, 56, QTc 449     Echocardiogram, 8/6/2018:  Normal left ventricular systolic function.  Left ventricular ejection fraction is visually estimated to be 60%.   The right ventricle was normal in size and function.  Mild mitral regurgitation.  Mild tricuspid regurgitation.  Estimated right ventricular systolic pressure is 45 mmHg.  LA normal size.     Echocardiogram, 6/27/2016:  Normal left ventricular size, thickness, systolic function EF 60%.   Mitral annular calcification.  Aortic sclerosis without stenosis.   Mild tricuspid regurgitation.  Right ventricular systolic pressure is estimated to be 33 mmHg.   No prior study for comparison\"     Echocardiogram, 11/18/2017:  Prior echocardiogram 6/27/2016.Normal left ventricular chamber size.  Left ventricular ejection fraction is visually estimated to be 60%.  Grade II diastolic dysfunction.  Aortic sclerosis without stenosis.  Mitral annular calcification.  Heavy calcification of the posterior mitral valve leaflet.  Mild mitral regurgitation.  Trace tricuspid regurgitation.  Normal " "pericardium without effusion.  Ascending aorta diameter is 2.4 cm\"     Myocardial perfusion imaging, 8/6/2018:  Normal left ventricular perfusion with no fixed or reversible defects.   Normal left ventricular size, ejection fraction, and wall motion.      CT chest 7-24-19  1.  No change in single small pulmonary nodule in each lower pulmonary lobe since 2017. These nodules appear benign.  2.  No new pulmonary nodules or masses are identified.  3.  Emphysema is again noted.  4.  Limited areas of pulmonary scarring fibrosis and bronchiectasis are again noted.  5.  Calcific atherosclerosis again noted.  6.  Calculi are again identified within each kidney.    Assessment:     1. Paroxysmal a-fib (CMS-HCC)  sotalol (BETAPACE) 80 MG Tab    EKG   2. Chronic anticoagulation  EKG   3. Coronary artery disease involving native coronary artery of native heart without angina pectoris  DILTIAZem CD (CARDIZEM CD) 240 MG CAPSULE SR 24 HR   4. Hyperlipidemia, unspecified hyperlipidemia type  lovastatin (MEVACOR) 40 MG tablet   5. PCI to her LAD in 2009     6. High risk medication use  Basic Metabolic Panel    MAGNESIUM       Medical Decision Making:  Today's Assessment / Status / Plan:   Refill sotalol, diltiazem, lovastatin.  Obtain BMP and magnesium level due to being on sotalol.  Patient will follow-up in 6-8 months.  Collaborating physician is Dr. Lozano.  "

## 2020-07-16 LAB — EKG IMPRESSION: NORMAL

## 2020-07-21 ENCOUNTER — HOSPITAL ENCOUNTER (OUTPATIENT)
Dept: LAB | Facility: MEDICAL CENTER | Age: 80
End: 2020-07-21
Attending: NURSE PRACTITIONER
Payer: MEDICARE

## 2020-07-21 ENCOUNTER — ANTICOAGULATION MONITORING (OUTPATIENT)
Dept: VASCULAR LAB | Facility: MEDICAL CENTER | Age: 80
End: 2020-07-21

## 2020-07-21 ENCOUNTER — HOSPITAL ENCOUNTER (OUTPATIENT)
Dept: LAB | Facility: MEDICAL CENTER | Age: 80
End: 2020-07-21
Attending: INTERNAL MEDICINE
Payer: MEDICARE

## 2020-07-21 DIAGNOSIS — Z79.899 HIGH RISK MEDICATION USE: ICD-10-CM

## 2020-07-21 DIAGNOSIS — E78.5 DYSLIPIDEMIA: ICD-10-CM

## 2020-07-21 DIAGNOSIS — Z79.01 CHRONIC ANTICOAGULATION: ICD-10-CM

## 2020-07-21 DIAGNOSIS — I48.0 PAROXYSMAL A-FIB (HCC): ICD-10-CM

## 2020-07-21 LAB
ANION GAP SERPL CALC-SCNC: 13 MMOL/L (ref 7–16)
BUN SERPL-MCNC: 15 MG/DL (ref 8–22)
CALCIUM SERPL-MCNC: 9.7 MG/DL (ref 8.5–10.5)
CHLORIDE SERPL-SCNC: 105 MMOL/L (ref 96–112)
CHOLEST SERPL-MCNC: 176 MG/DL (ref 100–199)
CO2 SERPL-SCNC: 23 MMOL/L (ref 20–33)
CREAT SERPL-MCNC: 0.77 MG/DL (ref 0.5–1.4)
FASTING STATUS PATIENT QL REPORTED: NORMAL
FASTING STATUS PATIENT QL REPORTED: NORMAL
GLUCOSE SERPL-MCNC: 93 MG/DL (ref 65–99)
HDLC SERPL-MCNC: 99 MG/DL
INR PPP: 1.96 (ref 0.87–1.13)
LDLC SERPL CALC-MCNC: 67 MG/DL
MAGNESIUM SERPL-MCNC: 2 MG/DL (ref 1.5–2.5)
POTASSIUM SERPL-SCNC: 4.8 MMOL/L (ref 3.6–5.5)
PROTHROMBIN TIME: 22.9 SEC (ref 12–14.6)
SODIUM SERPL-SCNC: 141 MMOL/L (ref 135–145)
TRIGL SERPL-MCNC: 48 MG/DL (ref 0–149)

## 2020-07-21 PROCEDURE — 80048 BASIC METABOLIC PNL TOTAL CA: CPT

## 2020-07-21 PROCEDURE — 83735 ASSAY OF MAGNESIUM: CPT

## 2020-07-21 PROCEDURE — 36415 COLL VENOUS BLD VENIPUNCTURE: CPT

## 2020-07-21 PROCEDURE — 85610 PROTHROMBIN TIME: CPT

## 2020-07-21 PROCEDURE — 80061 LIPID PANEL: CPT

## 2020-07-21 NOTE — PROGRESS NOTES
OP   Telephone Anticoagulation Service Note      Anticoagulation Summary  As of 2020    INR goal:   2.0-3.0   TTR:   81.1 % (2.6 y)   INR used for dosin.96! (2020)   Warfarin maintenance plan:   1 mg (1 mg x 1) every day   Weekly warfarin total:   7 mg   Plan last modified:   Jessika Licea (2020)   Next INR check:   2020   Target end date:   Indefinite    Indications    Paroxysmal a-fib (CMS-HCC) [I48.0]  Chronic anticoagulation [Z79.01]             Anticoagulation Episode Summary     INR check location:   Anticoagulation Clinic    Preferred lab:       Send INR reminders to:       Comments:   [phone] (H)      Anticoagulation Care Providers     Provider Role Specialty Phone number    Evelio Tejeda M.D. Referring Internal Medicine [phone]    [facility] Anticoagulation Services Responsible  [phone]    Beryl Pang M.D. Responsible Family Medicine [phone]        Anticoagulation Patient Findings    Spoke with the patient on the phone today, reporting a slightly SUB-therapeutic INR of 1.96. Confirmed the current warfarin dosing regimen and patient compliance. Patient had mild diarrhea so wanted to check on INR just in case since she was doing blood work anyway. Patient denies any interval changes to diet and/or medications. Patient denies any signs/symptoms of bleeding or clotting.  Patient would like to boost her dose a little since she does not like running so low. Patient will take 2mg TONIGHT ONLY, then will resume her current dosing regimen. Patient will retest again in 4 weeks.     Celeste GrovesD

## 2020-08-10 ENCOUNTER — HOSPITAL ENCOUNTER (OUTPATIENT)
Dept: LAB | Facility: MEDICAL CENTER | Age: 80
End: 2020-08-10
Attending: NURSE PRACTITIONER
Payer: MEDICARE

## 2020-08-10 DIAGNOSIS — Z79.01 CHRONIC ANTICOAGULATION: ICD-10-CM

## 2020-08-10 LAB
BASOPHILS # BLD AUTO: 1.3 % (ref 0–1.8)
BASOPHILS # BLD: 0.12 K/UL (ref 0–0.12)
EOSINOPHIL # BLD AUTO: 0.2 K/UL (ref 0–0.51)
EOSINOPHIL NFR BLD: 2.2 % (ref 0–6.9)
ERYTHROCYTE [DISTWIDTH] IN BLOOD BY AUTOMATED COUNT: 49.7 FL (ref 35.9–50)
HCT VFR BLD AUTO: 50.1 % (ref 37–47)
HGB BLD-MCNC: 15.8 G/DL (ref 12–16)
IMM GRANULOCYTES # BLD AUTO: 0.03 K/UL (ref 0–0.11)
IMM GRANULOCYTES NFR BLD AUTO: 0.3 % (ref 0–0.9)
LYMPHOCYTES # BLD AUTO: 3.01 K/UL (ref 1–4.8)
LYMPHOCYTES NFR BLD: 32.4 % (ref 22–41)
MCH RBC QN AUTO: 30.2 PG (ref 27–33)
MCHC RBC AUTO-ENTMCNC: 31.5 G/DL (ref 33.6–35)
MCV RBC AUTO: 95.8 FL (ref 81.4–97.8)
MONOCYTES # BLD AUTO: 0.87 K/UL (ref 0–0.85)
MONOCYTES NFR BLD AUTO: 9.4 % (ref 0–13.4)
NEUTROPHILS # BLD AUTO: 5.05 K/UL (ref 2–7.15)
NEUTROPHILS NFR BLD: 54.4 % (ref 44–72)
NRBC # BLD AUTO: 0 K/UL
NRBC BLD-RTO: 0 /100 WBC
PLATELET # BLD AUTO: 198 K/UL (ref 164–446)
PMV BLD AUTO: 11 FL (ref 9–12.9)
RBC # BLD AUTO: 5.23 M/UL (ref 4.2–5.4)
WBC # BLD AUTO: 9.3 K/UL (ref 4.8–10.8)

## 2020-08-10 PROCEDURE — 86706 HEP B SURFACE ANTIBODY: CPT | Mod: GA

## 2020-08-10 PROCEDURE — 86704 HEP B CORE ANTIBODY TOTAL: CPT | Mod: GA

## 2020-08-10 PROCEDURE — 86708 HEPATITIS A ANTIBODY: CPT

## 2020-08-10 PROCEDURE — 82977 ASSAY OF GGT: CPT

## 2020-08-10 PROCEDURE — 80076 HEPATIC FUNCTION PANEL: CPT

## 2020-08-10 PROCEDURE — 85025 COMPLETE CBC W/AUTO DIFF WBC: CPT

## 2020-08-10 PROCEDURE — 87340 HEPATITIS B SURFACE AG IA: CPT | Mod: GA

## 2020-08-10 PROCEDURE — 86803 HEPATITIS C AB TEST: CPT

## 2020-08-10 PROCEDURE — 36415 COLL VENOUS BLD VENIPUNCTURE: CPT

## 2020-08-11 LAB
ALBUMIN SERPL BCP-MCNC: 4.3 G/DL (ref 3.2–4.9)
ALP SERPL-CCNC: 84 U/L (ref 30–99)
ALT SERPL-CCNC: 21 U/L (ref 2–50)
AST SERPL-CCNC: 28 U/L (ref 12–45)
BILIRUB CONJ SERPL-MCNC: <0.2 MG/DL (ref 0.1–0.5)
BILIRUB INDIRECT SERPL-MCNC: NORMAL MG/DL (ref 0–1)
BILIRUB SERPL-MCNC: 0.4 MG/DL (ref 0.1–1.5)
GGT SERPL-CCNC: 29 U/L (ref 7–34)
HBV CORE AB SERPL QL IA: NONREACTIVE
HBV SURFACE AB SERPL IA-ACNC: <3.5 MIU/ML (ref 0–10)
HBV SURFACE AG SER QL: NORMAL
HCV AB SER QL: NORMAL
PROT SERPL-MCNC: 6.9 G/DL (ref 6–8.2)

## 2020-08-12 DIAGNOSIS — Z79.01 CHRONIC ANTICOAGULATION: ICD-10-CM

## 2020-08-12 DIAGNOSIS — I48.0 PAROXYSMAL A-FIB (HCC): ICD-10-CM

## 2020-08-12 LAB — HAV AB SER QL IA: NEGATIVE

## 2020-08-13 ENCOUNTER — HOSPITAL ENCOUNTER (OUTPATIENT)
Facility: MEDICAL CENTER | Age: 80
End: 2020-08-13
Attending: NURSE PRACTITIONER
Payer: MEDICARE

## 2020-08-13 DIAGNOSIS — Z79.01 CHRONIC ANTICOAGULATION: ICD-10-CM

## 2020-08-13 LAB
INR PPP: 2.2 (ref 0.87–1.13)
PROTHROMBIN TIME: 25.1 SEC (ref 12–14.6)

## 2020-08-13 PROCEDURE — 85610 PROTHROMBIN TIME: CPT

## 2020-08-14 ENCOUNTER — ANTICOAGULATION MONITORING (OUTPATIENT)
Dept: VASCULAR LAB | Facility: MEDICAL CENTER | Age: 80
End: 2020-08-14

## 2020-08-14 DIAGNOSIS — I48.0 PAROXYSMAL A-FIB (HCC): ICD-10-CM

## 2020-08-14 DIAGNOSIS — Z79.01 CHRONIC ANTICOAGULATION: ICD-10-CM

## 2020-08-14 NOTE — PROGRESS NOTES
Anticoagulation Summary  As of 2020    INR goal:  2.0-3.0   TTR:  81.1 % (2.6 y)   INR used for dosin.20 (2020)   Warfarin maintenance plan:  1 mg (1 mg x 1) every day   Weekly warfarin total:  7 mg   Plan last modified:  Jessika Licea (2020)   Next INR check:  2020   Target end date:  Indefinite    Indications    Paroxysmal a-fib (CMS-HCC) [I48.0]  Chronic anticoagulation [Z79.01]             Anticoagulation Episode Summary     INR check location:  Anticoagulation Clinic    Preferred lab:      Send INR reminders to:      Comments:  [phone] (H)      Anticoagulation Care Providers     Provider Role Specialty Phone number    Evelio Tejeda M.D. Referring Internal Medicine [phone]    [facility] Responsible  [phone]    Beryl Pang M.D. Responsible Family Medicine [phone]        Anticoagulation Patient Findings        Spoke to patient on the phone.   INR  therapeutic.   Denies signs/symptoms of bleeding and/or thrombosis.   Denies changes to diet or medications.   Follow up appointment in 4 week(s).    Continue weekly warfarin dose as noted      Pieter Covarrubias, PharmD, MS, BCACP, LCC    This note was created using voice recognition software (Dragon). The accuracy of the dictation is limited by the abilities of the software. I have reviewed the note prior to signing, however some errors in grammar and context are still possible. If you have any questions related to this note please do not hesitate to contact our office.

## 2020-09-15 ENCOUNTER — HOSPITAL ENCOUNTER (OUTPATIENT)
Dept: LAB | Facility: MEDICAL CENTER | Age: 80
End: 2020-09-15
Attending: NURSE PRACTITIONER
Payer: MEDICARE

## 2020-09-15 ENCOUNTER — ANTICOAGULATION MONITORING (OUTPATIENT)
Dept: VASCULAR LAB | Facility: MEDICAL CENTER | Age: 80
End: 2020-09-15

## 2020-09-15 DIAGNOSIS — Z79.01 CHRONIC ANTICOAGULATION: ICD-10-CM

## 2020-09-15 DIAGNOSIS — I48.0 PAROXYSMAL A-FIB (HCC): ICD-10-CM

## 2020-09-15 LAB
INR PPP: 1.75 (ref 0.87–1.13)
PROTHROMBIN TIME: 21 SEC (ref 12–14.6)

## 2020-09-15 PROCEDURE — 85610 PROTHROMBIN TIME: CPT

## 2020-09-15 PROCEDURE — 36415 COLL VENOUS BLD VENIPUNCTURE: CPT

## 2020-09-15 NOTE — PROGRESS NOTES
OP Telephone Anticoagulation Service Note    Date: 9/15/2020      Anticoagulation Summary  As of 9/15/2020    INR goal:  2.0-3.0   TTR:  79.9 % (2.7 y)   INR used for dosin.75 (9/15/2020)   Warfarin maintenance plan:  1 mg (1 mg x 1) every day   Weekly warfarin total:  7 mg   Plan last modified:  Major uHghes PharmD (2020)   Next INR check:  2020   Target end date:  Indefinite    Indications    Paroxysmal a-fib (CMS-HCC) [I48.0]  Chronic anticoagulation [Z79.01]             Anticoagulation Episode Summary     INR check location:  Anticoagulation Clinic    Preferred lab:      Send INR reminders to:      Comments:  [phone] (H)      Anticoagulation Care Providers     Provider Role Specialty Phone number    Evelio Tejeda M.D. Referring Internal Medicine [phone]    [facility] Anticoagulation Services Responsible  [phone]    Beryl Pang M.D. Responsible Family Medicine [phone]        Anticoagulation Patient Findings      INR SUB-therapeutic at 1.75.  Left voicemail message for pt.  Verified regimen.  Instructed pt to bolus with 1.5 mg today and to then begin a newly increased regimen of 1.5 mg on Tues and 1 mg ROW as pt has been subtherapeutic/low end of therapeutic for the last ~9 weeks.  Told pt to call if any s/s of bleeding or medication changes.  Check INR in 1 week(s).  Instructed pt to call clinic at [phone] if there are any questions.    Jimbo Dunlap PharmD    S/w patient today regarding latest INR result.  She states she missed dose two days prior to INR draw.  Will have patient bolus with 1.5mg today, then resume previous weekly warfarin regimen as detailed above.  Follow up in 2 weeks, to reduce risk of adverse events related to this high risk medication,  Warfarin.    Major Hughes, Piter, BCACP

## 2020-09-29 ENCOUNTER — HOSPITAL ENCOUNTER (OUTPATIENT)
Dept: LAB | Facility: MEDICAL CENTER | Age: 80
End: 2020-09-29
Attending: NURSE PRACTITIONER
Payer: MEDICARE

## 2020-09-29 DIAGNOSIS — Z79.01 CHRONIC ANTICOAGULATION: ICD-10-CM

## 2020-09-29 LAB
INR PPP: 2.15 (ref 0.87–1.13)
PROTHROMBIN TIME: 24.7 SEC (ref 12–14.6)

## 2020-09-29 PROCEDURE — 85610 PROTHROMBIN TIME: CPT

## 2020-09-29 PROCEDURE — 36415 COLL VENOUS BLD VENIPUNCTURE: CPT

## 2020-09-30 ENCOUNTER — ANTICOAGULATION MONITORING (OUTPATIENT)
Dept: VASCULAR LAB | Facility: MEDICAL CENTER | Age: 80
End: 2020-09-30

## 2020-09-30 DIAGNOSIS — I48.0 PAROXYSMAL A-FIB (HCC): ICD-10-CM

## 2020-09-30 DIAGNOSIS — Z79.01 CHRONIC ANTICOAGULATION: ICD-10-CM

## 2020-09-30 NOTE — PROGRESS NOTES
Anticoagulation Summary  As of 2020    INR goal:  2.0-3.0   TTR:  79.3 % (2.8 y)   INR used for dosin.15 (2020)   Warfarin maintenance plan:  1 mg (1 mg x 1) every day   Weekly warfarin total:  7 mg   Plan last modified:  Major Hughes, PharmD (2020)   Next INR check:  10/13/2020   Target end date:  Indefinite    Indications    Paroxysmal a-fib (CMS-HCC) [I48.0]  Chronic anticoagulation [Z79.01]             Anticoagulation Episode Summary     INR check location:  Anticoagulation Clinic    Preferred lab:      Send INR reminders to:      Comments:  [phone] (H)      Anticoagulation Care Providers     Provider Role Specialty Phone number    Evelio Tejeda M.D. Referring Internal Medicine [phone]    [facility] Responsible  [phone]    Beryl Pang M.D. Responsible Family Medicine [phone]        Anticoagulation Patient Findings     Spoke with patient today regarding therapeutic INR of 2.15.  Patient states she has had signs/symptoms of bruising and bleeding but this is not abnormal for her. She states her skin is very thin and easily breaks open if she bumps herself. She states she can usually stop the bleeding within 10 -20 min with compression. I instructed her to seek medical attention if the bleed does not stop within 30 min. She has recently started taking a fiber supplement QD for constipation and she has been eating more meat lately. Instructed patient to call clinic with any questions or concerns.    Pt is to continue with current warfarin dosing regimen.    Follow up in 2 weeks, to reduce risk of adverse events related to this high risk medication,  Warfarin.    Fanny Sampson, Pharmacy Intern

## 2020-10-13 ENCOUNTER — HOSPITAL ENCOUNTER (OUTPATIENT)
Dept: LAB | Facility: MEDICAL CENTER | Age: 80
End: 2020-10-13
Attending: NURSE PRACTITIONER
Payer: MEDICARE

## 2020-10-13 ENCOUNTER — ANTICOAGULATION MONITORING (OUTPATIENT)
Dept: VASCULAR LAB | Facility: MEDICAL CENTER | Age: 80
End: 2020-10-13

## 2020-10-13 DIAGNOSIS — Z79.01 CHRONIC ANTICOAGULATION: ICD-10-CM

## 2020-10-13 DIAGNOSIS — I48.0 PAROXYSMAL A-FIB (HCC): ICD-10-CM

## 2020-10-13 LAB
INR PPP: 2.54 (ref 0.87–1.13)
PROTHROMBIN TIME: 28.2 SEC (ref 12–14.6)

## 2020-10-13 PROCEDURE — 85610 PROTHROMBIN TIME: CPT

## 2020-10-13 PROCEDURE — 36415 COLL VENOUS BLD VENIPUNCTURE: CPT

## 2020-10-14 NOTE — PROGRESS NOTES
OP   Telephone Anticoagulation Service Note      Anticoagulation Summary  As of 10/13/2020    INR goal:  2.0-3.0   TTR:  79.6 % (2.8 y)   INR used for dosin.54 (10/13/2020)   Warfarin maintenance plan:  1 mg (1 mg x 1) every day   Weekly warfarin total:  7 mg   Plan last modified:  Major Hughes, PharmD (2020)   Next INR check:  11/10/2020   Target end date:  Indefinite    Indications    Paroxysmal a-fib (CMS-HCC) [I48.0]  Chronic anticoagulation [Z79.01]             Anticoagulation Episode Summary     INR check location:  Anticoagulation Clinic    Preferred lab:      Send INR reminders to:      Comments:  [phone] (H)      Anticoagulation Care Providers     Provider Role Specialty Phone number    Evelio Tejeda M.D. Referring Internal Medicine [phone]    [facility] Anticoagulation Services Responsible  [phone]    Beryl Pang M.D. Responsible Family Medicine [phone]        Anticoagulation Patient Findings  Patient Findings     Negatives:  Signs/symptoms of thrombosis, Signs/symptoms of bleeding, Laboratory test error suspected, Change in health, Change in alcohol use, Change in activity, Upcoming invasive procedure, Emergency department visit, Upcoming dental procedure, Missed doses, Extra doses, Change in medications, Change in diet/appetite, Hospital admission, Bruising, Other complaints        Spoke with the patient on the phone today, reporting a therapeutic INR of 2.54. Confirmed the current warfarin dosing regimen and patient compliance. Patient denies any interval changes to diet and/or medications. Patient denies any signs/symptoms of bleeding or clotting.  Patient instructed to continue with the current warfarin dosing regimen, and asked to follow up again in 4 weeks (per pt preference).     Celeste GrovesD

## 2020-11-10 ENCOUNTER — HOSPITAL ENCOUNTER (OUTPATIENT)
Dept: LAB | Facility: MEDICAL CENTER | Age: 80
End: 2020-11-10
Attending: NURSE PRACTITIONER
Payer: MEDICARE

## 2020-11-10 DIAGNOSIS — Z79.01 CHRONIC ANTICOAGULATION: ICD-10-CM

## 2020-11-10 LAB
INR PPP: 2.3 (ref 0.87–1.13)
PROTHROMBIN TIME: 26 SEC (ref 12–14.6)

## 2020-11-10 PROCEDURE — 85610 PROTHROMBIN TIME: CPT

## 2020-11-10 PROCEDURE — 36415 COLL VENOUS BLD VENIPUNCTURE: CPT

## 2020-11-11 ENCOUNTER — ANTICOAGULATION MONITORING (OUTPATIENT)
Dept: VASCULAR LAB | Facility: MEDICAL CENTER | Age: 80
End: 2020-11-11

## 2020-11-11 DIAGNOSIS — I48.0 PAROXYSMAL A-FIB (HCC): ICD-10-CM

## 2020-11-11 DIAGNOSIS — Z79.01 CHRONIC ANTICOAGULATION: ICD-10-CM

## 2020-11-11 NOTE — PROGRESS NOTES
Anticoagulation Summary  As of 2020    INR goal:  2.0-3.0   TTR:  80.2 % (2.9 y)   INR used for dosin.30 (11/10/2020)   Warfarin maintenance plan:  1 mg (1 mg x 1) every day   Weekly warfarin total:  7 mg   Plan last modified:  Major Hughes, PharmD (2020)   Next INR check:  2020   Target end date:  Indefinite    Indications    Paroxysmal a-fib (CMS-HCC) [I48.0]  Chronic anticoagulation [Z79.01]             Anticoagulation Episode Summary     INR check location:  Anticoagulation Clinic    Preferred lab:      Send INR reminders to:      Comments:  [phone] (H)      Anticoagulation Care Providers     Provider Role Specialty Phone number    Evelio Tejeda M.D. Referring Internal Medicine [phone]    [facility] Anticoagulation Services Responsible  [phone]    Beryl Pang M.D. Responsible Family Medicine [phone]        Anticoagulation Patient Findings      Spoke with patient to report a therapeutic INR.  Pt confirms dosing and denies bleeding, or any changes to medications or diet. Pt instructed to continue with current regimen. Will f/u 6 weeks       Yazmin Bernard, Pharmacy Intern

## 2020-11-14 ENCOUNTER — HOSPITAL ENCOUNTER (OUTPATIENT)
Dept: RADIOLOGY | Facility: MEDICAL CENTER | Age: 80
End: 2020-11-14
Attending: FAMILY MEDICINE
Payer: MEDICARE

## 2020-11-14 DIAGNOSIS — Z12.31 VISIT FOR SCREENING MAMMOGRAM: ICD-10-CM

## 2020-11-14 PROCEDURE — 77067 SCR MAMMO BI INCL CAD: CPT

## 2020-12-02 DIAGNOSIS — Z79.01 CHRONIC ANTICOAGULATION: ICD-10-CM

## 2020-12-04 RX ORDER — WARFARIN SODIUM 1 MG/1
TABLET ORAL
Qty: 90 TAB | Refills: 1 | Status: SHIPPED | OUTPATIENT
Start: 2020-12-04 | End: 2021-04-23

## 2020-12-08 ENCOUNTER — HOSPITAL ENCOUNTER (OUTPATIENT)
Dept: LAB | Facility: MEDICAL CENTER | Age: 80
End: 2020-12-08
Attending: NURSE PRACTITIONER
Payer: MEDICARE

## 2020-12-08 ENCOUNTER — ANTICOAGULATION MONITORING (OUTPATIENT)
Dept: VASCULAR LAB | Facility: MEDICAL CENTER | Age: 80
End: 2020-12-08

## 2020-12-08 DIAGNOSIS — Z79.01 CHRONIC ANTICOAGULATION: ICD-10-CM

## 2020-12-08 DIAGNOSIS — I48.0 PAROXYSMAL A-FIB (HCC): ICD-10-CM

## 2020-12-08 LAB
INR PPP: 2.5 (ref 0.87–1.13)
PROTHROMBIN TIME: 27.8 SEC (ref 12–14.6)

## 2020-12-08 PROCEDURE — 85610 PROTHROMBIN TIME: CPT

## 2020-12-08 PROCEDURE — 36415 COLL VENOUS BLD VENIPUNCTURE: CPT

## 2020-12-09 NOTE — PROGRESS NOTES
OP   Telephone Anticoagulation Service Note      Anticoagulation Summary  As of 2020    INR goal:  2.0-3.0   TTR:  80.7 % (3 y)   INR used for dosin.50 (2020)   Warfarin maintenance plan:  1 mg (1 mg x 1) every day   Weekly warfarin total:  7 mg   No change documented:  Jessika Licea   Plan last modified:  Major Hughes, PharmD (2020)   Next INR check:  2021   Target end date:  Indefinite    Indications    Paroxysmal a-fib (CMS-HCC) [I48.0]  Chronic anticoagulation [Z79.01]             Anticoagulation Episode Summary     INR check location:  Anticoagulation Clinic    Preferred lab:      Send INR reminders to:      Comments:  [phone] (H)      Anticoagulation Care Providers     Provider Role Specialty Phone number    Eveloi Tejeda M.D. Referring Internal Medicine [phone]    [facility] Anticoagulation Services Responsible  [phone]    Beryl Pang M.D. Responsible Family Medicine [phone]        Anticoagulation Patient Findings  Patient Findings     Negatives:  Signs/symptoms of thrombosis, Signs/symptoms of bleeding, Laboratory test error suspected, Change in health, Change in alcohol use, Change in activity, Upcoming invasive procedure, Emergency department visit, Upcoming dental procedure, Missed doses, Extra doses, Change in medications, Change in diet/appetite, Hospital admission, Bruising, Other complaints        Spoke with the patient on the phone today, reporting a therapeutic INR of 2.5.   Confirmed the current warfarin dosing regimen and patient compliance.  Patient denies any interval changes to diet and/or medications. Patient denies any signs/symptoms of bleeding or clotting.  Patient instructed to continue with the current warfarin dosing regimen, and asked to follow up again in 4 weeks.     Celeste GrovesD

## 2021-01-12 ENCOUNTER — ANTICOAGULATION MONITORING (OUTPATIENT)
Dept: VASCULAR LAB | Facility: MEDICAL CENTER | Age: 81
End: 2021-01-12

## 2021-01-12 ENCOUNTER — HOSPITAL ENCOUNTER (OUTPATIENT)
Dept: LAB | Facility: MEDICAL CENTER | Age: 81
End: 2021-01-12
Attending: FAMILY MEDICINE
Payer: MEDICARE

## 2021-01-12 ENCOUNTER — HOSPITAL ENCOUNTER (OUTPATIENT)
Dept: LAB | Facility: MEDICAL CENTER | Age: 81
End: 2021-01-12
Attending: NURSE PRACTITIONER
Payer: MEDICARE

## 2021-01-12 DIAGNOSIS — Z23 NEED FOR VACCINATION: ICD-10-CM

## 2021-01-12 DIAGNOSIS — I48.0 PAROXYSMAL A-FIB (HCC): ICD-10-CM

## 2021-01-12 DIAGNOSIS — Z79.01 CHRONIC ANTICOAGULATION: ICD-10-CM

## 2021-01-12 LAB
BASOPHILS # BLD AUTO: 0.9 % (ref 0–1.8)
BASOPHILS # BLD: 0.07 K/UL (ref 0–0.12)
EOSINOPHIL # BLD AUTO: 0.23 K/UL (ref 0–0.51)
EOSINOPHIL NFR BLD: 3 % (ref 0–6.9)
ERYTHROCYTE [DISTWIDTH] IN BLOOD BY AUTOMATED COUNT: 49 FL (ref 35.9–50)
EST. AVERAGE GLUCOSE BLD GHB EST-MCNC: 123 MG/DL
HBA1C MFR BLD: 5.9 % (ref 0–5.6)
HCT VFR BLD AUTO: 46.7 % (ref 37–47)
HGB BLD-MCNC: 14.3 G/DL (ref 12–16)
IMM GRANULOCYTES # BLD AUTO: 0.02 K/UL (ref 0–0.11)
IMM GRANULOCYTES NFR BLD AUTO: 0.3 % (ref 0–0.9)
INR PPP: 2.36 (ref 0.87–1.13)
LYMPHOCYTES # BLD AUTO: 2.91 K/UL (ref 1–4.8)
LYMPHOCYTES NFR BLD: 38.5 % (ref 22–41)
MCH RBC QN AUTO: 28.9 PG (ref 27–33)
MCHC RBC AUTO-ENTMCNC: 30.6 G/DL (ref 33.6–35)
MCV RBC AUTO: 94.3 FL (ref 81.4–97.8)
MONOCYTES # BLD AUTO: 0.81 K/UL (ref 0–0.85)
MONOCYTES NFR BLD AUTO: 10.7 % (ref 0–13.4)
NEUTROPHILS # BLD AUTO: 3.51 K/UL (ref 2–7.15)
NEUTROPHILS NFR BLD: 46.6 % (ref 44–72)
NRBC # BLD AUTO: 0 K/UL
NRBC BLD-RTO: 0 /100 WBC
PLATELET # BLD AUTO: 204 K/UL (ref 164–446)
PMV BLD AUTO: 10.5 FL (ref 9–12.9)
PROTHROMBIN TIME: 26.6 SEC (ref 12–14.6)
RBC # BLD AUTO: 4.95 M/UL (ref 4.2–5.4)
WBC # BLD AUTO: 7.6 K/UL (ref 4.8–10.8)

## 2021-01-12 PROCEDURE — 80053 COMPREHEN METABOLIC PANEL: CPT

## 2021-01-12 PROCEDURE — 85610 PROTHROMBIN TIME: CPT

## 2021-01-12 PROCEDURE — 84443 ASSAY THYROID STIM HORMONE: CPT

## 2021-01-12 PROCEDURE — 80061 LIPID PANEL: CPT

## 2021-01-12 PROCEDURE — 85025 COMPLETE CBC W/AUTO DIFF WBC: CPT

## 2021-01-12 PROCEDURE — 36415 COLL VENOUS BLD VENIPUNCTURE: CPT

## 2021-01-12 PROCEDURE — 83036 HEMOGLOBIN GLYCOSYLATED A1C: CPT | Mod: GZ

## 2021-01-13 LAB
ALBUMIN SERPL BCP-MCNC: 4 G/DL (ref 3.2–4.9)
ALBUMIN/GLOB SERPL: 1.7 G/DL
ALP SERPL-CCNC: 91 U/L (ref 30–99)
ALT SERPL-CCNC: 19 U/L (ref 2–50)
ANION GAP SERPL CALC-SCNC: 7 MMOL/L (ref 7–16)
AST SERPL-CCNC: 27 U/L (ref 12–45)
BILIRUB SERPL-MCNC: 0.4 MG/DL (ref 0.1–1.5)
BUN SERPL-MCNC: 12 MG/DL (ref 8–22)
CALCIUM SERPL-MCNC: 9.5 MG/DL (ref 8.5–10.5)
CHLORIDE SERPL-SCNC: 104 MMOL/L (ref 96–112)
CHOLEST SERPL-MCNC: 157 MG/DL (ref 100–199)
CO2 SERPL-SCNC: 27 MMOL/L (ref 20–33)
CREAT SERPL-MCNC: 0.72 MG/DL (ref 0.5–1.4)
GLOBULIN SER CALC-MCNC: 2.3 G/DL (ref 1.9–3.5)
GLUCOSE SERPL-MCNC: 86 MG/DL (ref 65–99)
HDLC SERPL-MCNC: 90 MG/DL
LDLC SERPL CALC-MCNC: 56 MG/DL
POTASSIUM SERPL-SCNC: 4.5 MMOL/L (ref 3.6–5.5)
PROT SERPL-MCNC: 6.3 G/DL (ref 6–8.2)
SODIUM SERPL-SCNC: 138 MMOL/L (ref 135–145)
TRIGL SERPL-MCNC: 55 MG/DL (ref 0–149)
TSH SERPL DL<=0.005 MIU/L-ACNC: 1.82 UIU/ML (ref 0.38–5.33)

## 2021-01-13 NOTE — PROGRESS NOTES
Anticoagulation Summary  As of 2021    INR goal:  2.0-3.0   TTR:  81.3 % (3.1 y)   INR used for dosin.36 (2021)   Warfarin maintenance plan:  1 mg (1 mg x 1) every day   Weekly warfarin total:  7 mg   Plan last modified:  Major Hughes PharmD (2020)   Next INR check:  2021   Target end date:  Indefinite    Indications    Paroxysmal a-fib (CMS-HCC) [I48.0]  Chronic anticoagulation [Z79.01]             Anticoagulation Episode Summary     INR check location:  Anticoagulation Clinic    Preferred lab:      Send INR reminders to:      Comments:  [phone] (H)      Anticoagulation Care Providers     Provider Role Specialty Phone number    Evelio Tejeda M.D. Referring Internal Medicine [phone]    [facility] Anticoagulation Services Responsible  [phone]    Beryl Pang M.D. Responsible Family Medicine [phone]        Anticoagulation Patient Findings  Patient Findings     Negatives:  Signs/symptoms of thrombosis, Signs/symptoms of bleeding, Laboratory test error suspected, Change in health, Change in alcohol use, Change in activity, Upcoming invasive procedure, Emergency department visit, Upcoming dental procedure, Missed doses, Extra doses, Change in medications, Change in diet/appetite, Hospital admission, Bruising, Other complaints        Spoke with patient today regarding therapeutic INR of 2.36.  Patient denies any signs/symptoms of bruising or bleeding or any changes in diet and medications.  Instructed patient to call clinic with any questions or concerns.  Pt is to continue with current warfarin dosing regimen.  Follow up in 6 weeks, to reduce risk of adverse events related to this high risk medication,  Warfarin.    Major Hughes, PharmD, BCACP

## 2021-02-17 ENCOUNTER — OFFICE VISIT (OUTPATIENT)
Dept: CARDIOLOGY | Facility: MEDICAL CENTER | Age: 81
End: 2021-02-17
Payer: MEDICARE

## 2021-02-17 VITALS
DIASTOLIC BLOOD PRESSURE: 76 MMHG | SYSTOLIC BLOOD PRESSURE: 126 MMHG | RESPIRATION RATE: 16 BRPM | BODY MASS INDEX: 17.41 KG/M2 | OXYGEN SATURATION: 97 % | WEIGHT: 94.6 LBS | HEIGHT: 62 IN | HEART RATE: 57 BPM

## 2021-02-17 DIAGNOSIS — Z95.5 S/P CORONARY ARTERY STENT PLACEMENT: ICD-10-CM

## 2021-02-17 DIAGNOSIS — I73.9 PAD (PERIPHERAL ARTERY DISEASE) (HCC): ICD-10-CM

## 2021-02-17 DIAGNOSIS — Z79.01 CHRONIC ANTICOAGULATION: ICD-10-CM

## 2021-02-17 DIAGNOSIS — I25.10 CORONARY ARTERY DISEASE INVOLVING NATIVE CORONARY ARTERY OF NATIVE HEART WITHOUT ANGINA PECTORIS: ICD-10-CM

## 2021-02-17 DIAGNOSIS — I48.0 PAROXYSMAL A-FIB (HCC): ICD-10-CM

## 2021-02-17 PROBLEM — Z87.892 PERSONAL HISTORY OF ANAPHYLAXIS: Status: ACTIVE | Noted: 2020-12-18

## 2021-02-17 PROBLEM — K59.00 CONSTIPATION: Status: ACTIVE | Noted: 2017-12-03

## 2021-02-17 PROBLEM — R20.2 PARESTHESIA OF UPPER EXTREMITY: Status: ACTIVE | Noted: 2021-02-04

## 2021-02-17 PROBLEM — J44.1 ACUTE EXACERBATION OF CHRONIC OBSTRUCTIVE PULMONARY DISEASE (HCC): Status: ACTIVE | Noted: 2020-12-18

## 2021-02-17 LAB — EKG IMPRESSION: NORMAL

## 2021-02-17 PROCEDURE — 93000 ELECTROCARDIOGRAM COMPLETE: CPT | Performed by: INTERNAL MEDICINE

## 2021-02-17 PROCEDURE — 99214 OFFICE O/P EST MOD 30 MIN: CPT | Performed by: NURSE PRACTITIONER

## 2021-02-17 ASSESSMENT — ENCOUNTER SYMPTOMS
CHILLS: 0
FEVER: 0
VOMITING: 0
CLAUDICATION: 0
DIZZINESS: 0
HEADACHES: 0
PND: 0
PALPITATIONS: 0
NAUSEA: 0
HEMOPTYSIS: 0
ORTHOPNEA: 0
COUGH: 0
WHEEZING: 0
SHORTNESS OF BREATH: 0
SPUTUM PRODUCTION: 0

## 2021-02-17 ASSESSMENT — FIBROSIS 4 INDEX: FIB4 SCORE: 2.46

## 2021-02-17 NOTE — PROGRESS NOTES
Chief Complaint   Patient presents with   • Atrial Fibrillation       Subjective:   Angie Root is a 80 y.o. female who presents today PAD status post PCI to her left leg in 2008, and more recently diagnosed ischemic colitis, CAD with 2 stents placed in her LAD in 2009, nicotine dependence, dyslipidemia, recurrent C diff colitis, and paroxysmal atrial fibrillation on rhythm control strategy (with sotalol) and now anticoagulated with Warfarin.  Patient last seen 9/17/2019 by Dr. Carmona and by myself on 7/15/2020.     2/17/2021: Patient reports that she has been having a heavy sensation in her left arm since Andrez.  She had been moving some boxes so felt it was likely musculoskeletal.  She continues to have the heaviness and down she reports that her left leg feels numb as well.  Nothing makes the heaviness/numbness better or worse.     7/15/2020: Patient comes in today with no significant complaints.  She denies chest pain, palpitations, orthopnea, claudication, lower extremity edema.  EKG today shows sinus bradycardia with RSR prime in V1 and V2.  QTc 463.     Probably had rheumatic fever as a child.  Sister had some type of autoimmune vascular problem, mult stents to her legs.  She had stents for PAD in 1991.  Then having afib with palpitations.  Also having CPs, had angio, 2 stents, then afib went away but also on sotalol.  Prior K replacement caused problems.     On warfarin, TQALT9akiv of 3, no TIA/CVA.     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 No Abx due to CDiff.     Echo 2018, normal EF, normal size LA, RVSP 45 mmhg.     LDL 62, HDL 80, on lovastatin, was on another statin, does not know which, no prior problems.  BP low, no HTN.     Still smoking, since age 15, quit a few times, has used Chantix, hypnosis.     Followed for Pulm Nodule, calcified atherosclerosis on CT    Past Medical History:   Diagnosis Date   • Arthritis     BL hands   • CAD (coronary artery disease)    •  Cancer (HCC) 1982    melanoma   • COPD (chronic obstructive pulmonary disease) (HCC)    • Croup 3 years old   • Diverticulosis    • Dyslipidemia    • GERD (gastroesophageal reflux disease)    • Hiatal hernia    • High cholesterol    • Hypertension    • Infectious disease 2018    C Diff   • Measles     5 years old   • Paroxysmal A-fib (HCC) 1/20/2016    Symptomatic, on a rhythm control strategy with sotalol 40 mg by mouth twice a day.    • Paroxysmal atrial fibrillation (HCC)    • Prediabetes    • PVD (peripheral vascular disease) (HCC)      Past Surgical History:   Procedure Laterality Date   • FECAL TRANSPLANT N/A 4/9/2018    Procedure: FECAL TRANSPLANT;  Surgeon: Gonzalez Cruz M.D.;  Location: ENDOSCOPY [facility];  Service: Gastroenterology   • COLONOSCOPY - ENDO N/A 4/9/2018    Procedure: COLONOSCOPY - ENDO;  Surgeon: Gonzalez Cruz M.D.;  Location: ENDOSCOPY [facility];  Service: Gastroenterology   • WIDE EXCISION MELANOMA, LEG, W/POSS.STSG  10/2015    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   • CARDIAC CATH, RIGHT HEART  2008    stent   • OTHER ORTHOPEDIC SURGERY Left 2008    hand repair   • CHOLECYSTECTOMY  1993   • TONSILLECTOMY  1945   • OTHER CARDIAC SURGERY      r heart cath stents   • STENT PLACEMENT      L iliac stent     Family History   Problem Relation Age of Onset   • Hypertension Mother    • Hyperlipidemia Mother    • Cancer Maternal Grandmother         tongue   • Cancer Maternal Uncle         x 3, pancreas   • Cancer Maternal Grandfather         unknown   • Cancer Paternal Grandmother         unknown   • Cancer Paternal Grandfather         unknown   • Diabetes Maternal Uncle    • Heart Disease Maternal Uncle    • Hypertension Sister    • Hyperlipidemia Sister    • Heart Disease Sister    • Alcohol/Drug Sister    • Thyroid Sister    • Psychiatric Illness Neg Hx    • Stroke Neg Hx      Social History     Socioeconomic History   • Marital status:      Spouse name: Not on file   •  Number of children: 0   • Years of education: Not on file   • Highest education level: Not on file   Occupational History   • Not on file   Tobacco Use   • Smoking status: Current Every Day Smoker     Packs/day: 0.25     Years: 60.00     Pack years: 15.00     Types: Cigarettes     Start date: 3/20/1957   • Smokeless tobacco: Never Used   • Tobacco comment: 6 per day   Substance and Sexual Activity   • Alcohol use: No     Alcohol/week: 0.0 oz   • Drug use: No   • Sexual activity: Not Currently     Partners: Male   Other Topics Concern   • Not on file   Social History Narrative    .     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 [employer]     Social Determinants of Health     Financial Resource Strain:    • Difficulty of Paying Living Expenses:    Food Insecurity:    • Worried About Running Out of Food in the Last Year:    • Ran Out of Food in the Last Year:    Transportation Needs:    • Lack of Transportation (Medical):    • Lack of Transportation (Non-Medical):    Physical Activity:    • Days of Exercise per Week:    • Minutes of Exercise per Session:    Stress:    • Feeling of Stress :    Social Connections:    • Frequency of Communication with Friends and Family:    • Frequency of Social Gatherings with Friends and Family:    • Attends Confucianist Services:    • Active Member of Clubs or Organizations:    • Attends Club or Organization Meetings:    • Marital Status:    Intimate Partner Violence:    • Fear of Current or Ex-Partner:    • Emotionally Abused:    • Physically Abused:    • Sexually Abused:      Allergies   Allergen Reactions   • Codeine Swelling   • Iodine Swelling   • Bee Venom Anaphylaxis   • Chantix [Varenicline]      disoriented   • Ciprofloxacin      Causes C diff, recurrent and very difficult   • Flagyl [Metronidazole] Rash     C/O flagyl causes rash.    • Latex Hives   • Lipitor [Atorvastatin Calcium] Unspecified     Intense Stomach pain   • Pcn [Penicillins] Anaphylaxis and Hives   • Shellfish  "Allergy      unknown   • Tetanus Antitoxin Swelling     unknown     Outpatient Encounter Medications as of 2/17/2021   Medication Sig Dispense Refill   • FLUTICASONE FUROATE INH Inhale.     • Probiotic Product (PROBIOTIC PO) Take  by mouth.     • warfarin (COUMADIN) 1 MG Tab TAKE 1 TABLET DAILY OR AS DIRECTED BY ANTICOAGULATION CLINIC 90 Tab 1   • DILTIAZem CD (CARDIZEM CD) 240 MG CAPSULE SR 24 HR Take 1 Cap by mouth every day. 90 Cap 3   • lovastatin (MEVACOR) 40 MG tablet Take 1 Tab by mouth every evening. 90 Tab 3   • sotalol (BETAPACE) 80 MG Tab Take 0.5 Tabs by mouth 2 times a day. 90 Tab 3   • albuterol 108 (90 Base) MCG/ACT Aero Soln inhalation aerosol Inhale 2 Puffs by mouth every 6 hours as needed for Shortness of Breath.     • ascorbic acid (ASCORBIC ACID) 500 MG Tab Take 1,000 mg by mouth every day.     • ipratropium (ATROVENT) 0.02 % Solution 0.2 mg by Nebulization route 4 times a day.     • alprazolam (XANAX) 0.25 MG Tab Take 0.25 mg by mouth 2 times a day as needed for Anxiety.     • vitamin D (CHOLECALCIFEROL) 1000 UNIT Tab Take 2,000 Units by mouth every morning. 3000 units     • psyllium (METAMUCIL) 58.12 % Pack Take 1 Packet by mouth every day.     • lactobacillus rhamnosus (CULTURELLE) Cap capsule Take 1 Cap by mouth every day.       No facility-administered encounter medications on file as of 2/17/2021.     Review of Systems   Constitutional: Negative for chills and fever.   Respiratory: Negative for cough, hemoptysis, sputum production, shortness of breath and wheezing.    Cardiovascular: Negative for chest pain, palpitations, orthopnea, claudication, leg swelling and PND.   Gastrointestinal: Negative for nausea and vomiting.   Neurological: Negative for dizziness and headaches.   All other systems reviewed and are negative.       Objective:   /76 (BP Location: Left arm, Patient Position: Sitting, BP Cuff Size: Adult)   Pulse (!) 57   Resp 16   Ht 1.575 m (5' 2\")   Wt 42.9 kg (94 lb " "9.6 oz)   SpO2 97%   BMI 17.30 kg/m²     Physical Exam   Constitutional: She appears well-developed and well-nourished.   Eyes: EOM are normal.   Neck: No JVD present.   Cardiovascular: Normal rate, regular rhythm and normal heart sounds.   No murmur heard.  Pulmonary/Chest: Effort normal and breath sounds normal.   Abdominal: Soft.   Musculoskeletal:      Cervical back: Neck supple.   Neurological:   Cranial nerves II-XII WNL   Skin: Skin is warm and dry.   Psychiatric: She has a normal mood and affect. Her behavior is normal. Judgment and thought content normal.   Nursing note and vitals reviewed.    ECG 5-24-19  Sinus, 56, QTc 449     Echocardiogram, 8/6/2018:  Normal left ventricular systolic function.  Left ventricular ejection fraction is visually estimated to be 60%.   The right ventricle was normal in size and function.  Mild mitral regurgitation.  Mild tricuspid regurgitation.  Estimated right ventricular systolic pressure is 45 mmHg.  LA normal size.     Echocardiogram, 6/27/2016:  Normal left ventricular size, thickness, systolic function EF 60%.   Mitral annular calcification.  Aortic sclerosis without stenosis.   Mild tricuspid regurgitation.  Right ventricular systolic pressure is estimated to be 33 mmHg.   No prior study for comparison\"     Echocardiogram, 11/18/2017:  Prior echocardiogram 6/27/2016.Normal left ventricular chamber size.  Left ventricular ejection fraction is visually estimated to be 60%.  Grade II diastolic dysfunction.  Aortic sclerosis without stenosis.  Mitral annular calcification.  Heavy calcification of the posterior mitral valve leaflet.  Mild mitral regurgitation.  Trace tricuspid regurgitation.  Normal pericardium without effusion.  Ascending aorta diameter is 2.4 cm\"     Myocardial perfusion imaging, 8/6/2018:  Normal left ventricular perfusion with no fixed or reversible defects.   Normal left ventricular size, ejection fraction, and wall motion.      CT chest 7-24-19  1. "  No change in single small pulmonary nodule in each lower pulmonary lobe since 2017. These nodules appear benign.  2.  No new pulmonary nodules or masses are identified.  3.  Emphysema is again noted.  4.  Limited areas of pulmonary scarring fibrosis and bronchiectasis are again noted.  5.  Calcific atherosclerosis again noted.  6.  Calculi are again identified within each kidney.    Assessment:     1. Coronary artery disease involving native coronary artery of native heart without angina pectoris     2. PCI to her LAD in 2009     3. Paroxysmal a-fib (CMS-HCC)  EKG   4. Chronic anticoagulation         Medical Decision Making:  Today's Assessment / Status / Plan:   Continue significant history of PAD and CAD will order left upper and lower extremity EDGAR.  ECG today was normal reviewed by Dr. Braga.    Follow-up in 3 months with Dr. Carmona

## 2021-02-23 ENCOUNTER — HOSPITAL ENCOUNTER (OUTPATIENT)
Dept: LAB | Facility: MEDICAL CENTER | Age: 81
End: 2021-02-23
Attending: NURSE PRACTITIONER
Payer: MEDICARE

## 2021-02-23 DIAGNOSIS — Z79.01 CHRONIC ANTICOAGULATION: ICD-10-CM

## 2021-02-23 LAB
INR PPP: 2.45 (ref 0.87–1.13)
PROTHROMBIN TIME: 27.3 SEC (ref 12–14.6)

## 2021-02-23 PROCEDURE — 85610 PROTHROMBIN TIME: CPT

## 2021-02-23 PROCEDURE — 36415 COLL VENOUS BLD VENIPUNCTURE: CPT

## 2021-02-24 ENCOUNTER — ANTICOAGULATION MONITORING (OUTPATIENT)
Dept: VASCULAR LAB | Facility: MEDICAL CENTER | Age: 81
End: 2021-02-24

## 2021-02-24 DIAGNOSIS — I48.0 PAROXYSMAL A-FIB (HCC): ICD-10-CM

## 2021-02-24 DIAGNOSIS — Z79.01 CHRONIC ANTICOAGULATION: ICD-10-CM

## 2021-02-24 NOTE — PROGRESS NOTES
Anticoagulation Summary  As of 2021    INR goal:  2.0-3.0   TTR:  82.0 % (3.2 y)   INR used for dosin.45 (2021)   Warfarin maintenance plan:  1 mg (1 mg x 1) every day   Weekly warfarin total:  7 mg   Plan last modified:  Major Hughes, PharmD (2020)   Next INR check:  3/23/2021   Target end date:  Indefinite    Indications    Paroxysmal a-fib (CMS-HCC) [I48.0]  Chronic anticoagulation [Z79.01]             Anticoagulation Episode Summary     INR check location:  Anticoagulation Clinic    Preferred lab:      Send INR reminders to:      Comments:  [phone] (H)      Anticoagulation Care Providers     Provider Role Specialty Phone number    Evelio Tejeda M.D. Referring Internal Medicine [phone]    [facility] Anticoagulation Services Responsible  [phone]    Beryl Pang M.D. Responsible Family Medicine [phone]        Anticoagulation Patient Findings  Patient Findings     Positives:  Bruising    Negatives:  Signs/symptoms of thrombosis, Signs/symptoms of bleeding, Laboratory test error suspected, Change in health, Change in alcohol use, Change in activity, Upcoming invasive procedure, Emergency department visit, Upcoming dental procedure, Missed doses, Extra doses, Change in medications, Change in diet/appetite, Hospital admission, Other complaints           Spoke with patient today regarding therapeutic INR of 2.45.    Pt confirmed current dosing and adherence.  Patient denies any signs/symptoms of bleeding or any changes in diet and medications.    Pt did say she bruises easy but the do heal and never have any signs of bleeding.  Instructed patient to call clinic with any questions or concerns.  Pt is to continue with current warfarin dosing regimen.  Follow up in 4 weeks, to reduce risk of adverse events related to this high risk medication,  Warfarin.    Nayan Crews, Pharmacy Intern

## 2021-02-25 ENCOUNTER — HOSPITAL ENCOUNTER (OUTPATIENT)
Dept: RADIOLOGY | Facility: MEDICAL CENTER | Age: 81
End: 2021-02-25
Attending: FAMILY MEDICINE
Payer: MEDICARE

## 2021-02-25 DIAGNOSIS — R20.2 NUMBNESS AND TINGLING: ICD-10-CM

## 2021-02-25 DIAGNOSIS — R20.0 NUMBNESS AND TINGLING: ICD-10-CM

## 2021-02-25 PROCEDURE — 93880 EXTRACRANIAL BILAT STUDY: CPT

## 2021-02-26 ENCOUNTER — HOSPITAL ENCOUNTER (OUTPATIENT)
Dept: RADIOLOGY | Facility: MEDICAL CENTER | Age: 81
End: 2021-02-26
Attending: NURSE PRACTITIONER
Payer: MEDICARE

## 2021-02-26 DIAGNOSIS — I73.9 PAD (PERIPHERAL ARTERY DISEASE) (HCC): ICD-10-CM

## 2021-02-26 PROCEDURE — 93922 UPR/L XTREMITY ART 2 LEVELS: CPT

## 2021-02-26 PROCEDURE — 93925 LOWER EXTREMITY STUDY: CPT

## 2021-02-27 PROCEDURE — 93925 LOWER EXTREMITY STUDY: CPT | Mod: 26 | Performed by: INTERNAL MEDICINE

## 2021-02-27 PROCEDURE — 93922 UPR/L XTREMITY ART 2 LEVELS: CPT | Mod: 26 | Performed by: INTERNAL MEDICINE

## 2021-03-22 ENCOUNTER — HOSPITAL ENCOUNTER (OUTPATIENT)
Dept: LAB | Facility: MEDICAL CENTER | Age: 81
End: 2021-03-22
Attending: NURSE PRACTITIONER
Payer: MEDICARE

## 2021-03-22 ENCOUNTER — ANTICOAGULATION MONITORING (OUTPATIENT)
Dept: VASCULAR LAB | Facility: MEDICAL CENTER | Age: 81
End: 2021-03-22

## 2021-03-22 ENCOUNTER — ANTICOAGULATION MONITORING (OUTPATIENT)
Dept: MEDICAL GROUP | Facility: PHYSICIAN GROUP | Age: 81
End: 2021-03-22

## 2021-03-22 DIAGNOSIS — Z79.01 CHRONIC ANTICOAGULATION: ICD-10-CM

## 2021-03-22 DIAGNOSIS — I48.0 PAROXYSMAL A-FIB (HCC): ICD-10-CM

## 2021-03-22 LAB
INR PPP: 2.21 (ref 0.87–1.13)
INR PPP: 2.21 (ref 2–3.5)
PROTHROMBIN TIME: 25.2 SEC (ref 12–14.6)

## 2021-03-22 PROCEDURE — 85610 PROTHROMBIN TIME: CPT

## 2021-03-22 PROCEDURE — 36415 COLL VENOUS BLD VENIPUNCTURE: CPT

## 2021-03-22 NOTE — PROGRESS NOTES
OP   Telephone Anticoagulation Service Note      Anticoagulation Summary  As of 3/22/2021    INR goal:  2.0-3.0   TTR:  82.4 % (3.2 y)   INR used for dosin.21 (3/22/2021)   Warfarin maintenance plan:  1 mg (1 mg x 1) every day   Weekly warfarin total:  7 mg   Plan last modified:  Yaneli AguirreD (2020)   Next INR check:  2021   Target end date:  Indefinite    Indications    Paroxysmal a-fib (CMS-HCC) [I48.0]  Chronic anticoagulation [Z79.01]             Anticoagulation Episode Summary     INR check location:  Anticoagulation Clinic    Preferred lab:      Send INR reminders to:      Comments:  [phone] (H)      Anticoagulation Care Providers     Provider Role Specialty Phone number    Evelio Tejeda M.D. Referring Internal Medicine [phone]    [facility] Anticoagulation Services Responsible  [phone]    Beryl Pang M.D. Responsible Family Medicine [phone]        Anticoagulation Patient Findings     Spoke with the patient on the phone today, reporting a therapeutic INR of 2.21.  Confirmed the current warfarin dosing regimen and patient compliance.  Patient denies any interval changes to diet and/or medications. Patient denies any signs/symptoms of bleeding or clotting.  Patient instructed to continue with the current warfarin dosing regimen, and asked to follow up again in 4 weeks.     Celeste Licea  PharmD

## 2021-04-06 ENCOUNTER — APPOINTMENT (RX ONLY)
Dept: URBAN - METROPOLITAN AREA CLINIC 4 | Facility: CLINIC | Age: 81
Setting detail: DERMATOLOGY
End: 2021-04-06

## 2021-04-06 DIAGNOSIS — Z71.89 OTHER SPECIFIED COUNSELING: ICD-10-CM

## 2021-04-06 DIAGNOSIS — R23.3 SPONTANEOUS ECCHYMOSES: ICD-10-CM

## 2021-04-06 DIAGNOSIS — L81.4 OTHER MELANIN HYPERPIGMENTATION: ICD-10-CM

## 2021-04-06 DIAGNOSIS — D18.0 HEMANGIOMA: ICD-10-CM

## 2021-04-06 DIAGNOSIS — L82.1 OTHER SEBORRHEIC KERATOSIS: ICD-10-CM

## 2021-04-06 DIAGNOSIS — D22 MELANOCYTIC NEVI: ICD-10-CM

## 2021-04-06 DIAGNOSIS — Z85.828 PERSONAL HISTORY OF OTHER MALIGNANT NEOPLASM OF SKIN: ICD-10-CM

## 2021-04-06 PROBLEM — D18.01 HEMANGIOMA OF SKIN AND SUBCUTANEOUS TISSUE: Status: ACTIVE | Noted: 2021-04-06

## 2021-04-06 PROBLEM — D22.39 MELANOCYTIC NEVI OF OTHER PARTS OF FACE: Status: ACTIVE | Noted: 2021-04-06

## 2021-04-06 PROBLEM — D22.5 MELANOCYTIC NEVI OF TRUNK: Status: ACTIVE | Noted: 2021-04-06

## 2021-04-06 PROCEDURE — ? OBSERVATION AND MEASURE

## 2021-04-06 PROCEDURE — ? COUNSELING

## 2021-04-06 PROCEDURE — 99213 OFFICE O/P EST LOW 20 MIN: CPT

## 2021-04-06 ASSESSMENT — LOCATION SIMPLE DESCRIPTION DERM
LOCATION SIMPLE: ABDOMEN
LOCATION SIMPLE: CHEST
LOCATION SIMPLE: LEFT PRETIBIAL REGION
LOCATION SIMPLE: RIGHT THIGH
LOCATION SIMPLE: LEFT FOREARM
LOCATION SIMPLE: RIGHT PRETIBIAL REGION
LOCATION SIMPLE: LEFT UPPER BACK
LOCATION SIMPLE: RIGHT FOREARM
LOCATION SIMPLE: LEFT CHEEK
LOCATION SIMPLE: LEFT FOREHEAD
LOCATION SIMPLE: RIGHT UPPER BACK
LOCATION SIMPLE: UPPER BACK
LOCATION SIMPLE: RIGHT CHEEK
LOCATION SIMPLE: RIGHT LOWER BACK

## 2021-04-06 ASSESSMENT — LOCATION DETAILED DESCRIPTION DERM
LOCATION DETAILED: RIGHT DISTAL PRETIBIAL REGION
LOCATION DETAILED: LEFT PROXIMAL PRETIBIAL REGION
LOCATION DETAILED: LEFT PROXIMAL DORSAL FOREARM
LOCATION DETAILED: RIGHT SUPERIOR MEDIAL UPPER BACK
LOCATION DETAILED: LEFT MEDIAL UPPER BACK
LOCATION DETAILED: EPIGASTRIC SKIN
LOCATION DETAILED: RIGHT SUPERIOR MEDIAL MIDBACK
LOCATION DETAILED: RIGHT CENTRAL MALAR CHEEK
LOCATION DETAILED: LEFT INFERIOR CENTRAL MALAR CHEEK
LOCATION DETAILED: RIGHT INFERIOR MEDIAL UPPER BACK
LOCATION DETAILED: LEFT DISTAL PRETIBIAL REGION
LOCATION DETAILED: SUPERIOR THORACIC SPINE
LOCATION DETAILED: LEFT SUPERIOR FOREHEAD
LOCATION DETAILED: RIGHT ANTERIOR PROXIMAL THIGH
LOCATION DETAILED: LEFT DISTAL DORSAL FOREARM
LOCATION DETAILED: LOWER STERNUM
LOCATION DETAILED: RIGHT PROXIMAL DORSAL FOREARM
LOCATION DETAILED: RIGHT DISTAL DORSAL FOREARM
LOCATION DETAILED: RIGHT PROXIMAL PRETIBIAL REGION

## 2021-04-06 ASSESSMENT — LOCATION ZONE DERM
LOCATION ZONE: FACE
LOCATION ZONE: ARM
LOCATION ZONE: LEG
LOCATION ZONE: TRUNK

## 2021-04-19 ENCOUNTER — HOSPITAL ENCOUNTER (OUTPATIENT)
Dept: LAB | Facility: MEDICAL CENTER | Age: 81
End: 2021-04-19
Attending: NURSE PRACTITIONER
Payer: MEDICARE

## 2021-04-19 DIAGNOSIS — Z79.01 CHRONIC ANTICOAGULATION: ICD-10-CM

## 2021-04-19 LAB
INR PPP: 2.37 (ref 0.87–1.13)
PROTHROMBIN TIME: 26.7 SEC (ref 12–14.6)

## 2021-04-19 PROCEDURE — 36415 COLL VENOUS BLD VENIPUNCTURE: CPT

## 2021-04-19 PROCEDURE — 85610 PROTHROMBIN TIME: CPT

## 2021-04-20 ENCOUNTER — ANTICOAGULATION MONITORING (OUTPATIENT)
Dept: VASCULAR LAB | Facility: MEDICAL CENTER | Age: 81
End: 2021-04-20

## 2021-04-20 DIAGNOSIS — I48.0 PAROXYSMAL A-FIB (HCC): ICD-10-CM

## 2021-04-20 DIAGNOSIS — Z79.01 CHRONIC ANTICOAGULATION: ICD-10-CM

## 2021-04-20 NOTE — PROGRESS NOTES
Anticoagulation Summary  As of 2021    INR goal:  2.0-3.0   TTR:  82.8 % (3.3 y)   INR used for dosin.37 (2021)   Warfarin maintenance plan:  1 mg (1 mg x 1) every day   Weekly warfarin total:  7 mg   Plan last modified:  Major Hughes, PharmD (2020)   Next INR check:  2021   Target end date:  Indefinite    Indications    Paroxysmal a-fib (CMS-HCC) [I48.0]  Chronic anticoagulation [Z79.01]             Anticoagulation Episode Summary     INR check location:  Anticoagulation Clinic    Preferred lab:      Send INR reminders to:      Comments:  [phone] (H)      Anticoagulation Care Providers     Provider Role Specialty Phone number    Evelio Tejeda M.D. Referring Internal Medicine [phone]    [facility] Anticoagulation Services Responsible  [phone]    Beryl Pang M.D. Responsible Family Medicine [phone]        Anticoagulation Patient Findings    Spoke with patient today regarding therapeutic INR of 2.37.  Patient denies any signs/symptoms of bruising or bleeding or any changes in diet and medications.  Instructed patient to call clinic with any questions or concerns.  Follow up in 4 weeks, to reduce risk of adverse events related to this high risk medication,  Warfarin.    Abisai uQiles - Student Pharmacist

## 2021-04-21 DIAGNOSIS — Z79.01 CHRONIC ANTICOAGULATION: ICD-10-CM

## 2021-04-23 DIAGNOSIS — I25.10 CORONARY ARTERY DISEASE INVOLVING NATIVE CORONARY ARTERY OF NATIVE HEART WITHOUT ANGINA PECTORIS: ICD-10-CM

## 2021-04-23 DIAGNOSIS — I48.0 PAROXYSMAL A-FIB (HCC): ICD-10-CM

## 2021-04-23 RX ORDER — DILTIAZEM HYDROCHLORIDE 240 MG/1
CAPSULE, COATED, EXTENDED RELEASE ORAL
Qty: 90 CAPSULE | Refills: 3 | Status: SHIPPED | OUTPATIENT
Start: 2021-04-23 | End: 2022-06-09

## 2021-04-23 RX ORDER — WARFARIN SODIUM 1 MG/1
TABLET ORAL
Qty: 90 TABLET | Refills: 1 | Status: SHIPPED | OUTPATIENT
Start: 2021-04-23 | End: 2021-09-29

## 2021-04-23 RX ORDER — SOTALOL HYDROCHLORIDE 80 MG/1
TABLET ORAL
Qty: 90 TABLET | Refills: 1 | Status: SHIPPED | OUTPATIENT
Start: 2021-04-23 | End: 2021-09-30

## 2021-05-17 ENCOUNTER — HOSPITAL ENCOUNTER (OUTPATIENT)
Dept: LAB | Facility: MEDICAL CENTER | Age: 81
End: 2021-05-17
Attending: NURSE PRACTITIONER
Payer: MEDICARE

## 2021-05-17 ENCOUNTER — ANTICOAGULATION MONITORING (OUTPATIENT)
Dept: VASCULAR LAB | Facility: MEDICAL CENTER | Age: 81
End: 2021-05-17

## 2021-05-17 DIAGNOSIS — Z79.01 CHRONIC ANTICOAGULATION: ICD-10-CM

## 2021-05-17 DIAGNOSIS — E78.5 HYPERLIPIDEMIA, UNSPECIFIED HYPERLIPIDEMIA TYPE: ICD-10-CM

## 2021-05-17 DIAGNOSIS — I48.0 PAROXYSMAL A-FIB (HCC): ICD-10-CM

## 2021-05-17 LAB
INR PPP: 2.14 (ref 0.87–1.13)
PROTHROMBIN TIME: 24.6 SEC (ref 12–14.6)

## 2021-05-17 PROCEDURE — 85610 PROTHROMBIN TIME: CPT

## 2021-05-17 PROCEDURE — 36415 COLL VENOUS BLD VENIPUNCTURE: CPT

## 2021-05-17 NOTE — PROGRESS NOTES
OP   Telephone Anticoagulation Service Note      Anticoagulation Summary  As of 2021    INR goal:  2.0-3.0   TTR:  83.2 % (3.4 y)   INR used for dosin.14 (2021)   Warfarin maintenance plan:  1 mg (1 mg x 1) every day   Weekly warfarin total:  7 mg   Plan last modified:  Yaneli AguirreD (2020)   Next INR check:  2021   Target end date:  Indefinite    Indications    Paroxysmal a-fib (CMS-HCC) [I48.0]  Chronic anticoagulation [Z79.01]             Anticoagulation Episode Summary     INR check location:  Anticoagulation Clinic    Preferred lab:      Send INR reminders to:      Comments:  [phone] (H)      Anticoagulation Care Providers     Provider Role Specialty Phone number    Evelio Tejeda M.D. Referring Internal Medicine [phone]    [facility] Anticoagulation Services Responsible  [phone]    Beryl Pang M.D. Responsible Family Medicine [phone]        Anticoagulation Patient Findings    Spoke with the patient on the phone today, reporting a therapeutic INR of 2.14.   Confirmed the current warfarin dosing regimen and patient compliance.  Patient denies any interval changes to diet and/or medications. Patient denies any signs/symptoms of bleeding or clotting.  Patient instructed to continue with the current warfarin dosing regimen, and asked to follow up again in 4 weeks.     Celeste Licea  PharmD

## 2021-05-18 RX ORDER — LOVASTATIN 40 MG/1
TABLET ORAL
Qty: 90 TABLET | Refills: 3 | Status: SHIPPED | OUTPATIENT
Start: 2021-05-18 | End: 2021-09-01

## 2021-06-15 ENCOUNTER — HOSPITAL ENCOUNTER (OUTPATIENT)
Dept: LAB | Facility: MEDICAL CENTER | Age: 81
End: 2021-06-15
Attending: NURSE PRACTITIONER
Payer: MEDICARE

## 2021-06-15 ENCOUNTER — ANTICOAGULATION MONITORING (OUTPATIENT)
Dept: VASCULAR LAB | Facility: MEDICAL CENTER | Age: 81
End: 2021-06-15

## 2021-06-15 DIAGNOSIS — Z79.01 CHRONIC ANTICOAGULATION: ICD-10-CM

## 2021-06-15 DIAGNOSIS — I48.0 PAROXYSMAL A-FIB (HCC): ICD-10-CM

## 2021-06-15 LAB
INR PPP: 2.49 (ref 0.87–1.13)
INR PPP: 2.49 (ref 2–3.5)
PROTHROMBIN TIME: 26.2 SEC (ref 12–14.6)

## 2021-06-15 PROCEDURE — 85610 PROTHROMBIN TIME: CPT

## 2021-06-15 PROCEDURE — 36415 COLL VENOUS BLD VENIPUNCTURE: CPT

## 2021-06-15 NOTE — PROGRESS NOTES
Anticoagulation Summary  As of 6/15/2021    INR goal:  2.0-3.0   TTR:  83.5 % (3.5 y)   INR used for dosin.49 (6/15/2021)   Warfarin maintenance plan:  1 mg (1 mg x 1) every day   Weekly warfarin total:  7 mg   Plan last modified:  Major Hughes, PharmD (2020)   Next INR check:  2021   Target end date:  Indefinite    Indications    Paroxysmal a-fib (CMS-HCC) [I48.0]  Chronic anticoagulation [Z79.01]             Anticoagulation Episode Summary     INR check location:  Anticoagulation Clinic    Preferred lab:      Send INR reminders to:      Comments:  [phone] (H)      Anticoagulation Care Providers     Provider Role Specialty Phone number    Evelio Tejeda M.D. Referring Internal Medicine [phone]    [facility] Anticoagulation Services Responsible  [phone]    Beryl Pang M.D. Responsible Family Medicine [phone]        Anticoagulation Patient Findings  Patient Findings     Negatives:  Signs/symptoms of thrombosis, Signs/symptoms of bleeding, Laboratory test error suspected, Change in health, Change in alcohol use, Change in activity, Upcoming invasive procedure, Emergency department visit, Upcoming dental procedure, Missed doses, Extra doses, Change in medications, Change in diet/appetite, Hospital admission, Bruising, Other complaints         PT needs a new INR standing order      Spoke with patient today regarding therapeutic INR of 2.49.  Patient denies any signs/symptoms of bruising or bleeding or any changes in diet and medications.  Instructed patient to call clinic with any questions or concerns.    Pt is to continue with current warfarin dosing regimen.    Follow up in 4 weeks, to reduce risk of adverse events related to this high risk medication,  Warfarin.    Adolph Antoine, Pharmacy Intern

## 2021-06-21 ENCOUNTER — HOSPITAL ENCOUNTER (OUTPATIENT)
Facility: MEDICAL CENTER | Age: 81
End: 2021-06-21
Attending: INTERNAL MEDICINE
Payer: MEDICARE

## 2021-06-21 PROCEDURE — 83993 ASSAY FOR CALPROTECTIN FECAL: CPT

## 2021-06-21 PROCEDURE — 83520 IMMUNOASSAY QUANT NOS NONAB: CPT

## 2021-06-21 PROCEDURE — 83630 LACTOFERRIN FECAL (QUAL): CPT

## 2021-06-23 LAB — LACTOFERRIN STL QL IA: NEGATIVE

## 2021-06-24 LAB
CALPROTECTIN STL-MCNT: 105 UG/G
ELASTASE PANC STL-MCNT: >800 UG/G

## 2021-07-13 ENCOUNTER — HOSPITAL ENCOUNTER (OUTPATIENT)
Dept: LAB | Facility: MEDICAL CENTER | Age: 81
End: 2021-07-13
Attending: NURSE PRACTITIONER
Payer: MEDICARE

## 2021-07-13 ENCOUNTER — ANTICOAGULATION MONITORING (OUTPATIENT)
Dept: VASCULAR LAB | Facility: MEDICAL CENTER | Age: 81
End: 2021-07-13

## 2021-07-13 DIAGNOSIS — Z79.01 CHRONIC ANTICOAGULATION: ICD-10-CM

## 2021-07-13 DIAGNOSIS — I48.0 PAROXYSMAL A-FIB (HCC): ICD-10-CM

## 2021-07-13 LAB
INR PPP: 2.07 (ref 0.87–1.13)
PROTHROMBIN TIME: 22.7 SEC (ref 12–14.6)

## 2021-07-13 PROCEDURE — 85610 PROTHROMBIN TIME: CPT

## 2021-07-13 PROCEDURE — 36415 COLL VENOUS BLD VENIPUNCTURE: CPT

## 2021-08-08 ENCOUNTER — HOSPITAL ENCOUNTER (OUTPATIENT)
Facility: MEDICAL CENTER | Age: 81
End: 2021-08-08
Attending: NURSE PRACTITIONER
Payer: MEDICARE

## 2021-08-08 PROCEDURE — 83993 ASSAY FOR CALPROTECTIN FECAL: CPT

## 2021-08-09 ENCOUNTER — HOSPITAL ENCOUNTER (OUTPATIENT)
Dept: LAB | Facility: MEDICAL CENTER | Age: 81
End: 2021-08-09
Attending: NURSE PRACTITIONER
Payer: MEDICARE

## 2021-08-09 DIAGNOSIS — Z79.01 CHRONIC ANTICOAGULATION: ICD-10-CM

## 2021-08-09 DIAGNOSIS — I48.0 PAROXYSMAL A-FIB (HCC): ICD-10-CM

## 2021-08-09 LAB
INR PPP: 2.57 (ref 0.87–1.13)
PROTHROMBIN TIME: 26.8 SEC (ref 12–14.6)

## 2021-08-09 PROCEDURE — 36415 COLL VENOUS BLD VENIPUNCTURE: CPT

## 2021-08-09 PROCEDURE — 85610 PROTHROMBIN TIME: CPT

## 2021-08-10 ENCOUNTER — ANTICOAGULATION MONITORING (OUTPATIENT)
Dept: VASCULAR LAB | Facility: MEDICAL CENTER | Age: 81
End: 2021-08-10

## 2021-08-10 DIAGNOSIS — Z79.01 CHRONIC ANTICOAGULATION: ICD-10-CM

## 2021-08-10 DIAGNOSIS — I48.0 PAROXYSMAL A-FIB (HCC): ICD-10-CM

## 2021-08-10 NOTE — PROGRESS NOTES
Anticoagulation Summary  As of 8/10/2021    INR goal:  2.0-3.0   TTR:  84.2 % (3.6 y)   INR used for dosin.57 (2021)   Warfarin maintenance plan:  1 mg (1 mg x 1) every day   Weekly warfarin total:  7 mg   Plan last modified:  Major Hughes PharmD (2020)   Next INR check:  2021   Target end date:  Indefinite    Indications    Paroxysmal a-fib (CMS-HCC) [I48.0]  Chronic anticoagulation [Z79.01]             Anticoagulation Episode Summary     INR check location:  Anticoagulation Clinic    Preferred lab:      Send INR reminders to:      Comments:  [phone] (H)      Anticoagulation Care Providers     Provider Role Specialty Phone number    Evelio Tejeda M.D. Referring Internal Medicine [phone]    [facility] Anticoagulation Services Responsible  [phone]    Beryl Pang M.D. Responsible Family Medicine [phone]          Refer to Anticoagulation Patient Findings for HPI  Patient Findings     Negatives:  Signs/symptoms of thrombosis, Signs/symptoms of bleeding, Laboratory test error suspected, Change in health, Change in alcohol use, Change in activity, Upcoming invasive procedure, Emergency department visit, Upcoming dental procedure, Missed doses, Extra doses, Change in medications, Change in diet/appetite, Hospital admission, Bruising, Other complaints          Spoke with patient via phone to report a therapeutic INR.      Pt is NOT on antiplatelet therapy     Pt instructed to continue with current warfarin dosing regimen, confirms dosing.   Will follow up in 4 week(s).     Misbah Godoy, YaneliD

## 2021-08-11 LAB — CALPROTECTIN STL-MCNT: 65 UG/G

## 2021-09-01 ENCOUNTER — TELEPHONE (OUTPATIENT)
Dept: CARDIOLOGY | Facility: MEDICAL CENTER | Age: 81
End: 2021-09-01

## 2021-09-01 ENCOUNTER — OFFICE VISIT (OUTPATIENT)
Dept: CARDIOLOGY | Facility: MEDICAL CENTER | Age: 81
End: 2021-09-01
Payer: MEDICARE

## 2021-09-01 VITALS
HEIGHT: 62 IN | BODY MASS INDEX: 16.69 KG/M2 | DIASTOLIC BLOOD PRESSURE: 62 MMHG | OXYGEN SATURATION: 98 % | SYSTOLIC BLOOD PRESSURE: 122 MMHG | WEIGHT: 90.7 LBS | HEART RATE: 58 BPM | RESPIRATION RATE: 12 BRPM

## 2021-09-01 DIAGNOSIS — Z79.899 HIGH RISK MEDICATION USE: ICD-10-CM

## 2021-09-01 DIAGNOSIS — I48.0 PAROXYSMAL A-FIB (HCC): ICD-10-CM

## 2021-09-01 DIAGNOSIS — E78.5 HYPERLIPIDEMIA, UNSPECIFIED HYPERLIPIDEMIA TYPE: ICD-10-CM

## 2021-09-01 DIAGNOSIS — I73.9 PAD (PERIPHERAL ARTERY DISEASE) (HCC): ICD-10-CM

## 2021-09-01 DIAGNOSIS — Z95.5 S/P CORONARY ARTERY STENT PLACEMENT: ICD-10-CM

## 2021-09-01 DIAGNOSIS — Z79.01 CHRONIC ANTICOAGULATION: ICD-10-CM

## 2021-09-01 DIAGNOSIS — I25.10 CORONARY ARTERY DISEASE INVOLVING NATIVE CORONARY ARTERY OF NATIVE HEART WITHOUT ANGINA PECTORIS: ICD-10-CM

## 2021-09-01 PROCEDURE — 99214 OFFICE O/P EST MOD 30 MIN: CPT | Performed by: NURSE PRACTITIONER

## 2021-09-01 RX ORDER — EPINEPHRINE 0.3 MG/.3ML
INJECTION SUBCUTANEOUS
COMMUNITY

## 2021-09-01 RX ORDER — ROSUVASTATIN CALCIUM 20 MG/1
20 TABLET, COATED ORAL EVERY EVENING
Qty: 30 TABLET | Refills: 11 | Status: SHIPPED
Start: 2021-09-01 | End: 2021-12-23

## 2021-09-01 RX ORDER — ROSUVASTATIN CALCIUM 20 MG/1
10 TABLET, COATED ORAL EVERY EVENING
Qty: 30 TABLET | Refills: 11 | Status: SHIPPED | OUTPATIENT
Start: 2021-09-01 | End: 2021-09-01 | Stop reason: SDUPTHER

## 2021-09-01 ASSESSMENT — ENCOUNTER SYMPTOMS
FEVER: 0
HEMOPTYSIS: 0
ORTHOPNEA: 0
SHORTNESS OF BREATH: 0
CLAUDICATION: 0
SPUTUM PRODUCTION: 0
COUGH: 0
CHILLS: 0
HEADACHES: 0
PND: 0
NAUSEA: 0
WHEEZING: 0
PALPITATIONS: 0
DIZZINESS: 0
VOMITING: 0

## 2021-09-01 ASSESSMENT — FIBROSIS 4 INDEX: FIB4 SCORE: 2.49

## 2021-09-01 NOTE — PROGRESS NOTES
Chief Complaint   Patient presents with   • Atrial Fibrillation     F/V Dx: Paroxysmal a-fib (CMS-HCC)   • Coronary Artery Disease     F/V Dx: Coronary artery disease involving native coronary artery of native heart without angina pectoris       Subjective     Angie Root is a 81 y.o. female who presents today FOR PAD status post PCI to her left leg in 2008, and more recently diagnosed ischemic colitis, CAD with 2 stents placed in her LAD in 2009, nicotine dependence, dyslipidemia, recurrent C diff colitis, and paroxysmal atrial fibrillation on rhythm control strategy (with sotalol) and now anticoagulated with Warfarin.  Patient last seen 9/17/2019 by Dr. Carmona and by myself on 2/17/2021.    Since 2/17/2021, the patient had bilateral ABIs which showed mild to moderate arterial disease on 2/26/2021.  Patient also had US bilateral lower extremity arterial with 50-75% stenosis of the proximal right superficial femoral artery and proximal left common iliac artery.  Also patient reported symptoms when she was last seen.  She is not having symptoms currently.  Nothing to suggest claudication.     2/17/2021: Patient reports that she has been having a heavy sensation in her left arm since Andrez.  She had been moving some boxes so felt it was likely musculoskeletal.  She continues to have the heaviness and down she reports that her left leg feels numb as well.  Nothing makes the heaviness/numbness better or worse.     7/15/2020: Patient comes in today with no significant complaints.  She denies chest pain, palpitations, orthopnea, claudication, lower extremity edema.  EKG today shows sinus bradycardia with RSR prime in V1 and V2.  QTc 463.     Probably had rheumatic fever as a child.  Sister had some type of autoimmune vascular problem, mult stents to her legs.  She had stents for PAD in 1991.  Then having afib with palpitations.  Also having CPs, had angio, 2 stents, then afib went away but also on  sotalol.  Prior K replacement caused problems.     On warfarin, [id number] of 3, no TIA/CVA.     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 No Abx due to CDiff.     Echo 2018, normal EF, normal size LA, RVSP 45 mmhg.     LDL 62, HDL 80, on lovastatin, was on another statin, does not know which, no prior problems.  BP low, no HTN.     Still smoking, since age 15, quit a few times, has used Chantix, hypnosis.     Followed for Pulm Nodule, calcified atherosclerosis on CT    Past Medical History:   Diagnosis Date   • Arthritis     BL hands   • CAD (coronary artery disease)    • Cancer (HCC) 1982    melanoma   • COPD (chronic obstructive pulmonary disease) (HCC)    • Croup 3 years old   • Diverticulosis    • Dyslipidemia    • GERD (gastroesophageal reflux disease)    • Hiatal hernia    • High cholesterol    • Hypertension    • Infectious disease 2018    C Diff   • Measles     5 years old   • Paroxysmal A-fib (HCC) 1/20/2016    Symptomatic, on a rhythm control strategy with sotalol 40 mg by mouth twice a day.    • Paroxysmal atrial fibrillation (HCC)    • Prediabetes    • PVD (peripheral vascular disease) (HCC)      Past Surgical History:   Procedure Laterality Date   • FECAL TRANSPLANT N/A 4/9/2018    Procedure: FECAL TRANSPLANT;  Surgeon: Gonzalez Cruz M.D.;  Location: ENDOSCOPY [facility];  Service: Gastroenterology   • COLONOSCOPY - ENDO N/A 4/9/2018    Procedure: COLONOSCOPY - ENDO;  Surgeon: Gonzalez Cruz M.D.;  Location: ENDOSCOPY [facility];  Service: Gastroenterology   • WIDE EXCISION MELANOMA, LEG, W/POSS.STSG  10/2015    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   • CARDIAC CATH, RIGHT HEART  2008    stent   • OTHER ORTHOPEDIC SURGERY Left 2008    hand repair   • CHOLECYSTECTOMY  1993   • TONSILLECTOMY  1945   • OTHER CARDIAC SURGERY      r heart cath stents   • STENT PLACEMENT      L iliac stent     Family History   Problem Relation Age of Onset   • Hypertension Mother    •  Hyperlipidemia Mother    • Cancer Maternal Grandmother         tongue   • Cancer Maternal Uncle         x 3, pancreas   • Cancer Maternal Grandfather         unknown   • Cancer Paternal Grandmother         unknown   • Cancer Paternal Grandfather         unknown   • Diabetes Maternal Uncle    • Heart Disease Maternal Uncle    • Hypertension Sister    • Hyperlipidemia Sister    • Heart Disease Sister    • Alcohol/Drug Sister    • Thyroid Sister    • Psychiatric Illness Neg Hx    • Stroke Neg Hx      Social History     Socioeconomic History   • Marital status:      Spouse name: Not on file   • Number of children: 0   • Years of education: Not on file   • Highest education level: Not on file   Occupational History   • Not on file   Tobacco Use   • Smoking status: Current Every Day Smoker     Packs/day: 0.25     Years: 60.00     Pack years: 15.00     Types: Cigarettes     Start date: 3/20/1957   • Smokeless tobacco: Never Used   • Tobacco comment: 6 per day   Vaping Use   • Vaping Use: Never used   Substance and Sexual Activity   • Alcohol use: No     Alcohol/week: 0.0 oz   • Drug use: No   • Sexual activity: Not Currently     Partners: Male   Other Topics Concern   • Not on file   Social History Narrative    .     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     Social Determinants of Health     Financial Resource Strain:    • Difficulty of Paying Living Expenses:    Food Insecurity:    • Worried About Running Out of Food in the Last Year:    • Ran Out of Food in the Last Year:    Transportation Needs:    • Lack of Transportation (Medical):    • Lack of Transportation (Non-Medical):    Physical Activity:    • Days of Exercise per Week:    • Minutes of Exercise per Session:    Stress:    • Feeling of Stress :    Social Connections:    • Frequency of Communication with Friends and Family:    • Frequency of Social Gatherings with Friends and Family:    • Attends [employer] Services:    • Active Member of Clubs  or Organizations:    • Attends Club or Organization Meetings:    • Marital Status:    Intimate Partner Violence:    • Fear of Current or Ex-Partner:    • Emotionally Abused:    • Physically Abused:    • Sexually Abused:      Allergies   Allergen Reactions   • Codeine Swelling   • Iodine Swelling   • Bee Venom Anaphylaxis   • Chantix [Varenicline]      disoriented   • Ciprofloxacin      Causes C diff, recurrent and very difficult   • Flagyl [Metronidazole] Rash     C/O flagyl causes rash.    • Latex Hives   • Lipitor [Atorvastatin Calcium] Unspecified     Intense Stomach pain   • Pcn [Penicillins] Anaphylaxis and Hives   • Shellfish Allergy      unknown   • Tetanus Antitoxin Swelling     unknown     Outpatient Encounter Medications as of 9/1/2021   Medication Sig Dispense Refill   • lovastatin (MEVACOR) 40 MG tablet TAKE 1 TABLET EVERY EVENING 90 tablet 3   • warfarin (COUMADIN) 1 MG Tab TAKE 1 TABLET DAILY OR AS DIRECTED BY ANTICOAGULATION CLINIC 90 tablet 1   • DILTIAZem CD (CARDIZEM CD) 240 MG CAPSULE SR 24 HR TAKE 1 CAPSULE EVERY DAY 90 capsule 3   • sotalol (BETAPACE) 80 MG Tab TAKE 1/2 TABLET TWICE DAILY 90 tablet 1   • FLUTICASONE FUROATE INH Inhale.     • Probiotic Product (PROBIOTIC PO) Take  by mouth.     • albuterol 108 (90 Base) MCG/ACT Aero Soln inhalation aerosol Inhale 2 Puffs by mouth every 6 hours as needed for Shortness of Breath.     • ascorbic acid (ASCORBIC ACID) 500 MG Tab Take 1,000 mg by mouth every day.     • ipratropium (ATROVENT) 0.02 % Solution 0.2 mg by Nebulization route 4 times a day.     • lactobacillus rhamnosus (CULTURELLE) Cap capsule Take 1 Cap by mouth every day.     • alprazolam (XANAX) 0.25 MG Tab Take 0.25 mg by mouth 2 times a day as needed for Anxiety.     • vitamin D (CHOLECALCIFEROL) 1000 UNIT Tab Take 2,000 Units by mouth every morning. 3000 units     • psyllium (METAMUCIL) 58.12 % Pack Take 1 Packet by mouth every day. (Patient not taking: Reported on 9/1/2021)       No  "facility-administered encounter medications on file as of 9/1/2021.     Review of Systems   Constitutional: Negative for chills and fever.   Respiratory: Negative for cough, hemoptysis, sputum production, shortness of breath and wheezing.    Cardiovascular: Negative for chest pain, palpitations, orthopnea, claudication, leg swelling and PND.   Gastrointestinal: Negative for nausea and vomiting.   Neurological: Negative for dizziness and headaches.   All other systems reviewed and are negative.             Objective     /62 (BP Location: Left arm, Patient Position: Sitting, BP Cuff Size: Adult)   Pulse (!) 58   Resp 12   Ht 1.575 m (5' 2\")   Wt 41.1 kg (90 lb 11.2 oz)   SpO2 98%   BMI 16.59 kg/m²     Physical Exam   Constitutional: She appears well-developed.   Neck: No JVD present.   Cardiovascular: Normal rate, regular rhythm and normal heart sounds.   No murmur heard.  Pulmonary/Chest: Effort normal and breath sounds normal.   Abdominal: Soft.   Musculoskeletal:      Cervical back: Neck supple.   Neurological:   Cranial nerves II-XII WNL   Skin: Skin is warm and dry.   Psychiatric: Her behavior is normal. Judgment and thought content normal.   Nursing note and vitals reviewed.    ECG 5-24-19  Sinus, 56, QTc 449     Echocardiogram, 8/6/2018:  Normal left ventricular systolic function.  Left ventricular ejection fraction is visually estimated to be 60%.   The right ventricle was normal in size and function.  Mild mitral regurgitation.  Mild tricuspid regurgitation.  Estimated right ventricular systolic pressure is 45 mmHg.  LA normal size.     Echocardiogram, 6/27/2016:  Normal left ventricular size, thickness, systolic function EF 60%.   Mitral annular calcification.  Aortic sclerosis without stenosis.   Mild tricuspid regurgitation.  Right ventricular systolic pressure is estimated to be 33 mmHg.   No prior study for comparison\"     Echocardiogram, 11/18/2017:  Prior echocardiogram 6/27/2016.Normal " "left ventricular chamber size.  Left ventricular ejection fraction is visually estimated to be 60%.  Grade II diastolic dysfunction.  Aortic sclerosis without stenosis.  Mitral annular calcification.  Heavy calcification of the posterior mitral valve leaflet.  Mild mitral regurgitation.  Trace tricuspid regurgitation.  Normal pericardium without effusion.  Ascending aorta diameter is 2.4 cm\"     Myocardial perfusion imaging, 8/6/2018:  Normal left ventricular perfusion with no fixed or reversible defects.   Normal left ventricular size, ejection fraction, and wall motion.      CT chest 7-24-19  1.  No change in single small pulmonary nodule in each lower pulmonary lobe since 2017. These nodules appear benign.  2.  No new pulmonary nodules or masses are identified.  3.  Emphysema is again noted.  4.  Limited areas of pulmonary scarring fibrosis and bronchiectasis are again noted.  5.  Calcific atherosclerosis again noted.  6.  Calculi are again identified within each kidney.     US lower extremity bilateral 2/26/2021   FINDINGS   RIGHT:   Mild to moderate appearing plaque in the common femoral artery.   Mild plaque in the profunda femoral artery.   50-75% stenosis in the proximal superficial femoral artery.   Mild plaque in the popliteal artery.   Three vessel runoff in the calf with mild calcification observed.   LEFT:   50-75% stenosis in the proximal common iliac artery. (velocity is 252 cm/s)   Mild plaque in the extermal iliac artery.   Moderate plaque seen in the comon femoral artery.   Mild plaque in the profunda and femoral arteries.   Mild to moderate appearing plaque in the popliteal artery with no focal    stenosis.   Three vessel runoff in the calf with mild calcification observed.    US EDGAR left lower extremity 2/26/2021   Vascular Laboratory   Conclusions   There is mild to moderate arterial disease demonstrated (EDGAR is .5-.89) in    the left lower extremity and probably in the right lower extremity as "    well..    US wBI bilateral upper extremities 2/26/2021  No evidence of arterial insufficiency in bilateral upper extremities    US bilateral carotid arteries 2/25/2021  1.  There mild-moderate atherosclerotic plaque.   Plaque is located in carotid bulbs and proximal internal carotid arteries.   Plaque characterization:  Irregular and heterogeneous.   2.  There is no evidence of carotid occlusion.   3. Vertebral arteries demonstrate antegrade flow.   4. Diameter reduction in the internal carotid arteries: less than 50%.   5. There are incidental normal-sized right neck lymph nodes with loss of normal fatty hilum. These could be inflammatory or potentially neoplastic.    Assessment & Plan     1. Coronary artery disease involving native coronary artery of native heart without angina pectoris     2. Hyperlipidemia, unspecified hyperlipidemia type     3. PCI to her LAD in 2009     4. PAD (peripheral artery disease) (HCC)     5. Paroxysmal a-fib (CMS-HCC)     6. High risk medication use     7. Chronic anticoagulation         Medical Decision Making: Today's Assessment/Status/Plan:   1.  Explained to the patient when she has pain or cramping with exertion and then after rest resolves her symptoms that would be an indication that we may need to intervene on her lower extremities.  Refer to Dr. Lozano.  2.  Change lovastatin to rosuvastatin 20 mg every evening.   3.  Follow-up in 1 year

## 2021-09-01 NOTE — TELEPHONE ENCOUNTER
DB    Pt called stating she forgot to tell DB that she has been having a horrible pain in her right groin for the last 5 weeks and is concerned.    Pt also states she is staying on lovastatin and not taking the rosuvastatin.     Please call Pt back at [phone] or [phone].    Thank you

## 2021-09-07 ENCOUNTER — HOSPITAL ENCOUNTER (OUTPATIENT)
Dept: LAB | Facility: MEDICAL CENTER | Age: 81
End: 2021-09-07
Attending: NURSE PRACTITIONER
Payer: MEDICARE

## 2021-09-07 ENCOUNTER — ANTICOAGULATION MONITORING (OUTPATIENT)
Dept: CARDIOLOGY | Facility: MEDICAL CENTER | Age: 81
End: 2021-09-07

## 2021-09-07 DIAGNOSIS — Z79.01 CHRONIC ANTICOAGULATION: ICD-10-CM

## 2021-09-07 DIAGNOSIS — I48.0 PAROXYSMAL A-FIB (HCC): ICD-10-CM

## 2021-09-07 LAB
INR PPP: 2.41 (ref 0.87–1.13)
PROTHROMBIN TIME: 25.6 SEC (ref 12–14.6)

## 2021-09-07 PROCEDURE — 85610 PROTHROMBIN TIME: CPT

## 2021-09-07 PROCEDURE — 36415 COLL VENOUS BLD VENIPUNCTURE: CPT

## 2021-09-07 NOTE — TELEPHONE ENCOUNTER
JAG Newton.  You 3 days ago     Not referring to [facility] at this time. SHe Is asymptomatic.   Aury    Message text     You  JAG Newton. 6 days ago     FYI. It looks like your note states to refer to [facility] for lower extremity evaluation, but please let me know if you have any additional input. I also do not see a referral placed for [facility], would you like me to do this?     Message text

## 2021-09-08 NOTE — PROGRESS NOTES
Anticoagulation Summary  As of 2021    INR goal:  2.0-3.0   TTR:  84.6 % (3.7 y)   INR used for dosin.41 (2021)   Warfarin maintenance plan:  1 mg (1 mg x 1) every day   Weekly warfarin total:  7 mg   Plan last modified:  Yaneli AguirreD (2020)   Next INR check:  10/19/2021   Target end date:  Indefinite    Indications    Paroxysmal a-fib (CMS-HCC) [I48.0]  Chronic anticoagulation [Z79.01]             Anticoagulation Episode Summary     INR check location:  Anticoagulation Clinic    Preferred lab:      Send INR reminders to:      Comments:  [phone] (H)      Anticoagulation Care Providers     Provider Role Specialty Phone number    Evelio Tejeda M.D. Referring Internal Medicine [phone]    [facility] Anticoagulation Services Responsible  [phone]    Beryl Pang M.D. Responsible Family Medicine [phone]        Anticoagulation Patient Findings  Patient Findings     Negatives:  Signs/symptoms of thrombosis, Signs/symptoms of bleeding, Laboratory test error suspected, Change in health, Change in alcohol use, Change in activity, Upcoming invasive procedure, Emergency department visit, Upcoming dental procedure, Missed doses, Extra doses, Change in medications, Change in diet/appetite, Hospital admission, Bruising, Other complaints        Spoke with patient today regarding therapeutic INR of 2.41.  Patient denies any signs/symptoms of bruising or bleeding or any changes in diet and medications.  Instructed patient to call clinic with any questions or concerns.  Pt is to continue with current warfarin dosing regimen.  Follow up in 6 weeks, to reduce risk of adverse events related to this high risk medication,  Warfarin.    Major Hughes, PharmD, BCACP

## 2021-09-09 ENCOUNTER — HOSPITAL ENCOUNTER (OUTPATIENT)
Dept: RADIOLOGY | Facility: MEDICAL CENTER | Age: 81
End: 2021-09-09
Attending: PHYSICIAN ASSISTANT
Payer: MEDICARE

## 2021-09-09 DIAGNOSIS — N20.0 CALCULUS OF KIDNEY: ICD-10-CM

## 2021-09-09 PROCEDURE — 74018 RADEX ABDOMEN 1 VIEW: CPT

## 2021-09-10 ENCOUNTER — HOSPITAL ENCOUNTER (OUTPATIENT)
Facility: MEDICAL CENTER | Age: 81
End: 2021-09-10
Attending: NURSE PRACTITIONER
Payer: MEDICARE

## 2021-09-10 PROCEDURE — 83993 ASSAY FOR CALPROTECTIN FECAL: CPT

## 2021-09-14 LAB — CALPROTECTIN STL-MCNT: 84 UG/G

## 2021-09-29 DIAGNOSIS — Z79.01 CHRONIC ANTICOAGULATION: ICD-10-CM

## 2021-09-29 DIAGNOSIS — I48.0 PAROXYSMAL A-FIB (HCC): ICD-10-CM

## 2021-09-29 RX ORDER — WARFARIN SODIUM 1 MG/1
TABLET ORAL
Qty: 90 TABLET | Refills: 1 | Status: SHIPPED | OUTPATIENT
Start: 2021-09-29 | End: 2022-02-22

## 2021-09-30 DIAGNOSIS — Z79.899 HIGH RISK MEDICATION USE: ICD-10-CM

## 2021-09-30 RX ORDER — SOTALOL HYDROCHLORIDE 80 MG/1
TABLET ORAL
Qty: 90 TABLET | Refills: 0 | Status: SHIPPED | OUTPATIENT
Start: 2021-09-30 | End: 2021-12-07

## 2021-09-30 NOTE — PROGRESS NOTES
Charlie Adams,  I would like you to get a couple labs done in the next week or so since I am refilling your sotalol.  Just to check a magnesium and kidney function and electrolytes.  Please have these done at your earliest convenience.  Refill for sotalol sent to pharmacy.  Thank you, Aury

## 2021-10-05 ENCOUNTER — ANTICOAGULATION MONITORING (OUTPATIENT)
Dept: VASCULAR LAB | Facility: MEDICAL CENTER | Age: 81
End: 2021-10-05

## 2021-10-05 ENCOUNTER — HOSPITAL ENCOUNTER (OUTPATIENT)
Dept: LAB | Facility: MEDICAL CENTER | Age: 81
End: 2021-10-05
Attending: NURSE PRACTITIONER
Payer: MEDICARE

## 2021-10-05 DIAGNOSIS — I48.0 PAROXYSMAL A-FIB (HCC): ICD-10-CM

## 2021-10-05 DIAGNOSIS — Z79.01 CHRONIC ANTICOAGULATION: ICD-10-CM

## 2021-10-05 LAB
INR PPP: 2.3 (ref 0.87–1.13)
PROTHROMBIN TIME: 24.6 SEC (ref 12–14.6)

## 2021-10-05 PROCEDURE — 36415 COLL VENOUS BLD VENIPUNCTURE: CPT

## 2021-10-05 PROCEDURE — 85610 PROTHROMBIN TIME: CPT

## 2021-10-05 NOTE — PROGRESS NOTES
Anticoagulation Summary  As of 10/5/2021    INR goal:  2.0-3.0   TTR:  84.9 % (3.8 y)   INR used for dosin.30 (10/5/2021)   Warfarin maintenance plan:  1 mg (1 mg x 1) every day   Weekly warfarin total:  7 mg   Plan last modified:  Major Hughes PharmD (2020)   Next INR check:  2021   Target end date:  Indefinite    Indications    Paroxysmal a-fib (CMS-HCC) [I48.0]  Chronic anticoagulation [Z79.01]             Anticoagulation Episode Summary     INR check location:  Anticoagulation Clinic    Preferred lab:      Send INR reminders to:      Comments:  [phone] (H)      Anticoagulation Care Providers     Provider Role Specialty Phone number    Evelio Tejeda M.D. Referring Internal Medicine [phone]    [facility] Anticoagulation Services Responsible  [phone]    Beryl Pang M.D. Responsible Family Medicine [phone]          Refer to Anticoagulation Patient Findings for HPI  Patient Findings     Negatives:  Signs/symptoms of thrombosis, Signs/symptoms of bleeding, Laboratory test error suspected, Change in health, Change in alcohol use, Change in activity, Upcoming invasive procedure, Emergency department visit, Upcoming dental procedure, Missed doses, Extra doses, Change in medications, Change in diet/appetite, Hospital admission, Bruising, Other complaints          Spoke with patient to report a therapeutic INR.      Pt instructed to continue with current warfarin dosing regimen, confirms dosing.   Will follow up in 4 week(s) per pt preference.     Keren Valerio PharmD

## 2021-10-07 ENCOUNTER — HOSPITAL ENCOUNTER (OUTPATIENT)
Dept: LAB | Facility: MEDICAL CENTER | Age: 81
End: 2021-10-07
Attending: NURSE PRACTITIONER
Payer: MEDICARE

## 2021-10-07 DIAGNOSIS — Z79.899 HIGH RISK MEDICATION USE: ICD-10-CM

## 2021-10-07 LAB
ANION GAP SERPL CALC-SCNC: 7 MMOL/L (ref 7–16)
BUN SERPL-MCNC: 13 MG/DL (ref 8–22)
CALCIUM SERPL-MCNC: 9.9 MG/DL (ref 8.5–10.5)
CHLORIDE SERPL-SCNC: 106 MMOL/L (ref 96–112)
CO2 SERPL-SCNC: 29 MMOL/L (ref 20–33)
CREAT SERPL-MCNC: 0.67 MG/DL (ref 0.5–1.4)
GLUCOSE SERPL-MCNC: 87 MG/DL (ref 65–99)
MAGNESIUM SERPL-MCNC: 1.9 MG/DL (ref 1.5–2.5)
POTASSIUM SERPL-SCNC: 4.2 MMOL/L (ref 3.6–5.5)
SODIUM SERPL-SCNC: 142 MMOL/L (ref 135–145)

## 2021-10-07 PROCEDURE — 80048 BASIC METABOLIC PNL TOTAL CA: CPT

## 2021-10-07 PROCEDURE — 36415 COLL VENOUS BLD VENIPUNCTURE: CPT

## 2021-10-07 PROCEDURE — 83735 ASSAY OF MAGNESIUM: CPT

## 2021-11-02 ENCOUNTER — ANTICOAGULATION MONITORING (OUTPATIENT)
Dept: VASCULAR LAB | Facility: MEDICAL CENTER | Age: 81
End: 2021-11-02

## 2021-11-02 ENCOUNTER — HOSPITAL ENCOUNTER (OUTPATIENT)
Dept: LAB | Facility: MEDICAL CENTER | Age: 81
End: 2021-11-02
Attending: NURSE PRACTITIONER
Payer: MEDICARE

## 2021-11-02 DIAGNOSIS — Z79.01 CHRONIC ANTICOAGULATION: ICD-10-CM

## 2021-11-02 DIAGNOSIS — I48.0 PAROXYSMAL A-FIB (HCC): ICD-10-CM

## 2021-11-02 LAB
INR PPP: 2.2 (ref 0.87–1.13)
PROTHROMBIN TIME: 23.8 SEC (ref 12–14.6)

## 2021-11-02 PROCEDURE — 85610 PROTHROMBIN TIME: CPT

## 2021-11-02 PROCEDURE — 36415 COLL VENOUS BLD VENIPUNCTURE: CPT

## 2021-11-02 NOTE — PROGRESS NOTES
Anticoagulation Summary  As of 2021    INR goal:  2.0-3.0   TTR:  85.2 % (3.9 y)   INR used for dosin.20 (2021)   Warfarin maintenance plan:  1 mg (1 mg x 1) every day   Weekly warfarin total:  7 mg   Plan last modified:  Major Hughes, PharmD (2020)   Next INR check:  2021   Target end date:  Indefinite    Indications    Paroxysmal a-fib (CMS-HCC) [I48.0]  Chronic anticoagulation [Z79.01]             Anticoagulation Episode Summary     INR check location:  Clinic Lab    Preferred lab:  [facility]    Send INR reminders to:      Comments:  [phone] (H)  [facility]      Anticoagulation Care Providers     Provider Role Specialty Phone number    Evelio Tejeda M.D. Referring Internal Medicine [phone]    [facility] Anticoagulation Services Responsible  [phone]    Beryl Pang M.D. Responsible Family Medicine [phone]        Anticoagulation Patient Findings          Spoke with Angie to report a therapeutic INR of 2.2. Continue current dosing regimen.  Follow up in 4 weeks, to reduce the risk of adverse events related to this high risk medication, warfarin.    Sadaf Santillan, Clinical Pharmacist

## 2021-11-30 ENCOUNTER — HOSPITAL ENCOUNTER (OUTPATIENT)
Dept: LAB | Facility: MEDICAL CENTER | Age: 81
End: 2021-11-30
Attending: NURSE PRACTITIONER
Payer: MEDICARE

## 2021-11-30 ENCOUNTER — ANTICOAGULATION MONITORING (OUTPATIENT)
Dept: VASCULAR LAB | Facility: MEDICAL CENTER | Age: 81
End: 2021-11-30

## 2021-11-30 DIAGNOSIS — I48.0 PAROXYSMAL A-FIB (HCC): ICD-10-CM

## 2021-11-30 DIAGNOSIS — Z79.01 CHRONIC ANTICOAGULATION: ICD-10-CM

## 2021-11-30 LAB
BASOPHILS # BLD AUTO: 1 % (ref 0–1.8)
BASOPHILS # BLD: 0.1 K/UL (ref 0–0.12)
EOSINOPHIL # BLD AUTO: 0.3 K/UL (ref 0–0.51)
EOSINOPHIL NFR BLD: 3.1 % (ref 0–6.9)
ERYTHROCYTE [DISTWIDTH] IN BLOOD BY AUTOMATED COUNT: 48.7 FL (ref 35.9–50)
HCT VFR BLD AUTO: 45 % (ref 37–47)
HGB BLD-MCNC: 14.1 G/DL (ref 12–16)
IMM GRANULOCYTES # BLD AUTO: 0.03 K/UL (ref 0–0.11)
IMM GRANULOCYTES NFR BLD AUTO: 0.3 % (ref 0–0.9)
INR PPP: 1.92 (ref 0.87–1.13)
LYMPHOCYTES # BLD AUTO: 2.74 K/UL (ref 1–4.8)
LYMPHOCYTES NFR BLD: 28.6 % (ref 22–41)
MCH RBC QN AUTO: 29.4 PG (ref 27–33)
MCHC RBC AUTO-ENTMCNC: 31.3 G/DL (ref 33.6–35)
MCV RBC AUTO: 93.9 FL (ref 81.4–97.8)
MONOCYTES # BLD AUTO: 0.86 K/UL (ref 0–0.85)
MONOCYTES NFR BLD AUTO: 9 % (ref 0–13.4)
NEUTROPHILS # BLD AUTO: 5.54 K/UL (ref 2–7.15)
NEUTROPHILS NFR BLD: 58 % (ref 44–72)
NRBC # BLD AUTO: 0 K/UL
NRBC BLD-RTO: 0 /100 WBC
PLATELET # BLD AUTO: 203 K/UL (ref 164–446)
PMV BLD AUTO: 11 FL (ref 9–12.9)
PROTHROMBIN TIME: 21.4 SEC (ref 12–14.6)
RBC # BLD AUTO: 4.79 M/UL (ref 4.2–5.4)
WBC # BLD AUTO: 9.6 K/UL (ref 4.8–10.8)

## 2021-11-30 PROCEDURE — 85025 COMPLETE CBC W/AUTO DIFF WBC: CPT

## 2021-11-30 PROCEDURE — 85610 PROTHROMBIN TIME: CPT

## 2021-11-30 PROCEDURE — 36415 COLL VENOUS BLD VENIPUNCTURE: CPT

## 2021-12-01 NOTE — PROGRESS NOTES
Anticoagulation Summary  As of 2021    INR goal:  2.0-3.0   TTR:  84.9 % (3.9 y)   INR used for dosin.92 (2021)   Warfarin maintenance plan:  1 mg (1 mg x 1) every day   Weekly warfarin total:  7 mg   Plan last modified:  Yaneli AgurireD (2020)   Next INR check:  2021   Target end date:  Indefinite    Indications    Paroxysmal a-fib (CMS-HCC) [I48.0]  Chronic anticoagulation [Z79.01]             Anticoagulation Episode Summary     INR check location:  Clinic Lab    Preferred lab:  [facility]    Send INR reminders to:      Comments:  [phone] (H)  [facility]      Anticoagulation Care Providers     Provider Role Specialty Phone number    Evelio Tejeda M.D. Referring Internal Medicine [phone]    [facility] Anticoagulation Services Responsible  [phone]    Beryl Pang M.D. Responsible Family Medicine [phone]          Refer to Anticoagulation Patient Findings for HPI  Patient Findings     Positives:  Change in health (experiencing GI issues)    Negatives:  Signs/symptoms of thrombosis, Signs/symptoms of bleeding, Laboratory test error suspected, Change in alcohol use, Change in activity, Upcoming invasive procedure, Emergency department visit, Upcoming dental procedure, Missed doses, Extra doses, Change in medications, Change in diet/appetite, Hospital admission, Bruising, Other complaints          Spoke with pt.  INR is subtherapeutic.     Pt verifies warfarin weekly dosing.    Will have pt bolus x 1 day then continue regimen    Repeat INR in 4 week(s).     Keren Valerio, PharmD

## 2021-12-06 DIAGNOSIS — I48.0 PAROXYSMAL A-FIB (HCC): ICD-10-CM

## 2021-12-07 ENCOUNTER — HOSPITAL ENCOUNTER (OUTPATIENT)
Dept: RADIOLOGY | Facility: MEDICAL CENTER | Age: 81
End: 2021-12-07
Attending: NURSE PRACTITIONER
Payer: MEDICARE

## 2021-12-07 DIAGNOSIS — R10.30 LOWER ABDOMINAL PAIN, UNSPECIFIED: ICD-10-CM

## 2021-12-07 PROCEDURE — 74176 CT ABD & PELVIS W/O CONTRAST: CPT | Mod: MH

## 2021-12-07 RX ORDER — SOTALOL HYDROCHLORIDE 80 MG/1
TABLET ORAL
Qty: 90 TABLET | Refills: 1 | Status: SHIPPED | OUTPATIENT
Start: 2021-12-07 | End: 2022-05-11

## 2021-12-23 ENCOUNTER — TELEPHONE (OUTPATIENT)
Dept: MEDICAL GROUP | Facility: CLINIC | Age: 81
End: 2021-12-23

## 2021-12-23 ENCOUNTER — TELEPHONE (OUTPATIENT)
Dept: CARDIOLOGY | Facility: MEDICAL CENTER | Age: 81
End: 2021-12-23

## 2021-12-23 RX ORDER — LOVASTATIN 40 MG/1
40 TABLET ORAL
Qty: 100 TABLET | Refills: 3 | Status: SHIPPED | OUTPATIENT
Start: 2021-12-23 | End: 2023-03-06

## 2021-12-23 RX ORDER — LOVASTATIN 40 MG/1
40 TABLET ORAL
Qty: 100 TABLET | Refills: 3 | Status: SHIPPED | OUTPATIENT
Start: 2021-12-23 | End: 2021-12-23 | Stop reason: SDUPTHER

## 2021-12-23 RX ORDER — LOVASTATIN 20 MG/1
20 TABLET ORAL NIGHTLY
COMMUNITY
End: 2021-12-23

## 2021-12-23 NOTE — TELEPHONE ENCOUNTER
Caller Name: Angie Root    Call Back Number: [phone] (home) [phone] (work)      Patient called and states the doctor she was getting her Lovastatin from has cancelled her prescription.. She has about 14 days left and is wondering if this is something you can refill for her? Patient also states she has an appointment with you on 1/8/2022.

## 2021-12-23 NOTE — TELEPHONE ENCOUNTER
DB-    Pt called about her prescription for the lovastatin (MEVACOR) 40 MG tablet. She was told by her pharmacy that she couldn't get it anymore and she wanted to know why she wasn't notified about it.   She wanted a call back to discuss the matter further.      Thank you

## 2021-12-23 NOTE — TELEPHONE ENCOUNTER
Upon chart review patient PCP ordered medication.     Called and spoke to the patient she stated she was never told about medication change and never picked it up. She messaged her PCP to order it and she did. Answered all questions and concerns, appreciative of call.

## 2021-12-28 ENCOUNTER — ANTICOAGULATION MONITORING (OUTPATIENT)
Dept: VASCULAR LAB | Facility: MEDICAL CENTER | Age: 81
End: 2021-12-28

## 2021-12-28 ENCOUNTER — HOSPITAL ENCOUNTER (OUTPATIENT)
Dept: LAB | Facility: MEDICAL CENTER | Age: 81
End: 2021-12-28
Attending: NURSE PRACTITIONER
Payer: MEDICARE

## 2021-12-28 DIAGNOSIS — I48.0 PAROXYSMAL A-FIB (HCC): ICD-10-CM

## 2021-12-28 DIAGNOSIS — Z79.01 CHRONIC ANTICOAGULATION: ICD-10-CM

## 2021-12-28 LAB
INR PPP: 1.91 (ref 0.87–1.13)
PROTHROMBIN TIME: 21.3 SEC (ref 12–14.6)

## 2021-12-28 PROCEDURE — 36415 COLL VENOUS BLD VENIPUNCTURE: CPT

## 2021-12-28 PROCEDURE — 85610 PROTHROMBIN TIME: CPT

## 2021-12-29 NOTE — PROGRESS NOTES
Anticoagulation Summary  As of 2021    INR goal:  2.0-3.0   TTR:  83.3 % (4 y)   INR used for dosin.91 (2021)   Warfarin maintenance plan:  1 mg (1 mg x 1) every day   Weekly warfarin total:  7 mg   Plan last modified:  Yaneli AguirreD (2020)   Next INR check:  2022   Target end date:  Indefinite    Indications    Paroxysmal a-fib (CMS-HCC) [I48.0]  Chronic anticoagulation [Z79.01]             Anticoagulation Episode Summary     INR check location:  Clinic Lab    Preferred lab:  [facility]    Send INR reminders to:      Comments:  [phone] (H)  [facility]      Anticoagulation Care Providers     Provider Role Specialty Phone number    Evelio Tejeda M.D. Referring Internal Medicine [phone]    [facility] Anticoagulation Services Responsible  [phone]    Beryl Pang M.D. Responsible Family Medicine [phone]          Refer to Anticoagulation Patient Findings for HPI  Patient Findings     Positives:  Change in diet/appetite (increased vitamin K intake)    Negatives:  Signs/symptoms of thrombosis, Signs/symptoms of bleeding, Laboratory test error suspected, Change in health, Change in alcohol use, Change in activity, Upcoming invasive procedure, Emergency department visit, Upcoming dental procedure, Missed doses, Extra doses, Change in medications, Hospital admission, Bruising, Other complaints          Spoke with pt  INR is subtherapeutic.     Pt verifies warfarin weekly dosing.     Will have pt bolus x 1 day then continue regimen. She will watch her vitamin K intake.    Repeat INR in 4 week(s).     Keren Abdi, YaneliD

## 2021-12-30 ENCOUNTER — OFFICE VISIT (OUTPATIENT)
Dept: URGENT CARE | Facility: PHYSICIAN GROUP | Age: 81
End: 2021-12-30
Payer: MEDICARE

## 2021-12-30 ENCOUNTER — HOSPITAL ENCOUNTER (OUTPATIENT)
Facility: MEDICAL CENTER | Age: 81
End: 2021-12-30
Attending: PHYSICIAN ASSISTANT
Payer: MEDICARE

## 2021-12-30 ENCOUNTER — APPOINTMENT (OUTPATIENT)
Dept: RADIOLOGY | Facility: MEDICAL CENTER | Age: 81
End: 2021-12-30
Attending: FAMILY MEDICINE
Payer: MEDICARE

## 2021-12-30 VITALS
TEMPERATURE: 97.3 F | BODY MASS INDEX: 15.95 KG/M2 | SYSTOLIC BLOOD PRESSURE: 112 MMHG | WEIGHT: 90 LBS | DIASTOLIC BLOOD PRESSURE: 60 MMHG | HEART RATE: 67 BPM | HEIGHT: 63 IN | RESPIRATION RATE: 12 BRPM | OXYGEN SATURATION: 93 %

## 2021-12-30 DIAGNOSIS — R05.9 COUGH: ICD-10-CM

## 2021-12-30 PROCEDURE — U0005 INFEC AGEN DETEC AMPLI PROBE: HCPCS

## 2021-12-30 PROCEDURE — U0003 INFECTIOUS AGENT DETECTION BY NUCLEIC ACID (DNA OR RNA); SEVERE ACUTE RESPIRATORY SYNDROME CORONAVIRUS 2 (SARS-COV-2) (CORONAVIRUS DISEASE [COVID-19]), AMPLIFIED PROBE TECHNIQUE, MAKING USE OF HIGH THROUGHPUT TECHNOLOGIES AS DESCRIBED BY CMS-2020-01-R: HCPCS

## 2021-12-30 PROCEDURE — 99214 OFFICE O/P EST MOD 30 MIN: CPT | Performed by: PHYSICIAN ASSISTANT

## 2021-12-30 RX ORDER — PREDNISONE 20 MG/1
20 TABLET ORAL 2 TIMES DAILY
Qty: 10 TABLET | Refills: 0 | Status: SHIPPED | OUTPATIENT
Start: 2021-12-30 | End: 2022-01-04

## 2021-12-30 ASSESSMENT — FIBROSIS 4 INDEX: FIB4 SCORE: 2.5

## 2021-12-30 ASSESSMENT — ENCOUNTER SYMPTOMS: COUGH: 1

## 2021-12-30 NOTE — PROGRESS NOTES
Subjective:   Angie Root is a 81 y.o. female who presents today with   Chief Complaint   Patient presents with   • Bronchitis     x2days cough, runny nose, pt states might be bronchitis        Cough  This is a new problem. Episode onset: 2 days. The problem has been unchanged. The problem occurs every few minutes. The cough is non-productive. Associated symptoms include nasal congestion. Pertinent negatives include no chest pain. Treatments tried: Nebulizers. The treatment provided mild relief. Her past medical history is significant for bronchitis.     I personally donned proper PPE throughout visit today.   Patient has history of bronchitis and states this feels identical.  PMH:  has a past medical history of Arthritis, CAD (coronary artery disease), Cancer ([facility]) (1982), COPD (chronic obstructive pulmonary disease) ([facility]), Croup (3 years old), Diverticulosis, Dyslipidemia, GERD (gastroesophageal reflux disease), Hiatal hernia, High cholesterol, Hypertension, Infectious disease (2018), Measles, Paroxysmal A-fib ([facility]) (1/20/2016), Paroxysmal atrial fibrillation ([facility]), Prediabetes, and PVD (peripheral vascular disease) ([facility]).  MEDS:   Current Outpatient Medications:   •  predniSONE (DELTASONE) 20 MG Tab, Take 1 Tablet by mouth 2 times a day for 5 days., Disp: 10 Tablet, Rfl: 0  •  Hydrocod Polst-CPM Polst ER (TUSSIONEX) 10-8 MG/5ML Suspension Extended Release, Take 5 mL by mouth every 12 hours as needed for Cough or Rhinitis for up to 7 days., Disp: 70 mL, Rfl: 0  •  lovastatin (MEVACOR) 40 MG tablet, Take 1 Tablet by mouth at bedtime., Disp: 100 Tablet, Rfl: 3  •  sotalol (BETAPACE) 80 MG Tab, TAKE 1/2 TABLET TWICE DAILY, Disp: 90 Tablet, Rfl: 1  •  albuterol 108 (90 Base) MCG/ACT Aero Soln inhalation aerosol, INHALE ONE TO TWO PUFFS BY MOUTH EVERY 4 HOURS AS NEEDED FOR SHORTNESS OF BREATH, Disp: 18 g, Rfl: 3  •  warfarin (COUMADIN) 1 MG Tab, TAKE 1 TABLET DAILY OR AS DIRECTED BY ANTICOAGULATION CLINIC, Disp:  90 Tablet, Rfl: 1  •  EPINEPHrine (EPIPEN) 0.3 MG/0.3ML Solution Auto-injector solution for injection, epinephrine 0.3 mg/0.3 mL injection, auto-injector, Disp: , Rfl:   •  DILTIAZem CD (CARDIZEM CD) 240 MG CAPSULE SR 24 HR, TAKE 1 CAPSULE EVERY DAY, Disp: 90 capsule, Rfl: 3  •  Probiotic Product (PROBIOTIC PO), Take  by mouth., Disp: , Rfl:   •  ascorbic acid (ASCORBIC ACID) 500 MG Tab, Take 1,000 mg by mouth every day., Disp: , Rfl:   •  lactobacillus rhamnosus (CULTURELLE) Cap capsule, Take 1 Cap by mouth every day., Disp: , Rfl:   •  alprazolam (XANAX) 0.25 MG Tab, Take 0.25 mg by mouth 2 times a day as needed for Anxiety., Disp: , Rfl:   •  vitamin D (CHOLECALCIFEROL) 1000 UNIT Tab, Take 2,000 Units by mouth every morning. 3000 units, Disp: , Rfl:   •  ipratropium (ATROVENT) 0.02 % Solution, 0.2 mg by Nebulization route 4 times a day. (Patient not taking: Reported on 12/30/2021), Disp: , Rfl:   ALLERGIES:   Allergies   Allergen Reactions   • Penicillins Anaphylaxis and Hives   • Codeine Swelling   • Iodine Swelling   • Bee Venom Anaphylaxis   • Chantix [Varenicline]      disoriented   • Ciprofloxacin      Causes C diff, recurrent and very difficult   • Flagyl [Metronidazole] Rash     C/O flagyl causes rash.    • Latex Hives   • Lipitor [Atorvastatin Calcium] Unspecified     Intense Stomach pain   • Shellfish Allergy      unknown   • Tetanus Antitoxin Swelling     unknown     SURGHX:   Past Surgical History:   Procedure Laterality Date   • FECAL TRANSPLANT N/A 4/9/2018    Procedure: FECAL TRANSPLANT;  Surgeon: Gonzalez Cruz M.D.;  Location: ENDOSCOPY [facility];  Service: Gastroenterology   • COLONOSCOPY - ENDO N/A 4/9/2018    Procedure: COLONOSCOPY - ENDO;  Surgeon: Gonzalez Cruz M.D.;  Location: ENDOSCOPY [facility];  Service: Gastroenterology   • WIDE EXCISION MELANOMA, LEG, W/POSS.STSG  10/2015    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   • CARDIAC CATH, RIGHT HEART  2008    stent   • OTHER  "ORTHOPEDIC SURGERY Left 2008    hand repair   • CHOLECYSTECTOMY  1993   • TONSILLECTOMY  1945   • OTHER CARDIAC SURGERY      r heart cath stents   • STENT PLACEMENT      L iliac stent     SOCHX:  reports that she has been smoking cigarettes. She started smoking about 64 years ago. She has a 15.00 pack-year smoking history. She has never used smokeless tobacco. She reports that she does not drink alcohol and does not use drugs.  FH: Reviewed with patient, not pertinent to this visit.     Review of Systems   HENT:        Runny nose   Respiratory: Positive for cough.    Cardiovascular: Negative for chest pain.      Objective:   /60 (BP Location: Right arm, Patient Position: Sitting, BP Cuff Size: Adult)   Pulse 67   Temp 36.3 °C (97.3 °F) (Temporal)   Resp 12   Ht 1.588 m (5' 2.5\")   Wt 40.8 kg (90 lb)   SpO2 93%   BMI 16.20 kg/m²   Physical Exam  Vitals and nursing note reviewed.   Constitutional:       General: She is not in acute distress.     Appearance: Normal appearance. She is well-developed. She is not ill-appearing or toxic-appearing.   HENT:      Head: Normocephalic and atraumatic.      Right Ear: Hearing normal.      Left Ear: Hearing normal.   Cardiovascular:      Rate and Rhythm: Normal rate and regular rhythm.      Heart sounds: Normal heart sounds.   Pulmonary:      Effort: Pulmonary effort is normal.      Breath sounds: Normal breath sounds. No stridor. No wheezing, rhonchi or rales.   Musculoskeletal:      Comments: Normal movement in all 4 extremities   Skin:     General: Skin is warm and dry.   Neurological:      Mental Status: She is alert.      Coordination: Coordination normal.   Psychiatric:         Mood and Affect: Mood normal.       Assessment/Plan:   Assessment    1. Cough  - SARS-CoV-2 PCR (24 hour In-House): Collect NP swab in VTM; Future  - predniSONE (DELTASONE) 20 MG Tab; Take 1 Tablet by mouth 2 times a day for 5 days.  Dispense: 10 Tablet; Refill: 0  - Hydrocod Polst-CPM " Polst ER (TUSSIONEX) 10-8 MG/5ML Suspension Extended Release; Take 5 mL by mouth every 12 hours as needed for Cough or Rhinitis for up to 7 days.  Dispense: 70 mL; Refill: 0    Symptoms and presentation consistent with viral respiratory illness versus bronchitis at this point.  Extensively discussed possible side effects of medication with patient she is fully understanding and agreeable with this plan as she states this has helped her in the past.  She states she has tolerated the cough syrup in the past.  She states that she takes it off set from her Xanax and never at the same time.  She is fully understanding to not take at the same time and is agreeable with potential side effects such as drowsiness.  We will also send in short course of steroids for her to only start on if Covid test comes back negative.  Patient is fully understanding of only using the cough syrup sparingly at night as prescribed and not at the same time as other potential drowsy medications as we discussed.  Differential diagnosis, natural history, supportive care, and indications for immediate follow-up discussed.   Patient given instructions and understanding of medications and treatment.    If not improving in 3-5 days, F/U with PCP or return to UC if symptoms worsen.    Patient agreeable to plan.  Greater than 30 minutes were spent reviewing patient's chart, examining and obtaining history from patient, and discussing plan of care.     Please note that this dictation was created using voice recognition software. I have made every reasonable attempt to correct obvious errors, but I expect that there are errors of grammar and possibly content that I did not discover before finalizing the note.    Dmitry Pratt PA-C

## 2021-12-31 LAB
COVID ORDER STATUS COVID19: NORMAL
SARS-COV-2 RNA RESP QL NAA+PROBE: NOTDETECTED
SPECIMEN SOURCE: NORMAL

## 2022-01-03 ENCOUNTER — TELEPHONE (OUTPATIENT)
Dept: MEDICAL GROUP | Facility: CLINIC | Age: 82
End: 2022-01-03

## 2022-01-03 NOTE — TELEPHONE ENCOUNTER
Caller Name: Angie Root    Call Back Number: [phone]      Patient called and would like to go over her CT results. She sees them on mychart but is not sure what exactly the results are saying.  Thank you!

## 2022-01-05 PROBLEM — Z87.442 HISTORY OF RENAL CALCULI: Status: ACTIVE | Noted: 2021-09-23

## 2022-01-05 PROBLEM — M25.551 PAIN OF RIGHT HIP JOINT: Status: ACTIVE | Noted: 2021-09-16

## 2022-01-05 PROBLEM — K92.1 HEMATOCHEZIA: Status: ACTIVE | Noted: 2021-09-16

## 2022-01-06 ENCOUNTER — OFFICE VISIT (OUTPATIENT)
Dept: MEDICAL GROUP | Facility: CLINIC | Age: 82
End: 2022-01-06
Payer: MEDICARE

## 2022-01-06 ENCOUNTER — APPOINTMENT (OUTPATIENT)
Dept: RADIOLOGY | Facility: CLINIC | Age: 82
End: 2022-01-06
Attending: FAMILY MEDICINE
Payer: MEDICARE

## 2022-01-06 VITALS
TEMPERATURE: 97.7 F | WEIGHT: 90 LBS | HEART RATE: 53 BPM | BODY MASS INDEX: 16.56 KG/M2 | HEIGHT: 62 IN | OXYGEN SATURATION: 92 % | SYSTOLIC BLOOD PRESSURE: 108 MMHG | DIASTOLIC BLOOD PRESSURE: 68 MMHG | RESPIRATION RATE: 16 BRPM

## 2022-01-06 DIAGNOSIS — J44.1 ACUTE EXACERBATION OF CHRONIC OBSTRUCTIVE PULMONARY DISEASE (HCC): Primary | ICD-10-CM

## 2022-01-06 DIAGNOSIS — R05.9 COUGH: ICD-10-CM

## 2022-01-06 PROCEDURE — 71046 X-RAY EXAM CHEST 2 VIEWS: CPT | Mod: TC | Performed by: FAMILY MEDICINE

## 2022-01-06 PROCEDURE — 99214 OFFICE O/P EST MOD 30 MIN: CPT | Performed by: FAMILY MEDICINE

## 2022-01-06 RX ORDER — BENZONATATE 100 MG/1
100 CAPSULE ORAL 3 TIMES DAILY PRN
Qty: 30 CAPSULE | Refills: 0 | Status: SHIPPED | OUTPATIENT
Start: 2022-01-06 | End: 2022-09-06 | Stop reason: SDUPTHER

## 2022-01-06 ASSESSMENT — FIBROSIS 4 INDEX: FIB4 SCORE: 2.5

## 2022-01-06 ASSESSMENT — PATIENT HEALTH QUESTIONNAIRE - PHQ9: CLINICAL INTERPRETATION OF PHQ2 SCORE: 0

## 2022-01-06 NOTE — PROGRESS NOTES
CC:The primary encounter diagnosis was Acute exacerbation of chronic obstructive pulmonary disease (HCC). A diagnosis of Cough was also pertinent to this visit.      HISTORY OF PRESENT ILLNESS: Patient is a 81 y.o. female established patient who presents today for an acute visit for bronchitis.         Acute exacerbation of chronic obstructive pulmonary disease (HCC)  Visited  last week for bronchitis. PCR covid test negative. Congested cough non productive. Sinus congestion. 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 She has finished a 5 day prednisone burst. She was unable to get out of bed yesterday but today slightly better and able to come to appointment. Tired-appearing but in no distress, not hypoxic at rest. CXR done in clinic without signs of consolidation but with some bibasilar atelectasis vs inflammation. Suspect COPD exacerbation. Pt is taking her albuterol and ipratropium nebs at home 3x/day. No fever/chills or chest pain. Just cough.   Walking test done and as pulse ox dropped to 87% with exercise we will go ahead and order her some home oxygen.   Tessalon pearls for cough as the hydrocodone she was given made her feel sick. Rx sent in.  Strict return precautions discussed. F/u in one week.     WALKING TEST:  Initial pulse ox 92% at rest. This dropped to 87% with walking. It then climbed back to 92% when walking stopped. She was notably short of breath on walking in addition to the hypoxia.     Patient Active Problem List    Diagnosis Date Noted   • History of renal calculi 09/23/2021   • Hematochezia 09/16/2021   • Pain of right hip joint 09/16/2021   • Paresthesia of upper extremity 02/04/2021   • Acute exacerbation of chronic obstructive pulmonary disease (HCC) 12/18/2020   • Personal history of anaphylaxis 12/18/2020   • Coronary artery disease involving native coronary artery of native heart without angina pectoris 03/12/2019   • PCI to  her LAD in 2009 03/12/2019   • Chronic anticoagulation 04/17/2018   • Physical debility 12/05/2017   • History of COPD 12/05/2017   • Circulating anticoagulants ([facility]) 12/04/2017   • Hypotension 12/04/2017   • Constipation 12/03/2017   • Ischemic colitis ([facility]) 12/03/2017   • Osteoporosis, postmenopausal 08/24/2017   • Depression with anxiety 02/15/2017   • Vitamin D deficiency 07/26/2016   • Insomnia 07/26/2016   • Ankle swelling 05/23/2016   • PAD (peripheral artery disease) ([facility]) 03/01/2016   • Cigarette nicotine dependence with nicotine-induced disorder 03/01/2016   • Gastroesophageal reflux disease without esophagitis 01/20/2016   • Hiatal hernia 01/20/2016   • Paroxysmal a-fib (CMS-[facility]) 01/20/2016   • Pre-diabetes 01/20/2016   • Screening for thyroid disorder 01/20/2016   • Panic attacks 01/20/2016        Allergies:Penicillins, Codeine, Iodine, Bee venom, Chantix [varenicline], Ciprofloxacin, Flagyl [metronidazole], Latex, Lipitor [atorvastatin calcium], Shellfish allergy, and Tetanus antitoxin    Current Outpatient Medications   Medication Sig Dispense Refill   • benzonatate (TESSALON) 100 MG Cap Take 1 Capsule by mouth 3 times a day as needed for Cough. 30 Capsule 0   • lovastatin (MEVACOR) 40 MG tablet Take 1 Tablet by mouth at bedtime. 100 Tablet 3   • sotalol (BETAPACE) 80 MG Tab TAKE 1/2 TABLET TWICE DAILY 90 Tablet 1   • albuterol 108 (90 Base) MCG/ACT Aero Soln inhalation aerosol INHALE ONE TO TWO PUFFS BY MOUTH EVERY 4 HOURS AS NEEDED FOR SHORTNESS OF BREATH 18 g 3   • warfarin (COUMADIN) 1 MG Tab TAKE 1 TABLET DAILY OR AS DIRECTED BY ANTICOAGULATION CLINIC 90 Tablet 1   • EPINEPHrine (EPIPEN) 0.3 MG/0.3ML Solution Auto-injector solution for injection epinephrine 0.3 mg/0.3 mL injection, auto-injector     • DILTIAZem CD (CARDIZEM CD) 240 MG CAPSULE SR 24 HR TAKE 1 CAPSULE EVERY DAY 90 capsule 3   • Probiotic Product (PROBIOTIC PO) Take  by mouth.     • ascorbic acid (ASCORBIC ACID) 500 MG Tab Take 1,000  "mg by mouth every day.     • lactobacillus rhamnosus (CULTURELLE) Cap capsule Take 1 Cap by mouth every day.     • alprazolam (XANAX) 0.25 MG Tab Take 0.25 mg by mouth 2 times a day as needed for Anxiety.     • vitamin D (CHOLECALCIFEROL) 1000 UNIT Tab Take 2,000 Units by mouth every morning. 3000 units     • ipratropium (ATROVENT) 0.02 % Solution 0.2 mg by Nebulization route 4 times a day. (Patient not taking: Reported on 12/30/2021)       No current facility-administered medications for this visit.       Social History     Tobacco Use   • Smoking status: Current Every Day Smoker     Packs/day: 0.25     Years: 60.00     Pack years: 15.00     Types: Cigarettes     Start date: 3/20/1958   • Smokeless tobacco: Never Used   • Tobacco comment: 6 per day   Vaping Use   • Vaping Use: Never used   Substance Use Topics   • Alcohol use: No     Alcohol/week: 0.0 oz   • Drug use: No     Social History     Social History Narrative    .     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       Family History   Problem Relation Age of Onset   • Hypertension Mother    • Hyperlipidemia Mother    • Cancer Maternal Grandmother         tongue   • Cancer Maternal Uncle         x 3, pancreas   • Cancer Maternal Grandfather         unknown   • Cancer Paternal Grandmother         unknown   • Cancer Paternal Grandfather         unknown   • Diabetes Maternal Uncle    • Heart Disease Maternal Uncle    • Hypertension Sister    • Hyperlipidemia Sister    • Heart Disease Sister    • Alcohol/Drug Sister    • Thyroid Sister    • Psychiatric Illness Neg Hx    • Stroke Neg Hx        Exam:    /68 (BP Location: Left arm, Patient Position: Sitting, BP Cuff Size: Adult)   Pulse (!) 53   Temp 36.5 °C (97.7 °F) (Temporal)   Resp 16   Ht 1.575 m (5' 2\")   Wt 40.8 kg (90 lb)   SpO2 92%  Body mass index is 16.46 kg/m².    General:  Well nourished, well developed female in NAD  HENT: Atraumatic, normocephalic  EYES: Extraocular movements " intact, pupils equal and reactive to light  NECK: Supple, FROM  CHEST: No deformities, Equal chest expansion  RESP: Unlabored, very quiet breath sounds without crackles or wheeze.  ABD: Non-Distended  Extremities: No Clubbing, Cyanosis, or Edema  Skin: Warm/dry, without rashes  Neuro: A/O x 4, CN 2-12 Grossly intact, Motor/sensory grossly intact  Psych: Normal behavior, normal affect      LABS: Results reviewed and discussed with the patient, questions answered.    DX-CHEST-2 VIEWS    Result Date: 1/6/2022 1/6/2022 9:14 AM HISTORY/REASON FOR EXAM:  Cough. TECHNIQUE/EXAM DESCRIPTION AND NUMBER OF VIEWS: Two views of the chest. COMPARISON:  05/24/2019 FINDINGS: The heart is normal in size. No pulmonary infiltrates or consolidations are noted. Linear opacifications are noted in each lung base. No pleural effusions are appreciated.     1.  Linear opacifications are noted in each lung base which could be due to discoid atelectasis edema or inflammation. 2.  No consolidations are identified.        Return in about 1 week (around 1/13/2022).    My total time spent caring for the patient on the day of the encounter was *45  minutes.   This does not include time spent on separately billable procedures/tests.     Please note that this dictation was created using voice recognition software. I have worked with consultants from the vendor as well as technical experts from [facility] to optimize the interface. I have made every reasonable attempt to correct obvious errors, but I expect that there are errors of grammar and possibly content that I did not discover before finalizing the note.

## 2022-01-06 NOTE — ASSESSMENT & PLAN NOTE
Visited  last week for bronchitis. PCR covid test negative. Congested cough non productive. Sinus congestion. 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 She has finished a 5 day prednisone burst. She was unable to get out of bed yesterday but today slightly better and able to come to appointment. Tired-appearing but in no distress, not hypoxic at rest. CXR done in clinic without signs of consolidation but with some bibasilar atelectasis vs inflammation. Suspect COPD exacerbation. Pt is taking her albuterol and ipratropium nebs at home 3x/day. No fever/chills or chest pain. Just cough.   Walking test done and as pulse ox dropped to 87% with exercise we will go ahead and order her some home oxygen.   Tessalon pearls for cough as the hydrocodone she was given made her feel sick. Rx sent in.  Strict return precautions discussed. F/u in one week.

## 2022-01-13 ENCOUNTER — HOSPITAL ENCOUNTER (OUTPATIENT)
Dept: LAB | Facility: MEDICAL CENTER | Age: 82
End: 2022-01-13
Attending: NURSE PRACTITIONER
Payer: MEDICARE

## 2022-01-13 ENCOUNTER — OFFICE VISIT (OUTPATIENT)
Dept: MEDICAL GROUP | Facility: CLINIC | Age: 82
End: 2022-01-13
Payer: MEDICARE

## 2022-01-13 ENCOUNTER — ANTICOAGULATION MONITORING (OUTPATIENT)
Dept: VASCULAR LAB | Facility: MEDICAL CENTER | Age: 82
End: 2022-01-13

## 2022-01-13 VITALS
HEIGHT: 63 IN | BODY MASS INDEX: 15.68 KG/M2 | WEIGHT: 88.5 LBS | SYSTOLIC BLOOD PRESSURE: 129 MMHG | HEART RATE: 84 BPM | DIASTOLIC BLOOD PRESSURE: 59 MMHG | OXYGEN SATURATION: 94 %

## 2022-01-13 DIAGNOSIS — Z79.01 CHRONIC ANTICOAGULATION: ICD-10-CM

## 2022-01-13 DIAGNOSIS — J44.1 ACUTE EXACERBATION OF CHRONIC OBSTRUCTIVE PULMONARY DISEASE (HCC): ICD-10-CM

## 2022-01-13 DIAGNOSIS — F41.0 PANIC ATTACKS: ICD-10-CM

## 2022-01-13 DIAGNOSIS — I48.0 PAROXYSMAL A-FIB (HCC): ICD-10-CM

## 2022-01-13 DIAGNOSIS — F17.219 CIGARETTE NICOTINE DEPENDENCE WITH NICOTINE-INDUCED DISORDER: ICD-10-CM

## 2022-01-13 DIAGNOSIS — I73.9 PERIPHERAL ARTERIAL DISEASE (HCC): ICD-10-CM

## 2022-01-13 DIAGNOSIS — Z23 NEED FOR INFLUENZA VACCINATION: ICD-10-CM

## 2022-01-13 DIAGNOSIS — D68.318 CIRCULATING ANTICOAGULANTS (HCC): ICD-10-CM

## 2022-01-13 LAB
INR PPP: 2.77 (ref 0.87–1.13)
PROTHROMBIN TIME: 28.4 SEC (ref 12–14.6)

## 2022-01-13 PROCEDURE — 99214 OFFICE O/P EST MOD 30 MIN: CPT | Mod: 25 | Performed by: FAMILY MEDICINE

## 2022-01-13 PROCEDURE — 36415 COLL VENOUS BLD VENIPUNCTURE: CPT

## 2022-01-13 PROCEDURE — 85610 PROTHROMBIN TIME: CPT

## 2022-01-13 ASSESSMENT — FIBROSIS 4 INDEX: FIB4 SCORE: 2.5

## 2022-01-14 PROBLEM — K55.9 ISCHEMIC COLITIS (HCC): Status: RESOLVED | Noted: 2017-12-03 | Resolved: 2022-01-14

## 2022-01-14 PROCEDURE — G0008 ADMIN INFLUENZA VIRUS VAC: HCPCS | Performed by: FAMILY MEDICINE

## 2022-01-14 PROCEDURE — 90662 IIV NO PRSV INCREASED AG IM: CPT | Performed by: FAMILY MEDICINE

## 2022-01-14 NOTE — ASSESSMENT & PLAN NOTE
Long term use of xanax, one daily in the morning. She has skipped it quite a bit lately and would like to get off. We discussed tapering the dose to half a pill a day and then off.

## 2022-01-14 NOTE — PROGRESS NOTES
CC:Diagnoses of Need for influenza vaccination, Acute exacerbation of chronic obstructive pulmonary disease (HCC), Cigarette nicotine dependence with nicotine-induced disorder, Panic attacks, Paroxysmal a-fib (CMS-HCC), Peripheral arterial disease (HCC), and Circulating anticoagulants ([facility]) were pertinent to this visit.      HISTORY OF PRESENT ILLNESS: Patient is a 81 y.o. female established patient who presents today to discuss several chronic issues. She reports she is feeling a lot better than a week ago.        Acute exacerbation of chronic obstructive pulmonary disease (HCC)  Angie is feeling much better. Good energy. Still some lingering cough but much improved. Pulse ox is 94%. Lung sounds are audible on stethoscope exam. She never did get the oxygen delivered but at this point she likely doesn't need it.   She requests flu vaccine so this was given today.  She is planning to get her covid booster. Questions answered.   She will continue her albuterol as needed as well as a daily ipratropium nebulizer. We have tried to get her daily inhalers with LABA/LAMA but these have been prohibitively expensive for her.   She has been 3 weeks without cigarettes. We discussed how this is helping her airways recover. Hopefully she can quit for good.     Cigarette nicotine dependence with nicotine-induced disorder  Has about a 60 pack year history. 3 weeks without cigarettes now. Hopefully she can keep this up!    Panic attacks  Long term use of xanax, one daily in the morning. She has skipped it quite a bit lately and would like to get off. We discussed tapering the dose to half a pill a day and then off.     Paroxysmal a-fib (CMS-HCC)  She is followed by cardiology. Rhythm controlled with sotalol. On coumadin for AC.     Peripheral arterial disease ([facility])  PCI in 2008 to L leg. No critical limb ischemia at this time. Has finally quit smoking.     Circulating anticoagulants (CMS-HCC) ([facility])  Goes to coumadin clinic for  therapeutic AC for a fib.       Patient Active Problem List    Diagnosis Date Noted   • History of renal calculi 09/23/2021   • Hematochezia 09/16/2021   • Pain of right hip joint 09/16/2021   • Paresthesia of upper extremity 02/04/2021   • Acute exacerbation of chronic obstructive pulmonary disease (HCC) 12/18/2020   • Personal history of anaphylaxis 12/18/2020   • Coronary artery disease involving native coronary artery of native heart without angina pectoris 03/12/2019   • PCI to her LAD in 2009 03/12/2019   • Chronic anticoagulation 04/17/2018   • Physical debility 12/05/2017   • History of COPD 12/05/2017   • Circulating anticoagulants (HCC) 12/04/2017   • Hypotension 12/04/2017   • Constipation 12/03/2017   • Osteoporosis, postmenopausal 08/24/2017   • Depression with anxiety 02/15/2017   • Vitamin D deficiency 07/26/2016   • Insomnia 07/26/2016   • Ankle swelling 05/23/2016   • Peripheral arterial disease (HCC) 03/01/2016   • Cigarette nicotine dependence with nicotine-induced disorder 03/01/2016   • Gastroesophageal reflux disease without esophagitis 01/20/2016   • Hiatal hernia 01/20/2016   • Paroxysmal a-fib (CMS-HCC) 01/20/2016   • Pre-diabetes 01/20/2016   • Screening for thyroid disorder 01/20/2016   • Panic attacks 01/20/2016        Allergies:Penicillins, Codeine, Iodine, Bee venom, Chantix [varenicline], Ciprofloxacin, Flagyl [metronidazole], Latex, Lipitor [atorvastatin calcium], Shellfish allergy, and Tetanus antitoxin    Current Outpatient Medications   Medication Sig Dispense Refill   • benzonatate (TESSALON) 100 MG Cap Take 1 Capsule by mouth 3 times a day as needed for Cough. 30 Capsule 0   • lovastatin (MEVACOR) 40 MG tablet Take 1 Tablet by mouth at bedtime. 100 Tablet 3   • sotalol (BETAPACE) 80 MG Tab TAKE 1/2 TABLET TWICE DAILY 90 Tablet 1   • albuterol 108 (90 Base) MCG/ACT Aero Soln inhalation aerosol INHALE ONE TO TWO PUFFS BY MOUTH EVERY 4 HOURS AS NEEDED FOR SHORTNESS OF BREATH 18 g 3    • warfarin (COUMADIN) 1 MG Tab TAKE 1 TABLET DAILY OR AS DIRECTED BY ANTICOAGULATION CLINIC 90 Tablet 1   • EPINEPHrine (EPIPEN) 0.3 MG/0.3ML Solution Auto-injector solution for injection epinephrine 0.3 mg/0.3 mL injection, auto-injector     • DILTIAZem CD (CARDIZEM CD) 240 MG CAPSULE SR 24 HR TAKE 1 CAPSULE EVERY DAY 90 capsule 3   • Probiotic Product (PROBIOTIC PO) Take  by mouth.     • ascorbic acid (ASCORBIC ACID) 500 MG Tab Take 1,000 mg by mouth every day.     • ipratropium (ATROVENT) 0.02 % Solution 0.2 mg by Nebulization route 4 times a day. (Patient not taking: Reported on 12/30/2021)     • lactobacillus rhamnosus (CULTURELLE) Cap capsule Take 1 Cap by mouth every day.     • alprazolam (XANAX) 0.25 MG Tab Take 0.25 mg by mouth 2 times a day as needed for Anxiety.     • vitamin D (CHOLECALCIFEROL) 1000 UNIT Tab Take 2,000 Units by mouth every morning. 3000 units       No current facility-administered medications for this visit.       Social History     Tobacco Use   • Smoking status: Current Every Day Smoker     Packs/day: 0.25     Years: 60.00     Pack years: 15.00     Types: Cigarettes     Start date: 3/20/1957   • Smokeless tobacco: Never Used   • Tobacco comment: 6 per day   Vaping Use   • Vaping Use: Never used   Substance Use Topics   • Alcohol use: No     Alcohol/week: 0.0 oz   • Drug use: No     Social History     Social History Narrative    .     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       Family History   Problem Relation Age of Onset   • Hypertension Mother    • Hyperlipidemia Mother    • Cancer Maternal Grandmother         tongue   • Cancer Maternal Uncle         x 3, pancreas   • Cancer Maternal Grandfather         unknown   • Cancer Paternal Grandmother         unknown   • Cancer Paternal Grandfather         unknown   • Diabetes Maternal Uncle    • Heart Disease Maternal Uncle    • Hypertension Sister    • Hyperlipidemia Sister    • Heart Disease Sister    • Alcohol/Drug Sister   "  • Thyroid Sister    • Psychiatric Illness Neg Hx    • Stroke Neg Hx        Exam:    /59 (BP Location: Left arm, Patient Position: Sitting, BP Cuff Size: Adult)   Pulse 84   Ht 1.588 m (5' 2.5\")   Wt 40.1 kg (88 lb 8 oz)   SpO2 94%  Body mass index is 15.93 kg/m².    General:  Well nourished, well dressed, thin female in NAD  HENT: Atraumatic, normocephalic  EYES: Extraocular movements intact, pupils equal and reactive to light  NECK: Supple, FROM  CHEST: increased AP diameter  RESP: Unlabored, no stridor or audible wheeze. Clear to auscultation  ABD: Non-Distended  Extremities: No Clubbing, Cyanosis, or Edema  Skin: Warm/dry, without rashes  Neuro: A/O x 4, CN 2-12 Grossly intact, Motor/sensory grossly intact  Psych: Normal behavior, normal affect      LABS: Results reviewed and discussed with the patient, questions answered.        Return in about 6 months (around 7/13/2022).    My total time spent caring for the patient on the day of the encounter was 30 minutes.   This does not include time spent on separately billable procedures/tests.       "

## 2022-01-14 NOTE — ASSESSMENT & PLAN NOTE
Angie is feeling much better. Good energy. Still some lingering cough but much improved. Pulse ox is 94%. Lung sounds are audible on stethoscope exam. She never did get the oxygen delivered but at this point she likely doesn't need it.   She requests flu vaccine so this was given today.  She is planning to get her covid booster. Questions answered.   She will continue her albuterol as needed as well as a daily ipratropium nebulizer. We have tried to get her daily inhalers with LABA/LAMA but these have been prohibitively expensive for her.   She has been 3 weeks without cigarettes. We discussed how this is helping her airways recover. Hopefully she can quit for good.

## 2022-01-14 NOTE — PROGRESS NOTES
Anticoagulation Summary  As of 2022    INR goal:  2.0-3.0   TTR:  83.4 % (4.1 y)   INR used for dosin.77 (2022)   Warfarin maintenance plan:  1 mg (1 mg x 1) every day   Weekly warfarin total:  7 mg   Plan last modified:  Major Hughes PharmD (2020)   Next INR check:  2022   Target end date:  Indefinite    Indications    Paroxysmal a-fib (CMS-HCC) [I48.0]  Chronic anticoagulation [Z79.01]             Anticoagulation Episode Summary     INR check location:  Clinic Lab    Preferred lab:  [facility]    Send INR reminders to:      Comments:  [phone] (H)  [facility]      Anticoagulation Care Providers     Provider Role Specialty Phone number    Evelio Tejeda M.D. Referring Internal Medicine [phone]    [facility] Anticoagulation Services Responsible  [phone]    Beryl Pang M.D. Responsible Family Medicine [phone]          Refer to Anticoagulation Patient Findings for HPI  Patient Findings     Negatives:  Signs/symptoms of thrombosis, Signs/symptoms of bleeding, Laboratory test error suspected, Change in health, Change in alcohol use, Change in activity, Upcoming invasive procedure, Emergency department visit, Upcoming dental procedure, Missed doses, Extra doses, Change in medications, Change in diet/appetite, Hospital admission, Bruising, Other complaints          Spoke with patient to report a therapeutic INR.      Pt is NOT on antiplatelet therapy.    Pt instructed to continue with current warfarin dosing regimen, confirms dosing.   Will follow up in 2 week(s).     Jimbo Dunlap, PharmD, BCACP

## 2022-01-25 ENCOUNTER — HOSPITAL ENCOUNTER (OUTPATIENT)
Dept: LAB | Facility: MEDICAL CENTER | Age: 82
End: 2022-01-25
Attending: NURSE PRACTITIONER
Payer: MEDICARE

## 2022-01-25 ENCOUNTER — ANTICOAGULATION MONITORING (OUTPATIENT)
Dept: VASCULAR LAB | Facility: MEDICAL CENTER | Age: 82
End: 2022-01-25

## 2022-01-25 DIAGNOSIS — Z79.01 CHRONIC ANTICOAGULATION: ICD-10-CM

## 2022-01-25 DIAGNOSIS — I48.0 PAROXYSMAL A-FIB (HCC): ICD-10-CM

## 2022-01-25 LAB
INR PPP: 2.4 (ref 0.87–1.13)
PROTHROMBIN TIME: 25.4 SEC (ref 12–14.6)

## 2022-01-25 PROCEDURE — 85610 PROTHROMBIN TIME: CPT

## 2022-01-25 PROCEDURE — 36415 COLL VENOUS BLD VENIPUNCTURE: CPT

## 2022-01-26 NOTE — PROGRESS NOTES
Anticoagulation Summary  As of 2022    INR goal:  2.0-3.0   TTR:  83.5 % (4.1 y)   INR used for dosin.40 (2022)   Warfarin maintenance plan:  1 mg (1 mg x 1) every day   Weekly warfarin total:  7 mg   Plan last modified:  Yaneli AguirreD (2020)   Next INR check:  2022   Target end date:  Indefinite    Indications    Paroxysmal a-fib (CMS-HCC) [I48.0]  Chronic anticoagulation [Z79.01]             Anticoagulation Episode Summary     INR check location:  Clinic Lab    Preferred lab:  [facility]    Send INR reminders to:      Comments:  [phone] (H)  [facility]      Anticoagulation Care Providers     Provider Role Specialty Phone number    Evelio Tejeda M.D. Referring Internal Medicine [phone]    [facility] Anticoagulation Services Responsible  [phone]    Beryl Pang M.D. Responsible Family Medicine [phone]        Anticoagulation Patient Findings  Patient Findings     Negatives:  Signs/symptoms of thrombosis, Signs/symptoms of bleeding, Laboratory test error suspected, Change in health, Change in alcohol use, Change in activity, Upcoming invasive procedure, Emergency department visit, Upcoming dental procedure, Missed doses, Extra doses, Change in medications, Change in diet/appetite, Hospital admission, Bruising, Other complaints         Spoke with patient today regarding therapeutic INR of 2.4.  Patient denies any signs/symptoms of bruising or bleeding or any changes in diet and medications.  Instructed patient to call clinic with any questions or concerns.    Pt is not on antiplatelet therapy    Pt is to continue with current warfarin dosing regimen.  Follow up in 4 weeks, to reduce risk of adverse events related to this high risk medication,  Warfarin.    Major Hughes, PharmD, BCACP

## 2022-02-21 DIAGNOSIS — Z79.01 CHRONIC ANTICOAGULATION: ICD-10-CM

## 2022-02-22 RX ORDER — WARFARIN SODIUM 1 MG/1
TABLET ORAL
Qty: 90 TABLET | Refills: 1 | Status: SHIPPED | OUTPATIENT
Start: 2022-02-22 | End: 2022-07-18

## 2022-02-23 ENCOUNTER — HOSPITAL ENCOUNTER (OUTPATIENT)
Dept: LAB | Facility: MEDICAL CENTER | Age: 82
End: 2022-02-23
Attending: NURSE PRACTITIONER
Payer: MEDICARE

## 2022-02-23 ENCOUNTER — ANTICOAGULATION MONITORING (OUTPATIENT)
Dept: VASCULAR LAB | Facility: MEDICAL CENTER | Age: 82
End: 2022-02-23

## 2022-02-23 DIAGNOSIS — I48.0 PAROXYSMAL A-FIB (HCC): ICD-10-CM

## 2022-02-23 DIAGNOSIS — Z79.01 CHRONIC ANTICOAGULATION: ICD-10-CM

## 2022-02-23 LAB
INR PPP: 1.84 (ref 0.87–1.13)
PROTHROMBIN TIME: 20.7 SEC (ref 12–14.6)

## 2022-02-23 PROCEDURE — 36415 COLL VENOUS BLD VENIPUNCTURE: CPT

## 2022-02-23 PROCEDURE — 85610 PROTHROMBIN TIME: CPT

## 2022-02-24 NOTE — PROGRESS NOTES
Anticoagulation Summary  As of 2022    INR goal:  2.0-3.0   TTR:  83.3 % (4.2 y)   INR used for dosin.84 (2022)   Warfarin maintenance plan:  1 mg (1 mg x 1) every day   Weekly warfarin total:  7 mg   Plan last modified:  Major Hughes, PharmD (2020)   Next INR check:  3/9/2022   Target end date:  Indefinite    Indications    Paroxysmal a-fib (CMS-HCC) [I48.0]  Chronic anticoagulation [Z79.01]             Anticoagulation Episode Summary     INR check location:  Clinic Lab    Preferred lab:  [facility]    Send INR reminders to:      Comments:  [phone] (H)  [facility]      Anticoagulation Care Providers     Provider Role Specialty Phone number    Evelio Tejeda M.D. Referring Internal Medicine [phone]    [facility] Anticoagulation Services Responsible  [phone]    Beryl Pang M.D. Responsible Family Medicine [phone]        Anticoagulation Patient Findings          Left voicemail message to report a  sub therapeutic INR of 1.84.  Pt to BOLUS DOSE WITH 1.5mg tonight, then continue with current warfarin dosing regimen. Requested pt contact the clinic for any s/s of unusual bleeding, bruising, clotting or any changes to diet or medication.  Follow up in 2 weeks, to reduce the risk of adverse events related to this high risk medication, warfarin.    Sadaf Santillan, Clinical Pharmacist

## 2022-03-02 ENCOUNTER — OFFICE VISIT (OUTPATIENT)
Dept: CARDIOLOGY | Facility: MEDICAL CENTER | Age: 82
End: 2022-03-02
Payer: MEDICARE

## 2022-03-02 ENCOUNTER — RESEARCH ENCOUNTER (OUTPATIENT)
Dept: CARDIOLOGY | Facility: MEDICAL CENTER | Age: 82
End: 2022-03-02

## 2022-03-02 VITALS
DIASTOLIC BLOOD PRESSURE: 60 MMHG | RESPIRATION RATE: 16 BRPM | HEART RATE: 64 BPM | HEIGHT: 63 IN | SYSTOLIC BLOOD PRESSURE: 120 MMHG | BODY MASS INDEX: 16.12 KG/M2 | OXYGEN SATURATION: 99 % | WEIGHT: 91 LBS

## 2022-03-02 DIAGNOSIS — Z95.5 S/P CORONARY ARTERY STENT PLACEMENT: ICD-10-CM

## 2022-03-02 DIAGNOSIS — I48.0 PAROXYSMAL A-FIB (HCC): ICD-10-CM

## 2022-03-02 DIAGNOSIS — I25.10 CORONARY ARTERY DISEASE INVOLVING NATIVE CORONARY ARTERY OF NATIVE HEART WITHOUT ANGINA PECTORIS: ICD-10-CM

## 2022-03-02 DIAGNOSIS — Z79.899 HIGH RISK MEDICATION USE: ICD-10-CM

## 2022-03-02 DIAGNOSIS — I73.9 PAD (PERIPHERAL ARTERY DISEASE) (HCC): ICD-10-CM

## 2022-03-02 DIAGNOSIS — Z79.01 CHRONIC ANTICOAGULATION: ICD-10-CM

## 2022-03-02 DIAGNOSIS — Z00.6 RESEARCH STUDY PATIENT: ICD-10-CM

## 2022-03-02 PROCEDURE — 99214 OFFICE O/P EST MOD 30 MIN: CPT | Performed by: NURSE PRACTITIONER

## 2022-03-02 ASSESSMENT — ENCOUNTER SYMPTOMS
WHEEZING: 0
COUGH: 0
HEADACHES: 0
CLAUDICATION: 0
VOMITING: 0
SHORTNESS OF BREATH: 0
PND: 0
SPUTUM PRODUCTION: 0
CHILLS: 0
HEMOPTYSIS: 0
PALPITATIONS: 0
DIZZINESS: 0
FEVER: 0
NAUSEA: 0
ORTHOPNEA: 0

## 2022-03-02 ASSESSMENT — FIBROSIS 4 INDEX: FIB4 SCORE: 2.53

## 2022-03-05 ENCOUNTER — PATIENT MESSAGE (OUTPATIENT)
Dept: CARDIOLOGY | Facility: MEDICAL CENTER | Age: 82
End: 2022-03-05
Payer: MEDICARE

## 2022-03-05 DIAGNOSIS — R20.0 LEFT ARM NUMBNESS: ICD-10-CM

## 2022-03-05 DIAGNOSIS — I20.89 ANGINA AT REST (HCC): ICD-10-CM

## 2022-03-05 DIAGNOSIS — I48.0 PAROXYSMAL A-FIB (HCC): ICD-10-CM

## 2022-03-05 DIAGNOSIS — I73.9 PAD (PERIPHERAL ARTERY DISEASE) (HCC): ICD-10-CM

## 2022-03-05 NOTE — PROGRESS NOTES
Referral to Dr. Riky Pérez for PAD  US/Duplex  CONCLUSIONS   50-75% stenosis in the proximal right superficial femoral artery.   50-75% stenosis in the proximal left common iliac artery. (velocity is 252    cm/s).     EDGAR   Conclusions   There is mild to moderate arterial disease demonstrated (EDGAR is .5-.89) in    the left lower extremity and probably in the right lower extremity as    well..    Discussed with patient over the phone

## 2022-03-15 ENCOUNTER — APPOINTMENT (OUTPATIENT)
Dept: RADIOLOGY | Facility: MEDICAL CENTER | Age: 82
End: 2022-03-15
Attending: FAMILY MEDICINE
Payer: MEDICARE

## 2022-03-22 ENCOUNTER — HOSPITAL ENCOUNTER (OUTPATIENT)
Dept: LAB | Facility: MEDICAL CENTER | Age: 82
End: 2022-03-22
Attending: NURSE PRACTITIONER
Payer: MEDICARE

## 2022-03-22 ENCOUNTER — ANTICOAGULATION MONITORING (OUTPATIENT)
Dept: VASCULAR LAB | Facility: MEDICAL CENTER | Age: 82
End: 2022-03-22

## 2022-03-22 DIAGNOSIS — I48.0 PAROXYSMAL A-FIB (HCC): ICD-10-CM

## 2022-03-22 DIAGNOSIS — Z79.01 CHRONIC ANTICOAGULATION: ICD-10-CM

## 2022-03-22 LAB
INR PPP: 1.77 (ref 0.87–1.13)
PROTHROMBIN TIME: 20 SEC (ref 12–14.6)

## 2022-03-22 PROCEDURE — 36415 COLL VENOUS BLD VENIPUNCTURE: CPT

## 2022-03-22 PROCEDURE — 85610 PROTHROMBIN TIME: CPT

## 2022-03-22 NOTE — PROGRESS NOTES
Anticoagulation Summary  As of 3/22/2022    INR goal:  2.0-3.0   TTR:  81.8 % (4.2 y)   INR used for dosin.77 (3/22/2022)   Warfarin maintenance plan:  2 mg (1 mg x 2) every Tue; 1 mg (1 mg x 1) all other days   Weekly warfarin total:  8 mg   Plan last modified:  Jimbo Dunlap PharmD (3/22/2022)   Next INR check:  2022   Target end date:  Indefinite    Indications    Paroxysmal a-fib (CMS-HCC) [I48.0]  Chronic anticoagulation [Z79.01]             Anticoagulation Episode Summary     INR check location:  Clinic Lab    Preferred lab:  [facility]    Send INR reminders to:      Comments:  [phone] (H)  [facility]      Anticoagulation Care Providers     Provider Role Specialty Phone number    Evelio Tejeda M.D. Referring Internal Medicine [phone]    [facility] Anticoagulation Services Responsible  [phone]    Beryl Pang M.D. Responsible Family Medicine [phone]          Refer to Anticoagulation Patient Findings for HPI  Patient Findings     Positives:  Missed doses    Negatives:  Signs/symptoms of thrombosis, Signs/symptoms of bleeding, Laboratory test error suspected, Change in health, Change in alcohol use, Change in activity, Upcoming invasive procedure, Emergency department visit, Upcoming dental procedure, Extra doses, Change in medications, Change in diet/appetite, Hospital admission, Bruising, Other complaints        Spoke with pt.  INR is SUB therapeutic.     Pt verifies warfarin weekly dosing.     Will have pt begin newly increased regimen of 2 mg Tues and 1 mg ROW.    Repeat INR in 2 week(s).     Jimbo Dunlap, PharmD, BCACP

## 2022-03-31 ENCOUNTER — HOSPITAL ENCOUNTER (OUTPATIENT)
Dept: RADIOLOGY | Facility: MEDICAL CENTER | Age: 82
End: 2022-03-31
Attending: NURSE PRACTITIONER
Payer: MEDICARE

## 2022-03-31 DIAGNOSIS — I20.89 ANGINA AT REST (HCC): ICD-10-CM

## 2022-03-31 DIAGNOSIS — I48.0 PAROXYSMAL A-FIB (HCC): ICD-10-CM

## 2022-03-31 DIAGNOSIS — R20.0 LEFT ARM NUMBNESS: ICD-10-CM

## 2022-03-31 LAB
APOB+LDLR+PCSK9 GENE MUT ANL BLD/T: NOT DETECTED
BRCA1+BRCA2 DEL+DUP + FULL MUT ANL BLD/T: NOT DETECTED
MLH1+MSH2+MSH6+PMS2 GN DEL+DUP+FUL M: NOT DETECTED

## 2022-03-31 PROCEDURE — A9502 TC99M TETROFOSMIN: HCPCS

## 2022-03-31 PROCEDURE — 78452 HT MUSCLE IMAGE SPECT MULT: CPT | Mod: 26 | Performed by: INTERNAL MEDICINE

## 2022-03-31 PROCEDURE — 93018 CV STRESS TEST I&R ONLY: CPT | Performed by: INTERNAL MEDICINE

## 2022-03-31 PROCEDURE — 700111 HCHG RX REV CODE 636 W/ 250 OVERRIDE (IP)

## 2022-03-31 RX ORDER — REGADENOSON 0.08 MG/ML
0.4 INJECTION, SOLUTION INTRAVENOUS ONCE
Status: COMPLETED | OUTPATIENT
Start: 2022-03-31 | End: 2022-03-31

## 2022-03-31 RX ORDER — REGADENOSON 0.08 MG/ML
INJECTION, SOLUTION INTRAVENOUS
Status: COMPLETED
Start: 2022-03-31 | End: 2022-03-31

## 2022-03-31 RX ADMIN — REGADENOSON 0.4 MG: 0.08 INJECTION, SOLUTION INTRAVENOUS at 09:11

## 2022-04-04 ENCOUNTER — HOSPITAL ENCOUNTER (OUTPATIENT)
Dept: LAB | Facility: MEDICAL CENTER | Age: 82
End: 2022-04-04
Attending: NURSE PRACTITIONER
Payer: MEDICARE

## 2022-04-04 DIAGNOSIS — Z79.01 CHRONIC ANTICOAGULATION: ICD-10-CM

## 2022-04-04 DIAGNOSIS — I48.0 PAROXYSMAL A-FIB (HCC): ICD-10-CM

## 2022-04-05 ENCOUNTER — HOSPITAL ENCOUNTER (OUTPATIENT)
Dept: RADIOLOGY | Facility: MEDICAL CENTER | Age: 82
End: 2022-04-05
Attending: FAMILY MEDICINE
Payer: MEDICARE

## 2022-04-05 DIAGNOSIS — Z12.31 BREAST CANCER SCREENING BY MAMMOGRAM: ICD-10-CM

## 2022-04-05 DIAGNOSIS — Z12.31 VISIT FOR SCREENING MAMMOGRAM: ICD-10-CM

## 2022-04-05 PROCEDURE — 77063 BREAST TOMOSYNTHESIS BI: CPT

## 2022-04-06 ENCOUNTER — ANTICOAGULATION MONITORING (OUTPATIENT)
Dept: VASCULAR LAB | Facility: MEDICAL CENTER | Age: 82
End: 2022-04-06
Payer: MEDICARE

## 2022-04-06 ENCOUNTER — DOCUMENTATION (OUTPATIENT)
Dept: MEDICAL GROUP | Facility: PHYSICIAN GROUP | Age: 82
End: 2022-04-06
Payer: MEDICARE

## 2022-04-06 ENCOUNTER — HOSPITAL ENCOUNTER (OUTPATIENT)
Facility: MEDICAL CENTER | Age: 82
End: 2022-04-06
Attending: NURSE PRACTITIONER
Payer: MEDICARE

## 2022-04-06 DIAGNOSIS — I48.0 PAROXYSMAL A-FIB (HCC): ICD-10-CM

## 2022-04-06 DIAGNOSIS — Z79.01 CHRONIC ANTICOAGULATION: ICD-10-CM

## 2022-04-06 LAB
INR PPP: 2.32 (ref 0.87–1.13)
PROTHROMBIN TIME: 24.8 SEC (ref 12–14.6)

## 2022-04-06 PROCEDURE — 85610 PROTHROMBIN TIME: CPT

## 2022-04-06 NOTE — PROGRESS NOTES
Patient called today to discuss recent INR results.  After speaking with lab, found out that sample was not large enough and she will need to return for redraw.  She understands and will return to lab today for draw.  Major Hughes, PharmD, BCACP

## 2022-04-06 NOTE — PROGRESS NOTES
Anticoagulation Summary  As of 2022    INR goal:  2.0-3.0   TTR:  81.6 % (4.3 y)   INR used for dosin.32 (2022)   Warfarin maintenance plan:  2 mg (1 mg x 2) every Tue; 1 mg (1 mg x 1) all other days   Weekly warfarin total:  8 mg   Plan last modified:  Yaneli MackenzieD (3/22/2022)   Next INR check:  2022   Target end date:  Indefinite    Indications    Paroxysmal a-fib (CMS-HCC) [I48.0]  Chronic anticoagulation [Z79.01]             Anticoagulation Episode Summary     INR check location:  Clinic Lab    Preferred lab:  [facility]    Send INR reminders to:      Comments:  [phone] (H)  [facility]      Anticoagulation Care Providers     Provider Role Specialty Phone number    Evelio Tejeda M.D. Referring Internal Medicine [phone]    [facility] Responsible  [phone]    Beryl Pang M.D. Responsible Family Medicine [phone]        Anticoagulation Patient Findings      Left voicemail message to report a therapeutic INR of 2.32     Pt to continue with current warfarin dosing regimen. Requested pt contact the clinic for any s/s of unusual bleeding, bruising, clotting or any changes to diet or medication.    FU INR in 3 week(s).    Kerne Abdi, PharmD

## 2022-04-07 ENCOUNTER — TELEPHONE (OUTPATIENT)
Dept: CARDIOLOGY | Facility: MEDICAL CENTER | Age: 82
End: 2022-04-07
Payer: MEDICARE

## 2022-04-07 DIAGNOSIS — I73.9 PERIPHERAL ARTERIAL DISEASE (HCC): ICD-10-CM

## 2022-04-07 NOTE — TELEPHONE ENCOUNTER
DB      Joann Collado,    This patient called and stated the referral over to Vascular Surgery with Dr. Pérez office is that he's not accepting any new patients at this time. She is asking for a new referral to be sent over to Dr. Marilou Olmos's office to have her do.    Can you please call this patient back with any questions at [phone].      Thank you,    JOSHUA

## 2022-04-11 NOTE — PROGRESS NOTES
Anticoagulation Summary  As of 12/27/2017    INR goal:   2.0-3.0   TTR:   0.0 % (2 d)   Today's INR:   3.38! (12/26/2017)   Maintenance plan:   1 mg (1 mg x 1) every day   Weekly total:   7 mg   Plan last modified:   Pieter Covarrubias, PharmD (12/21/2017)   Next INR check:   1/3/2018   Target end date:   Indefinite    Indications    Paroxysmal a-fib (CMS-HCC) [I48.0]             Anticoagulation Episode Summary     INR check location:   [facility]    Preferred lab:       Send INR reminders to:       Comments:   [phone] (H)      Anticoagulation Care Providers     Provider Role Specialty Phone number    Evelio Tejeda M.D. Referring Internal Medicine [phone]    [facility] Responsible  [phone]    Beryl Pang M.D. Responsible Family Medicine [phone]        Anticoagulation Patient Findings      Spoke with patient.  INR is SUPRA therapeutic.   Pt denies any unusual s/s of bleeding, bruising, clotting or any changes to diet or medications. Denies any etoh, cranberries, supplements, or illness.   Pt verifies warfarin weekly dosing.     Will have pt hold her warfarin today, she's having GI issues.    Repeat INR tomorrow at [place].     Soco Rowland, PharmD       Alar Island Pedicle Flap Text: The defect edges were debeveled with a #15 scalpel blade.  Given the location of the defect, shape of the defect and the proximity to the alar rim an island pedicle advancement flap was deemed most appropriate.  Using a sterile surgical marker, an appropriate advancement flap was drawn incorporating the defect, outlining the appropriate donor tissue and placing the expected incisions within the nasal ala running parallel to the alar rim. The area thus outlined was incised with a #15 scalpel blade.  The skin margins were undermined minimally to an appropriate distance in all directions around the primary defect and laterally outward around the island pedicle utilizing iris scissors.  There was minimal undermining beneath the pedicle flap.

## 2022-04-26 ENCOUNTER — HOSPITAL ENCOUNTER (OUTPATIENT)
Dept: LAB | Facility: MEDICAL CENTER | Age: 82
End: 2022-04-26
Attending: NURSE PRACTITIONER
Payer: MEDICARE

## 2022-04-26 DIAGNOSIS — Z79.01 CHRONIC ANTICOAGULATION: ICD-10-CM

## 2022-04-26 DIAGNOSIS — I48.0 PAROXYSMAL A-FIB (HCC): ICD-10-CM

## 2022-04-26 LAB
INR PPP: 2.76 (ref 0.87–1.13)
PROTHROMBIN TIME: 28.3 SEC (ref 12–14.6)

## 2022-04-26 PROCEDURE — 36415 COLL VENOUS BLD VENIPUNCTURE: CPT

## 2022-04-26 PROCEDURE — 85610 PROTHROMBIN TIME: CPT

## 2022-04-27 ENCOUNTER — ANTICOAGULATION MONITORING (OUTPATIENT)
Dept: CARDIOLOGY | Facility: MEDICAL CENTER | Age: 82
End: 2022-04-27
Payer: MEDICARE

## 2022-04-27 DIAGNOSIS — Z79.01 CHRONIC ANTICOAGULATION: ICD-10-CM

## 2022-04-27 DIAGNOSIS — I48.0 PAROXYSMAL A-FIB (HCC): ICD-10-CM

## 2022-04-27 NOTE — PROGRESS NOTES
Anticoagulation Summary  As of 2022    INR goal:  2.0-3.0   TTR:  81.8 % (4.3 y)   INR used for dosin.76 (2022)   Warfarin maintenance plan:  2 mg (1 mg x 2) every Tue; 1 mg (1 mg x 1) all other days   Weekly warfarin total:  8 mg   Plan last modified:  Yaneli MackenzieD (3/22/2022)   Next INR check:  2022   Target end date:  Indefinite    Indications    Paroxysmal a-fib (CMS-HCC) [I48.0]  Chronic anticoagulation [Z79.01]             Anticoagulation Episode Summary     INR check location:  Clinic Lab    Preferred lab:  [facility]    Send INR reminders to:      Comments:  [phone] (H)  [facility]      Anticoagulation Care Providers     Provider Role Specialty Phone number    Evelio Tejeda M.D. Referring Internal Medicine [phone]    [facility]ticoagulation [facility] Responsible  [phone]    Beryl Pang M.D. Responsible Family Medicine [phone]        Anticoagulation Patient Findings  Patient Findings     Negatives:  Signs/symptoms of thrombosis, Signs/symptoms of bleeding, Laboratory test error suspected, Change in health, Change in alcohol use, Change in activity, Upcoming invasive procedure, Emergency department visit, Upcoming dental procedure, Missed doses, Extra doses, Change in medications, Change in diet/appetite, Hospital admission, Bruising, Other complaints        Spoke with patient today regarding therapeutic INR of 2.76.  Patient denies any signs/symptoms of bruising or bleeding or any changes in diet and medications.  Instructed patient to call clinic with any questions or concerns.    Pt is not on antiplatelet therapy    Pt is to continue with current warfarin dosing regimen.  Follow up in 4 weeks, to reduce risk of adverse events related to this high risk medication,  Warfarin.    Major Hughes, PharmD, BCACP

## 2022-05-11 DIAGNOSIS — I48.0 PAROXYSMAL A-FIB (HCC): ICD-10-CM

## 2022-05-12 RX ORDER — SOTALOL HYDROCHLORIDE 80 MG/1
TABLET ORAL
Qty: 90 TABLET | Refills: 1 | Status: SHIPPED | OUTPATIENT
Start: 2022-05-12 | End: 2022-12-15 | Stop reason: SDUPTHER

## 2022-05-24 ENCOUNTER — HOSPITAL ENCOUNTER (OUTPATIENT)
Dept: LAB | Facility: MEDICAL CENTER | Age: 82
End: 2022-05-24
Attending: NURSE PRACTITIONER
Payer: MEDICARE

## 2022-05-24 ENCOUNTER — ANTICOAGULATION MONITORING (OUTPATIENT)
Dept: VASCULAR LAB | Facility: MEDICAL CENTER | Age: 82
End: 2022-05-24

## 2022-05-24 ENCOUNTER — TELEPHONE (OUTPATIENT)
Dept: VASCULAR LAB | Facility: MEDICAL CENTER | Age: 82
End: 2022-05-24

## 2022-05-24 DIAGNOSIS — Z79.01 CHRONIC ANTICOAGULATION: ICD-10-CM

## 2022-05-24 DIAGNOSIS — I48.0 PAROXYSMAL A-FIB (HCC): ICD-10-CM

## 2022-05-24 LAB
INR PPP: 2.47 (ref 0.87–1.13)
PROTHROMBIN TIME: 26 SEC (ref 12–14.6)

## 2022-05-24 PROCEDURE — 85610 PROTHROMBIN TIME: CPT

## 2022-05-24 PROCEDURE — 36415 COLL VENOUS BLD VENIPUNCTURE: CPT

## 2022-05-24 NOTE — PROGRESS NOTES
Anticoagulation Summary  As of 2022    INR goal:  2.0-3.0   TTR:  82.0 % (4.4 y)   INR used for dosin.47 (2022)   Warfarin maintenance plan:  2 mg (1 mg x 2) every Tue; 1 mg (1 mg x 1) all other days   Weekly warfarin total:  8 mg   Plan last modified:  Jimbo Dunlap, PharmD (3/22/2022)   Next INR check:  2022   Target end date:  Indefinite    Indications    Paroxysmal a-fib (CMS-HCC) [I48.0]  Chronic anticoagulation [Z79.01]             Anticoagulation Episode Summary     INR check location:  Clinic Lab    Preferred lab:  [facility]    Send INR reminders to:      Comments:  [phone] (H)  [facility]      Anticoagulation Care Providers     Provider Role Specialty Phone number    Evelio Tejeda M.D. Referring Internal Medicine [phone]    [facility] Antico[facility] Responsible  [phone]    Beryl Pang M.D. Responsible Family Medicine [phone]        Anticoagulation Patient Findings  Patient Findings     Negatives:  Signs/symptoms of thrombosis, Signs/symptoms of bleeding, Laboratory test error suspected, Change in health, Change in alcohol use, Change in activity, Upcoming invasive procedure, Emergency department visit, Upcoming dental procedure, Missed doses, Extra doses, Change in medications, Change in diet/appetite, Hospital admission, Bruising, Other complaints           Spoke with patient today regarding therapeutic INR of 2.47.  Patient denies any signs/symptoms of bruising or bleeding or any changes in diet and medications.  Instructed patient to call clinic with any questions or concerns.    Pt is not on antiplatelet therapy     Pt is to continue with current warfarin dosing regimen.    Follow up in 4 weeks, to reduce risk of adverse events related to this high risk medication, Warfarin.    NEEDS A STANDING ORDER RENEWAL    Mina Gonzalez, Pharmacy Intern

## 2022-06-06 NOTE — PROGRESS NOTES
"Subjective:   Chief Complaint:   Chief Complaint   Patient presents with   • Atrial Fibrillation     F/V Dx: Paroxysmal a-fib (CMS-HCC)   • Coronary Artery Disease     F/V Dx: Coronary artery disease involving native coronary artery of native heart without angina pectoris   • Anticoagulation     F/V Dx: Chronic anticoagulation         \"Angie\" Bartolome Root is a 82 y.o. female who returns for CAD/PCI to [facility] times 2/2009, PAD/PCI left leg 2008, ischemic colitis/recurrent C. difficile colitis, nicotine dependence, dyslipidemia, and paroxysmal atrial fibrillation on rhythm control strategy (with sotalol) and now anticoagulated with Warfarin.    In 2009, had afib, ended up with 2 stents.  Having unusual arm pain 2022, normal nuc.  On sotalol, statin, warfarin.    Since PAF at that time, has been on sotalol.  Echo 2018, normal EF, normal size LA, RVSP 45 mmhg.  QTc ok.  Heart rate in the 40s.  I am going to stop her Cardizem.    On warfarin, [id number] of 3, no TIA/CVA.    She had stents for PAD, sees Dr. Olmos.    LDL 62, HDL 80, on lovastatin, was on another statin, does not know which, no prior problems.    BP low, no HTN.    Prior K replacement caused problems.    Probably had rheumatic fever as a child.  Sister had some type of autoimmune vascular problem, mult stents to her legs.    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   No Abx due to CDiff.    Still smoking, since age 15, quit a few times, but smoking again.  Has some degree of COPD, [facility] 2-3 VIDAL.    Followed for Pulm Nodule.        DATA REVIEWED by me:  ECG 2/17/2021  Sinus, 54, RSR prime variant    ECG 5-24-19  Sinus, 56, QTc 449    Echocardiogram, 8/6/2018:  Normal left ventricular systolic function.  Left ventricular ejection fraction is visually estimated to be 60%.   The right ventricle was normal in size and function.  Mild mitral regurgitation.  Mild tricuspid regurgitation.  Estimated right ventricular systolic pressure is 45 " "mmHg.  LA normal size.    Echocardiogram, 6/27/2016:  Normal left ventricular size, thickness, systolic function EF 60%.   Mitral annular calcification.  Aortic sclerosis without stenosis.   Mild tricuspid regurgitation.  Right ventricular systolic pressure is estimated to be 33 mmHg.   No prior study for comparison\"     Echocardiogram, 11/18/2017:  Prior echocardiogram 6/27/2016.Normal left ventricular chamber size.  Left ventricular ejection fraction is visually estimated to be 60%.  Grade II diastolic dysfunction.  Aortic sclerosis without stenosis.  Mitral annular calcification.  Heavy calcification of the posterior mitral valve leaflet.  Mild mitral regurgitation.  Trace tricuspid regurgitation.  Normal pericardium without effusion.  Ascending aorta diameter is 2.4 cm\"    Nuclear stress test 3/31/2022   No evidence of significant jeopardized viable myocardium or prior myocardial    infarction.   Normal left ventricular size, ejection fraction, and wall motion.   ECG INTERPRETATION   Non-diagnostic EKG.     Myocardial perfusion imaging, 8/6/2018:  Normal left ventricular perfusion with no fixed or reversible defects.   Normal left ventricular size, ejection fraction, and wall motion.     CT chest 7-24-19  1.  No change in single small pulmonary nodule in each lower pulmonary lobe since 2017. These nodules appear benign.  2.  No new pulmonary nodules or masses are identified.  3.  Emphysema is again noted.  4.  Limited areas of pulmonary scarring fibrosis and bronchiectasis are again noted.  5.  Calcific atherosclerosis again noted.  6.  Calculi are again identified within each kidney.    US 2-26-21   50-75% stenosis in the proximal right superficial femoral artery.   50-75% stenosis in the proximal left common iliac artery. (velocity is 252    cm/s).     US carotids 2-25-21  1.  There mild-moderate atherosclerotic plaque.  Plaque is located in carotid bulbs and proximal internal carotid arteries.  Plaque " characterization:  Irregular and heterogeneous  2.  There is no evidence of carotid occlusion.  3. Vertebral arteries demonstrate antegrade flow.  4. Diameter reduction in the internal carotid arteries: less than 50%.  5. There are incidental normal-sized right neck lymph nodes with loss of normal fatty hilum. These could be inflammatory or potentially neoplastic.    Most recent labs:     7-16-19 hgb 15, p 185, n 137, k 3.8, cr 0.83, lfts normal     9-24-18 ldl 62, hdl 80, tg 93, tc 161    Past Medical History:   Diagnosis Date   • Arthritis     BL hands   • CAD (coronary artery disease)    • Cancer (HCC) 1982    melanoma   • COPD (chronic obstructive pulmonary disease) (HCC)    • Croup 3 years old   • Diverticulosis    • Dyslipidemia    • GERD (gastroesophageal reflux disease)    • Hiatal hernia    • High cholesterol    • Hypertension    • Infectious disease 2018    C Diff   • Measles     5 years old   • Paroxysmal A-fib (HCC) 1/20/2016    Symptomatic, on a rhythm control strategy with sotalol 40 mg by mouth twice a day.    • Paroxysmal atrial fibrillation (HCC)    • Prediabetes    • PVD (peripheral vascular disease) (HCC)      Past Surgical History:   Procedure Laterality Date   • FECAL TRANSPLANT N/A 4/9/2018    Procedure: FECAL TRANSPLANT;  Surgeon: Gonzalez Cruz M.D.;  Location: ENDOSCOPY [facility];  Service: Gastroenterology   • COLONOSCOPY - ENDO N/A 4/9/2018    Procedure: COLONOSCOPY - ENDO;  Surgeon: Gonzalez Cruz M.D.;  Location: ENDOSCOPY [facility];  Service: Gastroenterology   • WIDE EXCISION MELANOMA, LEG, W/POSS.STSG  10/2015    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   • CARDIAC CATH, RIGHT HEART  2008    stent   • OTHER ORTHOPEDIC SURGERY Left 2008    hand repair   • CHOLECYSTECTOMY  1993   • TONSILLECTOMY  1945   • OTHER CARDIAC SURGERY      r heart cath stents   • STENT PLACEMENT      L iliac stent     Family History   Problem Relation Age of Onset   • Hypertension Mother    •  Hyperlipidemia Mother    • Cancer Maternal Grandmother         tongue   • Cancer Maternal Uncle         x 3, pancreas   • Cancer Maternal Grandfather         unknown   • Cancer Paternal Grandmother         unknown   • Cancer Paternal Grandfather         unknown   • Diabetes Maternal Uncle    • Heart Disease Maternal Uncle    • Hypertension Sister    • Hyperlipidemia Sister    • Heart Disease Sister    • Alcohol/Drug Sister    • Thyroid Sister    • Psychiatric Illness Neg Hx    • Stroke Neg Hx      Social History     Socioeconomic History   • Marital status:      Spouse name: Not on file   • Number of children: 0   • Years of education: Not on file   • Highest education level: Not on file   Occupational History   • Not on file   Tobacco Use   • Smoking status: Current Every Day Smoker     Packs/day: 0.25     Years: 60.00     Pack years: 15.00     Types: Cigarettes     Start date: 3/20/1957   • Smokeless tobacco: Never Used   • Tobacco comment: 4 cigarettes per day   Vaping Use   • Vaping Use: Never used   Substance and Sexual Activity   • Alcohol use: No     Alcohol/week: 0.0 oz   • Drug use: No   • Sexual activity: Not Currently     Partners: Male   Other Topics Concern   • Not on file   Social History Narrative    .     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     Social Determinants of Health     Financial Resource Strain: Not on file   Food Insecurity: Not on file   Transportation Needs: Not on file   Physical Activity: Not on file   Stress: Not on file   Social Connections: Not on file   Intimate Partner Violence: Not on file   Housing Stability: Not on file     Allergies   Allergen Reactions   • Penicillins Anaphylaxis and Hives   • Codeine Swelling   • Iodine Swelling   • Bee Venom Anaphylaxis   • Chantix [Varenicline]      disoriented   • Ciprofloxacin      Causes C diff, recurrent and very difficult   • Flagyl [Metronidazole] Rash     C/O flagyl causes rash.    • Latex Hives   • Lipitor  [Atorvastatin Calcium] Unspecified     Intense Stomach pain   • Shellfish Allergy      unknown   • Tetanus Antitoxin Swelling     unknown       Current Outpatient Medications   Medication Sig Dispense Refill   • Multiple Vitamins-Minerals (ZINC PO) Take  by mouth every day.     • sotalol (BETAPACE) 80 MG Tab TAKE 1/2 TABLET TWICE DAILY (Patient taking differently: Take 40 mg by mouth 2 times a day.) 90 Tablet 1   • ALPRAZolam (XANAX) 0.25 MG Tab Take 1 Tablet by mouth 2 times a day as needed for Anxiety for up to 180 days. 60 Tablet 5   • warfarin (COUMADIN) 1 MG Tab TAKE 1 TABLET DAILY OR AS DIRECTED BY ANTICOAGULATION CLINIC (Patient taking differently: Take 1 mg by mouth every day. TAKE 1 TABLET DAILY OR AS DIRECTED BY ANTICOAGULATION CLINIC  2 Tablets on Tues) 90 Tablet 1   • benzonatate (TESSALON) 100 MG Cap Take 1 Capsule by mouth 3 times a day as needed for Cough. 30 Capsule 0   • lovastatin (MEVACOR) 40 MG tablet Take 1 Tablet by mouth at bedtime. 100 Tablet 3   • albuterol 108 (90 Base) MCG/ACT Aero Soln inhalation aerosol INHALE ONE TO TWO PUFFS BY MOUTH EVERY 4 HOURS AS NEEDED FOR SHORTNESS OF BREATH 18 g 3   • EPINEPHrine (EPIPEN) 0.3 MG/0.3ML Solution Auto-injector solution for injection epinephrine 0.3 mg/0.3 mL injection, auto-injector     • Probiotic Product (PROBIOTIC PO) Take  by mouth.     • ascorbic acid (ASCORBIC ACID) 500 MG Tab Take 1,000 mg by mouth every day.     • ipratropium (ATROVENT) 0.02 % Solution 0.2 mg by Nebulization route 4 times a day.     • lactobacillus rhamnosus (CULTURELLE) Cap capsule Take 1 Cap by mouth every day.     • vitamin D (CHOLECALCIFEROL) 1000 UNIT Tab Take 2,000 Units by mouth every morning. 3000 units       No current facility-administered medications for this visit.       ROS    All others systems reviewed and negative.     Objective:     /52 (BP Location: Left arm, Patient Position: Sitting, BP Cuff Size: Adult)   Pulse (!) 46   Resp 20   Ht 1.588 m (5'  "2.5\")   Wt 41.3 kg (91 lb)   SpO2 98%  Body mass index is 16.38 kg/m².    Physical Exam   General: No acute distress. Well nourished. Appears younger than age.  HEENT: EOM grossly intact, no scleral icterus, no pharyngeal erythema.   Neck:  No JVD, no bruits, trachea midline  CVS: Slow, regular. Normal S1, S2, +S3. No LE edema.  2+ radial pulses, 2+ PT pulses  Resp. Coarse BS bilaterally, prolonged exp phase, Normal respiratory effort.  Abdomen: Soft, NT, no simon hepatomegaly.  MSK/Ext: No clubbing or cyanosis.  Skin: Warm and dry, no rashes.  Neurological: CN III-XII grossly intact. No focal deficits.   Psych: A&O x 3, appropriate affect, good judgement    Physical exam performed today and unchanged, except what is noted, compared to 9-17-19      Assessment:     1. Paroxysmal a-fib (CMS-HCC)     2. Chronic anticoagulation     3. PAD (peripheral artery disease) ([facility])     4. Angina at rest ([facility])     5. Left arm numbness     6. High risk medication use     7. Coronary artery disease involving native coronary artery of native heart without angina pectoris     8. PCI to her LAD in 2009     9. Pure hypercholesterolemia     10. Long term current use of antiarrhythmic drug  EKG    MAGNESIUM    Basic Metabolic Panel       Medical Decision Making:  Today's Assessment / Status / Plan:     -In terms of CAD, continue secondary prevention statin.  On sotalol so no traditional beta-blockers.  On warfarin so no aspirin.  -On warfarin, [id number] of 3, no TIA/CVA.  Doing well on this.  NOACs not affordable.  -Needs Mg and K on sotalol  -ECG for QTc yearly  -Sotalol is a high risk medication requiring surveillance of magnesium, potassium, creatinine and serial EKGs.  -Her heart rate is too slow, stopping diltiazem, sounds like it was given for blood pressure anyway.  -Last LDL cholesterol looked fine.  -Blood pressure looks good.  If blood pressure creeps up after stopping diltiazem then we will add amlodipine 2.5 mg once " daily.  -Shortness of breath most likely related to smoking and some degree of emphysema, nuclear stress test look good  -Return to clinic 6 months    Written instructions given today:    -On sotalol, we need to routinely check her magnesium, potassium level and get EKGs.    -Stop your diltiazem/Cardizem 240 mg.  Your heart rate is too slow.    -If your blood pressure creeps up, we can add back amlodipine 2.5 mg once daily.  This will help with blood pressure without affecting the heart rate.  If you notice your blood pressure is consistently greater than 130/80, either number, let us know and we can give you the amlodipine.    Return in about 6 months (around 12/9/2022).    It is my pleasure to participate in the care of Ms. Root.  Please do not hesitate to contact me with questions or concerns.    Marilou Carmona MD, [facility]  Cardiologist [facility]    Please note that this dictation was created using voice recognition software. I have made every reasonable attempt to correct obvious errors, but it is possible there are errors of grammar and possibly content that I did not discover before finalizing the note.

## 2022-06-09 ENCOUNTER — OFFICE VISIT (OUTPATIENT)
Dept: CARDIOLOGY | Facility: MEDICAL CENTER | Age: 82
End: 2022-06-09
Payer: MEDICARE

## 2022-06-09 ENCOUNTER — HOSPITAL ENCOUNTER (OUTPATIENT)
Dept: LAB | Facility: MEDICAL CENTER | Age: 82
End: 2022-06-09
Attending: INTERNAL MEDICINE
Payer: MEDICARE

## 2022-06-09 VITALS
BODY MASS INDEX: 16.12 KG/M2 | RESPIRATION RATE: 20 BRPM | SYSTOLIC BLOOD PRESSURE: 116 MMHG | WEIGHT: 91 LBS | HEART RATE: 46 BPM | OXYGEN SATURATION: 98 % | HEIGHT: 63 IN | DIASTOLIC BLOOD PRESSURE: 52 MMHG

## 2022-06-09 DIAGNOSIS — I48.0 PAROXYSMAL A-FIB (HCC): ICD-10-CM

## 2022-06-09 DIAGNOSIS — Z79.01 CHRONIC ANTICOAGULATION: ICD-10-CM

## 2022-06-09 DIAGNOSIS — Z79.899 HIGH RISK MEDICATION USE: ICD-10-CM

## 2022-06-09 DIAGNOSIS — I25.10 CORONARY ARTERY DISEASE INVOLVING NATIVE CORONARY ARTERY OF NATIVE HEART WITHOUT ANGINA PECTORIS: ICD-10-CM

## 2022-06-09 DIAGNOSIS — Z79.899 LONG TERM CURRENT USE OF ANTIARRHYTHMIC DRUG: ICD-10-CM

## 2022-06-09 DIAGNOSIS — Z95.5 S/P CORONARY ARTERY STENT PLACEMENT: ICD-10-CM

## 2022-06-09 DIAGNOSIS — I73.9 PAD (PERIPHERAL ARTERY DISEASE) (HCC): ICD-10-CM

## 2022-06-09 DIAGNOSIS — I20.89 ANGINA AT REST (HCC): ICD-10-CM

## 2022-06-09 DIAGNOSIS — R20.0 LEFT ARM NUMBNESS: ICD-10-CM

## 2022-06-09 DIAGNOSIS — E78.00 PURE HYPERCHOLESTEROLEMIA: ICD-10-CM

## 2022-06-09 LAB
ANION GAP SERPL CALC-SCNC: 9 MMOL/L (ref 7–16)
BUN SERPL-MCNC: 25 MG/DL (ref 8–22)
CALCIUM SERPL-MCNC: 10.3 MG/DL (ref 8.5–10.5)
CHLORIDE SERPL-SCNC: 107 MMOL/L (ref 96–112)
CO2 SERPL-SCNC: 27 MMOL/L (ref 20–33)
CREAT SERPL-MCNC: 0.88 MG/DL (ref 0.5–1.4)
EKG IMPRESSION: NORMAL
GFR SERPLBLD CREATININE-BSD FMLA CKD-EPI: 65 ML/MIN/1.73 M 2
GLUCOSE SERPL-MCNC: 110 MG/DL (ref 65–99)
MAGNESIUM SERPL-MCNC: 2.1 MG/DL (ref 1.5–2.5)
POTASSIUM SERPL-SCNC: 4.6 MMOL/L (ref 3.6–5.5)
SODIUM SERPL-SCNC: 143 MMOL/L (ref 135–145)

## 2022-06-09 PROCEDURE — 36415 COLL VENOUS BLD VENIPUNCTURE: CPT

## 2022-06-09 PROCEDURE — 83735 ASSAY OF MAGNESIUM: CPT

## 2022-06-09 PROCEDURE — 93000 ELECTROCARDIOGRAM COMPLETE: CPT | Performed by: INTERNAL MEDICINE

## 2022-06-09 PROCEDURE — 99214 OFFICE O/P EST MOD 30 MIN: CPT | Mod: 25 | Performed by: INTERNAL MEDICINE

## 2022-06-09 PROCEDURE — 80048 BASIC METABOLIC PNL TOTAL CA: CPT

## 2022-06-09 ASSESSMENT — FIBROSIS 4 INDEX: FIB4 SCORE: 2.53

## 2022-06-09 NOTE — PATIENT INSTRUCTIONS
-On sotalol, we need to routinely check her magnesium, potassium level and get EKGs.    -Stop your diltiazem/Cardizem 240 mg.  Your heart rate is too slow.    -If your blood pressure creeps up, we can add back amlodipine 2.5 mg once daily.  This will help with blood pressure without affecting the heart rate.  If you notice your blood pressure is consistently greater than 130/80, either number, let us know and we can give you the amlodipine.

## 2022-06-09 NOTE — LETTER
"     [facility]   [address], [address]  [place] [address]  Phone: [phone]  Fax: [phone]              Angie Root  1940    Encounter Date: 6/9/2022    Marilou Carmona M.D.          PROGRESS NOTE:  Subjective:   Chief Complaint:   Chief Complaint   Patient presents with   • Atrial Fibrillation     F/V Dx: Paroxysmal a-fib (CMS-HCC)   • Coronary Artery Disease     F/V Dx: Coronary artery disease involving native coronary artery of native heart without angina pectoris   • Anticoagulation     F/V Dx: Chronic anticoagulation         \"Angie\" Bartolome Root is a 82 y.o. female who returns for CAD/PCI to [facility] times 2/2009, PAD/PCI left leg 2008, ischemic colitis/recurrent C. difficile colitis, nicotine dependence, dyslipidemia, and paroxysmal atrial fibrillation on rhythm control strategy (with sotalol) and now anticoagulated with Warfarin.    In 2009, had afib, ended up with 2 stents.  Having unusual arm pain 2022, normal nuc.  On sotalol, statin, warfarin.    Since PAF at that time, has been on sotalol.  Echo 2018, normal EF, normal size LA, RVSP 45 mmhg.  QTc ok.  Heart rate in the 40s.  I am going to stop her Cardizem.    On warfarin, [id number] of 3, no TIA/CVA.    She had stents for PAD, sees Dr. Olmos.    LDL 62, HDL 80, on lovastatin, was on another statin, does not know which, no prior problems.    BP low, no HTN.    Prior K replacement caused problems.    Probably had rheumatic fever as a child.  Sister had some type of autoimmune vascular problem, mult stents to her legs.    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   No Abx due to CDiff.    Still smoking, since age 15, quit a few times, but smoking again.  Has some degree of COPD, [facility] 2-3 VIDAL.    Followed for Pulm Nodule.        DATA REVIEWED by me:  ECG 2/17/2021  Sinus, 54, RSR prime variant    ECG 5-24-19  Sinus, 56, QTc 449    Echocardiogram, 8/6/2018:  Normal left " "ventricular systolic function.  Left ventricular ejection fraction is visually estimated to be 60%.   The right ventricle was normal in size and function.  Mild mitral regurgitation.  Mild tricuspid regurgitation.  Estimated right ventricular systolic pressure is 45 mmHg.  LA normal size.    Echocardiogram, 6/27/2016:  Normal left ventricular size, thickness, systolic function EF 60%.   Mitral annular calcification.  Aortic sclerosis without stenosis.   Mild tricuspid regurgitation.  Right ventricular systolic pressure is estimated to be 33 mmHg.   No prior study for comparison\"     Echocardiogram, 11/18/2017:  Prior echocardiogram 6/27/2016.Normal left ventricular chamber size.  Left ventricular ejection fraction is visually estimated to be 60%.  Grade II diastolic dysfunction.  Aortic sclerosis without stenosis.  Mitral annular calcification.  Heavy calcification of the posterior mitral valve leaflet.  Mild mitral regurgitation.  Trace tricuspid regurgitation.  Normal pericardium without effusion.  Ascending aorta diameter is 2.4 cm\"    Nuclear stress test 3/31/2022   No evidence of significant jeopardized viable myocardium or prior myocardial    infarction.   Normal left ventricular size, ejection fraction, and wall motion.   ECG INTERPRETATION   Non-diagnostic EKG.     Myocardial perfusion imaging, 8/6/2018:  Normal left ventricular perfusion with no fixed or reversible defects.   Normal left ventricular size, ejection fraction, and wall motion.     CT chest 7-24-19  1.  No change in single small pulmonary nodule in each lower pulmonary lobe since 2017. These nodules appear benign.  2.  No new pulmonary nodules or masses are identified.  3.  Emphysema is again noted.  4.  Limited areas of pulmonary scarring fibrosis and bronchiectasis are again noted.  5.  Calcific atherosclerosis again noted.  6.  Calculi are again identified within each kidney.    US 2-26-21   50-75% stenosis in the proximal right superficial " femoral artery.   50-75% stenosis in the proximal left common iliac artery. (velocity is 252    cm/s).     US carotids 2-25-21  1.  There mild-moderate atherosclerotic plaque.  Plaque is located in carotid bulbs and proximal internal carotid arteries.  Plaque characterization:  Irregular and heterogeneous  2.  There is no evidence of carotid occlusion.  3. Vertebral arteries demonstrate antegrade flow.  4. Diameter reduction in the internal carotid arteries: less than 50%.  5. There are incidental normal-sized right neck lymph nodes with loss of normal fatty hilum. These could be inflammatory or potentially neoplastic.    Most recent labs:     7-16-19 hgb 15, p 185, n 137, k 3.8, cr 0.83, lfts normal     9-24-18 ldl 62, hdl 80, tg 93, tc 161    Past Medical History:   Diagnosis Date   • Arthritis     BL hands   • CAD (coronary artery disease)    • Cancer (HCC) 1982    melanoma   • COPD (chronic obstructive pulmonary disease) (HCC)    • Croup 3 years old   • Diverticulosis    • Dyslipidemia    • GERD (gastroesophageal reflux disease)    • Hiatal hernia    • High cholesterol    • Hypertension    • Infectious disease 2018    C Diff   • Measles     5 years old   • Paroxysmal A-fib (HCC) 1/20/2016    Symptomatic, on a rhythm control strategy with sotalol 40 mg by mouth twice a day.    • Paroxysmal atrial fibrillation (HCC)    • Prediabetes    • PVD (peripheral vascular disease) (HCC)      Past Surgical History:   Procedure Laterality Date   • FECAL TRANSPLANT N/A 4/9/2018    Procedure: FECAL TRANSPLANT;  Surgeon: Gonzalez Cruz M.D.;  Location: ENDOSCOPY [facility];  Service: Gastroenterology   • COLONOSCOPY - ENDO N/A 4/9/2018    Procedure: COLONOSCOPY - ENDO;  Surgeon: Gonzalez Cruz M.D.;  Location: ENDOSCOPY [facility];  Service: Gastroenterology   • WIDE EXCISION MELANOMA, LEG, W/POSS.STSG  10/2015    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   • CARDIAC CATH, RIGHT HEART  2008    stent   • OTHER  ORTHOPEDIC SURGERY Left 2008    hand repair   • CHOLECYSTECTOMY  1993   • TONSILLECTOMY  1945   • OTHER CARDIAC SURGERY      r heart cath stents   • STENT PLACEMENT      L iliac stent     Family History   Problem Relation Age of Onset   • Hypertension Mother    • Hyperlipidemia Mother    • Cancer Maternal Grandmother         tongue   • Cancer Maternal Uncle         x 3, pancreas   • Cancer Maternal Grandfather         unknown   • Cancer Paternal Grandmother         unknown   • Cancer Paternal Grandfather         unknown   • Diabetes Maternal Uncle    • Heart Disease Maternal Uncle    • Hypertension Sister    • Hyperlipidemia Sister    • Heart Disease Sister    • Alcohol/Drug Sister    • Thyroid Sister    • Psychiatric Illness Neg Hx    • Stroke Neg Hx      Social History     Socioeconomic History   • Marital status:      Spouse name: Not on file   • Number of children: 0   • Years of education: Not on file   • Highest education level: Not on file   Occupational History   • Not on file   Tobacco Use   • Smoking status: Current Every Day Smoker     Packs/day: 0.25     Years: 60.00     Pack years: 15.00     Types: Cigarettes     Start date: 3/20/1957   • Smokeless tobacco: Never Used   • Tobacco comment: 4 cigarettes per day   Vaping Use   • Vaping Use: Never used   Substance and Sexual Activity   • Alcohol use: No     Alcohol/week: 0.0 oz   • Drug use: No   • Sexual activity: Not Currently     Partners: Male   Other Topics Concern   • Not on file   Social History Narrative    .     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     Social Determinants of Health     Financial Resource Strain: Not on file   Food Insecurity: Not on file   Transportation Needs: Not on file   Physical Activity: Not on file   Stress: Not on file   Social Connections: Not on file   Intimate Partner Violence: Not on file   Housing Stability: Not on file     Allergies   Allergen Reactions   • Penicillins Anaphylaxis and Hives   •  Codeine Swelling   • Iodine Swelling   • Bee Venom Anaphylaxis   • Chantix [Varenicline]      disoriented   • Ciprofloxacin      Causes C diff, recurrent and very difficult   • Flagyl [Metronidazole] Rash     C/O flagyl causes rash.    • Latex Hives   • Lipitor [Atorvastatin Calcium] Unspecified     Intense Stomach pain   • Shellfish Allergy      unknown   • Tetanus Antitoxin Swelling     unknown       Current Outpatient Medications   Medication Sig Dispense Refill   • Multiple Vitamins-Minerals (ZINC PO) Take  by mouth every day.     • sotalol (BETAPACE) 80 MG Tab TAKE 1/2 TABLET TWICE DAILY (Patient taking differently: Take 40 mg by mouth 2 times a day.) 90 Tablet 1   • ALPRAZolam (XANAX) 0.25 MG Tab Take 1 Tablet by mouth 2 times a day as needed for Anxiety for up to 180 days. 60 Tablet 5   • warfarin (COUMADIN) 1 MG Tab TAKE 1 TABLET DAILY OR AS DIRECTED BY ANTICOAGULATION CLINIC (Patient taking differently: Take 1 mg by mouth every day. TAKE 1 TABLET DAILY OR AS DIRECTED BY ANTICOAGULATION CLINIC  2 Tablets on Tues) 90 Tablet 1   • benzonatate (TESSALON) 100 MG Cap Take 1 Capsule by mouth 3 times a day as needed for Cough. 30 Capsule 0   • lovastatin (MEVACOR) 40 MG tablet Take 1 Tablet by mouth at bedtime. 100 Tablet 3   • albuterol 108 (90 Base) MCG/ACT Aero Soln inhalation aerosol INHALE ONE TO TWO PUFFS BY MOUTH EVERY 4 HOURS AS NEEDED FOR SHORTNESS OF BREATH 18 g 3   • EPINEPHrine (EPIPEN) 0.3 MG/0.3ML Solution Auto-injector solution for injection epinephrine 0.3 mg/0.3 mL injection, auto-injector     • Probiotic Product (PROBIOTIC PO) Take  by mouth.     • ascorbic acid (ASCORBIC ACID) 500 MG Tab Take 1,000 mg by mouth every day.     • ipratropium (ATROVENT) 0.02 % Solution 0.2 mg by Nebulization route 4 times a day.     • lactobacillus rhamnosus (CULTURELLE) Cap capsule Take 1 Cap by mouth every day.     • vitamin D (CHOLECALCIFEROL) 1000 UNIT Tab Take 2,000 Units by mouth every morning. 3000 units    "    No current facility-administered medications for this visit.       ROS    All others systems reviewed and negative.     Objective:     /52 (BP Location: Left arm, Patient Position: Sitting, BP Cuff Size: Adult)   Pulse (!) 46   Resp 20   Ht 1.588 m (5' 2.5\")   Wt 41.3 kg (91 lb)   SpO2 98%  Body mass index is 16.38 kg/m².    Physical Exam   General: No acute distress. Well nourished. Appears younger than age.  HEENT: EOM grossly intact, no scleral icterus, no pharyngeal erythema.   Neck:  No JVD, no bruits, trachea midline  CVS: Slow, regular. Normal S1, S2, +S3. No LE edema.  2+ radial pulses, 2+ PT pulses  Resp. Coarse BS bilaterally, prolonged exp phase, Normal respiratory effort.  Abdomen: Soft, NT, no simon hepatomegaly.  MSK/Ext: No clubbing or cyanosis.  Skin: Warm and dry, no rashes.  Neurological: CN III-XII grossly intact. No focal deficits.   Psych: A&O x 3, appropriate affect, good judgement    Physical exam performed today and unchanged, except what is noted, compared to 9-17-19      Assessment:     1. Paroxysmal a-fib (CMS-HCC)     2. Chronic anticoagulation     3. PAD (peripheral artery disease) (HCC)     4. Angina at rest (HCC)     5. Left arm numbness     6. High risk medication use     7. Coronary artery disease involving native coronary artery of native heart without angina pectoris     8. PCI to her LAD in 2009     9. Pure hypercholesterolemia     10. Long term current use of antiarrhythmic drug  EKG    MAGNESIUM    Basic Metabolic Panel       Medical Decision Making:  Today's Assessment / Status / Plan:     -In terms of CAD, continue secondary prevention statin.  On sotalol so no traditional beta-blockers.  On warfarin so no aspirin.  -On warfarin, [id number] of 3, no TIA/CVA.  Doing well on this.  NOACs not affordable.  -Needs Mg and K on sotalol  -ECG for QTc yearly  -Sotalol is a high risk medication requiring surveillance of magnesium, potassium, creatinine and serial " EKGs.  -Her heart rate is too slow, stopping diltiazem, sounds like it was given for blood pressure anyway.  -Last LDL cholesterol looked fine.  -Blood pressure looks good.  If blood pressure creeps up after stopping diltiazem then we will add amlodipine 2.5 mg once daily.  -Shortness of breath most likely related to smoking and some degree of emphysema, nuclear stress test look good  -Return to clinic 6 months    Written instructions given today:    -On sotalol, we need to routinely check her magnesium, potassium level and get EKGs.    -Stop your diltiazem/Cardizem 240 mg.  Your heart rate is too slow.    -If your blood pressure creeps up, we can add back amlodipine 2.5 mg once daily.  This will help with blood pressure without affecting the heart rate.  If you notice your blood pressure is consistently greater than 130/80, either number, let us know and we can give you the amlodipine.    Return in about 6 months (around 12/9/2022).    It is my pleasure to participate in the care of Ms. Root.  Please do not hesitate to contact me with questions or concerns.    Marilou Carmona MD, [facility]  Cardiologist [facility]    Please note that this dictation was created using voice recognition software. I have made every reasonable attempt to correct obvious errors, but it is possible there are errors of grammar and possibly content that I did not discover before finalizing the note.          No Recipients

## 2022-06-10 ENCOUNTER — PATIENT MESSAGE (OUTPATIENT)
Dept: CARDIOLOGY | Facility: MEDICAL CENTER | Age: 82
End: 2022-06-10
Payer: MEDICARE

## 2022-06-15 ENCOUNTER — PATIENT MESSAGE (OUTPATIENT)
Dept: CARDIOLOGY | Facility: MEDICAL CENTER | Age: 82
End: 2022-06-15
Payer: MEDICARE

## 2022-06-16 NOTE — TELEPHONE ENCOUNTER
S/W pt, she states she still feels overly tired, no better since stopping diltiazem on 6/9, pulses consistently 60's-70's and bp's as noted below. She has headache, congestion, runny nose, sinus pressure and teeth pain going on for a few weeks and is wondering if this is related to her feeling tired. She will reach out to PCP for guidance on this and let us know what they say. She will continue to monitor bp and hr and report back. Nothing further needed at this time.

## 2022-06-21 ENCOUNTER — TELEPHONE (OUTPATIENT)
Dept: MEDICAL GROUP | Facility: CLINIC | Age: 82
End: 2022-06-21

## 2022-06-21 ENCOUNTER — HOSPITAL ENCOUNTER (OUTPATIENT)
Dept: LAB | Facility: MEDICAL CENTER | Age: 82
End: 2022-06-21
Attending: NURSE PRACTITIONER
Payer: MEDICARE

## 2022-06-21 ENCOUNTER — NON-PROVIDER VISIT (OUTPATIENT)
Dept: MEDICAL GROUP | Facility: PHYSICIAN GROUP | Age: 82
End: 2022-06-21
Payer: MEDICARE

## 2022-06-21 ENCOUNTER — ANTICOAGULATION MONITORING (OUTPATIENT)
Dept: MEDICAL GROUP | Facility: PHYSICIAN GROUP | Age: 82
End: 2022-06-21

## 2022-06-21 VITALS — DIASTOLIC BLOOD PRESSURE: 60 MMHG | SYSTOLIC BLOOD PRESSURE: 126 MMHG

## 2022-06-21 DIAGNOSIS — J43.9 PULMONARY EMPHYSEMA, UNSPECIFIED EMPHYSEMA TYPE (HCC): ICD-10-CM

## 2022-06-21 DIAGNOSIS — Z79.01 CHRONIC ANTICOAGULATION: ICD-10-CM

## 2022-06-21 DIAGNOSIS — I48.0 PAROXYSMAL A-FIB (HCC): ICD-10-CM

## 2022-06-21 LAB
INR PPP: 2.22 (ref 0.87–1.13)
PROTHROMBIN TIME: 24 SEC (ref 12–14.6)

## 2022-06-21 PROCEDURE — 36415 COLL VENOUS BLD VENIPUNCTURE: CPT

## 2022-06-21 PROCEDURE — 85610 PROTHROMBIN TIME: CPT

## 2022-06-21 NOTE — PROGRESS NOTES
Angie Root is a 82 y.o. female here for a non-provider visit for bp check    Vitals:    06/21/22 1001   BP: 126/60   BP Location: Left arm   Patient Position: Sitting   BP Cuff Size: Child     If abnormal, was the Registered Nurse (office provider if RN is unavailable) notified today? Not Indicated    Routed to PCP? Yes    Patients cuff BP was 133/67

## 2022-06-21 NOTE — PROGRESS NOTES
OP   Telephone Anticoagulation Service Note      Anticoagulation Summary  As of 2022    INR goal:  2.0-3.0   TTR:  82.3 % (4.5 y)   INR used for dosin.22 (2022)   Warfarin maintenance plan:  2 mg (1 mg x 2) every Tue; 1 mg (1 mg x 1) all other days   Weekly warfarin total:  8 mg   Plan last modified:  Jimbo Dunlap PharmD (3/22/2022)   Next INR check:  2022   Target end date:  Indefinite    Indications    Paroxysmal a-fib (CMS-HCC) [I48.0]  Chronic anticoagulation [Z79.01]             Anticoagulation Episode Summary     INR check location:  Clinic Lab    Preferred lab:  [facility]    Send INR reminders to:      Comments:  [phone] (H)  [facility]      Anticoagulation Care Providers     Provider Role Specialty Phone number    Evelio Tejeda M.D. Referring Internal Medicine [phone]    [facility] Anticoagulation Services Responsible  [phone]    Beryl Pang M.D. Responsible Family Medicine [phone]        Anticoagulation Patient Findings  Patient Findings     Positives:  Change in medications (pt d/c'd diltiazem)    Negatives:  Signs/symptoms of thrombosis, Signs/symptoms of bleeding, Laboratory test error suspected, Change in health, Change in alcohol use, Change in activity, Upcoming invasive procedure, Emergency department visit, Upcoming dental procedure, Missed doses, Extra doses, Change in diet/appetite, Hospital admission, Bruising, Other complaints        Spoke with the patient on the phone today, reporting a therapeutic INR of 2.22.   Confirmed the current warfarin dosing regimen and patient compliance.  Patient denies any interval changes to diet and/or medications. Patient denies any signs/symptoms of bleeding or clotting.  Patient instructed to continue with the current warfarin dosing regimen, and asked to follow up again in 4 weeks.     Celeste GrovesD

## 2022-06-21 NOTE — TELEPHONE ENCOUNTER
Caller Name: Angie Root    Call Back Number: [phone] (home)       Angie called and would like a refill on her albuterol. I did not see this on her med list. She would like this sent to [facility] pharmacy. Thank you!

## 2022-06-22 RX ORDER — ALBUTEROL SULFATE 90 UG/1
1-2 AEROSOL, METERED RESPIRATORY (INHALATION) EVERY 4 HOURS PRN
Qty: 18 G | Refills: 3 | Status: SHIPPED | OUTPATIENT
Start: 2022-06-22 | End: 2023-03-15 | Stop reason: SDUPTHER

## 2022-06-22 RX ORDER — ALBUTEROL SULFATE 90 UG/1
1-2 AEROSOL, METERED RESPIRATORY (INHALATION) EVERY 4 HOURS PRN
Qty: 18 G | Refills: 3 | Status: SHIPPED | OUTPATIENT
Start: 2022-06-22 | End: 2022-06-22

## 2022-07-11 ENCOUNTER — PATIENT MESSAGE (OUTPATIENT)
Dept: CARDIOLOGY | Facility: MEDICAL CENTER | Age: 82
End: 2022-07-11
Payer: MEDICARE

## 2022-07-18 DIAGNOSIS — Z79.01 CHRONIC ANTICOAGULATION: ICD-10-CM

## 2022-07-18 RX ORDER — WARFARIN SODIUM 1 MG/1
TABLET ORAL
Qty: 180 TABLET | Refills: 1 | Status: SHIPPED | OUTPATIENT
Start: 2022-07-18 | End: 2023-03-06

## 2022-07-19 ENCOUNTER — ANTICOAGULATION MONITORING (OUTPATIENT)
Dept: VASCULAR LAB | Facility: MEDICAL CENTER | Age: 82
End: 2022-07-19

## 2022-07-19 ENCOUNTER — HOSPITAL ENCOUNTER (OUTPATIENT)
Dept: LAB | Facility: MEDICAL CENTER | Age: 82
End: 2022-07-19
Attending: NURSE PRACTITIONER
Payer: MEDICARE

## 2022-07-19 DIAGNOSIS — Z79.01 CHRONIC ANTICOAGULATION: ICD-10-CM

## 2022-07-19 DIAGNOSIS — I48.0 PAROXYSMAL A-FIB (HCC): ICD-10-CM

## 2022-07-19 LAB
INR PPP: 2.48 (ref 0.87–1.13)
PROTHROMBIN TIME: 26 SEC (ref 12–14.6)

## 2022-07-19 PROCEDURE — 36415 COLL VENOUS BLD VENIPUNCTURE: CPT

## 2022-07-19 PROCEDURE — 85610 PROTHROMBIN TIME: CPT

## 2022-07-19 NOTE — PROGRESS NOTES
Anticoagulation Summary  As of 2022    INR goal:  2.0-3.0   TTR:  82.6 % (4.6 y)   INR used for dosin.48 (2022)   Warfarin maintenance plan:  2 mg (1 mg x 2) every Tue; 1 mg (1 mg x 1) all other days   Weekly warfarin total:  8 mg   Plan last modified:  Jimbo Dunlap, PharmD (3/22/2022)   Next INR check:  2022   Target end date:  Indefinite    Indications    Paroxysmal a-fib (CMS-HCC) [I48.0]  Chronic anticoagulation [Z79.01]             Anticoagulation Episode Summary     INR check location:  Clinic Lab    Preferred lab:  [facility]    Send INR reminders to:      Comments:  [phone] (H)  [facility]      Anticoagulation Care Providers     Provider Role Specialty Phone number    Evelio Tejeda M.D. Referring Internal Medicine [phone]    [facility] Anticoagulation Services Responsible  [phone]    Beryl Pang M.D. Responsible Family Medicine [phone]          Refer to Anticoagulation Patient Findings for HPI  Patient Findings     Negatives:  Signs/symptoms of thrombosis, Signs/symptoms of bleeding, Laboratory test error suspected, Change in health, Change in alcohol use, Change in activity, Upcoming invasive procedure, Emergency department visit, Upcoming dental procedure, Missed doses, Extra doses, Change in medications, Change in diet/appetite, Hospital admission, Bruising, Other complaints        Spoke with patient to report a therapeutic INR.      Pt is NOT on antiplatelet therapy.    Pt instructed to continue with current warfarin dosing regimen, confirms dosing.     Will follow up in 4 week(s). Pt prefers to continue testing every 4 weeks.     Danika Clifton, PhT

## 2022-07-25 ENCOUNTER — OFFICE VISIT (OUTPATIENT)
Dept: MEDICAL GROUP | Facility: CLINIC | Age: 82
End: 2022-07-25
Payer: MEDICARE

## 2022-07-25 ENCOUNTER — APPOINTMENT (OUTPATIENT)
Dept: RADIOLOGY | Facility: CLINIC | Age: 82
End: 2022-07-25
Attending: FAMILY MEDICINE
Payer: MEDICARE

## 2022-07-25 ENCOUNTER — PATIENT MESSAGE (OUTPATIENT)
Dept: MEDICAL GROUP | Facility: CLINIC | Age: 82
End: 2022-07-25

## 2022-07-25 VITALS
HEART RATE: 72 BPM | BODY MASS INDEX: 15.41 KG/M2 | RESPIRATION RATE: 16 BRPM | WEIGHT: 87 LBS | OXYGEN SATURATION: 96 % | HEIGHT: 63 IN | SYSTOLIC BLOOD PRESSURE: 98 MMHG | DIASTOLIC BLOOD PRESSURE: 60 MMHG

## 2022-07-25 DIAGNOSIS — R63.4 ABNORMAL WEIGHT LOSS: ICD-10-CM

## 2022-07-25 DIAGNOSIS — F17.219 CIGARETTE NICOTINE DEPENDENCE WITH NICOTINE-INDUCED DISORDER: ICD-10-CM

## 2022-07-25 DIAGNOSIS — R06.02 SHORTNESS OF BREATH: ICD-10-CM

## 2022-07-25 DIAGNOSIS — R53.82 CHRONIC FATIGUE: ICD-10-CM

## 2022-07-25 DIAGNOSIS — R68.89 EXERCISE INTOLERANCE: ICD-10-CM

## 2022-07-25 PROBLEM — R20.2 PARESTHESIA OF UPPER EXTREMITY: Status: RESOLVED | Noted: 2021-02-04 | Resolved: 2022-07-25

## 2022-07-25 PROBLEM — I95.9 HYPOTENSION: Status: RESOLVED | Noted: 2017-12-04 | Resolved: 2022-07-25

## 2022-07-25 PROBLEM — Z87.442 HISTORY OF RENAL CALCULI: Status: RESOLVED | Noted: 2021-09-23 | Resolved: 2022-07-25

## 2022-07-25 PROBLEM — M25.551 PAIN OF RIGHT HIP JOINT: Status: RESOLVED | Noted: 2021-09-16 | Resolved: 2022-07-25

## 2022-07-25 PROCEDURE — 99214 OFFICE O/P EST MOD 30 MIN: CPT | Performed by: FAMILY MEDICINE

## 2022-07-25 PROCEDURE — 71046 X-RAY EXAM CHEST 2 VIEWS: CPT | Mod: TC | Performed by: FAMILY MEDICINE

## 2022-07-25 ASSESSMENT — FIBROSIS 4 INDEX: FIB4 SCORE: 2.53

## 2022-07-25 NOTE — ASSESSMENT & PLAN NOTE
Has lost several more pounds. Using Boost supplements daily. More short of breath and exercise tolerance is reduced. Anorexia. Albuterol not helping much. At high risk for lung cancer, will get CT chest.

## 2022-07-25 NOTE — PROGRESS NOTES
CC:Diagnoses of Abnormal weight loss, Cigarette nicotine dependence with nicotine-induced disorder, and Shortness of breath were pertinent to this visit.      HISTORY OF PRESENT ILLNESS: Patient is a 82 y.o. female established patient who presents today to discuss weight loss, anorexia, shortness of breath and poor exercise tolerance.         Abnormal weight loss  Has lost several more pounds. Using Boost supplements daily. More short of breath and exercise tolerance is reduced. Anorexia. Albuterol not helping much. At high risk for lung cancer, will get CT chest.    Cigarette nicotine dependence with nicotine-induced disorder  Still smoking. Will get CT chest due to anorexia, weight loss, increased SOB. CXR in office is normal.     Exercise intolerance  Notes increased fatigue after light exertion. Used to be able to clean her house in 5 hrs, now takes her 3 days. Feels pooped. More winded than usual. Lung exam is clear and diminished today. No f/c or cough worse than baseline. No peripheral edema. Pulse normal and regular. BP 98/60. Followed by cardiology, on sotalol for rate control for a fib.   She is overdue for CT chest for lung cancer screening. Will order diagnostic chest CT due to anorexia, weight loss, dyspnea, exertion intolerance.       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   • Depression with anxiety 02/15/2017   • Vitamin D deficiency  07/26/2016   • Insomnia 07/26/2016   • Ankle swelling 05/23/2016   • Peripheral arterial disease (HCC) 03/01/2016   • Cigarette nicotine dependence with nicotine-induced disorder 03/01/2016   • Gastroesophageal reflux disease without esophagitis 01/20/2016   • Hiatal hernia 01/20/2016   • Paroxysmal a-fib (CMS-HCC) 01/20/2016   • Pre-diabetes 01/20/2016   • Panic attacks 01/20/2016        Allergies:Penicillins, Codeine, Iodine, Bee venom, Chantix [varenicline], Ciprofloxacin, Flagyl [metronidazole], Latex, Lipitor [atorvastatin calcium], Shellfish allergy, and Tetanus antitoxin    Current Outpatient Medications   Medication Sig Dispense Refill   • warfarin (COUMADIN) 1 MG Tab Take one to two tablets by mouth daily or as directed by anticoagulation clinic 180 Tablet 1   • albuterol 108 (90 Base) MCG/ACT Aero Soln inhalation aerosol Inhale 1-2 Puffs every four hours as needed for Shortness of Breath. 18 g 3   • Multiple Vitamins-Minerals (ZINC PO) Take  by mouth every day.     • sotalol (BETAPACE) 80 MG Tab TAKE 1/2 TABLET TWICE DAILY 90 Tablet 1   • ALPRAZolam (XANAX) 0.25 MG Tab Take 1 Tablet by mouth 2 times a day as needed for Anxiety for up to 180 days. 60 Tablet 5   • benzonatate (TESSALON) 100 MG Cap Take 1 Capsule by mouth 3 times a day as needed for Cough. 30 Capsule 0   • lovastatin (MEVACOR) 40 MG tablet Take 1 Tablet by mouth at bedtime. 100 Tablet 3   • EPINEPHrine (EPIPEN) 0.3 MG/0.3ML Solution Auto-injector solution for injection epinephrine 0.3 mg/0.3 mL injection, auto-injector     • Probiotic Product (PROBIOTIC PO) Take  by mouth.     • ascorbic acid (ASCORBIC ACID) 500 MG Tab Take 1,000 mg by mouth every day.     • ipratropium (ATROVENT) 0.02 % Solution 0.2 mg by Nebulization route 4 times a day.     • lactobacillus rhamnosus (CULTURELLE) Cap capsule Take 1 Cap by mouth every day.     • vitamin D (CHOLECALCIFEROL) 1000 UNIT Tab Take 2,000 Units by mouth every morning. 3000 units       No current  "facility-administered medications for this visit.       Social History     Tobacco Use   • Smoking status: Current Every Day Smoker     Packs/day: 0.25     Years: 60.00     Pack years: 15.00     Types: Cigarettes     Start date: 3/20/1958   • Smokeless tobacco: Never Used   • Tobacco comment: 4 cigarettes per day   Vaping Use   • Vaping Use: Never used   Substance Use Topics   • Alcohol use: No     Alcohol/week: 0.0 oz   • Drug use: No     Social History     Social History Narrative    .     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       Family History   Problem Relation Age of Onset   • Hypertension Mother    • Hyperlipidemia Mother    • Cancer Maternal Grandmother         tongue   • Cancer Maternal Uncle         x 3, pancreas   • Cancer Maternal Grandfather         unknown   • Cancer Paternal Grandmother         unknown   • Cancer Paternal Grandfather         unknown   • Diabetes Maternal Uncle    • Heart Disease Maternal Uncle    • Hypertension Sister    • Hyperlipidemia Sister    • Heart Disease Sister    • Alcohol/Drug Sister    • Thyroid Sister    • Psychiatric Illness Neg Hx    • Stroke Neg Hx        Exam:    BP (!) 98/60 (BP Location: Left arm, Patient Position: Sitting, BP Cuff Size: Adult)   Pulse 72   Resp 16   Ht 1.588 m (5' 2.5\")   Wt 39.5 kg (87 lb)   SpO2 96%  Body mass index is 15.66 kg/m².    General:  Thin/cachetic, well groomed female in NAD  HENT: Atraumatic, normocephalic  EYES: Extraocular movements intact, pupils equal and reactive to light  NECK: Supple, FROM  CHEST: No deformities, Equal chest expansion  RESP: Unlabored, no stridor or audible wheeze  ABD: Non-Distended  Extremities: No Clubbing, Cyanosis, or Edema  Skin: Warm/dry, without rashes  Neuro: A/O x 4, CN 2-12 Grossly intact, Motor/sensory grossly intact  Psych: Normal behavior, normal affect      LABS: Results reviewed and discussed with the patient, questions answered.        Return in about 3 months (around " 10/25/2022).    My total time spent caring for the patient on the day of the encounter was 30 minutes.   This does not include time spent on separately billable procedures/tests.

## 2022-07-25 NOTE — ASSESSMENT & PLAN NOTE
Still smoking. Will get CT chest due to anorexia, weight loss, increased SOB. CXR in office is normal.

## 2022-07-25 NOTE — ASSESSMENT & PLAN NOTE
Notes increased fatigue after light exertion. Used to be able to clean her house in 5 hrs, now takes her 3 days. Feels pooped. More winded than usual. Lung exam is clear and diminished today. No f/c or cough worse than baseline. No peripheral edema. Pulse normal and regular. BP 98/60. Followed by cardiology, on sotalol for rate control for a fib.   She is overdue for CT chest for lung cancer screening. Will order diagnostic chest CT due to anorexia, weight loss, dyspnea, exertion intolerance.

## 2022-07-26 ENCOUNTER — HOSPITAL ENCOUNTER (OUTPATIENT)
Dept: LAB | Facility: MEDICAL CENTER | Age: 82
End: 2022-07-26
Attending: FAMILY MEDICINE
Payer: MEDICARE

## 2022-07-26 DIAGNOSIS — R63.4 ABNORMAL WEIGHT LOSS: ICD-10-CM

## 2022-07-26 DIAGNOSIS — R53.82 CHRONIC FATIGUE: ICD-10-CM

## 2022-07-26 LAB
ALBUMIN SERPL BCP-MCNC: 3.8 G/DL (ref 3.2–4.9)
ALBUMIN/GLOB SERPL: 1.4 G/DL
ALP SERPL-CCNC: 72 U/L (ref 30–99)
ALT SERPL-CCNC: 17 U/L (ref 2–50)
ANION GAP SERPL CALC-SCNC: 6 MMOL/L (ref 7–16)
AST SERPL-CCNC: 29 U/L (ref 12–45)
BASOPHILS # BLD AUTO: 1.1 % (ref 0–1.8)
BASOPHILS # BLD: 0.09 K/UL (ref 0–0.12)
BILIRUB SERPL-MCNC: 0.4 MG/DL (ref 0.1–1.5)
BUN SERPL-MCNC: 15 MG/DL (ref 8–22)
CALCIUM SERPL-MCNC: 10.3 MG/DL (ref 8.5–10.5)
CHLORIDE SERPL-SCNC: 105 MMOL/L (ref 96–112)
CO2 SERPL-SCNC: 29 MMOL/L (ref 20–33)
CREAT SERPL-MCNC: 0.68 MG/DL (ref 0.5–1.4)
EOSINOPHIL # BLD AUTO: 0.41 K/UL (ref 0–0.51)
EOSINOPHIL NFR BLD: 5 % (ref 0–6.9)
ERYTHROCYTE [DISTWIDTH] IN BLOOD BY AUTOMATED COUNT: 47.6 FL (ref 35.9–50)
GFR SERPLBLD CREATININE-BSD FMLA CKD-EPI: 86 ML/MIN/1.73 M 2
GLOBULIN SER CALC-MCNC: 2.7 G/DL (ref 1.9–3.5)
GLUCOSE SERPL-MCNC: 94 MG/DL (ref 65–99)
HCT VFR BLD AUTO: 44.4 % (ref 37–47)
HGB BLD-MCNC: 13.9 G/DL (ref 12–16)
IMM GRANULOCYTES # BLD AUTO: 0.03 K/UL (ref 0–0.11)
IMM GRANULOCYTES NFR BLD AUTO: 0.4 % (ref 0–0.9)
LYMPHOCYTES # BLD AUTO: 2.37 K/UL (ref 1–4.8)
LYMPHOCYTES NFR BLD: 28.8 % (ref 22–41)
MCH RBC QN AUTO: 28.9 PG (ref 27–33)
MCHC RBC AUTO-ENTMCNC: 31.3 G/DL (ref 33.6–35)
MCV RBC AUTO: 92.3 FL (ref 81.4–97.8)
MONOCYTES # BLD AUTO: 0.76 K/UL (ref 0–0.85)
MONOCYTES NFR BLD AUTO: 9.2 % (ref 0–13.4)
NEUTROPHILS # BLD AUTO: 4.58 K/UL (ref 2–7.15)
NEUTROPHILS NFR BLD: 55.5 % (ref 44–72)
NRBC # BLD AUTO: 0 K/UL
NRBC BLD-RTO: 0 /100 WBC
PLATELET # BLD AUTO: 207 K/UL (ref 164–446)
PMV BLD AUTO: 11.1 FL (ref 9–12.9)
POTASSIUM SERPL-SCNC: 4.3 MMOL/L (ref 3.6–5.5)
PROT SERPL-MCNC: 6.5 G/DL (ref 6–8.2)
RBC # BLD AUTO: 4.81 M/UL (ref 4.2–5.4)
SODIUM SERPL-SCNC: 140 MMOL/L (ref 135–145)
WBC # BLD AUTO: 8.2 K/UL (ref 4.8–10.8)

## 2022-07-26 PROCEDURE — 84443 ASSAY THYROID STIM HORMONE: CPT

## 2022-07-26 PROCEDURE — 80053 COMPREHEN METABOLIC PANEL: CPT

## 2022-07-26 PROCEDURE — 82607 VITAMIN B-12: CPT

## 2022-07-26 PROCEDURE — 36415 COLL VENOUS BLD VENIPUNCTURE: CPT

## 2022-07-26 PROCEDURE — 85025 COMPLETE CBC W/AUTO DIFF WBC: CPT

## 2022-07-27 LAB
TSH SERPL DL<=0.005 MIU/L-ACNC: 1.48 UIU/ML (ref 0.38–5.33)
VIT B12 SERPL-MCNC: 991 PG/ML (ref 211–911)

## 2022-08-11 ENCOUNTER — APPOINTMENT (RX ONLY)
Dept: URBAN - METROPOLITAN AREA CLINIC 4 | Facility: CLINIC | Age: 82
Setting detail: DERMATOLOGY
End: 2022-08-11

## 2022-08-11 DIAGNOSIS — L82.1 OTHER SEBORRHEIC KERATOSIS: ICD-10-CM

## 2022-08-11 DIAGNOSIS — L57.0 ACTINIC KERATOSIS: ICD-10-CM

## 2022-08-11 DIAGNOSIS — Z71.89 OTHER SPECIFIED COUNSELING: ICD-10-CM

## 2022-08-11 DIAGNOSIS — D22 MELANOCYTIC NEVI: ICD-10-CM

## 2022-08-11 DIAGNOSIS — Z85.828 PERSONAL HISTORY OF OTHER MALIGNANT NEOPLASM OF SKIN: ICD-10-CM

## 2022-08-11 DIAGNOSIS — D18.0 HEMANGIOMA: ICD-10-CM

## 2022-08-11 DIAGNOSIS — L81.4 OTHER MELANIN HYPERPIGMENTATION: ICD-10-CM

## 2022-08-11 PROBLEM — D22.5 MELANOCYTIC NEVI OF TRUNK: Status: ACTIVE | Noted: 2022-08-11

## 2022-08-11 PROBLEM — D48.5 NEOPLASM OF UNCERTAIN BEHAVIOR OF SKIN: Status: ACTIVE | Noted: 2022-08-11

## 2022-08-11 PROBLEM — D18.01 HEMANGIOMA OF SKIN AND SUBCUTANEOUS TISSUE: Status: ACTIVE | Noted: 2022-08-11

## 2022-08-11 PROBLEM — D22.39 MELANOCYTIC NEVI OF OTHER PARTS OF FACE: Status: ACTIVE | Noted: 2022-08-11

## 2022-08-11 PROCEDURE — ? COUNSELING

## 2022-08-11 PROCEDURE — 17000 DESTRUCT PREMALG LESION: CPT | Mod: 59

## 2022-08-11 PROCEDURE — 11102 TANGNTL BX SKIN SINGLE LES: CPT

## 2022-08-11 PROCEDURE — ? LIQUID NITROGEN

## 2022-08-11 PROCEDURE — 17003 DESTRUCT PREMALG LES 2-14: CPT

## 2022-08-11 PROCEDURE — 99213 OFFICE O/P EST LOW 20 MIN: CPT | Mod: 25

## 2022-08-11 PROCEDURE — ? BIOPSY BY SHAVE METHOD

## 2022-08-11 ASSESSMENT — LOCATION DETAILED DESCRIPTION DERM
LOCATION DETAILED: LEFT MEDIAL SUPERIOR CHEST
LOCATION DETAILED: LEFT SUPERIOR LATERAL FOREHEAD
LOCATION DETAILED: INFERIOR THORACIC SPINE
LOCATION DETAILED: RIGHT MID-UPPER BACK
LOCATION DETAILED: SUPERIOR THORACIC SPINE
LOCATION DETAILED: LEFT MID-UPPER BACK
LOCATION DETAILED: LEFT SUPERIOR LATERAL FOREHEAD
LOCATION DETAILED: RIGHT PROXIMAL PRETIBIAL REGION
LOCATION DETAILED: LEFT LATERAL SUPERIOR CHEST

## 2022-08-11 ASSESSMENT — LOCATION SIMPLE DESCRIPTION DERM
LOCATION SIMPLE: LEFT FOREHEAD
LOCATION SIMPLE: LEFT FOREHEAD
LOCATION SIMPLE: CHEST
LOCATION SIMPLE: RIGHT UPPER BACK
LOCATION SIMPLE: LEFT UPPER BACK
LOCATION SIMPLE: UPPER BACK
LOCATION SIMPLE: RIGHT PRETIBIAL REGION

## 2022-08-11 ASSESSMENT — LOCATION ZONE DERM
LOCATION ZONE: FACE
LOCATION ZONE: FACE
LOCATION ZONE: LEG
LOCATION ZONE: TRUNK

## 2022-08-11 NOTE — PROCEDURE: LIQUID NITROGEN
Show Aperture Variable?: Yes
Detail Level: Detailed
Render Note In Bullet Format When Appropriate: No
Number Of Freeze-Thaw Cycles: 3 freeze-thaw cycles
Duration Of Freeze Thaw-Cycle (Seconds): 0
Post-Care Instructions: I reviewed with the patient in detail post-care instructions. Patient is to wear sunprotection, and avoid picking at any of the treated lesions. Pt may apply Vaseline to crusted or scabbing areas.
Consent: The patient's consent was obtained including but not limited to risks of crusting, scabbing, blistering, scarring, darker or lighter pigmentary change, recurrence, incomplete removal and infection.

## 2022-08-15 ENCOUNTER — HOSPITAL ENCOUNTER (OUTPATIENT)
Dept: RADIOLOGY | Facility: MEDICAL CENTER | Age: 82
End: 2022-08-15
Attending: FAMILY MEDICINE
Payer: MEDICARE

## 2022-08-15 DIAGNOSIS — R63.4 ABNORMAL WEIGHT LOSS: ICD-10-CM

## 2022-08-15 DIAGNOSIS — F17.219 CIGARETTE NICOTINE DEPENDENCE WITH NICOTINE-INDUCED DISORDER: ICD-10-CM

## 2022-08-15 DIAGNOSIS — R06.02 SHORTNESS OF BREATH: ICD-10-CM

## 2022-08-15 PROCEDURE — 71250 CT THORAX DX C-: CPT | Mod: ME

## 2022-08-18 ENCOUNTER — HOSPITAL ENCOUNTER (OUTPATIENT)
Dept: LAB | Facility: MEDICAL CENTER | Age: 82
End: 2022-08-18
Attending: NURSE PRACTITIONER
Payer: MEDICARE

## 2022-08-18 ENCOUNTER — ANTICOAGULATION MONITORING (OUTPATIENT)
Dept: VASCULAR LAB | Facility: MEDICAL CENTER | Age: 82
End: 2022-08-18
Payer: MEDICARE

## 2022-08-18 DIAGNOSIS — I48.0 PAROXYSMAL A-FIB (HCC): ICD-10-CM

## 2022-08-18 DIAGNOSIS — Z79.01 CHRONIC ANTICOAGULATION: ICD-10-CM

## 2022-08-18 LAB
INR PPP: 2.77 (ref 0.87–1.13)
PROTHROMBIN TIME: 28.3 SEC (ref 12–14.6)

## 2022-08-18 PROCEDURE — 85610 PROTHROMBIN TIME: CPT

## 2022-08-18 PROCEDURE — 36415 COLL VENOUS BLD VENIPUNCTURE: CPT

## 2022-08-19 NOTE — PROGRESS NOTES
Anticoagulation Summary  As of 2022      INR goal:  2.0-3.0   TTR:  82.9 % (4.7 y)   INR used for dosin.77 (2022)   Warfarin maintenance plan:  2 mg (1 mg x 2) every Tue; 1 mg (1 mg x 1) all other days   Weekly warfarin total:  8 mg   Plan last modified:  Jimbo Dunlap PharmD (3/22/2022)   Next INR check:  2022   Target end date:  Indefinite    Indications    Paroxysmal a-fib (CMS-HCC) [I48.0]  Chronic anticoagulation [Z79.01]                 Anticoagulation Episode Summary       INR check location:  Clinic Lab    Preferred lab:  [facility]    Send INR reminders to:      Comments:  [phone] (H)  [facility]          Anticoagulation Care Providers       Provider Role Specialty Phone number    Evelio Tejeda M.D. Referring Internal Medicine [phone]    [facility] Anticoagulation Services Responsible  [phone]    Beryl Pang M.D. Responsible Family Medicine [phone]            Refer to Anticoagulation Patient Findings for HPI  Patient Findings       Negatives:  Signs/symptoms of thrombosis, Signs/symptoms of bleeding, Laboratory test error suspected, Change in health, Change in alcohol use, Change in activity, Upcoming invasive procedure, Emergency department visit, Upcoming dental procedure, Missed doses, Extra doses, Change in medications, Change in diet/appetite, Hospital admission, Bruising, Other complaints            Spoke with patient to report a therapeutic INR.      Pt instructed to continue with current warfarin dosing regimen, confirms dosing.   Will follow up in 4 week(s).     Jimbo Dunlap, PharmD, BCACP

## 2022-08-25 DIAGNOSIS — F41.0 PANIC ATTACKS: ICD-10-CM

## 2022-08-26 RX ORDER — ALPRAZOLAM 0.25 MG/1
0.25 TABLET ORAL 2 TIMES DAILY PRN
Qty: 60 TABLET | Refills: 5 | Status: SHIPPED | OUTPATIENT
Start: 2022-08-26 | End: 2023-02-22

## 2022-08-30 ENCOUNTER — OFFICE VISIT (OUTPATIENT)
Dept: URGENT CARE | Facility: PHYSICIAN GROUP | Age: 82
End: 2022-08-30
Payer: MEDICARE

## 2022-08-30 VITALS
RESPIRATION RATE: 20 BRPM | SYSTOLIC BLOOD PRESSURE: 122 MMHG | DIASTOLIC BLOOD PRESSURE: 58 MMHG | BODY MASS INDEX: 15.94 KG/M2 | TEMPERATURE: 98.3 F | HEIGHT: 62 IN | OXYGEN SATURATION: 91 % | HEART RATE: 67 BPM | WEIGHT: 86.6 LBS

## 2022-08-30 DIAGNOSIS — Z03.818 ENCOUNTER FOR PATIENT CONCERN ABOUT EXPOSURE TO INFECTIOUS ORGANISM: ICD-10-CM

## 2022-08-30 DIAGNOSIS — Z87.09 HISTORY OF COPD: ICD-10-CM

## 2022-08-30 DIAGNOSIS — U07.1 COVID-19: ICD-10-CM

## 2022-08-30 DIAGNOSIS — I48.0 PAROXYSMAL A-FIB (HCC): ICD-10-CM

## 2022-08-30 DIAGNOSIS — I25.10 CORONARY ARTERY DISEASE INVOLVING NATIVE CORONARY ARTERY OF NATIVE HEART WITHOUT ANGINA PECTORIS: ICD-10-CM

## 2022-08-30 DIAGNOSIS — F17.219 CIGARETTE NICOTINE DEPENDENCE WITH NICOTINE-INDUCED DISORDER: ICD-10-CM

## 2022-08-30 LAB
EXTERNAL QUALITY CONTROL: ABNORMAL
INT CON NEG: ABNORMAL
INT CON POS: ABNORMAL
SARS-COV+SARS-COV-2 AG RESP QL IA.RAPID: POSITIVE

## 2022-08-30 PROCEDURE — 99214 OFFICE O/P EST MOD 30 MIN: CPT | Mod: 25,CS | Performed by: FAMILY MEDICINE

## 2022-08-30 PROCEDURE — 87426 SARSCOV CORONAVIRUS AG IA: CPT | Performed by: FAMILY MEDICINE

## 2022-08-30 PROCEDURE — 99406 BEHAV CHNG SMOKING 3-10 MIN: CPT | Mod: 25 | Performed by: FAMILY MEDICINE

## 2022-08-30 ASSESSMENT — FIBROSIS 4 INDEX: FIB4 SCORE: 2.82

## 2022-08-30 NOTE — PROGRESS NOTES
"  Subjective:      82 y.o. female presents to urgent care for COVID-19.  She tested positive at home test this morning, but symptoms began yesterday. She is experiencing fever, cough, and body aches. No diarrhea, headache, or sore throat. She has been using Tylenol with moderate relief in symptoms. She smokes an average of 4 cigarettes daily.  She does have COPD for which she takes Ventolin.  She needs to take this an average of 2-3 times per day at baseline.  She is fully vaccinated against COVID. Her  tested positive for COVID on the weekend.    She does have atrial fibrillation for which she takes Sotalol and Warfarin for a [id number] of 3.  Currently she denies any chest pain, palpitations, or shortness of breath.  She is also taking lovastatin for her dyslipidemia.  No myalgias today.    She denies any other questions or concerns at this time.    Current problem list, medication, and past medical/surgical history were reviewed in Epic.    ROS  See HPI     Objective:      /58 (BP Location: Right arm, Patient Position: Sitting, BP Cuff Size: Small adult)   Pulse 67   Temp 36.8 °C (98.3 °F) (Temporal)   Resp 20   Ht 1.575 m (5' 2\")   Wt 39.3 kg (86 lb 9.6 oz)   SpO2 91%   BMI 15.84 kg/m²     Physical Exam  Constitutional:       General: She is not in acute distress.     Appearance: She is not diaphoretic.   Cardiovascular:      Rate and Rhythm: Normal rate and regular rhythm.      Heart sounds: Normal heart sounds.   Pulmonary:      Effort: Pulmonary effort is normal. No respiratory distress.      Breath sounds: Normal breath sounds.   Neurological:      Mental Status: She is alert.   Psychiatric:         Mood and Affect: Affect normal.         Judgment: Judgment normal.     Assessment/Plan:     1. COVID-19  2. Encounter for patient concern about exposure to infectious organism  3. History of COPD  4. Paroxysmal a-fib (CMS-HCC)  5. Coronary artery disease involving native coronary artery of " native heart without angina pectoris  Rapid Covid positive today in urgent care.  I encouraged mask use, frequent handwashing, wiping down hard surfaces, etc. Tylenol and Ibuprofen were recommended for symptomatic relief. Although she is high risk for severe COVID she is on several medications that are contraindicated with concurrent use Paxlovid therefore I cannot prescribe her an antiviral.  She should continue with her medication as prescribed.  [facility] [place] which [facility] program that we will do monoclonal antibodies treatment as indicated through proper supervision.  Patient is currently without indication of need for higher level of care. Patient/Guardian was given precautionary signs/symptoms that mandate immediate follow up and evaluation in the ED. The patient and/or guardian demonstrated a good understanding and agreed with the treatment plan.  - POCT SARS-COV Antigen JOSUÉ Manual Result    6. Cigarette nicotine dependence with nicotine-induced disorder  > 3 minutes was spent in educating patient of health risks associated with tobacco, nicotine, and vaping. Verbalized understanding.  She has been successful in quitting in the past, at least 5 times.  Most recent was December 2021 after a bout of bronchitis.  She quit for approximately 8 weeks without the help of any nicotine supplements as she states that it worked for her. She declines further information or assistance at this time.         Instructed to return to Urgent Care or nearest Emergency Department if symptoms fail to improve, for any change in condition, further concerns, or new concerning symptoms. Patient states understanding of the plan of care and discharge instructions.    Simona Manning M.D.

## 2022-09-06 ENCOUNTER — PATIENT MESSAGE (OUTPATIENT)
Dept: MEDICAL GROUP | Facility: CLINIC | Age: 82
End: 2022-09-06
Payer: MEDICARE

## 2022-09-06 ENCOUNTER — TELEPHONE (OUTPATIENT)
Dept: MEDICAL GROUP | Facility: CLINIC | Age: 82
End: 2022-09-06
Payer: MEDICARE

## 2022-09-06 DIAGNOSIS — R05.9 COUGH: ICD-10-CM

## 2022-09-06 DIAGNOSIS — J44.1 COPD EXACERBATION (HCC): ICD-10-CM

## 2022-09-06 RX ORDER — PREDNISONE 20 MG/1
20 TABLET ORAL DAILY
Qty: 5 TABLET | Refills: 0 | Status: SHIPPED | OUTPATIENT
Start: 2022-09-06 | End: 2022-09-11

## 2022-09-06 RX ORDER — BENZONATATE 100 MG/1
100 CAPSULE ORAL 3 TIMES DAILY PRN
Qty: 30 CAPSULE | Refills: 0 | Status: SHIPPED | OUTPATIENT
Start: 2022-09-06 | End: 2022-10-24 | Stop reason: SDUPTHER

## 2022-09-06 NOTE — TELEPHONE ENCOUNTER
Caller Name: Angie Root    Call Back Number: [phone] (home) [phone] (work)      Angie called and has tested positive for covid. She is on day 12 and still having some issues. She would like to know if she can have prednisone and tessalon pearls sent to her pharmacy.

## 2022-09-14 ENCOUNTER — APPOINTMENT (OUTPATIENT)
Dept: RADIOLOGY | Facility: OTHER | Age: 82
End: 2022-09-14
Attending: FAMILY MEDICINE
Payer: MEDICARE

## 2022-09-14 ENCOUNTER — PATIENT MESSAGE (OUTPATIENT)
Dept: MEDICAL GROUP | Facility: CLINIC | Age: 82
End: 2022-09-14

## 2022-09-14 ENCOUNTER — PATIENT MESSAGE (OUTPATIENT)
Dept: HOSPITALIST | Facility: MEDICAL CENTER | Age: 82
End: 2022-09-14

## 2022-09-14 DIAGNOSIS — R05.9 COUGH: ICD-10-CM

## 2022-09-14 DIAGNOSIS — J18.9 PNEUMONIA OF RIGHT LOWER LOBE DUE TO INFECTIOUS ORGANISM: ICD-10-CM

## 2022-09-14 DIAGNOSIS — R06.02 SHORTNESS OF BREATH: ICD-10-CM

## 2022-09-14 DIAGNOSIS — R31.0 GROSS HEMATURIA: ICD-10-CM

## 2022-09-14 PROCEDURE — 71046 X-RAY EXAM CHEST 2 VIEWS: CPT | Mod: TC,FY | Performed by: FAMILY MEDICINE

## 2022-09-14 RX ORDER — DOXYCYCLINE 100 MG/1
100 CAPSULE ORAL 2 TIMES DAILY
Qty: 14 CAPSULE | Refills: 0 | Status: SHIPPED
Start: 2022-09-14 | End: 2022-10-20

## 2022-09-14 NOTE — PROGRESS NOTES
Had luis come in for CXR today as her cough is very bad and she is short of breath--she has had interval development of RLL infiltrate suggestive of pneumonia. Really doesn't want to go to the hospital. Pulse ox 91-92 at home. She has multiple antibiotic allergies including anaphylaxis with PCN precluding both augmentin and C3; she has history of recurrent c diff and very bad reaction to cipro. Per UTD, when the above are not tolerated, suggest lefamulin--I have ordered this and will do a PA, as I'm not sure if it's covered. If not we may elect to try levofloxacin.

## 2022-09-16 ENCOUNTER — APPOINTMENT (OUTPATIENT)
Dept: PULMONOLOGY | Facility: MEDICAL CENTER | Age: 82
End: 2022-09-16
Attending: FAMILY MEDICINE
Payer: MEDICARE

## 2022-09-25 ENCOUNTER — HOSPITAL ENCOUNTER (OUTPATIENT)
Facility: MEDICAL CENTER | Age: 82
End: 2022-09-25
Attending: STUDENT IN AN ORGANIZED HEALTH CARE EDUCATION/TRAINING PROGRAM
Payer: MEDICARE

## 2022-09-25 ENCOUNTER — OFFICE VISIT (OUTPATIENT)
Dept: URGENT CARE | Facility: PHYSICIAN GROUP | Age: 82
End: 2022-09-25
Payer: MEDICARE

## 2022-09-25 VITALS
WEIGHT: 89 LBS | RESPIRATION RATE: 16 BRPM | DIASTOLIC BLOOD PRESSURE: 60 MMHG | HEIGHT: 62 IN | HEART RATE: 75 BPM | BODY MASS INDEX: 16.38 KG/M2 | OXYGEN SATURATION: 96 % | TEMPERATURE: 98.3 F | SYSTOLIC BLOOD PRESSURE: 128 MMHG

## 2022-09-25 DIAGNOSIS — N30.01 ACUTE CYSTITIS WITH HEMATURIA: ICD-10-CM

## 2022-09-25 DIAGNOSIS — R31.9 HEMATURIA, UNSPECIFIED TYPE: ICD-10-CM

## 2022-09-25 LAB
APPEARANCE UR: NORMAL
BILIRUB UR STRIP-MCNC: NEGATIVE MG/DL
COLOR UR AUTO: NORMAL
GLUCOSE UR STRIP.AUTO-MCNC: NEGATIVE MG/DL
KETONES UR STRIP.AUTO-MCNC: NEGATIVE MG/DL
LEUKOCYTE ESTERASE UR QL STRIP.AUTO: NORMAL
NITRITE UR QL STRIP.AUTO: NEGATIVE
PH UR STRIP.AUTO: 5.5 [PH] (ref 5–8)
PROT UR QL STRIP: 100 MG/DL
RBC UR QL AUTO: NORMAL
SP GR UR STRIP.AUTO: 1.02
UROBILINOGEN UR STRIP-MCNC: 0.2 MG/DL

## 2022-09-25 PROCEDURE — 81002 URINALYSIS NONAUTO W/O SCOPE: CPT | Performed by: STUDENT IN AN ORGANIZED HEALTH CARE EDUCATION/TRAINING PROGRAM

## 2022-09-25 PROCEDURE — 99213 OFFICE O/P EST LOW 20 MIN: CPT | Performed by: STUDENT IN AN ORGANIZED HEALTH CARE EDUCATION/TRAINING PROGRAM

## 2022-09-25 PROCEDURE — 87086 URINE CULTURE/COLONY COUNT: CPT

## 2022-09-25 RX ORDER — NITROFURANTOIN 25; 75 MG/1; MG/1
100 CAPSULE ORAL 2 TIMES DAILY
Qty: 10 CAPSULE | Refills: 0 | Status: SHIPPED | OUTPATIENT
Start: 2022-09-25 | End: 2022-09-30

## 2022-09-25 ASSESSMENT — FIBROSIS 4 INDEX: FIB4 SCORE: 2.82

## 2022-09-25 NOTE — PROGRESS NOTES
Subjective:   Angie Root is a 82 y.o. female who presents for Other (Blood in urine, Started last night, )      HPI:  Pleasant 82-year-old female who is anticoagulated with warfarin presents to clinic for blood in her urine that started last night.  Patient states that she has had UTIs in the past but is been several years.  She reports small amount of burning with urination but really no other symptoms at this time.  She denies fever, chills, nausea, vomiting, abdominal pain, diarrhea, constipation, dizziness, chest pain, palpitations, racing heart rate, lower leg swelling, leg pain, shortness of breath cough, wheezing, dizziness, lightheadedness, or LOC.      Medications:    albuterol Aers  ALPRAZolam Tabs  ascorbic acid Tabs  benzonatate Caps  doxycycline  EPINEPHrine Soaj  ipratropium Soln  lactobacillus rhamnosus Caps  lovastatin  nitrofurantoin Caps  PROBIOTIC PO  sotalol Tabs  vitamin D Tabs  warfarin Tabs  ZINC PO    Allergies: Penicillins, Codeine, Iodine, Bee venom, Chantix [varenicline], Ciprofloxacin, Flagyl [metronidazole], Latex, Lipitor [atorvastatin calcium], Other environmental, Shellfish allergy, and Tetanus antitoxin    Problem List: Angie Root does not have any pertinent problems on file.    Surgical History:  Past Surgical History:   Procedure Laterality Date    FECAL TRANSPLANT N/A 4/9/2018    Procedure: FECAL TRANSPLANT;  Surgeon: Gonzalez Cruz M.D.;  Location: ENDOSCOPY [facility];  Service: Gastroenterology    COLONOSCOPY - ENDO N/A 4/9/2018    Procedure: COLONOSCOPY - ENDO;  Surgeon: Gonzalez Cruz M.D.;  Location: ENDOSCOPY [facility];  Service: Gastroenterology    WIDE EXCISION MELANOMA, LEG, W/POSS.[email or web address]  10/2015    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    CARDIAC CATH, RIGHT HEART  2008    stent    OTHER ORTHOPEDIC SURGERY Left 2008    hand repair    CHOLECYSTECTOMY  1993    TONSILLECTOMY  1945    OTHER CARDIAC SURGERY      r heart cath stents    STENT PLACEMENT   "    L iliac stent       Past Social Hx: Angie Root  reports that she has been smoking cigarettes. She started smoking about 65 years ago. She has a 15.00 pack-year smoking history. She has never used smokeless tobacco. She reports that she does not drink alcohol and does not use drugs.     Past Family Hx:  Angie Root family history includes Alcohol/Drug in her sister; Cancer in her maternal grandfather, maternal grandmother, maternal uncle, paternal grandfather, and paternal grandmother; Diabetes in her maternal uncle; Heart Disease in her maternal uncle and sister; Hyperlipidemia in her mother and sister; Hypertension in her mother and sister; Thyroid in her sister.     Problem list, medications, and allergies reviewed by myself today in Epic.     Objective:     /60 (BP Location: Right arm, Patient Position: Sitting, BP Cuff Size: Adult)   Pulse 75   Temp 36.8 °C (98.3 °F) (Temporal)   Resp 16   Ht 1.575 m (5' 2\")   Wt 40.4 kg (89 lb)   SpO2 96%   BMI 16.28 kg/m²     Physical Exam  Vitals reviewed.   Constitutional:       General: She is not in acute distress.     Appearance: Normal appearance.   HENT:      Mouth/Throat:      Mouth: Mucous membranes are moist.   Eyes:      Pupils: Pupils are equal, round, and reactive to light.   Cardiovascular:      Rate and Rhythm: Normal rate and regular rhythm.      Pulses: Normal pulses.           Radial pulses are 2+ on the right side and 2+ on the left side.      Heart sounds: Normal heart sounds. No murmur heard.  Pulmonary:      Effort: Pulmonary effort is normal. No respiratory distress.      Breath sounds: Normal breath sounds. No stridor. No wheezing, rhonchi or rales.   Abdominal:      Tenderness: There is no abdominal tenderness. There is no right CVA tenderness, left CVA tenderness or guarding.   Skin:     General: Skin is warm and dry.      Capillary Refill: Capillary refill takes less than 2 seconds.      Findings: No erythema, lesion or " rash.   Neurological:      General: No focal deficit present.      Mental Status: She is alert and oriented to person, place, and time.       Assessment/Plan:     Diagnosis and associated orders:     1. Hematuria, unspecified type  POCT Urinalysis    CBC WITH DIFFERENTIAL    URINE CULTURE(NEW)      2. Acute cystitis with hematuria  nitrofurantoin (MACROBID) 100 MG Cap    URINE CULTURE(NEW)         Comments/MDM:     POCT urinalysis consistent with urinary tract infection.  Patient will be treated with nitrofurantoin.  Patient was educated use this medication possible side effects including allergic reaction.  Urine culture conducted for further evaluation.  A CBC was also ordered for further evaluation given her warfarin use.  She is not having any symptoms of blood loss.  Vitals all within normal limits and she is afebrile.  No dizziness or lightheadedness.  No pallor or shortness of breath.  Patient stable at this time and appropriate for discharge.  She does have an INR scheduled for 3 days from now.  I do not suspect pyelonephritis or kidney stones at this time.  Abdominal exam shows no abnormal findings.  ED/return precautions were given.         Differential diagnosis, natural history, supportive care, and indications for immediate follow-up discussed.    Advised the patient to follow-up with the primary care physician for recheck, reevaluation, and consideration of further management.    Please note that this dictation was created using voice recognition software. I have made a reasonable attempt to correct obvious errors, but I expect that there are errors of grammar and possibly content that I did not discover before finalizing the note.    Electronically signed by Dean Bullard PA-C.

## 2022-09-26 ENCOUNTER — HOSPITAL ENCOUNTER (OUTPATIENT)
Dept: LAB | Facility: MEDICAL CENTER | Age: 82
End: 2022-09-26
Attending: NURSE PRACTITIONER
Payer: MEDICARE

## 2022-09-26 ENCOUNTER — ANTICOAGULATION MONITORING (OUTPATIENT)
Dept: MEDICAL GROUP | Facility: PHYSICIAN GROUP | Age: 82
End: 2022-09-26
Payer: MEDICARE

## 2022-09-26 ENCOUNTER — HOSPITAL ENCOUNTER (OUTPATIENT)
Dept: LAB | Facility: MEDICAL CENTER | Age: 82
End: 2022-09-26
Attending: STUDENT IN AN ORGANIZED HEALTH CARE EDUCATION/TRAINING PROGRAM
Payer: MEDICARE

## 2022-09-26 DIAGNOSIS — I48.0 PAROXYSMAL A-FIB (HCC): ICD-10-CM

## 2022-09-26 DIAGNOSIS — Z79.01 CHRONIC ANTICOAGULATION: ICD-10-CM

## 2022-09-26 DIAGNOSIS — R31.9 HEMATURIA, UNSPECIFIED TYPE: ICD-10-CM

## 2022-09-26 LAB
BASOPHILS # BLD AUTO: 1.1 % (ref 0–1.8)
BASOPHILS # BLD: 0.08 K/UL (ref 0–0.12)
EOSINOPHIL # BLD AUTO: 0.17 K/UL (ref 0–0.51)
EOSINOPHIL NFR BLD: 2.2 % (ref 0–6.9)
ERYTHROCYTE [DISTWIDTH] IN BLOOD BY AUTOMATED COUNT: 48.4 FL (ref 35.9–50)
HCT VFR BLD AUTO: 39.7 % (ref 37–47)
HGB BLD-MCNC: 12.5 G/DL (ref 12–16)
IMM GRANULOCYTES # BLD AUTO: 0.02 K/UL (ref 0–0.11)
IMM GRANULOCYTES NFR BLD AUTO: 0.3 % (ref 0–0.9)
INR PPP: 4.14 (ref 0.87–1.13)
LYMPHOCYTES # BLD AUTO: 1.75 K/UL (ref 1–4.8)
LYMPHOCYTES NFR BLD: 23 % (ref 22–41)
MCH RBC QN AUTO: 28.7 PG (ref 27–33)
MCHC RBC AUTO-ENTMCNC: 31.5 G/DL (ref 33.6–35)
MCV RBC AUTO: 91.1 FL (ref 81.4–97.8)
MONOCYTES # BLD AUTO: 0.81 K/UL (ref 0–0.85)
MONOCYTES NFR BLD AUTO: 10.7 % (ref 0–13.4)
NEUTROPHILS # BLD AUTO: 4.77 K/UL (ref 2–7.15)
NEUTROPHILS NFR BLD: 62.7 % (ref 44–72)
NRBC # BLD AUTO: 0 K/UL
NRBC BLD-RTO: 0 /100 WBC
PLATELET # BLD AUTO: 150 K/UL (ref 164–446)
PMV BLD AUTO: 11.4 FL (ref 9–12.9)
PROTHROMBIN TIME: 38.5 SEC (ref 12–14.6)
RBC # BLD AUTO: 4.36 M/UL (ref 4.2–5.4)
WBC # BLD AUTO: 7.6 K/UL (ref 4.8–10.8)

## 2022-09-26 PROCEDURE — 85610 PROTHROMBIN TIME: CPT

## 2022-09-26 PROCEDURE — 85025 COMPLETE CBC W/AUTO DIFF WBC: CPT

## 2022-09-26 PROCEDURE — 36415 COLL VENOUS BLD VENIPUNCTURE: CPT

## 2022-09-26 NOTE — PROGRESS NOTES
OP Anticoagulation Service Note    Date: 2022    Anticoagulation Summary  As of 2022      INR goal:  2.0-3.0   TTR:  81.4 % (4.8 y)   INR used for dosin.14 (2022)   Warfarin maintenance plan:  2 mg (1 mg x 2) every Tue; 1 mg (1 mg x 1) all other days   Weekly warfarin total:  8 mg   Plan last modified:  Jimbo Dunlap PharmD (3/22/2022)   Next INR check:  10/3/2022   Target end date:  Indefinite    Indications    Paroxysmal a-fib (CMS-HCC) [I48.0]  Chronic anticoagulation [Z79.01]                 Anticoagulation Episode Summary       INR check location:  Clinic Lab    Preferred lab:  [facility]    Send INR reminders to:      Comments:  [phone] (H)  [facility]          Anticoagulation Care Providers       Provider Role Specialty Phone number    Evelio Tejeda M.D. Referring Internal Medicine [phone]    [facility] Anticoagulation Services Responsible  [phone]    Beryl Pang M.D. Responsible Family Medicine [phone]          Anticoagulation Patient Findings            HPI:   The reason for today's call is to prevent morbidity and mortality from a blood clot and/or stroke and to reduce the risk of bleeding while on a anticoagulant.     PCP:  Beryl Pang M.D.  [address] [facility] [place] [address]    Assessment:     INR  supra-therapeutic.     Lab Results   Component Value Date/Time    BUN 15 2022 10:45 AM    CREATININE 0.68 2022 10:45 AM     Lab Results   Component Value Date/Time    HEMOGLOBIN 12.5 2022 10:45 AM    HEMATOCRIT 39.7 2022 10:45 AM    PLATELETCT 150 (L) 2022 10:45 AM    ALKPHOSPHAT 72 2022 10:45 AM    ASTSGOT 29 2022 10:45 AM    ALTSGPT 17 2022 10:45 AM          Current Outpatient Medications:     nitrofurantoin, 100 mg, Oral, BID    doxycycline, 100 mg, Oral, BID    benzonatate, 100 mg, Oral, TID PRN    ALPRAZolam, 0.25 mg, Oral, BID PRN    warfarin, Take one to two tablets by mouth daily or as directed by  anticoagulation clinic    albuterol, 1-2 Puff, Inhalation, Q4HRS PRN    Multiple Vitamins-Minerals (ZINC PO), Take  by mouth every day.    sotalol, TAKE 1/2 TABLET TWICE DAILY    lovastatin, 40 mg, Oral, QHS    EPINEPHrine, epinephrine 0.3 mg/0.3 mL injection, auto-injector    Probiotic Product (PROBIOTIC PO), Take  by mouth.    ascorbic acid, 1,000 mg, Oral, DAILY    ipratropium, 0.2 mg, Nebulization, 4XDAY    lactobacillus rhamnosus, 1 Capsule, Oral, DAILY    vitamin D, 2,000 Units, Oral, QAM      Plan:     Hold x 2 days then resume     Follow-up:     test in 1 weeks.        Additional information discussed with patient:     Asked patient to please call the anticoagulation clinic if they have any signs/symptoms of bleeding and/or thrombosis or any changes to diet or medications.      National recommendations regarding anticoagulation therapy:     The CHEST guidelines recommends frequent INR monitoring at regular intervals (a few days up to a max of 12 weeks) to ensure patients are on the proper dose of warfarin, and patients are not having any complications from therapy.  INRs can dramatically change over a short time period due to diet, medications, and medical conditions.       Pieter Covarrubias, PharmD, MS, BCACP, C  [facility]  Phone: [phone]  Fax: [phone]  On call: [phone]  General scheduling/information [phone]  For emergencies please dial 911  Please do not use [place] [employer] for urgent matters, call the phone numbers listed above.    This note was created using voice recognition software (Dragon). The accuracy of the dictation is limited by the abilities of the software. I have reviewed the note prior to signing, however some errors in grammar and context are still possible. If you have any questions related to this note please do not hesitate to contact our office.

## 2022-09-27 ENCOUNTER — TELEPHONE (OUTPATIENT)
Dept: MEDICAL GROUP | Facility: CLINIC | Age: 82
End: 2022-09-27
Payer: MEDICARE

## 2022-09-27 DIAGNOSIS — R31.0 GROSS HEMATURIA: ICD-10-CM

## 2022-09-27 LAB
BACTERIA UR CULT: NORMAL
SIGNIFICANT IND 70042: NORMAL
SITE SITE: NORMAL
SOURCE SOURCE: NORMAL

## 2022-09-27 NOTE — TELEPHONE ENCOUNTER
Caller Name: Angie Root    Call Back Number: [phone] (home) [phone] (work)    Angie called to let us know that she is not going to be able to get her imaging done until Oct. 28th.   I told her I would call around and see if we can get her in any sooner.   I thought I would pass the message along. Thank you

## 2022-09-29 ENCOUNTER — PATIENT MESSAGE (OUTPATIENT)
Dept: MEDICAL GROUP | Facility: CLINIC | Age: 82
End: 2022-09-29

## 2022-09-29 ENCOUNTER — HOSPITAL ENCOUNTER (OUTPATIENT)
Dept: RADIOLOGY | Facility: MEDICAL CENTER | Age: 82
End: 2022-09-29
Attending: FAMILY MEDICINE
Payer: MEDICARE

## 2022-09-29 DIAGNOSIS — R31.0 GROSS HEMATURIA: ICD-10-CM

## 2022-09-29 DIAGNOSIS — R73.03 PRE-DIABETES: ICD-10-CM

## 2022-09-29 PROCEDURE — 76775 US EXAM ABDO BACK WALL LIM: CPT

## 2022-10-03 ENCOUNTER — HOSPITAL ENCOUNTER (OUTPATIENT)
Dept: LAB | Facility: MEDICAL CENTER | Age: 82
End: 2022-10-03
Attending: NURSE PRACTITIONER
Payer: MEDICARE

## 2022-10-03 DIAGNOSIS — Z79.01 CHRONIC ANTICOAGULATION: ICD-10-CM

## 2022-10-03 DIAGNOSIS — I48.0 PAROXYSMAL A-FIB (HCC): ICD-10-CM

## 2022-10-03 LAB
INR PPP: 1.61 (ref 0.87–1.13)
PROTHROMBIN TIME: 18.8 SEC (ref 12–14.6)

## 2022-10-03 PROCEDURE — 36415 COLL VENOUS BLD VENIPUNCTURE: CPT

## 2022-10-03 PROCEDURE — 85610 PROTHROMBIN TIME: CPT

## 2022-10-04 ENCOUNTER — ANTICOAGULATION MONITORING (OUTPATIENT)
Dept: VASCULAR LAB | Facility: MEDICAL CENTER | Age: 82
End: 2022-10-04
Payer: MEDICARE

## 2022-10-04 DIAGNOSIS — I48.0 PAROXYSMAL A-FIB (HCC): ICD-10-CM

## 2022-10-04 DIAGNOSIS — Z79.01 CHRONIC ANTICOAGULATION: ICD-10-CM

## 2022-10-04 NOTE — PROGRESS NOTES
Anticoagulation Summary  As of 10/4/2022      INR goal:  2.0-3.0   TTR:  81.2 % (4.8 y)   INR used for dosin.61 (10/3/2022)   Warfarin maintenance plan:  2 mg (1 mg x 2) every Tue; 1 mg (1 mg x 1) all other days   Weekly warfarin total:  8 mg   Plan last modified:  Jimbo Dunlap, PharmD (3/22/2022)   Next INR check:  10/10/2022   Target end date:  Indefinite    Indications    Paroxysmal a-fib (CMS-HCC) [I48.0]  Chronic anticoagulation [Z79.01]                 Anticoagulation Episode Summary       INR check location:  Clinic Lab    Preferred lab:  [facility]    Send INR reminders to:      Comments:  [phone] (H)  [facility]          Anticoagulation Care Providers       Provider Role Specialty Phone number    Eveilo Tejeda M.D. Referring Internal Medicine [phone]    [facility] Anticoagulation Services Responsible  [phone]    Beryl Pang M.D. Responsible Family Medicine [phone]          Anticoagulation Patient Findings  Patient Findings       Positives:  Signs/symptoms of bleeding (Pt reported: blood in the urine that stopped 2022. Pt did see provider and had an ultasound that was negative for kidney stones.)    Negatives:  Signs/symptoms of thrombosis, Laboratory test error suspected, Change in health, Change in alcohol use, Change in activity, Upcoming invasive procedure, Emergency department visit, Upcoming dental procedure, Missed doses, Extra doses, Change in medications, Change in diet/appetite, Hospital admission, Bruising, Other complaints              Spoke with patient today regarding SUB therapeutic INR of 1.61.  Patient denies any signs/symptoms of bruising or bleeding or any changes in diet and medications.  Instructed patient to call clinic with any questions or concerns.    Pt reported recently had a 2 dosing's held and missed a dose last week. Pt was instructed to take  warfarin regimen as above.     Pt is not on antiplatelet therapy  F/u in 1 week.   Sukumar Robles,  PhT

## 2022-10-05 ENCOUNTER — APPOINTMENT (RX ONLY)
Dept: URBAN - METROPOLITAN AREA CLINIC 4 | Facility: CLINIC | Age: 82
Setting detail: DERMATOLOGY
End: 2022-10-05

## 2022-10-05 DIAGNOSIS — L57.0 ACTINIC KERATOSIS: ICD-10-CM

## 2022-10-05 PROBLEM — D04.72 CARCINOMA IN SITU OF SKIN OF LEFT LOWER LIMB, INCLUDING HIP: Status: ACTIVE | Noted: 2022-10-05

## 2022-10-05 PROBLEM — D48.5 NEOPLASM OF UNCERTAIN BEHAVIOR OF SKIN: Status: ACTIVE | Noted: 2022-10-05

## 2022-10-05 PROCEDURE — 11102 TANGNTL BX SKIN SINGLE LES: CPT

## 2022-10-05 PROCEDURE — 17260 DSTRJ MAL LES T/A/L 0.5 CM/<: CPT | Mod: 59

## 2022-10-05 PROCEDURE — ? ADDITIONAL NOTES

## 2022-10-05 PROCEDURE — ? LIQUID NITROGEN

## 2022-10-05 PROCEDURE — 17000 DESTRUCT PREMALG LESION: CPT | Mod: 59

## 2022-10-05 PROCEDURE — ? BIOPSY BY SHAVE METHOD

## 2022-10-05 PROCEDURE — ? CURETTAGE AND DESTRUCTION WITH PATHOLOGY

## 2022-10-05 ASSESSMENT — LOCATION ZONE DERM: LOCATION ZONE: FACE

## 2022-10-05 ASSESSMENT — LOCATION SIMPLE DESCRIPTION DERM: LOCATION SIMPLE: RIGHT CHEEK

## 2022-10-05 ASSESSMENT — LOCATION DETAILED DESCRIPTION DERM: LOCATION DETAILED: RIGHT CENTRAL BUCCAL CHEEK

## 2022-10-05 NOTE — PROCEDURE: CURETTAGE AND DESTRUCTION WITH PATHOLOGY
Detail Level: Detailed
Size Of Lesion After Curettage: 0.5
Anesthesia Type: 1% lidocaine with epinephrine
Cautery Type: electrodesiccation
Number Of Curettages: 3
What Was Performed First?: Curettage
Additional Information: (Optional): The wound was cleaned, and a pressure dressing was applied.  The patient received detailed post-op instructions.
Lab: [id number]
Lab Facility: 
Histology Text: Following the procedure a portion of the curetted material was sent for histologic evaluation.
Biopsy Type: H and E
Render Path Notes In Note?: No
Consent was obtained from the patient. The risks, benefits and alternatives to therapy were discussed in detail. Specifically, the risks of infection, scarring, bleeding, prolonged wound healing, nerve injury, incomplete removal, allergy to anesthesia and recurrence were addressed. Alternatives to ED&C, such as: surgical removal and XRT were also discussed.  Prior to the procedure, the treatment site was clearly identified and confirmed by the patient. All components of Universal Protocol/PAUSE Rule completed.
Post-Care Instructions: I reviewed with the patient in detail post-care instructions. Patient is to keep the area dry for 48 hours, and not to engage in any swimming until the area is healed. Should the patient develop any fevers, chills, bleeding, severe pain patient will contact the office immediately.
Billing Type: Third-Party Bill
Bill As A Line Item Or As Units: Line Item

## 2022-10-05 NOTE — PROCEDURE: LIQUID NITROGEN
Render Note In Bullet Format When Appropriate: No
Number Of Freeze-Thaw Cycles: 3 freeze-thaw cycles
Detail Level: Detailed
Post-Care Instructions: I reviewed with the patient in detail post-care instructions. Patient is to wear sunprotection, and avoid picking at any of the treated lesions. Pt may apply Vaseline to crusted or scabbing areas.
Consent: The patient's consent was obtained including but not limited to risks of crusting, scabbing, blistering, scarring, darker or lighter pigmentary change, recurrence, incomplete removal and infection.
Show Aperture Variable?: Yes
Duration Of Freeze Thaw-Cycle (Seconds): 0

## 2022-10-05 NOTE — PROCEDURE: ADDITIONAL NOTES
Render Risk Assessment In Note?: no
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
Detail Level: Detailed

## 2022-10-11 ENCOUNTER — HOSPITAL ENCOUNTER (OUTPATIENT)
Dept: LAB | Facility: MEDICAL CENTER | Age: 82
End: 2022-10-11
Attending: NURSE PRACTITIONER
Payer: MEDICARE

## 2022-10-11 DIAGNOSIS — Z79.01 CHRONIC ANTICOAGULATION: ICD-10-CM

## 2022-10-11 DIAGNOSIS — I48.0 PAROXYSMAL A-FIB (HCC): ICD-10-CM

## 2022-10-11 LAB
INR PPP: 2.25 (ref 0.87–1.13)
PROTHROMBIN TIME: 24.2 SEC (ref 12–14.6)

## 2022-10-11 PROCEDURE — 85610 PROTHROMBIN TIME: CPT

## 2022-10-11 PROCEDURE — 36415 COLL VENOUS BLD VENIPUNCTURE: CPT

## 2022-10-12 ENCOUNTER — ANTICOAGULATION MONITORING (OUTPATIENT)
Dept: VASCULAR LAB | Facility: MEDICAL CENTER | Age: 82
End: 2022-10-12
Payer: MEDICARE

## 2022-10-12 DIAGNOSIS — Z79.01 CHRONIC ANTICOAGULATION: ICD-10-CM

## 2022-10-12 DIAGNOSIS — I48.0 PAROXYSMAL A-FIB (HCC): ICD-10-CM

## 2022-10-12 NOTE — PROGRESS NOTES
Anticoagulation Summary  As of 10/12/2022      INR goal:  2.0-3.0   TTR:  81.1 % (4.8 y)   INR used for dosin.25 (10/11/2022)   Warfarin maintenance plan:  2 mg (1 mg x 2) every Tue; 1 mg (1 mg x 1) all other days   Weekly warfarin total:  8 mg   Plan last modified:  Jimbo Dunlap PharmD (3/22/2022)   Next INR check:  10/25/2022   Target end date:  Indefinite    Indications    Paroxysmal a-fib (CMS-HCC) [I48.0]  Chronic anticoagulation [Z79.01]                 Anticoagulation Episode Summary       INR check location:  Clinic Lab    Preferred lab:  [facility]    Send INR reminders to:      Comments:  [phone] (H)  [facility]          Anticoagulation Care Providers       Provider Role Specialty Phone number    Evelio Tejeda M.D. Referring Internal Medicine [phone]    [facility] Anticoagulation Services Responsible  [phone]    Beryl Pang M.D. Responsible Family Medicine [phone]            Refer to Anticoagulation Patient Findings for HPI  Patient Findings       Negatives:  Signs/symptoms of thrombosis, Signs/symptoms of bleeding, Laboratory test error suspected, Change in health, Change in alcohol use, Change in activity, Upcoming invasive procedure, Emergency department visit, Upcoming dental procedure, Missed doses, Extra doses, Change in medications, Change in diet/appetite, Hospital admission, Bruising, Other complaints            Spoke with patient to report a therapeutic INR.      Pt is NOT on antiplatelet therapy    Pt instructed to continue with current warfarin dosing regimen, confirms dosing.   Will follow up in 2 week(s).     Simona Mendez PharmD

## 2022-10-20 ENCOUNTER — OFFICE VISIT (OUTPATIENT)
Dept: MEDICAL GROUP | Facility: CLINIC | Age: 82
End: 2022-10-20
Payer: MEDICARE

## 2022-10-20 VITALS
OXYGEN SATURATION: 92 % | HEART RATE: 72 BPM | RESPIRATION RATE: 16 BRPM | SYSTOLIC BLOOD PRESSURE: 125 MMHG | WEIGHT: 85.31 LBS | HEIGHT: 62 IN | BODY MASS INDEX: 15.7 KG/M2 | DIASTOLIC BLOOD PRESSURE: 75 MMHG

## 2022-10-20 DIAGNOSIS — J43.9 PULMONARY EMPHYSEMA, UNSPECIFIED EMPHYSEMA TYPE (HCC): ICD-10-CM

## 2022-10-20 DIAGNOSIS — R05.9 COUGH, UNSPECIFIED TYPE: ICD-10-CM

## 2022-10-20 DIAGNOSIS — Z23 NEED FOR VACCINATION: ICD-10-CM

## 2022-10-20 PROCEDURE — G0008 ADMIN INFLUENZA VIRUS VAC: HCPCS | Performed by: FAMILY MEDICINE

## 2022-10-20 PROCEDURE — 99213 OFFICE O/P EST LOW 20 MIN: CPT | Mod: 25,GE | Performed by: STUDENT IN AN ORGANIZED HEALTH CARE EDUCATION/TRAINING PROGRAM

## 2022-10-20 PROCEDURE — 90662 IIV NO PRSV INCREASED AG IM: CPT | Performed by: FAMILY MEDICINE

## 2022-10-20 RX ORDER — FLUTICASONE PROPIONATE 50 MCG
1 SPRAY, SUSPENSION (ML) NASAL DAILY
Qty: 16 G | Refills: 3 | Status: SHIPPED | OUTPATIENT
Start: 2022-10-20 | End: 2023-10-31 | Stop reason: SDUPTHER

## 2022-10-20 ASSESSMENT — FIBROSIS 4 INDEX: FIB4 SCORE: 3.89

## 2022-10-20 NOTE — PROGRESS NOTES
HPI:  Patient is a 82 y.o. female. This pleasant patient is here today to discuss her recent bronchilitiis, PNA - COVID, still feels like it is 20-30% better. Has had rough few months will several illnesses    Cough somewhat improved, does have fatigue, no chills/fevers     2 cig per day.     Fatigue -  Losing weight - 10 lbs usually 94      Urology next week -   Pulmonary   Vascular        No problems updated.                                                                                                                               Patient Active Problem List    Diagnosis Date Noted    Abnormal weight loss 07/25/2022    Exercise intolerance 07/25/2022    Hematochezia 09/16/2021    Acute exacerbation of chronic obstructive pulmonary disease (HCC) 12/18/2020    Personal history of anaphylaxis 12/18/2020    Coronary artery disease involving native coronary artery of native heart without angina pectoris 03/12/2019    PCI to her LAD in 2009 03/12/2019    Chronic anticoagulation 04/17/2018    Physical debility 12/05/2017    History of COPD 12/05/2017    Circulating anticoagulants (HCC) 12/04/2017    Constipation 12/03/2017    Osteoporosis, postmenopausal 08/24/2017    Depression with anxiety 02/15/2017    Vitamin D deficiency 07/26/2016    Insomnia 07/26/2016    Ankle swelling 05/23/2016    Peripheral arterial disease (HCC) 03/01/2016    Cigarette nicotine dependence with nicotine-induced disorder 03/01/2016    Gastroesophageal reflux disease without esophagitis 01/20/2016    Hiatal hernia 01/20/2016    Paroxysmal a-fib (CMS-HCC) 01/20/2016    Pre-diabetes 01/20/2016    Panic attacks 01/20/2016       Current Outpatient Medications   Medication Sig Dispense Refill    fluticasone (FLONASE) 50 MCG/ACT nasal spray Administer 1 Spray into affected nostril(S) every day. 16 g 3    ALPRAZolam (XANAX) 0.25 MG Tab Take 1 Tablet by mouth 2 times a day as needed for Anxiety for up to 180 days. 60 Tablet 5    warfarin (COUMADIN)  "1 MG Tab Take one to two tablets by mouth daily or as directed by anticoagulation clinic 180 Tablet 1    albuterol 108 (90 Base) MCG/ACT Aero Soln inhalation aerosol Inhale 1-2 Puffs every four hours as needed for Shortness of Breath. 18 g 3    Multiple Vitamins-Minerals (ZINC PO) Take  by mouth every day.      sotalol (BETAPACE) 80 MG Tab TAKE 1/2 TABLET TWICE DAILY 90 Tablet 1    lovastatin (MEVACOR) 40 MG tablet Take 1 Tablet by mouth at bedtime. 100 Tablet 3    EPINEPHrine (EPIPEN) 0.3 MG/0.3ML Solution Auto-injector solution for injection epinephrine 0.3 mg/0.3 mL injection, auto-injector      Probiotic Product (PROBIOTIC PO) Take  by mouth.      ascorbic acid (ASCORBIC ACID) 500 MG Tab Take 1,000 mg by mouth every day.      ipratropium (ATROVENT) 0.02 % Solution 0.2 mg by Nebulization route 4 times a day.      lactobacillus rhamnosus (CULTURELLE) Cap capsule Take 1 Cap by mouth every day.      vitamin D (CHOLECALCIFEROL) 1000 UNIT Tab Take 2,000 Units by mouth every morning. 3000 units      benzonatate (TESSALON) 100 MG Cap Take 1 Capsule by mouth 3 times a day as needed for Cough. 30 Capsule 0     No current facility-administered medications for this visit.         Allergies as of 10/20/2022 - Reviewed 10/20/2022   Allergen Reaction Noted    Penicillins Anaphylaxis and Hives 11/24/2015    Codeine Swelling 11/24/2015    Iodine Swelling 11/24/2015    Bee venom Anaphylaxis 11/24/2015    Chantix [varenicline]  06/05/2017    Ciprofloxacin  09/20/2018    Flagyl [metronidazole] Rash 12/12/2019    Latex Hives 11/24/2015    Lipitor [atorvastatin calcium] Unspecified 02/03/2020    Other environmental Itching and Swelling 07/25/2022    Shellfish allergy  11/24/2015    Tetanus antitoxin Swelling 11/24/2015          /75 (BP Location: Left arm, Patient Position: Sitting, BP Cuff Size: Small adult)   Pulse 72   Resp 16   Ht 1.575 m (5' 2\")   Wt 38.7 kg (85 lb 5 oz)   SpO2 92%   BMI 15.60 kg/m²     Gen: no " fever/chills  CV: No chest pain  Resp: No SOB  GI/: No bowel or bladder complaints  Psych: No depression      Physical Exam:  General: Well-appearing and in no acute distress  HEENT: MMM, EOMI  Lungs: No respiratory distress or audible wheezing  Heart: Pulse present  Abdomen: Nondistended.  Skin: No rashes or lesions visible  EXT: Warm and well-perfused  Neuro: Nonfocal        Assessment and Plan    82 y.o. female with the following-  Problem List Items Addressed This Visit    None  Visit Diagnoses       Pulmonary emphysema, unspecified emphysema type (HCC)        Relevant Medications    fluticasone (FLONASE) 50 MCG/ACT nasal spray    Cough, unspecified type        Relevant Medications    fluticasone (FLONASE) 50 MCG/ACT nasal spray    Need for vaccination        Relevant Orders    Influenza Vaccine, High Dose (65+ Only) (Completed)           Discussed improvement potentially with flonase, vaccine today  Plan to RTC 1-2 months after using flonase daily incase it has allergy component.   Encourage to quit smoking and eat high calorie meals     Benita Block M.D.

## 2022-10-24 DIAGNOSIS — R05.2 SUBACUTE COUGH: ICD-10-CM

## 2022-10-24 DIAGNOSIS — R05.9 COUGH: ICD-10-CM

## 2022-10-24 DIAGNOSIS — J06.9 VIRAL UPPER RESPIRATORY TRACT INFECTION: ICD-10-CM

## 2022-10-24 RX ORDER — BENZONATATE 100 MG/1
100 CAPSULE ORAL 3 TIMES DAILY PRN
Qty: 30 CAPSULE | Refills: 0 | Status: SHIPPED
Start: 2022-10-24 | End: 2023-05-29

## 2022-10-25 ENCOUNTER — ANTICOAGULATION MONITORING (OUTPATIENT)
Dept: VASCULAR LAB | Facility: MEDICAL CENTER | Age: 82
End: 2022-10-25
Payer: MEDICARE

## 2022-10-25 ENCOUNTER — HOSPITAL ENCOUNTER (OUTPATIENT)
Dept: LAB | Facility: MEDICAL CENTER | Age: 82
End: 2022-10-25
Attending: NURSE PRACTITIONER
Payer: MEDICARE

## 2022-10-25 DIAGNOSIS — Z79.01 CHRONIC ANTICOAGULATION: ICD-10-CM

## 2022-10-25 DIAGNOSIS — I48.0 PAROXYSMAL A-FIB (HCC): ICD-10-CM

## 2022-10-25 LAB
INR PPP: 3.66 (ref 0.87–1.13)
PROTHROMBIN TIME: 35 SEC (ref 12–14.6)

## 2022-10-25 PROCEDURE — 36415 COLL VENOUS BLD VENIPUNCTURE: CPT

## 2022-10-25 PROCEDURE — 85610 PROTHROMBIN TIME: CPT

## 2022-10-26 NOTE — PROGRESS NOTES
OP Anticoagulation Service Note    Date: 10/25/2022    Anticoagulation Summary  As of 10/25/2022      INR goal:  2.0-3.0   TTR:  80.8 % (4.8 y)   INR used for dosing:  3.66 (10/25/2022)   Warfarin maintenance plan:  2 mg (1 mg x 2) every Tue; 1 mg (1 mg x 1) all other days   Weekly warfarin total:  8 mg   Plan last modified:  Jimbo Dunlap PharmD (3/22/2022)   Next INR check:  11/22/2022   Target end date:  Indefinite    Indications    Paroxysmal a-fib (CMS-HCC) [I48.0]  Chronic anticoagulation [Z79.01]                 Anticoagulation Episode Summary       INR check location:  Clinic Lab    Preferred lab:  [facility]    Send INR reminders to:      Comments:  [phone] (H)  [facility]          Anticoagulation Care Providers       Provider Role Specialty Phone number    Evelio Tejeda M.D. Referring Internal Medicine [phone]    [facility] Anticoagulation Services Responsible  [phone]    Beryl Pang M.D. Responsible Family Medicine [phone]          Anticoagulation Patient Findings        Patient's preferred phone number:  [phone]        HPI:   The reason for today's call is to prevent morbidity and mortality from a blood clot and/or stroke and to reduce the risk of bleeding while on a anticoagulant.     PCP:  Beryl Pang M.D.  [address] [place] [phone]    Assessment:     INR  supra-therapeutic.     Lab Results   Component Value Date/Time    BUN 15 07/26/2022 10:45 AM    CREATININE 0.68 07/26/2022 10:45 AM     Lab Results   Component Value Date/Time    HEMOGLOBIN 12.5 09/26/2022 10:45 AM    HEMATOCRIT 39.7 09/26/2022 10:45 AM    PLATELETCT 150 (L) 09/26/2022 10:45 AM    ALKPHOSPHAT 72 07/26/2022 10:45 AM    ASTSGOT 29 07/26/2022 10:45 AM    ALTSGPT 17 07/26/2022 10:45 AM          Current Outpatient Medications:     benzonatate, 100 mg, Oral, TID PRN    fluticasone, 1 Spray, Nasal, DAILY    ALPRAZolam, 0.25 mg, Oral, BID PRN    warfarin, Take one to two tablets by mouth daily  or as directed by anticoagulation clinic    albuterol, 1-2 Puff, Inhalation, Q4HRS PRN    Multiple Vitamins-Minerals (ZINC PO), Take  by mouth every day.    sotalol, TAKE 1/2 TABLET TWICE DAILY    lovastatin, 40 mg, Oral, QHS    EPINEPHrine, epinephrine 0.3 mg/0.3 mL injection, auto-injector    Probiotic Product (PROBIOTIC PO), Take  by mouth.    ascorbic acid, 1,000 mg, Oral, DAILY    ipratropium, 0.2 mg, Nebulization, 4XDAY    lactobacillus rhamnosus, 1 Capsule, Oral, DAILY    vitamin D, 2,000 Units, Oral, QAM      Plan:     1mg toay then continue the same warfarin dose, as noted above.       Follow-up:     test in 4 weeks, per pt       Additional information discussed with patient:     Asked patient to please call the anticoagulation clinic if they have any signs/symptoms of bleeding and/or thrombosis or any changes to diet or medications.      National recommendations regarding anticoagulation therapy:     The CHEST guidelines recommends frequent INR monitoring at regular intervals (a few days up to a max of 12 weeks) to ensure patients are on the proper dose of warfarin, and patients are not having any complications from therapy.  INRs can dramatically change over a short time period due to diet, medications, and medical conditions.       Pieter Covarrubias, PharmD, MS, BCACP, LCC  [facility]  Phone: [phone]  Fax: [phone]  On call: [phone]  General scheduling/information [phone]  For emergencies please dial [phone]  Please do not use [facility] [place] for urgent matters, call the phone numbers listed above.    This note was created using voice recognition software (Dragon). The accuracy of the dictation is limited by the abilities of the software. I have reviewed the note prior to signing, however some errors in grammar and context are still possible. If you have any questions related to this note please do not hesitate to contact our office.

## 2022-11-07 ENCOUNTER — PATIENT MESSAGE (OUTPATIENT)
Dept: HEALTH INFORMATION MANAGEMENT | Facility: OTHER | Age: 82
End: 2022-11-07

## 2022-11-09 ENCOUNTER — HOSPITAL ENCOUNTER (OUTPATIENT)
Dept: LAB | Facility: MEDICAL CENTER | Age: 82
End: 2022-11-09
Attending: STUDENT IN AN ORGANIZED HEALTH CARE EDUCATION/TRAINING PROGRAM
Payer: MEDICARE

## 2022-11-09 LAB
BUN SERPL-MCNC: 13 MG/DL (ref 8–22)
CREAT SERPL-MCNC: 0.77 MG/DL (ref 0.5–1.4)
GFR SERPLBLD CREATININE-BSD FMLA CKD-EPI: 76 ML/MIN/1.73 M 2

## 2022-11-09 PROCEDURE — 82565 ASSAY OF CREATININE: CPT

## 2022-11-09 PROCEDURE — 36415 COLL VENOUS BLD VENIPUNCTURE: CPT

## 2022-11-09 PROCEDURE — 84520 ASSAY OF UREA NITROGEN: CPT

## 2022-11-15 ENCOUNTER — APPOINTMENT (RX ONLY)
Dept: URBAN - METROPOLITAN AREA CLINIC 4 | Facility: CLINIC | Age: 82
Setting detail: DERMATOLOGY
End: 2022-11-15

## 2022-11-15 DIAGNOSIS — L57.0 ACTINIC KERATOSIS: ICD-10-CM

## 2022-11-15 DIAGNOSIS — I78.8 OTHER DISEASES OF CAPILLARIES: ICD-10-CM

## 2022-11-15 PROCEDURE — ? COUNSELING

## 2022-11-15 PROCEDURE — 17000 DESTRUCT PREMALG LESION: CPT

## 2022-11-15 PROCEDURE — ? LIQUID NITROGEN

## 2022-11-15 PROCEDURE — ? ADDITIONAL NOTES

## 2022-11-15 PROCEDURE — 99212 OFFICE O/P EST SF 10 MIN: CPT | Mod: 25

## 2022-11-15 ASSESSMENT — LOCATION DETAILED DESCRIPTION DERM
LOCATION DETAILED: RIGHT MEDIAL MALAR CHEEK
LOCATION DETAILED: RIGHT NASAL SIDEWALL
LOCATION DETAILED: LEFT CENTRAL MALAR CHEEK
LOCATION DETAILED: NASAL ROOT

## 2022-11-15 ASSESSMENT — LOCATION ZONE DERM
LOCATION ZONE: NOSE
LOCATION ZONE: FACE

## 2022-11-15 ASSESSMENT — LOCATION SIMPLE DESCRIPTION DERM
LOCATION SIMPLE: LEFT CHEEK
LOCATION SIMPLE: RIGHT NOSE
LOCATION SIMPLE: NOSE
LOCATION SIMPLE: RIGHT CHEEK

## 2022-11-15 NOTE — PROCEDURE: ADDITIONAL NOTES
Render Risk Assessment In Note?: no
Additional Notes: Discussed laser tortelangiectative changes.
Detail Level: Detailed

## 2022-11-15 NOTE — PROCEDURE: LIQUID NITROGEN
Show Aperture Variable?: Yes
Consent: The patient's consent was obtained including but not limited to risks of crusting, scabbing, blistering, scarring, darker or lighter pigmentary change, recurrence, incomplete removal and infection.
Detail Level: Detailed
Render Post-Care Instructions In Note?: no
Number Of Freeze-Thaw Cycles: 3 freeze-thaw cycles
Duration Of Freeze Thaw-Cycle (Seconds): 0
Post-Care Instructions: I reviewed with the patient in detail post-care instructions. Patient is to wear sunprotection, and avoid picking at any of the treated lesions. Pt may apply Vaseline to crusted or scabbing areas.

## 2022-11-17 NOTE — ASSESSMENT & PLAN NOTE
Continues to have panic attacks, worsened anxiety since death of sister and other life stressors. Xanax one daily, no or very rare alcohol use.  reviewed.   beer

## 2022-11-18 ENCOUNTER — OFFICE VISIT (OUTPATIENT)
Dept: MEDICAL GROUP | Facility: CLINIC | Age: 82
End: 2022-11-18
Payer: MEDICARE

## 2022-11-18 VITALS — HEIGHT: 59 IN | BODY MASS INDEX: 17.79 KG/M2 | RESPIRATION RATE: 16 BRPM | WEIGHT: 88.25 LBS

## 2022-11-18 DIAGNOSIS — J44.1 ACUTE EXACERBATION OF CHRONIC OBSTRUCTIVE PULMONARY DISEASE (HCC): ICD-10-CM

## 2022-11-18 PROCEDURE — 99213 OFFICE O/P EST LOW 20 MIN: CPT | Mod: GE | Performed by: STUDENT IN AN ORGANIZED HEALTH CARE EDUCATION/TRAINING PROGRAM

## 2022-11-18 ASSESSMENT — FIBROSIS 4 INDEX: FIB4 SCORE: 3.89

## 2022-11-18 NOTE — PROGRESS NOTES
HPI:  Patient is a 82 y.o. female. This pleasant patient is here today  as follow up for chronic cough- has improved significantly, no residual URI symptoms  H/o microhematuria on several UA pending urology workup- CT, cysto, renal u/s showed b/l cysts.  No simon hematuria, no dysuria or change in frequency- encouraged to keep appointments for imaging/procedure.    Problem   Acute Exacerbation of Chronic Obstructive Pulmonary Disease (Hcc)                                                                                                                                  Patient Active Problem List    Diagnosis Date Noted    Abnormal weight loss 07/25/2022    Exercise intolerance 07/25/2022    Hematochezia 09/16/2021    Acute exacerbation of chronic obstructive pulmonary disease (HCC) 12/18/2020    Personal history of anaphylaxis 12/18/2020    Coronary artery disease involving native coronary artery of native heart without angina pectoris 03/12/2019    PCI to her LAD in 2009 03/12/2019    Chronic anticoagulation 04/17/2018    Physical debility 12/05/2017    History of COPD 12/05/2017    Circulating anticoagulants ([facility]) 12/04/2017    Constipation 12/03/2017    Osteoporosis, postmenopausal 08/24/2017    Depression with anxiety 02/15/2017    Vitamin D deficiency 07/26/2016    Insomnia 07/26/2016    Ankle swelling 05/23/2016    Peripheral arterial disease ([facility]) 03/01/2016    Cigarette nicotine dependence with nicotine-induced disorder 03/01/2016    Gastroesophageal reflux disease without esophagitis 01/20/2016    Hiatal hernia 01/20/2016    Paroxysmal a-fib (CMS-[facility]) 01/20/2016    Pre-diabetes 01/20/2016    Panic attacks 01/20/2016       Current Outpatient Medications   Medication Sig Dispense Refill    benzonatate (TESSALON) 100 MG Cap Take 1 Capsule by mouth 3 times a day as needed for Cough. 30 Capsule 0    fluticasone (FLONASE) 50 MCG/ACT nasal spray Administer 1 Spray into affected nostril(S) every day. 16 g 3     "ALPRAZolam (XANAX) 0.25 MG Tab Take 1 Tablet by mouth 2 times a day as needed for Anxiety for up to 180 days. 60 Tablet 5    warfarin (COUMADIN) 1 MG Tab Take one to two tablets by mouth daily or as directed by anticoagulation clinic 180 Tablet 1    albuterol 108 (90 Base) MCG/ACT Aero Soln inhalation aerosol Inhale 1-2 Puffs every four hours as needed for Shortness of Breath. 18 g 3    Multiple Vitamins-Minerals (ZINC PO) Take  by mouth every day.      sotalol (BETAPACE) 80 MG Tab TAKE 1/2 TABLET TWICE DAILY 90 Tablet 1    lovastatin (MEVACOR) 40 MG tablet Take 1 Tablet by mouth at bedtime. 100 Tablet 3    EPINEPHrine (EPIPEN) 0.3 MG/0.3ML Solution Auto-injector solution for injection epinephrine 0.3 mg/0.3 mL injection, auto-injector      Probiotic Product (PROBIOTIC PO) Take  by mouth.      ascorbic acid (ASCORBIC ACID) 500 MG Tab Take 2 Tablets by mouth every day.      ipratropium (ATROVENT) 0.02 % Solution Take 1 mL by nebulization 4 times a day.      lactobacillus rhamnosus (CULTURELLE) Cap capsule Take 1 Capsule by mouth every day.      vitamin D (CHOLECALCIFEROL) 1000 UNIT Tab Take 2 Tablets by mouth every morning. 3000 units       No current facility-administered medications for this visit.         Allergies as of 11/18/2022 - Reviewed 10/20/2022   Allergen Reaction Noted    Penicillins Anaphylaxis and Hives 11/24/2015    Codeine Swelling 11/24/2015    Iodine Swelling 11/24/2015    Bee venom Anaphylaxis 11/24/2015    Chantix [varenicline]  06/05/2017    Ciprofloxacin  09/20/2018    Flagyl [metronidazole] Rash 12/12/2019    Latex Hives 11/24/2015    Lipitor [atorvastatin calcium] Unspecified 02/03/2020    Other environmental Itching and Swelling 07/25/2022    Shellfish allergy  11/24/2015    Tetanus antitoxin Swelling 11/24/2015          BP (!) (P) 97/61 (BP Location: Left arm, Patient Position: Sitting, BP Cuff Size: Adult)   Pulse (P) 68   Resp 16   Ht 1.499 m (4' 11\")   Wt 40 kg (88 lb 4 oz)   SpO2 " (P) 94%   BMI 17.82 kg/m²     Gen: no fever/chills  CV: No chest pain  Resp: No SOB  GI/: No bowel or bladder complaints  Psych: No depression      Physical Exam:  General: Well-appearing and in no acute distress  HEENT: MMM, EOMI  Lungs: No respiratory distress or audible wheezing  Heart: Pulse present  Abdomen: Nondistended.  Skin: No rashes or lesions visible  EXT: Warm and well-perfused  Neuro: Nonfocal        Assessment and Plan    82 y.o. female with the following-  Patient feeling well, no refills for medication needed, has f/u with PCP Dr. Pang   Problem List Items Addressed This Visit       Acute exacerbation of chronic obstructive pulmonary disease (HCC)     Residual sequelae now resolved  Continue home inhalers             Benita Block M.D.

## 2022-11-22 ENCOUNTER — HOSPITAL ENCOUNTER (OUTPATIENT)
Dept: LAB | Facility: MEDICAL CENTER | Age: 82
End: 2022-11-22
Attending: NURSE PRACTITIONER
Payer: MEDICARE

## 2022-11-22 ENCOUNTER — HOSPITAL ENCOUNTER (OUTPATIENT)
Dept: LAB | Facility: MEDICAL CENTER | Age: 82
End: 2022-11-22
Attending: FAMILY MEDICINE
Payer: MEDICARE

## 2022-11-22 ENCOUNTER — ANTICOAGULATION MONITORING (OUTPATIENT)
Dept: VASCULAR LAB | Facility: MEDICAL CENTER | Age: 82
End: 2022-11-22
Payer: MEDICARE

## 2022-11-22 DIAGNOSIS — R73.03 PRE-DIABETES: ICD-10-CM

## 2022-11-22 DIAGNOSIS — I48.0 PAROXYSMAL A-FIB (HCC): ICD-10-CM

## 2022-11-22 DIAGNOSIS — Z79.01 CHRONIC ANTICOAGULATION: ICD-10-CM

## 2022-11-22 LAB
EST. AVERAGE GLUCOSE BLD GHB EST-MCNC: 114 MG/DL
HBA1C MFR BLD: 5.6 % (ref 4–5.6)
INR PPP: 2.76 (ref 0.87–1.13)
PROTHROMBIN TIME: 28.3 SEC (ref 12–14.6)

## 2022-11-22 PROCEDURE — 83036 HEMOGLOBIN GLYCOSYLATED A1C: CPT | Mod: GA

## 2022-11-22 PROCEDURE — 85610 PROTHROMBIN TIME: CPT

## 2022-11-22 PROCEDURE — 36415 COLL VENOUS BLD VENIPUNCTURE: CPT

## 2022-11-23 NOTE — PROGRESS NOTES
OP Anticoagulation Service Note    Date: 2022    Anticoagulation Summary  As of 2022      INR goal:  2.0-3.0   TTR:  80.0 % (4.9 y)   INR used for dosin.76 (2022)   Warfarin maintenance plan:  2 mg (1 mg x 2) every Tue; 1 mg (1 mg x 1) all other days; Starting 2022   Weekly warfarin total:  8 mg   Plan last modified:  Yaneli KruseD (2022)   Next INR check:  1/3/2023   Target end date:  Indefinite    Indications    Paroxysmal a-fib (CMS-HCC) [I48.0]  Chronic anticoagulation [Z79.01]                 Anticoagulation Episode Summary       INR check location:  Clinic Lab    Preferred lab:  [facility]    Send INR reminders to:      Comments:  [phone] (H)  [facility]          Anticoagulation Care Providers       Provider Role Specialty Phone number    Evelio Tejeda M.D. Referring Internal Medicine [phone]    [facility] Anticoagulation Services Responsible  [phone]    Beryl Pang M.D. Responsible Family Medicine [phone]          Anticoagulation Patient Findings          Patient's preferred phone number:  [phone]        HPI:   The reason for today's call is to prevent morbidity and mortality from a blood clot and/or stroke and to reduce the risk of bleeding while on a anticoagulant.     PCP:  Beryl Pang M.D.  [address] [facility] [address]    Assessment:     INR  therapeutic.     Lab Results   Component Value Date/Time    BUN 13 2022 10:37 AM    CREATININE 0.77 2022 10:37 AM     Lab Results   Component Value Date/Time    HEMOGLOBIN 12.5 2022 10:45 AM    HEMATOCRIT 39.7 2022 10:45 AM    PLATELETCT 150 (L) 2022 10:45 AM    ALKPHOSPHAT 72 2022 10:45 AM    ASTSGOT 29 2022 10:45 AM    ALTSGPT 17 2022 10:45 AM          Current Outpatient Medications:     benzonatate, 100 mg, Oral, TID PRN    fluticasone, 1 Spray, Nasal, DAILY    ALPRAZolam, 0.25 mg, Oral, BID PRN    warfarin, Take one to two  tablets by mouth daily or as directed by anticoagulation clinic    albuterol, 1-2 Puff, Inhalation, Q4HRS PRN    Multiple Vitamins-Minerals (ZINC PO), Take  by mouth every day.    sotalol, TAKE 1/2 TABLET TWICE DAILY    lovastatin, 40 mg, Oral, QHS    EPINEPHrine, epinephrine 0.3 mg/0.3 mL injection, auto-injector    Probiotic Product (PROBIOTIC PO), Take  by mouth.    ascorbic acid, 1,000 mg, Oral, DAILY    ipratropium, 0.2 mg, Nebulization, 4XDAY    lactobacillus rhamnosus, 1 Capsule, Oral, DAILY    vitamin D, 2,000 Units, Oral, QAM      Plan:     Continue the same warfarin dose, as noted above.       Follow-up:     test in 4 weeks.        Additional information discussed with patient:     Asked patient to please call the anticoagulation clinic if they have any signs/symptoms of bleeding and/or thrombosis or any changes to diet or medications.      National recommendations regarding anticoagulation therapy:     The CHEST guidelines recommends frequent INR monitoring at regular intervals (a few days up to a max of 12 weeks) to ensure patients are on the proper dose of warfarin, and patients are not having any complications from therapy.  INRs can dramatically change over a short time period due to diet, medications, and medical conditions.       Pieter Covarrubias, PharmD, MS, BCACP, C  [facility]  Phone: [phone]  Fax: [phone]  On call: [phone]  General scheduling/information [phone]  For emergencies please dial [phone]  Please do not use [facility] for urgent matters, call the phone numbers listed above.    This note was created using voice recognition software (Dragon). The accuracy of the dictation is limited by the abilities of the software. I have reviewed the note prior to signing, however some errors in grammar and context are still possible. If you have any questions related to this note please do not hesitate to contact our office.

## 2022-12-02 ENCOUNTER — HOSPITAL ENCOUNTER (OUTPATIENT)
Dept: RADIOLOGY | Facility: MEDICAL CENTER | Age: 82
End: 2022-12-02
Attending: STUDENT IN AN ORGANIZED HEALTH CARE EDUCATION/TRAINING PROGRAM
Payer: MEDICARE

## 2022-12-02 DIAGNOSIS — R31.0 GROSS HEMATURIA: ICD-10-CM

## 2022-12-02 PROCEDURE — 700117 HCHG RX CONTRAST REV CODE 255: Performed by: STUDENT IN AN ORGANIZED HEALTH CARE EDUCATION/TRAINING PROGRAM

## 2022-12-02 PROCEDURE — 74178 CT ABD&PLV WO CNTR FLWD CNTR: CPT

## 2022-12-02 RX ADMIN — IOHEXOL 75 ML: 350 INJECTION, SOLUTION INTRAVENOUS at 11:24

## 2022-12-15 ENCOUNTER — OFFICE VISIT (OUTPATIENT)
Dept: CARDIOLOGY | Facility: MEDICAL CENTER | Age: 82
End: 2022-12-15
Payer: MEDICARE

## 2022-12-15 VITALS
WEIGHT: 88.9 LBS | BODY MASS INDEX: 17.92 KG/M2 | HEIGHT: 59 IN | HEART RATE: 76 BPM | DIASTOLIC BLOOD PRESSURE: 62 MMHG | OXYGEN SATURATION: 96 % | RESPIRATION RATE: 16 BRPM | SYSTOLIC BLOOD PRESSURE: 120 MMHG

## 2022-12-15 DIAGNOSIS — I73.9 PERIPHERAL ARTERIAL DISEASE (HCC): ICD-10-CM

## 2022-12-15 DIAGNOSIS — Z95.5 S/P CORONARY ARTERY STENT PLACEMENT: ICD-10-CM

## 2022-12-15 DIAGNOSIS — I48.0 PAROXYSMAL A-FIB (HCC): ICD-10-CM

## 2022-12-15 DIAGNOSIS — I25.10 CORONARY ARTERY DISEASE INVOLVING NATIVE CORONARY ARTERY OF NATIVE HEART WITHOUT ANGINA PECTORIS: ICD-10-CM

## 2022-12-15 DIAGNOSIS — I48.19 PERSISTENT ATRIAL FIBRILLATION (HCC): ICD-10-CM

## 2022-12-15 LAB — EKG IMPRESSION: NORMAL

## 2022-12-15 PROCEDURE — 93000 ELECTROCARDIOGRAM COMPLETE: CPT | Performed by: INTERNAL MEDICINE

## 2022-12-15 PROCEDURE — 99214 OFFICE O/P EST MOD 30 MIN: CPT | Performed by: INTERNAL MEDICINE

## 2022-12-15 RX ORDER — PREDNISONE 50 MG/1
TABLET ORAL
COMMUNITY
Start: 2022-11-07 | End: 2022-12-15

## 2022-12-15 RX ORDER — PREDNISONE 50 MG/1
TABLET ORAL
COMMUNITY
End: 2022-12-15

## 2022-12-15 RX ORDER — SOTALOL HYDROCHLORIDE 80 MG/1
TABLET ORAL
Qty: 90 TABLET | Refills: 3 | Status: SHIPPED | OUTPATIENT
Start: 2022-12-15 | End: 2024-03-01

## 2022-12-15 ASSESSMENT — FIBROSIS 4 INDEX: FIB4 SCORE: 3.89

## 2022-12-15 NOTE — PROGRESS NOTES
Cardiology Follow-up Consultation Note        CC: Follow-up coronary artery disease, atrial fibrillation    Patient ID/HPI:   82-year-old male patient with history of coronary artery disease, PCI of LAD, persistent atrial fibrillation maintained sinus rhythm on sotalol therapy, peripheral vascular disease, here for follow-up.  Since last evaluated by Dr. Carmona she had COVID, pneumonia, bronchitis.  She recovered well has been feeling better.  Denies chest pain, shortness of breath.  Lower extremity pain is not significant, follows up with Dr. Olmos        Past Medical History:   Diagnosis Date    Arthritis     BL hands    CAD (coronary artery disease)     Cancer ([facility]) 1982    melanoma    COPD (chronic obstructive pulmonary disease) ([facility])     Croup 3 years old    Diverticulosis     Dyslipidemia     GERD (gastroesophageal reflux disease)     Hiatal hernia     High cholesterol     Hypertension     Infectious disease 2018    C Diff    Measles     5 years old    Paroxysmal A-fib ([facility]) 1/20/2016    Symptomatic, on a rhythm control strategy with sotalol 40 mg by mouth twice a day.     Paroxysmal atrial fibrillation ([facility])     Prediabetes     PVD (peripheral vascular disease) ([facility])          Current Outpatient Medications   Medication Sig Dispense Refill    sotalol (BETAPACE) 80 MG Tab TAKE 1/2 TABLET TWICE DAILY Strength: 80 mg 90 Tablet 3    benzonatate (TESSALON) 100 MG Cap Take 1 Capsule by mouth 3 times a day as needed for Cough. 30 Capsule 0    fluticasone (FLONASE) 50 MCG/ACT nasal spray Administer 1 Spray into affected nostril(S) every day. 16 g 3    ALPRAZolam (XANAX) 0.25 MG Tab Take 1 Tablet by mouth 2 times a day as needed for Anxiety for up to 180 days. 60 Tablet 5    warfarin (COUMADIN) 1 MG Tab Take one to two tablets by mouth daily or as directed by anticoagulation clinic 180 Tablet 1    albuterol 108 (90 Base) MCG/ACT Aero Soln inhalation aerosol Inhale 1-2 Puffs every four hours as needed for  "Shortness of Breath. 18 g 3    Multiple Vitamins-Minerals (ZINC PO) Take  by mouth every day.      lovastatin (MEVACOR) 40 MG tablet Take 1 Tablet by mouth at bedtime. 100 Tablet 3    EPINEPHrine (EPIPEN) 0.3 MG/0.3ML Solution Auto-injector solution for injection epinephrine 0.3 mg/0.3 mL injection, auto-injector      Probiotic Product (PROBIOTIC PO) Take  by mouth.      ascorbic acid (ASCORBIC ACID) 500 MG Tab Take 2 Tablets by mouth every day.      ipratropium (ATROVENT) 0.02 % Solution Take 1 mL by nebulization 4 times a day.      lactobacillus rhamnosus (CULTURELLE) Cap capsule Take 1 Capsule by mouth every day.      vitamin D (CHOLECALCIFEROL) 1000 UNIT Tab Take 2 Tablets by mouth every morning. 3000 units       No current facility-administered medications for this visit.         Physical Exam:  Ambulatory Vitals  /62 (BP Location: Left arm, Patient Position: Sitting, BP Cuff Size: Adult)   Pulse 76   Resp 16   Ht 1.499 m (4' 11\")   Wt 40.3 kg (88 lb 14.4 oz)   SpO2 96%    Oxygen Therapy:  Pulse Oximetry: 96 %  BP Readings from Last 4 Encounters:   12/15/22 120/62   11/18/22 (!) (P) 97/61   10/20/22 125/75   09/25/22 128/60       Weight/BMI: Body mass index is 17.96 kg/m².  Wt Readings from Last 4 Encounters:   12/15/22 40.3 kg (88 lb 14.4 oz)   11/18/22 40 kg (88 lb 4 oz)   10/20/22 38.7 kg (85 lb 5 oz)   09/25/22 40.4 kg (89 lb)       General: Well appearing and in no apparent distress  Neck: JVP absent, carotid bruits absent  Lungs: respiratory sounds  normal, additional breath sounds absent  Heart: Regular rhythm,   No palpable thrills on palpation, murmurs absent, no rubs,   Lower extremity edema absent.     EKG today shows sinus rhythm,     Echocardiogram from 2018 reviewed, independently interpreted shows normal LV, RV function, mild valvular regurgitation    Medical Decision Making:  Problem List Items Addressed This Visit       Paroxysmal a-fib (CMS-[facility])    Relevant Medications    " sotalol (BETAPACE) 80 MG Tab    Peripheral arterial disease (HCC)    Relevant Medications    sotalol (BETAPACE) 80 MG Tab    Coronary artery disease involving native coronary artery of native heart without angina pectoris    Relevant Medications    sotalol (BETAPACE) 80 MG Tab    PCI to her LAD in 2009     Other Visit Diagnoses       Persistent atrial fibrillation (HCC)        Relevant Medications    sotalol (BETAPACE) 80 MG Tab          She is doing well from cardiac standpoint.  No palpitations chest pain, shortness of breath.  EKG shows sinus rhythm, slightly prolonged  expected with sotalol therapy  She follows up with Dr. Olmos for peripheral vascular disease.  Renewed sotalol.  Continue warfarin for anticoagulation.  Continue lovastatin.    Return in about 6 months (around 6/15/2023).      Ethan ENRIQUEZ  Interventional cardiologist  [facility], [address]  [address] [address]  Phone: [phone]  Fax: [phone]

## 2022-12-20 ENCOUNTER — ANTICOAGULATION MONITORING (OUTPATIENT)
Dept: CARDIOLOGY | Facility: MEDICAL CENTER | Age: 82
End: 2022-12-20
Payer: MEDICARE

## 2022-12-20 ENCOUNTER — HOSPITAL ENCOUNTER (OUTPATIENT)
Dept: LAB | Facility: MEDICAL CENTER | Age: 82
End: 2022-12-20
Attending: NURSE PRACTITIONER
Payer: MEDICARE

## 2022-12-20 DIAGNOSIS — I48.0 PAROXYSMAL A-FIB (HCC): ICD-10-CM

## 2022-12-20 DIAGNOSIS — Z79.01 CHRONIC ANTICOAGULATION: ICD-10-CM

## 2022-12-20 LAB
INR PPP: 2.75 (ref 0.87–1.13)
PROTHROMBIN TIME: 28.2 SEC (ref 12–14.6)

## 2022-12-20 PROCEDURE — 36415 COLL VENOUS BLD VENIPUNCTURE: CPT

## 2022-12-20 PROCEDURE — 85610 PROTHROMBIN TIME: CPT

## 2022-12-20 NOTE — PROGRESS NOTES
OP Anticoagulation Service Note    Date: 2022    Anticoagulation Summary  As of 2022      INR goal:  2.0-3.0   TTR:  80.3 % (5 y)   INR used for dosin.75 (2022)   Warfarin maintenance plan:  2 mg (1 mg x 2) every Tue; 1 mg (1 mg x 1) all other days; Starting 2022   Weekly warfarin total:  8 mg   Plan last modified:  Yaneli KruseD (2022)   Next INR check:  2023   Target end date:  Indefinite    Indications    Paroxysmal a-fib (CMS-HCC) [I48.0]  Chronic anticoagulation [Z79.01]                 Anticoagulation Episode Summary       INR check location:  Clinic Lab    Preferred lab:  [facility]    Send INR reminders to:      Comments:  [phone] (H)  [facility]          Anticoagulation Care Providers       Provider Role Specialty Phone number    Evelio Tejeda M.D. Referring Internal Medicine [phone]    [facility] Anticoagulation Services Responsible  [phone]    Beryl Pang M.D. Responsible Family Medicine [phone]          Anticoagulation Patient Findings          Patient's preferred phone number:  [phone]        HPI:   The reason for today's call is to prevent morbidity and mortality from a blood clot and/or stroke and to reduce the risk of bleeding while on a anticoagulant.     PCP:  Beryl Pang M.D.  [address] [facility] [address]    Assessment:     INR  therapeutic.     Lab Results   Component Value Date/Time    BUN 13 2022 10:37 AM    CREATININE 0.77 2022 10:37 AM     Lab Results   Component Value Date/Time    HEMOGLOBIN 12.5 2022 10:45 AM    HEMATOCRIT 39.7 2022 10:45 AM    PLATELETCT 150 (L) 2022 10:45 AM    ALKPHOSPHAT 72 2022 10:45 AM    ASTSGOT 29 2022 10:45 AM    ALTSGPT 17 2022 10:45 AM          Current Outpatient Medications:     sotalol, TAKE 1/2 TABLET TWICE DAILY Strength: 80 mg    benzonatate, 100 mg, Oral, TID PRN    fluticasone, 1 Spray, Nasal, DAILY    ALPRAZolam,  0.25 mg, Oral, BID PRN    warfarin, Take one to two tablets by mouth daily or as directed by anticoagulation clinic    albuterol, 1-2 Puff, Inhalation, Q4HRS PRN    Multiple Vitamins-Minerals (ZINC PO), Take  by mouth every day.    lovastatin, 40 mg, Oral, QHS    EPINEPHrine, epinephrine 0.3 mg/0.3 mL injection, auto-injector    Probiotic Product (PROBIOTIC PO), Take  by mouth.    ascorbic acid, 1,000 mg, Oral, DAILY    ipratropium, 0.2 mg, Nebulization, 4XDAY    lactobacillus rhamnosus, 1 Capsule, Oral, DAILY    vitamin D, 2,000 Units, Oral, QAM      Plan:     Continue the same warfarin dose, as noted above.       Follow-up:     test in 6 weeks.        Additional information discussed with patient:     Asked patient to please call the anticoagulation clinic if they have any signs/symptoms of bleeding and/or thrombosis or any changes to diet or medications.      National recommendations regarding anticoagulation therapy:     The CHEST guidelines recommends frequent INR monitoring at regular intervals (a few days up to a max of 12 weeks) to ensure patients are on the proper dose of warfarin, and patients are not having any complications from therapy.  INRs can dramatically change over a short time period due to diet, medications, and medical conditions.       Pieter Covarrubias, PharmD, MS, BCACP, C  [facility]  Phone: [phone]  Fax: [phone]  On call: [phone]  General scheduling/information [phone]  For emergencies please dial [phone]  Please do not use [facility] for urgent matters, call the phone numbers listed above.    This note was created using voice recognition software (Dragon). The accuracy of the dictation is limited by the abilities of the software. I have reviewed the note prior to signing, however some errors in grammar and context are still possible. If you have any questions related to this note please do not hesitate to contact our office.

## 2022-12-21 ENCOUNTER — PATIENT MESSAGE (OUTPATIENT)
Dept: CARDIOLOGY | Facility: MEDICAL CENTER | Age: 82
End: 2022-12-21
Payer: MEDICARE

## 2022-12-27 VITALS — HEIGHT: 62 IN | BODY MASS INDEX: 16.26 KG/M2

## 2023-01-24 ENCOUNTER — HOSPITAL ENCOUNTER (OUTPATIENT)
Dept: LAB | Facility: MEDICAL CENTER | Age: 83
End: 2023-01-24
Attending: NURSE PRACTITIONER
Payer: MEDICARE

## 2023-01-24 ENCOUNTER — ANTICOAGULATION MONITORING (OUTPATIENT)
Dept: VASCULAR LAB | Facility: MEDICAL CENTER | Age: 83
End: 2023-01-24
Payer: MEDICARE

## 2023-01-24 DIAGNOSIS — Z79.01 CHRONIC ANTICOAGULATION: ICD-10-CM

## 2023-01-24 DIAGNOSIS — I48.0 PAROXYSMAL A-FIB (HCC): ICD-10-CM

## 2023-01-24 LAB
INR PPP: 2.86 (ref 0.87–1.13)
PROTHROMBIN TIME: 29.1 SEC (ref 12–14.6)

## 2023-01-24 PROCEDURE — 36415 COLL VENOUS BLD VENIPUNCTURE: CPT

## 2023-01-24 PROCEDURE — 85610 PROTHROMBIN TIME: CPT

## 2023-01-25 NOTE — PROGRESS NOTES
Anticoagulation Summary  As of 2023      INR goal:  2.0-3.0   TTR:  80.7 % (5.1 y)   INR used for dosin.86 (2023)   Warfarin maintenance plan:  2 mg (1 mg x 2) every Tue; 1 mg (1 mg x 1) all other days   Weekly warfarin total:  8 mg   Plan last modified:  Pieter Covarrubias, PharmD (2022)   Next INR check:  2023   Target end date:  Indefinite    Indications    Paroxysmal a-fib (CMS-HCC) [I48.0]  Chronic anticoagulation [Z79.01]                 Anticoagulation Episode Summary       INR check location:  Clinic Lab    Preferred lab:  [facility]    Send INR reminders to:      Comments:  [phone] (H)  [facility]          Anticoagulation Care Providers       Provider Role Specialty Phone number    Evelio Tejeda M.D. Referring Internal Medicine [phone]    [facility] Anticoagulation Services Responsible  [phone]    Beryl Pang M.D. Responsible Family Medicine [phone]          Anticoagulation Patient Findings  Patient Findings       Positives:  Signs/symptoms of bleeding (nose bloods - all have stopped within 15 minutes)    Negatives:  Signs/symptoms of thrombosis, Laboratory test error suspected, Change in health, Change in alcohol use, Change in activity, Upcoming invasive procedure, Emergency department visit, Upcoming dental procedure, Missed doses, Extra doses, Change in medications, Change in diet/appetite, Hospital admission, Bruising, Other complaints          Spoke with patient today regarding therapeutic INR of 2.86.  Patient denies any signs/symptoms of bruising or bleeding or any changes in diet and medications.  Instructed patient to call clinic with any questions or concerns.    Pt is to continue with current warfarin dosing regimen.  Per patient preference will follow up in 4 weeks.     Pt is not on antiplatelet therapy    Facundo Mclaughlin, Pharmacy Intern

## 2023-02-15 ENCOUNTER — TELEPHONE (OUTPATIENT)
Dept: MEDICAL GROUP | Facility: CLINIC | Age: 83
End: 2023-02-15
Payer: MEDICARE

## 2023-03-03 ENCOUNTER — ANTICOAGULATION MONITORING (OUTPATIENT)
Dept: VASCULAR LAB | Facility: MEDICAL CENTER | Age: 83
End: 2023-03-03
Payer: MEDICARE

## 2023-03-03 ENCOUNTER — HOSPITAL ENCOUNTER (OUTPATIENT)
Dept: LAB | Facility: MEDICAL CENTER | Age: 83
End: 2023-03-03
Attending: FAMILY MEDICINE
Payer: MEDICARE

## 2023-03-03 DIAGNOSIS — Z79.01 CHRONIC ANTICOAGULATION: ICD-10-CM

## 2023-03-03 DIAGNOSIS — I48.0 PAROXYSMAL A-FIB (HCC): ICD-10-CM

## 2023-03-03 LAB
INR PPP: 2.91 (ref 0.87–1.13)
PROTHROMBIN TIME: 29.4 SEC (ref 12–14.6)

## 2023-03-03 PROCEDURE — 36415 COLL VENOUS BLD VENIPUNCTURE: CPT

## 2023-03-03 PROCEDURE — 85610 PROTHROMBIN TIME: CPT

## 2023-03-04 NOTE — PROGRESS NOTES
Anticoagulation Summary  As of 3/3/2023      INR goal:  2.0-3.0   TTR:  81.1 % (5.2 y)   INR used for dosin.91 (3/3/2023)   Warfarin maintenance plan:  2 mg (1 mg x 2) every Tue; 1 mg (1 mg x 1) all other days   Weekly warfarin total:  8 mg   Plan last modified:  Pieter Covarrubias PharmD (2022)   Next INR check:  2023   Target end date:  Indefinite    Indications    Paroxysmal a-fib (CMS-HCC) [I48.0]  Chronic anticoagulation [Z79.01]                 Anticoagulation Episode Summary       INR check location:  Clinic Lab    Preferred lab:  [facility]    Send INR reminders to:      Comments:  [phone] (H)  [facility]          Anticoagulation Care Providers       Provider Role Specialty Phone number    Evelio Tejeda M.D. Referring Internal Medicine [phone]    [facility] Anticoagulation Services Responsible  [phone]    Beryl Pang M.D. Responsible Family Medicine [phone]          Anticoagulation Patient Findings    Left voicemail message to report a therapeutic INR of 2.91.  Requested pt contact the clinic for any s/s of unusual bleeding, bruising, clotting or any changes to diet or medication.   Pt is to continue with current warfarin dosing regimen.    Pt is not on antiplatelet therapy      FU 6 weeks.  Major Hughes, YaneliD, BCACP

## 2023-03-14 DIAGNOSIS — J43.9 PULMONARY EMPHYSEMA, UNSPECIFIED EMPHYSEMA TYPE (HCC): ICD-10-CM

## 2023-03-15 RX ORDER — ALBUTEROL SULFATE 90 UG/1
AEROSOL, METERED RESPIRATORY (INHALATION)
Qty: 18 G | Refills: 3 | Status: SHIPPED | OUTPATIENT
Start: 2023-03-15 | End: 2023-07-11

## 2023-03-28 ENCOUNTER — HOSPITAL ENCOUNTER (OUTPATIENT)
Dept: LAB | Facility: MEDICAL CENTER | Age: 83
End: 2023-03-28
Attending: NURSE PRACTITIONER
Payer: MEDICARE

## 2023-03-28 ENCOUNTER — ANTICOAGULATION MONITORING (OUTPATIENT)
Dept: CARDIOLOGY | Facility: MEDICAL CENTER | Age: 83
End: 2023-03-28
Payer: MEDICARE

## 2023-03-28 DIAGNOSIS — I48.0 PAROXYSMAL A-FIB (HCC): ICD-10-CM

## 2023-03-28 DIAGNOSIS — Z79.01 CHRONIC ANTICOAGULATION: ICD-10-CM

## 2023-03-28 LAB
INR PPP: 2.59 (ref 0.87–1.13)
PROTHROMBIN TIME: 26.9 SEC (ref 12–14.6)

## 2023-03-28 PROCEDURE — 85610 PROTHROMBIN TIME: CPT

## 2023-03-28 PROCEDURE — 36415 COLL VENOUS BLD VENIPUNCTURE: CPT

## 2023-03-28 NOTE — PROGRESS NOTES
Anticoagulation Summary  As of 3/28/2023      INR goal:  2.0-3.0   TTR:  81.4 % (5.3 y)   INR used for dosin.59 (3/28/2023)   Warfarin maintenance plan:  2 mg (1 mg x 2) every Tue; 1 mg (1 mg x 1) all other days   Weekly warfarin total:  8 mg   Plan last modified:  Pieter Covarrubias, PharmD (2022)   Next INR check:     Target end date:  Indefinite    Indications    Paroxysmal a-fib (CMS-HCC) [I48.0]  Chronic anticoagulation [Z79.01]                 Anticoagulation Episode Summary       INR check location:  Clinic Lab    Preferred lab:  [facility]    Send INR reminders to:      Comments:  [phone] (H)  [facility]          Anticoagulation Care Providers       Provider Role Specialty Phone number    Evelio Tejeda M.D. Referring Internal Medicine [phone]    [facility] Anticoagulation Services Responsible  [phone]    Beryl Pang M.D. Responsible Family Medicine [phone]            Refer to Anticoagulation Patient Findings for HPI      Spoke with patient  to report a therapeutic INR.      Pt is NOT on antiplatelet therapy   Pt instructed to continue with current warfarin dosing regimen, confirms dosing.   Will follow up in 7 week(s).     Lux Donato, Pharmacy Intern

## 2023-04-25 ENCOUNTER — HOSPITAL ENCOUNTER (OUTPATIENT)
Dept: LAB | Facility: MEDICAL CENTER | Age: 83
End: 2023-04-25
Attending: NURSE PRACTITIONER
Payer: MEDICARE

## 2023-04-25 ENCOUNTER — ANTICOAGULATION MONITORING (OUTPATIENT)
Dept: MEDICAL GROUP | Facility: PHYSICIAN GROUP | Age: 83
End: 2023-04-25
Payer: MEDICARE

## 2023-04-25 DIAGNOSIS — Z79.01 CHRONIC ANTICOAGULATION: ICD-10-CM

## 2023-04-25 DIAGNOSIS — I48.0 PAROXYSMAL A-FIB (HCC): ICD-10-CM

## 2023-04-25 LAB
INR PPP: 2.67 (ref 0.87–1.13)
PROTHROMBIN TIME: 27.6 SEC (ref 12–14.6)

## 2023-04-25 PROCEDURE — 85610 PROTHROMBIN TIME: CPT

## 2023-04-25 PROCEDURE — 36415 COLL VENOUS BLD VENIPUNCTURE: CPT

## 2023-04-26 NOTE — PROGRESS NOTES
Anticoagulation Summary  As of 2023      INR goal:  2.0-3.0   TTR:  81.6 % (5.3 y)   INR used for dosin.67 (2023)   Warfarin maintenance plan:  2 mg (1 mg x 2) every Tue; 1 mg (1 mg x 1) all other days   Weekly warfarin total:  8 mg   Plan last modified:  Pieter Covarrubias PharmD (2022)   Next INR check:  2023   Target end date:  Indefinite    Indications    Paroxysmal a-fib (CMS-HCC) [I48.0]  Chronic anticoagulation [Z79.01]                 Anticoagulation Episode Summary       INR check location:  Clinic Lab    Preferred lab:  [facility]    Send INR reminders to:      Comments:  [phone] (H)  [facility]          Anticoagulation Care Providers       Provider Role Specialty Phone number    Evelio Tejeda M.D. Referring Internal Medicine [phone]    [facility] Anticoagulation Services Responsible  [phone]    Beryl Pang M.D. Responsible Family Medicine [phone]            Refer to Anticoagulation Patient Findings for HPI  Patient Findings       Negatives:  Signs/symptoms of thrombosis, Signs/symptoms of bleeding, Laboratory test error suspected, Change in health, Change in alcohol use, Change in activity, Upcoming invasive procedure, Emergency department visit, Upcoming dental procedure, Missed doses, Extra doses, Change in medications, Change in diet/appetite, Hospital admission, Bruising, Other complaints            Spoke with patient to report a therapeutic INR.      Pt is NOT on antiplatelet therapy    Pt instructed to continue with current warfarin dosing regimen, confirms dosing.   Will follow up in 5 week(s) per pt preference (initially recommended 7 wks).     Keren Abdi, PharmD

## 2023-05-02 DIAGNOSIS — Z79.01 CHRONIC ANTICOAGULATION: ICD-10-CM

## 2023-05-29 ENCOUNTER — HOSPITAL ENCOUNTER (OUTPATIENT)
Facility: MEDICAL CENTER | Age: 83
End: 2023-05-29
Payer: MEDICARE

## 2023-05-29 ENCOUNTER — OFFICE VISIT (OUTPATIENT)
Dept: URGENT CARE | Facility: PHYSICIAN GROUP | Age: 83
End: 2023-05-29
Payer: MEDICARE

## 2023-05-29 VITALS
WEIGHT: 88 LBS | TEMPERATURE: 97.5 F | SYSTOLIC BLOOD PRESSURE: 130 MMHG | DIASTOLIC BLOOD PRESSURE: 70 MMHG | RESPIRATION RATE: 16 BRPM | OXYGEN SATURATION: 97 % | BODY MASS INDEX: 17.74 KG/M2 | HEIGHT: 59 IN | HEART RATE: 64 BPM

## 2023-05-29 DIAGNOSIS — R10.13 EPIGASTRIC PAIN: ICD-10-CM

## 2023-05-29 DIAGNOSIS — R07.89 CHEST HEAVINESS: ICD-10-CM

## 2023-05-29 DIAGNOSIS — R31.0 GROSS HEMATURIA: ICD-10-CM

## 2023-05-29 LAB
APPEARANCE UR: NORMAL
BILIRUB UR STRIP-MCNC: NORMAL MG/DL
COLOR UR AUTO: NORMAL
GLUCOSE UR STRIP.AUTO-MCNC: NEGATIVE MG/DL
KETONES UR STRIP.AUTO-MCNC: NORMAL MG/DL
LEUKOCYTE ESTERASE UR QL STRIP.AUTO: NORMAL
NITRITE UR QL STRIP.AUTO: NEGATIVE
PH UR STRIP.AUTO: 5.5 [PH] (ref 5–8)
PROT UR QL STRIP: 300 MG/DL
RBC UR QL AUTO: NORMAL
SP GR UR STRIP.AUTO: 1.02
UROBILINOGEN UR STRIP-MCNC: 1 MG/DL

## 2023-05-29 PROCEDURE — 93000 ELECTROCARDIOGRAM COMPLETE: CPT

## 2023-05-29 PROCEDURE — 3075F SYST BP GE 130 - 139MM HG: CPT

## 2023-05-29 PROCEDURE — 3078F DIAST BP <80 MM HG: CPT

## 2023-05-29 PROCEDURE — 87086 URINE CULTURE/COLONY COUNT: CPT

## 2023-05-29 PROCEDURE — 81002 URINALYSIS NONAUTO W/O SCOPE: CPT

## 2023-05-29 PROCEDURE — 99214 OFFICE O/P EST MOD 30 MIN: CPT

## 2023-05-29 ASSESSMENT — ENCOUNTER SYMPTOMS
ABDOMINAL PAIN: 1
FEVER: 0
IRREGULAR HEARTBEAT: 0
NAUSEA: 0
CHILLS: 0
VOMITING: 0
PALPITATIONS: 0
SHORTNESS OF BREATH: 0
DIZZINESS: 0

## 2023-05-29 ASSESSMENT — FIBROSIS 4 INDEX: FIB4 SCORE: 3.94

## 2023-05-29 NOTE — PROGRESS NOTES
"Subjective     Angie Root is a 83 y.o. female who presents with Blood in Urine (X5 days)            Chest Pain   This is a new problem. The current episode started in the past 7 days. The onset quality is gradual. The problem occurs intermittently (usually AM). The pain is present in the lateral region. The pain is mild. The quality of the pain is described as heavy. The pain radiates to the left arm. Associated symptoms include abdominal pain (suprapubic discomfort). Pertinent negatives include no dizziness, fever, irregular heartbeat, nausea, palpitations, shortness of breath or vomiting. She has tried nothing for the symptoms. Risk factors include being elderly.     Patient presents with hematuria for 4 to 5 days now.  She reports noticing blood in the toilet water as well as in the tissue when she wipes herself.  She denies any dysuria, urinary urgency, or flank pains.  She does report urinary frequency but attributes increasing her oral fluid intake.  Patient takes warfarin for atrial fibrillation.  She has history of kidney stones and was seeing urology. Patient had CT abdomen on 12/2/22. Bilateral nonobstructing renal stones measuring up to 4.1 mm. No hydronephrosis and no ureteral stones.  Patient was advised that she will eventually pass the stone.    Patient further endorses chest heaviness for 7 days now.  She reports her \"heaviness\" is mostly in the epigastric area as well as in the left parasternal area.  She also notes intermittent heaviness on her left arm.  She reports feeling this mostly in the morning when she wakes up.  She denies any nausea or vomiting.  She denies any shortness of breath, palpitations, or irregular heartbeat.  She further denies any dizziness.  She does follow-up with cardiology and anticoagulation clinic for her warfarin.     Spent over 40 minutes with patient visit including chart review and documentation.  Patient had multiple issues that she wanted " addressed.    Patient's current problem list, medications, and past medical/surgical history were reviewed in Epic.    PMH:  has a past medical history of Arthritis, CAD (coronary artery disease), Cancer (HCC) (1982), COPD (chronic obstructive pulmonary disease) (HCC), Croup (3 years old), Diverticulosis, Dyslipidemia, GERD (gastroesophageal reflux disease), Hiatal hernia, High cholesterol, Hypertension, Infectious disease (2018), Measles, Paroxysmal A-fib (HCC) (1/20/2016), Paroxysmal atrial fibrillation ([facility]), Prediabetes, and PVD (peripheral vascular disease) ([facility]).  MEDS:   Current Outpatient Medications:     albuterol 108 (90 Base) MCG/ACT Aero Soln inhalation aerosol, INHALE ONE TO TWO PUFFS BY MOUTH EVERY 4 HOURS AS NEEDED FOR SHORTNESS OF BREATH, Disp: 18 g, Rfl: 3    lovastatin (MEVACOR) 40 MG tablet, TAKE 1 TABLET AT BEDTIME, Disp: 100 Tablet, Rfl: 3    warfarin (COUMADIN) 1 MG Tab, TAKE ONE TO TWO TABLETS DAILY OR AS DIRECTED BY ANTICOAGULATION CLINIC, Disp: 180 Tablet, Rfl: 1    ALPRAZolam (XANAX) 0.25 MG Tab, TAKE ONE TABLET BY MOUTH TWICE A DAY AS NEEDED FOR ANXIETY, Disp: 60 Tablet, Rfl: 2    sotalol (BETAPACE) 80 MG Tab, TAKE 1/2 TABLET TWICE DAILY Strength: 80 mg, Disp: 90 Tablet, Rfl: 3    fluticasone (FLONASE) 50 MCG/ACT nasal spray, Administer 1 Spray into affected nostril(S) every day., Disp: 16 g, Rfl: 3    Multiple Vitamins-Minerals (ZINC PO), Take  by mouth every day., Disp: , Rfl:     EPINEPHrine (EPIPEN) 0.3 MG/0.3ML Solution Auto-injector solution for injection, epinephrine 0.3 mg/0.3 mL injection, auto-injector, Disp: , Rfl:     Probiotic Product (PROBIOTIC PO), Take  by mouth., Disp: , Rfl:     ascorbic acid (ASCORBIC ACID) 500 MG Tab, Take 2 Tablets by mouth every day., Disp: , Rfl:     lactobacillus rhamnosus (CULTURELLE) Cap capsule, Take 1 Capsule by mouth every day., Disp: , Rfl:     vitamin D (CHOLECALCIFEROL) 1000 UNIT Tab, Take 2 Tablets by mouth every morning. 3000 units, Disp:  , Rfl:   ALLERGIES:   Allergies   Allergen Reactions    Penicillins Anaphylaxis and Hives    Codeine Swelling    Iodine Swelling    Bee Venom Anaphylaxis    Chantix [Varenicline]      disoriented    Ciprofloxacin      Causes C diff, recurrent and very difficult    Flagyl [Metronidazole] Rash     C/O flagyl causes rash.     Latex Hives    Lipitor [Atorvastatin Calcium] Unspecified     Intense Stomach pain    Other Environmental Itching and Swelling    Shellfish Allergy      unknown    Tetanus Antitoxin Swelling     unknown     SURGHX:   Past Surgical History:   Procedure Laterality Date    FECAL TRANSPLANT N/A 4/9/2018    Procedure: FECAL TRANSPLANT;  Surgeon: Gonzalez Cruz M.D.;  Location: ENDOSCOPY [facility];  Service: Gastroenterology    COLONOSCOPY - ENDO N/A 4/9/2018    Procedure: COLONOSCOPY - ENDO;  Surgeon: Gonzalez Cruz M.D.;  Location: ENDOSCOPY [facility];  Service: Gastroenterology    WIDE EXCISION MELANOMA, LEG, W/POSS.STSG  10/2015    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 She started smoking about 66 years ago. She has a 15.00 pack-year smoking history. She has never used smokeless tobacco. She reports that she does not drink alcohol and does not use drugs.  FH: Reviewed with patient, not pertinent to this visit.       Review of Systems   Constitutional:  Negative for chills and fever.   Respiratory:  Negative for shortness of breath.    Cardiovascular:  Positive for chest pain. Negative for palpitations.   Gastrointestinal:  Positive for abdominal pain (suprapubic discomfort). Negative for nausea and vomiting.   Genitourinary:  Positive for frequency and hematuria. Negative for dysuria and urgency.  "  Neurological:  Negative for dizziness.              Objective     /70 (BP Location: Right arm, Patient Position: Sitting, BP Cuff Size: Adult)   Pulse 64   Temp 36.4 °C (97.5 °F) (Temporal)   Resp 16   Ht 1.499 m (4' 11\")   Wt 39.9 kg (88 lb)   SpO2 97%   BMI 17.77 kg/m²      Physical Exam  Constitutional:       Appearance: Normal appearance.   HENT:      Head: Normocephalic.      Nose: Nose normal.   Eyes:      Extraocular Movements: Extraocular movements intact.   Cardiovascular:      Rate and Rhythm: Normal rate and regular rhythm.      Pulses: Normal pulses.      Heart sounds: Normal heart sounds.   Pulmonary:      Effort: Pulmonary effort is normal.      Breath sounds: Normal breath sounds.   Abdominal:      General: Abdomen is flat.      Palpations: Abdomen is soft.      Tenderness: There is abdominal tenderness in the epigastric area.   Musculoskeletal:         General: Normal range of motion.      Cervical back: Normal range of motion.   Skin:     General: Skin is warm and dry.   Neurological:      General: No focal deficit present.      Mental Status: She is alert.   Psychiatric:         Mood and Affect: Mood normal.         Behavior: Behavior normal.         Judgment: Judgment normal.         Assessment & Plan     1. Chest heaviness    - EKG - Clinic Performed    2. Epigastric pain    - continue pantoprazole 20 mg twice daily for 14 days    3. Gross hematuria    - POCT Urinalysis  - URINE CULTURE(NEW); Future       EKG was performed in the clinic.  Her EKG showed sinus rhythm, left atrial enlargement, LAD, consider left anterior fascicular block.  She did not appear to be in acute cardiorespiratory distress.  Patient was advised to go to the emergency room for further evaluation and management, however, she refused this.  She was advised to follow-up with her cardiologist.  She also has some epigastric tenderness.  She takes Protonix 10 mg as needed.  She is advised to take this 20 mg twice " daily for 14 days.  Advised on avoidance of triggers such as coffee, acidic foods, alcohol.  Her urinalysis showed hematuria.  This was sent for culture and will be treated accordingly once results are available.  Patient has history of renal/kidney stones in the past.  Although she has had had no flank pains, it is likely that hematuria is related to renal stone.  Patient is on Coumadin/warfarin, which could also be the source of bleeding.  Patient is advised to follow-up with urology for further evaluation management.  Discussed treatment plan with patient, she is agreeable and verbalized understanding.  Educated patient on signs and symptoms watch out for, when to return to the clinic or go to the ER.    Electronically Signed by LENA Agee

## 2023-05-30 ENCOUNTER — ANTICOAGULATION MONITORING (OUTPATIENT)
Dept: CARDIOLOGY | Facility: MEDICAL CENTER | Age: 83
End: 2023-05-30
Payer: MEDICARE

## 2023-05-30 ENCOUNTER — HOSPITAL ENCOUNTER (OUTPATIENT)
Dept: LAB | Facility: MEDICAL CENTER | Age: 83
End: 2023-05-30
Attending: NURSE PRACTITIONER
Payer: MEDICARE

## 2023-05-30 DIAGNOSIS — I48.0 PAROXYSMAL A-FIB (HCC): ICD-10-CM

## 2023-05-30 DIAGNOSIS — Z79.01 CHRONIC ANTICOAGULATION: ICD-10-CM

## 2023-05-30 LAB
INR PPP: 2.56 (ref 0.87–1.13)
PROTHROMBIN TIME: 26.7 SEC (ref 12–14.6)

## 2023-05-30 PROCEDURE — 85610 PROTHROMBIN TIME: CPT

## 2023-05-30 PROCEDURE — 36415 COLL VENOUS BLD VENIPUNCTURE: CPT

## 2023-05-31 ENCOUNTER — PATIENT MESSAGE (OUTPATIENT)
Dept: MEDICAL GROUP | Facility: CLINIC | Age: 83
End: 2023-05-31
Payer: MEDICARE

## 2023-05-31 DIAGNOSIS — R31.0 GROSS HEMATURIA: ICD-10-CM

## 2023-05-31 DIAGNOSIS — Z79.01 CHRONIC ANTICOAGULATION: ICD-10-CM

## 2023-05-31 DIAGNOSIS — I25.10 CORONARY ARTERY DISEASE INVOLVING NATIVE CORONARY ARTERY OF NATIVE HEART WITHOUT ANGINA PECTORIS: ICD-10-CM

## 2023-05-31 DIAGNOSIS — R73.03 PRE-DIABETES: ICD-10-CM

## 2023-05-31 DIAGNOSIS — I73.9 PERIPHERAL ARTERIAL DISEASE (HCC): ICD-10-CM

## 2023-05-31 LAB
BACTERIA UR CULT: NORMAL
SIGNIFICANT IND 70042: NORMAL
SITE SITE: NORMAL
SOURCE SOURCE: NORMAL

## 2023-05-31 NOTE — PROGRESS NOTES
OP Anticoagulation Service Note    Date: 2023    Anticoagulation Summary  As of 2023      INR goal:  2.0-3.0   TTR:  81.9 % (5.4 y)   INR used for dosin.56 (2023)   Warfarin maintenance plan:  2 mg (1 mg x 2) every Tue; 1 mg (1 mg x 1) all other days   Weekly warfarin total:  8 mg   Plan last modified:  Pieter Covarrubias, PharmD (2022)   Next INR check:  2023   Target end date:  Indefinite    Indications    Paroxysmal a-fib (CMS-HCC) [I48.0]  Chronic anticoagulation [Z79.01]                 Anticoagulation Episode Summary       INR check location:  Clinic Lab    Preferred lab:  [facility]    Send INR reminders to:      Comments:  [phone] (H)  [facility]          Anticoagulation Care Providers       Provider Role Specialty Phone number    Evelio Tejeda M.D. Referring Internal Medicine [phone]    [facility] Responsible  [phone]    Beryl Pang M.D. Responsible Family Medicine [phone]          Anticoagulation Patient Findings        Patient's preferred phone number:  [phone]        HPI:   The reason for today's call is to prevent morbidity and mortality from a blood clot and/or stroke and to reduce the risk of bleeding while on a anticoagulant.     PCP:  Beryl Pang M.D.  [address] [place] [id number]    Assessment:     INR  therapeutic.     Lab Results   Component Value Date/Time    BUN 13 2022 10:37 AM    CREATININE 0.77 2022 10:37 AM     Lab Results   Component Value Date/Time    HEMOGLOBIN 12.5 2022 10:45 AM    HEMATOCRIT 39.7 2022 10:45 AM    PLATELETCT 150 (L) 2022 10:45 AM    ALKPHOSPHAT 72 2022 10:45 AM    ASTSGOT 29 2022 10:45 AM    ALTSGPT 17 2022 10:45 AM          Current Outpatient Medications:     albuterol, INHALE ONE TO TWO PUFFS BY MOUTH EVERY 4 HOURS AS NEEDED FOR SHORTNESS OF BREATH    lovastatin, TAKE 1 TABLET AT BEDTIME    warfarin, TAKE ONE TO TWO TABLETS  DAILY OR AS DIRECTED BY ANTICOAGULATION CLINIC    ALPRAZolam, TAKE ONE TABLET BY MOUTH TWICE A DAY AS NEEDED FOR ANXIETY    sotalol, TAKE 1/2 TABLET TWICE DAILY Strength: 80 mg    fluticasone, 1 Spray, Nasal, DAILY    Multiple Vitamins-Minerals (ZINC PO), Take  by mouth every day.    EPINEPHrine, epinephrine 0.3 mg/0.3 mL injection, auto-injector    Probiotic Product (PROBIOTIC PO), Take  by mouth.    ascorbic acid, 1,000 mg, Oral, DAILY    lactobacillus rhamnosus, 1 Capsule, Oral, DAILY    vitamin D, 2,000 Units, Oral, QAM      Plan:     Continue the same warfarin dose, as noted above.       Follow-up:     As seen above      Additional information discussed with patient:     Asked patient to please call the anticoagulation clinic if they have any signs/symptoms of bleeding and/or thrombosis or any changes to diet or medications.      National recommendations regarding anticoagulation therapy:     The CHEST guidelines recommends frequent INR monitoring at regular intervals (a few days up to a max of 12 weeks) to ensure patients are on the proper dose of warfarin, and patients are not having any complications from therapy.  INRs can dramatically change over a short time period due to diet, medications, and medical conditions.         [facility]  Phone: [phone]  Fax: [phone]  On call: [phone]  General scheduling/information [phone]  For emergencies please dial [phone]  Please do not use [employer] for urgent matters, call the phone numbers listed above.    This note was created using voice recognition software (Dragon). The accuracy of the dictation is limited by the abilities of the software. I have reviewed the note prior to signing, however some errors in grammar and context are still possible. If you have any questions related to this note please do not hesitate to contact our office.

## 2023-06-02 ENCOUNTER — HOSPITAL ENCOUNTER (OUTPATIENT)
Dept: LAB | Facility: MEDICAL CENTER | Age: 83
End: 2023-06-02
Attending: FAMILY MEDICINE
Payer: MEDICARE

## 2023-06-02 DIAGNOSIS — R73.03 PRE-DIABETES: ICD-10-CM

## 2023-06-02 DIAGNOSIS — I25.10 CORONARY ARTERY DISEASE INVOLVING NATIVE CORONARY ARTERY OF NATIVE HEART WITHOUT ANGINA PECTORIS: ICD-10-CM

## 2023-06-02 DIAGNOSIS — I73.9 PERIPHERAL ARTERIAL DISEASE (HCC): ICD-10-CM

## 2023-06-02 DIAGNOSIS — R31.0 GROSS HEMATURIA: ICD-10-CM

## 2023-06-02 DIAGNOSIS — Z79.01 CHRONIC ANTICOAGULATION: ICD-10-CM

## 2023-06-02 LAB
ALBUMIN SERPL BCP-MCNC: 3.9 G/DL (ref 3.2–4.9)
ALBUMIN/GLOB SERPL: 1.6 G/DL
ALP SERPL-CCNC: 84 U/L (ref 30–99)
ALT SERPL-CCNC: 22 U/L (ref 2–50)
ANION GAP SERPL CALC-SCNC: 11 MMOL/L (ref 7–16)
AST SERPL-CCNC: 27 U/L (ref 12–45)
BASOPHILS # BLD AUTO: 1.2 % (ref 0–1.8)
BASOPHILS # BLD: 0.09 K/UL (ref 0–0.12)
BILIRUB SERPL-MCNC: 0.4 MG/DL (ref 0.1–1.5)
BUN SERPL-MCNC: 18 MG/DL (ref 8–22)
CALCIUM ALBUM COR SERPL-MCNC: 10.1 MG/DL (ref 8.5–10.5)
CALCIUM SERPL-MCNC: 10 MG/DL (ref 8.5–10.5)
CHLORIDE SERPL-SCNC: 107 MMOL/L (ref 96–112)
CO2 SERPL-SCNC: 27 MMOL/L (ref 20–33)
CREAT SERPL-MCNC: 0.75 MG/DL (ref 0.5–1.4)
EOSINOPHIL # BLD AUTO: 0.3 K/UL (ref 0–0.51)
EOSINOPHIL NFR BLD: 4 % (ref 0–6.9)
ERYTHROCYTE [DISTWIDTH] IN BLOOD BY AUTOMATED COUNT: 50.6 FL (ref 35.9–50)
GFR SERPLBLD CREATININE-BSD FMLA CKD-EPI: 78 ML/MIN/1.73 M 2
GLOBULIN SER CALC-MCNC: 2.4 G/DL (ref 1.9–3.5)
GLUCOSE SERPL-MCNC: 90 MG/DL (ref 65–99)
HCT VFR BLD AUTO: 46.1 % (ref 37–47)
HGB BLD-MCNC: 14.7 G/DL (ref 12–16)
IMM GRANULOCYTES # BLD AUTO: 0.01 K/UL (ref 0–0.11)
IMM GRANULOCYTES NFR BLD AUTO: 0.1 % (ref 0–0.9)
LYMPHOCYTES # BLD AUTO: 2.24 K/UL (ref 1–4.8)
LYMPHOCYTES NFR BLD: 29.7 % (ref 22–41)
MCH RBC QN AUTO: 29.5 PG (ref 27–33)
MCHC RBC AUTO-ENTMCNC: 31.9 G/DL (ref 32.2–35.5)
MCV RBC AUTO: 92.6 FL (ref 81.4–97.8)
MONOCYTES # BLD AUTO: 0.81 K/UL (ref 0–0.85)
MONOCYTES NFR BLD AUTO: 10.8 % (ref 0–13.4)
NEUTROPHILS # BLD AUTO: 4.08 K/UL (ref 1.82–7.42)
NEUTROPHILS NFR BLD: 54.2 % (ref 44–72)
NRBC # BLD AUTO: 0 K/UL
NRBC BLD-RTO: 0 /100 WBC (ref 0–0.2)
PLATELET # BLD AUTO: 176 K/UL (ref 164–446)
PMV BLD AUTO: 11.4 FL (ref 9–12.9)
POTASSIUM SERPL-SCNC: 5.2 MMOL/L (ref 3.6–5.5)
PROT SERPL-MCNC: 6.3 G/DL (ref 6–8.2)
RBC # BLD AUTO: 4.98 M/UL (ref 4.2–5.4)
SODIUM SERPL-SCNC: 145 MMOL/L (ref 135–145)
WBC # BLD AUTO: 7.5 K/UL (ref 4.8–10.8)

## 2023-06-02 PROCEDURE — 36415 COLL VENOUS BLD VENIPUNCTURE: CPT

## 2023-06-02 PROCEDURE — 80053 COMPREHEN METABOLIC PANEL: CPT

## 2023-06-02 PROCEDURE — 85025 COMPLETE CBC W/AUTO DIFF WBC: CPT

## 2023-06-05 ENCOUNTER — OFFICE VISIT (OUTPATIENT)
Dept: CARDIOLOGY | Facility: MEDICAL CENTER | Age: 83
End: 2023-06-05
Attending: INTERNAL MEDICINE
Payer: MEDICARE

## 2023-06-05 VITALS
SYSTOLIC BLOOD PRESSURE: 110 MMHG | BODY MASS INDEX: 15.64 KG/M2 | OXYGEN SATURATION: 92 % | HEIGHT: 62 IN | WEIGHT: 85 LBS | HEART RATE: 67 BPM | RESPIRATION RATE: 14 BRPM | DIASTOLIC BLOOD PRESSURE: 60 MMHG

## 2023-06-05 DIAGNOSIS — Z79.01 CHRONIC ANTICOAGULATION: ICD-10-CM

## 2023-06-05 DIAGNOSIS — Z95.5 S/P CORONARY ARTERY STENT PLACEMENT: ICD-10-CM

## 2023-06-05 DIAGNOSIS — I25.10 CORONARY ARTERY DISEASE INVOLVING NATIVE CORONARY ARTERY OF NATIVE HEART WITHOUT ANGINA PECTORIS: ICD-10-CM

## 2023-06-05 DIAGNOSIS — Z79.899 HIGH RISK MEDICATION USE: ICD-10-CM

## 2023-06-05 DIAGNOSIS — I73.9 PERIPHERAL ARTERIAL DISEASE (HCC): ICD-10-CM

## 2023-06-05 DIAGNOSIS — I48.0 PAROXYSMAL A-FIB (HCC): ICD-10-CM

## 2023-06-05 DIAGNOSIS — R31.0 GROSS HEMATURIA: ICD-10-CM

## 2023-06-05 PROCEDURE — 3078F DIAST BP <80 MM HG: CPT | Performed by: INTERNAL MEDICINE

## 2023-06-05 PROCEDURE — 3074F SYST BP LT 130 MM HG: CPT | Performed by: INTERNAL MEDICINE

## 2023-06-05 PROCEDURE — 99214 OFFICE O/P EST MOD 30 MIN: CPT | Performed by: INTERNAL MEDICINE

## 2023-06-05 PROCEDURE — 99213 OFFICE O/P EST LOW 20 MIN: CPT | Performed by: INTERNAL MEDICINE

## 2023-06-05 ASSESSMENT — FIBROSIS 4 INDEX: FIB4 SCORE: 2.75

## 2023-06-05 NOTE — PROGRESS NOTES
Chief Complaint   Patient presents with    Atrial Fibrillation     F/V Dx: Persistent atrial fibrillation (HCC)       Subjective     Angie Root is a 83 y.o. female who presents today for her initial visit urgent add-on after being seen in the emergency department.  She is a patient of Dr. Lozano with a history of LAD PCI and persistent AF currently in sinus rhythm on sotalol therapy and oral anticoagulation.  She was seen in the emergency department/urgent care for hematuria.  There it appears the focus was on her constant left-sided arm heaviness that has been present  chronically.  Not much mention of the actual reason she presented however she has subsequently seen urology back.  She is not anemic.  She would like to be off of her anticoagulation both of the hematuria and cumbersome nature of it.  She has some substernal chest discomfort that has resolved and not returned.    Past Medical History:   Diagnosis Date    Arthritis     BL hands    CAD (coronary artery disease)     Cancer (HCC) 1982    melanoma    COPD (chronic obstructive pulmonary disease) (HCC)     Croup 3 years old    Diverticulosis     Dyslipidemia     GERD (gastroesophageal reflux disease)     Hiatal hernia     High cholesterol     Hypertension     Infectious disease 2018    C Diff    Measles     5 years old    Paroxysmal A-fib (HCC) 1/20/2016    Symptomatic, on a rhythm control strategy with sotalol 40 mg by mouth twice a day.     Paroxysmal atrial fibrillation (HCC)     Prediabetes     PVD (peripheral vascular disease) (HCC)      Past Surgical History:   Procedure Laterality Date    FECAL TRANSPLANT N/A 4/9/2018    Procedure: FECAL TRANSPLANT;  Surgeon: Gonzalez Cruz M.D.;  Location: ENDOSCOPY [facility];  Service: Gastroenterology    COLONOSCOPY - ENDO N/A 4/9/2018    Procedure: COLONOSCOPY - ENDO;  Surgeon: Gonzalez Cruz M.D.;  Location: ENDOSCOPY [facility];  Service: Gastroenterology    WIDE EXCISION MELANOMA, LEG,  W/[email or web address]  10/2015    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    CARDIAC CATH, RIGHT HEART  2008    stent    OTHER ORTHOPEDIC SURGERY Left 2008    hand repair    CHOLECYSTECTOMY  1993    TONSILLECTOMY  1945    OTHER CARDIAC SURGERY      r heart cath stents    STENT PLACEMENT      L iliac stent     Family History   Problem Relation Age of Onset    Hypertension Mother     Hyperlipidemia Mother     Cancer Maternal Grandmother         tongue    Cancer Maternal Uncle         x 3, pancreas    Cancer Maternal Grandfather         unknown    Cancer Paternal Grandmother         unknown    Cancer Paternal Grandfather         unknown    Diabetes Maternal Uncle     Heart Disease Maternal Uncle     Hypertension Sister     Hyperlipidemia Sister     Heart Disease Sister     Alcohol/Drug Sister     Thyroid Sister     Psychiatric Illness Neg Hx     Stroke Neg Hx      Social History     Socioeconomic History    Marital status:      Spouse name: Not on file    Number of children: 0    Years of education: Not on file    Highest education level: Not on file   Occupational History    Not on file   Tobacco Use    Smoking status: Every Day     Packs/day: 0.25     Years: 60.00     Pack years: 15.00     Types: Cigarettes     Start date: 3/20/1957    Smokeless tobacco: Never    Tobacco comments:     4 cigarettes per day   Vaping Use    Vaping Use: Never used   Substance and Sexual Activity    Alcohol use: No     Alcohol/week: 0.0 oz    Drug use: No    Sexual activity: Not Currently     Partners: Male   Other Topics Concern    Not on file   Social History Narrative    .     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     Social Determinants of Health     Financial Resource Strain: Not on file   Food Insecurity: Not on file   Transportation Needs: Not on file   Physical Activity: Not on file   Stress: Not on file   Social Connections: Not on file   Intimate Partner Violence: Not on file   Housing Stability: Not on file      Allergies   Allergen Reactions    Penicillins Anaphylaxis and Hives    Codeine Swelling    Iodine Swelling    Bee Venom Anaphylaxis    Chantix [Varenicline]      disoriented    Ciprofloxacin      Causes C diff, recurrent and very difficult    Flagyl [Metronidazole] Rash     C/O flagyl causes rash.     Latex Hives    Lipitor [Atorvastatin Calcium] Unspecified     Intense Stomach pain    Other Environmental Itching and Swelling    Shellfish Allergy      unknown    Tetanus Antitoxin Swelling     unknown     Outpatient Encounter Medications as of 2023   Medication Sig Dispense Refill    albuterol 108 (90 Base) MCG/ACT Aero Soln inhalation aerosol INHALE ONE TO TWO PUFFS BY MOUTH EVERY 4 HOURS AS NEEDED FOR SHORTNESS OF BREATH 18 g 3    lovastatin (MEVACOR) 40 MG tablet TAKE 1 TABLET AT BEDTIME 100 Tablet 3    warfarin (COUMADIN) 1 MG Tab TAKE ONE TO TWO TABLETS DAILY OR AS DIRECTED BY ANTICOAGULATION CLINIC 180 Tablet 1    sotalol (BETAPACE) 80 MG Tab TAKE 1/2 TABLET TWICE DAILY Strength: 80 mg 90 Tablet 3    fluticasone (FLONASE) 50 MCG/ACT nasal spray Administer 1 Spray into affected nostril(S) every day. 16 g 3    Multiple Vitamins-Minerals (ZINC PO) Take  by mouth every day.      EPINEPHrine (EPIPEN) 0.3 MG/0.3ML Solution Auto-injector solution for injection epinephrine 0.3 mg/0.3 mL injection, auto-injector      Probiotic Product (PROBIOTIC PO) Take  by mouth.      ascorbic acid (ASCORBIC ACID) 500 MG Tab Take 2 Tablets by mouth every day.      vitamin D (CHOLECALCIFEROL) 1000 UNIT Tab Take 2 Tablets by mouth every morning. 3000 units      [] ALPRAZolam (XANAX) 0.25 MG Tab TAKE ONE TABLET BY MOUTH TWICE A DAY AS NEEDED FOR ANXIETY 60 Tablet 2    lactobacillus rhamnosus (CULTURELLE) Cap capsule Take 1 Capsule by mouth every day.       No facility-administered encounter medications on file as of 2023.     Review of Systems   All other systems reviewed and are negative.             Objective     BP  "110/60 (BP Location: Left arm, Patient Position: Sitting, BP Cuff Size: Adult)   Pulse 67   Resp 14   Ht 1.575 m (5' 2\")   Wt 38.6 kg (85 lb)   SpO2 92%   BMI 15.55 kg/m²     Physical Exam  Vitals and nursing note reviewed.   Constitutional:       General: She is not in acute distress.     Appearance: Normal appearance.   HENT:      Head: Normocephalic and atraumatic.      Right Ear: External ear normal.      Left Ear: External ear normal.      Nose: Nose normal.   Eyes:      Conjunctiva/sclera: Conjunctivae normal.   Cardiovascular:      Rate and Rhythm: Normal rate and regular rhythm.      Pulses: Normal pulses.      Heart sounds: No murmur heard.  Pulmonary:      Effort: Pulmonary effort is normal. No respiratory distress.      Breath sounds: Normal breath sounds.   Abdominal:      General: There is no distension.      Palpations: Abdomen is soft.   Musculoskeletal:      Cervical back: No rigidity or tenderness.      Right lower leg: No edema.      Left lower leg: No edema.   Skin:     General: Skin is warm and dry.      Capillary Refill: Capillary refill takes 2 to 3 seconds.   Neurological:      General: No focal deficit present.      Mental Status: She is alert and oriented to person, place, and time.   Psychiatric:         Mood and Affect: Mood normal.         Behavior: Behavior normal.         Thought Content: Thought content normal.       LABS:  Lab Results   Component Value Date/Time    CHOLSTRLTOT 157 01/12/2021 09:29 AM    LDL 56 01/12/2021 09:29 AM    HDL 90 01/12/2021 09:29 AM    TRIGLYCERIDE 55 01/12/2021 09:29 AM       Lab Results   Component Value Date/Time    WBC 7.5 06/02/2023 09:48 AM    RBC 4.98 06/02/2023 09:48 AM    HEMOGLOBIN 14.7 06/02/2023 09:48 AM    HEMATOCRIT 46.1 06/02/2023 09:48 AM    MCV 92.6 06/02/2023 09:48 AM    NEUTSPOLYS 54.20 06/02/2023 09:48 AM    LYMPHOCYTES 29.70 06/02/2023 09:48 AM    MONOCYTES 10.80 06/02/2023 09:48 AM    EOSINOPHILS 4.00 06/02/2023 09:48 AM    " BASOPHILS 1.20 06/02/2023 09:48 AM     Lab Results   Component Value Date/Time    SODIUM 145 06/02/2023 09:48 AM    POTASSIUM 5.2 06/02/2023 09:48 AM    CHLORIDE 107 06/02/2023 09:48 AM    CO2 27 06/02/2023 09:48 AM    GLUCOSE 90 06/02/2023 09:48 AM    BUN 18 06/02/2023 09:48 AM    CREATININE 0.75 06/02/2023 09:48 AM     Lab Results   Component Value Date    HBA1C 5.6 11/22/2022      Lab Results   Component Value Date/Time    ALKPHOSPHAT 84 06/02/2023 09:48 AM    ASTSGOT 27 06/02/2023 09:48 AM    ALTSGPT 22 06/02/2023 09:48 AM    TBILIRUBIN 0.4 06/02/2023 09:48 AM      No results found for: BNPBTYPENAT   No results found for: TSH  Lab Results   Component Value Date/Time    PROTHROMBTM 26.7 (H) 05/30/2023 09:47 AM    INR 2.56 (H) 05/30/2023 09:47 AM                   Assessment & Plan     1. Chronic anticoagulation        2. Paroxysmal A-fib (HCC)  EC-ECHOCARDIOGRAM COMPLETE W/O CONT      3. Coronary artery disease involving native coronary artery of native heart without angina pectoris        4. S/P coronary artery stent placement        5. Peripheral arterial disease (HCC)        6. High risk medication use  EC-ECHOCARDIOGRAM COMPLETE W/O CONT      7. Gross hematuria            Medical Decision Making: Today's Assessment/Status/Plan:        Hematuria intermittent consider Watchman.  Documentation provided.  Tolerating her high-dose medications well however.  Maintaining sinus rhythm.  Echocardiogram for chest heaviness.  If she has recurrent chest heaviness then consider stress testing with her primary cardiologist Dr. Lozano.    Follow-up Dr. Lozano.  Continue medical therapy.

## 2023-06-06 ENCOUNTER — APPOINTMENT (RX ONLY)
Dept: URBAN - METROPOLITAN AREA CLINIC 4 | Facility: CLINIC | Age: 83
Setting detail: DERMATOLOGY
End: 2023-06-06

## 2023-06-06 DIAGNOSIS — L82.1 OTHER SEBORRHEIC KERATOSIS: ICD-10-CM

## 2023-06-06 DIAGNOSIS — Z71.89 OTHER SPECIFIED COUNSELING: ICD-10-CM

## 2023-06-06 PROBLEM — D48.5 NEOPLASM OF UNCERTAIN BEHAVIOR OF SKIN: Status: ACTIVE | Noted: 2023-06-06

## 2023-06-06 PROBLEM — D04.72 CARCINOMA IN SITU OF SKIN OF LEFT LOWER LIMB, INCLUDING HIP: Status: ACTIVE | Noted: 2023-06-06

## 2023-06-06 PROCEDURE — ? OBSERVATION

## 2023-06-06 PROCEDURE — 99214 OFFICE O/P EST MOD 30 MIN: CPT

## 2023-06-06 PROCEDURE — ? DIAGNOSIS COMMENT

## 2023-06-06 PROCEDURE — ? PRESCRIPTION

## 2023-06-06 PROCEDURE — ? COUNSELING

## 2023-06-06 RX ORDER — FLUOROURACIL 2 G/40G
1 CREAM TOPICAL BID
Qty: 40 | Refills: 3 | Status: ERX | COMMUNITY
Start: 2023-06-06

## 2023-06-06 RX ADMIN — FLUOROURACIL 1: 2 CREAM TOPICAL at 00:00

## 2023-06-06 ASSESSMENT — LOCATION ZONE DERM: LOCATION ZONE: LEG

## 2023-06-06 ASSESSMENT — LOCATION SIMPLE DESCRIPTION DERM: LOCATION SIMPLE: RIGHT THIGH

## 2023-06-06 ASSESSMENT — LOCATION DETAILED DESCRIPTION DERM: LOCATION DETAILED: RIGHT ANTERIOR DISTAL THIGH

## 2023-06-06 NOTE — PROCEDURE: DIAGNOSIS COMMENT
Detail Level: Simple
Render Risk Assessment In Note?: no
Comment: Bx in 10/202, C&D in 11/2022, still present. Will treat with efudex.  Patient does not want biopsies on this leg today, she would like to defer.  She will return for recheck after 5 FU treatment.  She will consider further treatment if lesion persist.

## 2023-06-12 ENCOUNTER — APPOINTMENT (RX ONLY)
Dept: URBAN - METROPOLITAN AREA CLINIC 4 | Facility: CLINIC | Age: 83
Setting detail: DERMATOLOGY
End: 2023-06-12

## 2023-06-12 DIAGNOSIS — Z71.89 OTHER SPECIFIED COUNSELING: ICD-10-CM

## 2023-06-12 DIAGNOSIS — L30.9 DERMATITIS, UNSPECIFIED: ICD-10-CM

## 2023-06-12 DIAGNOSIS — L82.1 OTHER SEBORRHEIC KERATOSIS: ICD-10-CM

## 2023-06-12 PROBLEM — D48.5 NEOPLASM OF UNCERTAIN BEHAVIOR OF SKIN: Status: ACTIVE | Noted: 2023-06-12

## 2023-06-12 PROCEDURE — ? PHOTO-DOCUMENTATION

## 2023-06-12 PROCEDURE — ? COUNSELING

## 2023-06-12 PROCEDURE — 11103 TANGNTL BX SKIN EA SEP/ADDL: CPT

## 2023-06-12 PROCEDURE — 11102 TANGNTL BX SKIN SINGLE LES: CPT

## 2023-06-12 PROCEDURE — 99214 OFFICE O/P EST MOD 30 MIN: CPT | Mod: 25

## 2023-06-12 PROCEDURE — ? PRESCRIPTION

## 2023-06-12 PROCEDURE — ? BIOPSY BY SHAVE METHOD

## 2023-06-12 PROCEDURE — ? DIAGNOSIS COMMENT

## 2023-06-12 RX ORDER — HYDROCORTISONE 25 MG/G
1 CREAM TOPICAL BID
Qty: 30 | Refills: 2 | Status: ERX | COMMUNITY
Start: 2023-06-12

## 2023-06-12 RX ADMIN — HYDROCORTISONE 1: 25 CREAM TOPICAL at 00:00

## 2023-06-12 ASSESSMENT — LOCATION ZONE DERM
LOCATION ZONE: FACE
LOCATION ZONE: NECK
LOCATION ZONE: HAND
LOCATION ZONE: ARM
LOCATION ZONE: FINGER
LOCATION ZONE: LEG

## 2023-06-12 ASSESSMENT — LOCATION DETAILED DESCRIPTION DERM
LOCATION DETAILED: RIGHT DISTAL DORSAL FOREARM
LOCATION DETAILED: RIGHT MID DORSAL INDEX FINGER
LOCATION DETAILED: RIGHT ANTERIOR DISTAL THIGH
LOCATION DETAILED: RIGHT INFERIOR LATERAL MALAR CHEEK
LOCATION DETAILED: RIGHT RADIAL DORSAL HAND
LOCATION DETAILED: RIGHT ACHILLES SKIN
LOCATION DETAILED: RIGHT CENTRAL LATERAL NECK

## 2023-06-12 ASSESSMENT — LOCATION SIMPLE DESCRIPTION DERM
LOCATION SIMPLE: NECK
LOCATION SIMPLE: RIGHT THIGH
LOCATION SIMPLE: RIGHT HAND
LOCATION SIMPLE: RIGHT FOREARM
LOCATION SIMPLE: RIGHT INDEX FINGER
LOCATION SIMPLE: RIGHT ACHILLES SKIN
LOCATION SIMPLE: RIGHT CHEEK

## 2023-06-12 NOTE — PROCEDURE: COUNSELING
Detail Level: Detailed
Cleanser Recommendations: Gentle cleansers
Detail Level: Simple
Moisturizer Recommendations: Cerave cream

## 2023-06-12 NOTE — PROCEDURE: DIAGNOSIS COMMENT
Render Risk Assessment In Note?: no
Detail Level: Simple
Comment: Curette was used at time of biopsy \\nBx in 10/2022, C&D in 11/2022, still present. Refuses to treat with efudex.
Comment: Patient states was tender a few days ago, but no blisters that she noticed. The day before it flared with itch she states she was pulling weeds. Discussed with patient could be allergic contact to something she came in contact with. Will prescribe hydrocortisone cream bid for 2 weeks w/ 1 week off. \\Boo Herrera came in to examine patient and agrees on diagnosis.

## 2023-06-20 ENCOUNTER — APPOINTMENT (OUTPATIENT)
Dept: MEDICAL GROUP | Facility: CLINIC | Age: 83
End: 2023-06-20
Payer: MEDICARE

## 2023-06-27 ENCOUNTER — ANTICOAGULATION MONITORING (OUTPATIENT)
Dept: CARDIOLOGY | Facility: MEDICAL CENTER | Age: 83
End: 2023-06-27
Payer: MEDICARE

## 2023-06-27 ENCOUNTER — HOSPITAL ENCOUNTER (OUTPATIENT)
Dept: LAB | Facility: MEDICAL CENTER | Age: 83
End: 2023-06-27
Attending: NURSE PRACTITIONER
Payer: MEDICARE

## 2023-06-27 DIAGNOSIS — I48.0 PAROXYSMAL A-FIB (HCC): ICD-10-CM

## 2023-06-27 DIAGNOSIS — Z79.01 CHRONIC ANTICOAGULATION: ICD-10-CM

## 2023-06-27 LAB
INR PPP: 2.57 (ref 0.87–1.13)
PROTHROMBIN TIME: 26.7 SEC (ref 12–14.6)

## 2023-06-27 PROCEDURE — 36415 COLL VENOUS BLD VENIPUNCTURE: CPT

## 2023-06-27 PROCEDURE — 85610 PROTHROMBIN TIME: CPT

## 2023-06-27 NOTE — PROGRESS NOTES
Anticoagulation Summary  As of 2023      INR goal:  2.0-3.0   TTR:  82.1 % (5.5 y)   INR used for dosin.57 (2023)   Warfarin maintenance plan:  2 mg (1 mg x 2) every Tue; 1 mg (1 mg x 1) all other days   Weekly warfarin total:  8 mg   Plan last modified:  Pieter Covarrubias, PharmD (2022)   Next INR check:  2023   Target end date:  Indefinite    Indications    Paroxysmal a-fib (CMS-HCC) [I48.0]  Chronic anticoagulation [Z79.01]                 Anticoagulation Episode Summary       INR check location:  Clinic Lab    Preferred lab:  [facility]    Send INR reminders to:      Comments:  [phone] (H)  [facility]          Anticoagulation Care Providers       Provider Role Specialty Phone number    Evelio Tejeda M.D. Referring Internal Medicine [phone]    [facility] Anticoagulation Services Responsible  [phone]    Beryl Pang M.D. Responsible Family Medicine [phone]            Refer to Anticoagulation Patient Findings for HPI  Patient Findings       Positives:  Signs/symptoms of bleeding (Patient had small volume of blood in her urine for the past few weeks)            Spoke with patient to report a therapeutic INR.    Pt is NOT on antiplatelet therapy   Patient stated that she has had a little blood in the urine for the past few weeks. She has been seen by her PCP for this. Primary provider said it wasn't anything to worry about. Will continue to follow up with pcp if warfarin should be stopped per request. Patient would like to check in one month.  Pt instructed to continue with current warfarin dosing regimen, confirms dosing.   Will follow up in 4 week(s).     Erica Coats, Pharmacy Intern

## 2023-06-28 ENCOUNTER — OFFICE VISIT (OUTPATIENT)
Dept: MEDICAL GROUP | Facility: CLINIC | Age: 83
End: 2023-06-28
Payer: MEDICARE

## 2023-06-28 DIAGNOSIS — D41.4 BLADDER POLYP: ICD-10-CM

## 2023-06-28 DIAGNOSIS — J43.8 OTHER EMPHYSEMA (HCC): ICD-10-CM

## 2023-06-28 DIAGNOSIS — F41.0 PANIC ATTACKS: ICD-10-CM

## 2023-06-28 DIAGNOSIS — F17.219 CIGARETTE NICOTINE DEPENDENCE WITH NICOTINE-INDUCED DISORDER: ICD-10-CM

## 2023-06-28 DIAGNOSIS — M81.0 AGE-RELATED OSTEOPOROSIS WITHOUT CURRENT PATHOLOGICAL FRACTURE: ICD-10-CM

## 2023-06-28 DIAGNOSIS — M81.0 OSTEOPOROSIS, POSTMENOPAUSAL: ICD-10-CM

## 2023-06-28 PROBLEM — R63.4 ABNORMAL WEIGHT LOSS: Status: RESOLVED | Noted: 2022-07-25 | Resolved: 2023-06-28

## 2023-06-28 PROBLEM — R53.81 PHYSICAL DEBILITY: Status: RESOLVED | Noted: 2017-12-05 | Resolved: 2023-06-28

## 2023-06-28 PROBLEM — K59.00 CONSTIPATION: Status: RESOLVED | Noted: 2017-12-03 | Resolved: 2023-06-28

## 2023-06-28 PROBLEM — J44.1 ACUTE EXACERBATION OF CHRONIC OBSTRUCTIVE PULMONARY DISEASE (HCC): Status: RESOLVED | Noted: 2020-12-18 | Resolved: 2023-06-28

## 2023-06-28 PROBLEM — K92.1 HEMATOCHEZIA: Status: RESOLVED | Noted: 2021-09-16 | Resolved: 2023-06-28

## 2023-06-28 PROCEDURE — 99214 OFFICE O/P EST MOD 30 MIN: CPT | Performed by: FAMILY MEDICINE

## 2023-06-28 RX ORDER — GLYCOPYRROLATE AND FORMOTEROL FUMARATE 9; 4.8 UG/1; UG/1
2 AEROSOL, METERED RESPIRATORY (INHALATION) 2 TIMES DAILY
Qty: 10.7 G | Refills: 3 | Status: SHIPPED | OUTPATIENT
Start: 2023-06-28 | End: 2023-07-12

## 2023-06-28 ASSESSMENT — PATIENT HEALTH QUESTIONNAIRE - PHQ9: CLINICAL INTERPRETATION OF PHQ2 SCORE: 0

## 2023-06-28 ASSESSMENT — FIBROSIS 4 INDEX: FIB4 SCORE: 2.75

## 2023-06-29 NOTE — ASSESSMENT & PLAN NOTE
Followed by urology. Hematuria has finally stopped 2 days ago. Will be discussing continuing anticoagulation vs possible ablation for a fib with cardiology.

## 2023-06-29 NOTE — PROGRESS NOTES
CC:Diagnoses of Panic attacks, Other emphysema (HCC), Age-related osteoporosis without current pathological fracture, Bladder polyp, Cigarette nicotine dependence with nicotine-induced disorder, and Osteoporosis, postmenopausal were pertinent to this visit.      HISTORY OF PRESENT ILLNESS: Patient is a 83 y.o. female established patient who presents today to review the following:      Other emphysema (HCC) Chronic, worsening  She is using the albuterol 3-4 times a day. In the past we have tried to get her on LAMA/LABA however the cost has been an issue. She does have ipratropium nebs at home so we discussed using those daily to help with mucus reduction.. We will try to order a combo inhaler and see if the cost is better. She declines PFTs at this time. Pulse ox today is fine.      Bladder polyp--Chronic, stable  Followed by urology. Hematuria has finally stopped 2 days ago. Will be discussing continuing anticoagulation vs possible ablation for a fib with cardiology.     Cigarette nicotine dependence with nicotine-induced disorder--Chronic, stable  Angie has cut way down but has found it impossible to quit entirely.     Osteoporosis, postmenopausal--Chronic, stable  Will get dexa scan to evaluate.     Panic attacks--Chronic, stable  Uses xanax sparingly for decades. I am not concerned about misuse.     Patient Active Problem List    Diagnosis Date Noted    Other emphysema (HCC) 06/28/2023    Gross hematuria 06/05/2023    Bladder polyp 05/31/2023    Exercise intolerance 07/25/2022    Personal history of anaphylaxis 12/18/2020    Coronary artery disease involving native coronary artery of native heart without angina pectoris 03/12/2019    PCI to her LAD in 2009 03/12/2019    Chronic anticoagulation 04/17/2018    History of COPD 12/05/2017    Circulating anticoagulants (HCC) 12/04/2017    Osteoporosis, postmenopausal 08/24/2017    Depression with anxiety 02/15/2017    Vitamin D deficiency 07/26/2016    Insomnia  07/26/2016    Peripheral arterial disease (HCC) 03/01/2016    Cigarette nicotine dependence with nicotine-induced disorder 03/01/2016    Gastroesophageal reflux disease without esophagitis 01/20/2016    Paroxysmal a-fib (CMS-HCC) 01/20/2016    Pre-diabetes 01/20/2016    Panic attacks 01/20/2016        Allergies:Penicillins, Codeine, Iodine, Bee venom, Chantix [varenicline], Ciprofloxacin, Flagyl [metronidazole], Latex, Lipitor [atorvastatin calcium], Other environmental, Shellfish allergy, and Tetanus antitoxin    Current Outpatient Medications   Medication Sig Dispense Refill    Glycopyrrolate-Formoterol (BEVESPI AEROSPHERE) 9-4.8 MCG/ACT Aerosol Inhale 2 Puffs 2 times a day. 10.7 g 3    ALPRAZolam (XANAX) 0.25 MG Tab TAKE ONE TABLET BY MOUTH TWICE A DAY AS NEEDED FOR ANXIETY 60 Tablet 5    albuterol 108 (90 Base) MCG/ACT Aero Soln inhalation aerosol INHALE ONE TO TWO PUFFS BY MOUTH EVERY 4 HOURS AS NEEDED FOR SHORTNESS OF BREATH 18 g 3    lovastatin (MEVACOR) 40 MG tablet TAKE 1 TABLET AT BEDTIME 100 Tablet 3    warfarin (COUMADIN) 1 MG Tab TAKE ONE TO TWO TABLETS DAILY OR AS DIRECTED BY ANTICOAGULATION CLINIC 180 Tablet 1    sotalol (BETAPACE) 80 MG Tab TAKE 1/2 TABLET TWICE DAILY Strength: 80 mg 90 Tablet 3    fluticasone (FLONASE) 50 MCG/ACT nasal spray Administer 1 Spray into affected nostril(S) every day. 16 g 3    Multiple Vitamins-Minerals (ZINC PO) Take  by mouth every day.      EPINEPHrine (EPIPEN) 0.3 MG/0.3ML Solution Auto-injector solution for injection epinephrine 0.3 mg/0.3 mL injection, auto-injector      Probiotic Product (PROBIOTIC PO) Take  by mouth.      ascorbic acid (ASCORBIC ACID) 500 MG Tab Take 2 Tablets by mouth every day.      lactobacillus rhamnosus (CULTURELLE) Cap capsule Take 1 Capsule by mouth every day.      vitamin D (CHOLECALCIFEROL) 1000 UNIT Tab Take 2 Tablets by mouth every morning. 3000 units       No current facility-administered medications for this visit.       Social  History     Tobacco Use    Smoking status: Every Day     Packs/day: 0.25     Years: 60.00     Pack years: 15.00     Types: Cigarettes     Start date: 3/20/1958    Smokeless tobacco: Never    Tobacco comments:     4 cigarettes per day   Vaping Use    Vaping Use: Never used   Substance Use Topics    Alcohol use: No     Alcohol/week: 0.0 oz    Drug use: No     Social History     Social History Narrative    .     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       Family History   Problem Relation Age of Onset    Hypertension Mother     Hyperlipidemia Mother     Cancer Maternal Grandmother         tongue    Cancer Maternal Uncle         x 3, pancreas    Cancer Maternal Grandfather         unknown    Cancer Paternal Grandmother         unknown    Cancer Paternal Grandfather         unknown    Diabetes Maternal Uncle     Heart Disease Maternal Uncle     Hypertension Sister     Hyperlipidemia Sister     Heart Disease Sister     Alcohol/Drug Sister     Thyroid Sister     Psychiatric Illness Neg Hx     Stroke Neg Hx        Exam:    There were no vitals taken for this visit. Body mass index is 15.55 kg/m² (pended).    General:  Thin, immaculately coiffed and dressed, well developed female in NAD  HENT: Atraumatic, normocephalic  EYES: Extraocular movements intact, pupils equal and reactive to light  NECK: Supple, FROM  CHEST: Increased AP diameter, diminished but clear breath sounds, Equal chest expansion  RESP: Unlabored, no stridor or audible wheeze  ABD: Non-Distended  Extremities: No Clubbing, Cyanosis, or Edema  Skin: Multiple bruises from chronic anticoagulation  Neuro: A/O x 4, CN 2-12 Grossly intact, Motor/sensory grossly intact  Psych: Normal behavior, normal affect      LABS: Results reviewed and discussed with the patient, questions answered.        Return in about 6 months (around 12/28/2023).    My total time spent caring for the patient on the day of the encounter was 30 minutes.   This does not include time  spent on separately billable procedures/tests.

## 2023-06-29 NOTE — ASSESSMENT & PLAN NOTE
She is using the albuterol 3-4 times a day. In the past we have tried to get her on LAMA/LABA however the cost has been an issue. She does have ipratropium nebs at home so we discussed using those daily to help with mucus reduction.. We will try to order a combo inhaler and see if the cost is better. She declines PFTs at this time.

## 2023-07-06 ENCOUNTER — APPOINTMENT (OUTPATIENT)
Dept: CARDIOLOGY | Facility: MEDICAL CENTER | Age: 83
End: 2023-07-06
Attending: NURSE PRACTITIONER
Payer: MEDICARE

## 2023-07-06 ENCOUNTER — TELEPHONE (OUTPATIENT)
Dept: CARDIOLOGY | Facility: MEDICAL CENTER | Age: 83
End: 2023-07-06
Payer: MEDICARE

## 2023-07-07 DIAGNOSIS — J43.9 PULMONARY EMPHYSEMA, UNSPECIFIED EMPHYSEMA TYPE (HCC): ICD-10-CM

## 2023-07-11 RX ORDER — ALBUTEROL SULFATE 90 UG/1
AEROSOL, METERED RESPIRATORY (INHALATION)
Qty: 18 G | Refills: 3 | Status: SHIPPED | OUTPATIENT
Start: 2023-07-11 | End: 2023-10-10

## 2023-07-12 ENCOUNTER — OFFICE VISIT (OUTPATIENT)
Dept: MEDICAL GROUP | Facility: CLINIC | Age: 83
End: 2023-07-12
Payer: MEDICARE

## 2023-07-12 DIAGNOSIS — R06.00 NOCTURNAL DYSPNEA: ICD-10-CM

## 2023-07-12 DIAGNOSIS — J43.9 PULMONARY EMPHYSEMA, UNSPECIFIED EMPHYSEMA TYPE (HCC): ICD-10-CM

## 2023-07-12 DIAGNOSIS — G47.30 SLEEP APNEA, UNSPECIFIED TYPE: ICD-10-CM

## 2023-07-12 PROCEDURE — 99213 OFFICE O/P EST LOW 20 MIN: CPT | Mod: GE | Performed by: STUDENT IN AN ORGANIZED HEALTH CARE EDUCATION/TRAINING PROGRAM

## 2023-07-12 RX ORDER — POTASSIUM CHLORIDE 750 MG/1
TABLET, EXTENDED RELEASE ORAL
COMMUNITY
End: 2023-10-05

## 2023-07-12 RX ORDER — PREDNISONE 20 MG/1
TABLET ORAL
COMMUNITY
End: 2023-07-12

## 2023-07-12 RX ORDER — HYDROCODONE POLISTIREX AND CHLORPHENIRAMINE POLISTIREX 10; 8 MG/5ML; MG/5ML
SUSPENSION, EXTENDED RELEASE ORAL
COMMUNITY
End: 2023-07-13

## 2023-07-12 RX ORDER — CLOPIDOGREL BISULFATE 75 MG/1
1 TABLET ORAL
COMMUNITY
End: 2023-07-13

## 2023-07-12 RX ORDER — ASPIRIN 81 MG/1
TABLET, CHEWABLE ORAL
COMMUNITY
End: 2023-07-13

## 2023-07-12 RX ORDER — PREDNISONE 50 MG/1
TABLET ORAL
COMMUNITY
End: 2023-07-12

## 2023-07-12 RX ORDER — MONTELUKAST SODIUM 5 MG/1
TABLET, CHEWABLE ORAL
COMMUNITY
End: 2023-07-13

## 2023-07-12 RX ORDER — BENZONATATE 100 MG/1
CAPSULE ORAL
COMMUNITY
End: 2023-07-13

## 2023-07-12 RX ORDER — DILTIAZEM HYDROCHLORIDE 240 MG/1
CAPSULE, EXTENDED RELEASE ORAL
COMMUNITY
End: 2023-07-13

## 2023-07-12 ASSESSMENT — FIBROSIS 4 INDEX: FIB4 SCORE: 2.75

## 2023-07-12 NOTE — PROGRESS NOTES
Subjective:     CC: nocturnal hypoxia    HPI:   Angie presents today with below complaints.  #Nocturnal dyspnea on exertion  #Nocturnal hypoxemia  Patient reports that last couple of months had issues where she wakes up in the middle the night to use the restroom is extremely short of breath.  This does improve after she uses ipratropium nebulizer.  She also has issues when she wakes up in the morning having increased shortness of breath.  She reports her oxygen typically around these times is 88 to 89%.  Per patient, oxygen saturations do typically improve following ipratropium treatment.  She states that she last used her ipratropium earlier today.  She states that sometimes in the night she has to have her  help her as she is so short of breath she cannot get a nebulizer.  Patient also reports symptoms do improve after albuterol.  Per patient, she was unable to get the inhaler as insurance did not cover the 1 ordered and it was expensive to pay out-of-pocket.  She denies any current symptoms.    No problems updated.    Current Outpatient Medications Ordered in Epic   Medication Sig Dispense Refill    potassium chloride SA (K-DUR) 10 MEQ Tab CR       albuterol 108 (90 Base) MCG/ACT Aero Soln inhalation aerosol INHALE ONE TO TWO PUFFS BY MOUTH EVERY 4 HOURS AS NEEDED FOR SHORTNESS OF BREATH 18 g 3    ALPRAZolam (XANAX) 0.25 MG Tab TAKE ONE TABLET BY MOUTH TWICE A DAY AS NEEDED FOR ANXIETY 60 Tablet 5    lovastatin (MEVACOR) 40 MG tablet TAKE 1 TABLET AT BEDTIME 100 Tablet 3    warfarin (COUMADIN) 1 MG Tab TAKE ONE TO TWO TABLETS DAILY OR AS DIRECTED BY ANTICOAGULATION CLINIC 180 Tablet 1    sotalol (BETAPACE) 80 MG Tab TAKE 1/2 TABLET TWICE DAILY Strength: 80 mg 90 Tablet 3    fluticasone (FLONASE) 50 MCG/ACT nasal spray Administer 1 Spray into affected nostril(S) every day. 16 g 3    Multiple Vitamins-Minerals (ZINC PO) Take  by mouth every day.      EPINEPHrine (EPIPEN) 0.3 MG/0.3ML Solution Auto-injector  "solution for injection epinephrine 0.3 mg/0.3 mL injection, auto-injector      Probiotic Product (PROBIOTIC PO) Take  by mouth.      ascorbic acid (ASCORBIC ACID) 500 MG Tab Take 2 Tablets by mouth every day.      vitamin D (CHOLECALCIFEROL) 1000 UNIT Tab Take 2 Tablets by mouth every morning. 3000 units      aspirin (ASA) 81 MG Chew Tab chewable tablet take 1 tablet (81AMG) by oral route every day (Patient not taking: Reported on 7/12/2023)      benzonatate (TESSALON) 100 MG Cap  (Patient not taking: Reported on 7/12/2023)      clopidogrel (PLAVIX) 75 MG Tab Take 1 Tablet by mouth every day. (Patient not taking: Reported on 7/12/2023)      hydrocod yesenia-chlorphe yesenia ER (TUSSIONEX) 10-8 MG/5ML Suspension Extended Release  (Patient not taking: Reported on 7/12/2023)      montelukast (SINGULAIR) 5 MG Chew Tab  (Patient not taking: Reported on 7/12/2023)      predniSONE (DELTASONE) 20 MG Tab  (Patient not taking: Reported on 7/12/2023)      predniSONE (DELTASONE) 50 MG Tab  (Patient not taking: Reported on 7/12/2023)      diltiazem (DILACOR XR) 240 MG XR capsule  (Patient not taking: Reported on 7/12/2023)      Glycopyrrolate-Formoterol (BEVESPI AEROSPHERE) 9-4.8 MCG/ACT Aerosol Inhale 2 Puffs 2 times a day. (Patient not taking: Reported on 7/12/2023) 10.7 g 3    lactobacillus rhamnosus (CULTURELLE) Cap capsule Take 1 Capsule by mouth every day. (Patient not taking: Reported on 7/12/2023)       No current [facility]-ordered facility-administered medications on file.       ROS:  Review of Systems   Respiratory:  Positive for shortness of breath.    Gastrointestinal:  Positive for abdominal pain (lower, chronic).   All other systems reviewed and are negative.      Objective:     Exam:  BP (P) 112/68 (BP Location: Right arm, Patient Position: Sitting, BP Cuff Size: Child)   Pulse (P) 85   Temp (P) 36.9 °C (98.5 °F) (Temporal)   Ht 1.575 m (5' 2\")   Wt 38.4 kg (84 lb 11.2 oz)   SpO2 (P) 97%   BMI 15.49 kg/m²  Body mass " index is 15.49 kg/m².    Physical Exam  Constitutional:       General: She is not in acute distress.     Appearance: She is not toxic-appearing.      Comments: Thin   HENT:      Head: Normocephalic and atraumatic.   Eyes:      Extraocular Movements: Extraocular movements intact.      Conjunctiva/sclera: Conjunctivae normal.   Cardiovascular:      Rate and Rhythm: Normal rate and regular rhythm.   Pulmonary:      Effort: Pulmonary effort is normal. No respiratory distress.      Breath sounds: No wheezing.      Comments: Decreased breath sounds bilaterally  Abdominal:      General: Abdomen is flat.   Musculoskeletal:         General: No deformity. Normal range of motion.      Cervical back: Normal range of motion and neck supple.   Skin:     General: Skin is warm.      Coloration: Skin is not jaundiced.      Findings: No rash.   Neurological:      General: No focal deficit present.      Mental Status: She is alert.   Psychiatric:         Mood and Affect: Mood normal.         Behavior: Behavior normal.         Thought Content: Thought content normal.         Judgment: Judgment normal.         Assessment & Plan:     83 y.o. female with the following -     #Pulmonary emphysema  This is chronic.  She was unable to get inhaler last time as insurance did not cover the combination inhaler that was prescribed.  She states that she continues to use the albuterol and ipratropium at home.  Symptoms do improve following ipratropium.  She has had increased symptoms over the last 3 months where she is waking up in the middle the night as well as in the morning and having difficulty breathing.  This does improve following nebulizer.  Due to severity of symptoms, recommended patient be on a triple combo inhaler.  Walk test was also done in clinic to evaluate SPO2.  SPO2 prior to his 6-minute walk was 88%, following 6-minute walk it was 91%.  Due to concern that oxygen saturations are dropping at night, recommended a sleep study.   Patient is agreeable to this unless she has to wear a mask over her face for the study. STOP BANG score 3.   -Sleep study to evaluate for nocturnal hypoxemia  -Trelegy inhaler once daily    Problem List Items Addressed This Visit    None  Visit Diagnoses       Pulmonary emphysema, unspecified emphysema type (HCC)        Relevant Medications    benzonatate (TESSALON) 100 MG Cap    hydrocod yesenia-chlorphe yesenia ER (TUSSIONEX) 10-8 MG/5ML Suspension Extended Release    montelukast (SINGULAIR) 5 MG Chew Tab    fluticasone-umeclidin-vilant (TRELEGY) 100-62.5-25 MCG/ACT AEROSOL POWDER, BREATH ACTIVATED inhalation    Other Relevant Orders    Polysomnography, 4 or More    Nocturnal dyspnea        Relevant Orders    Polysomnography, 4 or More    Sleep apnea, unspecified type        Relevant Orders    Polysomnography, 4 or More            I spent a total of 45 minutes with record review, exam, communication with the patient, communication with other providers, and documentation of this encounter.      Return if symptoms worsen or fail to improve.      Queta Cruz MD  [employer] PGY-2

## 2023-07-13 VITALS — HEIGHT: 62 IN | OXYGEN SATURATION: 91 % | WEIGHT: 84.7 LBS | BODY MASS INDEX: 15.59 KG/M2

## 2023-07-13 ASSESSMENT — ENCOUNTER SYMPTOMS
ABDOMINAL PAIN: 1
SHORTNESS OF BREATH: 1

## 2023-07-14 ENCOUNTER — TELEPHONE (OUTPATIENT)
Dept: MEDICAL GROUP | Facility: CLINIC | Age: 83
End: 2023-07-14
Payer: MEDICARE

## 2023-07-14 NOTE — TELEPHONE ENCOUNTER
VOICEMAIL  1. Caller Name: Angie Root  Call Back Number: [phone] (home)       2. Message: Pt called stating ins does not cover the Trelegy Inhaler. She is requesting alternative.

## 2023-07-14 NOTE — TELEPHONE ENCOUNTER
MEDICATION PRIOR AUTHORIZATION NEEDED:    1. Name of Medication: Fluticasone Furoate-Vilanterol 100-25MCG/ACT aerosol powder    2. Requested By (Name of Pharmacy): [facility]     3. Is insurance on file current? Yes    4. What is the name & phone number of the 3rd party payor? Covermymeds

## 2023-07-17 ENCOUNTER — TELEPHONE (OUTPATIENT)
Dept: MEDICAL GROUP | Facility: CLINIC | Age: 83
End: 2023-07-17
Payer: MEDICARE

## 2023-07-17 DIAGNOSIS — J43.9 PULMONARY EMPHYSEMA, UNSPECIFIED EMPHYSEMA TYPE (HCC): ICD-10-CM

## 2023-07-17 RX ORDER — BUDESONIDE AND FORMOTEROL FUMARATE DIHYDRATE 160; 4.5 UG/1; UG/1
2 AEROSOL RESPIRATORY (INHALATION) 2 TIMES DAILY
Qty: 10.2 G | Refills: 6 | Status: SHIPPED
Start: 2023-07-17 | End: 2023-10-05

## 2023-07-17 NOTE — TELEPHONE ENCOUNTER
FINAL PRIOR AUTHORIZATION STATUS:    1.  Name of Medication & Dose: Fluticasone Furoate-Vilanterol 100-25MCG/ACT aerosol powder     2. Prior Auth Status: Approved through Covermymeds     3. Action Taken: Pharmacy Notified: yes Patient Notified: no

## 2023-07-17 NOTE — PROGRESS NOTES
Patient's insurance denied Trelegy.  Recommending Symbicort as alternative.  -Symbicort twice daily sent to pharmacy.

## 2023-07-25 ENCOUNTER — HOSPITAL ENCOUNTER (OUTPATIENT)
Dept: LAB | Facility: MEDICAL CENTER | Age: 83
End: 2023-07-25
Attending: NURSE PRACTITIONER
Payer: MEDICARE

## 2023-07-25 DIAGNOSIS — Z79.01 CHRONIC ANTICOAGULATION: ICD-10-CM

## 2023-07-25 DIAGNOSIS — J43.9 PULMONARY EMPHYSEMA, UNSPECIFIED EMPHYSEMA TYPE (HCC): ICD-10-CM

## 2023-07-25 LAB
INR PPP: 2.72 (ref 0.87–1.13)
PROTHROMBIN TIME: 27.9 SEC (ref 12–14.6)

## 2023-07-25 PROCEDURE — 36415 COLL VENOUS BLD VENIPUNCTURE: CPT

## 2023-07-25 PROCEDURE — 85610 PROTHROMBIN TIME: CPT

## 2023-07-26 ENCOUNTER — ANTICOAGULATION MONITORING (OUTPATIENT)
Dept: VASCULAR LAB | Facility: MEDICAL CENTER | Age: 83
End: 2023-07-26
Payer: MEDICARE

## 2023-07-26 DIAGNOSIS — Z79.01 CHRONIC ANTICOAGULATION: ICD-10-CM

## 2023-07-26 DIAGNOSIS — I48.0 PAROXYSMAL A-FIB (HCC): ICD-10-CM

## 2023-07-26 NOTE — PROGRESS NOTES
Anticoagulation Summary  As of 2023      INR goal:  2.0-3.0   TTR:  82.5 % (5.6 y)   INR used for dosin.72 (2023)   Warfarin maintenance plan:  2 mg (1 mg x 2) every Tue; 1 mg (1 mg x 1) all other days   Weekly warfarin total:  8 mg   Plan last modified:  Pieter Covarrubias, PharmD (2022)   Next INR check:  2023   Target end date:  Indefinite    Indications    Paroxysmal a-fib (CMS-HCC) [I48.0]  Chronic anticoagulation [Z79.01]                 Anticoagulation Episode Summary       INR check location:  Clinic Lab    Preferred lab:  [facility]    Send INR reminders to:      Comments:  [phone] (H)  [facility]          Anticoagulation Care Providers       Provider Role Specialty Phone number    Evelio Tejeda M.D. Referring Internal Medicine [phone]    [facility] Anticoagulation Services Responsible  [phone]    Beryl Pang M.D. Responsible Family Medicine [phone]            Refer to Anticoagulation Patient Findings for HPI  Patient Findings       Negatives:  Signs/symptoms of thrombosis, Signs/symptoms of bleeding, Laboratory test error suspected, Change in health, Change in alcohol use, Change in activity, Upcoming invasive procedure, Emergency department visit, Upcoming dental procedure, Missed doses, Extra doses, Change in medications, Change in diet/appetite, Hospital admission, Bruising, Other complaints            Spoke with patient to report a therapeutic INR.      Pt is NOT on antiplatelet therapy     Pt instructed to continue with current warfarin dosing regimen, confirms dosing.   Will follow up in 5 week(s).     Erica Coats, Pharmacy Intern

## 2023-07-28 ENCOUNTER — TELEPHONE (OUTPATIENT)
Dept: HEALTH INFORMATION MANAGEMENT | Facility: OTHER | Age: 83
End: 2023-07-28
Payer: MEDICARE

## 2023-08-01 ENCOUNTER — TELEPHONE (OUTPATIENT)
Dept: HEALTH INFORMATION MANAGEMENT | Facility: OTHER | Age: 83
End: 2023-08-01
Payer: MEDICARE

## 2023-08-02 ENCOUNTER — APPOINTMENT (RX ONLY)
Dept: URBAN - METROPOLITAN AREA CLINIC 36 | Facility: CLINIC | Age: 83
Setting detail: DERMATOLOGY
End: 2023-08-02

## 2023-08-02 PROBLEM — D04.9 CARCINOMA IN SITU OF SKIN, UNSPECIFIED: Status: ACTIVE | Noted: 2023-08-02

## 2023-08-02 PROCEDURE — ? PATIENT SPECIFIC COUNSELING

## 2023-08-02 PROCEDURE — ? MEDICATION COUNSELING

## 2023-08-02 PROCEDURE — 99213 OFFICE O/P EST LOW 20 MIN: CPT

## 2023-08-02 PROCEDURE — ? PRESCRIPTION

## 2023-08-02 RX ORDER — FLUOROURACIL 5 MG/G
CREAM TOPICAL
Qty: 40 | Refills: 1 | Status: ERX | COMMUNITY
Start: 2023-08-02

## 2023-08-02 RX ADMIN — FLUOROURACIL 1: 5 CREAM TOPICAL at 00:00

## 2023-08-02 NOTE — PROCEDURE: MEDICATION COUNSELING
Azithromycin Pregnancy And Lactation Text: This medication is considered safe during pregnancy and is also secreted in breast milk.
Glycopyrrolate Pregnancy And Lactation Text: This medication is Pregnancy Category B and is considered safe during pregnancy. It is unknown if it is excreted breast milk.
Picato Pregnancy And Lactation Text: This medication is Pregnancy Category C. It is unknown if this medication is excreted in breast milk.
Tremfya Pregnancy And Lactation Text: The risk during pregnancy and breastfeeding is uncertain with this medication.
Enbrel Counseling:  I discussed with the patient the risks of etanercept including but not limited to myelosuppression, immunosuppression, autoimmune hepatitis, demyelinating diseases, lymphoma, and infections.  The patient understands that monitoring is required including a PPD at baseline and must alert us or the primary physician if symptoms of infection or other concerning signs are noted.
Winlevi Pregnancy And Lactation Text: This medication is considered safe during pregnancy and breastfeeding.
Topical Metronidazole Counseling: Metronidazole is a topical antibiotic medication. You may experience burning, stinging, redness, or allergic reactions.  Please call our office if you develop any problems from using this medication.
Prednisone Counseling:  I discussed with the patient the risks of prolonged use of prednisone including but not limited to weight gain, insomnia, osteoporosis, mood changes, diabetes, susceptibility to infection, glaucoma and high blood pressure.  In cases where prednisone use is prolonged, patients should be monitored with blood pressure checks, serum glucose levels and an eye exam.  Additionally, the patient may need to be placed on GI prophylaxis, PCP prophylaxis, and calcium and vitamin D supplementation and/or a bisphosphonate.  The patient verbalized understanding of the proper use and the possible adverse effects of prednisone.  All of the patient's questions and concerns were addressed.
SSKI Counseling:  I discussed with the patient the risks of SSKI including but not limited to thyroid abnormalities, metallic taste, GI upset, fever, headache, acne, arthralgias, paraesthesias, lymphadenopathy, easy bleeding, arrhythmias, and allergic reaction.
Klisyri Counseling:  I discussed with the patient the risks of Klisyri including but not limited to erythema, scaling, itching, weeping, crusting, and pain.
Quinolones Pregnancy And Lactation Text: This medication is Pregnancy Category C and it isn't know if it is safe during pregnancy. It is also excreted in breast milk.
Sotyktu Pregnancy And Lactation Text: There is insufficient data to evaluate whether or not Sotyktu is safe to use during pregnancy.   It is not known if Sotyktu passes into breast milk and whether or not it is safe to use when breastfeeding.  
Drysol Pregnancy And Lactation Text: This medication is considered safe during pregnancy and breast feeding.
Dutasteride Male Counseling: Dustasteride Counseling:  I discussed with the patient the risks of use of dutasteride including but not limited to decreased libido, decreased ejaculate volume, and gynecomastia. Women who can become pregnant should not handle medication.  All of the patient's questions and concerns were addressed.
High Dose Vitamin A Counseling: Side effects reviewed, pt to contact office should one occur.
Griseofulvin Counseling:  I discussed with the patient the risks of griseofulvin including but not limited to photosensitivity, cytopenia, liver damage, nausea/vomiting and severe allergy.  The patient understands that this medication is best absorbed when taken with a fatty meal (e.g., ice cream or french fries).
Soolantra Pregnancy And Lactation Text: This medication is Pregnancy Category C. This medication is considered safe during breast feeding.
Cimetidine Pregnancy And Lactation Text: This medication is Pregnancy Category B and is considered safe during pregnancy. It is also excreted in breast milk and breast feeding isn't recommended.
Siliq Counseling:  I discussed with the patient the risks of Siliq including but not limited to new or worsening depression, suicidal thoughts and behavior, immunosuppression, malignancy, posterior leukoencephalopathy syndrome, and serious infections.  The patient understands that monitoring is required including a PPD at baseline and must alert us or the primary physician if symptoms of infection or other concerning signs are noted. There is also a special program designed to monitor depression which is required with Siliq.
Carac Counseling:  I discussed with the patient the risks of Carac including but not limited to erythema, scaling, itching, weeping, crusting, and pain.
Xolair Counseling:  Patient informed of potential adverse effects including but not limited to fever, muscle aches, rash and allergic reactions.  The patient verbalized understanding of the proper use and possible adverse effects of Xolair.  All of the patient's questions and concerns were addressed.
VTAMA Counseling: I discussed with the patient that VTAMA is not for use in the eyes, mouth or mouth. They should call the office if they develop any signs of allergic reactions to VTAMA. The patient verbalized understanding of the proper use and possible adverse effects of VTAMA.  All of the patient's questions and concerns were addressed.
Hydroxychloroquine Counseling:  I discussed with the patient that a baseline ophthalmologic exam is needed at the start of therapy and every year thereafter while on therapy. A CBC may also be warranted for monitoring.  The side effects of this medication were discussed with the patient, including but not limited to agranulocytosis, aplastic anemia, seizures, rashes, retinopathy, and liver toxicity. Patient instructed to call the office should any adverse effect occur.  The patient verbalized understanding of the proper use and possible adverse effects of Plaquenil.  All the patient's questions and concerns were addressed.
Enbrel Pregnancy And Lactation Text: This medication is Pregnancy Category B and is considered safe during pregnancy. It is unknown if this medication is excreted in breast milk.
Protopic Counseling: Patient may experience a mild burning sensation during topical application. Protopic is not approved in children less than 2 years of age. There have been case reports of hematologic and skin malignancies in patients using topical calcineurin inhibitors although causality is questionable.
Bactrim Counseling:  I discussed with the patient the risks of sulfa antibiotics including but not limited to GI upset, allergic reaction, drug rash, diarrhea, dizziness, photosensitivity, and yeast infections.  Rarely, more serious reactions can occur including but not limited to aplastic anemia, agranulocytosis, methemoglobinemia, blood dyscrasias, liver or kidney failure, lung infiltrates or desquamative/blistering drug rashes.
Olanzapine Pregnancy And Lactation Text: This medication is pregnancy category C.   There are no adequate and well controlled trials with olanzapine in pregnant females.  Olanzapine should be used during pregnancy only if the potential benefit justifies the potential risk to the fetus.   In a study in lactating healthy women, olanzapine was excreted in breast milk.  It is recommended that women taking olanzapine should not breast feed.
Azathioprine Pregnancy And Lactation Text: This medication is Pregnancy Category D and isn't considered safe during pregnancy. It is unknown if this medication is excreted in breast milk.
Arava Counseling:  Patient counseled regarding adverse effects of Arava including but not limited to nausea, vomiting, abnormalities in liver function tests. Patients may develop mouth sores, rash, diarrhea, and abnormalities in blood counts. The patient understands that monitoring is required including LFTs and blood counts.  There is a rare possibility of scarring of the liver and lung problems that can occur when taking methotrexate. Persistent nausea, loss of appetite, pale stools, dark urine, cough, and shortness of breath should be reported immediately. Patient advised to discontinue Arava treatment and consult with a physician prior to attempting conception. The patient will have to undergo a treatment to eliminate Arava from the body prior to conception.
Adbry Counseling: I discussed with the patient the risks of tralokinumab including but not limited to eye infection and irritation, cold sores, injection site reactions, worsening of asthma, allergic reactions and increased risk of parasitic infection.  Live vaccines should be avoided while taking tralokinumab. The patient understands that monitoring is required and they must alert us or the primary physician if symptoms of infection or other concerning signs are noted.
Sski Pregnancy And Lactation Text: This medication is Pregnancy Category D and isn't considered safe during pregnancy. It is excreted in breast milk.
Doxycycline Pregnancy And Lactation Text: This medication is Pregnancy Category D and not consider safe during pregnancy. It is also excreted in breast milk but is considered safe for shorter treatment courses.
Rifampin Counseling: I discussed with the patient the risks of rifampin including but not limited to liver damage, kidney damage, red-orange body fluids, nausea/vomiting and severe allergy.
Carac Pregnancy And Lactation Text: This medication is Pregnancy Category X and contraindicated in pregnancy and in women who may become pregnant. It is unknown if this medication is excreted in breast milk.
Elidel Counseling: Patient may experience a mild burning sensation during topical application. Elidel is not approved in children less than 2 years of age. There have been case reports of hematologic and skin malignancies in patients using topical calcineurin inhibitors although causality is questionable.
Doxepin Counseling:  Patient advised that the medication is sedating and not to drive a car after taking this medication. Patient informed of potential adverse effects including but not limited to dry mouth, urinary retention, and blurry vision.  The patient verbalized understanding of the proper use and possible adverse effects of doxepin.  All of the patient's questions and concerns were addressed.
Prednisone Pregnancy And Lactation Text: This medication is Pregnancy Category C and it isn't know if it is safe during pregnancy. This medication is excreted in breast milk.
Griseofulvin Pregnancy And Lactation Text: This medication is Pregnancy Category X and is known to cause serious birth defects. It is unknown if this medication is excreted in breast milk but breast feeding should be avoided.
Topical Metronidazole Pregnancy And Lactation Text: This medication is Pregnancy Category B and considered safe during pregnancy.  It is also considered safe to use while breastfeeding.
Klisyri Pregnancy And Lactation Text: It is unknown if this medication can harm a developing fetus or if it is excreted in breast milk.
Humira Counseling:  I discussed with the patient the risks of adalimumab including but not limited to myelosuppression, immunosuppression, autoimmune hepatitis, demyelinating diseases, lymphoma, and serious infections.  The patient understands that monitoring is required including a PPD at baseline and must alert us or the primary physician if symptoms of infection or other concerning signs are noted.
Topical Retinoid counseling:  Patient advised to apply a pea-sized amount only at bedtime and wait 30 minutes after washing their face before applying.  If too drying, patient may add a non-comedogenic moisturizer. The patient verbalized understanding of the proper use and possible adverse effects of retinoids.  All of the patient's questions and concerns were addressed.
Hydroxychloroquine Pregnancy And Lactation Text: This medication has been shown to cause fetal harm but it isn't assigned a Pregnancy Risk Category. There are small amounts excreted in breast milk.
Xeljanz Counseling: I discussed with the patient the risks of Xeljanz therapy including increased risk of infection, liver issues, headache, diarrhea, or cold symptoms. Live vaccines should be avoided. They were instructed to call if they have any problems.
Dutasteride Pregnancy And Lactation Text: This medication is absolutely contraindicated in women, especially during pregnancy and breast feeding. Feminization of male fetuses is possible if taking while pregnant.
Arava Pregnancy And Lactation Text: This medication is Pregnancy Category X and is absolutely contraindicated during pregnancy. It is unknown if it is excreted in breast milk.
Xolair Pregnancy And Lactation Text: This medication is Pregnancy Category B and is considered safe during pregnancy. This medication is excreted in breast milk.
Oral Minoxidil Counseling- I discussed with the patient the risks of oral minoxidil including but not limited to shortness of breath, swelling of the feet or ankles, dizziness, lightheadedness, unwanted hair growth and allergic reaction.  The patient verbalized understanding of the proper use and possible adverse effects of oral minoxidil.  All of the patient's questions and concerns were addressed.
Cellcept Counseling:  I discussed with the patient the risks of mycophenolate mofetil including but not limited to infection/immunosuppression, GI upset, hypokalemia, hypercholesterolemia, bone marrow suppression, lymphoproliferative disorders, malignancy, GI ulceration/bleed/perforation, colitis, interstitial lung disease, kidney failure, progressive multifocal leukoencephalopathy, and birth defects.  The patient understands that monitoring is required including a baseline creatinine and regular CBC testing. In addition, patient must alert us immediately if symptoms of infection or other concerning signs are noted.
Bactrim Pregnancy And Lactation Text: This medication is Pregnancy Category D and is known to cause fetal risk.  It is also excreted in breast milk.
Protopic Pregnancy And Lactation Text: This medication is Pregnancy Category C. It is unknown if this medication is excreted in breast milk when applied topically.
High Dose Vitamin A Pregnancy And Lactation Text: High dose vitamin A therapy is contraindicated during pregnancy and breast feeding.
Vtama Pregnancy And Lactation Text: It is unknown if this medication can cause problems during pregnancy and breastfeeding.
Erythromycin Counseling:  I discussed with the patient the risks of erythromycin including but not limited to GI upset, allergic reaction, drug rash, diarrhea, increase in liver enzymes, and yeast infections.
Itraconazole Counseling:  I discussed with the patient the risks of itraconazole including but not limited to liver damage, nausea/vomiting, neuropathy, and severe allergy.  The patient understands that this medication is best absorbed when taken with acidic beverages such as non-diet cola or ginger ale.  The patient understands that monitoring is required including baseline LFTs and repeat LFTs at intervals.  The patient understands that they are to contact us or the primary physician if concerning signs are noted.
Doxepin Pregnancy And Lactation Text: This medication is Pregnancy Category C and it isn't known if it is safe during pregnancy. It is also excreted in breast milk and breast feeding isn't recommended.
Minoxidil Counseling: Minoxidil is a topical medication which can increase blood flow where it is applied. It is uncertain how this medication increases hair growth. Side effects are uncommon and include stinging and allergic reactions.
Calcipotriene Counseling:  I discussed with the patient the risks of calcipotriene including but not limited to erythema, scaling, itching, and irritation.
Adbry Pregnancy And Lactation Text: It is unknown if this medication will adversely affect pregnancy or breast feeding.
Topical Steroids Counseling: I discussed with the patient that prolonged use of topical steroids can result in the increased appearance of superficial blood vessels (telangiectasias), lightening (hypopigmentation) and thinning of the skin (atrophy).  Patient understands to avoid using high potency steroids in skin folds, the groin or the face.  The patient verbalized understanding of the proper use and possible adverse effects of topical steroids.  All of the patient's questions and concerns were addressed.
Thalidomide Counseling: I discussed with the patient the risks of thalidomide including but not limited to birth defects, anxiety, weakness, chest pain, dizziness, cough and severe allergy.
Rifampin Pregnancy And Lactation Text: This medication is Pregnancy Category C and it isn't know if it is safe during pregnancy. It is also excreted in breast milk and should not be used if you are breast feeding.
Clofazimine Counseling:  I discussed with the patient the risks of clofazimine including but not limited to skin and eye pigmentation, liver damage, nausea/vomiting, gastrointestinal bleeding and allergy.
Oral Minoxidil Pregnancy And Lactation Text: This medication should only be used when clearly needed if you are pregnant, attempting to become pregnant or breast feeding.
Simponi Counseling:  I discussed with the patient the risks of golimumab including but not limited to myelosuppression, immunosuppression, autoimmune hepatitis, demyelinating diseases, lymphoma, and serious infections.  The patient understands that monitoring is required including a PPD at baseline and must alert us or the primary physician if symptoms of infection or other concerning signs are noted.
Xellázaroz Pregnancy And Lactation Text: This medication is Pregnancy Category D and is not considered safe during pregnancy.  The risk during breast feeding is also uncertain.
Finasteride Male Counseling: Finasteride Counseling:  I discussed with the patient the risks of use of finasteride including but not limited to decreased libido, decreased ejaculate volume, gynecomastia, and depression. Women should not handle medication.  All of the patient's questions and concerns were addressed.
Calcipotriene Pregnancy And Lactation Text: The use of this medication during pregnancy or lactation is not recommended as there is insufficient data.
Qbrexza Counseling:  I discussed with the patient the risks of Qbrexza including but not limited to headache, mydriasis, blurred vision, dry eyes, nasal dryness, dry mouth, dry throat, dry skin, urinary hesitation, and constipation.  Local skin reactions including erythema, burning, stinging, and itching can also occur.
Cibinqo Counseling: I discussed with the patient the risks of Cibinqo therapy including but not limited to common cold, nausea, headache, cold sores, increased blood CPK levels, dizziness, UTIs, fatigue, acne, and vomitting. Live vaccines should be avoided.  This medication has been linked to serious infections; higher rate of mortality; malignancy and lymphoproliferative disorders; major adverse cardiovascular events; thrombosis; thrombocytopenia and lymphopenia; lipid elevations; and retinal detachment.
Zoryve Counseling:  I discussed with the patient that Zoryve is not for use in the eyes, mouth or vagina. The most commonly reported side effects include diarrhea, headache, insomnia, application site pain, upper respiratory tract infections, and urinary tract infections.  All of the patient's questions and concerns were addressed.
Cephalexin Counseling: I counseled the patient regarding use of cephalexin as an antibiotic for prophylactic and/or therapeutic purposes. Cephalexin (commonly prescribed under brand name Keflex) is a cephalosporin antibiotic which is active against numerous classes of bacteria, including most skin bacteria. Side effects may include nausea, diarrhea, gastrointestinal upset, rash, hives, yeast infections, and in rare cases, hepatitis, kidney disease, seizures, fever, confusion, neurologic symptoms, and others. Patients with severe allergies to penicillin medications are cautioned that there is about a 10% incidence of cross-reactivity with cephalosporins. When possible, patients with penicillin allergies should use alternatives to cephalosporins for antibiotic therapy.
Topical Steroids Applications Pregnancy And Lactation Text: Most topical steroids are considered safe to use during pregnancy and lactation.  Any topical steroid applied to the breast or nipple should be washed off before breastfeeding.
Sarecycline Counseling: Patient advised regarding possible photosensitivity and discoloration of the teeth, skin, lips, tongue and gums.  Patient instructed to avoid sunlight, if possible.  When exposed to sunlight, patients should wear protective clothing, sunglasses, and sunscreen.  The patient was instructed to call the office immediately if the following severe adverse effects occur:  hearing changes, easy bruising/bleeding, severe headache, or vision changes.  The patient verbalized understanding of the proper use and possible adverse effects of sarecycline.  All of the patient's questions and concerns were addressed.
Cimzia Counseling:  I discussed with the patient the risks of Cimzia including but not limited to immunosuppression, allergic reactions and infections.  The patient understands that monitoring is required including a PPD at baseline and must alert us or the primary physician if symptoms of infection or other concerning signs are noted.
Minoxidil Pregnancy And Lactation Text: This medication has not been assigned a Pregnancy Risk Category but animal studies failed to show danger with the topical medication. It is unknown if the medication is excreted in breast milk.
Low Dose Naltrexone Counseling- I discussed with the patient the potential risks and side effects of low dose naltrexone including but not limited to: more vivid dreams, headaches, nausea, vomiting, abdominal pain, fatigue, dizziness, and anxiety.
Erivedge Counseling- I discussed with the patient the risks of Erivedge including but not limited to nausea, vomiting, diarrhea, constipation, weight loss, changes in the sense of taste, decreased appetite, muscle spasms, and hair loss.  The patient verbalized understanding of the proper use and possible adverse effects of Erivedge.  All of the patient's questions and concerns were addressed.
Finasteride Pregnancy And Lactation Text: This medication is absolutely contraindicated during pregnancy. It is unknown if it is excreted in breast milk.
Otezla Counseling: The side effects of Otezla were discussed with the patient, including but not limited to worsening or new depression, weight loss, diarrhea, nausea, upper respiratory tract infection, and headache. Patient instructed to call the office should any adverse effect occur.  The patient verbalized understanding of the proper use and possible adverse effects of Otezla.  All the patient's questions and concerns were addressed.
Erythromycin Pregnancy And Lactation Text: This medication is Pregnancy Category B and is considered safe during pregnancy. It is also excreted in breast milk.
Aklief counseling:  Patient advised to apply a pea-sized amount only at bedtime and wait 30 minutes after washing their face before applying.  If too drying, patient may add a non-comedogenic moisturizer.  The most commonly reported side effects including irritation, redness, scaling, dryness, stinging, burning, itching, and increased risk of sunburn.  The patient verbalized understanding of the proper use and possible adverse effects of retinoids.  All of the patient's questions and concerns were addressed.
Ilumya Counseling: I discussed with the patient the risks of tildrakizumab including but not limited to immunosuppression, malignancy, posterior leukoencephalopathy syndrome, and serious infections.  The patient understands that monitoring is required including a PPD at baseline and must alert us or the primary physician if symptoms of infection or other concerning signs are noted.
Cyclophosphamide Counseling:  I discussed with the patient the risks of cyclophosphamide including but not limited to hair loss, hormonal abnormalities, decreased fertility, abdominal pain, diarrhea, nausea and vomiting, bone marrow suppression and infection. The patient understands that monitoring is required while taking this medication.
Cephalexin Pregnancy And Lactation Text: This medication is Pregnancy Category B and considered safe during pregnancy.  It is also excreted in breast milk but can be used safely for shorter doses.
Cantharidin Counseling:  I discussed with the patient the risks of Cantharidin including but not limited to pain, redness, burning, itching, and blistering.
Hydroxyzine Counseling: Patient advised that the medication is sedating and not to drive a car after taking this medication.  Patient informed of potential adverse effects including but not limited to dry mouth, urinary retention, and blurry vision.  The patient verbalized understanding of the proper use and possible adverse effects of hydroxyzine.  All of the patient's questions and concerns were addressed.
Clofazimine Pregnancy And Lactation Text: This medication is Pregnancy Category C and isn't considered safe during pregnancy. It is excreted in breast milk.
Eucrisa Counseling: Patient may experience a mild burning sensation during topical application. Eucrisa is not approved in children less than 2 years of age.
Tazorac Counseling:  Patient advised that medication is irritating and drying.  Patient may need to apply sparingly and wash off after an hour before eventually leaving it on overnight.  The patient verbalized understanding of the proper use and possible adverse effects of tazorac.  All of the patient's questions and concerns were addressed.
Cantharidin Pregnancy And Lactation Text: This medication has not been proven safe during pregnancy. It is unknown if this medication is excreted in breast milk.
Cimzia Pregnancy And Lactation Text: This medication crosses the placenta but can be considered safe in certain situations. Cimzia may be excreted in breast milk.
Qbrexza Pregnancy And Lactation Text: There is no available data on Qbrexza use in pregnant women.  There is no available data on Qbrexza use in lactation.
Cibinqo Pregnancy And Lactation Text: It is unknown if this medication will adversely affect pregnancy or breast feeding.  You should not take this medication if you are currently pregnant or planning a pregnancy or while breastfeeding.
Low Dose Naltrexone Pregnancy And Lactation Text: Naltrexone is pregnancy category C.  There have been no adequate and well-controlled studies in pregnant women.  It should be used in pregnancy only if the potential benefit justifies the potential risk to the fetus.   Limited data indicates that naltrexone is minimally excreted into breastmilk.
Tranexamic Acid Counseling:  Patient advised of the small risk of bleeding problems with tranexamic acid. They were also instructed to call if they developed any nausea, vomiting or diarrhea. All of the patient's questions and concerns were addressed.
Dapsone Counseling: I discussed with the patient the risks of dapsone including but not limited to hemolytic anemia, agranulocytosis, rashes, methemoglobinemia, kidney failure, peripheral neuropathy, headaches, GI upset, and liver toxicity.  Patients who start dapsone require monitoring including baseline LFTs and weekly CBCs for the first month, then every month thereafter.  The patient verbalized understanding of the proper use and possible adverse effects of dapsone.  All of the patient's questions and concerns were addressed.
Acitretin Counseling:  I discussed with the patient the risks of acitretin including but not limited to hair loss, dry lips/skin/eyes, liver damage, hyperlipidemia, depression/suicidal ideation, photosensitivity.  Serious rare side effects can include but are not limited to pancreatitis, pseudotumor cerebri, bony changes, clot formation/stroke/heart attack.  Patient understands that alcohol is contraindicated since it can result in liver toxicity and significantly prolong the elimination of the drug by many years.
Mirvaso Counseling: Mirvaso is a topical medication which can decrease superficial blood flow where applied. Side effects are uncommon and include stinging, redness and allergic reactions.
Detail Level: Simple
Sarecycline Pregnancy And Lactation Text: This medication is Pregnancy Category D and not consider safe during pregnancy. It is also excreted in breast milk.
Topical Sulfur Applications Counseling: Topical Sulfur Counseling: Patient counseled that this medication may cause skin irritation or allergic reactions.  In the event of skin irritation, the patient was advised to reduce the amount of the drug applied or use it less frequently.   The patient verbalized understanding of the proper use and possible adverse effects of topical sulfur application.  All of the patient's questions and concerns were addressed.
Ketoconazole Counseling:   Patient counseled regarding improving absorption with orange juice.  Adverse effects include but are not limited to breast enlargement, headache, diarrhea, nausea, upset stomach, liver function test abnormalities, taste disturbance, and stomach pain.  There is a rare possibility of liver failure that can occur when taking ketoconazole. The patient understands that monitoring of LFTs may be required, especially at baseline. The patient verbalized understanding of the proper use and possible adverse effects of ketoconazole.  All of the patient's questions and concerns were addressed.
Tazorac Pregnancy And Lactation Text: This medication is not safe during pregnancy. It is unknown if this medication is excreted in breast milk.
Hydroxyzine Pregnancy And Lactation Text: This medication is not safe during pregnancy and should not be taken. It is also excreted in breast milk and breast feeding isn't recommended.
Birth Control Pills Counseling: Birth Control Pill Counseling: I discussed with the patient the potential side effects of OCPs including but not limited to increased risk of stroke, heart attack, thrombophlebitis, deep venous thrombosis, hepatic adenomas, breast changes, GI upset, headaches, and depression.  The patient verbalized understanding of the proper use and possible adverse effects of OCPs. All of the patient's questions and concerns were addressed.
Colchicine Counseling:  Patient counseled regarding adverse effects including but not limited to stomach upset (nausea, vomiting, stomach pain, or diarrhea).  Patient instructed to limit alcohol consumption while taking this medication.  Colchicine may reduce blood counts especially with prolonged use.  The patient understands that monitoring of kidney function and blood counts may be required, especially at baseline. The patient verbalized understanding of the proper use and possible adverse effects of colchicine.  All of the patient's questions and concerns were addressed.
Otezla Pregnancy And Lactation Text: This medication is Pregnancy Category C and it isn't known if it is safe during pregnancy. It is unknown if it is excreted in breast milk.
5-Fu Counseling: 5-Fluorouracil Counseling:  I discussed with the patient the risks of 5-fluorouracil including but not limited to erythema, scaling, itching, weeping, crusting, and pain.
Metronidazole Counseling:  I discussed with the patient the risks of metronidazole including but not limited to seizures, nausea/vomiting, a metallic taste in the mouth, nausea/vomiting and severe allergy.
Skyrizi Counseling: I discussed with the patient the risks of risankizumab-rzaa including but not limited to immunosuppression, and serious infections.  The patient understands that monitoring is required including a PPD at baseline and must alert us or the primary physician if symptoms of infection or other concerning signs are noted.
Zyclara Counseling:  I discussed with the patient the risks of imiquimod including but not limited to erythema, scaling, itching, weeping, crusting, and pain.  Patient understands that the inflammatory response to imiquimod is variable from person to person and was educated regarded proper titration schedule.  If flu-like symptoms develop, patient knows to discontinue the medication and contact us.
Niacinamide Counseling: I recommended taking niacin or niacinamide, also know as vitamin B3, twice daily. Recent evidence suggests that taking vitamin B3 (500 mg twice daily) can reduce the risk of actinic keratoses and non-melanoma skin cancers. Side effects of vitamin B3 include flushing and headache.
Rhofade Counseling: Rhofade is a topical medication which can decrease superficial blood flow where applied. Side effects are uncommon and include stinging, redness and allergic reactions.
Clindamycin Counseling: I counseled the patient regarding use of clindamycin as an antibiotic for prophylactic and/or therapeutic purposes. Clindamycin is active against numerous classes of bacteria, including skin bacteria. Side effects may include nausea, diarrhea, gastrointestinal upset, rash, hives, yeast infections, and in rare cases, colitis.
Aklief Pregnancy And Lactation Text: It is unknown if this medication is safe to use during pregnancy.  It is unknown if this medication is excreted in breast milk.  Breastfeeding women should use the topical cream on the smallest area of the skin for the shortest time needed while breastfeeding.  Do not apply to nipple and areola.
Cyclophosphamide Pregnancy And Lactation Text: This medication is Pregnancy Category D and it isn't considered safe during pregnancy. This medication is excreted in breast milk.
Olumiant Counseling: I discussed with the patient the risks of Olumiant therapy including but not limited to upper respiratory tract infections, shingles, cold sores, and nausea. Live vaccines should be avoided.  This medication has been linked to serious infections; higher rate of mortality; malignancy and lymphoproliferative disorders; major adverse cardiovascular events; thrombosis; gastrointestinal perforations; neutropenia; lymphopenia; anemia; liver enzyme elevations; and lipid elevations.
Ketoconazole Pregnancy And Lactation Text: This medication is Pregnancy Category C and it isn't know if it is safe during pregnancy. It is also excreted in breast milk and breast feeding isn't recommended.
Mirvaso Pregnancy And Lactation Text: This medication has not been assigned a Pregnancy Risk Category. It is unknown if the medication is excreted in breast milk.
Tranexamic Acid Pregnancy And Lactation Text: It is unknown if this medication is safe during pregnancy or breast feeding.
Topical Sulfur Applications Pregnancy And Lactation Text: This medication is Pregnancy Category C and has an unknown safety profile during pregnancy. It is unknown if this topical medication is excreted in breast milk.
Include Pregnancy/Lactation Warning?: No
Tetracycline Counseling: Patient counseled regarding possible photosensitivity and increased risk for sunburn.  Patient instructed to avoid sunlight, if possible.  When exposed to sunlight, patients should wear protective clothing, sunglasses, and sunscreen.  The patient was instructed to call the office immediately if the following severe adverse effects occur:  hearing changes, easy bruising/bleeding, severe headache, or vision changes.  The patient verbalized understanding of the proper use and possible adverse effects of tetracycline.  All of the patient's questions and concerns were addressed. Patient understands to avoid pregnancy while on therapy due to potential birth defects.
Cosentyx Counseling:  I discussed with the patient the risks of Cosentyx including but not limited to worsening of Crohn's disease, immunosuppression, allergic reactions and infections.  The patient understands that monitoring is required including a PPD at baseline and must alert us or the primary physician if symptoms of infection or other concerning signs are noted.
Acitretin Pregnancy And Lactation Text: This medication is Pregnancy Category X and should not be given to women who are pregnant or may become pregnant in the future. This medication is excreted in breast milk.
Topical Clindamycin Counseling: Patient counseled that this medication may cause skin irritation or allergic reactions.  In the event of skin irritation, the patient was advised to reduce the amount of the drug applied or use it less frequently.   The patient verbalized understanding of the proper use and possible adverse effects of clindamycin.  All of the patient's questions and concerns were addressed.
Hydroquinone Counseling:  Patient advised that medication may result in skin irritation, lightening (hypopigmentation), dryness, and burning.  In the event of skin irritation, the patient was advised to reduce the amount of the drug applied or use it less frequently.  Rarely, spots that are treated with hydroquinone can become darker (pseudoochronosis).  Should this occur, patient instructed to stop medication and call the office. The patient verbalized understanding of the proper use and possible adverse effects of hydroquinone.  All of the patient's questions and concerns were addressed.
Dapsone Pregnancy And Lactation Text: This medication is Pregnancy Category C and is not considered safe during pregnancy or breast feeding.
Metronidazole Pregnancy And Lactation Text: This medication is Pregnancy Category B and considered safe during pregnancy.  It is also excreted in breast milk.
Libtayo Counseling- I discussed with the patient the risks of Libtayo including but not limited to nausea, vomiting, diarrhea, and bone or muscle pain.  The patient verbalized understanding of the proper use and possible adverse effects of Libtayo.  All of the patient's questions and concerns were addressed.
Birth Control Pills Pregnancy And Lactation Text: This medication should be avoided if pregnant and for the first 30 days post-partum.
Olumiant Pregnancy And Lactation Text: Based on animal studies, Olumiant may cause embryo-fetal harm when administered to pregnant women.  The medication should not be used in pregnancy.  Breastfeeding is not recommended during treatment.
Infliximab Counseling:  I discussed with the patient the risks of infliximab including but not limited to myelosuppression, immunosuppression, autoimmune hepatitis, demyelinating diseases, lymphoma, and serious infections.  The patient understands that monitoring is required including a PPD at baseline and must alert us or the primary physician if symptoms of infection or other concerning signs are noted.
Oxybutynin Counseling:  I discussed with the patient the risks of oxybutynin including but not limited to skin rash, drowsiness, dry mouth, difficulty urinating, and blurred vision.
Azelaic Acid Counseling: Patient counseled that medicine may cause skin irritation and to avoid applying near the eyes.  In the event of skin irritation, the patient was advised to reduce the amount of the drug applied or use it less frequently.   The patient verbalized understanding of the proper use and possible adverse effects of azelaic acid.  All of the patient's questions and concerns were addressed.
Cyclosporine Counseling:  I discussed with the patient the risks of cyclosporine including but not limited to hypertension, gingival hyperplasia,myelosuppression, immunosuppression, liver damage, kidney damage, neurotoxicity, lymphoma, and serious infections. The patient understands that monitoring is required including baseline blood pressure, CBC, CMP, lipid panel and uric acid, and then 1-2 times monthly CMP and blood pressure.
Bexarotene Counseling:  I discussed with the patient the risks of bexarotene including but not limited to hair loss, dry lips/skin/eyes, liver abnormalities, hyperlipidemia, pancreatitis, depression/suicidal ideation, photosensitivity, drug rash/allergic reactions, hypothyroidism, anemia, leukopenia, infection, cataracts, and teratogenicity.  Patient understands that they will need regular blood tests to check lipid profile, liver function tests, white blood cell count, thyroid function tests and pregnancy test if applicable.
Opzelura Counseling:  I discussed with the patient the risks of Opzelura including but not limited to nasopharngitis, bronchitis, ear infection, eosinophila, hives, diarrhea, folliculitis, tonsillitis, and rhinorrhea.  Taken orally, this medication has been linked to serious infections; higher rate of mortality; malignancy and lymphoproliferative disorders; major adverse cardiovascular events; thrombosis; thrombocytopenia, anemia, and neutropenia; and lipid elevations.
Niacinamide Pregnancy And Lactation Text: These medications are considered safe during pregnancy.
Clindamycin Pregnancy And Lactation Text: This medication can be used in pregnancy if certain situations. Clindamycin is also present in breast milk.
Albendazole Counseling:  I discussed with the patient the risks of albendazole including but not limited to cytopenia, kidney damage, nausea/vomiting and severe allergy.  The patient understands that this medication is being used in an off-label manner.
Terbinafine Counseling: Patient counseling regarding adverse effects of terbinafine including but not limited to headache, diarrhea, rash, upset stomach, liver function test abnormalities, itching, taste/smell disturbance, nausea, abdominal pain, and flatulence.  There is a rare possibility of liver failure that can occur when taking terbinafine.  The patient understands that a baseline LFT and kidney function test may be required. The patient verbalized understanding of the proper use and possible adverse effects of terbinafine.  All of the patient's questions and concerns were addressed.
Stelara Counseling:  I discussed with the patient the risks of ustekinumab including but not limited to immunosuppression, malignancy, posterior leukoencephalopathy syndrome, and serious infections.  The patient understands that monitoring is required including a PPD at baseline and must alert us or the primary physician if symptoms of infection or other concerning signs are noted.
Gabapentin Counseling: I discussed with the patient the risks of gabapentin including but not limited to dizziness, somnolence, fatigue and ataxia.
Spironolactone Counseling: Patient advised regarding risks of diarrhea, abdominal pain, hyperkalemia, birth defects (for female patients), liver toxicity and renal toxicity. The patient may need blood work to monitor liver and kidney function and potassium levels while on therapy. The patient verbalized understanding of the proper use and possible adverse effects of spironolactone.  All of the patient's questions and concerns were addressed.
Taltz Counseling: I discussed with the patient the risks of ixekizumab including but not limited to immunosuppression, serious infections, worsening of inflammatory bowel disease and drug reactions.  The patient understands that monitoring is required including a PPD at baseline and must alert us or the primary physician if symptoms of infection or other concerning signs are noted.
Wartpeel Counseling:  I discussed with the patient the risks of Wartpeel including but not limited to erythema, scaling, itching, weeping, crusting, and pain.
Valtrex Counseling: I discussed with the patient the risks of valacyclovir including but not limited to kidney damage, nausea, vomiting and severe allergy.  The patient understands that if the infection seems to be worsening or is not improving, they are to call.
Libtayo Pregnancy And Lactation Text: This medication is contraindicated in pregnancy and when breast feeding.
Oxybutynin Pregnancy And Lactation Text: This medication is Pregnancy Category B and is considered safe during pregnancy. It is unknown if it is excreted in breast milk.
Minocycline Counseling: Patient advised regarding possible photosensitivity and discoloration of the teeth, skin, lips, tongue and gums.  Patient instructed to avoid sunlight, if possible.  When exposed to sunlight, patients should wear protective clothing, sunglasses, and sunscreen.  The patient was instructed to call the office immediately if the following severe adverse effects occur:  hearing changes, easy bruising/bleeding, severe headache, or vision changes.  The patient verbalized understanding of the proper use and possible adverse effects of minocycline.  All of the patient's questions and concerns were addressed.
Solaraze Counseling:  I discussed with the patient the risks of Solaraze including but not limited to erythema, scaling, itching, weeping, crusting, and pain.
Albendazole Pregnancy And Lactation Text: This medication is Pregnancy Category C and it isn't known if it is safe during pregnancy. It is also excreted in breast milk.
Rinvoq Counseling: I discussed with the patient the risks of Rinvoq therapy including but not limited to upper respiratory tract infections, shingles, cold sores, bronchitis, nausea, cough, fever, acne, and headache. Live vaccines should be avoided.  This medication has been linked to serious infections; higher rate of mortality; malignancy and lymphoproliferative disorders; major adverse cardiovascular events; thrombosis; thrombocytopenia, anemia, and neutropenia; lipid elevations; liver enzyme elevations; and gastrointestinal perforations.
Doxycycline Counseling:  Patient counseled regarding possible photosensitivity and increased risk for sunburn.  Patient instructed to avoid sunlight, if possible.  When exposed to sunlight, patients should wear protective clothing, sunglasses, and sunscreen.  The patient was instructed to call the office immediately if the following severe adverse effects occur:  hearing changes, easy bruising/bleeding, severe headache, or vision changes.  The patient verbalized understanding of the proper use and possible adverse effects of doxycycline.  All of the patient's questions and concerns were addressed.
Nsaids Counseling: NSAID Counseling: I discussed with the patient that NSAIDs should be taken with food. Prolonged use of NSAIDs can result in the development of stomach ulcers.  Patient advised to stop taking NSAIDs if abdominal pain occurs.  The patient verbalized understanding of the proper use and possible adverse effects of NSAIDs.  All of the patient's questions and concerns were addressed.
Valtrex Pregnancy And Lactation Text: this medication is Pregnancy Category B and is considered safe during pregnancy. This medication is not directly found in breast milk but it's metabolite acyclovir is present.
Odomzo Counseling- I discussed with the patient the risks of Odomzo including but not limited to nausea, vomiting, diarrhea, constipation, weight loss, changes in the sense of taste, decreased appetite, muscle spasms, and hair loss.  The patient verbalized understanding of the proper use and possible adverse effects of Odomzo.  All of the patient's questions and concerns were addressed.
Opzelura Pregnancy And Lactation Text: There is insufficient data to evaluate drug-associated risk for major birth defects, miscarriage, or other adverse maternal or fetal outcomes.  There is a pregnancy registry that monitors pregnancy outcomes in pregnant persons exposed to the medication during pregnancy.  It is unknown if this medication is excreted in breast milk.  Do not breastfeed during treatment and for about 4 weeks after the last dose.
Dupixent Counseling: I discussed with the patient the risks of dupilumab including but not limited to eye infection and irritation, cold sores, injection site reactions, worsening of asthma, allergic reactions and increased risk of parasitic infection.  Live vaccines should be avoided while taking dupilumab. Dupilumab will also interact with certain medications such as warfarin and cyclosporine. The patient understands that monitoring is required and they must alert us or the primary physician if symptoms of infection or other concerning signs are noted.
Bexarotene Pregnancy And Lactation Text: This medication is Pregnancy Category X and should not be given to women who are pregnant or may become pregnant. This medication should not be used if you are breast feeding.
Fluconazole Counseling:  Patient counseled regarding adverse effects of fluconazole including but not limited to headache, diarrhea, nausea, upset stomach, liver function test abnormalities, taste disturbance, and stomach pain.  There is a rare possibility of liver failure that can occur when taking fluconazole.  The patient understands that monitoring of LFTs and kidney function test may be required, especially at baseline. The patient verbalized understanding of the proper use and possible adverse effects of fluconazole.  All of the patient's questions and concerns were addressed.
Imiquimod Counseling:  I discussed with the patient the risks of imiquimod including but not limited to erythema, scaling, itching, weeping, crusting, and pain.  Patient understands that the inflammatory response to imiquimod is variable from person to person and was educated regarded proper titration schedule.  If flu-like symptoms develop, patient knows to discontinue the medication and contact us.
Spironolactone Pregnancy And Lactation Text: This medication can cause feminization of the male fetus and should be avoided during pregnancy. The active metabolite is also found in breast milk.
Topical Ketoconazole Counseling: Patient counseled that this medication may cause skin irritation or allergic reactions.  In the event of skin irritation, the patient was advised to reduce the amount of the drug applied or use it less frequently.   The patient verbalized understanding of the proper use and possible adverse effects of ketoconazole.  All of the patient's questions and concerns were addressed.
Opioid Counseling: I discussed with the patient the potential side effects of opioids including but not limited to addiction, altered mental status, and depression. I stressed avoiding alcohol, benzodiazepines, muscle relaxants and sleep aids unless specifically okayed by a physician. The patient verbalized understanding of the proper use and possible adverse effects of opioids. All of the patient's questions and concerns were addressed. They were instructed to flush the remaining pills down the toilet if they did not need them for pain.
Benzoyl Peroxide Counseling: Patient counseled that medicine may cause skin irritation and bleach clothing.  In the event of skin irritation, the patient was advised to reduce the amount of the drug applied or use it less frequently.   The patient verbalized understanding of the proper use and possible adverse effects of benzoyl peroxide.  All of the patient's questions and concerns were addressed.
Solaraze Pregnancy And Lactation Text: This medication is Pregnancy Category B and is considered safe. There is some data to suggest avoiding during the third trimester. It is unknown if this medication is excreted in breast milk.
Nsaids Pregnancy And Lactation Text: These medications are considered safe up to 30 weeks gestation. It is excreted in breast milk.
Ivermectin Counseling:  Patient instructed to take medication on an empty stomach with a full glass of water.  Patient informed of potential adverse effects including but not limited to nausea, diarrhea, dizziness, itching, and swelling of the extremities or lymph nodes.  The patient verbalized understanding of the proper use and possible adverse effects of ivermectin.  All of the patient's questions and concerns were addressed.
Rituxan Counseling:  I discussed with the patient the risks of Rituxan infusions. Side effects can include infusion reactions, severe drug rashes including mucocutaneous reactions, reactivation of latent hepatitis and other infections and rarely progressive multifocal leukoencephalopathy.  All of the patient's questions and concerns were addressed.
Methotrexate Counseling:  Patient counseled regarding adverse effects of methotrexate including but not limited to nausea, vomiting, abnormalities in liver function tests. Patients may develop mouth sores, rash, diarrhea, and abnormalities in blood counts. The patient understands that monitoring is required including LFT's and blood counts.  There is a rare possibility of scarring of the liver and lung problems that can occur when taking methotrexate. Persistent nausea, loss of appetite, pale stools, dark urine, cough, and shortness of breath should be reported immediately. Patient advised to discontinue methotrexate treatment at least three months before attempting to become pregnant.  I discussed the need for folate supplements while taking methotrexate.  These supplements can decrease side effects during methotrexate treatment. The patient verbalized understanding of the proper use and possible adverse effects of methotrexate.  All of the patient's questions and concerns were addressed.
Propranolol Counseling:  I discussed with the patient the risks of propranolol including but not limited to low heart rate, low blood pressure, low blood sugar, restlessness and increased cold sensitivity. They should call the office if they experience any of these side effects.
Dupixent Pregnancy And Lactation Text: This medication likely crosses the placenta but the risk for the fetus is uncertain. This medication is excreted in breast milk.
Rinvoq Pregnancy And Lactation Text: Based on animal studies, Rinvoq may cause embryo-fetal harm when administered to pregnant women.  The medication should not be used in pregnancy.  Breastfeeding is not recommended during treatment and for 6 days after the last dose.
Tremfya Counseling: I discussed with the patient the risks of guselkumab including but not limited to immunosuppression, serious infections, worsening of inflammatory bowel disease and drug reactions.  The patient understands that monitoring is required including a PPD at baseline and must alert us or the primary physician if symptoms of infection or other concerning signs are noted.
Isotretinoin Counseling: Patient should get monthly blood tests, not donate blood, not drive at night if vision affected, not share medication, and not undergo elective surgery for 6 months after tx completed. Side effects reviewed, pt to contact office should one occur.
Quinolones Counseling:  I discussed with the patient the risks of fluoroquinolones including but not limited to GI upset, allergic reaction, drug rash, diarrhea, dizziness, photosensitivity, yeast infections, liver function test abnormalities, tendonitis/tendon rupture.
Glycopyrrolate Counseling:  I discussed with the patient the risks of glycopyrrolate including but not limited to skin rash, drowsiness, dry mouth, difficulty urinating, and blurred vision.
Azithromycin Counseling:  I discussed with the patient the risks of azithromycin including but not limited to GI upset, allergic reaction, drug rash, diarrhea, and yeast infections.
Opioid Pregnancy And Lactation Text: These medications can lead to premature delivery and should be avoided during pregnancy. These medications are also present in breast milk in small amounts.
Winlevi Counseling:  I discussed with the patient the risks of topical clascoterone including but not limited to erythema, scaling, itching, and stinging. Patient voiced their understanding.
Picato Counseling:  I discussed with the patient the risks of Picato including but not limited to erythema, scaling, itching, weeping, crusting, and pain.
Rituxan Pregnancy And Lactation Text: This medication is Pregnancy Category C and it isn't know if it is safe during pregnancy. It is unknown if this medication is excreted in breast milk but similar antibodies are known to be excreted.
Cimetidine Counseling:  I discussed with the patient the risks of Cimetidine including but not limited to gynecomastia, headache, diarrhea, nausea, drowsiness, arrhythmias, pancreatitis, skin rashes, psychosis, bone marrow suppression and kidney toxicity.
Propranolol Pregnancy And Lactation Text: This medication is Pregnancy Category C and it isn't known if it is safe during pregnancy. It is excreted in breast milk.
Methotrexate Pregnancy And Lactation Text: This medication is Pregnancy Category X and is known to cause fetal harm. This medication is excreted in breast milk.
Isotretinoin Pregnancy And Lactation Text: This medication is Pregnancy Category X and is considered extremely dangerous during pregnancy. It is unknown if it is excreted in breast milk.
Olanzapine Counseling- I discussed with the patient the common side effects of olanzapine including but are not limited to: lack of energy, dry mouth, increased appetite, sleepiness, tremor, constipation, dizziness, changes in behavior, or restlessness.  Explained that teenagers are more likely to experience headaches, abdominal pain, pain in the arms or legs, tiredness, and sleepiness.  Serious side effects include but are not limited: increased risk of death in elderly patients who are confused, have memory loss, or dementia-related psychosis; hyperglycemia; increased cholesterol and triglycerides; and weight gain.
Azathioprine Counseling:  I discussed with the patient the risks of azathioprine including but not limited to myelosuppression, immunosuppression, hepatotoxicity, lymphoma, and infections.  The patient understands that monitoring is required including baseline LFTs, Creatinine, possible TPMP genotyping and weekly CBCs for the first month and then every 2 weeks thereafter.  The patient verbalized understanding of the proper use and possible adverse effects of azathioprine.  All of the patient's questions and concerns were addressed.
Benzoyl Peroxide Pregnancy And Lactation Text: This medication is Pregnancy Category C. It is unknown if benzoyl peroxide is excreted in breast milk.
Drysol Counseling:  I discussed with the patient the risks of drysol/aluminum chloride including but not limited to skin rash, itching, irritation, burning.
Soolantra Counseling: I discussed with the patients the risks of topial Soolantra. This is a medicine which decreases the number of mites and inflammation in the skin. You experience burning, stinging, eye irritation or allergic reactions.  Please call our office if you develop any problems from using this medication.
Sotyktu Counseling:  I discussed the most common side effects of Sotyktu including: common cold, sore throat, sinus infections, cold sores, canker sores, folliculitis, and acne.  I also discussed more serious side effects of Sotyktu including but not limited to: serious allergic reactions; increased risk for infections such as TB; cancers such as lymphomas; rhabdomyolysis and elevated CPK; and elevated triglycerides and liver enzymes.

## 2023-08-08 ENCOUNTER — HOSPITAL ENCOUNTER (OUTPATIENT)
Dept: RADIOLOGY | Facility: MEDICAL CENTER | Age: 83
End: 2023-08-08
Attending: FAMILY MEDICINE
Payer: MEDICARE

## 2023-08-08 ENCOUNTER — PATIENT MESSAGE (OUTPATIENT)
Dept: MEDICAL GROUP | Facility: CLINIC | Age: 83
End: 2023-08-08
Payer: MEDICARE

## 2023-08-08 DIAGNOSIS — M81.0 AGE-RELATED OSTEOPOROSIS WITHOUT CURRENT PATHOLOGICAL FRACTURE: ICD-10-CM

## 2023-08-08 PROCEDURE — 77080 DXA BONE DENSITY AXIAL: CPT

## 2023-08-08 RX ORDER — DIPHENHYDRAMINE HYDROCHLORIDE 50 MG/ML
50 INJECTION INTRAMUSCULAR; INTRAVENOUS PRN
OUTPATIENT
Start: 2023-08-08

## 2023-08-08 RX ORDER — EPINEPHRINE 1 MG/ML(1)
0.5 AMPUL (ML) INJECTION PRN
OUTPATIENT
Start: 2023-08-08

## 2023-08-08 RX ORDER — METHYLPREDNISOLONE SODIUM SUCCINATE 125 MG/2ML
125 INJECTION, POWDER, LYOPHILIZED, FOR SOLUTION INTRAMUSCULAR; INTRAVENOUS PRN
OUTPATIENT
Start: 2023-08-08

## 2023-08-08 RX ORDER — ALBUTEROL SULFATE 90 UG/1
2 AEROSOL, METERED RESPIRATORY (INHALATION) PRN
OUTPATIENT
Start: 2023-08-08

## 2023-08-08 NOTE — PROGRESS NOTES
High fracture risk noted on Dexa scan.  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   Prolia indicated; treatment plan order set completed. Pt will schedule if she elects to move forward with injection.

## 2023-08-10 ENCOUNTER — HOSPITAL ENCOUNTER (OUTPATIENT)
Facility: MEDICAL CENTER | Age: 83
End: 2023-08-10
Attending: PHYSICIAN ASSISTANT
Payer: MEDICARE

## 2023-08-10 PROCEDURE — 87077 CULTURE AEROBIC IDENTIFY: CPT

## 2023-08-10 PROCEDURE — 87086 URINE CULTURE/COLONY COUNT: CPT

## 2023-08-10 PROCEDURE — 87186 SC STD MICRODIL/AGAR DIL: CPT

## 2023-08-14 LAB
BACTERIA UR CULT: ABNORMAL
BACTERIA UR CULT: ABNORMAL
SIGNIFICANT IND 70042: ABNORMAL
SITE SITE: ABNORMAL
SOURCE SOURCE: ABNORMAL

## 2023-08-23 ENCOUNTER — HOSPITAL ENCOUNTER (OUTPATIENT)
Dept: LAB | Facility: MEDICAL CENTER | Age: 83
End: 2023-08-23
Attending: NURSE PRACTITIONER
Payer: MEDICARE

## 2023-08-23 ENCOUNTER — ANTICOAGULATION MONITORING (OUTPATIENT)
Dept: VASCULAR LAB | Facility: MEDICAL CENTER | Age: 83
End: 2023-08-23
Payer: MEDICARE

## 2023-08-23 DIAGNOSIS — Z79.01 CHRONIC ANTICOAGULATION: ICD-10-CM

## 2023-08-23 DIAGNOSIS — I48.0 PAROXYSMAL A-FIB (HCC): ICD-10-CM

## 2023-08-23 LAB
INR PPP: 3.44 (ref 0.87–1.13)
PROTHROMBIN TIME: 35.2 SEC (ref 12–14.6)

## 2023-08-23 PROCEDURE — 85610 PROTHROMBIN TIME: CPT

## 2023-08-23 PROCEDURE — 36415 COLL VENOUS BLD VENIPUNCTURE: CPT

## 2023-08-23 NOTE — PROGRESS NOTES
Anticoagulation Summary  As of 8/23/2023      INR goal:  2.0-3.0   TTR:  81.9 % (5.7 y)   INR used for dosing:  3.44 (8/23/2023)   Warfarin maintenance plan:  2 mg (1 mg x 2) every Tue; 1 mg (1 mg x 1) all other days   Weekly warfarin total:  8 mg   Plan last modified:  Yaneli KruseD (11/22/2022)   Next INR check:  8/30/2023   Target end date:  Indefinite    Indications    Paroxysmal a-fib (CMS-HCC) [I48.0]  Chronic anticoagulation [Z79.01]                 Anticoagulation Episode Summary       INR check location:  Clinic Lab    Preferred lab:  [facility]    Send INR reminders to:      Comments:  [phone] (H)  [facility]          Anticoagulation Care Providers       Provider Role Specialty Phone number    Evelio Tejeda M.D. Referring Internal Medicine [phone]    [facility] Anticoagulation Services Responsible  [phone]    Beryl Pang M.D. Responsible Family Medicine [phone]            Refer to Anticoagulation Patient Findings for HPI  Patient Findings       Positives:  Change in health (e coli in urine), Change in medications (taking d-mannose supplement for urinary health)    Negatives:  Signs/symptoms of thrombosis, Signs/symptoms of bleeding, Laboratory test error suspected, Change in alcohol use, Change in activity, Upcoming invasive procedure, Emergency department visit, Upcoming dental procedure, Missed doses, Extra doses, Change in diet/appetite, Hospital admission, Bruising, Other complaints            Spoke with patient.  INR is supra therapeutic.     Pt verifies warfarin weekly dosing.     Pt is NOT on antiplatelet therapy.    Patient reports blood in urine since May secondary to benign bladder polyps. Is working with urology,they are aware of anticoagulation.     Will have pt take 0.5mg (1/2 dose) today then resume normal regimen.     Repeat INR in 1 week(s).     Plan discussed with Keren Abdi, YaneliD.    Peggy Garcia, Student

## 2023-08-28 ENCOUNTER — TELEPHONE (OUTPATIENT)
Dept: HEALTH INFORMATION MANAGEMENT | Facility: OTHER | Age: 83
End: 2023-08-28
Payer: MEDICARE

## 2023-08-28 ENCOUNTER — APPOINTMENT (RX ONLY)
Dept: URBAN - METROPOLITAN AREA CLINIC 4 | Facility: CLINIC | Age: 83
Setting detail: DERMATOLOGY
End: 2023-08-28

## 2023-08-28 ENCOUNTER — PATIENT MESSAGE (OUTPATIENT)
Dept: CARDIOLOGY | Facility: MEDICAL CENTER | Age: 83
End: 2023-08-28
Payer: MEDICARE

## 2023-08-28 DIAGNOSIS — I25.10 CORONARY ARTERY DISEASE INVOLVING NATIVE CORONARY ARTERY OF NATIVE HEART WITHOUT ANGINA PECTORIS: ICD-10-CM

## 2023-08-28 PROBLEM — D04.9 CARCINOMA IN SITU OF SKIN, UNSPECIFIED: Status: ACTIVE | Noted: 2023-08-28

## 2023-08-28 PROCEDURE — ? PATIENT SPECIFIC COUNSELING

## 2023-08-28 PROCEDURE — ? MEDICATION COUNSELING

## 2023-08-28 PROCEDURE — 99212 OFFICE O/P EST SF 10 MIN: CPT

## 2023-08-30 ENCOUNTER — ANTICOAGULATION MONITORING (OUTPATIENT)
Dept: VASCULAR LAB | Facility: MEDICAL CENTER | Age: 83
End: 2023-08-30
Payer: MEDICARE

## 2023-08-30 ENCOUNTER — HOSPITAL ENCOUNTER (OUTPATIENT)
Dept: LAB | Facility: MEDICAL CENTER | Age: 83
End: 2023-08-30
Attending: NURSE PRACTITIONER
Payer: MEDICARE

## 2023-08-30 DIAGNOSIS — J43.9 PULMONARY EMPHYSEMA, UNSPECIFIED EMPHYSEMA TYPE (HCC): ICD-10-CM

## 2023-08-30 DIAGNOSIS — Z79.01 CHRONIC ANTICOAGULATION: ICD-10-CM

## 2023-08-30 DIAGNOSIS — I48.0 PAROXYSMAL A-FIB (HCC): ICD-10-CM

## 2023-08-30 LAB
INR PPP: 3.09 (ref 0.87–1.13)
PROTHROMBIN TIME: 32.3 SEC (ref 12–14.6)

## 2023-08-30 PROCEDURE — 85610 PROTHROMBIN TIME: CPT

## 2023-08-30 PROCEDURE — 36415 COLL VENOUS BLD VENIPUNCTURE: CPT

## 2023-08-30 NOTE — TELEPHONE ENCOUNTER
Pharmacy is requesting an electronic rx re-sent for this medication with the updated ICD-10 code per the patients insurance. ICD-10: J44.9

## 2023-08-30 NOTE — PROGRESS NOTES
Anticoagulation Summary  As of 8/30/2023      INR goal:  2.0-3.0   TTR:  81.6 % (5.7 y)   INR used for dosing:  3.09 (8/30/2023)   Warfarin maintenance plan:  1 mg (1 mg x 1) every day   Weekly warfarin total:  7 mg   Plan last modified:  Zari Rosario PharmD (8/30/2023)   Next INR check:  9/5/2023   Target end date:  Indefinite    Indications    Paroxysmal a-fib (CMS-HCC) [I48.0]  Chronic anticoagulation [Z79.01]                 Anticoagulation Episode Summary       INR check location:  Clinic Lab    Preferred lab:  [facility]    Send INR reminders to:      Comments:  [phone] (H)  [facility]          Anticoagulation Care Providers       Provider Role Specialty Phone number    Evelio Tejeda M.D. Referring Internal Medicine [phone]    [facility] Anticoagulation Services Responsible  [phone]    Beryl Pang M.D. Responsible Family Medicine [phone]          Anticoagulation Patient Findings  Patient Findings       Negatives:  Signs/symptoms of thrombosis, Signs/symptoms of bleeding, Laboratory test error suspected, Change in health, Change in alcohol use, Change in activity, Upcoming invasive procedure, Emergency department visit, Upcoming dental procedure, Missed doses, Extra doses, Change in medications, Change in diet/appetite, Hospital admission, Bruising, Other complaints            INR slightly supratherapeutic following reduced dose x 1. Will reduce regimen to mimic last week's total weekly dose.    Called and spoke to patient.    Warfarin Plan: Decrease to 1 mg daily    Next INR in 1 week(s).    Zari Rosario, Piter, BCACP

## 2023-09-01 ENCOUNTER — PATIENT MESSAGE (OUTPATIENT)
Dept: CARDIOLOGY | Facility: MEDICAL CENTER | Age: 83
End: 2023-09-01
Payer: MEDICARE

## 2023-09-05 ENCOUNTER — ANTICOAGULATION MONITORING (OUTPATIENT)
Dept: CARDIOLOGY | Facility: MEDICAL CENTER | Age: 83
End: 2023-09-05
Payer: MEDICARE

## 2023-09-05 ENCOUNTER — HOSPITAL ENCOUNTER (OUTPATIENT)
Dept: LAB | Facility: MEDICAL CENTER | Age: 83
End: 2023-09-05
Attending: STUDENT IN AN ORGANIZED HEALTH CARE EDUCATION/TRAINING PROGRAM
Payer: MEDICARE

## 2023-09-05 ENCOUNTER — HOSPITAL ENCOUNTER (OUTPATIENT)
Dept: LAB | Facility: MEDICAL CENTER | Age: 83
End: 2023-09-05
Attending: NURSE PRACTITIONER
Payer: MEDICARE

## 2023-09-05 ENCOUNTER — HOSPITAL ENCOUNTER (OUTPATIENT)
Dept: LAB | Facility: MEDICAL CENTER | Age: 83
End: 2023-09-05
Attending: INTERNAL MEDICINE
Payer: MEDICARE

## 2023-09-05 ENCOUNTER — HOSPITAL ENCOUNTER (OUTPATIENT)
Dept: LAB | Facility: MEDICAL CENTER | Age: 83
End: 2023-09-05
Attending: PHYSICIAN ASSISTANT
Payer: MEDICARE

## 2023-09-05 DIAGNOSIS — I48.0 PAROXYSMAL A-FIB (HCC): ICD-10-CM

## 2023-09-05 DIAGNOSIS — Z79.01 CHRONIC ANTICOAGULATION: ICD-10-CM

## 2023-09-05 DIAGNOSIS — Z13.1 ENCOUNTER FOR SCREENING EXAMINATION FOR INTERMEDIATE HYPERGLYCEMIA AND DIABETES MELLITUS: ICD-10-CM

## 2023-09-05 LAB
ANION GAP SERPL CALC-SCNC: 8 MMOL/L (ref 7–16)
BUN SERPL-MCNC: 17 MG/DL (ref 8–22)
CALCIUM SERPL-MCNC: 9.8 MG/DL (ref 8.5–10.5)
CHLORIDE SERPL-SCNC: 107 MMOL/L (ref 96–112)
CHOLEST SERPL-MCNC: 168 MG/DL (ref 100–199)
CO2 SERPL-SCNC: 28 MMOL/L (ref 20–33)
CREAT SERPL-MCNC: 0.81 MG/DL (ref 0.5–1.4)
GFR SERPLBLD CREATININE-BSD FMLA CKD-EPI: 71 ML/MIN/1.73 M 2
GLUCOSE SERPL-MCNC: 89 MG/DL (ref 65–99)
HDLC SERPL-MCNC: 90 MG/DL
INR PPP: 3.12 (ref 0.87–1.13)
LDLC SERPL CALC-MCNC: 63 MG/DL
POTASSIUM SERPL-SCNC: 4.7 MMOL/L (ref 3.6–5.5)
PROTHROMBIN TIME: 32.6 SEC (ref 12–14.6)
SODIUM SERPL-SCNC: 143 MMOL/L (ref 135–145)
TRIGL SERPL-MCNC: 73 MG/DL (ref 0–149)

## 2023-09-05 PROCEDURE — 87086 URINE CULTURE/COLONY COUNT: CPT

## 2023-09-05 PROCEDURE — 80061 LIPID PANEL: CPT

## 2023-09-05 PROCEDURE — 85610 PROTHROMBIN TIME: CPT

## 2023-09-05 PROCEDURE — 80048 BASIC METABOLIC PNL TOTAL CA: CPT

## 2023-09-05 PROCEDURE — 36415 COLL VENOUS BLD VENIPUNCTURE: CPT

## 2023-09-05 NOTE — PROGRESS NOTES
Anticoagulation Summary  As of 9/5/2023      INR goal:  2.0-3.0   TTR:  81.4 % (5.7 y)   INR used for dosing:  3.12 (9/5/2023)   Warfarin maintenance plan:  0.5 mg (1 mg x 0.5) every Tue; 1 mg (1 mg x 1) all other days   Weekly warfarin total:  6.5 mg   Plan last modified:  Zari Rosario PharmD (9/5/2023)   Next INR check:  9/12/2023   Target end date:  Indefinite    Indications    Paroxysmal a-fib (CMS-HCC) [I48.0]  Chronic anticoagulation [Z79.01]                 Anticoagulation Episode Summary       INR check location:  Clinic Lab    Preferred lab:  [facility]    Send INR reminders to:      Comments:  [phone] (H)  [facility]          Anticoagulation Care Providers       Provider Role Specialty Phone number    Evelio Tejeda M.D. Referring Internal Medicine [phone]    [facility] Anticoagulation Services Responsible  [phone]    Beryl Pang M.D. Responsible Family Medicine [phone]          Anticoagulation Patient Findings  Patient Findings       Positives:  Change in medications (Taking d-mannose daily for UTI ppx)    Negatives:  Signs/symptoms of thrombosis, Signs/symptoms of bleeding, Laboratory test error suspected, Change in health, Change in alcohol use, Change in activity, Upcoming invasive procedure, Emergency department visit, Upcoming dental procedure, Missed doses, Extra doses, Change in diet/appetite, Hospital admission, Bruising, Other complaints            Called and spoke to patient.    INR slightly supratherapeutic despite recent dose reduction. Will decrease regimen further. No significant DDI anticipated between warfarin and D-mannose.    Warfarin Plan: Decrease to 0.5 mg Tue, 1 mg all other days    Next INR in 1 week(s).    Zari Rosario, Piter, BCACP

## 2023-09-07 LAB
BACTERIA UR CULT: NORMAL
SIGNIFICANT IND 70042: NORMAL
SITE SITE: NORMAL
SOURCE SOURCE: NORMAL

## 2023-09-12 ENCOUNTER — ANTICOAGULATION MONITORING (OUTPATIENT)
Dept: VASCULAR LAB | Facility: MEDICAL CENTER | Age: 83
End: 2023-09-12
Payer: MEDICARE

## 2023-09-12 ENCOUNTER — HOSPITAL ENCOUNTER (OUTPATIENT)
Dept: LAB | Facility: MEDICAL CENTER | Age: 83
End: 2023-09-12
Attending: NURSE PRACTITIONER
Payer: MEDICARE

## 2023-09-12 DIAGNOSIS — Z79.01 CHRONIC ANTICOAGULATION: ICD-10-CM

## 2023-09-12 DIAGNOSIS — I48.0 PAROXYSMAL A-FIB (HCC): ICD-10-CM

## 2023-09-12 LAB
INR PPP: 2.59 (ref 0.87–1.13)
PROTHROMBIN TIME: 28.2 SEC (ref 12–14.6)

## 2023-09-12 PROCEDURE — 85610 PROTHROMBIN TIME: CPT

## 2023-09-12 PROCEDURE — 36415 COLL VENOUS BLD VENIPUNCTURE: CPT

## 2023-09-12 NOTE — PROGRESS NOTES
Anticoagulation Summary  As of 2023      INR goal:  2.0-3.0   TTR:  81.4 % (5.7 y)   INR used for dosin.59 (2023)   Warfarin maintenance plan:  0.5 mg (1 mg x 0.5) every Tue; 1 mg (1 mg x 1) all other days   Weekly warfarin total:  6.5 mg   Plan last modified:  Yaneli SanchesD (2023)   Next INR check:  2023   Target end date:  Indefinite    Indications    Paroxysmal a-fib (CMS-HCC) [I48.0]  Chronic anticoagulation [Z79.01]                 Anticoagulation Episode Summary       INR check location:  Clinic Lab    Preferred lab:  [facility]    Send INR reminders to:      Comments:  [phone] (H)  [facility]          Anticoagulation Care Providers       Provider Role Specialty Phone number    Evelio Tejeda M.D. Referring Internal Medicine [phone]    [facility] Anticoagulation Services Responsible  [phone]    Beryl Pang M.D. Responsible Family Medicine [phone]          Anticoagulation Patient Findings      INR therapeutic    Called and spoke to patient.    Warfarin Plan: Continue regimen as listed above.    Pt is not on antiplatelet therapy.    Next INR in 1 week(s).    Keren Abdi, PharmD

## 2023-09-19 ENCOUNTER — HOSPITAL ENCOUNTER (OUTPATIENT)
Dept: LAB | Facility: MEDICAL CENTER | Age: 83
End: 2023-09-19
Attending: NURSE PRACTITIONER
Payer: MEDICARE

## 2023-09-19 ENCOUNTER — ANTICOAGULATION MONITORING (OUTPATIENT)
Dept: CARDIOLOGY | Facility: MEDICAL CENTER | Age: 83
End: 2023-09-19
Payer: MEDICARE

## 2023-09-19 DIAGNOSIS — Z79.01 CHRONIC ANTICOAGULATION: ICD-10-CM

## 2023-09-19 DIAGNOSIS — I48.0 PAROXYSMAL A-FIB (HCC): ICD-10-CM

## 2023-09-19 LAB
INR PPP: 2.58 (ref 0.87–1.13)
PROTHROMBIN TIME: 28.1 SEC (ref 12–14.6)

## 2023-09-19 PROCEDURE — 36415 COLL VENOUS BLD VENIPUNCTURE: CPT

## 2023-09-19 PROCEDURE — 85610 PROTHROMBIN TIME: CPT

## 2023-09-19 NOTE — PROGRESS NOTES
OP Anticoagulation Service Note    Date: 2023    Anticoagulation Summary  As of 2023      INR goal:  2.0-3.0   TTR:  81.4 % (5.7 y)   INR used for dosin.58 (2023)   Warfarin maintenance plan:  0.5 mg (1 mg x 0.5) every Tue; 1 mg (1 mg x 1) all other days   Weekly warfarin total:  6.5 mg   Plan last modified:  Yaneli SanchesD (2023)   Next INR check:  10/3/2023   Target end date:  Indefinite    Indications    Paroxysmal a-fib (CMS-HCC) [I48.0]  Chronic anticoagulation [Z79.01]                 Anticoagulation Episode Summary       INR check location:  Clinic Lab    Preferred lab:  [facility]    Send INR reminders to:      Comments:  [phone] (H)  [facility]          Anticoagulation Care Providers       Provider Role Specialty Phone number    Evelio Tejeda M.D. Referring Internal Medicine [phone]    [facility] Anticoagulation Services Responsible  [phone]    Beryl Pang M.D. Responsible Family Medicine [phone]          Anticoagulation Patient Findings        Patient's preferred phone number:  [phone]        HPI:   The reason for today's call is to prevent morbidity and mortality from a blood clot and/or stroke and to reduce the risk of bleeding while on a anticoagulant.     PCP:  Beryl Pang M.D.  [address] [facility] [address]    Assessment:     INR  therapeutic.     Lab Results   Component Value Date/Time    BUN 17 2023 10:52 AM    CREATININE 0.81 2023 10:52 AM     Lab Results   Component Value Date/Time    HEMOGLOBIN 14.7 2023 09:48 AM    HEMATOCRIT 46.1 2023 09:48 AM    PLATELETCT 176 2023 09:48 AM    ALKPHOSPHAT 84 2023 09:48 AM    ASTSGOT 27 2023 09:48 AM    ALTSGPT 22 2023 09:48 AM          Current Outpatient Medications:     ipratropium, 0.2 mg, Nebulization, 4XDAY    ipratropium, 0.2 mg, Nebulization, 4XDAY    fluticasone furoate-vilanterol, 1 Puff, Inhalation, DAILY     budesonide-formoterol, 2 Puff, Inhalation, BID    potassium chloride SA,     albuterol, INHALE ONE TO TWO PUFFS BY MOUTH EVERY 4 HOURS AS NEEDED FOR SHORTNESS OF BREATH    ALPRAZolam, TAKE ONE TABLET BY MOUTH TWICE A DAY AS NEEDED FOR ANXIETY    lovastatin, TAKE 1 TABLET AT BEDTIME    warfarin, TAKE ONE TO TWO TABLETS DAILY OR AS DIRECTED BY ANTICOAGULATION CLINIC    sotalol, TAKE 1/2 TABLET TWICE DAILY Strength: 80 mg    fluticasone, 1 Spray, Nasal, DAILY    EPINEPHrine, epinephrine 0.3 mg/0.3 mL injection, auto-injector    Probiotic Product (PROBIOTIC PO), Take  by mouth.    ascorbic acid, 1,000 mg, Oral, DAILY    vitamin D, 2,000 Units, Oral, QAM      Plan:     Continue the same warfarin dose, as noted above.       Follow-up:     As seen above      Additional information discussed with patient:     Asked patient to please call the anticoagulation clinic if they have any signs/symptoms of bleeding and/or thrombosis or any changes to diet or medications.      National recommendations regarding anticoagulation therapy:     The CHEST guidelines recommends frequent INR monitoring at regular intervals (a few days up to a max of 12 weeks) to ensure patients are on the proper dose of warfarin, and patients are not having any complications from therapy.  INRs can dramatically change over a short time period due to diet, medications, and medical conditions.         [facility]  Phone: [phone]  Fax: [phone]  On call: [phone]  General scheduling/information [phone]  For emergencies please dial 911  Please do not use [employer] for urgent matters, call the phone numbers listed above.    This note was created using voice recognition software (Dragon). The accuracy of the dictation is limited by the abilities of the software. I have reviewed the note prior to signing, however some errors in grammar and context are still possible. If you have any questions related to this note  please do not hesitate to contact our office.

## 2023-09-26 ENCOUNTER — ANTICOAGULATION MONITORING (OUTPATIENT)
Dept: VASCULAR LAB | Facility: MEDICAL CENTER | Age: 83
End: 2023-09-26
Payer: MEDICARE

## 2023-09-26 ENCOUNTER — HOSPITAL ENCOUNTER (OUTPATIENT)
Dept: LAB | Facility: MEDICAL CENTER | Age: 83
End: 2023-09-26
Attending: NURSE PRACTITIONER
Payer: MEDICARE

## 2023-09-26 DIAGNOSIS — Z79.01 CHRONIC ANTICOAGULATION: ICD-10-CM

## 2023-09-26 DIAGNOSIS — I48.0 PAROXYSMAL A-FIB (HCC): ICD-10-CM

## 2023-09-26 LAB
INR PPP: 3.04 (ref 0.87–1.13)
PROTHROMBIN TIME: 31.9 SEC (ref 12–14.6)

## 2023-09-26 PROCEDURE — 85610 PROTHROMBIN TIME: CPT

## 2023-09-26 PROCEDURE — 36415 COLL VENOUS BLD VENIPUNCTURE: CPT

## 2023-09-26 NOTE — PROGRESS NOTES
Anticoagulation Summary  As of 9/26/2023      INR goal:  2.0-3.0   TTR:  81.5 % (5.8 y)   INR used for dosing:  3.04 (9/26/2023)   Warfarin maintenance plan:  0.5 mg (1 mg x 0.5) every Tue; 1 mg (1 mg x 1) all other days   Weekly warfarin total:  6.5 mg   Plan last modified:  Pieter Covarrubias, PharmD (9/19/2023)   Next INR check:  10/3/2023   Target end date:  Indefinite    Indications    Paroxysmal a-fib (CMS-HCC) [I48.0]  Chronic anticoagulation [Z79.01]                 Anticoagulation Episode Summary       INR check location:  Clinic Lab    Preferred lab:  [facility]    Send INR reminders to:      Comments:  [phone] (H)  [facility]          Anticoagulation Care Providers       Provider Role Specialty Phone number    Evelio Tejeda M.D. Referring Internal Medicine [phone]    [facility] Anticoagulation Services Responsible  [phone]    Beryl Pang M.D. Responsible Family Medicine [phone]          Anticoagulation Patient Findings  Patient Findings       Positives:  Signs/symptoms of bleeding (Pt. reports hematauria, had labs done recently and repots everything is normal.)    Negatives:  Signs/symptoms of thrombosis, Laboratory test error suspected, Change in health, Change in alcohol use, Change in activity, Upcoming invasive procedure, Emergency department visit, Upcoming dental procedure, Missed doses, Extra doses, Change in medications, Change in diet/appetite, Hospital admission, Bruising, Other complaints          Spoke with patient today regarding supra therapeutic INR of 3.04.  Patient denies any signs/symptoms of bruising or bleeding or any changes in diet and medications.  Instructed patient to call clinic with any questions or concerns.    Pt is not on antiplatelet therapy     Pt. To hold warfarin dose today and then continue weekly regimen as described above.     Follow up in 1 weeks, to reduce risk of adverse events related to this high risk medication,  Warfarin.    Ehsan SINCLAIR  Efra, Pharmacy Intern  - this plan was developed with Piter Veliz

## 2023-09-28 ENCOUNTER — HOSPITAL ENCOUNTER (OUTPATIENT)
Dept: CARDIOLOGY | Facility: MEDICAL CENTER | Age: 83
End: 2023-09-28
Attending: INTERNAL MEDICINE
Payer: MEDICARE

## 2023-09-28 DIAGNOSIS — I48.0 PAROXYSMAL A-FIB (HCC): ICD-10-CM

## 2023-09-28 DIAGNOSIS — Z79.899 HIGH RISK MEDICATION USE: ICD-10-CM

## 2023-09-28 PROCEDURE — 93306 TTE W/DOPPLER COMPLETE: CPT

## 2023-10-03 ENCOUNTER — HOSPITAL ENCOUNTER (OUTPATIENT)
Dept: LAB | Facility: MEDICAL CENTER | Age: 83
End: 2023-10-03
Attending: NURSE PRACTITIONER
Payer: MEDICARE

## 2023-10-03 ENCOUNTER — ANTICOAGULATION MONITORING (OUTPATIENT)
Dept: MEDICAL GROUP | Facility: PHYSICIAN GROUP | Age: 83
End: 2023-10-03
Payer: MEDICARE

## 2023-10-03 DIAGNOSIS — I48.0 PAROXYSMAL A-FIB (HCC): ICD-10-CM

## 2023-10-03 DIAGNOSIS — Z79.01 CHRONIC ANTICOAGULATION: ICD-10-CM

## 2023-10-03 LAB
INR PPP: 1.88 (ref 0.87–1.13)
PROTHROMBIN TIME: 21.8 SEC (ref 12–14.6)

## 2023-10-03 PROCEDURE — 85610 PROTHROMBIN TIME: CPT

## 2023-10-03 PROCEDURE — 36415 COLL VENOUS BLD VENIPUNCTURE: CPT

## 2023-10-03 NOTE — PROGRESS NOTES
Anticoagulation Summary  As of 10/3/2023      INR goal:  2.0-3.0   TTR:  81.5 % (5.8 y)   INR used for dosin.88 (10/3/2023)   Warfarin maintenance plan:  0.5 mg (1 mg x 0.5) every Tue; 1 mg (1 mg x 1) all other days   Weekly warfarin total:  6.5 mg   Plan last modified:  Yaneli KruseD (2023)   Next INR check:  10/10/2023   Target end date:  Indefinite    Indications    Paroxysmal a-fib (CMS-HCC) [I48.0]  Chronic anticoagulation [Z79.01]                 Anticoagulation Episode Summary       INR check location:  Clinic Lab    Preferred lab:  [facility]    Send INR reminders to:      Comments:  [phone] (H)  [facility]          Anticoagulation Care Providers       Provider Role Specialty Phone number    Evelio Tejeda M.D. Referring Internal Medicine [phone]    [facility] Anticoagulation Services Responsible  [phone]    Beryl Pang M.D. Responsible Family Medicine [phone]          Anticoagulation Patient Findings  Patient Findings       Positives:  Missed doses (Pt. missed last nights dose)    Negatives:  Signs/symptoms of thrombosis, Signs/symptoms of bleeding, Laboratory test error suspected, Change in health, Change in alcohol use, Change in activity, Upcoming invasive procedure, Emergency department visit, Upcoming dental procedure, Extra doses, Change in medications, Change in diet/appetite, Hospital admission, Bruising, Other complaints            INR is slightly subtherapeutic    Called and spoke to patient.    Warfarin Plan: Bolus with 1mg for 1 day, then continue regimen    Pt is not on antiplatelet therapy.    Next INR in 1 week(s).    Plan Discussed with Keren Abdi, PharmD.    Marta White

## 2023-10-05 ENCOUNTER — OFFICE VISIT (OUTPATIENT)
Dept: SLEEP MEDICINE | Facility: MEDICAL CENTER | Age: 83
End: 2023-10-05
Attending: STUDENT IN AN ORGANIZED HEALTH CARE EDUCATION/TRAINING PROGRAM
Payer: MEDICARE

## 2023-10-05 VITALS
OXYGEN SATURATION: 97 % | DIASTOLIC BLOOD PRESSURE: 54 MMHG | BODY MASS INDEX: 15.46 KG/M2 | WEIGHT: 84 LBS | HEIGHT: 62 IN | SYSTOLIC BLOOD PRESSURE: 114 MMHG | HEART RATE: 64 BPM

## 2023-10-05 DIAGNOSIS — Z72.0 TOBACCO USE: ICD-10-CM

## 2023-10-05 DIAGNOSIS — J44.9 CHRONIC OBSTRUCTIVE PULMONARY DISEASE, UNSPECIFIED COPD TYPE (HCC): ICD-10-CM

## 2023-10-05 PROCEDURE — 3078F DIAST BP <80 MM HG: CPT | Performed by: INTERNAL MEDICINE

## 2023-10-05 PROCEDURE — 99204 OFFICE O/P NEW MOD 45 MIN: CPT | Performed by: INTERNAL MEDICINE

## 2023-10-05 PROCEDURE — 99212 OFFICE O/P EST SF 10 MIN: CPT | Performed by: INTERNAL MEDICINE

## 2023-10-05 PROCEDURE — 3074F SYST BP LT 130 MM HG: CPT | Performed by: INTERNAL MEDICINE

## 2023-10-05 RX ORDER — IPRATROPIUM BROMIDE AND ALBUTEROL SULFATE 2.5; .5 MG/3ML; MG/3ML
3 SOLUTION RESPIRATORY (INHALATION) EVERY 4 HOURS PRN
Qty: 120 ML | Refills: 11 | Status: SHIPPED | OUTPATIENT
Start: 2023-10-05

## 2023-10-05 ASSESSMENT — ENCOUNTER SYMPTOMS
SPUTUM PRODUCTION: 1
EYE PAIN: 0
PHOTOPHOBIA: 0
SPEECH CHANGE: 0
EYE DISCHARGE: 0
FALLS: 0
WEAKNESS: 0
DIARRHEA: 0
ORTHOPNEA: 0
TREMORS: 0
COUGH: 1
FEVER: 0
ABDOMINAL PAIN: 0
SORE THROAT: 0
DIAPHORESIS: 0
WEIGHT LOSS: 0
WHEEZING: 0
SINUS PAIN: 0
CONSTIPATION: 0
CHILLS: 0
EYE REDNESS: 0
NAUSEA: 0
HEADACHES: 0
PALPITATIONS: 0
VOMITING: 0
FOCAL WEAKNESS: 0
PND: 0
HEMOPTYSIS: 0
BACK PAIN: 0
MYALGIAS: 0
STRIDOR: 0
DEPRESSION: 0
DIZZINESS: 0
SHORTNESS OF BREATH: 0
CLAUDICATION: 0
NECK PAIN: 0
DOUBLE VISION: 0
BLURRED VISION: 0
HEARTBURN: 0

## 2023-10-05 ASSESSMENT — FIBROSIS 4 INDEX: FIB4 SCORE: 2.75

## 2023-10-05 NOTE — PATIENT INSTRUCTIONS
Try mucinex (guaifenesin) 400-600mg regular release (12 hours) one to times daily followed by nebulizer treatment with duonebs    Consider trying zyrtec, claritin or allegra (generic store brand is fine, no D) daily for sinus congestion    Also can try fluticasone nasal spray (generic flonase) one spray each nostril at night before bed

## 2023-10-05 NOTE — PROGRESS NOTES
Chief Complaint   Patient presents with    New Patient     REF BY DR. BRANDON FOR Pulmonary emphysema, COPD    Results     CXR 9/14/22, 7/25/22  CT CHEST 8/18/22       HPI: This patient is a 83 y.o. female presenting for evaluation of chronic cough.  The patient's past medical history is significant for hiatal hernia with associated gastroesophageal reflux disease, dyslipidemia, history of melanoma, coronary artery disease and paroxysmal atrial fibrillation.  She has fairly severe emphysema but no pulmonary function testing.  She is a current tobacco smoker with greater than 50-pack-year history but currently smokes only 2 cigarettes/day.  Mother live at in her 90s as did her father.  Her mother did suffer from dementia in her later years.  The patient has worked various jobs and currently is retired living with her  locally after moving from [facility] 8 years ago.  She has never been hospitalized for COPD exacerbation but does report being treated for bronchitis roughly once per year for the past several years.  She has nebulizer at home and that she typically uses either albuterol or Atrovent but not a combination of the 2.  She has tried Symbicort and Breo in the past but had issues with the powdered formulation of Breo and the dispensing mechanism.  She did not use Symbicort due to risk for thrush which she has had in the past and did not want to recur.  She denies significant shortness of breath or wheezing but does have pretty significant daily cough that often improves after expectoration but she has a difficult time expectorating.  She does find that over-the-counter cough syrup helps on occasion.  She is active on a regular basis caring for her  who has degenerative disease of the spine.  CT chest from August of last year showed fairly severe bilateral emphysematous changes concentrated in the upper lobes.  No concerning lung nodules.  No pulmonary function testing.    Past Medical History:    Diagnosis Date    Arthritis     BL hands    CAD (coronary artery disease)     Cancer ([facility]) 1982    melanoma    COPD (chronic obstructive pulmonary disease) (HCC)     Croup 3 years old    Diverticulosis     Dyslipidemia     GERD (gastroesophageal reflux disease)     Hiatal hernia     High cholesterol     Hypertension     Infectious disease 2018    C Diff    Measles     5 years old    Paroxysmal A-fib (HCC) 1/20/2016    Symptomatic, on a rhythm control strategy with sotalol 40 mg by mouth twice a day.     Paroxysmal atrial fibrillation (HCC)     Prediabetes     PVD (peripheral vascular disease) ([facility])        Social History     Socioeconomic History    Marital status:      Spouse name: Not on file    Number of children: 0    Years of education: Not on file    Highest education level: Not on file   Occupational History    Not on file   Tobacco Use    Smoking status: Every Day     Current packs/day: 0.25     Average packs/day: 0.3 packs/day for 66.5 years (16.6 ttl pk-yrs)     Types: Cigarettes     Start date: 3/20/1957    Smokeless tobacco: Never    Tobacco comments:     4 cigarettes per day   Vaping Use    Vaping Use: Never used   Substance and Sexual Activity    Alcohol use: No     Alcohol/week: 0.0 oz    Drug use: No    Sexual activity: Not Currently     Partners: Male   Other Topics Concern    Not on file   Social History Narrative    .     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     Social Determinants of Health     Financial Resource Strain: Not on file   Food Insecurity: Not on file   Transportation Needs: Not on file   Physical Activity: Not on file   Stress: Not on file   Social Connections: Not on file   Intimate Partner Violence: Not on file   Housing Stability: Not on file       Family History   Problem Relation Age of Onset    Hypertension Mother     Hyperlipidemia Mother     Cancer Maternal Grandmother         tongue    Cancer Maternal Uncle         x 3, pancreas    Cancer Maternal  Grandfather         unknown    Cancer Paternal Grandmother         unknown    Cancer Paternal Grandfather         unknown    Diabetes Maternal Uncle     Heart Disease Maternal Uncle     Hypertension Sister     Hyperlipidemia Sister     Heart Disease Sister     Alcohol/Drug Sister     Thyroid Sister     Psychiatric Illness Neg Hx     Stroke Neg Hx        Current Outpatient Medications on File Prior to Visit   Medication Sig Dispense Refill    Multiple Vitamins-Minerals (ZINC PO) Take  by mouth.      ipratropium (ATROVENT) 0.02 % Solution Take 1 mL by nebulization 4 times a day. 200 Each 2    ipratropium (ATROVENT) 0.02 % Solution Take 1 mL by nebulization 4 times a day. 90 Each 3    albuterol 108 (90 Base) MCG/ACT Aero Soln inhalation aerosol INHALE ONE TO TWO PUFFS BY MOUTH EVERY 4 HOURS AS NEEDED FOR SHORTNESS OF BREATH 18 g 3    lovastatin (MEVACOR) 40 MG tablet TAKE 1 TABLET AT BEDTIME 100 Tablet 3    warfarin (COUMADIN) 1 MG Tab TAKE ONE TO TWO TABLETS DAILY OR AS DIRECTED BY ANTICOAGULATION CLINIC 180 Tablet 1    sotalol (BETAPACE) 80 MG Tab TAKE 1/2 TABLET TWICE DAILY Strength: 80 mg 90 Tablet 3    fluticasone (FLONASE) 50 MCG/ACT nasal spray Administer 1 Spray into affected nostril(S) every day. 16 g 3    EPINEPHrine (EPIPEN) 0.3 MG/0.3ML Solution Auto-injector solution for injection epinephrine 0.3 mg/0.3 mL injection, auto-injector      Probiotic Product (PROBIOTIC PO) Take  by mouth.      ascorbic acid (ASCORBIC ACID) 500 MG Tab Take 2 Tablets by mouth every day.      vitamin D (CHOLECALCIFEROL) 1000 UNIT Tab Take 2 Tablets by mouth every morning. 3000 units      ALPRAZolam (XANAX) 0.25 MG Tab TAKE ONE TABLET BY MOUTH TWICE A DAY AS NEEDED FOR ANXIETY 60 Tablet 5     No current facility-administered medications on file prior to visit.       Allergies: Penicillins; Codeine; Iodine; Bee venom; Chantix [varenicline]; Ciprofloxacin; Diphtheria,pertussis,tetanus; Flagyl [metronidazole]; Honey bee venom; Latex;  "Lipitor [atorvastatin calcium]; Other environmental; Shellfish allergy; and Tetanus antitoxin    ROS:   Review of Systems   Constitutional:  Negative for chills, diaphoresis, fever, malaise/fatigue and weight loss.   HENT:  Negative for congestion, ear discharge, ear pain, hearing loss, nosebleeds, sinus pain, sore throat and tinnitus.    Eyes:  Negative for blurred vision, double vision, photophobia, pain, discharge and redness.   Respiratory:  Positive for cough and sputum production. Negative for hemoptysis, shortness of breath, wheezing and stridor.    Cardiovascular:  Negative for chest pain, palpitations, orthopnea, claudication, leg swelling and PND.   Gastrointestinal:  Negative for abdominal pain, constipation, diarrhea, heartburn, nausea and vomiting.   Genitourinary:  Negative for dysuria and urgency.   Musculoskeletal:  Negative for back pain, falls, joint pain, myalgias and neck pain.   Skin:  Negative for itching and rash.   Neurological:  Negative for dizziness, tremors, speech change, focal weakness, weakness and headaches.   Endo/Heme/Allergies:  Negative for environmental allergies.   Psychiatric/Behavioral:  Negative for depression.        /54 (BP Location: Right arm, Patient Position: Sitting, BP Cuff Size: Adult)   Pulse 64   Ht 1.575 m (5' 2\")   Wt 38.1 kg (84 lb)   SpO2 97%     Physical Exam:  Physical Exam  Constitutional:       General: She is not in acute distress.     Appearance: Normal appearance. She is well-developed.      Comments: underweight   HENT:      Head: Normocephalic and atraumatic.      Right Ear: External ear normal.      Left Ear: External ear normal.      Nose: Nose normal. No congestion.      Mouth/Throat:      Mouth: Mucous membranes are moist.      Pharynx: Oropharynx is clear. No oropharyngeal exudate.   Eyes:      General: No scleral icterus.     Extraocular Movements: Extraocular movements intact.      Conjunctiva/sclera: Conjunctivae normal.      Pupils: " Pupils are equal, round, and reactive to light.   Neck:      Vascular: No JVD.      Trachea: No tracheal deviation.   Cardiovascular:      Rate and Rhythm: Normal rate and regular rhythm.      Heart sounds: Normal heart sounds. No murmur heard.     No friction rub. No gallop.   Pulmonary:      Effort: Pulmonary effort is normal. No accessory muscle usage or respiratory distress.      Breath sounds: Normal breath sounds. No wheezing or rales.   Abdominal:      General: There is no distension.      Palpations: Abdomen is soft.      Tenderness: There is no abdominal tenderness.   Musculoskeletal:         General: No tenderness or deformity. Normal range of motion.      Cervical back: Normal range of motion and neck supple.      Right lower leg: No edema.      Left lower leg: No edema.   Lymphadenopathy:      Cervical: No cervical adenopathy.   Skin:     General: Skin is warm and dry.      Findings: No rash.      Nails: There is no clubbing.   Neurological:      Mental Status: She is alert and oriented to person, place, and time.      Cranial Nerves: No cranial nerve deficit.      Gait: Gait normal.   Psychiatric:         Behavior: Behavior normal.       PFTs as reviewed by me personally: None    Imaging as reviewed by me personally: As per HPI    Assessment:  1. Chronic obstructive pulmonary disease, unspecified COPD type (HCC)  ipratropium-albuterol (DUONEB) 0.5-2.5 (3) MG/3ML nebulizer solution    PULMONARY FUNCTION TESTS -Test requested: Complete Pulmonary Function Test      2. Tobacco use            Plan:  Presumed based on emphysematous changes on her CT, a history of recurrent bronchitis as well as chronic cough.  She has excellent oxygenation in clinic today at 97%.  Pending she started airway clearance regimen with Mucinex followed by Edwin to replace the Atrovent or albuterol in the morning and at night can use as needed for mucus clearance.  Pending response to this therapy, I would like to consider  controller therapy ideally with triple therapy such as Trelegy or Breztri however not clear if patient will tolerate inhaled corticosteroids.  I would like to obtain full pulmonary function testing and she was counseled on tobacco cessation.  Counseled on tobacco cessation which patient is actively working on.  No nodules on her CT.  She has aged out of lung cancer screening.  Return in about 3 months (around 1/5/2024).

## 2023-10-09 LAB — LV EJECT FRACT  99904: 60

## 2023-10-09 PROCEDURE — 93306 TTE W/DOPPLER COMPLETE: CPT | Mod: 26 | Performed by: INTERNAL MEDICINE

## 2023-10-10 ENCOUNTER — APPOINTMENT (OUTPATIENT)
Dept: LAB | Facility: MEDICAL CENTER | Age: 83
End: 2023-10-10
Attending: NURSE PRACTITIONER
Payer: MEDICARE

## 2023-10-10 DIAGNOSIS — Z79.01 CHRONIC ANTICOAGULATION: ICD-10-CM

## 2023-10-10 DIAGNOSIS — J43.9 PULMONARY EMPHYSEMA, UNSPECIFIED EMPHYSEMA TYPE (HCC): ICD-10-CM

## 2023-10-10 LAB
INR PPP: 2.01 (ref 0.87–1.13)
PROTHROMBIN TIME: 23 SEC (ref 12–14.6)

## 2023-10-10 PROCEDURE — 36415 COLL VENOUS BLD VENIPUNCTURE: CPT

## 2023-10-10 PROCEDURE — 85610 PROTHROMBIN TIME: CPT

## 2023-10-10 RX ORDER — ALBUTEROL SULFATE 90 UG/1
AEROSOL, METERED RESPIRATORY (INHALATION)
Qty: 18 G | Refills: 3 | Status: SHIPPED | OUTPATIENT
Start: 2023-10-10 | End: 2023-12-28 | Stop reason: SDUPTHER

## 2023-10-11 ENCOUNTER — ANTICOAGULATION MONITORING (OUTPATIENT)
Dept: VASCULAR LAB | Facility: MEDICAL CENTER | Age: 83
End: 2023-10-11
Payer: MEDICARE

## 2023-10-11 DIAGNOSIS — Z79.01 CHRONIC ANTICOAGULATION: ICD-10-CM

## 2023-10-11 DIAGNOSIS — I48.0 PAROXYSMAL A-FIB (HCC): ICD-10-CM

## 2023-10-11 NOTE — PROGRESS NOTES
Anticoagulation Summary  As of 10/11/2023      INR goal:  2.0-3.0   TTR:  81.2 % (5.8 y)   INR used for dosin.01 (10/10/2023)   Warfarin maintenance plan:  0.5 mg (1 mg x 0.5) every Tue; 1 mg (1 mg x 1) all other days   Weekly warfarin total:  6.5 mg   Plan last modified:  Pieter Covarrubias PharmD (2023)   Next INR check:  10/17/2023   Target end date:  Indefinite    Indications    Paroxysmal a-fib (CMS-HCC) [I48.0]  Chronic anticoagulation [Z79.01]                 Anticoagulation Episode Summary       INR check location:  Clinic Lab    Preferred lab:  [facility]    Send INR reminders to:      Comments:  [phone] (H)  [facility]          Anticoagulation Care Providers       Provider Role Specialty Phone number    Evelio Tejeda M.D. Referring Internal Medicine [phone]    [facility] Anticoagulation Services Responsible  [phone]    Beryl Pang M.D. Responsible Family Medicine [phone]          Anticoagulation Patient Findings  Patient Findings       Negatives:  Signs/symptoms of thrombosis, Signs/symptoms of bleeding, Laboratory test error suspected, Change in health, Change in alcohol use, Change in activity, Upcoming invasive procedure, Emergency department visit, Upcoming dental procedure, Missed doses, Extra doses, Change in medications, Change in diet/appetite, Hospital admission, Bruising, Other complaints          Spoke with patient on the phone.   Patient is therapeutic today.   Confirmed dosing.     Pt is not on antiplatelet agent    Instructed patient to call clinic if any unusual bleeding or bruising occurs.   Will continue dosing as outlined.   Will follow-up with patient in 1 week(s).    Marta White, Student Pharmacist

## 2023-10-13 ENCOUNTER — TELEPHONE (OUTPATIENT)
Dept: CARDIOLOGY | Facility: MEDICAL CENTER | Age: 83
End: 2023-10-13
Payer: MEDICARE

## 2023-10-13 DIAGNOSIS — R06.02 SHORTNESS OF BREATH: ICD-10-CM

## 2023-10-13 DIAGNOSIS — I34.0 SEVERE MITRAL REGURGITATION: ICD-10-CM

## 2023-10-13 DIAGNOSIS — I25.10 CORONARY ARTERY DISEASE INVOLVING NATIVE CORONARY ARTERY OF NATIVE HEART WITHOUT ANGINA PECTORIS: ICD-10-CM

## 2023-10-13 DIAGNOSIS — I48.19 PERSISTENT ATRIAL FIBRILLATION (HCC): ICD-10-CM

## 2023-10-13 NOTE — TELEPHONE ENCOUNTER
Notified patient of echo results and MD's recommendations. She is agreeable to proceed with NIRAJ.     Patient reports SOB/VIDAL, but she also states she has COPD. She denies swelling and reports weight loss.     Advised patient our  will reach out to schedule the NIRAJ.

## 2023-10-13 NOTE — TELEPHONE ENCOUNTER
----- Message from Ethan Lozano M.D. sent at 10/13/2023  3:23 PM PDT -----  Lets set up NIRAJ first  and visit with me after

## 2023-10-13 NOTE — TELEPHONE ENCOUNTER
----- Message from Eric Aragon M.D. sent at 10/13/2023  1:56 PM PDT -----  The patient is Dr. Lozano's I saw for ED FU. New appearing severity of MR. Please set f/u with him for further recommendations.    TY  ----- Message -----  From: France Vinson R.N.  Sent: 10/10/2023   7:23 AM PDT  To: Eric Aragon M.D.    To TW: Please advise. Thank you.

## 2023-10-14 NOTE — TELEPHONE ENCOUNTER
Ethan Lozano M.D.  You 5 minutes ago (5:21 PM)     Probably general      You  You; Marilou Banda; Ethan Lozano M.D. 28 minutes ago (4:58 PM)     Dr. Lozano, do you want general or moderate sedation for NIRAJ?

## 2023-10-16 ENCOUNTER — PATIENT MESSAGE (OUTPATIENT)
Dept: CARDIOLOGY | Facility: MEDICAL CENTER | Age: 83
End: 2023-10-16
Payer: MEDICARE

## 2023-10-16 NOTE — TELEPHONE ENCOUNTER
Patient is scheduled for a NIRAJ with anesthesia on 11- with Dr. Tejeda. Patient has been instructed to check in at 8:00 am for 10 :00 procedure. No meds to hold. Message sent to authorizations. Sent [employer] message to pt with instructions.  FYI sent to Jabari.

## 2023-10-16 NOTE — TELEPHONE ENCOUNTER
"Maite,    I scheduled this patient for her NIRAJ on 11-. She was concerned cause she had another \"episode\" last night. She said she is also having pain in her left arm. Can you please call the patient and discuss these issues with her?    Thank you so much!  "

## 2023-10-17 ENCOUNTER — HOSPITAL ENCOUNTER (OUTPATIENT)
Dept: LAB | Facility: MEDICAL CENTER | Age: 83
End: 2023-10-17
Attending: NURSE PRACTITIONER
Payer: MEDICARE

## 2023-10-17 ENCOUNTER — ANTICOAGULATION MONITORING (OUTPATIENT)
Dept: VASCULAR LAB | Facility: MEDICAL CENTER | Age: 83
End: 2023-10-17
Payer: MEDICARE

## 2023-10-17 DIAGNOSIS — I48.0 PAROXYSMAL A-FIB (HCC): ICD-10-CM

## 2023-10-17 DIAGNOSIS — Z79.01 CHRONIC ANTICOAGULATION: ICD-10-CM

## 2023-10-17 LAB
INR PPP: 2.87 (ref 0.87–1.13)
PROTHROMBIN TIME: 30.5 SEC (ref 12–14.6)

## 2023-10-17 PROCEDURE — 85610 PROTHROMBIN TIME: CPT

## 2023-10-17 PROCEDURE — 36415 COLL VENOUS BLD VENIPUNCTURE: CPT

## 2023-10-17 NOTE — TELEPHONE ENCOUNTER
"Patient called back. She states on 10/13-10/15 between 1144-3325, she got up to go to the bathroom and she was very short of breath to the point where her  contemplated calling 911. She states her  \"calms her down\" and she uses her nebulizer, and the SOB resolves. She denies SOB/VIDAL at any other time during the day/with activity. She states she had hx of hematuria, and the anticoagulation clinic recently adjusted her warfarin, and she hasn't had any further hematuria.    Patient does not take her VS regularly, but she denies swelling and is reporting weight loss.     O2 91-92%  HR 88    Advised patient I would reach out to Dr. Lozano to see if he wants to order any labs or a CXR to assess her SOB and call her back. Patient verbalizes understanding.   "

## 2023-10-17 NOTE — TELEPHONE ENCOUNTER
Phone Number Called: [phone]    Call outcome: Spoke to patient regarding message below.    Message: Pt needs follow-up with AK team after 11/10 procedure. Pt verbalizes understanding.

## 2023-10-17 NOTE — TELEPHONE ENCOUNTER
LVM on patient's home and mobile numbers to call back and discuss. Also need to schedule FU with Dr. Lozano for after NIRAJ.

## 2023-10-18 ENCOUNTER — OFFICE VISIT (OUTPATIENT)
Dept: MEDICAL GROUP | Facility: CLINIC | Age: 83
End: 2023-10-18
Payer: MEDICARE

## 2023-10-18 VITALS
SYSTOLIC BLOOD PRESSURE: 126 MMHG | HEIGHT: 59 IN | HEART RATE: 71 BPM | DIASTOLIC BLOOD PRESSURE: 80 MMHG | WEIGHT: 82 LBS | BODY MASS INDEX: 16.53 KG/M2 | OXYGEN SATURATION: 90 %

## 2023-10-18 DIAGNOSIS — M81.0 OSTEOPOROSIS, POSTMENOPAUSAL: ICD-10-CM

## 2023-10-18 DIAGNOSIS — J43.8 OTHER EMPHYSEMA (HCC): ICD-10-CM

## 2023-10-18 DIAGNOSIS — R06.09 DYSPNEA ON EXERTION: ICD-10-CM

## 2023-10-18 DIAGNOSIS — Z79.01 CHRONIC ANTICOAGULATION: ICD-10-CM

## 2023-10-18 DIAGNOSIS — R68.89 EXERCISE INTOLERANCE: ICD-10-CM

## 2023-10-18 DIAGNOSIS — E43 SEVERE PROTEIN-CALORIE MALNUTRITION (HCC): ICD-10-CM

## 2023-10-18 DIAGNOSIS — J43.9 PULMONARY EMPHYSEMA, UNSPECIFIED EMPHYSEMA TYPE (HCC): ICD-10-CM

## 2023-10-18 DIAGNOSIS — Z23 NEED FOR VACCINATION: ICD-10-CM

## 2023-10-18 DIAGNOSIS — D41.4 BLADDER POLYP: ICD-10-CM

## 2023-10-18 PROCEDURE — 3079F DIAST BP 80-89 MM HG: CPT | Performed by: FAMILY MEDICINE

## 2023-10-18 PROCEDURE — 99214 OFFICE O/P EST MOD 30 MIN: CPT | Mod: 25 | Performed by: FAMILY MEDICINE

## 2023-10-18 PROCEDURE — 3074F SYST BP LT 130 MM HG: CPT | Performed by: FAMILY MEDICINE

## 2023-10-18 PROCEDURE — G0008 ADMIN INFLUENZA VIRUS VAC: HCPCS | Performed by: FAMILY MEDICINE

## 2023-10-18 PROCEDURE — 90662 IIV NO PRSV INCREASED AG IM: CPT | Performed by: FAMILY MEDICINE

## 2023-10-18 RX ORDER — FLUOROURACIL 50 MG/G
1 CREAM TOPICAL DAILY
COMMUNITY
End: 2023-11-15

## 2023-10-18 ASSESSMENT — FIBROSIS 4 INDEX: FIB4 SCORE: 2.75

## 2023-10-18 NOTE — TELEPHONE ENCOUNTER
KADY Becker.  You; Maite Holm R.N. 8 minutes ago (10:19 AM)     OK to order bnp, bmp and CXR. Farzana, can you call her in maite's absence and get these ordered and let the patient know. Follow up with NIRAJ as planned. SC      Phone Number Called: [phone]    Call outcome: Spoke to patient regarding message below.    Message: Updated patient on need for labs and chest x-ray. Patient is going to PCP appointment today and reports that she potentially can get CXR done with them. Patient has appointment for lab draw next week. Advised to call back with any further questions or concerns. All questions answered at this time.

## 2023-10-18 NOTE — PROGRESS NOTES
Anticoagulation Summary  As of 10/17/2023      INR goal:  2.0-3.0   TTR:  81.3 % (5.8 y)   INR used for dosin.87 (10/17/2023)   Warfarin maintenance plan:  0.5 mg (1 mg x 0.5) every Tue; 1 mg (1 mg x 1) all other days   Weekly warfarin total:  6.5 mg   Plan last modified:  Pieter Covarrubias PharmD (2023)   Next INR check:  10/31/2023   Target end date:  Indefinite    Indications    Paroxysmal a-fib (CMS-HCC) [I48.0]  Chronic anticoagulation [Z79.01]                 Anticoagulation Episode Summary       INR check location:  Clinic Lab    Preferred lab:  [facility]    Send INR reminders to:      Comments:  [phone] (H)  [facility]          Anticoagulation Care Providers       Provider Role Specialty Phone number    Evelio Tejeda M.D. Referring Internal Medicine [phone]    [facility] Anticoagulation Services Responsible  [phone]    Beryl Pang M.D. Responsible Family Medicine [phone]          Anticoagulation Patient Findings  Patient Findings       Negatives:  Signs/symptoms of thrombosis, Signs/symptoms of bleeding, Laboratory test error suspected, Change in health, Change in alcohol use, Change in activity, Upcoming invasive procedure, Emergency department visit, Upcoming dental procedure, Missed doses, Extra doses, Change in medications, Change in diet/appetite, Hospital admission, Bruising, Other complaints          Spoke with patient today regarding therapeutic INR of 2.87.  Patient denies any signs/symptoms of bruising or bleeding or any changes in diet and medications.  Instructed patient to call clinic with any questions or concerns.    Pt is not on antiplatelet therapy    Pt is to continue with current warfarin dosing regimen.  Follow up in 1-2 weeks, to reduce risk of adverse events related to this high risk medication,  Warfarin.    Major Hughes, YaneliD, BCACP

## 2023-10-19 PROBLEM — E43 SEVERE PROTEIN-CALORIE MALNUTRITION (HCC): Status: ACTIVE | Noted: 2023-10-19

## 2023-10-19 NOTE — ASSESSMENT & PLAN NOTE
She is quite SOB after minimal exertion. No chest pain. Unclear if pulmonary vs cardiac in origin. She is followed by pulm and by cardiology. When she has had walking tests, hypoxia is not demonstrated so she is not on home oxygen. She is thin, has lost weight, and there is noted temporal muscle wasting.  is always trying to get her to eat more but she is not especially hungry.     Had CT abd/pelv Dec 2022 which showed ground glass opacities bases of lungs, previous CT chest Aug 2022 showed emphysema. Given increased SOB will get CT thorax as follow up.     Suspect she has worsening lung disease, but she is also a vasculopath. She has had her a fib controlled with sotalol, and does not notice palpitations. Will follow along with cards and pulm. Consider advanced care planning discussion in the next few months. Last year when she became ill with pneumonia, we managed to keep her out of the hospital but I suspect this will get more difficult with each winter season.    Recommended that she get the RSV vaccine, so this was ordered to her pharmacy.

## 2023-10-19 NOTE — PROGRESS NOTES
CC:Diagnoses of Exercise intolerance, Need for vaccination, Chronic anticoagulation, Bladder polyp, Dyspnea on exertion, Pulmonary emphysema, unspecified emphysema type (HCC), and Other emphysema (HCC) were pertinent to this visit.      HISTORY OF PRESENT ILLNESS: Patient is a 83 y.o. female established patient who presents today to review several chronic, stable problems, and one chronic worsening problem (exercise intolerance/emphysema).        Chronic anticoagulation  Followed by coumadin clinic. Has had problems with persistent hematuria for which she follows with urology, cystoscopy has demonstrated bladder polyp. Hematuria recently cleared up. Has needed a lot of titration of the coumadin recently.     Bladder polyp  Intermittent hematuria, followed by urology.     Exercise intolerance  She is quite SOB after minimal exertion. No chest pain. Unclear if pulmonary vs cardiac in origin. She is followed by pulm and by cardiology. When she has had walking tests, hypoxia is not demonstrated so she is not on home oxygen. She is thin, has lost weight, and there is noted temporal muscle wasting.  is always trying to get her to eat more but she is not especially hungry.     Had CT abd/pelv Dec 2022 which showed ground glass opacities bases of lungs, previous CT chest Aug 2022 showed emphysema. Given increased SOB will get CT thorax as follow up.     Suspect she has worsening lung disease, but she is also a vasculopath. She has had her a fib controlled with sotalol, and does not notice palpitations. Will follow along with cards and pulm. Consider advanced care planning discussion in the next few months. Last year when she became ill with pneumonia, we managed to keep her out of the hospital but I suspect this will get more difficult with each winter season.    Recommended that she get the RSV vaccine, so this was ordered to her pharmacy.     Other emphysema (HCC)  Followed by pulmonology, feels she is doing better  with duoneb treatment. She is noticing nighttime SOB symptoms that occur around 3am. Her  wonders if she is having anxiety, but she tried half a xanax tablet and didn't feel it helped her. She does use pursed lip breathing during these episodes and they eventually resolve.     Reviewed previous CT scans--last August 2022 showed emphysema; Dec 2022 abd/pelv showed ground glass opacities to bases so we will get a repeat CT thorax to follow up, as she is at high risk for lung cancer in addition to worsening COPD.     Osteoporosis, postmenopausal  Known osteoporosis, and she is a candidate for denosumab. Discussed with patient via [employer] message, and she declined therapy. High risk for fracture.     Severe protein-calorie malnutrition (HCC)  Not much appetite. Current BMI 16.56. Weight is slowly decreasing. Temporal muscle wasting. Very little exercise tolerance. Yet she remains cheerful and has retained her sense of humor. She is not bedbound but sleeps in her recliner. Will discuss further at next visit, consider prescribing supplemental protein shakes. CT chest to evaluate for malignancy.    Patient Active Problem List    Diagnosis Date Noted    Other emphysema (HCC) 06/28/2023    Gross hematuria 06/05/2023    Bladder polyp 05/31/2023    Exercise intolerance 07/25/2022    Personal history of anaphylaxis 12/18/2020    Coronary artery disease involving native coronary artery of native heart without angina pectoris 03/12/2019    PCI to her LAD in 2009 03/12/2019    Chronic anticoagulation 04/17/2018    History of COPD 12/05/2017    Circulating anticoagulants (HCC) 12/04/2017    Osteoporosis, postmenopausal 08/24/2017    Depression with anxiety 02/15/2017    Vitamin D deficiency 07/26/2016    Insomnia 07/26/2016    Peripheral arterial disease (HCC) 03/01/2016    Cigarette nicotine dependence with nicotine-induced disorder 03/01/2016    Gastroesophageal reflux disease without esophagitis 01/20/2016    Paroxysmal  a-fib (CMS-HCC) 01/20/2016    Pre-diabetes 01/20/2016    Panic attacks 01/20/2016        Allergies:Penicillins; Codeine; Iodine; Bee venom; Chantix [varenicline]; Ciprofloxacin; Diphtheria,pertussis,tetanus; Flagyl [metronidazole]; Honey bee venom; Latex; Lipitor [atorvastatin calcium]; Other environmental; Shellfish allergy; and Tetanus antitoxin    Current Outpatient Medications   Medication Sig Dispense Refill    fluorouracil (EFUDEX) 5 % cream Apply 1 Application topically every day.      albuterol 108 (90 Base) MCG/ACT Aero Soln inhalation aerosol INHALE ONE TO TWO PUFFS BY MOUTH EVERY 4 HOURS AS NEEDED FOR SHORTNESS OF BREATH 18 g 3    Multiple Vitamins-Minerals (ZINC PO) Take  by mouth.      ipratropium-albuterol (DUONEB) 0.5-2.5 (3) MG/3ML nebulizer solution Take 3 mL by nebulization every four hours as needed for Shortness of Breath. 120 mL 11    ALPRAZolam (XANAX) 0.25 MG Tab TAKE ONE TABLET BY MOUTH TWICE A DAY AS NEEDED FOR ANXIETY 60 Tablet 5    lovastatin (MEVACOR) 40 MG tablet TAKE 1 TABLET AT BEDTIME 100 Tablet 3    warfarin (COUMADIN) 1 MG Tab TAKE ONE TO TWO TABLETS DAILY OR AS DIRECTED BY ANTICOAGULATION CLINIC 180 Tablet 1    sotalol (BETAPACE) 80 MG Tab TAKE 1/2 TABLET TWICE DAILY Strength: 80 mg 90 Tablet 3    fluticasone (FLONASE) 50 MCG/ACT nasal spray Administer 1 Spray into affected nostril(S) every day. 16 g 3    EPINEPHrine (EPIPEN) 0.3 MG/0.3ML Solution Auto-injector solution for injection epinephrine 0.3 mg/0.3 mL injection, auto-injector      Probiotic Product (PROBIOTIC PO) Take  by mouth.      ascorbic acid (ASCORBIC ACID) 500 MG Tab Take 2 Tablets by mouth every day.      vitamin D (CHOLECALCIFEROL) 1000 UNIT Tab Take 2 Tablets by mouth every morning. 3000 units       No current facility-administered medications for this visit.       Social History     Tobacco Use    Smoking status: Every Day     Current packs/day: 0.25     Average packs/day: 0.3 packs/day for 66.6 years (16.6 ttl  "pk-yrs)     Types: Cigarettes     Start date: 3/20/1958    Smokeless tobacco: Never    Tobacco comments:     4 cigarettes per day   Vaping Use    Vaping Use: Never used   Substance Use Topics    Alcohol use: No     Alcohol/week: 0.0 oz    Drug use: No     Social History     Social History Narrative    .     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 [employer]       Family History   Problem Relation Age of Onset    Hypertension Mother     Hyperlipidemia Mother     Cancer Maternal Grandmother         tongue    Cancer Maternal Uncle         x 3, pancreas    Cancer Maternal Grandfather         unknown    Cancer Paternal Grandmother         unknown    Cancer Paternal Grandfather         unknown    Diabetes Maternal Uncle     Heart Disease Maternal Uncle     Hypertension Sister     Hyperlipidemia Sister     Heart Disease Sister     Alcohol/Drug Sister     Thyroid Sister     Psychiatric Illness Neg Hx     Stroke Neg Hx        Exam:    /80 (BP Location: Left arm, Patient Position: Sitting, BP Cuff Size: Adult)   Pulse 71   Ht 1.499 m (4' 11\")   Wt 37.2 kg (82 lb)   SpO2 90%  Body mass index is 16.56 kg/m².    General:  Thin, impeccably groomed female in NAD, talkative and cheerful.  HENT: Atraumatic, normocephalic. Temporal muscle wasting noted.   EYES: Extraocular movements intact, pupils equal and reactive to light  NECK: Supple, FROM  CHEST: No deformities, Equal chest expansion, increased AP diameter  RESP: Unlabored at rest, dyspneic with exertion. Quiet but audible breath sounds throughout.   ABD: Non-Distended  Extremities: No Clubbing, Cyanosis, or Edema.   Skin: Warm/dry, without rashes  Neuro: A/O x 4, CN 2-12 Grossly intact, Motor/sensory grossly intact  Psych: Normal behavior, normal affect      LABS: : Results reviewed and discussed with the patient, questions answered.        Return in about 2 months (around 12/18/2023).    My total time spent caring for the patient on the day of the encounter was 30 " minutes.   This does not include time spent on separately billable procedures/tests.

## 2023-10-19 NOTE — ASSESSMENT & PLAN NOTE
Followed by coumadin clinic. Has had problems with persistent hematuria for which she follows with urology, cystoscopy has demonstrated bladder polyp. Hematuria recently cleared up. Has needed a lot of titration of the coumadin recently.

## 2023-10-19 NOTE — ASSESSMENT & PLAN NOTE
Followed by pulmonology, feels she is doing better with duoneb treatment. She is noticing nighttime SOB symptoms that occur around 3am. Her  wonders if she is having anxiety, but she tried half a xanax tablet and didn't feel it helped her. She does use pursed lip breathing during these episodes and they eventually resolve.     Reviewed previous CT scans--last August 2022 showed emphysema; Dec 2022 abd/pelv showed ground glass opacities to bases so we will get a repeat CT thorax to follow up, as she is at high risk for lung cancer in addition to worsening COPD.

## 2023-10-19 NOTE — ASSESSMENT & PLAN NOTE
Not much appetite. Current BMI 16.56. Weight is slowly decreasing. Temporal muscle wasting. Very little exercise tolerance. Yet she remains cheerful and has retained her sense of humor. She is not bedbound but sleeps in her recliner. Will discuss further at next visit, consider prescribing supplemental protein shakes. CT chest to evaluate for malignancy.

## 2023-10-19 NOTE — ASSESSMENT & PLAN NOTE
Known osteoporosis, and she is a candidate for denosumab. Discussed with patient via [email or web address] message, and she declined therapy. High risk for fracture.

## 2023-10-23 ENCOUNTER — HOSPITAL ENCOUNTER (OUTPATIENT)
Dept: RADIOLOGY | Facility: MEDICAL CENTER | Age: 83
End: 2023-10-23
Attending: NURSE PRACTITIONER
Payer: MEDICARE

## 2023-10-23 ENCOUNTER — HOSPITAL ENCOUNTER (OUTPATIENT)
Dept: RADIOLOGY | Facility: MEDICAL CENTER | Age: 83
End: 2023-10-23
Attending: FAMILY MEDICINE
Payer: MEDICARE

## 2023-10-23 DIAGNOSIS — R06.09 DYSPNEA ON EXERTION: ICD-10-CM

## 2023-10-23 DIAGNOSIS — R06.02 SHORTNESS OF BREATH: ICD-10-CM

## 2023-10-23 DIAGNOSIS — J43.9 PULMONARY EMPHYSEMA, UNSPECIFIED EMPHYSEMA TYPE (HCC): ICD-10-CM

## 2023-10-23 PROCEDURE — 71046 X-RAY EXAM CHEST 2 VIEWS: CPT

## 2023-10-23 PROCEDURE — 71250 CT THORAX DX C-: CPT

## 2023-10-24 ENCOUNTER — TELEPHONE (OUTPATIENT)
Dept: CARDIOLOGY | Facility: MEDICAL CENTER | Age: 83
End: 2023-10-24
Payer: MEDICARE

## 2023-10-24 NOTE — TELEPHONE ENCOUNTER
----- Message from PEPE Becker sent at 10/24/2023  4:06 PM PDT -----  CXR shows COPD, most likely the cause of her symptoms. Check on vitals and symptoms with prior experiencing of shortness of breath. SC

## 2023-10-24 NOTE — TELEPHONE ENCOUNTER
Phone Number Called: [phone]    Call outcome: Spoke to patient regarding message below.    Message: Reviewed CXR results with patient and advised to follow up with pulmonologist. Patient agreeable to plan at this time. All questions answered. Advised to call back with any further questions or concerns.

## 2023-10-25 ENCOUNTER — HOSPITAL ENCOUNTER (OUTPATIENT)
Dept: LAB | Facility: MEDICAL CENTER | Age: 83
End: 2023-10-25
Attending: NURSE PRACTITIONER
Payer: MEDICARE

## 2023-10-25 ENCOUNTER — TELEPHONE (OUTPATIENT)
Dept: CARDIOLOGY | Facility: MEDICAL CENTER | Age: 83
End: 2023-10-25
Payer: MEDICARE

## 2023-10-25 ENCOUNTER — ANTICOAGULATION MONITORING (OUTPATIENT)
Dept: VASCULAR LAB | Facility: MEDICAL CENTER | Age: 83
End: 2023-10-25
Payer: MEDICARE

## 2023-10-25 DIAGNOSIS — I48.19 PERSISTENT ATRIAL FIBRILLATION (HCC): ICD-10-CM

## 2023-10-25 DIAGNOSIS — Z79.01 CHRONIC ANTICOAGULATION: ICD-10-CM

## 2023-10-25 DIAGNOSIS — I34.0 SEVERE MITRAL REGURGITATION: ICD-10-CM

## 2023-10-25 DIAGNOSIS — I48.0 PAROXYSMAL A-FIB (HCC): ICD-10-CM

## 2023-10-25 LAB
ANION GAP SERPL CALC-SCNC: 6 MMOL/L (ref 7–16)
BUN SERPL-MCNC: 14 MG/DL (ref 8–22)
CALCIUM SERPL-MCNC: 9.8 MG/DL (ref 8.5–10.5)
CHLORIDE SERPL-SCNC: 109 MMOL/L (ref 96–112)
CO2 SERPL-SCNC: 29 MMOL/L (ref 20–33)
CREAT SERPL-MCNC: 0.71 MG/DL (ref 0.5–1.4)
GFR SERPLBLD CREATININE-BSD FMLA CKD-EPI: 83 ML/MIN/1.73 M 2
GLUCOSE SERPL-MCNC: 89 MG/DL (ref 65–99)
INR PPP: 2.64 (ref 0.87–1.13)
NT-PROBNP SERPL IA-MCNC: 685 PG/ML (ref 0–125)
POTASSIUM SERPL-SCNC: 4.5 MMOL/L (ref 3.6–5.5)
PROTHROMBIN TIME: 28.6 SEC (ref 12–14.6)
SODIUM SERPL-SCNC: 144 MMOL/L (ref 135–145)

## 2023-10-25 PROCEDURE — 36415 COLL VENOUS BLD VENIPUNCTURE: CPT

## 2023-10-25 PROCEDURE — 83880 ASSAY OF NATRIURETIC PEPTIDE: CPT

## 2023-10-25 PROCEDURE — 80048 BASIC METABOLIC PNL TOTAL CA: CPT

## 2023-10-25 PROCEDURE — 85610 PROTHROMBIN TIME: CPT

## 2023-10-25 NOTE — TELEPHONE ENCOUNTER
TW    Caller: Angie Root    Topic/issue: Patient at [facility] right now and states she was told that Isabel Thomas put in wrong code for the proBrain Natriuretic Peptide.   Patient states that due to wrong code entered, she would have to pay close to $300. Patient at lab and wanting corrected now. Advised of turn around time for call.   William at [facility] is requesting phone call directly to her so that she can verify the new code will be correct, otherwise lab would have to call us again. Lab direct number: [phone].    Callback Number: [phone](cell)      Thank you,  Teressa HAMPTON

## 2023-10-25 NOTE — PROGRESS NOTES
Anticoagulation Summary  As of 10/25/2023      INR goal:  2.0-3.0   TTR:  81.4 % (5.8 y)   INR used for dosin.64 (10/25/2023)   Warfarin maintenance plan:  0.5 mg (1 mg x 0.5) every Tue; 1 mg (1 mg x 1) all other days   Weekly warfarin total:  6.5 mg   Plan last modified:  Pieter Covarrubias, PharmD (2023)   Next INR check:  2023   Target end date:  Indefinite    Indications    Paroxysmal a-fib (CMS-HCC) [I48.0]  Chronic anticoagulation [Z79.01]                 Anticoagulation Episode Summary       INR check location:  Clinic Lab    Preferred lab:  [facility]    Send INR reminders to:      Comments:  [phone] (H)  [facility]          Anticoagulation Care Providers       Provider Role Specialty Phone number    Evelio Tejeda M.D. Referring Internal Medicine [phone]    [facility]ticoagulation [facility] Responsible  [phone]    Beryl Pang M.D. Responsible Family Medicine [phone]          Anticoagulation Patient Findings  Patient Findings       Negatives:  Signs/symptoms of thrombosis, Signs/symptoms of bleeding, Laboratory test error suspected, Change in health, Change in alcohol use, Change in activity, Upcoming invasive procedure, Emergency department visit, Upcoming dental procedure, Missed doses, Extra doses, Change in medications, Change in diet/appetite, Hospital admission, Bruising, Other complaints                Spoke with patient on the phone.   Patient is therapeutic today.   Confirmed dosing.   Pt is not on antiplatelet agent  Instructed patient to call clinic if any unusual bleeding or bruising occurs.   Will continue dosing as outlined.   Will follow-up with patient in 2 week(s).  Marta White, Student Pharmacist

## 2023-10-31 ENCOUNTER — HOSPITAL ENCOUNTER (OUTPATIENT)
Dept: LAB | Facility: MEDICAL CENTER | Age: 83
End: 2023-10-31
Attending: NURSE PRACTITIONER
Payer: MEDICARE

## 2023-10-31 ENCOUNTER — ANTICOAGULATION MONITORING (OUTPATIENT)
Dept: MEDICAL GROUP | Facility: PHYSICIAN GROUP | Age: 83
End: 2023-10-31
Payer: MEDICARE

## 2023-10-31 DIAGNOSIS — Z79.01 CHRONIC ANTICOAGULATION: ICD-10-CM

## 2023-10-31 DIAGNOSIS — I48.0 PAROXYSMAL A-FIB (HCC): ICD-10-CM

## 2023-10-31 DIAGNOSIS — R05.9 COUGH, UNSPECIFIED TYPE: ICD-10-CM

## 2023-10-31 LAB
INR PPP: 2.81 (ref 0.87–1.13)
PROTHROMBIN TIME: 30 SEC (ref 12–14.6)

## 2023-10-31 PROCEDURE — 85610 PROTHROMBIN TIME: CPT

## 2023-10-31 PROCEDURE — 36415 COLL VENOUS BLD VENIPUNCTURE: CPT

## 2023-10-31 RX ORDER — FLUTICASONE PROPIONATE 50 MCG
1 SPRAY, SUSPENSION (ML) NASAL DAILY
Qty: 16 G | Refills: 3 | Status: SHIPPED | OUTPATIENT
Start: 2023-10-31

## 2023-10-31 NOTE — PROGRESS NOTES
Anticoagulation Summary  As of 10/31/2023      INR goal:  2.0-3.0   TTR:  81.4 % (5.9 y)   INR used for dosin.81 (10/31/2023)   Warfarin maintenance plan:  0.5 mg (1 mg x 0.5) every Tue; 1 mg (1 mg x 1) all other days   Weekly warfarin total:  6.5 mg   Plan last modified:  Pieter Covarrubias, PharmD (2023)   Next INR check:  2023   Target end date:  Indefinite    Indications    Paroxysmal a-fib (CMS-HCC) [I48.0]  Chronic anticoagulation [Z79.01]                 Anticoagulation Episode Summary       INR check location:  Clinic Lab    Preferred lab:  [facility]    Send INR reminders to:      Comments:  [phone] (H)  [facility]          Anticoagulation Care Providers       Provider Role Specialty Phone number    Evelio Tejeda M.D. Referring Internal Medicine [phone]    [facility] Anticoagulation Services Responsible  [phone]    Beryl Pang M.D. Responsible Family Medicine [phone]            Refer to Anticoagulation Patient Findings for HPI  Patient Findings       Negatives:  Signs/symptoms of thrombosis, Signs/symptoms of bleeding, Laboratory test error suspected, Change in health, Change in alcohol use, Change in activity, Upcoming invasive procedure, Emergency department visit, Upcoming dental procedure, Missed doses, Extra doses, Change in medications, Change in diet/appetite, Hospital admission, Bruising, Other complaints            Spoke with patient to report a therapeutic 2.81 INR.    Pt has follow-up appt with LENA Pruitt on 11.15.23.  Pt is NOT on antiplatelet therapy.  Pt instructed to continue with current warfarin dosing regimen, confirms dosing.   Will follow up in 2 week(s).     Mita Sylvester, Pharmacy Intern

## 2023-11-08 ENCOUNTER — APPOINTMENT (OUTPATIENT)
Dept: ADMISSIONS | Facility: MEDICAL CENTER | Age: 83
End: 2023-11-08
Attending: INTERNAL MEDICINE
Payer: MEDICARE

## 2023-11-09 ENCOUNTER — ANESTHESIA EVENT (OUTPATIENT)
Dept: CARDIOLOGY | Facility: MEDICAL CENTER | Age: 83
End: 2023-11-09
Payer: MEDICARE

## 2023-11-09 ENCOUNTER — PRE-ADMISSION TESTING (OUTPATIENT)
Dept: ADMISSIONS | Facility: MEDICAL CENTER | Age: 83
End: 2023-11-09
Payer: MEDICARE

## 2023-11-10 ENCOUNTER — APPOINTMENT (OUTPATIENT)
Dept: CARDIOLOGY | Facility: MEDICAL CENTER | Age: 83
End: 2023-11-10
Attending: INTERNAL MEDICINE
Payer: MEDICARE

## 2023-11-10 ENCOUNTER — HOSPITAL ENCOUNTER (OUTPATIENT)
Facility: MEDICAL CENTER | Age: 83
End: 2023-11-10
Attending: INTERNAL MEDICINE | Admitting: INTERNAL MEDICINE
Payer: MEDICARE

## 2023-11-10 ENCOUNTER — ANESTHESIA (OUTPATIENT)
Dept: CARDIOLOGY | Facility: MEDICAL CENTER | Age: 83
End: 2023-11-10
Payer: MEDICARE

## 2023-11-10 VITALS
BODY MASS INDEX: 15.2 KG/M2 | HEIGHT: 63 IN | RESPIRATION RATE: 16 BRPM | OXYGEN SATURATION: 92 % | DIASTOLIC BLOOD PRESSURE: 62 MMHG | TEMPERATURE: 96.9 F | HEART RATE: 62 BPM | SYSTOLIC BLOOD PRESSURE: 134 MMHG | WEIGHT: 85.76 LBS

## 2023-11-10 DIAGNOSIS — I34.0 SEVERE MITRAL REGURGITATION: ICD-10-CM

## 2023-11-10 PROBLEM — I27.20 PULMONARY HYPERTENSION (HCC): Status: ACTIVE | Noted: 2023-11-10

## 2023-11-10 LAB
ERYTHROCYTE [DISTWIDTH] IN BLOOD BY AUTOMATED COUNT: 50.1 FL (ref 35.9–50)
HCT VFR BLD AUTO: 44.7 % (ref 37–47)
HGB BLD-MCNC: 13.8 G/DL (ref 12–16)
MCH RBC QN AUTO: 28.9 PG (ref 27–33)
MCHC RBC AUTO-ENTMCNC: 30.9 G/DL (ref 32.2–35.5)
MCV RBC AUTO: 93.5 FL (ref 81.4–97.8)
PLATELET # BLD AUTO: 165 K/UL (ref 164–446)
PMV BLD AUTO: 10.9 FL (ref 9–12.9)
RBC # BLD AUTO: 4.78 M/UL (ref 4.2–5.4)
WBC # BLD AUTO: 7.6 K/UL (ref 4.8–10.8)

## 2023-11-10 PROCEDURE — 700105 HCHG RX REV CODE 258: Performed by: INTERNAL MEDICINE

## 2023-11-10 PROCEDURE — 4410588 EC-TEE W/O CONT

## 2023-11-10 PROCEDURE — 93320 DOPPLER ECHO COMPLETE: CPT | Mod: 26 | Performed by: INTERNAL MEDICINE

## 2023-11-10 PROCEDURE — 93319 3D ECHO IMG CGEN CAR ANOMAL: CPT | Performed by: INTERNAL MEDICINE

## 2023-11-10 PROCEDURE — 93312 ECHO TRANSESOPHAGEAL: CPT | Mod: 26 | Performed by: INTERNAL MEDICINE

## 2023-11-10 PROCEDURE — 700111 HCHG RX REV CODE 636 W/ 250 OVERRIDE (IP): Performed by: ANESTHESIOLOGY

## 2023-11-10 PROCEDURE — 160035 HCHG PACU - 1ST 60 MINS PHASE I

## 2023-11-10 PROCEDURE — 160046 HCHG PACU - 1ST 60 MINS PHASE II

## 2023-11-10 PROCEDURE — 160002 HCHG RECOVERY MINUTES (STAT)

## 2023-11-10 PROCEDURE — 85027 COMPLETE CBC AUTOMATED: CPT

## 2023-11-10 RX ORDER — SODIUM CHLORIDE, SODIUM LACTATE, POTASSIUM CHLORIDE, CALCIUM CHLORIDE 600; 310; 30; 20 MG/100ML; MG/100ML; MG/100ML; MG/100ML
INJECTION, SOLUTION INTRAVENOUS CONTINUOUS
Status: DISCONTINUED | OUTPATIENT
Start: 2023-11-10 | End: 2023-11-10 | Stop reason: HOSPADM

## 2023-11-10 RX ORDER — ONDANSETRON 2 MG/ML
4 INJECTION INTRAMUSCULAR; INTRAVENOUS
Status: DISCONTINUED | OUTPATIENT
Start: 2023-11-10 | End: 2023-11-10 | Stop reason: HOSPADM

## 2023-11-10 RX ORDER — DIPHENHYDRAMINE HYDROCHLORIDE 50 MG/ML
12.5 INJECTION INTRAMUSCULAR; INTRAVENOUS
Status: DISCONTINUED | OUTPATIENT
Start: 2023-11-10 | End: 2023-11-10 | Stop reason: HOSPADM

## 2023-11-10 RX ORDER — MIDAZOLAM HYDROCHLORIDE 1 MG/ML
INJECTION INTRAMUSCULAR; INTRAVENOUS PRN
Status: DISCONTINUED | OUTPATIENT
Start: 2023-11-10 | End: 2023-11-10 | Stop reason: SURG

## 2023-11-10 RX ADMIN — PROPOFOL 20 MG: 10 INJECTION, EMULSION INTRAVENOUS at 10:18

## 2023-11-10 RX ADMIN — PROPOFOL 20 MG: 10 INJECTION, EMULSION INTRAVENOUS at 10:29

## 2023-11-10 RX ADMIN — SODIUM CHLORIDE, POTASSIUM CHLORIDE, SODIUM LACTATE AND CALCIUM CHLORIDE: 600; 310; 30; 20 INJECTION, SOLUTION INTRAVENOUS at 09:55

## 2023-11-10 RX ADMIN — PROPOFOL 20 MG: 10 INJECTION, EMULSION INTRAVENOUS at 10:24

## 2023-11-10 RX ADMIN — PROPOFOL 40 MG: 10 INJECTION, EMULSION INTRAVENOUS at 10:05

## 2023-11-10 RX ADMIN — MIDAZOLAM HYDROCHLORIDE 2 MG: 2 INJECTION, SOLUTION INTRAMUSCULAR; INTRAVENOUS at 10:01

## 2023-11-10 RX ADMIN — PROPOFOL 40 MG: 10 INJECTION, EMULSION INTRAVENOUS at 10:11

## 2023-11-10 ASSESSMENT — PAIN SCALES - GENERAL: PAIN_LEVEL: 0

## 2023-11-10 ASSESSMENT — FIBROSIS 4 INDEX: FIB4 SCORE: 2.75

## 2023-11-10 ASSESSMENT — PAIN DESCRIPTION - PAIN TYPE: TYPE: ACUTE PAIN

## 2023-11-10 NOTE — ANESTHESIA PREPROCEDURE EVALUATION
Date/Time: 11/10/23 1000    Scheduled providers: Ryan Tejeda M.D.; Christiano Pavon M.D.    Procedure: EC-NIRAJ W/O CONT    Diagnosis:       Severe mitral regurgitation [I34.0]      Nonrheumatic mitral (valve) insufficiency [I34.0]    Location: [facility] - Echocardiology [facility]            Relevant Problems   PULMONARY   (positive) Other emphysema (HCC)      CARDIAC   (positive) Coronary artery disease involving native coronary artery of native heart without angina pectoris   (positive) Paroxysmal a-fib (CMS-HCC)   (positive) Peripheral arterial disease (HCC)   (positive) Pulmonary hypertension (HCC)      GI   (positive) Gastroesophageal reflux disease without esophagitis       Physical Exam    Airway   Mallampati: II  TM distance: >3 FB  Neck ROM: full       Cardiovascular - normal exam  Rhythm: regular  Rate: normal  (-) murmur     Dental - normal exam           Pulmonary - normal exam  Breath sounds clear to auscultation     Abdominal    Neurological - normal exam                   Anesthesia Plan    ASA 3   ASA physical status 3 criteria: COPD - poorly controlled    Plan - MAC               Induction: intravenous    Postoperative Plan: Postoperative administration of opioids is intended.    Pertinent diagnostic labs and testing reviewed    Informed Consent:    Anesthetic plan and risks discussed with patient.    Use of blood products discussed with: patient whom consented to blood products.

## 2023-11-10 NOTE — DISCHARGE INSTRUCTIONS
If any questions arise, call your provider.  If your provider is not available, please feel free to call the [facility] at [phone] ([phone]).    MEDICATIONS: Resume taking daily medication.  Take prescribed pain medication with food.  If no medication is prescribed, you may take non-aspirin pain medication if needed.  PAIN MEDICATION CAN BE VERY CONSTIPATING.  Take a stool softener or laxative such as senokot, pericolace, or milk of magnesia if needed.    Last pain medication given at n/a    What to Expect Post Anesthesia    Rest and take it easy for the first 24 hours.  A responsible adult is recommended to remain with you during that time.  It is normal to feel sleepy.  We encourage you to not do anything that requires balance, judgment or coordination.    FOR 24 HOURS DO NOT:  Drive, operate machinery or run household appliances.  Drink beer or alcoholic beverages.  Make important decisions or sign legal documents.    To avoid nausea, slowly advance diet as tolerated, avoiding spicy or greasy foods for the first day.  Add more substantial food to your diet according to your provider's instructions.  Babies can be fed formula or breast milk as soon as they are hungry.  INCREASE FLUIDS AND FIBER TO AVOID CONSTIPATION.    MILD FLU-LIKE SYMPTOMS ARE NORMAL.  YOU MAY EXPERIENCE GENERALIZED MUSCLE ACHES, THROAT IRRITATION, HEADACHE AND/OR SOME NAUSEA.

## 2023-11-10 NOTE — OR NURSING
Patient arrived in PPU, VSS. , Nav, at bedside. Patient alert and oriented. Reporting a little throat discomfort. Educating patient about procedure.   Went over all discharge paperwork with patient and , answering all questions.   Removed IV, discharged patient into husbands care.

## 2023-11-10 NOTE — ANESTHESIA TIME REPORT
Anesthesia Start and Stop Event Times       Date Time Event    11/10/2023 0955 Anesthesia Start     1040 Anesthesia Stop          Responsible Staff  11/10/23      Name Role Begin End    Christiano Pavon M.D. Anesth 0955 1040          Overtime Reason:  no overtime (within assigned shift)    Comments:

## 2023-11-10 NOTE — ANESTHESIA POSTPROCEDURE EVALUATION
Patient: Angie Root    Procedure Summary       Date: 11/10/23 Room / Location: [facility] - Echocardiology [facility]    Anesthesia Start: 0955 Anesthesia Stop: 1040    Procedure: EC-NIRAJ W/O CONT Diagnosis:       Severe mitral regurgitation      Nonrheumatic mitral (valve) insufficiency    Scheduled Providers: Ryan Tejeda M.D.; Christiano Pavon M.D. Responsible Provider: Christiano Pavon M.D.    Anesthesia Type: MAC ASA Status: 3            Final Anesthesia Type: MAC  Last vitals  BP   Blood Pressure : 124/72    Temp   36.2 °C (97.2 °F)    Pulse   65   Resp   18    SpO2   97 %      Anesthesia Post Evaluation    Patient location during evaluation: PACU  Patient participation: complete - patient participated  Level of consciousness: awake and alert  Pain score: 0    Airway patency: patent  Anesthetic complications: no  Cardiovascular status: hemodynamically stable  Respiratory status: acceptable  Hydration status: euvolemic    PONV: none          No notable events documented.     Nurse Pain Score: 2 (NPRS)

## 2023-11-10 NOTE — H&P
Cardiology H+P Procedure Note    Date of note:    11/10/2023      Attending Physician: Ryan Tejeda M.D.    Patient ID:    Name:   Angie Root   YOB: 1940  Age:   83 y.o.  female   MRN:   [id number]      Reason for Consultation: LEIGH MAURICIO    HPI:  Angie Root is a 83 y.o.-year-old female with a history of COPD and mitral regurgitation who presents for NIRAJ to evaluate MR. CARRILLO      Past Medical History:   Diagnosis Date    Arthritis     BL hands    Breath shortness     CAD (coronary artery disease)     Cancer (HCC) 1982    melanoma    Cataract     IOL implants    COPD (chronic obstructive pulmonary disease) (HCC)     Croup 3 years old    Diverticulosis     Dyslipidemia     GERD (gastroesophageal reflux disease)     Hiatal hernia     High cholesterol     Hypertension     Infectious disease 2018    C Diff    Measles     5 years old    Paroxysmal A-fib (HCC) 01/20/2016    Symptomatic, on a rhythm control strategy with sotalol 40 mg by mouth twice a day.     Paroxysmal atrial fibrillation (HCC)     Prediabetes     PVD (peripheral vascular disease) (HCC)        Past Surgical History:   Procedure Laterality Date    FECAL TRANSPLANT N/A 4/9/2018    Procedure: FECAL TRANSPLANT;  Surgeon: Gonzalez Cruz M.D.;  Location: ENDOSCOPY [facility];  Service: Gastroenterology    COLONOSCOPY - ENDO N/A 4/9/2018    Procedure: COLONOSCOPY - ENDO;  Surgeon: Gonzalez Cruz M.D.;  Location: ENDOSCOPY [facility];  Service: Gastroenterology    WIDE EXCISION MELANOMA, LEG, W/POSS.[email or web address]  10/2015    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    CARDIAC CATH, RIGHT HEART  2008    stent    OTHER ORTHOPEDIC SURGERY Left 2008    hand repair    CHOLECYSTECTOMY  1993    TONSILLECTOMY  1945    OTHER CARDIAC SURGERY      r heart cath stents    STENT PLACEMENT      L iliac stent         Medications Prior to Admission   Medication Sig Dispense Refill Last Dose    Calcium-Magnesium-Vitamin D (CALCIUM 1200+D3 PO)  Take  by mouth.   11/9/2023 at am    D-Mannose Powder Take  by mouth.   11/9/2023 at am    fluticasone (FLONASE) 50 MCG/ACT nasal spray Administer 1 Spray into affected nostril(S) every day. 16 g 3 11/10/2023 at 0600    fluorouracil (EFUDEX) 5 % cream Apply 1 Application topically every day.   10/27/2023    albuterol 108 (90 Base) MCG/ACT Aero Soln inhalation aerosol INHALE ONE TO TWO PUFFS BY MOUTH EVERY 4 HOURS AS NEEDED FOR SHORTNESS OF BREATH 18 g 3 11/10/2023 at 0600    Multiple Vitamins-Minerals (ZINC PO) Take  by mouth.   11/9/2023 at am    ipratropium-albuterol (DUONEB) 0.5-2.5 (3) MG/3ML nebulizer solution Take 3 mL by nebulization every four hours as needed for Shortness of Breath. 120 mL 11 11/9/2023 at pm    ALPRAZolam (XANAX) 0.25 MG Tab TAKE ONE TABLET BY MOUTH TWICE A DAY AS NEEDED FOR ANXIETY 60 Tablet 5 11/10/2023 at 0600    lovastatin (MEVACOR) 40 MG tablet TAKE 1 TABLET AT BEDTIME 100 Tablet 3 11/9/2023 at pm    warfarin (COUMADIN) 1 MG Tab TAKE ONE TO TWO TABLETS DAILY OR AS DIRECTED BY ANTICOAGULATION CLINIC 180 Tablet 1 11/9/2023 at 1600    sotalol (BETAPACE) 80 MG Tab TAKE 1/2 TABLET TWICE DAILY Strength: 80 mg 90 Tablet 3 11/10/2023 at 0600    EPINEPHrine (EPIPEN) 0.3 MG/0.3ML Solution Auto-injector solution for injection epinephrine 0.3 mg/0.3 mL injection, auto-injector       Probiotic Product (PROBIOTIC PO) Take  by mouth.   11/9/2023 at am    ascorbic acid (ASCORBIC ACID) 500 MG Tab Take 2 Tablets by mouth every day.   11/9/2023 at am    vitamin D (CHOLECALCIFEROL) 1000 UNIT Tab Take 2 Tablets by mouth every morning. 3000 units   11/9/2023 at 0600     Current Facility-Administered Medications   Medication Dose Route Frequency Provider Last Rate Last Admin    lidocaine (Xylocaine) 1 % injection 0.5 mL  0.5 mL Intradermal Once PRN Krishna LOZANO M.D.        lactated ringers infusion   Intravenous Continuous Krishna LOZANO M.D.             Allergies   Allergen Reactions    Penicillins  Anaphylaxis and Hives    Codeine Swelling    Iodine Swelling    Bee Venom Anaphylaxis    Chantix [Varenicline]      disoriented    Ciprofloxacin      Causes C diff, recurrent and very difficult    Diphtheria,Pertussis,Tetanus     Flagyl [Metronidazole] Rash     C/O flagyl causes rash.     Honey Bee Venom     Latex Hives    Lipitor [Atorvastatin Calcium] Unspecified     Intense Stomach pain    Other Environmental Itching and Swelling    Shellfish Allergy      unknown    Tetanus Antitoxin Swelling     unknown         Family History   Problem Relation Age of Onset    Hypertension Mother     Hyperlipidemia Mother     Cancer Maternal Grandmother         tongue    Cancer Maternal Uncle         x 3, pancreas    Cancer Maternal Grandfather         unknown    Cancer Paternal Grandmother         unknown    Cancer Paternal Grandfather         unknown    Diabetes Maternal Uncle     Heart Disease Maternal Uncle     Hypertension Sister     Hyperlipidemia Sister     Heart Disease Sister     Alcohol/Drug Sister     Thyroid Sister     Psychiatric Illness Neg Hx     Stroke Neg Hx          Social History     Socioeconomic History    Marital status:      Spouse name: Not on file    Number of children: 0    Years of education: Not on file    Highest education level: Not on file   Occupational History    Not on file   Tobacco Use    Smoking status: Every Day     Current packs/day: 0.25     Average packs/day: 0.3 packs/day for 66.6 years (16.7 ttl pk-yrs)     Types: Cigarettes     Start date: 3/20/1957    Smokeless tobacco: Never    Tobacco comments:     2 cigarettes per day   Vaping Use    Vaping Use: Never used   Substance and Sexual Activity    Alcohol use: No     Alcohol/week: 0.0 oz    Drug use: No    Sexual activity: Not Currently     Partners: Male   Other Topics Concern    Not on file   Social History Narrative    .     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     Social Determinants of Health     Financial  "Resource Strain: Not on file   Food Insecurity: Not on file   Transportation Needs: Not on file   Physical Activity: Not on file   Stress: Not on file   Social Connections: Not on file   Intimate Partner Violence: Not on file   Housing Stability: Not on file         Physical Exam  Body mass index is 15.44 kg/m².  /72   Pulse 65   Temp 36.2 °C (97.2 °F) (Temporal)   Resp 18   Ht 1.588 m (5' 2.5\")   Wt 38.9 kg (85 lb 12.1 oz)   SpO2 97%   Vitals:    11/10/23 0857   BP: 124/72   Pulse: 65   Resp: 18   Temp: 36.2 °C (97.2 °F)   TempSrc: Temporal   SpO2: 97%   Weight: 38.9 kg (85 lb 12.1 oz)   Height: 1.588 m (5' 2.5\")     Oxygen Therapy:  Pulse Oximetry: 97 %, O2 (LPM): 0, O2 Delivery Device: None - Room Air    General: No apparent distress        Labs (personally reviewed and notable for):   Elevated proBNP    TTE      CONCLUSIONS  Compared to the prior study on 08/06/2018 - MR has progressed from mild   to severe. AS not fully evaluated this study.  The left ventricle is small in size.  The left ventricular ejection fraction is visually estimated to be 55-  60%.  Mild concentric left ventricular hypertrophy.  Grade II diastolic dysfunction.  Severe mitral regurgitation.  The aortic valve is not well visualized.  Aortic stenosis not fully evaluated this study.  Estimated right ventricular systolic pressure is 55 mmHg.    Impression and Medical Decision Making:  # MR  # a fib on coumadin  # pulmonary hypertension    Recommendations:  # proceed with NIRAJ.       Ryan Tejeda MD  Cardiologist, [facility] [place]   [phone]      "

## 2023-11-13 ENCOUNTER — TELEPHONE (OUTPATIENT)
Dept: CARDIOLOGY | Facility: MEDICAL CENTER | Age: 83
End: 2023-11-13
Payer: MEDICARE

## 2023-11-13 NOTE — TELEPHONE ENCOUNTER
Called and notified patient of NIRAJ results showing sev MR. Scheduled consult with Dr. Lozano for tomorrow.

## 2023-11-14 ENCOUNTER — OFFICE VISIT (OUTPATIENT)
Dept: CARDIOLOGY | Facility: MEDICAL CENTER | Age: 83
DRG: 660 | End: 2023-11-14
Attending: INTERNAL MEDICINE
Payer: MEDICARE

## 2023-11-14 ENCOUNTER — DOCUMENTATION (OUTPATIENT)
Dept: CARDIOLOGY | Facility: MEDICAL CENTER | Age: 83
End: 2023-11-14

## 2023-11-14 VITALS
RESPIRATION RATE: 14 BRPM | OXYGEN SATURATION: 97 % | SYSTOLIC BLOOD PRESSURE: 128 MMHG | WEIGHT: 86 LBS | DIASTOLIC BLOOD PRESSURE: 64 MMHG | HEIGHT: 62 IN | HEART RATE: 63 BPM | BODY MASS INDEX: 15.83 KG/M2

## 2023-11-14 DIAGNOSIS — I25.10 CORONARY ARTERY DISEASE INVOLVING NATIVE CORONARY ARTERY OF NATIVE HEART WITHOUT ANGINA PECTORIS: ICD-10-CM

## 2023-11-14 DIAGNOSIS — I34.0 SEVERE MITRAL REGURGITATION: ICD-10-CM

## 2023-11-14 DIAGNOSIS — R06.02 SHORTNESS OF BREATH: ICD-10-CM

## 2023-11-14 DIAGNOSIS — I48.19 PERSISTENT ATRIAL FIBRILLATION (HCC): ICD-10-CM

## 2023-11-14 PROCEDURE — 3078F DIAST BP <80 MM HG: CPT | Performed by: INTERNAL MEDICINE

## 2023-11-14 PROCEDURE — 3074F SYST BP LT 130 MM HG: CPT | Performed by: INTERNAL MEDICINE

## 2023-11-14 PROCEDURE — 99215 OFFICE O/P EST HI 40 MIN: CPT | Mod: 25 | Performed by: INTERNAL MEDICINE

## 2023-11-14 PROCEDURE — 99213 OFFICE O/P EST LOW 20 MIN: CPT | Performed by: INTERNAL MEDICINE

## 2023-11-14 PROCEDURE — 93005 ELECTROCARDIOGRAM TRACING: CPT | Performed by: INTERNAL MEDICINE

## 2023-11-14 RX ORDER — FUROSEMIDE 20 MG/1
20 TABLET ORAL DAILY
Qty: 30 TABLET | Refills: 11 | Status: SHIPPED | OUTPATIENT
Start: 2023-11-14 | End: 2023-12-18

## 2023-11-14 ASSESSMENT — PATIENT HEALTH QUESTIONNAIRE - PHQ9: CLINICAL INTERPRETATION OF PHQ2 SCORE: 0

## 2023-11-14 ASSESSMENT — FIBROSIS 4 INDEX: FIB4 SCORE: 2.93

## 2023-11-14 NOTE — PROGRESS NOTES
Valve Program Consultation: 11/14/2023 for tentative Mitral DORITA 12/5/2023    Mental Status Assessment:  Appearance: normal  Behavior: normal  Mood/Affect: normal, pleasant  Thought process/content: normal  Cognition: normal  Functional ability: normal  Dental Concerns: natural teeth, patient denies s/s of active oral infection  Further mental assessment needed: no    Post-op Plan of Care:  Support systems: patient presents to visit alone.  Nav, relatives Alina and Shaina  Patient understands discharge plan: yes  DME: nebulizer  Home health warranted prior to procedure: no  Social concerns for discharge: none  PCP alerted of social concerns: n/a  POLST: none  Advance directive: none  Post-op goal: improve fatigue, SOB, orthopnea  Medication instructions: hold warfarin x5 days (message sent to anticoag clinic 11/14), hold furosemide AM of procedure    Concerns prior to procedure: current smoker (1-2 cigarettes/day, non-O2 dependent COPD). Isabel PAREDES started patient on 20mg furosemide QD d/t elevated BNP and orthopnea/SOB.     Met with patient during New Mitral DORITA consult.     All patient's questions and concerns were addressed during this visit. They understood pre-operative and post-operative plan of care.    Reviewed patient Mitral DORITA education packet explaining that information is provided regarding preparation for Mitral DORITA the night prior, what to expect during the hospital stay, average LOS, and what to look out for post Mitral DORITA discharge. Explained that patient will require SBE prophylactic antibiotic prior to any dental treatment post Mitral DORITA. Advised patient not to receive any new vaccinations within 1 week pre- and 1 week post-procedure.     Explained that patient should not eat or drink anything past midnight the day of the procedure. Encouraged patient to wear something clean and comfortable, easy to get on/off to check in Tuesday morning, time TBD. Patient may have friends and  family in the pre-operational area until patient is taken to the operating room, at which time family and friends will be asked to wait on floor 1 [facility] surgical waiting area. On completion of Mitral DORITA, heart team will update family. Once update is given, there is some time before family/friends may visit patient in their assigned room. Length of stay on average is one night, however patient may stay longer depending on specific needs at that time. Patient given printed instructions sheet with all above stated instructions. Patient states understanding of all material and education presented today and has no further questions at this time. Encouraged patient again, to contact me with any questions or concerns during this work-up process. Patient states understanding.

## 2023-11-14 NOTE — PROGRESS NOTES
Cardiology Follow-up Consultation Note        CC: Follow-up coronary artery disease, atrial fibrillation, mitral regurgitation    Patient ID/HPI:   83-year-old male patient with history of coronary artery disease, PCI of LAD, persistent atrial fibrillation maintained sinus rhythm on sotalol therapy, peripheral vascular disease, recently discovered to have severe mitral regurgitation.  She complains of significant dyspnea on exertion, feeling exhausted all the time.  Denies chest pain, lightheadedness, syncope.      Past Medical History:   Diagnosis Date    Arthritis     BL hands    Breath shortness     CAD (coronary artery disease)     Cancer ([facility]) 1982    melanoma    Cataract     IOL implants    COPD (chronic obstructive pulmonary disease) ([facility])     Croup 3 years old    Diverticulosis     Dyslipidemia     GERD (gastroesophageal reflux disease)     Hiatal hernia     High cholesterol     Hypertension     Infectious disease 2018    C Diff    Measles     5 years old    Paroxysmal A-fib ([facility]) 01/20/2016    Symptomatic, on a rhythm control strategy with sotalol 40 mg by mouth twice a day.     Paroxysmal atrial fibrillation ([facility])     Prediabetes     PVD (peripheral vascular disease) ([facility])          Current Outpatient Medications   Medication Sig Dispense Refill    Calcium-Magnesium-Vitamin D (CALCIUM 1200+D3 PO) Take  by mouth.      D-Mannose Powder Take  by mouth.      fluticasone (FLONASE) 50 MCG/ACT nasal spray Administer 1 Spray into affected nostril(S) every day. 16 g 3    fluorouracil (EFUDEX) 5 % cream Apply 1 Application topically every day.      albuterol 108 (90 Base) MCG/ACT Aero Soln inhalation aerosol INHALE ONE TO TWO PUFFS BY MOUTH EVERY 4 HOURS AS NEEDED FOR SHORTNESS OF BREATH 18 g 3    Multiple Vitamins-Minerals (ZINC PO) Take  by mouth.      ipratropium-albuterol (DUONEB) 0.5-2.5 (3) MG/3ML nebulizer solution Take 3 mL by nebulization every four hours as needed for Shortness of Breath. 120 mL 11     "ALPRAZolam (XANAX) 0.25 MG Tab TAKE ONE TABLET BY MOUTH TWICE A DAY AS NEEDED FOR ANXIETY 60 Tablet 5    lovastatin (MEVACOR) 40 MG tablet TAKE 1 TABLET AT BEDTIME 100 Tablet 3    warfarin (COUMADIN) 1 MG Tab TAKE ONE TO TWO TABLETS DAILY OR AS DIRECTED BY ANTICOAGULATION CLINIC (Patient taking differently: 1 Tablet every day. TAKE ONE EVERY NIGHT EXCEPT TUESDAY TAKES A HALF OF PILL) 180 Tablet 1    sotalol (BETAPACE) 80 MG Tab TAKE 1/2 TABLET TWICE DAILY Strength: 80 mg 90 Tablet 3    EPINEPHrine (EPIPEN) 0.3 MG/0.3ML Solution Auto-injector solution for injection epinephrine 0.3 mg/0.3 mL injection, auto-injector      Probiotic Product (PROBIOTIC PO) Take  by mouth.      ascorbic acid (ASCORBIC ACID) 500 MG Tab Take 2 Tablets by mouth every day.      vitamin D (CHOLECALCIFEROL) 1000 UNIT Tab Take 2 Tablets by mouth every morning. 3000 units       No current facility-administered medications for this visit.         Physical Exam:  Ambulatory Vitals  /64 (BP Location: Left arm, Patient Position: Sitting, BP Cuff Size: Adult)   Pulse 63   Resp 14   Ht 1.575 m (5' 2\")   Wt 39 kg (86 lb)   SpO2 97%    Oxygen Therapy:  Pulse Oximetry: 97 %  BP Readings from Last 4 Encounters:   11/14/23 128/64   11/10/23 134/62   10/18/23 126/80   10/05/23 114/54       Weight/BMI: Body mass index is 15.73 kg/m².  Wt Readings from Last 4 Encounters:   11/14/23 39 kg (86 lb)   11/10/23 38.9 kg (85 lb 12.1 oz)   10/18/23 37.2 kg (82 lb)   10/05/23 38.1 kg (84 lb)       General: Well appearing and in no apparent distress  Neck: JVP absent, carotid bruits absent  Lungs: respiratory sounds  normal, additional breath sounds absent  Heart: Regular rhythm,   No palpable thrills on palpation, murmurs present no rubs,   Lower extremity edema absent.     EKG today shows sinus rhythm,     Echocardiogram from 2018 reviewed, independently interpreted shows normal LV, RV function, mild valvular regurgitation    Echocardiogram 9/28/2023 " reviewed, independently interpreted  CONCLUSIONS  Compared to the prior study on 08/06/2018 - MR has progressed from mild   to severe. AS not fully evaluated this study.  The left ventricle is small in size.  The left ventricular ejection fraction is visually estimated to be 55-  60%.  Mild concentric left ventricular hypertrophy.  Grade II diastolic dysfunction.  Severe mitral regurgitation.  The aortic valve is not well visualized.  Aortic stenosis not fully evaluated this study.  Estimated right ventricular systolic pressure is 55 mmHg.    NIRAJ 11/10/2023 reviewed, independently interpreted  CONCLUSIONS  There is severe primary mitral regurgiation secondary to posterior   leaflet prolapse (P1/P2).  EROA 0.33cm2. MR volume 66mL.  There is mild calcification of the mitral valve leaflets and mild   annular mitral calcification.  Normal left ventricular systolic function.  The left ventricular ejection fraction is visually estimated to be 60%.  Grossly normal right ventricular size and systolic function.  No thrombus detected in the left atrial appendage.  There is moderate aortic stenosis by planimetry, ARIC 1.2cm2.     Compared to the echocardiogram performed on 9/28/2023: No significant   change.    Medical Decision Making:  Problem List Items Addressed This Visit       Coronary artery disease involving native coronary artery of native heart without angina pectoris    Severe mitral regurgitation    Persistent atrial fibrillation (HCC)    Relevant Orders    EKG - Clinic Performed    EKG       She has degenerative mitral regurgitation due to P2 segment prolapse with severe mitral regurgitation, she has NYHA class II stage C symptoms with fatigue, dyspnea on exertion.  Discussed treatment options including medical therapy, surgical, percutaneous valve repair.  Patient would like to proceed with percutaneous valve repair using a MitraClip.  She has severe posterior annular calcification, technically will be slightly  challenging to place a MitraClip, we will discuss in multidisciplinary valve conference, if feasible we will proceed with MitraClip.  She also has moderate aortic stenosis which we will continue to follow.  Risk benefits of mitral valve clip procedure discussed in detail.  Continue current medical therapy with sotalol, warfarin, lovastatin.  She is interested in getting a Watchman procedure down the road.          Ethan ENRIQUEZ  Interventional cardiologist  [facility]  [address] [address]  Phone: [phone]  Fax: [phone]

## 2023-11-14 NOTE — PROGRESS NOTES
Valve Program Functional Assessment:     KCCQ12   1a) Showering/bathin  1b) Walking 1 block on ground: 3  1c) Hurrying or joggin  2) Swellin  3) Fatigue: 2  4) Shortness of breath: 4  5) Sleep sitting up: 1  6) Limited enjoyment of life: 3  7) Spend the rest of your life with HF: 1  8a) Hobbies, recreational activities:3  8b) Working or doing household chores:2  8c) Visiting family or friends: 5     Strength   1) _12_____ kg  2) _12_____ kg  3) _10_____ kg  AVG:__11.3____    NEFF ADLs  Patient independently preforms...   - Bathing: Yes   - Dressing: Yes   - Toileting: Yes   - Transferring: Yes   - Continence: Yes   - Feeding: Yes   Total Score: _6_/6    Living Situation  Patient lives: with spouse    Mobility Aids   Patient uses: none      FRAILTY SCORE: _1_/ 3

## 2023-11-15 ENCOUNTER — APPOINTMENT (OUTPATIENT)
Dept: RADIOLOGY | Facility: MEDICAL CENTER | Age: 83
DRG: 660 | End: 2023-11-15
Attending: STUDENT IN AN ORGANIZED HEALTH CARE EDUCATION/TRAINING PROGRAM
Payer: MEDICARE

## 2023-11-15 ENCOUNTER — ANTICOAGULATION MONITORING (OUTPATIENT)
Dept: VASCULAR LAB | Facility: MEDICAL CENTER | Age: 83
End: 2023-11-15
Payer: MEDICARE

## 2023-11-15 ENCOUNTER — HOSPITAL ENCOUNTER (INPATIENT)
Facility: MEDICAL CENTER | Age: 83
LOS: 2 days | DRG: 660 | End: 2023-11-17
Attending: STUDENT IN AN ORGANIZED HEALTH CARE EDUCATION/TRAINING PROGRAM | Admitting: STUDENT IN AN ORGANIZED HEALTH CARE EDUCATION/TRAINING PROGRAM
Payer: MEDICARE

## 2023-11-15 ENCOUNTER — HOSPITAL ENCOUNTER (OUTPATIENT)
Dept: LAB | Facility: MEDICAL CENTER | Age: 83
DRG: 660 | End: 2023-11-15
Attending: NURSE PRACTITIONER
Payer: MEDICARE

## 2023-11-15 ENCOUNTER — APPOINTMENT (OUTPATIENT)
Dept: CARDIOLOGY | Facility: MEDICAL CENTER | Age: 83
End: 2023-11-15
Payer: MEDICARE

## 2023-11-15 DIAGNOSIS — N20.1 URETEROLITHIASIS: Primary | ICD-10-CM

## 2023-11-15 DIAGNOSIS — N39.0 ACUTE UTI: ICD-10-CM

## 2023-11-15 DIAGNOSIS — Z79.01 CHRONIC ANTICOAGULATION: ICD-10-CM

## 2023-11-15 LAB
ALBUMIN SERPL BCP-MCNC: 3.4 G/DL (ref 3.2–4.9)
ALBUMIN/GLOB SERPL: 1.3 G/DL
ALP SERPL-CCNC: 67 U/L (ref 30–99)
ALT SERPL-CCNC: 22 U/L (ref 2–50)
ANION GAP SERPL CALC-SCNC: 11 MMOL/L (ref 7–16)
APPEARANCE UR: ABNORMAL
AST SERPL-CCNC: 38 U/L (ref 12–45)
BACTERIA #/AREA URNS HPF: NEGATIVE /HPF
BASOPHILS # BLD AUTO: 0.8 % (ref 0–1.8)
BASOPHILS # BLD: 0.08 K/UL (ref 0–0.12)
BILIRUB SERPL-MCNC: 0.3 MG/DL (ref 0.1–1.5)
BILIRUB UR QL STRIP.AUTO: NEGATIVE
BUN SERPL-MCNC: 16 MG/DL (ref 8–22)
CALCIUM ALBUM COR SERPL-MCNC: 10.1 MG/DL (ref 8.5–10.5)
CALCIUM SERPL-MCNC: 9.6 MG/DL (ref 8.5–10.5)
CHLORIDE SERPL-SCNC: 106 MMOL/L (ref 96–112)
CO2 SERPL-SCNC: 25 MMOL/L (ref 20–33)
COLOR UR: ABNORMAL
CREAT SERPL-MCNC: 1 MG/DL (ref 0.5–1.4)
EKG IMPRESSION: NORMAL
EOSINOPHIL # BLD AUTO: 0.2 K/UL (ref 0–0.51)
EOSINOPHIL NFR BLD: 2.1 % (ref 0–6.9)
EPI CELLS #/AREA URNS HPF: NEGATIVE /HPF
ERYTHROCYTE [DISTWIDTH] IN BLOOD BY AUTOMATED COUNT: 47.8 FL (ref 35.9–50)
GFR SERPLBLD CREATININE-BSD FMLA CKD-EPI: 55 ML/MIN/1.73 M 2
GLOBULIN SER CALC-MCNC: 2.7 G/DL (ref 1.9–3.5)
GLUCOSE SERPL-MCNC: 107 MG/DL (ref 65–99)
GLUCOSE UR STRIP.AUTO-MCNC: NEGATIVE MG/DL
HCT VFR BLD AUTO: 38.1 % (ref 37–47)
HGB BLD-MCNC: 12.1 G/DL (ref 12–16)
HYALINE CASTS #/AREA URNS LPF: ABNORMAL /LPF
IMM GRANULOCYTES # BLD AUTO: 0.04 K/UL (ref 0–0.11)
IMM GRANULOCYTES NFR BLD AUTO: 0.4 % (ref 0–0.9)
INR PPP: 2.66 (ref 0.87–1.13)
KETONES UR STRIP.AUTO-MCNC: NEGATIVE MG/DL
LEUKOCYTE ESTERASE UR QL STRIP.AUTO: ABNORMAL
LIPASE SERPL-CCNC: 38 U/L (ref 11–82)
LYMPHOCYTES # BLD AUTO: 1.29 K/UL (ref 1–4.8)
LYMPHOCYTES NFR BLD: 13.3 % (ref 22–41)
MCH RBC QN AUTO: 28.6 PG (ref 27–33)
MCHC RBC AUTO-ENTMCNC: 31.8 G/DL (ref 32.2–35.5)
MCV RBC AUTO: 90.1 FL (ref 81.4–97.8)
MICRO URNS: ABNORMAL
MONOCYTES # BLD AUTO: 0.82 K/UL (ref 0–0.85)
MONOCYTES NFR BLD AUTO: 8.5 % (ref 0–13.4)
NEUTROPHILS # BLD AUTO: 7.24 K/UL (ref 1.82–7.42)
NEUTROPHILS NFR BLD: 74.9 % (ref 44–72)
NITRITE UR QL STRIP.AUTO: NEGATIVE
NRBC # BLD AUTO: 0 K/UL
NRBC BLD-RTO: 0 /100 WBC (ref 0–0.2)
PH UR STRIP.AUTO: 7 [PH] (ref 5–8)
PLATELET # BLD AUTO: 157 K/UL (ref 164–446)
PMV BLD AUTO: 10.9 FL (ref 9–12.9)
POTASSIUM SERPL-SCNC: 4.2 MMOL/L (ref 3.6–5.5)
PROT SERPL-MCNC: 6.1 G/DL (ref 6–8.2)
PROT UR QL STRIP: 30 MG/DL
PROTHROMBIN TIME: 28.7 SEC (ref 12–14.6)
RBC # BLD AUTO: 4.23 M/UL (ref 4.2–5.4)
RBC # URNS HPF: >150 /HPF
RBC UR QL AUTO: ABNORMAL
SODIUM SERPL-SCNC: 142 MMOL/L (ref 135–145)
SP GR UR STRIP.AUTO: 1.01
TROPONIN T SERPL-MCNC: 20 NG/L (ref 6–19)
UROBILINOGEN UR STRIP.AUTO-MCNC: 1 MG/DL
WBC # BLD AUTO: 9.7 K/UL (ref 4.8–10.8)
WBC #/AREA URNS HPF: ABNORMAL /HPF

## 2023-11-15 PROCEDURE — 93005 ELECTROCARDIOGRAM TRACING: CPT | Performed by: STUDENT IN AN ORGANIZED HEALTH CARE EDUCATION/TRAINING PROGRAM

## 2023-11-15 PROCEDURE — 99222 1ST HOSP IP/OBS MODERATE 55: CPT | Mod: AI,GC | Performed by: STUDENT IN AN ORGANIZED HEALTH CARE EDUCATION/TRAINING PROGRAM

## 2023-11-15 PROCEDURE — 80053 COMPREHEN METABOLIC PANEL: CPT

## 2023-11-15 PROCEDURE — 87086 URINE CULTURE/COLONY COUNT: CPT

## 2023-11-15 PROCEDURE — 84484 ASSAY OF TROPONIN QUANT: CPT

## 2023-11-15 PROCEDURE — 36415 COLL VENOUS BLD VENIPUNCTURE: CPT

## 2023-11-15 PROCEDURE — 83690 ASSAY OF LIPASE: CPT

## 2023-11-15 PROCEDURE — 85025 COMPLETE CBC W/AUTO DIFF WBC: CPT

## 2023-11-15 PROCEDURE — 81001 URINALYSIS AUTO W/SCOPE: CPT

## 2023-11-15 PROCEDURE — 70450 CT HEAD/BRAIN W/O DYE: CPT

## 2023-11-15 PROCEDURE — 85610 PROTHROMBIN TIME: CPT

## 2023-11-15 PROCEDURE — 99285 EMERGENCY DEPT VISIT HI MDM: CPT

## 2023-11-15 PROCEDURE — 770020 HCHG ROOM/CARE - TELE (206)

## 2023-11-15 PROCEDURE — 74176 CT ABD & PELVIS W/O CONTRAST: CPT

## 2023-11-15 PROCEDURE — 700111 HCHG RX REV CODE 636 W/ 250 OVERRIDE (IP): Mod: JW | Performed by: STUDENT IN AN ORGANIZED HEALTH CARE EDUCATION/TRAINING PROGRAM

## 2023-11-15 PROCEDURE — 96374 THER/PROPH/DIAG INJ IV PUSH: CPT

## 2023-11-15 RX ORDER — ALBUTEROL SULFATE 90 UG/1
2 AEROSOL, METERED RESPIRATORY (INHALATION) EVERY 4 HOURS PRN
Status: DISCONTINUED | OUTPATIENT
Start: 2023-11-15 | End: 2023-11-17 | Stop reason: HOSPADM

## 2023-11-15 RX ORDER — ONDANSETRON 4 MG/1
4 TABLET, ORALLY DISINTEGRATING ORAL EVERY 4 HOURS PRN
Status: DISCONTINUED | OUTPATIENT
Start: 2023-11-15 | End: 2023-11-17 | Stop reason: HOSPADM

## 2023-11-15 RX ORDER — WARFARIN SODIUM 1 MG/1
1 TABLET ORAL DAILY
Status: DISCONTINUED | OUTPATIENT
Start: 2023-11-16 | End: 2023-11-16

## 2023-11-15 RX ORDER — ONDANSETRON 2 MG/ML
4 INJECTION INTRAMUSCULAR; INTRAVENOUS EVERY 4 HOURS PRN
Status: DISCONTINUED | OUTPATIENT
Start: 2023-11-15 | End: 2023-11-17 | Stop reason: HOSPADM

## 2023-11-15 RX ORDER — AMOXICILLIN 250 MG
2 CAPSULE ORAL 2 TIMES DAILY
Status: DISCONTINUED | OUTPATIENT
Start: 2023-11-15 | End: 2023-11-17 | Stop reason: HOSPADM

## 2023-11-15 RX ORDER — LABETALOL HYDROCHLORIDE 5 MG/ML
10 INJECTION, SOLUTION INTRAVENOUS EVERY 4 HOURS PRN
Status: DISCONTINUED | OUTPATIENT
Start: 2023-11-15 | End: 2023-11-17 | Stop reason: HOSPADM

## 2023-11-15 RX ORDER — POLYETHYLENE GLYCOL 3350 17 G/17G
1 POWDER, FOR SOLUTION ORAL
Status: DISCONTINUED | OUTPATIENT
Start: 2023-11-15 | End: 2023-11-17 | Stop reason: HOSPADM

## 2023-11-15 RX ORDER — CEFTRIAXONE 1 G/1
1000 INJECTION, POWDER, FOR SOLUTION INTRAMUSCULAR; INTRAVENOUS ONCE
Status: DISCONTINUED | OUTPATIENT
Start: 2023-11-15 | End: 2023-11-16

## 2023-11-15 RX ORDER — KETOROLAC TROMETHAMINE 30 MG/ML
15 INJECTION, SOLUTION INTRAMUSCULAR; INTRAVENOUS ONCE
Status: COMPLETED | OUTPATIENT
Start: 2023-11-15 | End: 2023-11-15

## 2023-11-15 RX ORDER — ALPRAZOLAM 0.25 MG/1
0.25 TABLET ORAL 2 TIMES DAILY PRN
Status: DISCONTINUED | OUTPATIENT
Start: 2023-11-15 | End: 2023-11-17 | Stop reason: HOSPADM

## 2023-11-15 RX ORDER — ACETAMINOPHEN 325 MG/1
650 TABLET ORAL EVERY 6 HOURS PRN
Status: DISCONTINUED | OUTPATIENT
Start: 2023-11-15 | End: 2023-11-17 | Stop reason: HOSPADM

## 2023-11-15 RX ORDER — FENUGREEK SEED/BL.THISTLE/ANIS 340 MG
1 CAPSULE ORAL DAILY
Status: DISCONTINUED | OUTPATIENT
Start: 2023-11-16 | End: 2023-11-16

## 2023-11-15 RX ORDER — IPRATROPIUM BROMIDE AND ALBUTEROL SULFATE 2.5; .5 MG/3ML; MG/3ML
3 SOLUTION RESPIRATORY (INHALATION)
Status: DISCONTINUED | OUTPATIENT
Start: 2023-11-15 | End: 2023-11-17 | Stop reason: HOSPADM

## 2023-11-15 RX ORDER — BISACODYL 10 MG
10 SUPPOSITORY, RECTAL RECTAL
Status: DISCONTINUED | OUTPATIENT
Start: 2023-11-15 | End: 2023-11-17 | Stop reason: HOSPADM

## 2023-11-15 RX ORDER — NICOTINE 21 MG/24HR
14 PATCH, TRANSDERMAL 24 HOURS TRANSDERMAL
Status: DISCONTINUED | OUTPATIENT
Start: 2023-11-16 | End: 2023-11-17 | Stop reason: HOSPADM

## 2023-11-15 RX ORDER — KETOROLAC TROMETHAMINE 30 MG/ML
15 INJECTION, SOLUTION INTRAMUSCULAR; INTRAVENOUS EVERY 6 HOURS PRN
Status: DISCONTINUED | OUTPATIENT
Start: 2023-11-15 | End: 2023-11-17 | Stop reason: HOSPADM

## 2023-11-15 RX ADMIN — KETOROLAC TROMETHAMINE 15 MG: 30 INJECTION, SOLUTION INTRAMUSCULAR; INTRAVENOUS at 21:19

## 2023-11-15 ASSESSMENT — FIBROSIS 4 INDEX: FIB4 SCORE: 2.93

## 2023-11-15 ASSESSMENT — PAIN DESCRIPTION - PAIN TYPE: TYPE: ACUTE PAIN

## 2023-11-16 ENCOUNTER — TELEPHONE (OUTPATIENT)
Dept: OPHTHALMOLOGY | Facility: MEDICAL CENTER | Age: 83
End: 2023-11-16
Payer: MEDICARE

## 2023-11-16 ENCOUNTER — ANESTHESIA (OUTPATIENT)
Dept: SURGERY | Facility: MEDICAL CENTER | Age: 83
DRG: 660 | End: 2023-11-16
Payer: MEDICARE

## 2023-11-16 ENCOUNTER — APPOINTMENT (OUTPATIENT)
Dept: RADIOLOGY | Facility: MEDICAL CENTER | Age: 83
DRG: 660 | End: 2023-11-16
Attending: UROLOGY
Payer: MEDICARE

## 2023-11-16 ENCOUNTER — ANESTHESIA EVENT (OUTPATIENT)
Dept: SURGERY | Facility: MEDICAL CENTER | Age: 83
DRG: 660 | End: 2023-11-16
Payer: MEDICARE
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
## 2023-11-16 LAB
ALBUMIN SERPL BCP-MCNC: 3.1 G/DL (ref 3.2–4.9)
ALBUMIN/GLOB SERPL: 1.3 G/DL
ALP SERPL-CCNC: 62 U/L (ref 30–99)
ALT SERPL-CCNC: 18 U/L (ref 2–50)
ANION GAP SERPL CALC-SCNC: 4 MMOL/L (ref 7–16)
ANISOCYTOSIS BLD QL SMEAR: ABNORMAL
AST SERPL-CCNC: 26 U/L (ref 12–45)
BASOPHILS # BLD AUTO: 1.1 % (ref 0–1.8)
BASOPHILS # BLD: 0.08 K/UL (ref 0–0.12)
BILIRUB SERPL-MCNC: 0.4 MG/DL (ref 0.1–1.5)
BUN SERPL-MCNC: 23 MG/DL (ref 8–22)
BURR CELLS BLD QL SMEAR: NORMAL
CALCIUM ALBUM COR SERPL-MCNC: 10.1 MG/DL (ref 8.5–10.5)
CALCIUM SERPL-MCNC: 9.4 MG/DL (ref 8.5–10.5)
CHLORIDE SERPL-SCNC: 106 MMOL/L (ref 96–112)
CO2 SERPL-SCNC: 30 MMOL/L (ref 20–33)
COMMENT 1642: NORMAL
CREAT SERPL-MCNC: 1.16 MG/DL (ref 0.5–1.4)
EKG IMPRESSION: NORMAL
EOSINOPHIL # BLD AUTO: 0.22 K/UL (ref 0–0.51)
EOSINOPHIL NFR BLD: 3 % (ref 0–6.9)
ERYTHROCYTE [DISTWIDTH] IN BLOOD BY AUTOMATED COUNT: 50.4 FL (ref 35.9–50)
GFR SERPLBLD CREATININE-BSD FMLA CKD-EPI: 46 ML/MIN/1.73 M 2
GLOBULIN SER CALC-MCNC: 2.4 G/DL (ref 1.9–3.5)
GLUCOSE SERPL-MCNC: 96 MG/DL (ref 65–99)
HCT VFR BLD AUTO: 38.9 % (ref 37–47)
HGB BLD-MCNC: 11.6 G/DL (ref 12–16)
IMM GRANULOCYTES # BLD AUTO: 0.02 K/UL (ref 0–0.11)
IMM GRANULOCYTES NFR BLD AUTO: 0.3 % (ref 0–0.9)
LYMPHOCYTES # BLD AUTO: 1.95 K/UL (ref 1–4.8)
LYMPHOCYTES NFR BLD: 26.2 % (ref 22–41)
MACROCYTES BLD QL SMEAR: ABNORMAL
MCH RBC QN AUTO: 28.3 PG (ref 27–33)
MCHC RBC AUTO-ENTMCNC: 29.8 G/DL (ref 32.2–35.5)
MCV RBC AUTO: 94.9 FL (ref 81.4–97.8)
MONOCYTES # BLD AUTO: 0.87 K/UL (ref 0–0.85)
MONOCYTES NFR BLD AUTO: 11.7 % (ref 0–13.4)
MORPHOLOGY BLD-IMP: NORMAL
NEUTROPHILS # BLD AUTO: 4.29 K/UL (ref 1.82–7.42)
NEUTROPHILS NFR BLD: 57.7 % (ref 44–72)
NRBC # BLD AUTO: 0 K/UL
NRBC BLD-RTO: 0 /100 WBC (ref 0–0.2)
OVALOCYTES BLD QL SMEAR: NORMAL
PLATELET # BLD AUTO: 138 K/UL (ref 164–446)
PLATELET BLD QL SMEAR: NORMAL
PMV BLD AUTO: 10.9 FL (ref 9–12.9)
POIKILOCYTOSIS BLD QL SMEAR: NORMAL
POTASSIUM SERPL-SCNC: 4.3 MMOL/L (ref 3.6–5.5)
PROT SERPL-MCNC: 5.5 G/DL (ref 6–8.2)
RBC # BLD AUTO: 4.1 M/UL (ref 4.2–5.4)
RBC BLD AUTO: PRESENT
SODIUM SERPL-SCNC: 140 MMOL/L (ref 135–145)
TROPONIN T SERPL-MCNC: 20 NG/L (ref 6–19)
WBC # BLD AUTO: 7.4 K/UL (ref 4.8–10.8)

## 2023-11-16 PROCEDURE — 110371 HCHG SHELL REV 272: Performed by: UROLOGY

## 2023-11-16 PROCEDURE — 700111 HCHG RX REV CODE 636 W/ 250 OVERRIDE (IP): Performed by: ANESTHESIOLOGY

## 2023-11-16 PROCEDURE — 700101 HCHG RX REV CODE 250: Performed by: ANESTHESIOLOGY

## 2023-11-16 PROCEDURE — 700102 HCHG RX REV CODE 250 W/ 637 OVERRIDE(OP): Performed by: STUDENT IN AN ORGANIZED HEALTH CARE EDUCATION/TRAINING PROGRAM

## 2023-11-16 PROCEDURE — C2617 STENT, NON-COR, TEM W/O DEL: HCPCS | Performed by: UROLOGY

## 2023-11-16 PROCEDURE — 160039 HCHG SURGERY MINUTES - EA ADDL 1 MIN LEVEL 3: Performed by: UROLOGY

## 2023-11-16 PROCEDURE — 160009 HCHG ANES TIME/MIN: Performed by: UROLOGY

## 2023-11-16 PROCEDURE — 770001 HCHG ROOM/CARE - MED/SURG/GYN PRIV*

## 2023-11-16 PROCEDURE — 700105 HCHG RX REV CODE 258

## 2023-11-16 PROCEDURE — 93010 ELECTROCARDIOGRAM REPORT: CPT | Performed by: INTERNAL MEDICINE

## 2023-11-16 PROCEDURE — 85025 COMPLETE CBC W/AUTO DIFF WBC: CPT

## 2023-11-16 PROCEDURE — 0TC68ZZ EXTIRPATION OF MATTER FROM RIGHT URETER, VIA NATURAL OR ARTIFICIAL OPENING ENDOSCOPIC: ICD-10-PCS | Performed by: UROLOGY

## 2023-11-16 PROCEDURE — 160002 HCHG RECOVERY MINUTES (STAT): Performed by: UROLOGY

## 2023-11-16 PROCEDURE — A9270 NON-COVERED ITEM OR SERVICE: HCPCS | Performed by: STUDENT IN AN ORGANIZED HEALTH CARE EDUCATION/TRAINING PROGRAM

## 2023-11-16 PROCEDURE — 700105 HCHG RX REV CODE 258: Performed by: ANESTHESIOLOGY

## 2023-11-16 PROCEDURE — C1769 GUIDE WIRE: HCPCS | Performed by: UROLOGY

## 2023-11-16 PROCEDURE — 160028 HCHG SURGERY MINUTES - 1ST 30 MINS LEVEL 3: Performed by: UROLOGY

## 2023-11-16 PROCEDURE — 0T738DZ DILATION OF RIGHT KIDNEY PELVIS WITH INTRALUMINAL DEVICE, VIA NATURAL OR ARTIFICIAL OPENING ENDOSCOPIC: ICD-10-PCS | Performed by: UROLOGY

## 2023-11-16 PROCEDURE — 700105 HCHG RX REV CODE 258: Performed by: HEALTH CARE PROVIDER

## 2023-11-16 PROCEDURE — 80053 COMPREHEN METABOLIC PANEL: CPT

## 2023-11-16 PROCEDURE — 36415 COLL VENOUS BLD VENIPUNCTURE: CPT

## 2023-11-16 PROCEDURE — 160035 HCHG PACU - 1ST 60 MINS PHASE I: Performed by: UROLOGY

## 2023-11-16 PROCEDURE — 160048 HCHG OR STATISTICAL LEVEL 1-5: Performed by: UROLOGY

## 2023-11-16 DEVICE — STENT UROLOGICAL POLARIS 6X22  ULTRA: Type: IMPLANTABLE DEVICE | Status: FUNCTIONAL

## 2023-11-16 RX ORDER — TAMSULOSIN HYDROCHLORIDE 0.4 MG/1
0.4 CAPSULE ORAL
Status: DISCONTINUED | OUTPATIENT
Start: 2023-11-16 | End: 2023-11-17 | Stop reason: HOSPADM

## 2023-11-16 RX ORDER — METOPROLOL TARTRATE 1 MG/ML
1 INJECTION, SOLUTION INTRAVENOUS
Status: DISCONTINUED | OUTPATIENT
Start: 2023-11-16 | End: 2023-11-16 | Stop reason: HOSPADM

## 2023-11-16 RX ORDER — CEFAZOLIN SODIUM 1 G/3ML
INJECTION, POWDER, FOR SOLUTION INTRAMUSCULAR; INTRAVENOUS PRN
Status: DISCONTINUED | OUTPATIENT
Start: 2023-11-16 | End: 2023-11-16 | Stop reason: SURG

## 2023-11-16 RX ORDER — SODIUM CHLORIDE, SODIUM LACTATE, POTASSIUM CHLORIDE, CALCIUM CHLORIDE 600; 310; 30; 20 MG/100ML; MG/100ML; MG/100ML; MG/100ML
INJECTION, SOLUTION INTRAVENOUS CONTINUOUS
Status: DISCONTINUED | OUTPATIENT
Start: 2023-11-16 | End: 2023-11-16 | Stop reason: HOSPADM

## 2023-11-16 RX ORDER — SOTALOL HYDROCHLORIDE 80 MG/1
40 TABLET ORAL 2 TIMES DAILY
Status: DISCONTINUED | OUTPATIENT
Start: 2023-11-16 | End: 2023-11-17 | Stop reason: HOSPADM

## 2023-11-16 RX ORDER — DOCUSATE SODIUM 100 MG/1
100 CAPSULE, LIQUID FILLED ORAL 2 TIMES DAILY
Qty: 30 CAPSULE | Refills: 0 | COMMUNITY
Start: 2023-11-16 | End: 2023-11-16 | Stop reason: SDUPTHER

## 2023-11-16 RX ORDER — KETOROLAC TROMETHAMINE 30 MG/ML
INJECTION, SOLUTION INTRAMUSCULAR; INTRAVENOUS PRN
Status: DISCONTINUED | OUTPATIENT
Start: 2023-11-16 | End: 2023-11-16 | Stop reason: SURG

## 2023-11-16 RX ORDER — ONDANSETRON 2 MG/ML
4 INJECTION INTRAMUSCULAR; INTRAVENOUS
Status: DISCONTINUED | OUTPATIENT
Start: 2023-11-16 | End: 2023-11-16 | Stop reason: HOSPADM

## 2023-11-16 RX ORDER — POLYETHYLENE GLYCOL 3350 17 G/17G
17 POWDER, FOR SOLUTION ORAL DAILY
Qty: 14 EACH | Refills: 0 | COMMUNITY
Start: 2023-11-16 | End: 2023-11-16 | Stop reason: SDUPTHER

## 2023-11-16 RX ORDER — HYDROMORPHONE HYDROCHLORIDE 1 MG/ML
0.1 INJECTION, SOLUTION INTRAMUSCULAR; INTRAVENOUS; SUBCUTANEOUS
Status: DISCONTINUED | OUTPATIENT
Start: 2023-11-16 | End: 2023-11-16 | Stop reason: HOSPADM

## 2023-11-16 RX ORDER — POLYETHYLENE GLYCOL 3350 17 G/17G
17 POWDER, FOR SOLUTION ORAL DAILY
Qty: 14 EACH | Refills: 0 | Status: SHIPPED | OUTPATIENT
Start: 2023-11-16 | End: 2023-11-17 | Stop reason: SDUPTHER

## 2023-11-16 RX ORDER — HYDROMORPHONE HYDROCHLORIDE 1 MG/ML
0.4 INJECTION, SOLUTION INTRAMUSCULAR; INTRAVENOUS; SUBCUTANEOUS
Status: DISCONTINUED | OUTPATIENT
Start: 2023-11-16 | End: 2023-11-16 | Stop reason: HOSPADM

## 2023-11-16 RX ORDER — MEPERIDINE HYDROCHLORIDE 25 MG/ML
12.5 INJECTION INTRAMUSCULAR; INTRAVENOUS; SUBCUTANEOUS
Status: DISCONTINUED | OUTPATIENT
Start: 2023-11-16 | End: 2023-11-16 | Stop reason: HOSPADM

## 2023-11-16 RX ORDER — DEXAMETHASONE SODIUM PHOSPHATE 4 MG/ML
INJECTION, SOLUTION INTRA-ARTICULAR; INTRALESIONAL; INTRAMUSCULAR; INTRAVENOUS; SOFT TISSUE PRN
Status: DISCONTINUED | OUTPATIENT
Start: 2023-11-16 | End: 2023-11-16 | Stop reason: SURG

## 2023-11-16 RX ORDER — OXYBUTYNIN CHLORIDE 10 MG/1
10 TABLET, EXTENDED RELEASE ORAL
Qty: 14 TABLET | Refills: 0 | Status: SHIPPED | OUTPATIENT
Start: 2023-11-16 | End: 2023-11-16 | Stop reason: SDUPTHER

## 2023-11-16 RX ORDER — TRAMADOL HYDROCHLORIDE 50 MG/1
50 TABLET ORAL EVERY 6 HOURS PRN
Qty: 20 TABLET | Refills: 0 | Status: SHIPPED | OUTPATIENT
Start: 2023-11-16 | End: 2023-11-17 | Stop reason: SDUPTHER

## 2023-11-16 RX ORDER — OXYCODONE HCL 5 MG/5 ML
10 SOLUTION, ORAL ORAL
Status: DISCONTINUED | OUTPATIENT
Start: 2023-11-16 | End: 2023-11-16 | Stop reason: HOSPADM

## 2023-11-16 RX ORDER — PHENAZOPYRIDINE HYDROCHLORIDE 100 MG/1
100 TABLET, FILM COATED ORAL 3 TIMES DAILY PRN
Qty: 6 TABLET | Refills: 0 | COMMUNITY
Start: 2023-11-16 | End: 2023-11-16 | Stop reason: SDUPTHER

## 2023-11-16 RX ORDER — DOCUSATE SODIUM 100 MG/1
100 CAPSULE, LIQUID FILLED ORAL 2 TIMES DAILY
Qty: 30 CAPSULE | Refills: 0 | Status: SHIPPED | OUTPATIENT
Start: 2023-11-16 | End: 2023-11-17 | Stop reason: SDUPTHER

## 2023-11-16 RX ORDER — MIDAZOLAM HYDROCHLORIDE 1 MG/ML
1 INJECTION INTRAMUSCULAR; INTRAVENOUS
Status: DISCONTINUED | OUTPATIENT
Start: 2023-11-16 | End: 2023-11-16 | Stop reason: HOSPADM

## 2023-11-16 RX ORDER — HALOPERIDOL 5 MG/ML
1 INJECTION INTRAMUSCULAR
Status: DISCONTINUED | OUTPATIENT
Start: 2023-11-16 | End: 2023-11-16 | Stop reason: HOSPADM

## 2023-11-16 RX ORDER — ONDANSETRON 2 MG/ML
INJECTION INTRAMUSCULAR; INTRAVENOUS PRN
Status: DISCONTINUED | OUTPATIENT
Start: 2023-11-16 | End: 2023-11-16 | Stop reason: SURG

## 2023-11-16 RX ORDER — TRAMADOL HYDROCHLORIDE 50 MG/1
50 TABLET ORAL EVERY 6 HOURS PRN
Qty: 20 TABLET | Refills: 0 | Status: SHIPPED | OUTPATIENT
Start: 2023-11-16 | End: 2023-11-16 | Stop reason: SDUPTHER

## 2023-11-16 RX ORDER — OXYCODONE HCL 5 MG/5 ML
5 SOLUTION, ORAL ORAL
Status: DISCONTINUED | OUTPATIENT
Start: 2023-11-16 | End: 2023-11-16 | Stop reason: HOSPADM

## 2023-11-16 RX ORDER — PHENAZOPYRIDINE HYDROCHLORIDE 100 MG/1
100 TABLET, FILM COATED ORAL 3 TIMES DAILY PRN
Qty: 6 TABLET | Refills: 0 | Status: SHIPPED | OUTPATIENT
Start: 2023-11-16 | End: 2023-11-17 | Stop reason: SDUPTHER

## 2023-11-16 RX ORDER — LIDOCAINE HYDROCHLORIDE 20 MG/ML
INJECTION, SOLUTION EPIDURAL; INFILTRATION; INTRACAUDAL; PERINEURAL PRN
Status: DISCONTINUED | OUTPATIENT
Start: 2023-11-16 | End: 2023-11-16 | Stop reason: SURG

## 2023-11-16 RX ORDER — SODIUM CHLORIDE, SODIUM LACTATE, POTASSIUM CHLORIDE, CALCIUM CHLORIDE 600; 310; 30; 20 MG/100ML; MG/100ML; MG/100ML; MG/100ML
INJECTION, SOLUTION INTRAVENOUS
Status: DISCONTINUED | OUTPATIENT
Start: 2023-11-16 | End: 2023-11-16 | Stop reason: SURG

## 2023-11-16 RX ORDER — OXYBUTYNIN CHLORIDE 10 MG/1
10 TABLET, EXTENDED RELEASE ORAL
Qty: 14 TABLET | Refills: 0 | Status: SHIPPED | OUTPATIENT
Start: 2023-11-16 | End: 2023-11-17 | Stop reason: SDUPTHER

## 2023-11-16 RX ORDER — HYDROMORPHONE HYDROCHLORIDE 1 MG/ML
0.2 INJECTION, SOLUTION INTRAMUSCULAR; INTRAVENOUS; SUBCUTANEOUS
Status: DISCONTINUED | OUTPATIENT
Start: 2023-11-16 | End: 2023-11-16 | Stop reason: HOSPADM

## 2023-11-16 RX ORDER — LABETALOL HYDROCHLORIDE 5 MG/ML
5 INJECTION, SOLUTION INTRAVENOUS
Status: DISCONTINUED | OUTPATIENT
Start: 2023-11-16 | End: 2023-11-16 | Stop reason: HOSPADM

## 2023-11-16 RX ORDER — EPHEDRINE SULFATE 50 MG/ML
INJECTION, SOLUTION INTRAVENOUS PRN
Status: DISCONTINUED | OUTPATIENT
Start: 2023-11-16 | End: 2023-11-16 | Stop reason: SURG

## 2023-11-16 RX ORDER — EPHEDRINE SULFATE 50 MG/ML
5 INJECTION, SOLUTION INTRAVENOUS
Status: DISCONTINUED | OUTPATIENT
Start: 2023-11-16 | End: 2023-11-16 | Stop reason: HOSPADM

## 2023-11-16 RX ORDER — SODIUM CHLORIDE, SODIUM LACTATE, POTASSIUM CHLORIDE, AND CALCIUM CHLORIDE .6; .31; .03; .02 G/100ML; G/100ML; G/100ML; G/100ML
500 INJECTION, SOLUTION INTRAVENOUS ONCE
Status: COMPLETED | OUTPATIENT
Start: 2023-11-16 | End: 2023-11-16

## 2023-11-16 RX ORDER — HYDRALAZINE HYDROCHLORIDE 20 MG/ML
5 INJECTION INTRAMUSCULAR; INTRAVENOUS
Status: DISCONTINUED | OUTPATIENT
Start: 2023-11-16 | End: 2023-11-16 | Stop reason: HOSPADM

## 2023-11-16 RX ORDER — TAMSULOSIN HYDROCHLORIDE 0.4 MG/1
0.4 CAPSULE ORAL DAILY
Qty: 30 CAPSULE | Refills: 1 | Status: SHIPPED | OUTPATIENT
Start: 2023-11-16 | End: 2023-11-17 | Stop reason: SDUPTHER

## 2023-11-16 RX ORDER — TAMSULOSIN HYDROCHLORIDE 0.4 MG/1
0.4 CAPSULE ORAL
Status: DISCONTINUED | OUTPATIENT
Start: 2023-11-16 | End: 2023-11-16

## 2023-11-16 RX ORDER — DIPHENHYDRAMINE HYDROCHLORIDE 50 MG/ML
12.5 INJECTION INTRAMUSCULAR; INTRAVENOUS
Status: DISCONTINUED | OUTPATIENT
Start: 2023-11-16 | End: 2023-11-16 | Stop reason: HOSPADM

## 2023-11-16 RX ORDER — SODIUM CHLORIDE, SODIUM LACTATE, POTASSIUM CHLORIDE, CALCIUM CHLORIDE 600; 310; 30; 20 MG/100ML; MG/100ML; MG/100ML; MG/100ML
INJECTION, SOLUTION INTRAVENOUS CONTINUOUS
Status: DISCONTINUED | OUTPATIENT
Start: 2023-11-16 | End: 2023-11-17

## 2023-11-16 RX ADMIN — EPHEDRINE SULFATE 5 MG: 50 INJECTION INTRAVENOUS at 15:20

## 2023-11-16 RX ADMIN — ALBUTEROL SULFATE 2.5 MG: 2.5 SOLUTION RESPIRATORY (INHALATION) at 16:49

## 2023-11-16 RX ADMIN — SODIUM CHLORIDE, POTASSIUM CHLORIDE, SODIUM LACTATE AND CALCIUM CHLORIDE: 600; 310; 30; 20 INJECTION, SOLUTION INTRAVENOUS at 19:28

## 2023-11-16 RX ADMIN — ALPRAZOLAM 0.25 MG: 0.25 TABLET ORAL at 17:49

## 2023-11-16 RX ADMIN — SOTALOL HYDROCHLORIDE 40 MG: 80 TABLET ORAL at 17:49

## 2023-11-16 RX ADMIN — KETOROLAC TROMETHAMINE 15 MG: 30 INJECTION, SOLUTION INTRAMUSCULAR; INTRAVENOUS at 15:30

## 2023-11-16 RX ADMIN — LIDOCAINE HYDROCHLORIDE 40 MG: 20 INJECTION, SOLUTION EPIDURAL; INFILTRATION; INTRACAUDAL at 15:01

## 2023-11-16 RX ADMIN — TAMSULOSIN HYDROCHLORIDE 0.4 MG: 0.4 CAPSULE ORAL at 08:26

## 2023-11-16 RX ADMIN — ONDANSETRON 4 MG: 2 INJECTION INTRAMUSCULAR; INTRAVENOUS at 15:30

## 2023-11-16 RX ADMIN — CEFAZOLIN 2 G: 1 INJECTION, POWDER, FOR SOLUTION INTRAMUSCULAR; INTRAVENOUS at 15:06

## 2023-11-16 RX ADMIN — PROPOFOL 50 MG: 10 INJECTION, EMULSION INTRAVENOUS at 15:01

## 2023-11-16 RX ADMIN — ALBUTEROL SULFATE 2 PUFF: 90 AEROSOL, METERED RESPIRATORY (INHALATION) at 06:53

## 2023-11-16 RX ADMIN — SODIUM CHLORIDE, POTASSIUM CHLORIDE, SODIUM LACTATE AND CALCIUM CHLORIDE: 600; 310; 30; 20 INJECTION, SOLUTION INTRAVENOUS at 14:53

## 2023-11-16 RX ADMIN — ALBUTEROL SULFATE 2 PUFF: 90 AEROSOL, METERED RESPIRATORY (INHALATION) at 14:05

## 2023-11-16 RX ADMIN — SODIUM CHLORIDE, POTASSIUM CHLORIDE, SODIUM LACTATE AND CALCIUM CHLORIDE 500 ML: 600; 310; 30; 20 INJECTION, SOLUTION INTRAVENOUS at 22:25

## 2023-11-16 RX ADMIN — DEXAMETHASONE SODIUM PHOSPHATE 4 MG: 4 INJECTION INTRA-ARTICULAR; INTRALESIONAL; INTRAMUSCULAR; INTRAVENOUS; SOFT TISSUE at 15:01

## 2023-11-16 RX ADMIN — DOCUSATE SODIUM 50 MG AND SENNOSIDES 8.6 MG 2 TABLET: 8.6; 5 TABLET, FILM COATED ORAL at 17:49

## 2023-11-16 RX ADMIN — NICOTINE 14 MG: 14 PATCH TRANSDERMAL at 05:39

## 2023-11-16 RX ADMIN — SOTALOL HYDROCHLORIDE 40 MG: 80 TABLET ORAL at 10:32

## 2023-11-16 RX ADMIN — SODIUM CHLORIDE, POTASSIUM CHLORIDE, SODIUM LACTATE AND CALCIUM CHLORIDE: 600; 310; 30; 20 INJECTION, SOLUTION INTRAVENOUS at 02:48

## 2023-11-16 RX ADMIN — FENTANYL CITRATE 25 MCG: 50 INJECTION, SOLUTION INTRAMUSCULAR; INTRAVENOUS at 15:15

## 2023-11-16 ASSESSMENT — PATIENT HEALTH QUESTIONNAIRE - PHQ9
2. FEELING DOWN, DEPRESSED, IRRITABLE, OR HOPELESS: NOT AT ALL
SUM OF ALL RESPONSES TO PHQ9 QUESTIONS 1 AND 2: 0
1. LITTLE INTEREST OR PLEASURE IN DOING THINGS: NOT AT ALL

## 2023-11-16 ASSESSMENT — FIBROSIS 4 INDEX
FIB4 SCORE: 4.33
FIB4 SCORE: 4.33

## 2023-11-16 ASSESSMENT — COGNITIVE AND FUNCTIONAL STATUS - GENERAL
WALKING IN HOSPITAL ROOM: A LITTLE
MOBILITY SCORE: 19
DAILY ACTIVITIY SCORE: 21
DRESSING REGULAR LOWER BODY CLOTHING: A LITTLE
SUGGESTED CMS G CODE MODIFIER MOBILITY: CH
MOVING FROM LYING ON BACK TO SITTING ON SIDE OF FLAT BED: A LITTLE
MOVING TO AND FROM BED TO CHAIR: A LITTLE
DAILY ACTIVITIY SCORE: 24
CLIMB 3 TO 5 STEPS WITH RAILING: A LITTLE
HELP NEEDED FOR BATHING: A LITTLE
STANDING UP FROM CHAIR USING ARMS: A LITTLE
SUGGESTED CMS G CODE MODIFIER MOBILITY: CK
TOILETING: A LITTLE
SUGGESTED CMS G CODE MODIFIER DAILY ACTIVITY: CH
SUGGESTED CMS G CODE MODIFIER DAILY ACTIVITY: CJ
MOBILITY SCORE: 24

## 2023-11-16 ASSESSMENT — LIFESTYLE VARIABLES
EVER FELT BAD OR GUILTY ABOUT YOUR DRINKING: NO
CONSUMPTION TOTAL: NEGATIVE
DOES PATIENT WANT TO STOP DRINKING: NO
EVER HAD A DRINK FIRST THING IN THE MORNING TO STEADY YOUR NERVES TO GET RID OF A HANGOVER: NO
TOTAL SCORE: 0
HAVE YOU EVER FELT YOU SHOULD CUT DOWN ON YOUR DRINKING: NO
ON A TYPICAL DAY WHEN YOU DRINK ALCOHOL HOW MANY DRINKS DO YOU HAVE: 0
AVERAGE NUMBER OF DAYS PER WEEK YOU HAVE A DRINK CONTAINING ALCOHOL: 0
TOTAL SCORE: 0
TOTAL SCORE: 0
HAVE PEOPLE ANNOYED YOU BY CRITICIZING YOUR DRINKING: NO
HOW MANY TIMES IN THE PAST YEAR HAVE YOU HAD 5 OR MORE DRINKS IN A DAY: 0

## 2023-11-16 ASSESSMENT — PAIN DESCRIPTION - PAIN TYPE
TYPE: SURGICAL PAIN
TYPE: ACUTE PAIN;SURGICAL PAIN
TYPE: SURGICAL PAIN

## 2023-11-16 ASSESSMENT — PAIN SCALES - GENERAL: PAIN_LEVEL: 2

## 2023-11-16 NOTE — ED PROVIDER NOTES
ED Provider Note    CHIEF COMPLAINT  Chief Complaint   Patient presents with    Abdominal Pain     RLQ since this am. N/X x 1    Head Injury     MGLF. + head injury + thinners - LOC. Pt stumbled on recliner and hit her head on coffee table.        EXTERNAL RECORDS REVIEWED  Outpatient Notes office visit yesterday for mitral regurgitation    HPI/ROS  LIMITATION TO HISTORY   Select: : None  OUTSIDE HISTORIAN(S):      Angie Root is a 83 y.o. female who presents after ground-level fall onto her head.  Patient Dors is head pain.  Patient denies loss consciousness.  Patient is on Coumadin for atrial fibrillation.  Patient also reports she has been having right lower quadrant abdominal pain all day.  Patient denies history of appendectomy.  Patient denies neck or back pain.  Patient denies fever chills body aches or sweats.  Patient denies nausea or vomiting    PAST MEDICAL HISTORY   has a past medical history of Arthritis, Breath shortness, CAD (coronary artery disease), Cancer (HCC) (1982), Cataract, COPD (chronic obstructive pulmonary disease) ([facility]), Croup (3 years old), Diverticulosis, Dyslipidemia, GERD (gastroesophageal reflux disease), Hiatal hernia, High cholesterol, Hypertension, Infectious disease (2018), Measles, Paroxysmal A-fib (HCC) (01/20/2016), Paroxysmal atrial fibrillation ([facility]), Prediabetes, and PVD (peripheral vascular disease) ([facility]).    SURGICAL HISTORY   has a past surgical history that includes tonsillectomy (1945); cholecystectomy (1993); cardiac cath, right heart (2008); wide excision melanoma, leg, w/poss.stsg (10/2015); stent placement; other orthopedic surgery (Left, 2008); other cardiac surgery; fecal transplant (N/A, 4/9/2018); and colonoscopy - endo (N/A, 4/9/2018).    FAMILY HISTORY  Family History   Problem Relation Age of Onset    Hypertension Mother     Hyperlipidemia Mother     Cancer Maternal Grandmother         tongue    Cancer Maternal Uncle         x 3, pancreas    Cancer  Maternal Grandfather         unknown    Cancer Paternal Grandmother         unknown    Cancer Paternal Grandfather         unknown    Diabetes Maternal Uncle     Heart Disease Maternal Uncle     Hypertension Sister     Hyperlipidemia Sister     Heart Disease Sister     Alcohol/Drug Sister     Thyroid Sister     Psychiatric Illness Neg Hx     Stroke Neg Hx        SOCIAL HISTORY  Social History     Tobacco Use    Smoking status: Every Day     Current packs/day: 0.25     Average packs/day: 0.3 packs/day for 66.7 years (16.7 ttl pk-yrs)     Types: Cigarettes     Start date: 3/20/1957    Smokeless tobacco: Never    Tobacco comments:     2 cigarettes per day   Vaping Use    Vaping Use: Never used   Substance and Sexual Activity    Alcohol use: No     Alcohol/week: 0.0 oz    Drug use: No    Sexual activity: Not Currently     Partners: Male       CURRENT MEDICATIONS  Home Medications       Reviewed by Cary Lu R.N. (Registered Nurse) on 11/15/23 at 1821  Med List Status: Not Addressed     Medication Last Dose Status   albuterol 108 (90 Base) MCG/ACT Aero Soln inhalation aerosol  Active   ALPRAZolam (XANAX) 0.25 MG Tab  Active   ascorbic acid (ASCORBIC ACID) 500 MG Tab  Active   Calcium-Magnesium-Vitamin D (CALCIUM 1200+D3 PO)  Active   D-Mannose Powder  Active   EPINEPHrine (EPIPEN) 0.3 MG/0.3ML Solution Auto-injector solution for injection  Active   fluorouracil (EFUDEX) 5 % cream  Active   fluticasone (FLONASE) 50 MCG/ACT nasal spray  Active   furosemide (LASIX) 20 MG Tab  Active   ipratropium-albuterol (DUONEB) 0.5-2.5 (3) MG/3ML nebulizer solution  Active   lovastatin (MEVACOR) 40 MG tablet  Active   Multiple Vitamins-Minerals (ZINC PO)  Active   Probiotic Product (PROBIOTIC PO)  Active   sotalol (BETAPACE) 80 MG Tab  Active   vitamin D (CHOLECALCIFEROL) 1000 UNIT Tab  Active   warfarin (COUMADIN) 1 MG Tab  Active                    ALLERGIES  Allergies   Allergen Reactions    Penicillins Anaphylaxis and Hives  "   Codeine Swelling    Iodine Swelling    Bee Venom Anaphylaxis    Chantix [Varenicline]      disoriented    Ciprofloxacin      Causes C diff, recurrent and very difficult    Diphtheria,Pertussis,Tetanus     Flagyl [Metronidazole] Rash     C/O flagyl causes rash.     Honey Bee Venom     Latex Hives    Lipitor [Atorvastatin Calcium] Unspecified     Intense Stomach pain    Other Environmental Itching and Swelling    Shellfish Allergy      unknown    Tetanus Antitoxin Swelling     unknown       PHYSICAL EXAM  VITAL SIGNS: /64   Pulse 84   Temp 36.2 °C (97.1 °F) (Temporal)   Resp 20   Ht 1.575 m (5' 2\")   Wt 39 kg (86 lb)   SpO2 97%   BMI 15.73 kg/m²    Vitals and nursing note reviewed.   Constitutional:       Comments: Patient is lying in bed supine, pleasant, conversant, speaking in complete sentences   HENT:      Head: Tenderness to the back of the occiput, no lacerations abrasions  Eyes:      Extraocular Movements: Extraocular movements intact.      Conjunctiva/sclera: Conjunctivae normal.      Pupils: Pupils are equal, round, and reactive to light.   Cardiovascular:      Pulses: Normal pulses.      Comments: HR 84  Pulmonary:      Effort: Pulmonary effort is normal. No respiratory distress.   Abdominal:      Comments: Abdomen is soft, non-tender, non-distended, non-rigid, no rebound, guarding, masses, no McBurney's point tenderness, no peritoneal signs, negative Rovsing sign, negative Sharp sign.  No CVA tenderness to palpation. Benign abdomen.   Musculoskeletal:         General: No swelling. Normal range of motion.      Cervical back: Normal range of motion. No rigidity.   No midline C/T/L-spine tenderness palpation myself or deformities.  Skin:     General: Skin is warm and dry.      Capillary Refill: Capillary refill takes less than 2 seconds.   Neurological:      Mental Status: Alert.       DIAGNOSTIC STUDIES / PROCEDURES  EKG  I have independently interpreted this EKG  No " ischemia    LABS  Positive white blood cells, leukocyte esterase on urinalysis    RADIOLOGY  I have independently interpreted the diagnostic imaging associated with this visit and am waiting the final reading from the radiologist.   My preliminary interpretation is as follows: Right-sided ureterolithiasis  Radiologist interpretation: Hydronephrosis and right hydroureter with 6 mm stone    COURSE & MEDICAL DECISION MAKING    INITIAL ASSESSMENT, COURSE AND PLAN  Care Narrative: CBC to evaluate for acute anemia and leukocytosis.  CMP to evaluate for acute electrolyte abnormality, acute kidney injury, acute liver failure or dysfunction.  EKG to evaluate for arrhythmia.  Troponin evaluate for ACS.  Lipase to evaluate for appendicitis.  CT head to evaluate for intracranial hemorrhage.  CT abdomen pelvis to evaluate for appendicitis.    Electronically signed by: Oseas Hartman M.D., 11/15/2023 6:07 PM    CT abdomen pelvis demonstrates urolithiasis, hydronephrosis, hydroureter and some stranding.  Urinalysis with possible signs of infection given elevated white blood cell count, leukocyte esterase, cloudy urine.  Patient reports she was recently taking D-mannose but has not taken antibiotics.  Patient is concern about taking antibiotics due to multiple C. difficile infections and status post fecal transplant.  Patient has declined Rocephin at this time.  Dr. Morgan of urology has been consulted and recommends admission and IV antibiotics and observation.  Dr. Robles has graciously excepted the patient to her service for admission.    This dictation has been created using voice recognition software. I am continuously working with the software to minimize the number of voice recognition errors and I have made every attempt to manually correct the errors within my dictation. However errors  related to this voice recognition software may still exist and should be interpreted within the appropriate context.      Electronically signed by: Oseas Hartman M.D., 11/15/2023 10:40 PM    DISPOSITION AND DISCUSSIONS      Decision tools and prescription drugs considered including, but not limited to: HEART Score 5 .    FINAL DIAGNOSIS  1. Ureterolithiasis    2. Acute UTI           Electronically signed by: Oseas Hartman M.D., 11/15/2023 6:04 PM

## 2023-11-16 NOTE — ANESTHESIA PROCEDURE NOTES
Airway    Date/Time: 11/16/2023 3:03 PM    Performed by: Willi Calero M.D.  Authorized by: Willi Calero M.D.    Location:  OR  Urgency:  Elective  Indications for Airway Management:  Anesthesia      Spontaneous Ventilation: absent    Sedation Level:  Deep  Preoxygenated: Yes    Mask Difficulty Assessment:  1 - vent by mask  Final Airway Type:  Supraglottic airway  Final Supraglottic Airway:  Standard LMA    SGA Size:  4  Number of Attempts at Approach:  1

## 2023-11-16 NOTE — ASSESSMENT & PLAN NOTE
"Patient fell and struck the back of her head but from a \"short distance\".  Denies LOC, laceration or headache.  Patient on warfarin last dose yesterday.  CTA head significant for mild cerebral atrophy and chronic microvascular ischemic type change, no acute intercranial abnormality and small left temporal occipital scalp contusion.  -PT OT eval     "

## 2023-11-16 NOTE — PROGRESS NOTES
Anticoagulation Summary  As of 11/15/2023      INR goal:  2.0-3.0   TTR:  81.5 % (5.9 y)   INR used for dosin.66 (11/15/2023)   Warfarin maintenance plan:  0.5 mg (1 mg x 0.5) every Tue; 1 mg (1 mg x 1) all other days   Weekly warfarin total:  6.5 mg   Plan last modified:  Pieter Covarrubias PharmD (2023)   Next INR check:  2023   Target end date:  Indefinite    Indications    Paroxysmal a-fib (CMS-HCC) (Resolved) [I48.0]  Chronic anticoagulation [Z79.01]                 Anticoagulation Episode Summary       INR check location:  Clinic Lab    Preferred lab:  [facility]    Send INR reminders to:      Comments:  [phone] (H)  [facility]          Anticoagulation Care Providers       Provider Role Specialty Phone number    Evelio Tejeda M.D. Referring Internal Medicine [phone]    [facility] Responsible  [phone]    Beryl Pang M.D. Responsible Family Medicine [phone]            Refer to Anticoagulation Patient Findings for HPI  Patient Findings       Positives:  Upcoming invasive procedure (Mitral DORITA on 23 - holding warfarin x 5 days prior), Change in medications (Starting furosemide)    Negatives:  Signs/symptoms of thrombosis, Signs/symptoms of bleeding, Laboratory test error suspected, Change in health, Change in alcohol use, Change in activity, Emergency department visit, Upcoming dental procedure, Missed doses, Extra doses, Change in diet/appetite, Hospital admission, Bruising, Other complaints            Spoke with patient to report a therapeutic INR.      Pt instructed to continue with current warfarin dosing regimen, confirms dosing.     Will follow up in 2 week(s).     Jimbo Dunlap, PharmD, BCACP

## 2023-11-16 NOTE — H&P
"[facility] MEDICINE HISTORY AND PHYSICAL     PATIENT ID:  NAME:  Angie Root  MRN:               [id number]  YOB: 1940    Date of Admission: 11/15/2023     Attending: Yiama Robles MD    Resident: Sharifa Shields MD    Primary Care Physician:  Breyl Pang MD    CC:  Beryl Pang MD    HPI: Angie Root is a 83 y.o. female with a PMH significant for CAD with 2 stents placed in 2009, A-fib in 2016 on sotalol and warfarin, severe mitral regurgitation, aortic stenosis and COPD who presented with acute right lower quadrant pain that radiates to her back.  Patient reports she saw blood in her urine yesterday.  Today when she woke up she felt a little off.  As the day went on she continued to have pain in her right lower quadrant that radiated to her back. By the afternoon patient knew she was in trouble and waited for her  to get home and call 911.  Denies any fever, chills, nausea, vomiting, dysuria or diarrhea. When patient went to get up from her chair,  she put her weight on the foot rest of the recliner which collapsed and she fell to the floor hitting the back of her head on the coffee table.  She has a \"bump\" on the back of her head.  Patient denies LOC, laceration or headache. Patient has history of kidney stones and UTIs.  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  Patient is also adamant that she does not want to have any kind of open surgery.  Patient is scheduled to to receive a MitraClip on 12/5 and does not want to jeopardize getting that procedure done.     ERCourse:  Vitals: Temperature 97.1 °F, /53, HR 75, SPO2 85% in RA, RR 16.  Patient was placed on 2 L via NC, SPO2 increased to 95-98%    Labs:   -CBC notable for WBC WNL at 9.7 and platelet count of 157  -CMP notable for slightly elevated glucose at 107 and decreased GFR 55  -Troponin slightly elevated at " 20 with repeat at 20  -INR 2.66, PT 28.7  -UA: Significant for orange cloudy appearance, large occult blood, 30 protein, small leukocyte Estrace, WBC 20-50 hpf and hyaline casts 6-7 hpf    Imaging:  -EKG: Sinus rhythm, probable left atrial enlargement with ventricular hypertrophy  -CT head W/O: Mild cerebral atrophy and chronic microvascular ischemic type change, no acute intercranial abnormality, small left temporal occipital scalp contusion  -CT abdomen/pelvis: Moderate right hydronephrosis and hydro ureter secondary to obstructing mid right ureteral stone measuring 6 mm in diameter, bilateral nephrolithiasis, resolution of left lower lobe groundglass opacities    Given: Toradol    Consult: Dr. Morgan with urology who recommended admitting patient for observation, starting antibiotics and reassessing in the morning    REVIEW OF SYSTEMS:   Ten systems reviewed and were negative except as noted in the HPI.                PAST MEDICAL HISTORY:  Past Medical History:   Diagnosis Date    Arthritis     BL hands    Breath shortness     CAD (coronary artery disease)     Cancer (HCC) 1982    melanoma    Cataract     IOL implants    COPD (chronic obstructive pulmonary disease) (HCC)     Croup 3 years old    Diverticulosis     Dyslipidemia     GERD (gastroesophageal reflux disease)     Hiatal hernia     High cholesterol     Hypertension     Infectious disease 2018    C Diff    Measles     5 years old    Paroxysmal A-fib (HCC) 01/20/2016    Symptomatic, on a rhythm control strategy with sotalol 40 mg by mouth twice a day.     Paroxysmal atrial fibrillation (HCC)     Prediabetes     PVD (peripheral vascular disease) (HCC)      PAST SURGICAL HISTORY:  Past Surgical History:   Procedure Laterality Date    FECAL TRANSPLANT N/A 4/9/2018    Procedure: FECAL TRANSPLANT;  Surgeon: Gonzalez Cruz M.D.;  Location: ENDOSCOPY [facility];  Service: Gastroenterology    COLONOSCOPY - ENDO N/A 4/9/2018    Procedure: COLONOSCOPY -  ENDO;  Surgeon: Gonzalez Cruz M.D.;  Location: ENDOSCOPY [facility];  Service: Gastroenterology    WIDE EXCISION MELANOMA, LEG, W/POSS.STSG  10/2015    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    CARDIAC CATH, RIGHT HEART  2008    stent    OTHER ORTHOPEDIC SURGERY Left 2008    hand repair    CHOLECYSTECTOMY  1993    TONSILLECTOMY  1945    OTHER CARDIAC SURGERY      r heart cath stents    STENT PLACEMENT      L iliac stent       FAMILY HISTORY:  Family History   Problem Relation Age of Onset    Hypertension Mother     Hyperlipidemia Mother     Cancer Maternal Grandmother         tongue    Cancer Maternal Uncle         x 3, pancreas    Cancer Maternal Grandfather         unknown    Cancer Paternal Grandmother         unknown    Cancer Paternal Grandfather         unknown    Diabetes Maternal Uncle     Heart Disease Maternal Uncle     Hypertension Sister     Hyperlipidemia Sister     Heart Disease Sister     Alcohol/Drug Sister     Thyroid Sister     Psychiatric Illness Neg Hx     Stroke Neg Hx      SOCIAL HISTORY:   Social History:   Tobacco: Patient has smoked cigarettes since she was 15, currently smoking 2 cigarettes/day  Alcohol: Patient has not drank alcohol in 8 years  Recreational drugs (illegal and prescription): None  Activity Level: Independent with activities of daily living.  Living situation: Lives with   Recent travel:  Denies.  Primary Care Provider: Beryl Pang MD    DIET:   Orders Placed This Encounter   Procedures    Diet Order Diet: Regular     Standing Status:   Standing     Number of Occurrences:   1     Order Specific Question:   Diet:     Answer:   Regular [1]     ALLERGIES:  Allergies   Allergen Reactions    Penicillins Anaphylaxis and Hives    Codeine Swelling    Iodine Swelling    Bee Venom Anaphylaxis    Chantix [Varenicline]      disoriented    Ciprofloxacin      Causes C diff, recurrent and very difficult    Diphtheria,Pertussis,Tetanus     Flagyl [Metronidazole] Rash      C/O flagyl causes rash.     Honey Bee Venom     Latex Hives    Lipitor [Atorvastatin Calcium] Unspecified     Intense Stomach pain    Other Environmental Itching and Swelling    Shellfish Allergy      unknown    Tetanus Antitoxin Swelling     unknown     OUTPATIENT MEDICATIONS:    Current Facility-Administered Medications:     cefTRIAXone (Rocephin) injection 1,000 mg, 1,000 mg, Intravenous, Once, Oseas Hartman M.D.    albuterol inhaler 2 Puff, 2 Puff, Inhalation, Q4HRS PRN, Yaima Robles M.D.    ALPRAZolam (Xanax) tablet 0.25 mg, 0.25 mg, Oral, BID PRN, Yaima Robles M.D.    ipratropium-albuterol (DUONEB) nebulizer solution, 3 mL, Nebulization, Q4H PRN (RT), Yaima Robles M.D.    [START ON 11/16/2023] Probiotic PACK 1 Package, 1 Package, Oral, DAILY, Yaima Robles M.D.    [Held by provider] warfarin (Coumadin) tablet 1 mg, 1 mg, Oral, DAILY, Yaima Robles M.D.    senna-docusate (Pericolace Or Senokot S) 8.6-50 MG per tablet 2 Tablet, 2 Tablet, Oral, BID **AND** polyethylene glycol/lytes (Miralax) PACKET 1 Packet, 1 Packet, Oral, QDAY PRN **AND** magnesium hydroxide (Milk Of Magnesia) suspension 30 mL, 30 mL, Oral, QDAY PRN **AND** bisacodyl (Dulcolax) suppository 10 mg, 10 mg, Rectal, QDAY PRN, Yaima Robles M.D.    ketorolac (Toradol) injection 15 mg, 15 mg, Intravenous, Q6HRS PRN, Yaima Robles M.D.    acetaminophen (Tylenol) tablet 650 mg, 650 mg, Oral, Q6HRS PRN, Yaima Robles M.D.    labetalol (Normodyne/Trandate) injection 10 mg, 10 mg, Intravenous, Q4HRS PRN, Yaima Robles M.D.    ondansetron (Zofran) syringe/vial injection 4 mg, 4 mg, Intravenous, Q4HRS PRN, Yaima Robles M.D.    ondansetron (Zofran ODT) dispertab 4 mg, 4 mg, Oral, Q4HRS PRN, Yaima Robles M.D.    [START ON 11/16/2023] nicotine (Nicoderm) 14 MG/24HR 14 mg, 14 mg, Transdermal, Daily-0600 **AND** Nicotine Replacement Patient Education Materials, , , Once **AND** nicotine polacrilex (Nicorette) 2 MG  piece 2 mg, 2 mg, Oral, Q HOUR PRN, Yaima Robles M.D.    Current Outpatient Medications:     furosemide (LASIX) 20 MG Tab, Take 1 Tablet by mouth every day., Disp: 30 Tablet, Rfl: 11    Calcium-Magnesium-Vitamin D (CALCIUM 1200+D3 PO), Take  by mouth., Disp: , Rfl:     D-Mannose Powder, Take  by mouth., Disp: , Rfl:     fluticasone (FLONASE) 50 MCG/ACT nasal spray, Administer 1 Spray into affected nostril(S) every day., Disp: 16 g, Rfl: 3    fluorouracil (EFUDEX) 5 % cream, Apply 1 Application topically every day., Disp: , Rfl:     albuterol 108 (90 Base) MCG/ACT Aero Soln inhalation aerosol, INHALE ONE TO TWO PUFFS BY MOUTH EVERY 4 HOURS AS NEEDED FOR SHORTNESS OF BREATH, Disp: 18 g, Rfl: 3    Multiple Vitamins-Minerals (ZINC PO), Take  by mouth., Disp: , Rfl:     ipratropium-albuterol (DUONEB) 0.5-2.5 (3) MG/3ML nebulizer solution, Take 3 mL by nebulization every four hours as needed for Shortness of Breath., Disp: 120 mL, Rfl: 11    ALPRAZolam (XANAX) 0.25 MG Tab, TAKE ONE TABLET BY MOUTH TWICE A DAY AS NEEDED FOR ANXIETY, Disp: 60 Tablet, Rfl: 5    lovastatin (MEVACOR) 40 MG tablet, TAKE 1 TABLET AT BEDTIME, Disp: 100 Tablet, Rfl: 3    warfarin (COUMADIN) 1 MG Tab, TAKE ONE TO TWO TABLETS DAILY OR AS DIRECTED BY ANTICOAGULATION CLINIC (Patient taking differently: 1 Tablet every day. TAKE ONE EVERY NIGHT EXCEPT TUESDAY TAKES A HALF OF PILL), Disp: 180 Tablet, Rfl: 1    sotalol (BETAPACE) 80 MG Tab, TAKE 1/2 TABLET TWICE DAILY Strength: 80 mg, Disp: 90 Tablet, Rfl: 3    EPINEPHrine (EPIPEN) 0.3 MG/0.3ML Solution Auto-injector solution for injection, epinephrine 0.3 mg/0.3 mL injection, auto-injector, Disp: , Rfl:     Probiotic Product (PROBIOTIC PO), Take  by mouth., Disp: , Rfl:     ascorbic acid (ASCORBIC ACID) 500 MG Tab, Take 2 Tablets by mouth every day., Disp: , Rfl:     vitamin D (CHOLECALCIFEROL) 1000 UNIT Tab, Take 2 Tablets by mouth every morning. 3000 units, Disp: , Rfl:     PHYSICAL EXAM:  Vitals:     11/15/23 1828 11/15/23 2041 11/15/23 2212 11/15/23 2302   BP: 137/60 130/80 133/64 125/59   Pulse: 88 81 84 78   Resp: 15 20 20 20   Temp:       TempSrc:       SpO2:  95% 97% 98%   Weight:       Height:       , Temp (24hrs), Av.2 °C (97.1 °F), Min:36.2 °C (97.1 °F), Max:36.2 °C (97.1 °F)  , Pulse Oximetry: 98 %, O2 (LPM): 2, O2 Delivery Device: None - Room Air    General: Pt resting in NAD, cooperative   Skin:  Pink, warm and dry.  No rashes  HEENT: NC, bump on occipital bone. PERRL. EOMI. MMM. No nasal discharge. Oropharynx nonerythematous without exudate/plaques  Neck:  Supple without lymphadenopathy or rigidity.   Lungs:  Symmetrical.  CTAB with no W/R/R.  Good air movement   Cardiovascular:  S1/S2 RRR with murmur  Abdomen:  Abdomen is soft, tender in RLQ radiating to back, nondistended. +BS. No masses noted.  Extremities:  Full range of motion. No gross deformities noted. 2+ pulses in all extremities. No edema   Spine:  Straight without vertebral anomalies.  CNS:  Muscle tone is normal. No gross focal neurologic deficits    LAB TESTS:   Recent Labs     11/15/23  1839   WBC 9.7   RBC 4.23   HEMOGLOBIN 12.1   HEMATOCRIT 38.1   MCV 90.1   MCH 28.6   RDW 47.8   PLATELETCT 157*   MPV 10.9   NEUTSPOLYS 74.90*   LYMPHOCYTES 13.30*   MONOCYTES 8.50   EOSINOPHILS 2.10   BASOPHILS 0.80         Recent Labs     11/15/23  1839   SODIUM 142   POTASSIUM 4.2   CHLORIDE 106   CO2 25   BUN 16   CREATININE 1.00   CALCIUM 9.6   ALBUMIN 3.4     CULTURES:   Results       Procedure Component Value Units Date/Time    URINALYSIS [[id number]]  (Abnormal) Collected: 11/15/23 1948    Order Status: Completed Specimen: Urine Updated: 11/15/23 2048     Color Orange     Character Cloudy     Specific Gravity 1.012     Ph 7.0     Glucose Negative mg/dL      Ketones Negative mg/dL      Protein 30 mg/dL      Bilirubin Negative     Urobilinogen, Urine 1.0     Nitrite Negative     Leukocyte Esterase Small     Occult Blood Large     Micro Urine  Req Microscopic          IMAGES:  CT-ABDOMEN-PELVIS W/O   Final Result   Addendum (preliminary) 1 of 1   Normal appendix.      Final      1. No acute posttraumatic imaging findings.   2. Moderate right hydronephrosis and hydroureter secondary to obstructing mid right ureteral stone measuring 6 mm in diameter.   3. Bilateral nephrolithiasis.   4. Resolution of left lower lobe groundglass opacities.      CT-HEAD W/O   Final Result      1.  Mild cerebral atrophy and chronic microvascular ischemic type changes.   2.  No acute intracranial abnormality.   3.  Small left temporal occipital scalp contusion.           CONSULTS:   Urology, Dr. Morgan    ASSESSMENT/PLAN:   83 y.o. female admitted for nephrolithiasis causing right hydronephrosis and hydroureter ureter with evidence of possible UTI.    #Right obstructing nephrolithiasis  #Moderate right hydronephrosis and hydroureter ureter  #UTI  History of kidney stones x2, followed by Dr. Sierra with [facility].  On presentation to the ER patient was afebrile.  CBC notable for normal WBC at 9.7.  UA positive for orange cloudy urine, large occult blood, protein of 30, nitrate negative, small leukocyte Estrace, WBCs 20-50 hpf, RBCs greater than 150 hpf and hyaline casts 6-10 hpf.  CT abdomen/pelvis showed moderate right hydronephrosis and hydroureter ureter secondary to obstructive doing mid right ureteral stone measuring 6 mm in diameter.  Urology consulted, Dr. Morgan who recommended admission to the hospital for observation and starting antibiotics.     Plan  - Admit to the floor  - Will hold off on giving antibiotics at this time given patient's history of C. difficile  - Start Flomax  - LR 75 mL/hr  - Order urine culture  - Toradol for pain  - Follow-up with urology in the AM  - Labs: CBC and CMP AM    #GLF  Patient fell and struck the back of her head.  Denies LOC, laceration or headache.  Patient on warfarin last dose yesterday.  CTA head significant for mild  cerebral atrophy and chronic microvascular ischemic type change, no acute intercranial abnormality and small left temporal occipital scalp contusion.    Plan  - Continue to monitor, low threshold for repeat imaging given patient's history of anticoagulation    #CAD  #Afib  #Severe mitral regurgitation  #Aortic stenosis  CAD with 2 stents placed in 2009. Afib in 2016 controled on sotalol, currently on warfarin for anticoagulation therapy. Followed by cards, Dr. Lozano.  Echo 9/2023 showed EF 55 to 60%, severe MR, grade 2 diastolic dysfunction with RVSP of 55 mmHg and aortic stenosis. NIRAJ 11/2023 showed EF 60%, severe primary mitral regurgitation secondary to posterior leaflet prolapse, grossly normal right ventricular size and systolic function, moderate aortic stenosis.  Patient is scheduled to receive a MitraClip on 12/5.  After this procedure her aortic stenosis will be addressed and ultimately she will receive a watchman.     Plan  - Hold warfarin  - Continue sotalol  - Continue lovastatin  - Consider consulting cards    #COPD  #Tobacco Dependence  Patient is a current smoker and smokes about 2 cigarettes/day, patient is smoked since she was 15.  Patient declined PFTs.  Current COPD regimen is albuterol and DuoNebs as needed for shortness of breath.  Patient is not on home oxygen.    Plan  - Continue albuterol 1 to 2 puffs every 4 hours as needed  - DuoNebs 3 mils every 4 hours as needed  - NicoDerm ordered    Core Measures:   Fluids: none  Lines: PIV  Abx: none  DVT prophylaxis: PURNIMA stockings, no Anticoagulation ordered pending possible procedure  Code Status: DNR/DNI    Sharifa Shields MD  PGY-2  [facility]

## 2023-11-16 NOTE — ASSESSMENT & PLAN NOTE
Patient with a history of atrial fibrillation, currently on Coumadin.  Last dose prior to arrival to the hospital.  Was held on admission due to possible surgical intervention.  -Resume Coumadin pending urology recs

## 2023-11-16 NOTE — PROGRESS NOTES
Pt transported from Blue 14  to T812, A&Ox4, On 2L of oxygen via NC. Denies any pain at this time, VSS. Pt connected to Tele Box  Pt oriented room.  Bed locked and in lowest position. Call light within reach.

## 2023-11-16 NOTE — DIETARY
"Nutrition services: Day 1 of admit.  Angie Root is a 83 y.o. female with admitting DX of Ureterolithiasis     Consult received for BMI <19    Met with pt at bedside. Pt share that her weight has stabilized and she has not noticed any significant weight loss recently. Pt shared that several months ago she noticed a decrease in PO intake but appetite has since returned to normal. Pt also reports drinking 1 Boost Plus per day. Pt was amenable to receiving Chobani peach yogurt smoothie.     Assessment:  Height: 157.5 cm (5' 2\")  Weight: 39.4 kg (86 lb 13.8 oz) via stand up scale  Body mass index is 15.89 kg/m²., BMI classification: Underweight   Diet/Intake: Regular; No meals recorded     Wt Readings from Last 7 Encounters:   11/16/23 39.4 kg (86 lb 13.8 oz)   11/14/23 39 kg (86 lb)   11/10/23 38.9 kg (85 lb 12.1 oz)   10/18/23 37.2 kg (82 lb)   10/05/23 38.1 kg (84 lb)   07/12/23 38.4 kg (84 lb 11.2 oz)   06/28/23 (P) 38.6 kg (85 lb)     Weight Change: Per chart review, pt has had stable weight since 2021 ranging from 84-90 lbs, however, most recently weight has increased.       Evaluation:   PMH: CAD with 2 stents placed in 2009, aortic stenosis and COPD  Labs: Bun 23, GFR 46  Meds: LR infusion  +BM: 11/15  Nutrition focused physical exam: Noted muscle and fat loss though unable to determine the extent of muscle and fat loss related to nutrition vs normal aging process.     Malnutrition Risk: Does not meet criteria at this time.     Recommendations/Plan:  Provide Boost Plus BID-chocolate only    Encourage intake of ~50% of meals and supplements  Document intake of all meals and supplements  as % taken in ADL's to provide interdisciplinary communication across all shifts.   Monitor weight.  Nutrition rep will continue to see patient for ongoing meal and snack preferences.       RD following.     "

## 2023-11-16 NOTE — ANESTHESIA TIME REPORT
Anesthesia Start and Stop Event Times       Date Time Event    11/16/2023 1438 Ready for Procedure     1453 Anesthesia Start     1540 Anesthesia Stop          Responsible Staff  11/16/23      Name Role Begin End    Willi Calero M.D. Anesth 1453 1540          Overtime Reason:  no overtime (within assigned shift)    Comments:

## 2023-11-16 NOTE — ASSESSMENT & PLAN NOTE
History of kidney stones x2, followed by Dr. Sierra with [facility].  UA with RBC>150, no evidence of infection.  CT abdomen/pelvis showed moderate right hydronephrosis and hydroureter ureter secondary to obstructive doing mid right ureteral stone measuring 6 mm in diameter.  Urology consulted, Dr. Morgan who recommended admission to the hospital for observation and starting antibiotics.  Antibiotics were held given patient's history of severe C. difficile requiring fecal transplant given there were no signs of infection.  Urology took patient to the OR at 11/16.  -Follow urology recommendations  -Possible discharge tomorrow if patient stable   -Repeat BMP in the morning  -Likely can DC Flomax following urology intervention

## 2023-11-16 NOTE — ED NOTES
This RN called lab for Urine culture add on, spoke with Adalberto (Microbiology) and confirmed to me there's sample to be used for Urine culture.

## 2023-11-16 NOTE — OP REPORT
Urologic Surgery Operative Report     Date of Procedure: 11/16/2023    Pre-op Diagnosis: Right nephrolithiasis  Right ureterolithiasis  Right hydronephrosis  Right renal colick   Post-op Diagnosis: Same as above   Procedure: - Right Ureteral orifice dialtion with catheter, 69296-12  - Cystoscopy, Right Ureteroscopy w/ laser lithotripsy, JJ stent:, 68808   Right retrograde pyelogram   Surgeon: Surgeon(s) and Role:     * Alexsander Reyes M.D. - Primary   Assistant: Circulator: Sharifa Mcgee R.N.  Laser Staff: Jeet Ann  Scrub Person: Nicole Driver  Radiology Technologist: Nevin Mcfarland   Anesthesia: General  Anesthesiologist: Willi Calero M.D.   Estimated Blood Loss: Minimal   IV fluids <1L Crystalloid    Specimens: 1. Right Stone for chemical analysis    Complications: None   Condition: Stable, procedure well tolerated    Drains: 1. Right [id number] JJ stent(on strings)  2. No lee   Disposition:  1. PACU, discharge after voiding  2. f/u 5-7 days removal stent on strings  3. F/u 8 weeks RBUS   Findings 1. 7mm cm stone at right proximal ureter, nicely dusted and removed from ureter  2. There was tortuous proximal ureter on RPG, and hydronephrotic kidney seen while doing RPG with 10cc of contrast      Indication:   83-year-old female with history nephrolithiasis presents with severe right flank pain found to have 7 mm proximal right ureteral stone.  After a full discussion of alternatives, risks, and benefits the patient consented to proceeding with Ureteroscopy, laser lithotripsy and possible JJ stent placement on the respective side.  She understands the risk of inability to access ureter, the need for second procedures, the possibility of negative ureteroscopy, that she may have stent discomfort until this is removed, bleeding, infection, ureteral injury or stricture, bladder injury, post op urinary retention requiring lee catheter, and the general pulmonary and cardiovascular risks associated with  anesthesia.     Procedure Details:   The patient was taken to operating room and placed on table in supine position.  Ancef was administered prior to the start of the procedure based on previous urine cultures. Sequential compression devices were placed for deep venous thrombosis prophylaxis. After induction of general anesthesia, both legs were placed in Toby stirrups in the standard lithotomy position.  A timeout was held confirming the correct patient, procedure and laterality.   The perineal area was prepped and draped in a sterile fashion. A 22 Tajik rigid cystoscope was inserted into the urethra and the bladder was emptied and then distended with sterile saline. Urethral sounds were not needed to dilate the urethral meatus. Cystoscopy revealed normal bladder mucosa, orthotopic ureteral orifices, and no evidence of infection.    We passed a wire through the ureteral orifice of the respective side into the renal pelvis which was visualized under fluoroscopic vision.     The wire was moved to the side and a semirigid ureteroscope was placed into the urethra and passed up the right ureter. We did  need a ureteral dilator to pass the scope into the ureter.  [place] dilator was used to dilate the distal right ureter and ureteral orifice.  After this the rigid scope was extended up to the mid to proximal ureter where the stone was located well fragmented and all visible pieces were basketed and removed.  The ureter was resurveyed.  Given the tortuosity of the proximal ureter I did shoot a retrograde pyelogram using 10cc of 50/50 contrast to confirm placement of a wire into the renal pelvis which did straighten out the ureter eventually.    Returnign to rigid cystoscope, we then placed a right-sided JJ stent, [id number] JJ stent (on strings) under fluoroscopy. We then saw that the JJ stent was in appropriate position, with a good curl visualized in the renal pelvis fluoroscopically and a good curl in the bladder  endoscopically. The cystoscope was removed after emptying the bladder.  The patient was awoken from anesthesia and brought to recovery in satisfactory condition.          Alexsander Reyes M.D.   [address]  [place] [id number] [phone]

## 2023-11-16 NOTE — ED NOTES
Bedside report from VARGAS Salter    Pt is resting in bed. Gurney in low position, brakes locked.   at bedside.      PIV: G18 left forearm patent and intact  Cardiac monitor: Yes  Pulse ox: Yes  Fall risk: Low  Oxygen: 2lpm via NC connected to wall

## 2023-11-16 NOTE — ASSESSMENT & PLAN NOTE
Patient is a current smoker and smokes about 2 cigarettes/day, patient is smoked since she was 15.  Patient declined PFTs.  Current COPD regimen is albuterol and DuoNebs as needed for shortness of breath.  Not on home oxygen. No signs of COPD exacerbation.   - Continue albuterol 1 to 2 puffs every 4 hours as needed  -DuoNebs every 4 hours as needed  -nicotine replacement therapy

## 2023-11-16 NOTE — CONSULTS
[facility] Consult/H&P Note    Primary Service: hospitalist  Attending: Yaima Robles M.D.  Patient's Name/MRN: Angie Root, [id number]    Admit Date:11/15/2023  Today's Date: 11/16/2023   Length of stay:  LOS: 1 day   Room #: TPERIPOOL/NONE      Reason for consult/chief complaint: R ureteral stone  ID/HPI: Angie Root is a 83 y.o. female patient with R flank pain and 7mm right uretearl stone       Past Medical History:   Past Medical History:   Diagnosis Date    Arthritis     BL hands    Breath shortness     CAD (coronary artery disease)     Cancer (HCC) 1982    melanoma    Cataract     IOL implants    COPD (chronic obstructive pulmonary disease) (HCC)     Croup 3 years old    Diverticulosis     Dyslipidemia     GERD (gastroesophageal reflux disease)     Hiatal hernia     High cholesterol     Hypertension     Infectious disease 2018    C Diff    Measles     5 years old    Paroxysmal A-fib (HCC) 01/20/2016    Symptomatic, on a rhythm control strategy with sotalol 40 mg by mouth twice a day.     Paroxysmal atrial fibrillation (HCC)     Prediabetes     PVD (peripheral vascular disease) (HCC)         Past Surgical History:   Past Surgical History:   Procedure Laterality Date    FECAL TRANSPLANT N/A 4/9/2018    Procedure: FECAL TRANSPLANT;  Surgeon: Gonzalez Cruz M.D.;  Location: ENDOSCOPY [facility];  Service: Gastroenterology    COLONOSCOPY - ENDO N/A 4/9/2018    Procedure: COLONOSCOPY - ENDO;  Surgeon: Gonzalez Cruz M.D.;  Location: ENDOSCOPY [facility];  Service: Gastroenterology    WIDE EXCISION MELANOMA, LEG, W/POSS.STSG  10/2015    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    CARDIAC CATH, RIGHT HEART  2008    stent    OTHER ORTHOPEDIC SURGERY Left 2008    hand repair    CHOLECYSTECTOMY  1993    TONSILLECTOMY  1945    OTHER CARDIAC SURGERY      r heart cath stents    STENT PLACEMENT      L iliac stent        Family History:   Family History   Problem Relation Age of Onset    Hypertension  Mother     Hyperlipidemia Mother     Cancer Maternal Grandmother         tongue    Cancer Maternal Uncle         x 3, pancreas    Cancer Maternal Grandfather         unknown    Cancer Paternal Grandmother         unknown    Cancer Paternal Grandfather         unknown    Diabetes Maternal Uncle     Heart Disease Maternal Uncle     Hypertension Sister     Hyperlipidemia Sister     Heart Disease Sister     Alcohol/Drug Sister     Thyroid Sister     Psychiatric Illness Neg Hx     Stroke Neg Hx          Social History:   Social History     Tobacco Use    Smoking status: Every Day     Current packs/day: 0.25     Average packs/day: 0.3 packs/day for 66.7 years (16.7 ttl pk-yrs)     Types: Cigarettes     Start date: 3/20/1957    Smokeless tobacco: Never    Tobacco comments:     2 cigarettes per day   Vaping Use    Vaping Use: Never used   Substance Use Topics    Alcohol use: No     Alcohol/week: 0.0 oz    Drug use: No      Social History     Social History Narrative    .     Children: no    Work: [employer]        Allergies: she Penicillins; Codeine; Iodine; Bee venom; Chantix [varenicline]; Ciprofloxacin; Diphtheria,pertussis,tetanus; Flagyl [metronidazole]; Honey bee venom; Latex; Lipitor [atorvastatin calcium]; Other environmental; Shellfish allergy; and Tetanus antitoxin    Medications:   Medications Prior to Admission   Medication Sig Dispense Refill Last Dose    furosemide (LASIX) 20 MG Tab Take 1 Tablet by mouth every day. 30 Tablet 11 New RX at Not Started    Calcium-Magnesium-Vitamin D (CALCIUM 1200+D3 PO) Take 1 Tablet by mouth every day.   11/14/2023 at AM    D-Mannose Powder Take 1 Dose by mouth every day. Mix one scoop with 4 oz of water   11/14/2023 at AM    fluticasone (FLONASE) 50 MCG/ACT nasal spray Administer 1 Spray into affected nostril(S) every day. 16 g 3 11/15/2023 at 0800    albuterol 108 (90 Base) MCG/ACT Aero Soln inhalation aerosol INHALE ONE TO TWO PUFFS BY MOUTH EVERY 4 HOURS AS  "NEEDED FOR SHORTNESS OF BREATH 18 g 3 PRN at PRN    Multiple Vitamins-Minerals (ZINC PO) Take 1 Tablet by mouth every day.   11/14/2023 at AM    ipratropium-albuterol (DUONEB) 0.5-2.5 (3) MG/3ML nebulizer solution Take 3 mL by nebulization every four hours as needed for Shortness of Breath. 120 mL 11 PRN at PRN    ALPRAZolam (XANAX) 0.25 MG Tab TAKE ONE TABLET BY MOUTH TWICE A DAY AS NEEDED FOR ANXIETY 60 Tablet 5 11/15/2023 at 0800    lovastatin (MEVACOR) 40 MG tablet TAKE 1 TABLET AT BEDTIME 100 Tablet 3 11/14/2023 at 1730    warfarin (COUMADIN) 1 MG Tab TAKE ONE TO TWO TABLETS DAILY OR AS DIRECTED BY ANTICOAGULATION CLINIC (Patient taking differently: 1 Tablet every day. TAKE ONE EVERY NIGHT EXCEPT TUESDAY TAKES A HALF OF PILL) 180 Tablet 1 11/14/2023 at 1730    sotalol (BETAPACE) 80 MG Tab TAKE 1/2 TABLET TWICE DAILY Strength: 80 mg 90 Tablet 3 11/15/2023 at 0800    EPINEPHrine (EPIPEN) 0.3 MG/0.3ML Solution Auto-injector solution for injection epinephrine 0.3 mg/0.3 mL injection, auto-injector   PRN at PRN    Probiotic Product (PROBIOTIC PO) Take 1 Capsule by mouth every day.   11/14/2023 at AM    ascorbic acid (ASCORBIC ACID) 500 MG Tab Take 2 Tablets by mouth every day.   11/14/2023 at AM    vitamin D (CHOLECALCIFEROL) 1000 UNIT Tab Take 3,000 Units by mouth every morning. 3 tablets = 3000 units   11/15/2023 at 0800         Review of Systems  Review of Systems   All other systems reviewed and are negative.       Physical Exam  VITAL SIGNS: /76   Pulse 63   Temp 36.1 °C (96.9 °F) (Temporal)   Resp 17   Ht 1.575 m (5' 2\")   Wt 39.4 kg (86 lb 13.8 oz)   SpO2 92%   BMI 15.89 kg/m²   Physical Exam      Labs:  Recent Labs     11/15/23  1839 11/16/23  0813   WBC 9.7 7.4   RBC 4.23 4.10*   HEMOGLOBIN 12.1 11.6*   HEMATOCRIT 38.1 38.9   MCV 90.1 94.9   MCH 28.6 28.3   MCHC 31.8* 29.8*   RDW 47.8 50.4*   PLATELETCT 157* 138*   MPV 10.9 10.9     Recent Labs     11/15/23  1839 11/16/23  0813   SODIUM 142 140 "   POTASSIUM 4.2 4.3   CHLORIDE 106 106   CO2 25 30   GLUCOSE 107* 96   BUN 16 23*   CREATININE 1.00 1.16   CALCIUM 9.6 9.4     Recent Labs     11/15/23  0939   INR 2.66*     Glucose:  Recent Labs     11/15/23  1839 11/16/23  0813   GLUCOSE 107* 96     Coags:  Recent Labs     11/15/23  0939   INR 2.66*         Urinalysis:   Recent Labs     11/15/23  1948   COLORURINE Orange*   CLARITY Cloudy*   SPECGRAVITY 1.012   PHURINE 7.0   GLUCOSEUR Negative   KETONES Negative   NITRITE Negative   OCCULTBLOOD Large*   RBCURINE >150*   BACTERIA Negative   EPITHELCELL Negative       Imaging:  CT-ABDOMEN-PELVIS W/O   Final Result   Addendum (preliminary) 1 of 1   Normal appendix.      Final      1. No acute posttraumatic imaging findings.   2. Moderate right hydronephrosis and hydroureter secondary to obstructing mid right ureteral stone measuring 6 mm in diameter.   3. Bilateral nephrolithiasis.   4. Resolution of left lower lobe groundglass opacities.      CT-HEAD W/O   Final Result      1.  Mild cerebral atrophy and chronic microvascular ischemic type changes.   2.  No acute intracranial abnormality.   3.  Small left temporal occipital scalp contusion.         DX-PORTABLE FLUOROSCOPY < 1 HOUR    (Results Pending)       @lastct@     Assessment/Recommendation   83 y.o.F with Right ureattal stone.  She understands the risk of inability to access ureter, the need for second procedures, the possibility of negative ureteroscopy, that she may have stent discomfort until this is removed, bleeding, infection, ureteral injury or stricture, bladder injury, post op urinary retention requiring lee catheter, and the general pulmonary and cardiovascular risks associated with anesthesia. After a full discussion of the alternatives risks and benefits of the procedure, the patient consented to proceeding with the planned procedure.     Recommend CULTS  Consent obtained       Alexsander Reyes M.D.   [address] [place] [address]    [phone]

## 2023-11-16 NOTE — PROGRESS NOTES
4 Eyes Skin Assessment Completed by VARGAS Whitehead and VARGAS Bentley.    Head WDL  Ears WDL  Nose WDL  Mouth WDL  Neck WDL  Breast/Chest WDL  Shoulder Blades WDL  Spine WDL  (R) Arm/Elbow/Hand WDL  (L) Arm/Elbow/Hand WDL  Abdomen WDL  Groin WDL  Scrotum/Coccyx/Buttocks WDL  (R) Leg Redness and Bruising scabs  (L) Leg Redness and Bruising scabbs  (R) Heel/Foot/Toe Dry  (L) Heel/Foot/Toe Dry          Devices In Places Blood Pressure Cuff and Pulse Ox monitor leads, NC      Interventions In Place Gray Ear Foams and NC W/Ear Foams , Pt is able to turn self and encouraged to do so .     Possible Skin Injury No    Pictures Uploaded Into Epic N/A  Wound Consult Placed N/A  RN Wound Prevention Protocol Ordered No

## 2023-11-16 NOTE — ANESTHESIA PREPROCEDURE EVALUATION
Case: [id number] Date/Time: 11/16/23 1700    Procedures:       CYSTOSCOPY, WITH URETERAL STENT INSERTION (Right)      URETEROSCOPY      LITHOTRIPSY, USING LASER    Location: [facility]    Surgeons: Alexsander Reyes M.D.            Relevant Problems   PULMONARY   (positive) Other emphysema (HCC)      CARDIAC   (positive) Coronary artery disease involving native coronary artery of native heart without angina pectoris   (positive) Peripheral arterial disease (HCC)   (positive) Persistent atrial fibrillation (HCC)   (positive) Pulmonary hypertension (HCC)   (positive) Severe mitral regurgitation      GI   (positive) Gastroesophageal reflux disease without esophagitis       Physical Exam    Airway   Mallampati: II  TM distance: >3 FB  Neck ROM: full       Cardiovascular - normal exam  Rhythm: regular  Rate: normal  (+) murmur     Dental - normal exam           Pulmonary - normal exam  Breath sounds clear to auscultation     Abdominal    Neurological - normal exam                   Anesthesia Plan    ASA 4 (severe mitral regurgitation, moderate aortic stenosis, COPD)   ASA physical status 4 criteria: other (comment)    Plan - general       Airway plan will be LMA          Induction: intravenous    Postoperative Plan: Postoperative administration of opioids is intended.    Pertinent diagnostic labs and testing reviewed    Informed Consent:    Anesthetic plan and risks discussed with patient.    Use of blood products discussed with: patient whom consented to blood products.

## 2023-11-16 NOTE — ASSESSMENT & PLAN NOTE
Chronic issue, followed by cardiology.  NIRAJ 11/2023 showed EF 60%. Currently on Coumadin for anticoagulation.  40 mg twice daily sotalol. last dose prior to arrival.  This was held for urological intervention.  Will need to be continued.  She did have a recent fall with positive head strike but imaging on admission did not show evidence of a bleed.  -Continue Coumadin pending urology recs  - continue home 40 mg twice daily sotalol

## 2023-11-16 NOTE — PROGRESS NOTES
Bedside report received from night RN, pt care assumed, assessment completed. Pt is A&O4, pain 2, nsr on the monitor. Updated on POC, questions answered. Bed in lowest, locked position, treaded socks on, call light and belongings within reach.

## 2023-11-16 NOTE — CARE PLAN
The patient is Watcher - Medium risk of patient condition declining or worsening    Shift Goals  Clinical Goals: Pain Control  Patient Goals: Pain control  Family Goals: No family at bedside    Progress made toward(s) clinical / shift goals:      Problem: Pain - Standard  Goal: Alleviation of pain or a reduction in pain to the patient’s comfort goal  Outcome: Progressing- Pt denied pain      Problem: Knowledge Deficit - Standard  Goal: Patient and family/care givers will demonstrate understanding of plan of care, disease process/condition, diagnostic tests and medications  Outcome: Progressing     Problem: Hemodynamics  Goal: Patient's hemodynamics, fluid balance and neurologic status will be stable or improve  Outcome: Progressing       Patient is not progressing towards the following goals:

## 2023-11-16 NOTE — PROGRESS NOTES
[facility] PROGRESS NOTE     Attending:   Yaima Robles MD    Resident:   Queta Cruz MD PGY-3    PATIENT:   Angie Root; [id number]; 1940    ID: 83-year-old female with history of CAD, A-fib on sotalol and warfarin, severe mitral regurgitation, aortic stenosis, and COPD who presented with right lower quadrant pain and admitted for right obstructing nephrolithiasis with moderate right hydronephrosis and hydroureter.     SUBJECTIVE:   No acute events overnight, vital signs have remained stable and patient has remained afebrile.  Patient reports that she is feeling better today.  She denies any specific complaints.  All questions answered.  She was updated on plan of care.  Patient reports that she is eager to go to the OR today so she can be discharged if everything looks good.    OBJECTIVE:  Vitals:    11/16/23 0500 11/16/23 0600 11/16/23 0700 11/16/23 0709   BP: 113/62   119/62   Pulse:    68   Resp:    17   Temp:    36.9 °C (98.4 °F)   TempSrc:    Temporal   SpO2: 98% 96% 98% 96%   Weight:       Height:           Intake/Output Summary (Last 24 hours) at 11/16/2023 0803  Last data filed at 11/16/2023 0600  Gross per 24 hour   Intake 238.73 ml   Output --   Net 238.73 ml       PHYSICAL EXAM:   General: No acute distress, afebrile, resting comfortably, conversational   HEENT: NC/AT. EOMI.   Respiratory: CTAB, no tachypnea or retractions   Abdomen: soft, nontender, nondistended, no CVA tenderness  EXT:  ALANIS, no edema  Skin: No erythema/lesions   Neuro: Non-focal, alert and orientated       LABS:  Recent Labs     11/15/23  1839   WBC 9.7   RBC 4.23   HEMOGLOBIN 12.1   HEMATOCRIT 38.1   MCV 90.1   MCH 28.6   RDW 47.8   PLATELETCT 157*   MPV 10.9   NEUTSPOLYS 74.90*   LYMPHOCYTES 13.30*   MONOCYTES 8.50   EOSINOPHILS 2.10   BASOPHILS 0.80     Recent Labs     11/15/23  1839   SODIUM 142   POTASSIUM 4.2   CHLORIDE 106   CO2 25   BUN 16   CREATININE 1.00   CALCIUM 9.6   ALBUMIN 3.4     Estimated GFR/CRCL  = CrCl cannot be calculated (Unknown ideal weight.).  Recent Labs     11/15/23  1839   GLUCOSE 107*     Recent Labs     11/15/23  0939 11/15/23  1839   ASTSGOT  --  38   ALTSGPT  --  22   TBILIRUBIN  --  0.3   ALKPHOSPHAT  --  67   GLOBULIN  --  2.7   INR 2.66*  --              Recent Labs     11/15/23  0939   INR 2.66*     IMAGING:   CT-ABDOMEN-PELVIS W/O   Final Result   Addendum (preliminary) 1 of 1   Normal appendix.      Final      1. No acute posttraumatic imaging findings.   2. Moderate right hydronephrosis and hydroureter secondary to obstructing mid right ureteral stone measuring 6 mm in diameter.   3. Bilateral nephrolithiasis.   4. Resolution of left lower lobe groundglass opacities.      CT-HEAD W/O   Final Result      1.  Mild cerebral atrophy and chronic microvascular ischemic type changes.   2.  No acute intracranial abnormality.   3.  Small left temporal occipital scalp contusion.             CULTURES:   Results       Procedure Component Value Units Date/Time    URINE CULTURE(NEW) [[id number]] Collected: 11/15/23 1130    Order Status: Sent Specimen: Urine, Clean Catch Updated: 11/16/23 0013    Narrative:      Indication for culture:->Patient WITHOUT an indwelling Bhakta  catheter in place with new onset of Dysuria, Frequency,  Urgency, and/or Suprapubic pain  Release to patient->Immediate    URINALYSIS [[id number]]  (Abnormal) Collected: 11/15/23 1948    Order Status: Completed Specimen: Urine Updated: 11/15/23 2048     Color Orange     Character Cloudy     Specific Gravity 1.012     Ph 7.0     Glucose Negative mg/dL      Ketones Negative mg/dL      Protein 30 mg/dL      Bilirubin Negative     Urobilinogen, Urine 1.0     Nitrite Negative     Leukocyte Esterase Small     Occult Blood Large     Micro Urine Req Microscopic            MEDS:  Current Facility-Administered Medications   Medication Last Admin    tamsulosin (Flomax) capsule 0.4 mg      lactated ringers infusion New Bag at 11/16/23 8821     albuterol inhaler 2 Puff 2 Puff at 11/16/23 0653    ALPRAZolam (Xanax) tablet 0.25 mg      ipratropium-albuterol (DUONEB) nebulizer solution      senna-docusate (Pericolace Or Senokot S) 8.6-50 MG per tablet 2 Tablet      And    polyethylene glycol/lytes (Miralax) PACKET 1 Packet      And    magnesium hydroxide (Milk Of Magnesia) suspension 30 mL      And    bisacodyl (Dulcolax) suppository 10 mg      ketorolac (Toradol) injection 15 mg      acetaminophen (Tylenol) tablet 650 mg      labetalol (Normodyne/Trandate) injection 10 mg      ondansetron (Zofran) syringe/vial injection 4 mg      ondansetron (Zofran ODT) dispertab 4 mg      nicotine (Nicoderm) 14 MG/24HR 14 mg 14 mg at 11/16/23 0539    And    nicotine polacrilex (Nicorette) 2 MG piece 2 mg         ASSESSMENT/PLAN: 83-year-old female admitted 11/15 with history of CAD, A-fib on sotalol and warfarin, severe mitral regurgitation, aortic stenosis, and COPD who presented with right lower quadrant pain and admitted for right obstructing nephrolithiasis with moderate right hydronephrosis and hydroureter.     * Ureterolithiasis- (present on admission)  Assessment & Plan  History of kidney stones x2, followed by Dr. Sierra with [facility].  UA with RBC>150, no evidence of infection.  CT abdomen/pelvis showed moderate right hydronephrosis and hydroureter ureter secondary to obstructive doing mid right ureteral stone measuring 6 mm in diameter.  Urology consulted, Dr. Morgan who recommended admission to the hospital for observation and starting antibiotics.  Antibiotics were held given patient's history of severe C. difficile requiring fecal transplant given there were no signs of infection.  Urology took patient to the OR at 11/16.  -Follow urology recommendations  -Possible discharge tomorrow if patient stable   -Repeat BMP in the morning  -Likely can DC Flomax following urology intervention    Ground-level fall  Assessment & Plan  Patient fell and struck the  "back of her head but from a \"short distance\".  Denies LOC, laceration or headache.  Patient on warfarin last dose yesterday.  CTA head significant for mild cerebral atrophy and chronic microvascular ischemic type change, no acute intercranial abnormality and small left temporal occipital scalp contusion.  -PT OT eval       Persistent atrial fibrillation (HCC)- (present on admission)  Assessment & Plan  Chronic issue, followed by cardiology.  NIRAJ 11/2023 showed EF 60%. Currently on Coumadin for anticoagulation.  40 mg twice daily sotalol. last dose prior to arrival.  This was held for urological intervention.  Will need to be continued.  She did have a recent fall with positive head strike but imaging on admission did not show evidence of a bleed.  -Continue Coumadin  - continue home 40 mg twice daily sotalol    Other emphysema (HCC)- (present on admission)  Assessment & Plan  Patient is a current smoker and smokes about 2 cigarettes/day, patient is smoked since she was 15.  Patient declined PFTs.  Current COPD regimen is albuterol and DuoNebs as needed for shortness of breath.  Not on home oxygen. No signs of COPD exacerbation.   - Continue albuterol 1 to 2 puffs every 4 hours as needed  -DuoNebs every 4 hours as needed  -nicotine replacement therapy     Chronic anticoagulation- (present on admission)  Assessment & Plan  Patient with a history of atrial fibrillation, currently on Coumadin.  Last dose prior to arrival to the hospital.  Was held on admission due to possible surgical intervention.  -Resume Coumadin      Core Measures:   Fluids: LR at 75 ml/per hour  Lines: PIV  Abx: None  DVT prophylaxis: On therapeutic anticoagulation  Code Status: DNR/DNI    Disposition: OR today with urology, possible discharge pending urology recs    Queta Cruz MD   PGY-3 Family Medicine Resident   [facility]       "

## 2023-11-16 NOTE — ED NOTES
Med rec completed per patient  Allergies reviewed  No PO Antibiotics in the last 30 days     Patient is on Warfarin 1 mg tablets:  Patient takes 1/2 tablet (0.5 mg) on Tuesdays   And 1 tablet (1 mg) on all other days   Last dose 11/14 at 1730    Preferred Pharmacy: [facility] on [address]

## 2023-11-16 NOTE — DISCHARGE INSTRUCTIONS
DR. SALGUERO' DISCHARGE INSTRUCTIONS FOLLOWING   URETEROSCOPY AND LASER LITHOTRIPSY      DIET:  You can resume your regular diet. We encourage you to eat well-balanced and nutritious meals.      ACTIVITY:  Please restrain from strenuous activity or heavy lifting (more than 20 pounds) for the next week.  Please walk daily as much as tolerated, making exercise a part of your daily life. Do not drive while using narcotics for pain control. You may resumes showering and bathing without any restrictions.     WOUND CARE:  1. You have no dressing or open wounds to manage.   2. You do have a internal ureteral stent on strings.  Please do not pull these strings as they will be used at your follow up visit to remove the stent.        MEDICATIONS:  - Please use an over the counter antiinflammatory (ie Ibuprofen, naproxen, Ketoralac) and/or Tylenol for pain control as needed.  - You may take 3 days of pyridium as prescribed for numbing the bladder and burning with urination.  - You have been prescribed oxybutynin prn to help with bladder spasms or burning with urination. Please hold this if having difficulty urinating however.   - If you have severe pain refractory to Ibuprofen you may use Oxycodone for pain control as well as prescribed. If taking a narcotic, please use a stool softener like miralax or colace to prevent constipation.  - Please use flomax daily as prescribed while you have a stent in place to lessen stone pain.   - If you are using aspirin, Plavix, or coumadin, please don't restart these medications until 1 day after your discharge if you are not having large amounts of blood in the urine.    FOLLOW-UP:  We will call you to schedule your follow up appointment in 5-7 days with Dr. Salguero' physician assistant for stent removal on strings and discussion of stone diet. You will have f/u with  in approximately 8 weeks with renal ultrasound and possibly metabolic work up if you've had recurrent stones. If you have  not heard from us in 1-2 business days, please call [phone] to schedule your follow-up appointment. You may also contact this number if you have questions or concerns that can be answered by Dr. Reyes' staff.      WARNING SIGNS:  Fever greater than 101 degrees Fahrenheit, chills, nausea or vomiting, Large amount of clots in urine that make it difficult to urinate or for urine to drain from lee, increasing pain, or abdominal swelling. If you are experiencing these symptoms, call the Urology Clinic or go to your local PCP or emergency room.    It is normal to see blood in your urine for up to 2 weeks even from surgery. The urine may clear up entirely, and then turn bloody again a few days later depending on your activity level; do not be alarmed. However, if you experience severe pain or tenderness, have a lot of increased bleeding, or find that you are unable to urinate because of large clots, please notify your doctor immediately     MEDICAL HELP DURING NORMAL BUSINESS HOURS:  Between the hours of 8 AM and 5 PM, please call [phone] to speak with Dr. Alexsander Reyes’ staff.     MEDICAL HELP AFTER HOURS:  If you have a serious emergency such as chest pain, shortness of breath, relentless pain you should call [phone]. For other urgent problems after hours you may contact the urology physician on call by phoning the [phone]. You may also visit the Emergency room at local hospital for help.     For non-emergent problems such as prescription refills or routine questions, please do your best to contact us during normal business hours. This after-hours number should be used for urgent or emergent questions only.         Alexsander Reyes M.D.   [address] [place] [address]   [phone]

## 2023-11-16 NOTE — PROGRESS NOTES
[facility] PROGRESS NOTE     This note is intended for the purposes of medical student education and feedback only.   Please refer to the documentation by this patient's assigned medical practitioner for details of care and plans.    Attending: Yaima Robles MD    Resident: Queta Cruz MD    PATIENT: Angie Root; [id number]; 1940    ID: Ms. Angie Root is an 83 y.o. female with PMH significant for CAD with 2 stents placed in 2009, A-fib in 2016 on sotalol and warfarin, severe mitral regurgitation, aortic stenosis and COPD  who presented to the ED on 11/15/2023 after ground-level fall onto her head. Patient denies loss consciousness. Patient also reports she has been having right lower quadrant abdominal pain all day.      HPI:     No significant overnight events. Today, patient is resting comfortably in bed. Patient is in high spirits. Patient reports she had blood in urine since May until 5 weeks ago. Had one episode of blood in urine on Monday. Reports 2 previous episodes of nephrolithiasis in past 3 years. Has a history of C. Diff infections, and as a result would like to stay away from antibiotics as much as possible. Is eager to resolve current health concerns in order to have MitraClip procedure in near future.     ROS:  General: Denies fever, chills  HEENT: Denies headache, rhinorrhea, sore throat, ear pain, eye pain  CV: Denies chest pain, tightness, palpitations  Pulm: Reports she usually has trouble breathing/SOB in the morning due to her COPD, but that is resolves. Denies cough, pain with breathing.   GI: Denies N/V, abdominal pain, constipation, diarrhea, blood in stool  : Denies dysuria, polyuria, hematuria  MSK: Denies muscle aches and pains  Derm: Denies new rash, itchiness, new spots/marks on skin.   Neuro: Denies changes in vision, numbness, tingling     Hospital Course:  Patient presented to ED on 11/15/2023 after ground-level fall onto her head. Patient denied loss consciousness.   Patient is on Coumadin for atrial fibrillation.  Patient also reports she had been having right lower quadrant abdominal pain all day. In ED was diagnosed to have ureterolithiasis and acute UTI.     Consulted with Dr. Morgan with urology who recommended admitting patient for observation, starting antibiotics and reassessing the morning of 11/16/2023.     OBJECTIVE:     Vitals:    11/15/23 2302 11/16/23 0002 11/16/23 0100 11/16/23 0400   BP: 125/59 124/58 97/63 113/62   Pulse: 78 66 63 63   Resp: 20 17 16 16   Temp:   36.8 °C (98.2 °F) 36.2 °C (97.2 °F)   TempSrc:   Temporal Temporal   SpO2: 98% 98%  97%   Weight:   39.1 kg (86 lb 3.2 oz) 39.4 kg (86 lb 13.8 oz)   Height:           Intake/Output Summary (Last 24 hours) at 11/16/2023 0616  Last data filed at 11/16/2023 0400  Gross per 24 hour   Intake 88.73 ml   Output --   Net 88.73 ml       PE:   General: No acute distress, resting comfortably in bed.  HEENT: NC/AT. PERRLA. EOMI. MMM  Cardiovascular: Normal S1/S2, RRR, no m/r/g  Respiratory: Symmetric inspiratory effort. CTAB with no adventitious sounds  Abdomen: Bowel sounds appreciated on auscultation. Non-distended. Tenderness to palpation in RLQ.   EXT:  Bruising bilateral lower legs   Neuro: Cranial 2-12 grossly intact    LABS:  Recent Labs     11/15/23  1839   WBC 9.7   RBC 4.23   HEMOGLOBIN 12.1   HEMATOCRIT 38.1   MCV 90.1   MCH 28.6   RDW 47.8   PLATELETCT 157*   MPV 10.9   NEUTSPOLYS 74.90*   LYMPHOCYTES 13.30*   MONOCYTES 8.50   EOSINOPHILS 2.10   BASOPHILS 0.80     Recent Labs     11/15/23  1839   SODIUM 142   POTASSIUM 4.2   CHLORIDE 106   CO2 25   BUN 16   CREATININE 1.00   CALCIUM 9.6   ALBUMIN 3.4     Estimated GFR/CRCL = CrCl cannot be calculated (Unknown ideal weight.).  Recent Labs     11/15/23  1839   GLUCOSE 107*     Recent Labs     11/15/23  0939 11/15/23  1839   ASTSGOT  --  38   ALTSGPT  --  22   TBILIRUBIN  --  0.3   ALKPHOSPHAT  --  67   GLOBULIN  --  2.7   INR 2.66*  --               Recent Labs     11/15/23  0939   INR 2.66*      Latest Reference Range & Units 11/15/23 18:39 11/15/23 23:35   Troponin T 6 - 19 ng/L 20 (H) 20 (H)   (H): Data is abnormally high    Results         Procedure Component Value Units Date/Time     URINALYSIS [[id number]]  (Abnormal) Collected: 11/15/23 1948     Order Status: Completed Specimen: Urine Updated: 11/15/23 2048       Color Orange       Character Cloudy       Specific Gravity 1.012       Ph 7.0       Glucose Negative mg/dL         Ketones Negative mg/dL         Protein 30 mg/dL         Bilirubin Negative       Urobilinogen, Urine 1.0       Nitrite Negative       Leukocyte Esterase Small       Occult Blood Large       Micro Urine Req Microscopic        EKG   2023-11-15     Measurements   Intervals                               Axis   Rate:       81                           P:          89   KS:         139                          QRS:        -61   QRSD:       88                           T:          70   QT:         412   QTc:        479     Interpretive Statements   Sinus rhythm   Probable left atrial enlargement   LAD, consider left anterior fascicular block   RSR' in V1 or V2, probably normal variant   Left ventricular hypertrophy   Electronically Signed On 11- 22:38:37 PST by Oseas Hartman MD     IMAGING:   CT-ABDOMEN-PELVIS W/O   Final Result   Addendum (preliminary) 1 of 1   Normal appendix.      Final      1. No acute posttraumatic imaging findings.   2. Moderate right hydronephrosis and hydroureter secondary to obstructing mid right ureteral stone measuring 6 mm in diameter.   3. Bilateral nephrolithiasis.   4. Resolution of left lower lobe groundglass opacities.      CT-HEAD W/O   Final Result      1.  Mild cerebral atrophy and chronic microvascular ischemic type changes.   2.  No acute intracranial abnormality.   3.  Small left temporal occipital scalp contusion.             MEDS:  Current Facility-Administered Medications   Medication  Last Admin    tamsulosin (Flomax) capsule 0.4 mg      lactated ringers infusion New Bag at 11/16/23 0248    albuterol inhaler 2 Puff      ALPRAZolam (Xanax) tablet 0.25 mg      ipratropium-albuterol (DUONEB) nebulizer solution      senna-docusate (Pericolace Or Senokot S) 8.6-50 MG per tablet 2 Tablet      And    polyethylene glycol/lytes (Miralax) PACKET 1 Packet      And    magnesium hydroxide (Milk Of Magnesia) suspension 30 mL      And    bisacodyl (Dulcolax) suppository 10 mg      ketorolac (Toradol) injection 15 mg      acetaminophen (Tylenol) tablet 650 mg      labetalol (Normodyne/Trandate) injection 10 mg      ondansetron (Zofran) syringe/vial injection 4 mg      ondansetron (Zofran ODT) dispertab 4 mg      nicotine (Nicoderm) 14 MG/24HR 14 mg 14 mg at 11/16/23 0539    And    nicotine polacrilex (Nicorette) 2 MG piece 2 mg       ASSESSMENT/PLAN:   Ms. Angie Root is an 83 y.o. female admitted for nephrolithiasis causing right hydronephrosis and hydroureter ureter with evidence of possible UTI.     #Right obstructing nephrolithiasis  #Moderate right hydronephrosis and hydroureter ureter  #UTI  History of kidney stones x2, followed by Dr. Sierra with [facility].  On presentation to the ER patient was afebrile.  CBC notable for normal WBC at 9.7.  UA positive for orange cloudy urine, large occult blood, protein of 30, nitrate negative, small leukocyte Estrace, WBCs 20-50 hpf, RBCs greater than 150 hpf and hyaline casts 6-10 hpf.  CT abdomen/pelvis showed moderate right hydronephrosis and hydroureter ureter secondary to obstructive doing mid right ureteral stone measuring 6 mm in diameter.  Urology consulted, Dr. Morgan who recommended admission to the hospital for observation and starting antibiotics.      Plan  - Will hold off on giving antibiotics at this time given patient's history of C. difficile  - Flomax  - LR 75 mL/hr  - Toradol for pain  - Labs: CBC and CMP AM. Urine culture pending  results  --Procedure with Urology afternoon of 11/16/2023 to resolve stones  --Plan for spending night of 11/16/2023 in hospital    #GLF  Patient fell and struck the back of her head.  Denies LOC, laceration or headache.  Patient on warfarin last dose yesterday.  CTA head significant for mild cerebral atrophy and chronic microvascular ischemic type change, no acute intercranial abnormality and small left temporal occipital scalp contusion.     Plan  - Continue to monitor, low threshold for repeat imaging given patient's history of anticoagulation     #CAD  #Afib  #Severe mitral regurgitation  #Aortic stenosis  CAD with 2 stents placed in 2009. Afib in 2016 controled on sotalol, currently on warfarin for anticoagulation therapy. Followed by cards, Dr. Lozano.  Echo 9/2023 showed EF 55 to 60%, severe MR, grade 2 diastolic dysfunction with RVSP of 55 mmHg and aortic stenosis. NIRAJ 11/2023 showed EF 60%, severe primary mitral regurgitation secondary to posterior leaflet prolapse, grossly normal right ventricular size and systolic function, moderate aortic stenosis.  Patient is scheduled to receive a MitraClip on 12/5.  After this procedure her aortic stenosis will be addressed and ultimately she will receive a watchman.      Plan  - Hold warfarin  - Continue sotalol  - Continue lovastatin  - Consider consulting cards     #COPD  #Tobacco Dependence  Patient is a current smoker and smokes about 2 cigarettes/day, patient is smoked since she was 15.  Patient declined PFTs.  Current COPD regimen is albuterol and DuoNebs as needed for shortness of breath.  Patient is not on home oxygen.     Plan  - Continue albuterol 1 to 2 puffs every 4 hours as needed  - DuoNebs 3 mils every 4 hours as needed  - NicoDerm ordered     Core Measures:   Fluids: none  Lines: PIV  Abx: none  DVT prophylaxis: PURNIMA stockings, no Anticoagulation ordered pending possible procedure  Code Status: DNR/DNI    Tamar Morgan  MS3

## 2023-11-16 NOTE — ED TRIAGE NOTES
"Chief Complaint   Patient presents with    Abdominal Pain     RLQ since this am. N/X x 1    Head Injury     MGLF. + head injury + thinners - LOC. Pt stumbled on recliner and hit her head on coffee table.        Pt is alert and oriented, speaking in full sentences. TBI activated on arrival.  Hematoma to back of head    /53   Pulse 75   Temp 36.2 °C (97.1 °F) (Temporal)   Resp 16   Ht 1.575 m (5' 2\")   Wt 39 kg (86 lb)   SpO2 96%   BMI 15.73 kg/m²     "

## 2023-11-17 VITALS
DIASTOLIC BLOOD PRESSURE: 54 MMHG | BODY MASS INDEX: 16.63 KG/M2 | WEIGHT: 90.39 LBS | OXYGEN SATURATION: 90 % | HEIGHT: 62 IN | TEMPERATURE: 97.7 F | HEART RATE: 74 BPM | SYSTOLIC BLOOD PRESSURE: 108 MMHG | RESPIRATION RATE: 17 BRPM

## 2023-11-17 PROCEDURE — 700105 HCHG RX REV CODE 258

## 2023-11-17 PROCEDURE — 700102 HCHG RX REV CODE 250 W/ 637 OVERRIDE(OP): Performed by: STUDENT IN AN ORGANIZED HEALTH CARE EDUCATION/TRAINING PROGRAM

## 2023-11-17 PROCEDURE — 700102 HCHG RX REV CODE 250 W/ 637 OVERRIDE(OP)

## 2023-11-17 PROCEDURE — A9270 NON-COVERED ITEM OR SERVICE: HCPCS | Performed by: STUDENT IN AN ORGANIZED HEALTH CARE EDUCATION/TRAINING PROGRAM

## 2023-11-17 PROCEDURE — 94664 DEMO&/EVAL PT USE INHALER: CPT

## 2023-11-17 PROCEDURE — 99406 BEHAV CHNG SMOKING 3-10 MIN: CPT

## 2023-11-17 PROCEDURE — 97161 PT EVAL LOW COMPLEX 20 MIN: CPT

## 2023-11-17 PROCEDURE — 97165 OT EVAL LOW COMPLEX 30 MIN: CPT

## 2023-11-17 PROCEDURE — 97530 THERAPEUTIC ACTIVITIES: CPT

## 2023-11-17 PROCEDURE — 99238 HOSP IP/OBS DSCHRG MGMT 30/<: CPT | Mod: GC | Performed by: STUDENT IN AN ORGANIZED HEALTH CARE EDUCATION/TRAINING PROGRAM

## 2023-11-17 PROCEDURE — A9270 NON-COVERED ITEM OR SERVICE: HCPCS

## 2023-11-17 RX ORDER — OXYBUTYNIN CHLORIDE 10 MG/1
10 TABLET, EXTENDED RELEASE ORAL
Qty: 14 TABLET | Refills: 0 | Status: SHIPPED | OUTPATIENT
Start: 2023-11-17 | End: 2023-11-29

## 2023-11-17 RX ORDER — TRAMADOL HYDROCHLORIDE 50 MG/1
50 TABLET ORAL EVERY 6 HOURS PRN
Qty: 20 TABLET | Refills: 0 | Status: SHIPPED | OUTPATIENT
Start: 2023-11-17 | End: 2023-11-22

## 2023-11-17 RX ORDER — TAMSULOSIN HYDROCHLORIDE 0.4 MG/1
0.4 CAPSULE ORAL DAILY
Qty: 30 CAPSULE | Refills: 1 | Status: SHIPPED | OUTPATIENT
Start: 2023-11-17 | End: 2023-11-29

## 2023-11-17 RX ORDER — DOCUSATE SODIUM 100 MG/1
100 CAPSULE, LIQUID FILLED ORAL 2 TIMES DAILY
Qty: 30 CAPSULE | Refills: 0 | Status: SHIPPED | OUTPATIENT
Start: 2023-11-17 | End: 2023-11-29

## 2023-11-17 RX ORDER — TRAMADOL HYDROCHLORIDE 50 MG/1
50 TABLET ORAL EVERY 6 HOURS PRN
Qty: 20 TABLET | Refills: 0 | Status: SHIPPED | OUTPATIENT
Start: 2023-11-17 | End: 2023-11-17 | Stop reason: SDUPTHER

## 2023-11-17 RX ORDER — POLYETHYLENE GLYCOL 3350 17 G/17G
17 POWDER, FOR SOLUTION ORAL DAILY
Qty: 14 EACH | Refills: 0 | Status: SHIPPED | OUTPATIENT
Start: 2023-11-17 | End: 2023-11-29

## 2023-11-17 RX ORDER — PHENAZOPYRIDINE HYDROCHLORIDE 100 MG/1
100 TABLET, FILM COATED ORAL 3 TIMES DAILY PRN
Qty: 6 TABLET | Refills: 0 | Status: SHIPPED | OUTPATIENT
Start: 2023-11-17 | End: 2023-11-29

## 2023-11-17 RX ORDER — PHENAZOPYRIDINE HYDROCHLORIDE 200 MG/1
100 TABLET, FILM COATED ORAL
Status: DISCONTINUED | OUTPATIENT
Start: 2023-11-17 | End: 2023-11-17 | Stop reason: HOSPADM

## 2023-11-17 RX ADMIN — SODIUM CHLORIDE, POTASSIUM CHLORIDE, SODIUM LACTATE AND CALCIUM CHLORIDE: 600; 310; 30; 20 INJECTION, SOLUTION INTRAVENOUS at 03:51

## 2023-11-17 RX ADMIN — ACETAMINOPHEN 650 MG: 325 TABLET, FILM COATED ORAL at 02:36

## 2023-11-17 RX ADMIN — PHENAZOPYRIDINE 100 MG: 200 TABLET ORAL at 17:10

## 2023-11-17 RX ADMIN — PHENAZOPYRIDINE 100 MG: 200 TABLET ORAL at 13:47

## 2023-11-17 RX ADMIN — TAMSULOSIN HYDROCHLORIDE 0.4 MG: 0.4 CAPSULE ORAL at 08:53

## 2023-11-17 RX ADMIN — SOTALOL HYDROCHLORIDE 40 MG: 80 TABLET ORAL at 17:11

## 2023-11-17 RX ADMIN — ALBUTEROL SULFATE 2 PUFF: 90 AEROSOL, METERED RESPIRATORY (INHALATION) at 10:10

## 2023-11-17 RX ADMIN — DOCUSATE SODIUM 50 MG AND SENNOSIDES 8.6 MG 2 TABLET: 8.6; 5 TABLET, FILM COATED ORAL at 17:10

## 2023-11-17 RX ADMIN — ALPRAZOLAM 0.25 MG: 0.25 TABLET ORAL at 05:04

## 2023-11-17 RX ADMIN — SOTALOL HYDROCHLORIDE 40 MG: 80 TABLET ORAL at 05:01

## 2023-11-17 ASSESSMENT — COGNITIVE AND FUNCTIONAL STATUS - GENERAL
TURNING FROM BACK TO SIDE WHILE IN FLAT BAD: A LITTLE
MOVING TO AND FROM BED TO CHAIR: A LITTLE
MOVING FROM LYING ON BACK TO SITTING ON SIDE OF FLAT BED: A LITTLE
STANDING UP FROM CHAIR USING ARMS: A LITTLE
CLIMB 3 TO 5 STEPS WITH RAILING: A LITTLE
MOBILITY SCORE: 18
HELP NEEDED FOR BATHING: A LITTLE
DAILY ACTIVITIY SCORE: 23
SUGGESTED CMS G CODE MODIFIER MOBILITY: CK
SUGGESTED CMS G CODE MODIFIER DAILY ACTIVITY: CI
WALKING IN HOSPITAL ROOM: A LITTLE

## 2023-11-17 ASSESSMENT — LIFESTYLE VARIABLES: PACK_YEARS: 16.7

## 2023-11-17 ASSESSMENT — ACTIVITIES OF DAILY LIVING (ADL): TOILETING: INDEPENDENT

## 2023-11-17 ASSESSMENT — FIBROSIS 4 INDEX: FIB4 SCORE: 3.73

## 2023-11-17 ASSESSMENT — GAIT ASSESSMENTS
DISTANCE (FEET): 500
GAIT LEVEL OF ASSIST: SUPERVISED

## 2023-11-17 ASSESSMENT — PAIN DESCRIPTION - PAIN TYPE: TYPE: ACUTE PAIN

## 2023-11-17 NOTE — ANESTHESIA POSTPROCEDURE EVALUATION
Patient: Angie Root    Procedure Summary       Date: 11/16/23 Room / Location: [place] [id number] / SURGERY [facility]    Anesthesia Start: 1453 Anesthesia Stop: 1540    Procedures:       CYSTOSCOPY, WITH URETERAL STENT INSERTION (Right)      URETEROSCOPY (Right)      LITHOTRIPSY, USING LASER (Right) Diagnosis: (KIDNEY STONE)    Surgeons: Alexsander Reyes M.D. Responsible Provider: Willi Calero M.D.    Anesthesia Type: general ASA Status: 4            Final Anesthesia Type: general  Last vitals  BP   Blood Pressure : (!) 145/60    Temp   36.1 °C (97 °F)    Pulse   69   Resp   17    SpO2   94 %      Anesthesia Post Evaluation    Patient location during evaluation: PACU  Patient participation: complete - patient participated  Level of consciousness: awake and alert  Pain score: 2    Airway patency: patent  Anesthetic complications: no  Cardiovascular status: hemodynamically stable  Respiratory status: acceptable  Hydration status: euvolemic    PONV: none          No notable events documented.     Nurse Pain Score: 2 (NPRS)

## 2023-11-17 NOTE — DISCHARGE SUMMARY
"  [facility] [place] [facility] ADULT DISCHARGE SUMMARY     Admit Date:  11/15/2023       Discharge Date:   11/17/23    Service:   [facility] Family Medicine Inpatient Team  Attending Physician(s):   Yaima Robles MD       Senior Resident(s):   Zheng Pope MD  Intern: Narciso Arellano MD    Primary Diagnosis:   Obstructing ureterolithiasis  Hydronephrosis    Secondary Diagnoses:                Atrial fibrillation  Severe mitral regurg  Aortic stenosis  Emphysema  Tobacco dependence      HPI (Per Dr. Garvin's Admission H&P):     Angie Root is a 83 y.o. female with a PMH significant for CAD with 2 stents placed in 2009, A-fib in 2016 on sotalol and warfarin, severe mitral regurgitation, aortic stenosis and COPD who presented with acute right lower quadrant pain that radiates to her back.  Patient reports she saw blood in her urine yesterday.  Today when she woke up she felt a little off.  As the day went on she continued to have pain in her right lower quadrant that radiated to her back. By the afternoon patient knew she was in trouble and waited for her  to get home and call 911.  Denies any fever, chills, nausea, vomiting, dysuria or diarrhea. When patient went to get up from her chair,  she put her weight on the foot rest of the recliner which collapsed and she fell to the floor hitting the back of her head on the coffee table.  She has a \"bump\" on the back of her head.  Patient denies LOC, laceration or headache. Patient has history of kidney stones and UTIs.  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  Patient is also adamant that she does not want to have any kind of open surgery.  Patient is scheduled to to receive a MitraClip on 12/5 and does not want to jeopardize getting that procedure done.      ERCourse:  Vitals: Temperature 97.1 °F, /53, HR 75, SPO2 85% in RA, RR 16.  Patient was placed on 2 " L via NC, SPO2 increased to 95-98%     Labs:   -CBC notable for WBC WNL at 9.7 and platelet count of 157  -CMP notable for slightly elevated glucose at 107 and decreased GFR 55  -Troponin slightly elevated at 20 with repeat at 20  -INR 2.66, PT 28.7  -UA: Significant for orange cloudy appearance, large occult blood, 30 protein, small leukocyte Estrace, WBC 20-50 hpf and hyaline casts 6-7 hpf     Imaging:  -EKG: Sinus rhythm, probable left atrial enlargement with ventricular hypertrophy  -CT head W/O: Mild cerebral atrophy and chronic microvascular ischemic type change, no acute intercranial abnormality, small left temporal occipital scalp contusion  -CT abdomen/pelvis: Moderate right hydronephrosis and hydro ureter secondary to obstructing mid right ureteral stone measuring 6 mm in diameter, bilateral nephrolithiasis, resolution of left lower lobe groundglass opacities     Given: Toradol     Consult: Dr. Morgan with urology who recommended admitting patient for observation, starting antibiotics and reassessing in the morning      Hospital Summary (Brief Narrative):         Angie Root was admitted to [facility] on 11/15/2023 for management of obstructing right ureterolithiasis with hydronephrosis.    #Obstructing right ureterolithiasis   #Right hydronephrosis  Does have a history of nephrolithiasis.  Had worsening right-sided pain and was admitted after CT abdomen pelvis showed an obstructing right ureterolithiasis.  Patient was not started on antibiotics due to history of severe C. difficile requiring fecal transplant.  She did not have signs of infection on labs or on urinalysis.  She was taken to the OR on 11/16 with Dr. Reyes, urology and right ureteral orifice dilation with catheter, right ureteroscopy with laser lithotripsy, stent placement was performed without complication.  Patient was medically cleared and stable for discharge home with close follow-up with primary care physician as well as with urology for  stent removal and RBUS.  -Follow-up with urology in 5 to 7 days for stent removal  -Follow-up in 8 weeks with urology for RBUS    #Atrial fibrillation  This is a chronic issue.  Patient is on Coumadin outpatient.  She was not continued on this due to surgical intervention with urology.  Can resume this on 11/17 per urology's recommendation  following procedure.  Does have upcoming cardiology procedures.  -Restart Coumadin 11/17 a.m.    #Emphysema  Chronic condition.  Patient is not on oxygen at home.  She is not on albuterol and ipratropium.  Intermittent oxygen requirements while inpatient but is improved throughout the stay no longer requiring oxygen.  She sees her pulmonologist in January 2024.  She completed a home O2 eval while admitted without any needs for oxygen.  We promoted her to follow-up with pulmonology in January.    Consultants:      Dr. Reyes, urology     Procedures:        Right ureteroscopy with laser lithotripsy, stent placement, right ureteral orifice dilation with catheter    Labs:  Results for orders placed or performed during the hospital encounter of 11/15/23   CBC WITH DIFFERENTIAL   Result Value Ref Range    WBC 9.7 4.8 - 10.8 K/uL    RBC 4.23 4.20 - 5.40 M/uL    Hemoglobin 12.1 12.0 - 16.0 g/dL    Hematocrit 38.1 37.0 - 47.0 %    MCV 90.1 81.4 - 97.8 fL    MCH 28.6 27.0 - 33.0 pg    MCHC 31.8 (L) 32.2 - 35.5 g/dL    RDW 47.8 35.9 - 50.0 fL    Platelet Count 157 (L) 164 - 446 K/uL    MPV 10.9 9.0 - 12.9 fL    Neutrophils-Polys 74.90 (H) 44.00 - 72.00 %    Lymphocytes 13.30 (L) 22.00 - 41.00 %    Monocytes 8.50 0.00 - 13.40 %    Eosinophils 2.10 0.00 - 6.90 %    Basophils 0.80 0.00 - 1.80 %    Immature Granulocytes 0.40 0.00 - 0.90 %    Nucleated RBC 0.00 0.00 - 0.20 /100 WBC    Neutrophils (Absolute) 7.24 1.82 - 7.42 K/uL    Lymphs (Absolute) 1.29 1.00 - 4.80 K/uL    Monos (Absolute) 0.82 0.00 - 0.85 K/uL    Eos (Absolute) 0.20 0.00 - 0.51 K/uL    Baso (Absolute) 0.08 0.00 - 0.12 K/uL     Immature Granulocytes (abs) 0.04 0.00 - 0.11 K/uL    NRBC (Absolute) 0.00 K/uL   Comp Metabolic Panel   Result Value Ref Range    Sodium 142 135 - 145 mmol/L    Potassium 4.2 3.6 - 5.5 mmol/L    Chloride 106 96 - 112 mmol/L    Co2 25 20 - 33 mmol/L    Anion Gap 11.0 7.0 - 16.0    Glucose 107 (H) 65 - 99 mg/dL    Bun 16 8 - 22 mg/dL    Creatinine 1.00 0.50 - 1.40 mg/dL    Calcium 9.6 8.5 - 10.5 mg/dL    Correct Calcium 10.1 8.5 - 10.5 mg/dL    AST(SGOT) 38 12 - 45 U/L    ALT(SGPT) 22 2 - 50 U/L    Alkaline Phosphatase 67 30 - 99 U/L    Total Bilirubin 0.3 0.1 - 1.5 mg/dL    Albumin 3.4 3.2 - 4.9 g/dL    Total Protein 6.1 6.0 - 8.2 g/dL    Globulin 2.7 1.9 - 3.5 g/dL    A-G Ratio 1.3 g/dL   LIPASE   Result Value Ref Range    Lipase 38 11 - 82 U/L   TROPONIN   Result Value Ref Range    Troponin T 20 (H) 6 - 19 ng/L   URINALYSIS    Specimen: Urine   Result Value Ref Range    Color Orange (A)     Character Cloudy (A)     Specific Gravity 1.012 <1.035    Ph 7.0 5.0 - 8.0    Glucose Negative Negative mg/dL    Ketones Negative Negative mg/dL    Protein 30 (A) Negative mg/dL    Bilirubin Negative Negative    Urobilinogen, Urine 1.0 Negative    Nitrite Negative Negative    Leukocyte Esterase Small (A) Negative    Occult Blood Large (A) Negative    Micro Urine Req Microscopic    ESTIMATED GFR   Result Value Ref Range    GFR (CKD-EPI) 55 (A) >60 mL/min/1.73 m 2   URINE MICROSCOPIC (W/UA)   Result Value Ref Range    WBC 20-50 (A) /hpf    RBC >150 (A) /hpf    Bacteria Negative None /hpf    Epithelial Cells Negative /hpf    Hyaline Cast 6-10 (A) /lpf   TROPONIN   Result Value Ref Range    Troponin T 20 (H) 6 - 19 ng/L   URINE CULTURE(NEW)    Specimen: Urine, Clean Catch   Result Value Ref Range    Significant Indicator NEG     Source UR     Site URINE, CLEAN CATCH     Culture Result Culture in progress.    CBC with Differential   Result Value Ref Range    WBC 7.4 4.8 - 10.8 K/uL    RBC 4.10 (L) 4.20 - 5.40 M/uL    Hemoglobin  11.6 (L) 12.0 - 16.0 g/dL    Hematocrit 38.9 37.0 - 47.0 %    MCV 94.9 81.4 - 97.8 fL    MCH 28.3 27.0 - 33.0 pg    MCHC 29.8 (L) 32.2 - 35.5 g/dL    RDW 50.4 (H) 35.9 - 50.0 fL    Platelet Count 138 (L) 164 - 446 K/uL    MPV 10.9 9.0 - 12.9 fL    Neutrophils-Polys 57.70 44.00 - 72.00 %    Lymphocytes 26.20 22.00 - 41.00 %    Monocytes 11.70 0.00 - 13.40 %    Eosinophils 3.00 0.00 - 6.90 %    Basophils 1.10 0.00 - 1.80 %    Immature Granulocytes 0.30 0.00 - 0.90 %    Nucleated RBC 0.00 0.00 - 0.20 /100 WBC    Neutrophils (Absolute) 4.29 1.82 - 7.42 K/uL    Lymphs (Absolute) 1.95 1.00 - 4.80 K/uL    Monos (Absolute) 0.87 (H) 0.00 - 0.85 K/uL    Eos (Absolute) 0.22 0.00 - 0.51 K/uL    Baso (Absolute) 0.08 0.00 - 0.12 K/uL    Immature Granulocytes (abs) 0.02 0.00 - 0.11 K/uL    NRBC (Absolute) 0.00 K/uL    Anisocytosis 1+     Macrocytosis 1+    Comp Metabolic Panel (CMP)   Result Value Ref Range    Sodium 140 135 - 145 mmol/L    Potassium 4.3 3.6 - 5.5 mmol/L    Chloride 106 96 - 112 mmol/L    Co2 30 20 - 33 mmol/L    Anion Gap 4.0 (L) 7.0 - 16.0    Glucose 96 65 - 99 mg/dL    Bun 23 (H) 8 - 22 mg/dL    Creatinine 1.16 0.50 - 1.40 mg/dL    Calcium 9.4 8.5 - 10.5 mg/dL    Correct Calcium 10.1 8.5 - 10.5 mg/dL    AST(SGOT) 26 12 - 45 U/L    ALT(SGPT) 18 2 - 50 U/L    Alkaline Phosphatase 62 30 - 99 U/L    Total Bilirubin 0.4 0.1 - 1.5 mg/dL    Albumin 3.1 (L) 3.2 - 4.9 g/dL    Total Protein 5.5 (L) 6.0 - 8.2 g/dL    Globulin 2.4 1.9 - 3.5 g/dL    A-G Ratio 1.3 g/dL   ESTIMATED GFR   Result Value Ref Range    GFR (CKD-EPI) 46 (A) >60 mL/min/1.73 m 2   MORPHOLOGY   Result Value Ref Range    RBC Morphology Present     Poikilocytosis 1+     Ovalocytes 1+     Echinocytes 1+    PERIPHERAL SMEAR REVIEW   Result Value Ref Range    Peripheral Smear Review see below    PLATELET ESTIMATE   Result Value Ref Range    Plt Estimation Decreased    DIFFERENTIAL COMMENT   Result Value Ref Range    Comments-Diff see below    EKG   Result  Value Ref Range    Report       [facility] Emergency Dept.    Test Date:  2023-11-15  Pt Name:    MAY GOMEZ                   Department: ER  MRN:        [id number]                      Room:       BL 14  Gender:     Female                       Technician: [id number]  :        [id number]                   Requested By:TRINIDAD HARTMAN  Order #:    [id number]                    Reading MD: Trinidad Hartman MD    Measurements  Intervals                                Axis  Rate:       81                           P:          89  CO:         139                          QRS:        -61  QRSD:       88                           T:          70  QT:         412  QTc:        479    Interpretive Statements  Sinus rhythm  Probable left atrial enlargement  LAD, consider left anterior fascicular block  RSR' in V1 or V2, probably normal variant  Left ventricular hypertrophy  Electronically Signed On 11- 22:38:37 PST by Trinidad Hartman MD         Imaging/ Testing:      DX-PORTABLE FLUOROSCOPY < 1 HOUR   Final Result      Portable fluoroscopy utilized for 26 seconds.      INTERPRETING LOCATION: [address] [place] [address], [id number]      CT-ABDOMEN-PELVIS W/O   Final Result   Addendum (preliminary) 1 of 1   Normal appendix.      Final      1. No acute posttraumatic imaging findings.   2. Moderate right hydronephrosis and hydroureter secondary to obstructing mid right ureteral stone measuring 6 mm in diameter.   3. Bilateral nephrolithiasis.   4. Resolution of left lower lobe groundglass opacities.      CT-HEAD W/O   Final Result      1.  Mild cerebral atrophy and chronic microvascular ischemic type changes.   2.  No acute intracranial abnormality.   3.  Small left temporal occipital scalp contusion.             Physical Exam:  Constitutional: Alert, no distress, well-groomed.  Talkative  Skin: Warm, dry, good turgor, no rashes in visible areas.  Eye: Equal, round and reactive, conjunctiva clear, lids  normal.  ENMT: Lips without lesions, good dentition, moist mucous membranes.  Respiratory: Unlabored respiratory effort, no cough.  MSK: Moves all extremities.  Neuro: Grossly normal  Psych: Normal affect and mood.    Discharge Medications:           Medication List        START taking these medications        Instructions   docusate sodium 100 MG Caps  Commonly known as: Colace   Doctor's comments: Please use to prevent constipation.  If having loose stools, this can be held.  Take 1 Capsule by mouth 2 times a day.  Dose: 100 mg     oxybutynin SR 10 MG CR tablet  Commonly known as: Ditropan-XL   Take 1 Tablet by mouth 1 time a day as needed (bladder spasms/stent pain.). For stent pain/bladder spasms. Stop if having difficulty peeing.  Dose: 10 mg     phenazopyridine 100 MG Tabs  Commonly known as: Pyridium   Take 1 Tablet by mouth 3 times a day as needed (bladder pain, dysuria).  Dose: 100 mg     polyethylene glycol/lytes 17 g Pack  Commonly known as: Miralax   Take 1 Packet by mouth every day. Please take to prevent constipation following your procedure.  Hold if loose stools  Dose: 17 g     tamsulosin 0.4 MG capsule  Commonly known as: Flomax   Take 1 Capsule by mouth every day. For relief from stent or ureteral pain.  Dose: 0.4 mg     traMADol 50 MG Tabs  Commonly known as: Ultram   Take 1 Tablet by mouth every 6 hours as needed for Moderate Pain or Severe Pain for up to 5 days.  Dose: 50 mg            CHANGE how you take these medications        Instructions   warfarin 1 MG Tabs  What changed:   how much to take  when to take this  additional instructions  Commonly known as: Coumadin   TAKE ONE TO TWO TABLETS DAILY OR AS DIRECTED BY ANTICOAGULATION CLINIC            CONTINUE taking these medications        Instructions   albuterol 108 (90 Base) MCG/ACT Aers inhalation aerosol   INHALE ONE TO TWO PUFFS BY MOUTH EVERY 4 HOURS AS NEEDED FOR SHORTNESS OF BREATH     ALPRAZolam 0.25 MG Tabs  Commonly known as:  Xanax   TAKE ONE TABLET BY MOUTH TWICE A DAY AS NEEDED FOR ANXIETY     ascorbic acid 500 MG Tabs  Commonly known as: Ascorbic Acid   Take 2 Tablets by mouth every day.  Dose: 1,000 mg     CALCIUM 1200+D3 PO   Take 1 Tablet by mouth every day.  Dose: 1 Tablet     D-Mannose Powd   Take 1 Dose by mouth every day. Mix one scoop with 4 oz of water  Dose: 1 Dose     EPINEPHrine 0.3 MG/0.3ML Soaj solution for injection  Commonly known as: Epipen   epinephrine 0.3 mg/0.3 mL injection, auto-injector     fluticasone 50 MCG/ACT nasal spray  Commonly known as: Flonase   Administer 1 Spray into affected nostril(S) every day.  Dose: 1 Spray     furosemide 20 MG Tabs  Commonly known as: Lasix   Take 1 Tablet by mouth every day.  Dose: 20 mg     ipratropium-albuterol 0.5-2.5 (3) MG/3ML nebulizer solution  Commonly known as: Duoneb   Take 3 mL by nebulization every four hours as needed for Shortness of Breath.  Dose: 3 mL     lovastatin 40 MG tablet  Commonly known as: Mevacor   TAKE 1 TABLET AT BEDTIME     PROBIOTIC PO   Take 1 Capsule by mouth every day.  Dose: 1 Capsule     sotalol 80 MG Tabs  Commonly known as: Betapace   TAKE 1/2 TABLET TWICE DAILY Strength: 80 mg     vitamin D 1000 Unit (25 mcg) Tabs  Commonly known as: cholecalciferol   Take 3,000 Units by mouth every morning. 3 tablets = 3000 units  Dose: 3,000 Units     ZINC PO   Take 1 Tablet by mouth every day.  Dose: 1 Tablet                Disposition:   Discharge to home    Diet:   Cardiac    Activity:   As tolerated    Instructions:       The patient was instructed to return to the ER in the event of worsening symptoms. I have counseled the patient on the importance of compliance and the patient has agreed to proceed with all medical recommendations and follow up plan indicated above.   The patient understands that all medications come with benefits and risks. Risks may include permanent injury or death and these risks can be minimized with close reassessment and  monitoring.        Please CC: Beryl Pang M.D.    Follow up appointment details :        Future Appointments   Date Time Provider Department Center   11/22/2023  9:30 AM [facility] None   12/12/2023 11:30 AM Beryl Pang M.D. [facility] [place]   1/11/2024 10:30 AM [id number] None   1/11/2024 11:30 AM Peggy Tejeda M.D. [facility] None       Follow up with Primary Care Physician within 7 days of discharge for further evaluation and care    Pending Studies:        None      (1) The patient recovered much more quickly than anticipated on admission and is currently suitable for discharge.    Or     (2) The patient met the 2-midnight criteria for an inpatient stay at the time of discharge.

## 2023-11-17 NOTE — CARE PLAN
The patient is Stable - Low risk of patient condition declining or worsening    Shift Goals  Clinical Goals: Post-op vitals, Pain control, monitor Urine output  Patient Goals: get better  Family Goals: na    Progress made toward(s) clinical / shift goals:    Problem: Pain - Standard  Goal: Alleviation of pain or a reduction in pain to the patient’s comfort goal  Outcome: Progressing     Problem: Knowledge Deficit - Standard  Goal: Patient and family/care givers will demonstrate understanding of plan of care, disease process/condition, diagnostic tests and medications  Outcome: Progressing     Problem: Hemodynamics  Goal: Patient's hemodynamics, fluid balance and neurologic status will be stable or improve  Outcome: Progressing       Patient is not progressing towards the following goals:

## 2023-11-17 NOTE — PROGRESS NOTES
[facility] MEDICINE PROGRESS NOTE     This note is intended for the purposes of medical student education and feedback only.   Please refer to the documentation by this patient's assigned medical practitioner for details of care and plans.    Attending: Yaima Robles MD    Resident: Jonathan Pope MD    PATIENT: Angie Root; [id number]; 1940     ID: Ms. Angie Root is an 83 y.o. female with PMH significant for CAD with 2 stents placed in 2009, A-fib in 2016 on sotalol and warfarin, severe mitral regurgitation, aortic stenosis, and COPD  who presented to the ED on 11/15/2023 after ground-level fall onto her head and right lower quadrant pain and was admitted for right obstructing nephrolithiasis with moderate right hydronephrosis and hydroureter.     HPI:     Today, patient reports that she is still having trouble breathing, which her inhaler helps after about 30 min. This is very common for her as a result of her COPD. O2 sats are appropriate. Reported transient twinge of left-sided chest pain this morning. However, stated that she switched positions in bed and it quickly resolved. Denies any other cardiovascular complaints, or any symptoms suggesting acute coronary event. Reports blood clots and burning with urination since surgery.     ROS:  General: Denies fever, chills  HEENT: Denies headache, rhinorrhea, sore throat, ear pain, eye pain  CV: Denies chest pain, tightness, palpitations.  Pulm: Reports she usually has trouble breathing/SOB in the morning due to her COPD, but that is resolves. Denies cough, pain with breathing.   GI: Denies N/V, abdominal pain, constipation, diarrhea, blood in stool  : Endorses blood in urine and burning with urination since surgery.  MSK: Denies muscle aches and pains  Derm: Denies new rash, itchiness, new spots/marks on skin.   Neuro: Denies changes in vision, numbness, tingling      Hospital Course:  Patient presented to ED on 11/15/2023 after ground-level fall onto her  head. Patient denied loss consciousness.  Patient is on Coumadin for atrial fibrillation.  Patient also reports she had been having right lower quadrant abdominal pain all day. In ED was diagnosed to have ureterolithiasis and acute UTI.      Consulted with Dr. Morgan with urology who recommended admitting patient for observation, starting antibiotics and reassessing the morning of 11/16/2023.    On 11/16/203 patient underwent  Right Ureteral orifice dialtion with catheter, Cystoscopy, Right Ureteroscopy w/ laser lithotripsy, JJ stent, and Right retrograde pyelogram performed by Dr. Reyes. Doctor reports she was brought to recovery in satisfactory condition. Discharge OK after patient able to void, F/U in outpatient setting.       OBJECTIVE:     Vitals:    11/16/23 2310 11/17/23 0010 11/17/23 0043 11/17/23 0446   BP: 103/49 103/49  118/51   Pulse: (!) 59 60  68   Resp: 15 16  18   Temp:       TempSrc:  Temporal  Temporal   SpO2: 96% 95% 97% 97%   Weight:    41 kg (90 lb 6.2 oz)   Height:           Intake/Output Summary (Last 24 hours) at 11/17/2023 0522  Last data filed at 11/17/2023 0100  Gross per 24 hour   Intake 455 ml   Output 350 ml   Net 105 ml       PE:   General: No acute distress, resting comfortably in bed.  HEENT: NC/AT. PERRLA. EOMI. MMM  Cardiovascular: Normal S1/S2  Respiratory: Symmetric inspiratory effort. CTAB with no adventitious sounds  Abdomen: bowel sounds appreciated on auscultation. Soft, non-distended. Currently not tender to palpation.  EXT:  Bruising bilateral lower legs   Neuro: Cranial 2-12 grossly intact    LABS:  Recent Labs     11/15/23  1839 11/16/23  0813   WBC 9.7 7.4   RBC 4.23 4.10*   HEMOGLOBIN 12.1 11.6*   HEMATOCRIT 38.1 38.9   MCV 90.1 94.9   MCH 28.6 28.3   RDW 47.8 50.4*   PLATELETCT 157* 138*   MPV 10.9 10.9   NEUTSPOLYS 74.90* 57.70   LYMPHOCYTES 13.30* 26.20   MONOCYTES 8.50 11.70   EOSINOPHILS 2.10 3.00   BASOPHILS 0.80 1.10   RBCMORPHOLO  --  Present     Recent  Labs     11/15/23  1839 11/16/23  0813   SODIUM 142 140   POTASSIUM 4.2 4.3   CHLORIDE 106 106   CO2 25 30   BUN 16 23*   CREATININE 1.00 1.16   CALCIUM 9.6 9.4   ALBUMIN 3.4 3.1*     Estimated GFR/CRCL = CrCl cannot be calculated (Unknown ideal weight.).  Recent Labs     11/15/23  1839 11/16/23  0813   GLUCOSE 107* 96     Recent Labs     11/15/23  0939 11/15/23  1839 11/16/23  0813   ASTSGOT  --  38 26   ALTSGPT  --  22 18   TBILIRUBIN  --  0.3 0.4   ALKPHOSPHAT  --  67 62   GLOBULIN  --  2.7 2.4   INR 2.66*  --   --              Recent Labs     11/15/23  0939   INR 2.66*         IMAGING:   CT-ABDOMEN-PELVIS W/O   Final Result   Addendum (preliminary) 1 of 1   Normal appendix.      Final      1. No acute posttraumatic imaging findings.   2. Moderate right hydronephrosis and hydroureter secondary to obstructing mid right ureteral stone measuring 6 mm in diameter.   3. Bilateral nephrolithiasis.   4. Resolution of left lower lobe groundglass opacities.      CT-HEAD W/O   Final Result      1.  Mild cerebral atrophy and chronic microvascular ischemic type changes.   2.  No acute intracranial abnormality.   3.  Small left temporal occipital scalp contusion.         DX-PORTABLE FLUOROSCOPY < 1 HOUR    (Results Pending)       MEDS:  Current Facility-Administered Medications   Medication Last Admin    tamsulosin (Flomax) capsule 0.4 mg 0.4 mg at 11/16/23 0826    lactated ringers infusion New Bag at 11/17/23 0351    sotalol (Betapace) tablet 40 mg 40 mg at 11/17/23 0501    albuterol inhaler 2 Puff 2 Puff at 11/16/23 1405    ALPRAZolam (Xanax) tablet 0.25 mg 0.25 mg at 11/17/23 0504    ipratropium-albuterol (DUONEB) nebulizer solution      senna-docusate (Pericolace Or Senokot S) 8.6-50 MG per tablet 2 Tablet 2 Tablet at 11/16/23 1749    And    polyethylene glycol/lytes (Miralax) PACKET 1 Packet      And    magnesium hydroxide (Milk Of Magnesia) suspension 30 mL      And    bisacodyl (Dulcolax) suppository 10 mg       "ketorolac (Toradol) injection 15 mg      acetaminophen (Tylenol) tablet 650 mg 650 mg at 11/17/23 0236    labetalol (Normodyne/Trandate) injection 10 mg      ondansetron (Zofran) syringe/vial injection 4 mg      ondansetron (Zofran ODT) dispertab 4 mg      nicotine (Nicoderm) 14 MG/24HR 14 mg 14 mg at 11/16/23 0539    And    nicotine polacrilex (Nicorette) 2 MG piece 2 mg         ASSESSMENT/PLAN: Ms. Angie Root is an 83-year-old female admitted 11/15 with history of CAD, A-fib on sotalol and warfarin, severe mitral regurgitation, aortic stenosis, and COPD who presented with right lower quadrant pain and admitted for right obstructing nephrolithiasis with moderate right hydronephrosis and hydroureter.      * Ureterolithiasis- (present on admission)  Assessment & Plan  History of kidney stones x2, followed by Dr. Sierra with [facility].  UA with RBC>150, no evidence of infection.  CT abdomen/pelvis showed moderate right hydronephrosis and hydroureter ureter secondary to obstructive doing mid right ureteral stone measuring 6 mm in diameter.  Urology consulted, Dr. Morgan who recommended admission to the hospital for observation and starting antibiotics.  Antibiotics were held given patient's history of severe C. difficile requiring fecal transplant given there were no signs of infection.  On 11/16/203 patient underwent  Right Ureteral orifice dialtion with catheter, Cystoscopy, Right Ureteroscopy w/ laser lithotripsy, JJ stent, and Right retrograde pyelogram performed by Dr. Reyes  -Follow urology recommendations  -Repeat BMP today  -Likely can DC Flomax following urology intervention  -Discharge today     Ground-level fall  Assessment & Plan  Patient fell and struck the back of her head but from a \"short distance\".  Denies LOC, laceration or headache.  Patient on warfarin last dose yesterday.  CTA head significant for mild cerebral atrophy and chronic microvascular ischemic type change, no acute intercranial " abnormality and small left temporal occipital scalp contusion.  -Have patient monitor for neurological symptoms at home and return to hospital if anything arises        Persistent atrial fibrillation (HCC)- (present on admission)  Assessment & Plan  Chronic issue, followed by cardiology.  NIRAJ 11/2023 showed EF 60%. Currently on Coumadin for anticoagulation.  40 mg twice daily sotalol. last dose prior to arrival.  This was held for urological intervention.  Will need to be continued.  She did have a recent fall with positive head strike but imaging on admission did not show evidence of a bleed.  -Continue Coumadin  - continue home 40 mg twice daily sotalol     Other emphysema (HCC)- (present on admission)  Assessment & Plan  Patient is a current smoker and smokes about 2 cigarettes/day, patient is smoked since she was 15.  Patient declined PFTs.  Current COPD regimen is albuterol and DuoNebs as needed for shortness of breath.  Not on home oxygen. No signs of COPD exacerbation.   - Continue albuterol 1 to 2 puffs every 4 hours as needed  -DuoNebs every 4 hours as needed  -nicotine replacement therapy      Chronic anticoagulation- (present on admission)  Assessment & Plan  Patient with a history of atrial fibrillation, currently on Coumadin.  Last dose prior to arrival to the hospital.  Was held on admission due to possible surgical intervention.  -Resume Coumadin        Core Measures:   Fluids: LR at 75 ml/per hour  Lines: PIV  Abx: None  DVT prophylaxis: On therapeutic anticoagulation  Code Status: DNR/DNI     Disposition: Discharge today if patient is able to void on her own, follow up with [facility] and [facility] in outpatient setting    Tamar Morgan  MS3

## 2023-11-17 NOTE — THERAPY
Physical Therapy   Initial Evaluation     Patient Name: Angie Root  Age:  83 y.o., Sex:  female  Medical Record #: [id number]  Today's Date: 11/17/2023    Assessment  Patient is 83 y.o. female who presented with RLQ abdominal pain and GLF with head trauma.  Pt admitted with R obstructing nephrolithiasis, UTI, and R hydronephrosis and hydroureter.  Head CT negative for acute changes.  PMH includes CAD s/p stents x 2, A fib, severe mitral regurgitation, aortic stenosis pending MitraClip on 12/5, and C. Diff.  Pt was seen for PT evaluation, demonstrated all functional mobility with SPV.  Therapeutic activity provided in conjunction with PT evaluation including: progression of ambulation in dynamic environment, functional transfers, toilet transfers, and standing balance.  Educated pt to continue mobilizing with nursing staff including sitting up in chair & ambulating daily to prevent further deconditioning.  No further acute PT needs.    Plan    Physical Therapy Initial Treatment Plan   Duration: Evaluation only    DC Equipment Recommendations: None  Discharge Recommendations: Anticipate that the patient will have no further physical therapy needs after discharge from the hospital     Objective     11/17/23 1205   Prior Living Situation   Prior Services Home-Independent   Housing / Facility [address]   Steps Into Home 0   Equipment Owned None   Lives with - Patient's Self Care Capacity Spouse   Prior Level of Functional Mobility   Bed Mobility Independent   Transfer Status Independent   Ambulation Independent   Ambulation Distance community distances   Assistive Devices Used None   Stairs Independent   Cognition    Cognition / Consciousness WDL   Level of Consciousness Alert   Comments Pleasant & cooperative, hyperverbose & tangential   Active ROM Lower Body    Active ROM Lower Body  WDL   Strength Lower Body   Lower Body Strength  WDL   Sensation Lower Body   Lower Extremity Sensation   WDL   Other Treatments    Other Treatments Provided Therapeutic activity provided in conjunction with PT evaluation including: progression of ambulation in dynamic environment, functional transfers, toilet transfers, and standing balance.   Balance Assessment   Sitting Balance (Static) Fair   Sitting Balance (Dynamic) Fair   Standing Balance (Static) Fair   Standing Balance (Dynamic) Fair   Weight Shift Sitting Fair   Weight Shift Standing Fair   Bed Mobility    Supine to Sit Supervised   Sit to Supine Supervised   Scooting Supervised   Gait Analysis   Gait Level Of Assist Supervised   Assistive Device None   Distance (Feet) 500   # of Times Distance was Traveled 1   Weight Bearing Status No restrictions   Functional Mobility   Sit to Stand Supervised   Bed, Chair, Wheelchair Transfer Supervised   Toilet Transfers Supervised   Transfer Method Stand Step   Mobility bed mobility, ambulation in hallway   Activity Tolerance   Comments functional   Education Group   Education Provided Role of Physical Therapist   Role of Physical Therapist Patient Response Patient;Acceptance;Explanation;Verbal Demonstration   Physical Therapy Initial Treatment Plan    Duration Evaluation only

## 2023-11-17 NOTE — PROGRESS NOTES
Report received from outgoing nurse, care transferred at 1900. Patient currently in SB 56 on the monitor and A&Ox 4. Post-op vitals in progress, patient reports a burning sensation when peeing. Dark bloody urine output noted with white sediment small clots observed at this time. Whiteboard updated with plan for the day and names of staff. No other questions or concerns at this time from the patient. Call light within reach, fall precautions in place.

## 2023-11-17 NOTE — OR NURSING
Patient recovered well in post-op. AAO4. VSS, on 2L via NC. No visible surgical sites, stent string present. Declines pain. Patient tolerating oral fluids without nausea. Spouse updated via text per patient request. Patient belongings retrieved and on [facility]. Report called to VARGAS Loera. Awaiting transport. O2 tank full.

## 2023-11-17 NOTE — RESPIRATORY CARE
"COPD EDUCATION by COPD CLINICAL EDUCATOR  11/17/2023  at  12:45 PM by Melania Alejo, RRT     Patient interviewed by COPD education team.  Patient unable to participate in full program.  A short intervention has been conducted.  A comprehensive packet including information about COPD, types of treatments to manage their disease and safe home Oxygen usage was provided and reviewed with patient at the bedside. Pulmonary maintenance medications were discussed, as well as the importance of a pulmonary function test. Patient is scheduled for a PFT in January with Dr. Tejeda.       COPD Screen  COPD Risk Screening  Do you have a history of COPD?: Yes (not diagnosised by PFT scheduled in January with [facility])  Do you have a Pulmonologist?: Yes    COPD Assessment  COPD Clinical Specialists ONLY  COPD Education Initiated: Yes--Short Intervention  Is this a COPD exacerbation patient?: No  DME Company: none  DME Equipment Type: nebulizer from [facility]  Physician Follow Up Appointment: 12/12/23  Appt Time: 1230  Physician Name: Beryl Pang M.D.  Pulmonary Follow Up Appointment: 01/11/24 (PFT and office visit)  Appt Time: 1030  Pulmonologist Name: Yasmani Teixeira  Referrals Initiated: Yes  Pulmonary Rehab: N/A  Smoking Cessation: Yes  $ Smoking Cessation 3-10 Minutes: Symptomatic (patient will not quit she only smokes 1-1 1/2 cigarettes a day)  Smoking Pack Years: 16.7  Hospice: N/A  Home Health Care:  (TBD)  $ Demo/Eval of SVN's, MDI's and Aerosols: Yes (Home medications were reviewed, COPD booklet was reviewed spacer with instruction were given to patient)  Interdisciplinary Rounds: Attendance at Rounds (30 Min)    PFT Results    No results found for: \"PFT\"    Meds to Beds        MY COPD ACTION PLAN     It is recommended that patients and physicians /healthcare providers complete this action plan together. This plan should be discussed at each physician visit and updated as needed.    The green, yellow and red " "zones show groups of symptoms of COPD. This list of symptoms is not comprehensive, and you may experience other symptoms. In the \"Actions\" column, your healthcare provider has recommended actions for you to take based on your symptoms.    Patient Name: Angie Root   YOB: 1940   Last Updated on: 11/17/2023 12:42 PM   Green Zone:  I am doing well today Actions     Usual activitiy and exercise level   Take daily medications     Usual amounts of cough and phlegm/mucus   Use oxygen as prescribed     Sleep well at night   Continue regular exercise/diet plan     Appetite is good   At all times avoid cigarette smoke, inhaled irritants     Daily Medications (these medications are taken every day):             Additional Information:  No maintenance medication at this time     Yellow Zone:  I am having a bad day or a COPD flare Actions     More breathless than usual   Continue daily medications     I have less energy for my daily activities   Use quick relief inhaler as ordered     Increased or thicker phlegm/mucus   Use oxygen as prescribed     Using quick relief inhaler/nebulizer more often   Get plenty of rest     Swelling of ankles more than usual   Use pursed lip breathing     More coughing than usual   At all times avoid cigarette smoke, inhaled irritants     I feel like I have a \"chest cold\"     Poor sleep and my symptoms woke me up     My appetite is not good     My medicine is not helping      Call provider immediately if symptoms don’t improve     Continue daily medications, add rescue medications:   Albuterol/Ipratropium (Combivent, Duoneb)  Albuterol 3mL via nebulizer  2 Puffs Every 6 hours PRN  Every 4 hours PRN       Medications to be used during a flare up, (as Discussed with Provider):           Additional Information:  Use spacer with Albuterol inhaler.     Red Zone:  I need urgent medical care Actions     Severe shortness of breath even at rest   Call 911 or seek medical care immediately "     Not able to do any activity because of breathing      Fever or shaking chills      Feeling confused or very drowsy       Chest pains      Coughing up blood

## 2023-11-17 NOTE — PROGRESS NOTES
Assumed care at 0700. Bedside report received from Mario. Patient's chart and MAR reviewed. Pt complains of ongoig burning with urination pain at this time. Pt is A & O 4. Patient was updated on plan of care for the day. Questions answered and concerns addressed.  Pt denies any additional needs at this time. White board updated. Call light, phone and personal belongings within reach.

## 2023-11-17 NOTE — CARE PLAN
"  Problem: Pain - Standard  Goal: Alleviation of pain or a reduction in pain to the patient’s comfort goal  Outcome: Progressing  Note: Patient verbalized that pain was tolerable at this time      Problem: Knowledge Deficit - Standard  Goal: Patient and family/care givers will demonstrate understanding of plan of care, disease process/condition, diagnostic tests and medications  Outcome: Progressing  Note: Verbalized plan of care and remained npo for procedure      Problem: Hemodynamics  Goal: Patient's hemodynamics, fluid balance and neurologic status will be stable or improve  Outcome: Progressing   The patient is Watcher - Medium risk of patient condition declining or worsening    Shift Goals  Clinical Goals: Monitor VS and safety from fall  Patient Goals: get better  Family Goals: No family at bedside    Progress made toward(s) clinical / shift goals:   Patient currently in procedure, /57   Pulse 66   Temp 36.1 °C (97 °F) (Temporal)   Resp 14   Ht 1.575 m (5' 2\")   Wt 39.4 kg (86 lb 13.8 oz)   SpO2 96%           "

## 2023-11-18 LAB
BACTERIA UR CULT: NORMAL
SIGNIFICANT IND 70042: NORMAL
SITE SITE: NORMAL
SOURCE SOURCE: NORMAL

## 2023-11-18 NOTE — THERAPY
"Occupational Therapy   Initial Evaluation     Patient Name: Angie Root  Age:  83 y.o., Sex:  female  Medical Record #: [id number]  Today's Date: 11/17/2023     Precautions: Fall Risk    Assessment  Patient is 83 y.o. female seen for occupational therapy evaluation.  Pt admitted from home with blood in her urine and after a fall.  Pt with R obstructing nephrolithiasis, hydronephrosis, hydroureter, and UTI.   Pt underwent uretal orifice dilation and cystoscopy.  PMH includes CAD, afib, COPD, aortic stenosis, and recurring c-diff.  Pt appears close to her baseline at this time and was able to complete simple ADL and functional mobility tasks at a supervised level.  She has support from  at OR.  Pt with no further acute OT needs.    Plan      Discharge Recommendations: Anticipate that the patient will have no further occupational therapy needs after discharge from the hospital     Subjective    \"Why do you all come on the last day I'm here?\"     Objective       11/17/23 1615   Prior Living Situation   Prior Services None   Housing / Facility 1 Story House   Bathroom Set up Walk In Shower;Grab Bars   Equipment Owned None   Lives with - Patient's Self Care Capacity Spouse   Comments Pt reports her  is supportive and can assist as needed.   Prior Level of ADL Function   Self Feeding Independent   Grooming / Hygiene Independent   Bathing Independent   Dressing Independent   Toileting Independent   Prior Level of IADL Function   Medication Management Independent   Laundry Independent   Kitchen Mobility Independent   Finances Independent   Home Management Independent   Shopping Independent   Prior Level Of Mobility Independent Without Device in Community   Occupation (Pre-Hospital Vocational) Retired Due To Age   Cognition    Cognition / Consciousness WDL   Level of Consciousness Alert   Comments Pt pleasant and cooperative, verbose.   Active ROM Upper Body   Active ROM Upper Body  WDL   Strength Upper Body "   Upper Body Strength  WDL   Balance Assessment   Sitting Balance (Static) Good   Sitting Balance (Dynamic) Good   Standing Balance (Static) Fair +   Standing Balance (Dynamic) Fair +   Comments no AD   Bed Mobility    Supine to Sit Supervised   Sit to Supine Supervised   Scooting Supervised   ADL Assessment   Grooming Modified Independent   Lower Body Dressing Supervision   Toileting Supervision   Functional Mobility   Sit to Stand Supervised   Bed, Chair, Wheelchair Transfer Supervised   Toilet Transfers Supervised   Mobility bathroom, no AD   Activity Tolerance   Sitting Edge of Bed 10+ min   Standing 5 min

## 2023-11-20 ENCOUNTER — TELEPHONE (OUTPATIENT)
Dept: CARDIOLOGY | Facility: MEDICAL CENTER | Age: 83
End: 2023-11-20
Payer: MEDICARE

## 2023-11-20 ENCOUNTER — TELEPHONE (OUTPATIENT)
Dept: OPHTHALMOLOGY | Facility: MEDICAL CENTER | Age: 83
End: 2023-11-20
Payer: MEDICARE

## 2023-11-20 NOTE — TELEPHONE ENCOUNTER
"Called patient back to discuss. Patient received a VM from and \"ophthalmology office, Dr. Keller\" to schedule an office visit. Advised this is our cardiothoracic surgeon's office, but it may have been confusing because they share an office with the ophthalmologist. Patient verbalizes understanding and will call back to schedule her pre-Mitral DORITA CTS appt.   "

## 2023-11-20 NOTE — TELEPHONE ENCOUNTER
Patient called and wasn't sure on scheduling an appt with us due to her having emergency surgery. She would wanted to speak to someone in cardiology before getting scheduled with us, I did let her know they referred her over to us to get seen before 12/5 for the clip. Will call back if appt is needed.

## 2023-11-20 NOTE — TELEPHONE ENCOUNTER
AK  Caller: Angie Root     Topic/issue: Patient recently had a kidney stone removal and would like to make sure she can proceed with her mitro clip 12/5/23. She also states she had been called by the cardiology office and was told to see ophthalmology prior to her procedure.    Callback Number: [phone]     Thank you   Patricia VALERO

## 2023-11-21 ENCOUNTER — DOCUMENTATION (OUTPATIENT)
Dept: CARDIOLOGY | Facility: MEDICAL CENTER | Age: 83
End: 2023-11-21
Payer: MEDICARE

## 2023-11-21 DIAGNOSIS — Z00.6 EXAMINATION OF PARTICIPANT IN CLINICAL TRIAL: ICD-10-CM

## 2023-11-21 DIAGNOSIS — I34.0 SEVERE MITRAL REGURGITATION: ICD-10-CM

## 2023-11-21 NOTE — PROGRESS NOTES
REFERRING PHYSICIAN: Ethan Lozano MD.     CONSULTING PHYSICIAN: Tristan Keller DO     CHIEF COMPLAINT: Shortness of breath    HISTORY OF PRESENT ILLNESS: The patient is a 83 y.o. female with past medical history of coronary artery disease status post 2 stents in 2009, atrial fibrillation on sotolol and coumadin, severe MR, AS, COPD, and recent kidney surgery who presents with dyspnea on exertion. She denies chest pain, orthopnea, PND, dizziness and syncope.     PAST MEDICAL HISTORY:   Active Ambulatory Problems     Diagnosis Date Noted    Gastroesophageal reflux disease without esophagitis 01/20/2016    Pre-diabetes 01/20/2016    Panic attacks 01/20/2016    Peripheral arterial disease (HCC) 03/01/2016    Cigarette nicotine dependence with nicotine-induced disorder 03/01/2016    Vitamin D deficiency 07/26/2016    Insomnia 07/26/2016    Depression with anxiety 02/15/2017    Osteoporosis, postmenopausal 08/24/2017    Circulating anticoagulants (HCC) 12/04/2017    History of COPD 12/05/2017    Chronic anticoagulation 04/17/2018    Coronary artery disease involving native coronary artery of native heart without angina pectoris 03/12/2019    PCI to her LAD in 2009 03/12/2019    Personal history of anaphylaxis 12/18/2020    Exercise intolerance 07/25/2022    Gross hematuria 06/05/2023    Bladder polyp 05/31/2023    Other emphysema ([facility]) 06/28/2023    Severe protein-calorie malnutrition ([facility]) 10/19/2023    Pulmonary hypertension ([facility]) 11/10/2023    Severe mitral regurgitation 11/14/2023    Persistent atrial fibrillation ([facility]) 11/14/2023    Ureterolithiasis 11/15/2023    Ground-level fall 11/16/2023     Resolved Ambulatory Problems     Diagnosis Date Noted    Hiatal hernia 01/20/2016    Paroxysmal a-fib (CMS-HCC) 01/20/2016    Screening for thyroid disorder 01/20/2016    Ankle swelling 05/23/2016    Colitis 11/17/2017    Hypokalemia 11/17/2017    Constipation 12/03/2017    Ischemic colitis ([facility]) 12/03/2017     Supratherapeutic INR 12/04/2017    Hyponatremia 12/04/2017    Hypotension 12/04/2017    Failure to thrive in adult 12/04/2017    Physical debility 12/05/2017    Sepsis associated hypotension (HCC) 12/05/2017    Diverticulitis 12/13/2019    Transaminitis 12/13/2019    Acute exacerbation of chronic obstructive pulmonary disease (HCC) 12/18/2020    Paresthesia of upper extremity 02/04/2021    Hematochezia 09/16/2021    History of renal calculi 09/23/2021    Pain of right hip joint 09/16/2021    Abnormal weight loss 07/25/2022     Past Medical History:   Diagnosis Date    Arthritis     Breath shortness     CAD (coronary artery disease)     Cancer (HCC) 1982    Cataract     COPD (chronic obstructive pulmonary disease) (HCC)     Croup 3 years old    Diverticulosis     Dyslipidemia     GERD (gastroesophageal reflux disease)     High cholesterol     Hypertension     Infectious disease 2018    Measles     Paroxysmal atrial fibrillation (HCC)     Prediabetes     PVD (peripheral vascular disease) (HCC)        PAST SURGICAL HISTORY:   Past Surgical History:   Procedure Laterality Date    ME CYSTOSCOPY,INSERT URETERAL STENT Right 11/16/2023    Procedure: CYSTOSCOPY, WITH URETERAL STENT INSERTION;  Surgeon: Alexsander Reyes M.D.;  Location: SURGERY [facility];  Service: Urology    ME CYSTO/URETERO/PYELOSCOPY, DX Right 11/16/2023    Procedure: URETEROSCOPY;  Surgeon: Alexsander Reyes M.D.;  Location: SURGERY [facility];  Service: Urology    LASERTRIPSY Right 11/16/2023    Procedure: LITHOTRIPSY, USING LASER;  Surgeon: Alexsander Reyes M.D.;  Location: SURGERY [facility];  Service: Urology    FECAL TRANSPLANT N/A 4/9/2018    Procedure: FECAL TRANSPLANT;  Surgeon: Gonzalez Cruz M.D.;  Location: [place]OSCOPY [facility]  Service: Gastroenterology    COLONOSCOPY - ENDO N/A 4/9/2018    Procedure: COLONOSCOPY - ENDO;  Surgeon: Gonzalez Cruz M.D.;  Location[place] [facility]  Service: Gastroenterology    WIDE EXCISION  MELANOMA, LEG, W/POSS.[facility]  10/2015    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    CARDIAC CATH, RIGHT HEART  2008    stent    OTHER ORTHOPEDIC SURGERY Left 2008    hand repair    CHOLECYSTECTOMY  1993    TONSILLECTOMY  1945    OTHER CARDIAC SURGERY      r heart cath stents    STENT PLACEMENT      L iliac stent        ALLERGIES:   Allergies   Allergen Reactions    Penicillins Anaphylaxis and Hives    Codeine Swelling    Iodine Swelling    Bee Venom Anaphylaxis    Chantix [Varenicline]      disoriented    Ciprofloxacin      Causes C diff, recurrent and very difficult    Diphtheria,Pertussis,Tetanus     Flagyl [Metronidazole] Rash     C/O flagyl causes rash.     Honey Bee Venom     Latex Hives    Lipitor [Atorvastatin Calcium] Unspecified     Intense Stomach pain    Other Environmental Itching and Swelling    Shellfish Allergy      unknown    Tetanus Antitoxin Swelling     unknown        CURRENT MEDICATIONS:   Current Outpatient Medications:     docusate sodium (COLACE) 100 MG Cap, Take 1 Capsule by mouth 2 times a day., Disp: 30 Capsule, Rfl: 0    oxybutynin SR (DITROPAN-XL) 10 MG CR tablet, Take 1 Tablet by mouth 1 time a day as needed (bladder spasms/stent pain.). For stent pain/bladder spasms. Stop if having difficulty peeing., Disp: 14 Tablet, Rfl: 0    polyethylene glycol/lytes (MIRALAX) 17 g Pack, Take 1 Packet by mouth every day. Please take to prevent constipation following your procedure.  Hold if loose stools, Disp: 14 Each, Rfl: 0    tamsulosin (FLOMAX) 0.4 MG capsule, Take 1 Capsule by mouth every day. For relief from stent or ureteral pain., Disp: 30 Capsule, Rfl: 1    phenazopyridine (PYRIDIUM) 100 MG Tab, Take 1 Tablet by mouth 3 times a day as needed (bladder pain, dysuria)., Disp: 6 Tablet, Rfl: 0    traMADol (ULTRAM) 50 MG Tab, Take 1 Tablet by mouth every 6 hours as needed for Moderate Pain or Severe Pain for up to 5 days., Disp: 20 Tablet, Rfl: 0    furosemide (LASIX) 20 MG Tab, Take 1 Tablet by  mouth every day., Disp: 30 Tablet, Rfl: 11    Calcium-Magnesium-Vitamin D (CALCIUM 1200+D3 PO), Take 1 Tablet by mouth every day., Disp: , Rfl:     D-Mannose Powder, Take 1 Dose by mouth every day. Mix one scoop with 4 oz of water, Disp: , Rfl:     fluticasone (FLONASE) 50 MCG/ACT nasal spray, Administer 1 Spray into affected nostril(S) every day., Disp: 16 g, Rfl: 3    albuterol 108 (90 Base) MCG/ACT Aero Soln inhalation aerosol, INHALE ONE TO TWO PUFFS BY MOUTH EVERY 4 HOURS AS NEEDED FOR SHORTNESS OF BREATH, Disp: 18 g, Rfl: 3    Multiple Vitamins-Minerals (ZINC PO), Take 1 Tablet by mouth every day., Disp: , Rfl:     ipratropium-albuterol (DUONEB) 0.5-2.5 (3) MG/3ML nebulizer solution, Take 3 mL by nebulization every four hours as needed for Shortness of Breath., Disp: 120 mL, Rfl: 11    ALPRAZolam (XANAX) 0.25 MG Tab, TAKE ONE TABLET BY MOUTH TWICE A DAY AS NEEDED FOR ANXIETY, Disp: 60 Tablet, Rfl: 5    lovastatin (MEVACOR) 40 MG tablet, TAKE 1 TABLET AT BEDTIME, Disp: 100 Tablet, Rfl: 3    warfarin (COUMADIN) 1 MG Tab, TAKE ONE TO TWO TABLETS DAILY OR AS DIRECTED BY ANTICOAGULATION CLINIC (Patient taking differently: 1 Tablet every day. TAKE ONE EVERY NIGHT EXCEPT TUESDAY TAKES A HALF OF PILL), Disp: 180 Tablet, Rfl: 1    sotalol (BETAPACE) 80 MG Tab, TAKE 1/2 TABLET TWICE DAILY Strength: 80 mg, Disp: 90 Tablet, Rfl: 3    EPINEPHrine (EPIPEN) 0.3 MG/0.3ML Solution Auto-injector solution for injection, epinephrine 0.3 mg/0.3 mL injection, auto-injector, Disp: , Rfl:     Probiotic Product (PROBIOTIC PO), Take 1 Capsule by mouth every day., Disp: , Rfl:     ascorbic acid (ASCORBIC ACID) 500 MG Tab, Take 2 Tablets by mouth every day., Disp: , Rfl:     vitamin D (CHOLECALCIFEROL) 1000 UNIT Tab, Take 3,000 Units by mouth every morning. 3 tablets = 3000 units, Disp: , Rfl:     FAMILY HISTORY:   Family History   Problem Relation Age of Onset    Hypertension Mother     Hyperlipidemia Mother     Cancer Maternal  Grandmother         tongue    Cancer Maternal Uncle         x 3, pancreas    Cancer Maternal Grandfather         unknown    Cancer Paternal Grandmother         unknown    Cancer Paternal Grandfather         unknown    Diabetes Maternal Uncle     Heart Disease Maternal Uncle     Hypertension Sister     Hyperlipidemia Sister     Heart Disease Sister     Alcohol/Drug Sister     Thyroid Sister     Psychiatric Illness Neg Hx     Stroke Neg Hx         SOCIAL HISTORY:   Social History     Socioeconomic History    Marital status:      Spouse name: Not on file    Number of children: 0    Years of education: Not on file    Highest education level: Not on file   Occupational History    Not on file   Tobacco Use    Smoking status: Every Day     Current packs/day: 0.25     Average packs/day: 0.3 packs/day for 66.7 years (16.7 ttl pk-yrs)     Types: Cigarettes     Start date: 3/20/1957    Smokeless tobacco: Never    Tobacco comments:     2 cigarettes per day   Vaping Use    Vaping Use: Never used   Substance and Sexual Activity    Alcohol use: No     Alcohol/week: 0.0 oz    Drug use: No    Sexual activity: Not Currently     Partners: Male   Other Topics Concern    Not on file   Social History Narrative    .     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     Social Determinants of Health     Financial Resource Strain: Not on file   Food Insecurity: Not on file   Transportation Needs: Not on file   Physical Activity: Not on file   Stress: Not on file   Social Connections: Not on file   Intimate Partner Violence: Not on file   Housing Stability: Not on file       REVIEW OF SYSTEMS:  Review of Systems   Constitutional:  Positive for malaise/fatigue. Negative for chills, diaphoresis, fever and weight loss.   HENT:  Negative for congestion, ear pain, hearing loss, nosebleeds, sore throat and tinnitus.    Eyes:  Negative for blurred vision, double vision, pain and discharge.   Respiratory:  Positive for shortness of  "breath. Negative for cough, hemoptysis, sputum production, wheezing and stridor.    Cardiovascular:  Negative for chest pain, palpitations, orthopnea and leg swelling.   Gastrointestinal:  Negative for abdominal pain, constipation, heartburn, melena, nausea and vomiting.   Genitourinary:  Negative for dysuria, flank pain and hematuria.   Musculoskeletal:  Negative for joint pain, myalgias and neck pain.   Skin:  Negative for itching and rash.   Neurological:  Negative for dizziness, speech change, focal weakness, seizures, weakness and headaches.   Endo/Heme/Allergies:  Negative for environmental allergies and polydipsia. Does not bruise/bleed easily.   Psychiatric/Behavioral:  Negative for depression, hallucinations, substance abuse and suicidal ideas.          PHYSICAL EXAMINATION:    BP 94/58 (BP Location: Left arm, Patient Position: Sitting, BP Cuff Size: Adult)   Pulse 67   Temp 35.9 °C (96.7 °F) (Temporal)   Ht 1.575 m (5' 2\")   Wt 39.9 kg (88 lb)   SpO2 98%   BMI 16.10 kg/m²    Physical Exam  Constitutional:       General: She is not in acute distress.  HENT:      Head: Normocephalic and atraumatic.      Nose: Nose normal.   Eyes:      Conjunctiva/sclera: Conjunctivae normal.      Pupils: Pupils are equal, round, and reactive to light.   Neck:      Vascular: No JVD.      Trachea: No tracheal deviation.   Cardiovascular:      Rate and Rhythm: Normal rate and regular rhythm.      Heart sounds: Murmur heard.   Pulmonary:      Effort: Pulmonary effort is normal. No respiratory distress.      Breath sounds: Normal breath sounds. No stridor.   Abdominal:      General: Bowel sounds are normal. There is no distension.      Palpations: Abdomen is soft.      Tenderness: There is no abdominal tenderness.   Musculoskeletal:         General: No tenderness. Normal range of motion.      Cervical back: Normal range of motion and neck supple.   Skin:     General: Skin is warm and dry.   Neurological:      Mental Status: " She is alert and oriented to person, place, and time.      Coordination: Coordination normal.      Gait: Gait is intact.   Psychiatric:         Mood and Affect: Mood and affect normal.         Cognition and Memory: Memory normal.         LABS REVIEWED:  Lab Results   Component Value Date/Time    SODIUM 140 11/16/2023 08:13 AM    POTASSIUM 4.3 11/16/2023 08:13 AM    CHLORIDE 106 11/16/2023 08:13 AM    CO2 30 11/16/2023 08:13 AM    GLUCOSE 96 11/16/2023 08:13 AM    BUN 23 (H) 11/16/2023 08:13 AM    CREATININE 1.16 11/16/2023 08:13 AM      Lab Results   Component Value Date/Time    PROTHROMBTM 28.7 (H) 11/15/2023 09:39 AM    INR 2.66 (H) 11/15/2023 09:39 AM      Lab Results   Component Value Date/Time    WBC 7.4 11/16/2023 08:13 AM    RBC 4.10 (L) 11/16/2023 08:13 AM    HEMOGLOBIN 11.6 (L) 11/16/2023 08:13 AM    HEMATOCRIT 38.9 11/16/2023 08:13 AM    MCV 94.9 11/16/2023 08:13 AM    MCH 28.3 11/16/2023 08:13 AM    MCHC 29.8 (L) 11/16/2023 08:13 AM    MPV 10.9 11/16/2023 08:13 AM    NEUTSPOLYS 57.70 11/16/2023 08:13 AM    LYMPHOCYTES 26.20 11/16/2023 08:13 AM    MONOCYTES 11.70 11/16/2023 08:13 AM    EOSINOPHILS 3.00 11/16/2023 08:13 AM    BASOPHILS 1.10 11/16/2023 08:13 AM    ANISOCYTOSIS 1+ 11/16/2023 08:13 AM        IMAGING REVIEWED AND INTERPRETED:    ECHOCARDIOGRAM   TTE 9/28/23  CONCLUSIONS  Compared to the prior study on 08/06/2018 - MR has progressed from mild   to severe. AS not fully evaluated this study.  The left ventricle is small in size.  The left ventricular ejection fraction is visually estimated to be 55-  60%.  Mild concentric left ventricular hypertrophy.  Grade II diastolic dysfunction.  Severe mitral regurgitation.  The aortic valve is not well visualized.  Aortic stenosis not fully evaluated this study.  Estimated right ventricular systolic pressure is 55 mmHg.    NIRAJ 11/10/23 [facility] [place]  CONCLUSIONS  There is severe primary mitral regurgiation secondary to posterior   leaflet prolapse (P1/P2).  EROA  0.33cm2. MR volume 66mL.  There is mild calcification of the mitral valve leaflets and mild   annular mitral calcification.  Normal left ventricular systolic function.  The left ventricular ejection fraction is visually estimated to be 60%.  Grossly normal right ventricular size and systolic function.  No thrombus detected in the left atrial appendage.  There is moderate aortic stenosis by planimetry, ARIC 1.2cm2.     Compared to the echocardiogram performed on 9/28/2023: No significant   change.    CARDIAC CATHETERIZATION pending      IMPRESSION:  Severe mitral regurgitation, peripheral arterial disease, GERD, persistent atrial fibrillation      PLAN:    I recommend proceeding with workup for MitraClip.  I believe she would be high risk for surgical valve repair given her multiple comorbid conditions and her age.      The procedure, its risks, benefits, potential complications and alternative treatments were discussed with the patient in detail including the risks should she decide not to undergo my recommended treatment. All of her questions were answered to her satisfaction and she is willing to proceed with the operation. The risks include death, stroke, infection: to include a rare bacterial infection related to the use of the heart/lung machine, liz-operative myocardial infarction, dysrhythmias, diaphragmatic paralysis, chest wall paresthesia, tracheostomy, kidney or other organ failure, possible return to the operating room for bleeding, bleeding requiring transfusion with its attendant risks including AIDS or hepatitis, dehiscence of surgical incisions, respiratory complications including the need for prolonged ventilator support, Protamine or other drug reaction, peripheral neuropathy, loss of limb, and miscount of surgical items. The operative mortality risk is approximately 15-20%. The STS mortality risk score is 14.3% and the morbidity and mortality risk score is 33.7%. The scores were discussed with  patient.    Findings and recommendations have been discussed with the patient’s cardiologist, Ethan Lozano MD.  Thank you for this very challenging consultation and participation in the patient’s care.  I will keep you apprised of all future developments.      Sincerely,       Tristan Keller DO.

## 2023-11-21 NOTE — PROGRESS NOTES
[facility] Mitral DORITA review: Suitable for Implant: Yes  Any Additional Comments: PMR. P2/3 prolapse. MAC and calcified secondary chord under prolapsing leaflet. Leaflet length adequate to grasp. Mitral valve leaflets appear clipable. Recommend 17mm NTW clip to start. MVA appears small so may be limited to 1 clip. Confirm at baseline NIRAJ.

## 2023-11-22 ENCOUNTER — OFFICE VISIT (OUTPATIENT)
Dept: CARDIOTHORACIC SURGERY | Facility: MEDICAL CENTER | Age: 83
End: 2023-11-22
Payer: MEDICARE

## 2023-11-22 ENCOUNTER — APPOINTMENT (OUTPATIENT)
Dept: ADMISSIONS | Facility: MEDICAL CENTER | Age: 83
DRG: 267 | End: 2023-11-22
Attending: INTERNAL MEDICINE
Payer: MEDICARE

## 2023-11-22 ENCOUNTER — TELEPHONE (OUTPATIENT)
Dept: CARDIOLOGY | Facility: MEDICAL CENTER | Age: 83
End: 2023-11-22

## 2023-11-22 VITALS
OXYGEN SATURATION: 98 % | SYSTOLIC BLOOD PRESSURE: 94 MMHG | WEIGHT: 88 LBS | TEMPERATURE: 96.7 F | HEART RATE: 67 BPM | HEIGHT: 62 IN | DIASTOLIC BLOOD PRESSURE: 58 MMHG | BODY MASS INDEX: 16.2 KG/M2

## 2023-11-22 DIAGNOSIS — I34.0 SEVERE MITRAL REGURGITATION: ICD-10-CM

## 2023-11-22 PROCEDURE — 3074F SYST BP LT 130 MM HG: CPT | Performed by: THORACIC SURGERY (CARDIOTHORACIC VASCULAR SURGERY)

## 2023-11-22 PROCEDURE — 3078F DIAST BP <80 MM HG: CPT | Performed by: THORACIC SURGERY (CARDIOTHORACIC VASCULAR SURGERY)

## 2023-11-22 PROCEDURE — 99205 OFFICE O/P NEW HI 60 MIN: CPT | Performed by: THORACIC SURGERY (CARDIOTHORACIC VASCULAR SURGERY)

## 2023-11-22 ASSESSMENT — ENCOUNTER SYMPTOMS
DIAPHORESIS: 0
FEVER: 0
WEIGHT LOSS: 0
BRUISES/BLEEDS EASILY: 0
NECK PAIN: 0
DEPRESSION: 0
EYE DISCHARGE: 0
DOUBLE VISION: 0
SPUTUM PRODUCTION: 0
EYE PAIN: 0
SORE THROAT: 0
POLYDIPSIA: 0
WEAKNESS: 0
BLURRED VISION: 0
WHEEZING: 0
COUGH: 0
HEARTBURN: 0
FOCAL WEAKNESS: 0
HEMOPTYSIS: 0
NAUSEA: 0
SEIZURES: 0
SHORTNESS OF BREATH: 1
DIZZINESS: 0
VOMITING: 0
HEADACHES: 0
ABDOMINAL PAIN: 0
MYALGIAS: 0
CONSTIPATION: 0
FLANK PAIN: 0
ORTHOPNEA: 0
HALLUCINATIONS: 0
SPEECH CHANGE: 0
PALPITATIONS: 0
CHILLS: 0
STRIDOR: 0

## 2023-11-22 ASSESSMENT — FIBROSIS 4 INDEX: FIB4 SCORE: 3.73

## 2023-11-22 ASSESSMENT — LIFESTYLE VARIABLES: SUBSTANCE_ABUSE: 0

## 2023-11-22 NOTE — TELEPHONE ENCOUNTER
Called and spoke to patient. All questions answered and clarification provided. Patient verbalizes understanding and is aware she will receive call 11/29/23 to discuss Mitral DORITA procedure.

## 2023-11-22 NOTE — TELEPHONE ENCOUNTER
Patient would like a call back regarding her Warfarin medication.      She's having surgery 11/29/29 and would like to know if she should be taking the medication until day of surgery.    Call back # [phone].

## 2023-11-28 ENCOUNTER — HOSPITAL ENCOUNTER (OUTPATIENT)
Dept: LAB | Facility: MEDICAL CENTER | Age: 83
End: 2023-11-28
Attending: NURSE PRACTITIONER
Payer: MEDICARE

## 2023-11-28 ENCOUNTER — ANTICOAGULATION MONITORING (OUTPATIENT)
Dept: VASCULAR LAB | Facility: MEDICAL CENTER | Age: 83
End: 2023-11-28
Payer: MEDICARE

## 2023-11-28 DIAGNOSIS — Z79.01 CHRONIC ANTICOAGULATION: ICD-10-CM

## 2023-11-28 LAB
INR PPP: 1.69 (ref 0.87–1.13)
PROTHROMBIN TIME: 20.1 SEC (ref 12–14.6)

## 2023-11-28 PROCEDURE — 85610 PROTHROMBIN TIME: CPT

## 2023-11-28 PROCEDURE — 36415 COLL VENOUS BLD VENIPUNCTURE: CPT

## 2023-11-29 ENCOUNTER — PRE-ADMISSION TESTING (OUTPATIENT)
Dept: ADMISSIONS | Facility: MEDICAL CENTER | Age: 83
DRG: 267 | End: 2023-11-29
Attending: INTERNAL MEDICINE
Payer: MEDICARE

## 2023-11-29 ENCOUNTER — PATIENT MESSAGE (OUTPATIENT)
Dept: HEALTH INFORMATION MANAGEMENT | Facility: OTHER | Age: 83
End: 2023-11-29

## 2023-11-29 ENCOUNTER — HOSPITAL ENCOUNTER (OUTPATIENT)
Dept: RADIOLOGY | Facility: MEDICAL CENTER | Age: 83
DRG: 267 | End: 2023-11-29
Attending: INTERNAL MEDICINE
Payer: MEDICARE

## 2023-11-29 ENCOUNTER — TELEPHONE (OUTPATIENT)
Dept: CARDIOLOGY | Facility: MEDICAL CENTER | Age: 83
End: 2023-11-29
Payer: MEDICARE

## 2023-11-29 DIAGNOSIS — Z01.812 PRE-OPERATIVE LABORATORY EXAMINATION: ICD-10-CM

## 2023-11-29 DIAGNOSIS — Z01.811 PRE-OPERATIVE RESPIRATORY EXAMINATION: ICD-10-CM

## 2023-11-29 LAB
ABO GROUP BLD: NORMAL
ALBUMIN SERPL BCP-MCNC: 4.2 G/DL (ref 3.2–4.9)
ALBUMIN/GLOB SERPL: 1.5 G/DL
ALP SERPL-CCNC: 79 U/L (ref 30–99)
ALT SERPL-CCNC: 21 U/L (ref 2–50)
ANION GAP SERPL CALC-SCNC: 10 MMOL/L (ref 7–16)
APTT PPP: 39.4 SEC (ref 24.7–36)
AST SERPL-CCNC: 31 U/L (ref 12–45)
BASOPHILS # BLD AUTO: 1 % (ref 0–1.8)
BASOPHILS # BLD: 0.1 K/UL (ref 0–0.12)
BILIRUB SERPL-MCNC: 0.3 MG/DL (ref 0.1–1.5)
BLD GP AB SCN SERPL QL: NORMAL
BUN SERPL-MCNC: 17 MG/DL (ref 8–22)
CALCIUM ALBUM COR SERPL-MCNC: 10.3 MG/DL (ref 8.5–10.5)
CALCIUM SERPL-MCNC: 10.5 MG/DL (ref 8.5–10.5)
CHLORIDE SERPL-SCNC: 104 MMOL/L (ref 96–112)
CO2 SERPL-SCNC: 30 MMOL/L (ref 20–33)
CREAT SERPL-MCNC: 1.01 MG/DL (ref 0.5–1.4)
EOSINOPHIL # BLD AUTO: 0.25 K/UL (ref 0–0.51)
EOSINOPHIL NFR BLD: 2.6 % (ref 0–6.9)
ERYTHROCYTE [DISTWIDTH] IN BLOOD BY AUTOMATED COUNT: 49.6 FL (ref 35.9–50)
GFR SERPLBLD CREATININE-BSD FMLA CKD-EPI: 55 ML/MIN/1.73 M 2
GLOBULIN SER CALC-MCNC: 2.8 G/DL (ref 1.9–3.5)
GLUCOSE SERPL-MCNC: 103 MG/DL (ref 65–99)
HCT VFR BLD AUTO: 44 % (ref 37–47)
HGB BLD-MCNC: 13.9 G/DL (ref 12–16)
IMM GRANULOCYTES # BLD AUTO: 0.03 K/UL (ref 0–0.11)
IMM GRANULOCYTES NFR BLD AUTO: 0.3 % (ref 0–0.9)
INR PPP: 1.75 (ref 0.87–1.13)
LYMPHOCYTES # BLD AUTO: 2.37 K/UL (ref 1–4.8)
LYMPHOCYTES NFR BLD: 24.5 % (ref 22–41)
MCH RBC QN AUTO: 28.9 PG (ref 27–33)
MCHC RBC AUTO-ENTMCNC: 31.6 G/DL (ref 32.2–35.5)
MCV RBC AUTO: 91.5 FL (ref 81.4–97.8)
MONOCYTES # BLD AUTO: 0.87 K/UL (ref 0–0.85)
MONOCYTES NFR BLD AUTO: 9 % (ref 0–13.4)
NEUTROPHILS # BLD AUTO: 6.07 K/UL (ref 1.82–7.42)
NEUTROPHILS NFR BLD: 62.6 % (ref 44–72)
NRBC # BLD AUTO: 0 K/UL
NRBC BLD-RTO: 0 /100 WBC (ref 0–0.2)
NT-PROBNP SERPL IA-MCNC: 1261 PG/ML (ref 0–125)
PLATELET # BLD AUTO: 217 K/UL (ref 164–446)
PMV BLD AUTO: 10.7 FL (ref 9–12.9)
POTASSIUM SERPL-SCNC: 3.9 MMOL/L (ref 3.6–5.5)
PROT SERPL-MCNC: 7 G/DL (ref 6–8.2)
PROTHROMBIN TIME: 20.7 SEC (ref 12–14.6)
RBC # BLD AUTO: 4.81 M/UL (ref 4.2–5.4)
RH BLD: NORMAL
SODIUM SERPL-SCNC: 144 MMOL/L (ref 135–145)
WBC # BLD AUTO: 9.7 K/UL (ref 4.8–10.8)

## 2023-11-29 PROCEDURE — 85025 COMPLETE CBC W/AUTO DIFF WBC: CPT

## 2023-11-29 PROCEDURE — 85730 THROMBOPLASTIN TIME PARTIAL: CPT

## 2023-11-29 PROCEDURE — 86901 BLOOD TYPING SEROLOGIC RH(D): CPT

## 2023-11-29 PROCEDURE — 85610 PROTHROMBIN TIME: CPT

## 2023-11-29 PROCEDURE — 86900 BLOOD TYPING SEROLOGIC ABO: CPT

## 2023-11-29 PROCEDURE — 36415 COLL VENOUS BLD VENIPUNCTURE: CPT

## 2023-11-29 PROCEDURE — 83880 ASSAY OF NATRIURETIC PEPTIDE: CPT

## 2023-11-29 PROCEDURE — 80053 COMPREHEN METABOLIC PANEL: CPT

## 2023-11-29 PROCEDURE — 86850 RBC ANTIBODY SCREEN: CPT

## 2023-11-29 PROCEDURE — 71046 X-RAY EXAM CHEST 2 VIEWS: CPT

## 2023-11-29 NOTE — PROGRESS NOTES
Anticoagulation Summary  As of 2023      INR goal:  2.0-3.0   TTR:  81.5 % (5.9 y)   INR used for dosin.69 (2023)   Warfarin maintenance plan:  0.5 mg (1 mg x 0.5) every Tue; 1 mg (1 mg x 1) all other days   Weekly warfarin total:  6.5 mg   Plan last modified:  Pieter Covarrubias, PharmD (2023)   Next INR check:  2023   Target end date:  Indefinite    Indications    Paroxysmal a-fib (CMS-HCC) (Resolved) [I48.0]  Chronic anticoagulation [Z79.01]                 Anticoagulation Episode Summary       INR check location:  Clinic Lab    Preferred lab:  [facility]    Send INR reminders to:      Comments:  [phone] (H)  [facility]          Anticoagulation Care Providers       Provider Role Specialty Phone number    Evelio Tejeda M.D. Referring Internal Medicine [phone]    [facility] Anticoagulation Services Responsible  [phone]    Beryl Pang M.D. Responsible Family Medicine [phone]          Anticoagulation Patient Findings  Patient Findings       Positives:  Upcoming invasive procedure (Mitral DORITA on 23 - holding warfarin x 5 days prior), Missed doses (Warfarin not given during admission but pt states she did not resume warfarin until ~1 week ago), Hospital admission (Admitted 11/15- and underwent ureteroscopy with laser lithotripsy and stent placement. Pt to f/u with outpatient urology for stent removal)    Negatives:  Signs/symptoms of thrombosis, Signs/symptoms of bleeding, Laboratory test error suspected, Change in health, Change in alcohol use, Change in activity, Emergency department visit, Upcoming dental procedure, Extra doses, Change in medications, Change in diet/appetite, Bruising, Other complaints            Called and spoke to patient.    INR subtherapeutic likely due to warfarin interruption due to recent admission. Patient has upcoming procedure for which she will have to hold warfarin for, therefore will not bolus at this time.    Warfarin Plan:  Continue regimen as listed above. Hold warfarin 11/30-12/04. Patient to contact clinic when d/c from the hospital.    Next INR in ~2 week(s).    Zari Rosario, YaneliD, BCACP

## 2023-11-29 NOTE — TELEPHONE ENCOUNTER
Valve Conference Plan of Care: 11/29/2023           Mitral DORITA Candidate: yes  Access: n/a  Valve Size: n/a  General Anesthesia or MAC: GA  Unit: telemetry   Incidental findings to be discussed with PCP: n/a   Clearance needed prior to procedure: no    Check in time of 0930 12/5/2023.     Pre-op appointment completed: scheduled today    NPO after midnight. Hold all vitamins/supplements x7 days, hold warfarin x5 days, hold furosemide AM of procedure.     Patient notified of procedure date/time and check in process.     All questions were answered. Patient has direct extension for any further questions/concerns prior to the procedure.    Patient denies acute HF symptoms including SOB/VIDAL, fatigue, and weight gain. She reports minimal LE edema that resolves with compression socks/in the morning.

## 2023-11-30 ENCOUNTER — DOCUMENTATION (OUTPATIENT)
Dept: CARDIOLOGY | Facility: MEDICAL CENTER | Age: 83
End: 2023-11-30
Payer: MEDICARE

## 2023-11-30 DIAGNOSIS — I34.0 SEVERE MITRAL REGURGITATION: ICD-10-CM

## 2023-11-30 DIAGNOSIS — I34.0 MITRAL VALVE INSUFFICIENCY, UNSPECIFIED ETIOLOGY: ICD-10-CM

## 2023-11-30 NOTE — PROGRESS NOTES
Thank you for the opportunity to participate in your patient's care.     Your patient is scheduled for mitral valve transcatheter [id number] repair (Mitral DORITA) on 12/5/2023    Sincerely,    [facility] Structural [facility] Team    THI Beltran, RN, Structural Heart Program Nurse Coordinator ([phone])  THI Chilel, RN, Structural Heart Program Nurse Coordinator ([phone])

## 2023-12-04 ENCOUNTER — ANESTHESIA EVENT (OUTPATIENT)
Dept: SURGERY | Facility: MEDICAL CENTER | Age: 83
DRG: 267 | End: 2023-12-04
Payer: MEDICARE

## 2023-12-05 ENCOUNTER — APPOINTMENT (OUTPATIENT)
Dept: CARDIOLOGY | Facility: MEDICAL CENTER | Age: 83
DRG: 267 | End: 2023-12-05
Attending: ANESTHESIOLOGY
Payer: MEDICARE

## 2023-12-05 ENCOUNTER — HOSPITAL ENCOUNTER (INPATIENT)
Facility: MEDICAL CENTER | Age: 83
LOS: 1 days | DRG: 267 | End: 2023-12-06
Attending: INTERNAL MEDICINE | Admitting: INTERNAL MEDICINE
Payer: MEDICARE

## 2023-12-05 ENCOUNTER — APPOINTMENT (OUTPATIENT)
Dept: RADIOLOGY | Facility: MEDICAL CENTER | Age: 83
DRG: 267 | End: 2023-12-05
Attending: NURSE PRACTITIONER
Payer: MEDICARE

## 2023-12-05 ENCOUNTER — ANESTHESIA (OUTPATIENT)
Dept: SURGERY | Facility: MEDICAL CENTER | Age: 83
DRG: 267 | End: 2023-12-05
Payer: MEDICARE

## 2023-12-05 DIAGNOSIS — Z95.818 S/P MITRAL VALVE CLIP IMPLANTATION: ICD-10-CM

## 2023-12-05 DIAGNOSIS — I48.0 PAROXYSMAL A-FIB (HCC): ICD-10-CM

## 2023-12-05 DIAGNOSIS — I48.0 PAROXYSMAL ATRIAL FIBRILLATION (HCC): ICD-10-CM

## 2023-12-05 DIAGNOSIS — Z98.890 S/P MITRAL VALVE CLIP IMPLANTATION: ICD-10-CM

## 2023-12-05 PROBLEM — I35.0 NONRHEUMATIC AORTIC VALVE STENOSIS: Status: ACTIVE | Noted: 2023-12-05

## 2023-12-05 LAB
ABO + RH BLD: NORMAL
ACT BLD: 277 SEC (ref 74–137)
ACT BLD: 623 SEC (ref 74–137)
ALBUMIN SERPL BCP-MCNC: 3.5 G/DL (ref 3.2–4.9)
ALBUMIN/GLOB SERPL: 1.4 G/DL
ALP SERPL-CCNC: 65 U/L (ref 30–99)
ALT SERPL-CCNC: 22 U/L (ref 2–50)
ANION GAP SERPL CALC-SCNC: 9 MMOL/L (ref 7–16)
AST SERPL-CCNC: 37 U/L (ref 12–45)
BILIRUB SERPL-MCNC: 0.2 MG/DL (ref 0.1–1.5)
BUN SERPL-MCNC: 16 MG/DL (ref 8–22)
CALCIUM ALBUM COR SERPL-MCNC: 9.2 MG/DL (ref 8.5–10.5)
CALCIUM SERPL-MCNC: 8.8 MG/DL (ref 8.5–10.5)
CHLORIDE SERPL-SCNC: 108 MMOL/L (ref 96–112)
CO2 SERPL-SCNC: 24 MMOL/L (ref 20–33)
CREAT SERPL-MCNC: 0.67 MG/DL (ref 0.5–1.4)
EKG IMPRESSION: NORMAL
ERYTHROCYTE [DISTWIDTH] IN BLOOD BY AUTOMATED COUNT: 50.3 FL (ref 35.9–50)
GFR SERPLBLD CREATININE-BSD FMLA CKD-EPI: 86 ML/MIN/1.73 M 2
GLOBULIN SER CALC-MCNC: 2.5 G/DL (ref 1.9–3.5)
GLUCOSE SERPL-MCNC: 117 MG/DL (ref 65–99)
HCT VFR BLD AUTO: 39.8 % (ref 37–47)
HGB BLD-MCNC: 12.2 G/DL (ref 12–16)
INR PPP: 1.04 (ref 0.87–1.13)
MCH RBC QN AUTO: 28.6 PG (ref 27–33)
MCHC RBC AUTO-ENTMCNC: 30.7 G/DL (ref 32.2–35.5)
MCV RBC AUTO: 93.4 FL (ref 81.4–97.8)
NT-PROBNP SERPL IA-MCNC: 469 PG/ML (ref 0–125)
PLATELET # BLD AUTO: 147 K/UL (ref 164–446)
PMV BLD AUTO: 10.6 FL (ref 9–12.9)
POTASSIUM SERPL-SCNC: 4.4 MMOL/L (ref 3.6–5.5)
PROT SERPL-MCNC: 6 G/DL (ref 6–8.2)
PROTHROMBIN TIME: 13.7 SEC (ref 12–14.6)
RBC # BLD AUTO: 4.26 M/UL (ref 4.2–5.4)
SODIUM SERPL-SCNC: 141 MMOL/L (ref 135–145)
WBC # BLD AUTO: 8.8 K/UL (ref 4.8–10.8)

## 2023-12-05 PROCEDURE — 80053 COMPREHEN METABOLIC PANEL: CPT

## 2023-12-05 PROCEDURE — 700102 HCHG RX REV CODE 250 W/ 637 OVERRIDE(OP): Performed by: NURSE PRACTITIONER

## 2023-12-05 PROCEDURE — 33418 REPAIR TCAT MITRAL VALVE: CPT | Mod: 62,Q0 | Performed by: THORACIC SURGERY (CARDIOTHORACIC VASCULAR SURGERY)

## 2023-12-05 PROCEDURE — 700111 HCHG RX REV CODE 636 W/ 250 OVERRIDE (IP): Performed by: ANESTHESIOLOGY

## 2023-12-05 PROCEDURE — 700105 HCHG RX REV CODE 258: Performed by: INTERNAL MEDICINE

## 2023-12-05 PROCEDURE — 83880 ASSAY OF NATRIURETIC PEPTIDE: CPT

## 2023-12-05 PROCEDURE — 85347 COAGULATION TIME ACTIVATED: CPT

## 2023-12-05 PROCEDURE — 770020 HCHG ROOM/CARE - TELE (206)

## 2023-12-05 PROCEDURE — 93010 ELECTROCARDIOGRAM REPORT: CPT | Performed by: INTERNAL MEDICINE

## 2023-12-05 PROCEDURE — 700105 HCHG RX REV CODE 258: Performed by: NURSE PRACTITIONER

## 2023-12-05 PROCEDURE — 700101 HCHG RX REV CODE 250: Performed by: INTERNAL MEDICINE

## 2023-12-05 PROCEDURE — C1894 INTRO/SHEATH, NON-LASER: HCPCS | Performed by: INTERNAL MEDICINE

## 2023-12-05 PROCEDURE — C1887 CATHETER, GUIDING: HCPCS | Performed by: INTERNAL MEDICINE

## 2023-12-05 PROCEDURE — 160035 HCHG PACU - 1ST 60 MINS PHASE I: Performed by: INTERNAL MEDICINE

## 2023-12-05 PROCEDURE — 93005 ELECTROCARDIOGRAM TRACING: CPT | Performed by: NURSE PRACTITIONER

## 2023-12-05 PROCEDURE — 700101 HCHG RX REV CODE 250: Performed by: ANESTHESIOLOGY

## 2023-12-05 PROCEDURE — 93355 ECHO TRANSESOPHAGEAL (TEE): CPT

## 2023-12-05 PROCEDURE — 85027 COMPLETE CBC AUTOMATED: CPT

## 2023-12-05 PROCEDURE — 160042 HCHG SURGERY MINUTES - EA ADDL 1 MIN LEVEL 5: Performed by: INTERNAL MEDICINE

## 2023-12-05 PROCEDURE — A9270 NON-COVERED ITEM OR SERVICE: HCPCS | Performed by: NURSE PRACTITIONER

## 2023-12-05 PROCEDURE — 36415 COLL VENOUS BLD VENIPUNCTURE: CPT

## 2023-12-05 PROCEDURE — 160009 HCHG ANES TIME/MIN: Performed by: INTERNAL MEDICINE

## 2023-12-05 PROCEDURE — 160002 HCHG RECOVERY MINUTES (STAT): Performed by: INTERNAL MEDICINE

## 2023-12-05 PROCEDURE — 700105 HCHG RX REV CODE 258: Performed by: ANESTHESIOLOGY

## 2023-12-05 PROCEDURE — C1760 CLOSURE DEV, VASC: HCPCS | Performed by: INTERNAL MEDICINE

## 2023-12-05 PROCEDURE — 33418 REPAIR TCAT MITRAL VALVE: CPT | Mod: 62,Q0 | Performed by: INTERNAL MEDICINE

## 2023-12-05 PROCEDURE — 160048 HCHG OR STATISTICAL LEVEL 1-5: Performed by: INTERNAL MEDICINE

## 2023-12-05 PROCEDURE — 71045 X-RAY EXAM CHEST 1 VIEW: CPT

## 2023-12-05 PROCEDURE — C1751 CATH, INF, PER/CENT/MIDLINE: HCPCS | Performed by: INTERNAL MEDICINE

## 2023-12-05 PROCEDURE — 700111 HCHG RX REV CODE 636 W/ 250 OVERRIDE (IP): Performed by: INTERNAL MEDICINE

## 2023-12-05 PROCEDURE — 85610 PROTHROMBIN TIME: CPT

## 2023-12-05 PROCEDURE — 02UG3JZ SUPPLEMENT MITRAL VALVE WITH SYNTHETIC SUBSTITUTE, PERCUTANEOUS APPROACH: ICD-10-PCS | Performed by: INTERNAL MEDICINE

## 2023-12-05 PROCEDURE — 160031 HCHG SURGERY MINUTES - 1ST 30 MINS LEVEL 5: Performed by: INTERNAL MEDICINE

## 2023-12-05 DEVICE — KIT MITRACLIP G4 SYSTEM CATHETER SGC0701 (1/EA): Type: IMPLANTABLE DEVICE | Site: HEART | Status: FUNCTIONAL

## 2023-12-05 RX ORDER — LIDOCAINE HYDROCHLORIDE 20 MG/ML
INJECTION, SOLUTION EPIDURAL; INFILTRATION; INTRACAUDAL; PERINEURAL PRN
Status: DISCONTINUED | OUTPATIENT
Start: 2023-12-05 | End: 2023-12-05 | Stop reason: SURG

## 2023-12-05 RX ORDER — WARFARIN SODIUM 1 MG/1
1 TABLET ORAL
Status: DISCONTINUED | OUTPATIENT
Start: 2023-12-06 | End: 2023-12-06 | Stop reason: HOSPADM

## 2023-12-05 RX ORDER — EPHEDRINE SULFATE 50 MG/ML
5 INJECTION, SOLUTION INTRAVENOUS
Status: DISCONTINUED | OUTPATIENT
Start: 2023-12-05 | End: 2023-12-05 | Stop reason: HOSPADM

## 2023-12-05 RX ORDER — FUROSEMIDE 20 MG/1
20 TABLET ORAL DAILY
Status: DISCONTINUED | OUTPATIENT
Start: 2023-12-05 | End: 2023-12-06 | Stop reason: HOSPADM

## 2023-12-05 RX ORDER — OXYCODONE HCL 5 MG/5 ML
10 SOLUTION, ORAL ORAL
Status: DISCONTINUED | OUTPATIENT
Start: 2023-12-05 | End: 2023-12-05 | Stop reason: HOSPADM

## 2023-12-05 RX ORDER — DIPHENHYDRAMINE HCL 25 MG
25 TABLET ORAL NIGHTLY PRN
Status: DISCONTINUED | OUTPATIENT
Start: 2023-12-05 | End: 2023-12-05

## 2023-12-05 RX ORDER — HYDRALAZINE HYDROCHLORIDE 20 MG/ML
5 INJECTION INTRAMUSCULAR; INTRAVENOUS
Status: DISCONTINUED | OUTPATIENT
Start: 2023-12-05 | End: 2023-12-05 | Stop reason: HOSPADM

## 2023-12-05 RX ORDER — ACETAMINOPHEN 325 MG/1
650 TABLET ORAL EVERY 6 HOURS PRN
Status: DISCONTINUED | OUTPATIENT
Start: 2023-12-05 | End: 2023-12-06 | Stop reason: HOSPADM

## 2023-12-05 RX ORDER — ALBUTEROL SULFATE 90 UG/1
1-2 AEROSOL, METERED RESPIRATORY (INHALATION) EVERY 4 HOURS PRN
Status: DISCONTINUED | OUTPATIENT
Start: 2023-12-05 | End: 2023-12-06 | Stop reason: HOSPADM

## 2023-12-05 RX ORDER — POLYETHYLENE GLYCOL 3350 17 G/17G
1 POWDER, FOR SOLUTION ORAL
Status: DISCONTINUED | OUTPATIENT
Start: 2023-12-05 | End: 2023-12-06 | Stop reason: HOSPADM

## 2023-12-05 RX ORDER — AMOXICILLIN 250 MG
2 CAPSULE ORAL 2 TIMES DAILY
Status: DISCONTINUED | OUTPATIENT
Start: 2023-12-05 | End: 2023-12-06 | Stop reason: HOSPADM

## 2023-12-05 RX ORDER — DIPHENHYDRAMINE HYDROCHLORIDE 50 MG/ML
25 INJECTION INTRAMUSCULAR; INTRAVENOUS EVERY 6 HOURS PRN
Status: DISCONTINUED | OUTPATIENT
Start: 2023-12-05 | End: 2023-12-05

## 2023-12-05 RX ORDER — HYDROMORPHONE HYDROCHLORIDE 1 MG/ML
0.4 INJECTION, SOLUTION INTRAMUSCULAR; INTRAVENOUS; SUBCUTANEOUS
Status: DISCONTINUED | OUTPATIENT
Start: 2023-12-05 | End: 2023-12-05 | Stop reason: HOSPADM

## 2023-12-05 RX ORDER — ONDANSETRON 2 MG/ML
4 INJECTION INTRAMUSCULAR; INTRAVENOUS EVERY 4 HOURS PRN
Status: DISCONTINUED | OUTPATIENT
Start: 2023-12-05 | End: 2023-12-05

## 2023-12-05 RX ORDER — SODIUM CHLORIDE, SODIUM LACTATE, POTASSIUM CHLORIDE, CALCIUM CHLORIDE 600; 310; 30; 20 MG/100ML; MG/100ML; MG/100ML; MG/100ML
INJECTION, SOLUTION INTRAVENOUS CONTINUOUS
Status: DISCONTINUED | OUTPATIENT
Start: 2023-12-05 | End: 2023-12-05 | Stop reason: HOSPADM

## 2023-12-05 RX ORDER — ONDANSETRON 2 MG/ML
INJECTION INTRAMUSCULAR; INTRAVENOUS PRN
Status: DISCONTINUED | OUTPATIENT
Start: 2023-12-05 | End: 2023-12-05 | Stop reason: SURG

## 2023-12-05 RX ORDER — OXYCODONE HCL 5 MG/5 ML
5 SOLUTION, ORAL ORAL
Status: DISCONTINUED | OUTPATIENT
Start: 2023-12-05 | End: 2023-12-05 | Stop reason: HOSPADM

## 2023-12-05 RX ORDER — HEPARIN SODIUM,PORCINE 1000/ML
VIAL (ML) INJECTION PRN
Status: DISCONTINUED | OUTPATIENT
Start: 2023-12-05 | End: 2023-12-05 | Stop reason: SURG

## 2023-12-05 RX ORDER — LIDOCAINE HYDROCHLORIDE 20 MG/ML
INJECTION, SOLUTION INFILTRATION; PERINEURAL
Status: DISCONTINUED | OUTPATIENT
Start: 2023-12-05 | End: 2023-12-05 | Stop reason: HOSPADM

## 2023-12-05 RX ORDER — HYDRALAZINE HYDROCHLORIDE 20 MG/ML
10 INJECTION INTRAMUSCULAR; INTRAVENOUS
Status: DISCONTINUED | OUTPATIENT
Start: 2023-12-05 | End: 2023-12-06 | Stop reason: HOSPADM

## 2023-12-05 RX ORDER — WARFARIN SODIUM 1 MG/1
0.5 TABLET ORAL
Status: DISCONTINUED | OUTPATIENT
Start: 2023-12-12 | End: 2023-12-06 | Stop reason: HOSPADM

## 2023-12-05 RX ORDER — SODIUM CHLORIDE 9 MG/ML
INJECTION, SOLUTION INTRAVENOUS CONTINUOUS
Status: ACTIVE | OUTPATIENT
Start: 2023-12-05 | End: 2023-12-05

## 2023-12-05 RX ORDER — IPRATROPIUM BROMIDE AND ALBUTEROL SULFATE 2.5; .5 MG/3ML; MG/3ML
3 SOLUTION RESPIRATORY (INHALATION) EVERY 4 HOURS PRN
Status: DISCONTINUED | OUTPATIENT
Start: 2023-12-05 | End: 2023-12-06 | Stop reason: HOSPADM

## 2023-12-05 RX ORDER — BISACODYL 10 MG
10 SUPPOSITORY, RECTAL RECTAL
Status: DISCONTINUED | OUTPATIENT
Start: 2023-12-05 | End: 2023-12-06 | Stop reason: HOSPADM

## 2023-12-05 RX ORDER — ROCURONIUM BROMIDE 10 MG/ML
INJECTION, SOLUTION INTRAVENOUS PRN
Status: DISCONTINUED | OUTPATIENT
Start: 2023-12-05 | End: 2023-12-05 | Stop reason: SURG

## 2023-12-05 RX ORDER — LOVASTATIN 20 MG/1
40 TABLET ORAL
Status: DISCONTINUED | OUTPATIENT
Start: 2023-12-05 | End: 2023-12-06 | Stop reason: HOSPADM

## 2023-12-05 RX ORDER — ONDANSETRON 2 MG/ML
4 INJECTION INTRAMUSCULAR; INTRAVENOUS
Status: DISCONTINUED | OUTPATIENT
Start: 2023-12-05 | End: 2023-12-05 | Stop reason: HOSPADM

## 2023-12-05 RX ORDER — SODIUM CHLORIDE, SODIUM GLUCONATE, SODIUM ACETATE, POTASSIUM CHLORIDE AND MAGNESIUM CHLORIDE 526; 502; 368; 37; 30 MG/100ML; MG/100ML; MG/100ML; MG/100ML; MG/100ML
INJECTION, SOLUTION INTRAVENOUS
Status: DISCONTINUED | OUTPATIENT
Start: 2023-12-05 | End: 2023-12-05 | Stop reason: SURG

## 2023-12-05 RX ORDER — ALPRAZOLAM 0.25 MG/1
0.25 TABLET ORAL 2 TIMES DAILY PRN
Status: DISCONTINUED | OUTPATIENT
Start: 2023-12-05 | End: 2023-12-06 | Stop reason: HOSPADM

## 2023-12-05 RX ORDER — PROTAMINE SULFATE 10 MG/ML
INJECTION, SOLUTION INTRAVENOUS PRN
Status: DISCONTINUED | OUTPATIENT
Start: 2023-12-05 | End: 2023-12-05 | Stop reason: SURG

## 2023-12-05 RX ORDER — HYDROMORPHONE HYDROCHLORIDE 1 MG/ML
0.2 INJECTION, SOLUTION INTRAMUSCULAR; INTRAVENOUS; SUBCUTANEOUS
Status: DISCONTINUED | OUTPATIENT
Start: 2023-12-05 | End: 2023-12-05 | Stop reason: HOSPADM

## 2023-12-05 RX ORDER — DEXAMETHASONE SODIUM PHOSPHATE 4 MG/ML
INJECTION, SOLUTION INTRA-ARTICULAR; INTRALESIONAL; INTRAMUSCULAR; INTRAVENOUS; SOFT TISSUE PRN
Status: DISCONTINUED | OUTPATIENT
Start: 2023-12-05 | End: 2023-12-05 | Stop reason: SURG

## 2023-12-05 RX ORDER — HALOPERIDOL 5 MG/ML
1 INJECTION INTRAMUSCULAR
Status: DISCONTINUED | OUTPATIENT
Start: 2023-12-05 | End: 2023-12-05 | Stop reason: HOSPADM

## 2023-12-05 RX ORDER — DIPHENHYDRAMINE HCL 12.5MG/5ML
12.5 LIQUID (ML) ORAL ONCE
Status: DISCONTINUED | OUTPATIENT
Start: 2023-12-05 | End: 2023-12-05

## 2023-12-05 RX ORDER — SOTALOL HYDROCHLORIDE 80 MG/1
40 TABLET ORAL TWICE DAILY
Status: DISCONTINUED | OUTPATIENT
Start: 2023-12-06 | End: 2023-12-06 | Stop reason: HOSPADM

## 2023-12-05 RX ORDER — LIDOCAINE HYDROCHLORIDE 40 MG/ML
SOLUTION TOPICAL PRN
Status: DISCONTINUED | OUTPATIENT
Start: 2023-12-05 | End: 2023-12-05 | Stop reason: SURG

## 2023-12-05 RX ORDER — CEFAZOLIN SODIUM 1 G/3ML
INJECTION, POWDER, FOR SOLUTION INTRAMUSCULAR; INTRAVENOUS PRN
Status: DISCONTINUED | OUTPATIENT
Start: 2023-12-05 | End: 2023-12-05 | Stop reason: SURG

## 2023-12-05 RX ORDER — EPHEDRINE SULFATE 50 MG/ML
INJECTION, SOLUTION INTRAVENOUS PRN
Status: DISCONTINUED | OUTPATIENT
Start: 2023-12-05 | End: 2023-12-05 | Stop reason: SURG

## 2023-12-05 RX ORDER — DIPHENHYDRAMINE HCL 25 MG
12.5 TABLET ORAL ONCE
Status: COMPLETED | OUTPATIENT
Start: 2023-12-05 | End: 2023-12-05

## 2023-12-05 RX ORDER — ASPIRIN 81 MG/1
81 TABLET ORAL DAILY
Status: DISCONTINUED | OUTPATIENT
Start: 2023-12-05 | End: 2023-12-06 | Stop reason: HOSPADM

## 2023-12-05 RX ORDER — WARFARIN SODIUM 1 MG/1
1 TABLET ORAL ONCE
Status: COMPLETED | OUTPATIENT
Start: 2023-12-05 | End: 2023-12-05

## 2023-12-05 RX ORDER — BUPIVACAINE HYDROCHLORIDE 2.5 MG/ML
INJECTION, SOLUTION EPIDURAL; INFILTRATION; INTRACAUDAL
Status: DISCONTINUED | OUTPATIENT
Start: 2023-12-05 | End: 2023-12-05 | Stop reason: HOSPADM

## 2023-12-05 RX ORDER — HYDROMORPHONE HYDROCHLORIDE 1 MG/ML
0.1 INJECTION, SOLUTION INTRAMUSCULAR; INTRAVENOUS; SUBCUTANEOUS
Status: DISCONTINUED | OUTPATIENT
Start: 2023-12-05 | End: 2023-12-05 | Stop reason: HOSPADM

## 2023-12-05 RX ADMIN — LOVASTATIN 40 MG: 20 TABLET ORAL at 20:56

## 2023-12-05 RX ADMIN — SODIUM CHLORIDE: 9 INJECTION, SOLUTION INTRAVENOUS at 15:30

## 2023-12-05 RX ADMIN — ROCURONIUM BROMIDE 50 MG: 50 INJECTION, SOLUTION INTRAVENOUS at 11:16

## 2023-12-05 RX ADMIN — EPHEDRINE SULFATE 10 MG: 50 INJECTION, SOLUTION INTRAVENOUS at 12:11

## 2023-12-05 RX ADMIN — LIDOCAINE HYDROCHLORIDE 4 ML: 40 SOLUTION TOPICAL at 11:17

## 2023-12-05 RX ADMIN — WARFARIN SODIUM 1 MG: 1 TABLET ORAL at 16:17

## 2023-12-05 RX ADMIN — LIDOCAINE HYDROCHLORIDE 80 MG: 20 INJECTION, SOLUTION EPIDURAL; INFILTRATION; INTRACAUDAL at 11:16

## 2023-12-05 RX ADMIN — HEPARIN SODIUM 7000 UNITS: 1000 INJECTION, SOLUTION INTRAVENOUS; SUBCUTANEOUS at 11:45

## 2023-12-05 RX ADMIN — PROPOFOL 110 MG: 10 INJECTION, EMULSION INTRAVENOUS at 11:16

## 2023-12-05 RX ADMIN — FUROSEMIDE 20 MG: 20 TABLET ORAL at 16:17

## 2023-12-05 RX ADMIN — CEFAZOLIN 2 G: 1 INJECTION, POWDER, FOR SOLUTION INTRAMUSCULAR; INTRAVENOUS at 11:21

## 2023-12-05 RX ADMIN — EPHEDRINE SULFATE 20 MG: 50 INJECTION, SOLUTION INTRAVENOUS at 12:00

## 2023-12-05 RX ADMIN — SODIUM CHLORIDE, SODIUM GLUCONATE, SODIUM ACETATE, POTASSIUM CHLORIDE AND MAGNESIUM CHLORIDE: 526; 502; 368; 37; 30 INJECTION, SOLUTION INTRAVENOUS at 11:11

## 2023-12-05 RX ADMIN — ASPIRIN 81 MG: 81 TABLET, COATED ORAL at 16:17

## 2023-12-05 RX ADMIN — PROTAMINE SULFATE 30 MG: 10 INJECTION, SOLUTION INTRAVENOUS at 12:40

## 2023-12-05 RX ADMIN — SUGAMMADEX 200 MG: 100 INJECTION, SOLUTION INTRAVENOUS at 12:31

## 2023-12-05 RX ADMIN — DEXAMETHASONE SODIUM PHOSPHATE 4 MG: 4 INJECTION INTRA-ARTICULAR; INTRALESIONAL; INTRAMUSCULAR; INTRAVENOUS; SOFT TISSUE at 12:31

## 2023-12-05 RX ADMIN — ONDANSETRON 4 MG: 2 INJECTION INTRAMUSCULAR; INTRAVENOUS at 12:31

## 2023-12-05 RX ADMIN — DIPHENHYDRAMINE HYDROCHLORIDE 12.5 MG: 25 TABLET ORAL at 17:37

## 2023-12-05 RX ADMIN — EPHEDRINE SULFATE 20 MG: 50 INJECTION, SOLUTION INTRAVENOUS at 12:09

## 2023-12-05 ASSESSMENT — COGNITIVE AND FUNCTIONAL STATUS - GENERAL
DRESSING REGULAR LOWER BODY CLOTHING: A LITTLE
STANDING UP FROM CHAIR USING ARMS: A LITTLE
MOVING TO AND FROM BED TO CHAIR: A LITTLE
WALKING IN HOSPITAL ROOM: A LITTLE
TURNING FROM BACK TO SIDE WHILE IN FLAT BAD: A LITTLE
MOBILITY SCORE: 18
SUGGESTED CMS G CODE MODIFIER MOBILITY: CK
HELP NEEDED FOR BATHING: A LITTLE
TOILETING: A LITTLE
DAILY ACTIVITIY SCORE: 21
SUGGESTED CMS G CODE MODIFIER DAILY ACTIVITY: CJ
CLIMB 3 TO 5 STEPS WITH RAILING: A LITTLE
MOVING FROM LYING ON BACK TO SITTING ON SIDE OF FLAT BED: A LITTLE

## 2023-12-05 ASSESSMENT — LIFESTYLE VARIABLES
TOTAL SCORE: 0
EVER HAD A DRINK FIRST THING IN THE MORNING TO STEADY YOUR NERVES TO GET RID OF A HANGOVER: NO
EVER FELT BAD OR GUILTY ABOUT YOUR DRINKING: NO
HOW MANY TIMES IN THE PAST YEAR HAVE YOU HAD 5 OR MORE DRINKS IN A DAY: 0
ON A TYPICAL DAY WHEN YOU DRINK ALCOHOL HOW MANY DRINKS DO YOU HAVE: 0
ALCOHOL_USE: NO
EVER HAD A DRINK FIRST THING IN THE MORNING TO STEADY YOUR NERVES TO GET RID OF A HANGOVER: NO
AVERAGE NUMBER OF DAYS PER WEEK YOU HAVE A DRINK CONTAINING ALCOHOL: 0
CONSUMPTION TOTAL: INCOMPLETE
ALCOHOL_USE: NO
HAVE PEOPLE ANNOYED YOU BY CRITICIZING YOUR DRINKING: NO
HAVE YOU EVER FELT YOU SHOULD CUT DOWN ON YOUR DRINKING: NO
TOTAL SCORE: 0
CONSUMPTION TOTAL: NEGATIVE
HAVE YOU EVER FELT YOU SHOULD CUT DOWN ON YOUR DRINKING: NO
DOES PATIENT WANT TO STOP DRINKING: NO
AVERAGE NUMBER OF DAYS PER WEEK YOU HAVE A DRINK CONTAINING ALCOHOL: 0
DOES PATIENT WANT TO STOP DRINKING: NO
TOTAL SCORE: 0
HAVE PEOPLE ANNOYED YOU BY CRITICIZING YOUR DRINKING: NO
HOW MANY TIMES IN THE PAST YEAR HAVE YOU HAD 5 OR MORE DRINKS IN A DAY: 0
ON A TYPICAL DAY WHEN YOU DRINK ALCOHOL HOW MANY DRINKS DO YOU HAVE: 0

## 2023-12-05 ASSESSMENT — CHA2DS2 SCORE
HYPERTENSION: NO
CHF OR LEFT VENTRICULAR DYSFUNCTION: NO
AGE 75 OR GREATER: YES
DIABETES: NO
CHA2DS2 VASC SCORE: 4
PRIOR STROKE OR TIA OR THROMBOEMBOLISM: NO
AGE 65 TO 74: NO
VASCULAR DISEASE: YES
SEX: FEMALE

## 2023-12-05 ASSESSMENT — PAIN SCALES - GENERAL: PAIN_LEVEL: 0

## 2023-12-05 ASSESSMENT — PATIENT HEALTH QUESTIONNAIRE - PHQ9
1. LITTLE INTEREST OR PLEASURE IN DOING THINGS: NOT AT ALL
SUM OF ALL RESPONSES TO PHQ9 QUESTIONS 1 AND 2: 0
2. FEELING DOWN, DEPRESSED, IRRITABLE, OR HOPELESS: NOT AT ALL

## 2023-12-05 ASSESSMENT — FIBROSIS 4 INDEX
FIB4 SCORE: 4.51
FIB4 SCORE: 2.62

## 2023-12-05 ASSESSMENT — PAIN DESCRIPTION - PAIN TYPE
TYPE: ACUTE PAIN
TYPE: ACUTE PAIN

## 2023-12-05 NOTE — OR SURGEON
OPERATIVE REPORT    Operation date: 12/5/2023    PreOp Diagnosis: Severe symptomatic mitral regurgitation    PostOp Diagnosis: Severe symptomatic mitral regurgitation    Procedure(s):  TRANSCATHETER [id number] MITRAL VALVE REPAIR (DORITA MitraClip NTW x 1), transseptal puncture (Rittman catheter) and intraoperative transesophageal echocardiography    Surgeon(s):  MARION Sow M.D.    Anesthesiologist/Type of Anesthesia:  Anesthesiologist: Narciso Newman M.D./General    Surgical Staff:  Circulator: Sloan Mack R.N.; David Martinez R.N.  Scrub Person: Brandy Fernández  Radiology Technologist: Grisell de La Rosa Marquez  Cath Lab Tech: Fanny Moon    Specimens removed if any: None    Estimated Blood Loss: Minimal    Findings: Severe mitral regurgitation, mitral annular calcifications    Complications: None    Indications:  Ms. Root is an 83-year-old female with severe symptomatic mitral regurgitation.    Procedure:  The patient was brought to the operating room placed on the operating room table in the supine position.  After successful induction of general anesthesia endotracheal intubation, the patient was prepped and draped in the usual sterile fashion.  Ultrasound-guided right femoral venous access was obtained.  A 1 cm incision was made in the right groin and a single Perclose suture was deployed.  The Rittman catheter was used to perform the transseptal puncture as superiorly as possible but it was still only 3.3 cm from the mitral valve.  A ProTrac wire was introduced into the left atrium and the Rittman catheter was removed.  Serial dilatations of the right groin wound were performed.  A steerable guide catheter was then placed in the left atrium over the ProTrac wire which was subsequently removed.  A clip delivery system catheter with a MitraClip NTW at its tip was positioned appropriately over the mid A2 P2 scallops.  The MitraClip was advanced into the left  ventricle avoiding a large calcified posterior cord and pulled back to grasp the mid A2 P2.  Intraoperative transesophageal echocardiography showed marked reduction in the mitral regurgitant jet from 4+ to 1+.  The mean mitral transvalvular gradient was 8 mmHg.  However, the patient's blood pressure was over 180 mmHg.  When the blood pressure normalized and the clip was released the mean mitral transvalvular gradient was 6 mmHg.  The MitraClip was released in the usual fashion and the entire system was pulled out of the patient.  The Perclose suture was tightened down and Dermabond was applied over the small right groin incision.  The remainder of the operation will be dictated by Dr. Lozano.  There were no apparent complications.  The patient tolerated the procedure well and left the operating room in stable condition.      12/5/2023 12:36 PM Teressa Candelaria MD, FACS

## 2023-12-05 NOTE — PROGRESS NOTES
4 Eyes Skin Assessment Completed by VARGAS Steiner and VARGAS Kasper.    Head WDL  Ears Redness and Blanching  Nose WDL  Mouth WDL  Neck Redness, Blanching, and Scab  Breast/Chest WDL  Shoulder Blades Redness and Blanching  Spine Redness and Blanching  (R) Arm/Elbow/Hand Redness, Blanching, and Discoloration  (L) Arm/Elbow/Hand Redness, Blanching, and Discoloration  Abdomen WDL  Groin Incision  Scrotum/Coccyx/Buttocks Redness and Non-Blanching  (R) Leg Redness and Scab  (L) Leg Redness and Scab  (R) Heel/Foot/Toe Redness, Blanching, and Boggy  (L) Heel/Foot/Toe Redness, Blanching, and Boggy          Devices In Places Tele Box      Interventions In Place Sacral Mepilex, Pillows, and Barrier Cream    Possible Skin Injury Yes    Pictures Uploaded Into Epic Yes  Wound Consult Placed Yes  RN Wound Prevention Protocol Ordered Yes

## 2023-12-05 NOTE — ANESTHESIA POSTPROCEDURE EVALUATION
Patient: Angie Root    Procedure Summary       Date: 12/05/23 Room / Location: [place] [address] / SURGERY [facility]    Anesthesia Start: 1111 Anesthesia Stop: 1257    Procedures:       TRANSCATHETER MITRAL VALVE PROCEDURE (Right: Groin)      ECHOCARDIOGRAM, TRANSESOPHAGEAL, INTRAOPERATIVE (Throat) Diagnosis: (SEVERE MITRAL REGURGITATION)    Surgeons: Ethan Lozano M.D. Responsible Provider: Narciso Newman M.D.    Anesthesia Type: general ASA Status: 4            Final Anesthesia Type: general  Last vitals  BP   Blood Pressure : (!) 142/59    Temp   36.3 °C (97.3 °F)    Pulse   74   Resp   16    SpO2   92 %      Anesthesia Post Evaluation    Patient location during evaluation: PACU  Patient participation: complete - patient participated  Level of consciousness: awake  Pain score: 0    Airway patency: patent  Anesthetic complications: no  Cardiovascular status: adequate  Respiratory status: acceptable  Hydration status: stable    PONV: controlled          There were no known notable events for this encounter.     Nurse Pain Score: 0 (NPRS)

## 2023-12-05 NOTE — ANESTHESIA PROCEDURE NOTES
Airway    Date/Time: 12/5/2023 11:17 AM    Performed by: Narciso Newman M.D.  Authorized by: Narciso Newman M.D.    Location:  OR  Urgency:  Elective  Difficult Airway: No    Indications for Airway Management:  Anesthesia      Spontaneous Ventilation: absent    Sedation Level:  Deep  Preoxygenated: Yes    Patient Position:  Sniffing  Mask Difficulty Assessment:  0 - not attempted  Final Airway Type:  Endotracheal airway  Final Endotracheal Airway:  ETT  Cuffed: Yes    Technique Used for Successful ETT Placement:  Direct laryngoscopy    Insertion Site:  Oral  Blade Type:  Painting  Laryngoscope Blade/Videolaryngoscope Blade Size:  2  ETT Size (mm):  6.5  Measured from:  Teeth  ETT to Teeth (cm):  20  Placement Verified by: auscultation and capnometry    Cormack-Lehane Classification:  Grade III - view of epiglottis only  Number of Attempts at Approach:  1

## 2023-12-05 NOTE — PROGRESS NOTES
Pt transferred to T823 with PACU RN. Pt laying in the supine poisition, strict bedrest until 1600. Pt connected to tele monitor, monitor room notified, rate and rhythm verified. Post op vital signs initiated. VSS, saturating 93% on RA. Right groin site LUIS and soft to touch. Pedal pulses equal +1 bilaterally. Neuro assessment intact. Patient and  educated on post procedure protocol and precautions, pt and  verbalize understanding. Bed alarm activated, call light within reach, all personal belongings within reach.

## 2023-12-05 NOTE — ANESTHESIA PREPROCEDURE EVALUATION
Case: [id number] Date/Time: 12/05/23 1115    Procedures:       TRANSCATHETER MITRAL VALVE PROCEDURE (Right: Groin)      ECHOCARDIOGRAM, TRANSESOPHAGEAL, INTRAOPERATIVE (Throat)    Anesthesia type: General    Pre-op diagnosis: SEVERE MITRAL REGURGITATION    Location: [place] OR 19 / SURGERY [facility]    Surgeons: Ethan Lozano M.D.            Relevant Problems   PULMONARY   (positive) Other emphysema (HCC)      CARDIAC   (positive) Coronary artery disease involving native coronary artery of native heart without angina pectoris   (positive) Nonrheumatic aortic valve stenosis   (positive) Peripheral arterial disease (HCC)   (positive) Persistent atrial fibrillation (HCC)   (positive) Pulmonary hypertension (HCC)   (positive) Severe mitral regurgitation      GI   (positive) Gastroesophageal reflux disease without esophagitis      Other   (positive) Cigarette nicotine dependence with nicotine-induced disorder   (positive) PCI to her LAD in 2009   (positive) Personal history of anaphylaxis       Physical Exam    Airway   Mallampati: II  TM distance: >3 FB  Neck ROM: full       Cardiovascular   Rhythm: regular  Rate: normal  (+) murmur     Dental - normal exam           Pulmonary   Breath sounds clear to auscultation     Abdominal   (-) obese     Neurological - normal exam                   Anesthesia Plan    ASA 4 (Severe MR, moderate AS)       Plan - general       Airway plan will be ETT  NIRAJ Planned        Induction: intravenous      Pertinent diagnostic labs and testing reviewed    Informed Consent:    Anesthetic plan and risks discussed with patient and spouse.    Use of blood products discussed with: patient and spouse whom consented to blood products.

## 2023-12-05 NOTE — PROGRESS NOTES
Med Rec complete per pt  Allergies Reviewed    Pt reports is taking anticoagulant in the last 14 days  Anticoagulant: warfarin, Last dose: 5 days

## 2023-12-05 NOTE — OR NURSING
1255: Pt arrived from OR post TAMR under anesthesia. Pt is asleep. R groin sight is CDI. Cardiac rhythm appears to be SR.     1330:Labs drawn, EKG complete, CXR complete.    1353: Report to VARGAS Steiner.     1400: Updated pt's spouse.     1415: Pt to floor via harleyrabril with RN. Pt on 1L; tank is full. Sights are CDI.

## 2023-12-05 NOTE — ANESTHESIA PROCEDURE NOTES
Arterial Line    Performed by: Narciso Newman M.D.  Authorized by: Narciso Newman M.D.    Start Time:  12/5/2023 11:20 AM  End Time:  12/5/2023 11:21 AM  Localization: surface landmarks    Patient Location:  OR  Indication: continuous blood pressure monitoring        Catheter Size:  20 G  Seldinger Technique?: Yes    Laterality:  Right  Site:  Radial artery  Line Secured:  Antimicrobial disc, tape and transparent dressing  Events: patient tolerated procedure well with no complications

## 2023-12-05 NOTE — ANESTHESIA PROCEDURE NOTES
NIRAJ    Date/Time: 12/5/2023 11:21 AM    Performed by: Vini Archibald M.D.  Authorized by: Vini Archibald M.D.    Start Time:12/5/2023 11:21 AM  Preanesthetic Checklist: patient identified, IV checked, site marked, risks and benefits discussed, surgical consent, monitors and equipment checked, pre-op evaluation and timeout performed    Indication for NIRAJ: diagnostic   Patient Location: OR  Intubated: Yes  Bite Block: Yes  Heart Visualized: Yes  Insertion: atraumatic    **See FULL NIRAJ report in patient's chart via CV Synapse**

## 2023-12-05 NOTE — ANESTHESIA TIME REPORT
Anesthesia Start and Stop Event Times       Date Time Event    12/5/2023 1059 Ready for Procedure     1111 Anesthesia Start     1257 Anesthesia Stop          Responsible Staff  12/05/23      Name Role Begin End    Narciso Newman M.D. Anesth 1111 1257          Overtime Reason:  no overtime (within assigned shift)    Comments: Dr. Newman performed anesthesia for the procedure, and Dr. Archibald performed NIRAJ.  NIRAJ should be billed separately as [id number].

## 2023-12-05 NOTE — PROCEDURES
DATE OF PROCEDURE: 12/5/23      PROCEDURES:  1. Transcatheter mitral valve edge to edge repair (DORITA or Mitraclip)    PRE PROCEDURAL DIAGNOSIS:  Severe symptomatic mitral regurgitation NYHA III C , due to degenerative mitral valve disease and high surgical risk.    POST PROCEDURAL DIAGNOSIS:  Successful transcatheter mitral valve edge to edge repair using NTW clip, reduction of mitral regurgitation from 4+ to 1+    Surgeons: Dr. Lozano (interventional cardiologist) Dr. Candelaria (Cardiothoracic surgeon)    Anaesthesiologist:     Transesophageal echo , intra procedural imaging performed by     DESCRIPTION OF PROCEDURE:  After informed consent was signed by the   patient, the patient was brought into the OR 19.  Bilateral groin was prepped and draped in   usual sterile manner.  The initial timeout was performed.     A 6-Danish venous sheath was placed in the right femoral vein by Modified Seldinger   technique under ultrasound guidance. A PerClose devices was delivered after dilatation of skin track.  An 8.5-Danish TorFlex venous sheath was advanced. Half dose IV heparin was given. A Rome   needle was inserted into the catheter and both the needle and the sheath were placed   in the right atrium. Under transesophageal echocardiogram guidance, the Rome needle   was used to puncture the septum and the catheter was advanced into the left atrium by   myself. Rest of the half dose Heparin was given. A Protrack pigtail wire was positioned into the left   atrium. The sheath was removed and the femoral access site was dilated with   multiple dialators. The 22/24-Danish steerable guide catheter handle was inserted into  the left atrium under transesophageal echocardiogram guidance and the dilator was   removed by .  A NTW Clip attached to the delivery catheter handle was inserted in to   the left atrium. Again, under transesophageal echocardiogram guidance, the clip was   opened, advanced  into the left ventricle and pulled up to the mitral valve leaflets at the   A2/P2 position. The leaflets were grasped and the clip was partially closed. The steerable   device was manipulated by . The severity of mitral regurgitation and the mitral valve   gradient were measured. The clip was then closed fully and released by myself.The   delivery catheter was removed.    The mitral regurgitation was reduced from 4+ to 1+, the mean gradient was 6 mmHg.    The patient tolerated the procedure well. At the end of procedure hemodynamics were   again obtained. The steerable guide catheter was removed and the right femoral venous   access site was closed via PerClose closure.  The patient was then extubated and transferred to PACU in stable condition.      RECOMMENDATION: Guideline directed medical therapy.

## 2023-12-05 NOTE — PROGRESS NOTES
Inpatient Anticoagulation Service Note for 12/5/2023    Reason for Anticoagulation: Atrial Fibrillation   [id number] VASc Score: 4  HAS-BLED Score: 2    Hemoglobin Value: 12.2  Hematocrit Value: 39.8  Lab Platelet Value: (!) 147  Target INR: 2.0 to 3.0    INR from last 7 days       Date/Time INR Value    12/05/23 1038 1.04          Dose from last 7 days       Date/Time Dose (mg)    12/05/23 1526 1          Average Dose (mg): Home dose: 0.5 mg on Tuesdays, 1 mg all other days  Significant Interactions: Antiplatelet Medications (Aspirin 81 mg daily)  Bridge Therapy: No  Reversal Agent Administered: Not Applicable    Comments: INR subtherapeutic today at 1.04. This is expected, as patient was instructed to hold warfarin for 5 days prior to procedure today. Cardiac diet ordered. DDIs as noted above. H/H without concern. PLTs low. No overt bleeding noted. Home dosing of warfarin 0.5 mg on Tuesdays, 1 mg all other days. Will give bolus dose of 1 mg today, with plan to resume home dosing pending INR trend. Pharmacy will continue to follow and make dose adjustments as appropriate.     Plan: Warfarin 1 mg today, repeat INR in AM  Education Material Provided?: No (Home medication)    Pharmacist suggested discharge dosing: Likely can resume home dosing of warfarin 0.5 mg on Tuesdays, 1 mg all other days, pending INR trend. Recommend repeat INR within 48-72 hours of discharge.     Annie Rosario, PharmD

## 2023-12-06 ENCOUNTER — APPOINTMENT (OUTPATIENT)
Dept: RADIOLOGY | Facility: MEDICAL CENTER | Age: 83
DRG: 267 | End: 2023-12-06
Attending: NURSE PRACTITIONER
Payer: MEDICARE

## 2023-12-06 VITALS
DIASTOLIC BLOOD PRESSURE: 51 MMHG | HEIGHT: 63 IN | SYSTOLIC BLOOD PRESSURE: 130 MMHG | BODY MASS INDEX: 15.35 KG/M2 | TEMPERATURE: 97.3 F | HEART RATE: 67 BPM | RESPIRATION RATE: 18 BRPM | OXYGEN SATURATION: 90 % | WEIGHT: 86.64 LBS

## 2023-12-06 PROBLEM — Z95.5 S/P CORONARY ARTERY STENT PLACEMENT: Status: RESOLVED | Noted: 2019-03-12 | Resolved: 2023-12-06

## 2023-12-06 PROBLEM — R31.0 GROSS HEMATURIA: Status: RESOLVED | Noted: 2023-06-05 | Resolved: 2023-12-06

## 2023-12-06 PROBLEM — I35.0 NONRHEUMATIC AORTIC VALVE STENOSIS: Status: RESOLVED | Noted: 2023-12-05 | Resolved: 2023-12-06

## 2023-12-06 PROBLEM — J43.8 OTHER EMPHYSEMA (HCC): Status: RESOLVED | Noted: 2023-06-28 | Resolved: 2023-12-06

## 2023-12-06 PROBLEM — I48.0 PAROXYSMAL ATRIAL FIBRILLATION (HCC): Status: ACTIVE | Noted: 2023-11-14

## 2023-12-06 PROBLEM — Z98.890 S/P MITRAL VALVE CLIP IMPLANTATION: Status: ACTIVE | Noted: 2023-12-06

## 2023-12-06 PROBLEM — Z95.818 S/P MITRAL VALVE CLIP IMPLANTATION: Status: ACTIVE | Noted: 2023-12-06

## 2023-12-06 PROBLEM — Z79.01 CHRONIC ANTICOAGULATION: Status: RESOLVED | Noted: 2018-04-17 | Resolved: 2023-12-06

## 2023-12-06 PROBLEM — I34.0 SEVERE MITRAL REGURGITATION: Status: RESOLVED | Noted: 2023-11-14 | Resolved: 2023-12-06

## 2023-12-06 PROBLEM — R68.89 EXERCISE INTOLERANCE: Status: RESOLVED | Noted: 2022-07-25 | Resolved: 2023-12-06

## 2023-12-06 LAB
ALBUMIN SERPL BCP-MCNC: 3.4 G/DL (ref 3.2–4.9)
ALBUMIN/GLOB SERPL: 1.4 G/DL
ALP SERPL-CCNC: 62 U/L (ref 30–99)
ALT SERPL-CCNC: 22 U/L (ref 2–50)
ANION GAP SERPL CALC-SCNC: 9 MMOL/L (ref 7–16)
AST SERPL-CCNC: 33 U/L (ref 12–45)
BILIRUB SERPL-MCNC: 0.3 MG/DL (ref 0.1–1.5)
BUN SERPL-MCNC: 22 MG/DL (ref 8–22)
CALCIUM ALBUM COR SERPL-MCNC: 9.8 MG/DL (ref 8.5–10.5)
CALCIUM SERPL-MCNC: 9.3 MG/DL (ref 8.5–10.5)
CHLORIDE SERPL-SCNC: 104 MMOL/L (ref 96–112)
CO2 SERPL-SCNC: 27 MMOL/L (ref 20–33)
CREAT SERPL-MCNC: 1.01 MG/DL (ref 0.5–1.4)
EKG IMPRESSION: NORMAL
ERYTHROCYTE [DISTWIDTH] IN BLOOD BY AUTOMATED COUNT: 49.8 FL (ref 35.9–50)
GFR SERPLBLD CREATININE-BSD FMLA CKD-EPI: 55 ML/MIN/1.73 M 2
GLOBULIN SER CALC-MCNC: 2.5 G/DL (ref 1.9–3.5)
GLUCOSE SERPL-MCNC: 140 MG/DL (ref 65–99)
HCT VFR BLD AUTO: 40.2 % (ref 37–47)
HGB BLD-MCNC: 12.7 G/DL (ref 12–16)
INR PPP: 1.15 (ref 0.87–1.13)
LV EJECT FRACT  99904: 55
MAGNESIUM SERPL-MCNC: 1.9 MG/DL (ref 1.5–2.5)
MCH RBC QN AUTO: 29.3 PG (ref 27–33)
MCHC RBC AUTO-ENTMCNC: 31.6 G/DL (ref 32.2–35.5)
MCV RBC AUTO: 92.6 FL (ref 81.4–97.8)
NT-PROBNP SERPL IA-MCNC: 860 PG/ML (ref 0–125)
PLATELET # BLD AUTO: 150 K/UL (ref 164–446)
PMV BLD AUTO: 10.2 FL (ref 9–12.9)
POTASSIUM SERPL-SCNC: 4.6 MMOL/L (ref 3.6–5.5)
PROT SERPL-MCNC: 5.9 G/DL (ref 6–8.2)
PROTHROMBIN TIME: 14.9 SEC (ref 12–14.6)
RBC # BLD AUTO: 4.34 M/UL (ref 4.2–5.4)
SODIUM SERPL-SCNC: 140 MMOL/L (ref 135–145)
WBC # BLD AUTO: 11.1 K/UL (ref 4.8–10.8)

## 2023-12-06 PROCEDURE — 93005 ELECTROCARDIOGRAM TRACING: CPT | Performed by: NURSE PRACTITIONER

## 2023-12-06 PROCEDURE — 36415 COLL VENOUS BLD VENIPUNCTURE: CPT

## 2023-12-06 PROCEDURE — 85610 PROTHROMBIN TIME: CPT

## 2023-12-06 PROCEDURE — 97535 SELF CARE MNGMENT TRAINING: CPT

## 2023-12-06 PROCEDURE — 85027 COMPLETE CBC AUTOMATED: CPT

## 2023-12-06 PROCEDURE — 71045 X-RAY EXAM CHEST 1 VIEW: CPT

## 2023-12-06 PROCEDURE — 93010 ELECTROCARDIOGRAM REPORT: CPT | Performed by: INTERNAL MEDICINE

## 2023-12-06 PROCEDURE — 80053 COMPREHEN METABOLIC PANEL: CPT

## 2023-12-06 PROCEDURE — 83880 ASSAY OF NATRIURETIC PEPTIDE: CPT

## 2023-12-06 PROCEDURE — A9270 NON-COVERED ITEM OR SERVICE: HCPCS | Performed by: NURSE PRACTITIONER

## 2023-12-06 PROCEDURE — 97162 PT EVAL MOD COMPLEX 30 MIN: CPT

## 2023-12-06 PROCEDURE — 700102 HCHG RX REV CODE 250 W/ 637 OVERRIDE(OP): Performed by: NURSE PRACTITIONER

## 2023-12-06 PROCEDURE — 83735 ASSAY OF MAGNESIUM: CPT

## 2023-12-06 RX ORDER — ASPIRIN 81 MG/1
81 TABLET ORAL DAILY
Qty: 30 TABLET | Refills: 0 | Status: SHIPPED | OUTPATIENT
Start: 2023-12-07 | End: 2023-12-13

## 2023-12-06 RX ADMIN — FUROSEMIDE 20 MG: 20 TABLET ORAL at 06:02

## 2023-12-06 RX ADMIN — SOTALOL HYDROCHLORIDE 40 MG: 80 TABLET ORAL at 06:02

## 2023-12-06 RX ADMIN — ASPIRIN 81 MG: 81 TABLET, COATED ORAL at 06:02

## 2023-12-06 RX ADMIN — ALPRAZOLAM 0.25 MG: 0.25 TABLET ORAL at 06:06

## 2023-12-06 RX ADMIN — ACETAMINOPHEN 650 MG: 325 TABLET, FILM COATED ORAL at 10:39

## 2023-12-06 ASSESSMENT — GAIT ASSESSMENTS
GAIT LEVEL OF ASSIST: SUPERVISED
DEVIATION: INCREASED BASE OF SUPPORT
DISTANCE (FEET): 400

## 2023-12-06 ASSESSMENT — COGNITIVE AND FUNCTIONAL STATUS - GENERAL
STANDING UP FROM CHAIR USING ARMS: A LITTLE
SUGGESTED CMS G CODE MODIFIER MOBILITY: CJ
MOBILITY SCORE: 21
CLIMB 3 TO 5 STEPS WITH RAILING: A LITTLE
WALKING IN HOSPITAL ROOM: A LITTLE

## 2023-12-06 ASSESSMENT — FIBROSIS 4 INDEX: FIB4 SCORE: 3.94

## 2023-12-06 NOTE — PROGRESS NOTES
Assumed care of patient, bedside report received from Obdulia KAYE. Updated on POC, call light within reach and fall precautions in place. Bed locked and in lowest position. Patient instructed to call for assistance before getting out of bed. All questions answered, no other needs at this time. R groin site clean, dry, and soft. No signs of hematoma. CMS intact.

## 2023-12-06 NOTE — WOUND TEAM
[facility]  Inpatient Services  Wound and Skin Care Brief Evaluation    Admission Date: 12/5/2023     Last order of IP CONSULT TO WOUND CARE was found on 12/5/2023 from Hospital Encounter on 11/21/2023     HPI, PMH, SH: Reviewed    No chief complaint on file.    Diagnosis: Severe mitral regurgitation [I34.0]    Unit where seen by Wound Team: T823/02     Wound consult placed regarding sacrum. Chart and images reviewed. This discussed with bedside RN. This clinician in to assess patient. Patient pleasant and agreeable. Sacrum with discoloration related to prolonged sitting position on a recliner, should resolve with continued offloading and mobilization.     No pressure injuries or advanced wound care needs identified. Wound consult completed. No further follow up unless indicated and consulted.     BL heels assessed and they are intact. Pt able to move independently and is not in a prolonged position.          PREVENTATIVE INTERVENTIONS:    Q shift Abraham - performed per nursing policy  Q shift pressure point assessments - performed per nursing policy    Mobilization      Up to chair and Ambulate

## 2023-12-06 NOTE — CARE PLAN
The patient is Stable - Low risk of patient condition declining or worsening    Shift Goals  Clinical Goals: hemodynamic stability, groin site  Patient Goals: discharge    Progress made toward(s) clinical / shift goals:      Problem: Knowledge Deficit - Standard  Goal: Patient and family/care givers will demonstrate understanding of plan of care, disease process/condition, diagnostic tests and medications  Outcome: Progressing  Note: Discussed POC for night and monitoring of R groin site. R groin site is clean, dry, with no signs of hematoma.      Problem: Fall Risk  Goal: Patient will remain free from falls  Outcome: Progressing  Note: Pt is low-moderate fall risk, RN discussed this with pt. Bed alarm on, pt uses call light appropriately.        Patient is not progressing towards the following goals:

## 2023-12-06 NOTE — PROGRESS NOTES
Patient being discharged. Patient educated on discharge instructions and new prescriptions. Patient verbalized understanding. Follow up appt made with Cardiology and INR PIV removed, telemetry monitor checked in. Patient going home with .

## 2023-12-06 NOTE — DISCHARGE SUMMARY
PRIMARY DISCHARGE DIAGNOSIS: Status post MitraClip X 1 NTW clip    PROCEDURES:    1. Successful DORITA with MitraClip X1 NTW clip , transfemoral approach under general anesthesia on 12/5/23  2. Intraoperative transesophageal echocardiogram showing Intraoperative NIRAJ during mitraclip procedure.  Baseline images show   normal LV function with EF = 55%.  Degenerative mitral valve disease,   with severe mitral annular calcification extending to the subvalvular   apparatus and papillary muscle heads.  Posterior mitral valve leaflet   prolapse.  Severe mitral regurgitation. Single mitraclip placed at the   A2/P2 position with reduction in MR to mild-moderate.  Transmitral   gradient = 6 mm Hg.  Findings communicated at the time of exam.  3. CXR on 12/6/23 showing   1.  Bilateral basilar atelectasis, no focal infiltrate  2.  Trace bilateral pleural effusions  3.  Atherosclerosis  4.  Perihilar interstitial prominence and bronchial wall cuffing, appearance suggests changes of underlying bronchial inflammation, consider bronchitis.  5.  Hyperexpansion of the lungs suggest changes of COPD.  4. EKG on 12/6/23 showing   Sinus rhythm rate of 63 bpm  Probable left atrial enlargement   Borderline left axis deviation   Abnormal R-wave progression, late transition  5. Labs on 12/6/23 showing   Recent Labs     12/05/23  1303 12/06/23  0137   WBC 8.8 11.1*   RBC 4.26 4.34   HEMOGLOBIN 12.2 12.7   HEMATOCRIT 39.8 40.2   MCV 93.4 92.6   MCH 28.6 29.3   RDW 50.3* 49.8   PLATELETCT 147* 150*   MPV 10.6 10.2     Recent Labs     12/05/23  1303 12/06/23  0137   SODIUM 141 140   POTASSIUM 4.4 4.6   CHLORIDE 108 104   CO2 24 27   GLUCOSE 117* 140*   BUN 16 22     INR   Date Value Ref Range Status   12/06/2023 1.15 (H) 0.87 - 1.13 Final     Comment:     INR - Non-therapeutic Reference Range: 0.87-1.13  INR - Therapeutic Reference Range: 2.0-4.0       HOSPITAL COURSE: The patient is a pleasant 83 year old female with severe symptomatic mitral  regurgitation, HLD with CAD with prior stent placements in '09, COPD with current smoking and severe PAH, PAD with prior femoral PCI in '08, PAF on chronic anticoagulation, pre-diabetes, and malnutrition. Due to the patient's symptoms, the patient underwent successful MitraClip described as above. Post-procedure, the patient did well. They didn't require IV diuresis during their stay but will continue on oral diuretics post-operatively. She did have some post-operative itching which resolved with one dose of benadryl. She does have an allergy to iodine. She has known lung disease which was noted again on chest xray imaging, with no concerning symptoms or hypoxemia. She is strongly encouraged to stop smoking again. She will remain on her warfarin per home dosing with INR follow up next week with the anticoagulation clinic, she will remain on baby aspirin for one week until INR therapeutic. Her QTC was slightly elongated on post-operative EKG, one dose of sotalol was held with no further concerns. She will remain on home dosing of sotalol and we will repeat EKG in one week. No further events were noted during their stay. They are now off oxygen and are to be discharged to home with her .    DISCHARGE MEDICATIONS:      Medication List        START taking these medications        Instructions   aspirin 81 MG EC tablet  Start taking on: December 7, 2023   Take 1 Tablet by mouth every day.  Dose: 81 mg            CHANGE how you take these medications        Instructions   albuterol 108 (90 Base) MCG/ACT Aers inhalation aerosol  What changed: See the new instructions.   INHALE ONE TO TWO PUFFS BY MOUTH EVERY 4 HOURS AS NEEDED FOR SHORTNESS OF BREATH     warfarin 1 MG Tabs  What changed:   how much to take  when to take this  additional instructions  Commonly known as: Coumadin   TAKE ONE TO TWO TABLETS DAILY OR AS DIRECTED BY ANTICOAGULATION CLINIC            CONTINUE taking these medications        Instructions    ALPRAZolam 0.25 MG Tabs  Commonly known as: Xanax   TAKE ONE TABLET BY MOUTH TWICE A DAY AS NEEDED FOR ANXIETY     ascorbic acid 500 MG Tabs  Commonly known as: Ascorbic Acid   Take 2 Tablets by mouth every day.  Dose: 1,000 mg     CALCIUM 1200+D3 PO   Take 1 Tablet by mouth every day.  Dose: 1 Tablet     D-Mannose Powd   Take 1 Dose by mouth every day. Mix one scoop with 4 oz of water  Dose: 1 Dose     EPINEPHrine 0.3 MG/0.3ML Soaj solution for injection  Commonly known as: Epipen   epinephrine 0.3 mg/0.3 mL injection, auto-injector     fluticasone 50 MCG/ACT nasal spray  Commonly known as: Flonase   Administer 1 Spray into affected nostril(S) every day.  Dose: 1 Spray     furosemide 20 MG Tabs  Commonly known as: Lasix   Take 1 Tablet by mouth every day.  Dose: 20 mg     ipratropium-albuterol 0.5-2.5 (3) MG/3ML nebulizer solution  Commonly known as: Duoneb   Take 3 mL by nebulization every four hours as needed for Shortness of Breath.  Dose: 3 mL     lovastatin 40 MG tablet  Commonly known as: Mevacor   TAKE 1 TABLET AT BEDTIME     PROBIOTIC PO   Take 1 Capsule by mouth every day.  Dose: 1 Capsule     sotalol 80 MG Tabs  Commonly known as: Betapace   TAKE 1/2 TABLET TWICE DAILY Strength: 80 mg     vitamin D 1000 Unit (25 mcg) Tabs  Commonly known as: cholecalciferol   Take 3,000 Units by mouth every morning. 3 tablets = 3000 units  Dose: 3,000 Units     ZINC PO   Take 1 Tablet by mouth every day.  Dose: 1 Tablet            DISCHARGE INSTRUCTIONS: They are given discharge instructions on potential post-operative complications and symptoms to watch out for. Their groin sites were checked and were clean, dry, and intact. Patient or family to notify us for any complications noted on the discharge instructions. They will follow up with myself, Isabel Thomas DNP, APRN, on Tuesday in our cardiology office. They will not get labs before their follow up appointment. They will then follow up with a repeat  echocardiogram in one month for post MitraClip assessment.     Future Appointments   Date Time Provider Department Center   12/12/2023 11:30 AM Beryl Pang M.D. [facility]   12/12/2023 12:45 PM [facility] None   12/13/2023 10:00 AM LAB [facility] None   1/5/2024 12:15 PM [facility]   1/9/2024  3:15 PM [facility] None   1/11/2024 10:30 [id number]   1/11/2024 11:30 AM Peggy Tejeda M.D. [facility] None   11/26/2024 10:15 AM [facility] EXAM 12 [facility]   12/4/2024  1:15 PM [facility] None     Discharge time spent with patient was 21 minutes.

## 2023-12-06 NOTE — THERAPY
"Physical Therapy   Initial Evaluation and Discharge     Patient Name: Angie Root  Age:  83 y.o., Sex:  female  Medical Record #: [id number]  Today's Date: 12/6/2023     Precautions  Precautions: Fall Risk;Cardiac Precautions (See Comments)    Assessment  Patient is 83 y.o. female s/p mitral valve repair on 12/5 due to severe symptomatic mitral valve regurgitation. PMHx of CAD x2 cardiac stents, Afib. Pt receptive to [facility] with handouts provided regarding walking program, talk test, RPE scale, BP/HR monitoring, s/s heart failure response, activity pacing and progression, following up with cardiologist, and modifiable risk factors. Pt required SPV for all mobility including hallway ambulation, progressed to no AD, negative talk test, vitals stable. Recommend home with OP Cardiac Rehab. Will d/c PT as pt with no further acute IP PT needs.    Plan    Physical Therapy Initial Treatment Plan   Duration: Evaluation only    DC Equipment Recommendations: None  Discharge Recommendations:  (OP Cardiac Rehab)       Subjective    \"I'm surprised I haven't had to catch my breath at all!\"      Objective     12/06/23 0855   Vitals   O2 Delivery Device None - Room Air   Vitals Comments supine 99/46, HR 64; seated 98/50, 70; standing 111/54, 74; standing post ambulation 98/52, 72. Negative talk test   Pain 0 - 10 Group   Therapist Pain Assessment Nurse Notified;0;Post Activity Pain Same as Prior to Activity   Prior Living Situation   Prior Services None   Housing / Facility 1 Story House   Steps Into Home   (ramp)   Bathroom Set up Walk In Shower;Grab Bars   Equipment Owned Crutches   Lives with - Patient's Self Care Capacity Spouse   Comments Reports spouse able to assist as needed   Prior Level of Functional Mobility   Bed Mobility Independent   Transfer Status Independent   Ambulation Independent   Ambulation Distance   (community)   Assistive Devices Used None   Stairs Independent   History of Falls   History of Falls Yes "   Cognition    Cognition / Consciousness WDL   Level of Consciousness Alert   Comments Pleasant and cooperative, verbose   Active ROM Upper Body   Active ROM Upper Body  WDL   Strength Upper Body   Upper Body Strength  WDL   Active ROM Lower Body    Active ROM Lower Body  WDL   Strength Lower Body   Lower Body Strength  WDL   Balance Assessment   Sitting Balance (Static) Good   Sitting Balance (Dynamic) Good   Standing Balance (Static) Fair +   Standing Balance (Dynamic) Fair +   Weight Shift Sitting Good   Weight Shift Standing Fair   Comments initially with FWW, later with no AD. No LOB   Bed Mobility    Supine to Sit Supervised   Sit to Supine Supervised   Scooting Supervised   Gait Analysis   Gait Level Of Assist Supervised   Assistive Device   (initially with FWW, progressed to no  ft)   Distance (Feet) 400   # of Times Distance was Traveled 1   Deviation Increased Base Of Support   Weight Bearing Status No restrictions   Functional Mobility   Sit to Stand Supervised   Bed, Chair, Wheelchair Transfer Supervised   Transfer Method Stand Step   Mobility no AD in hallway   How much difficulty does the patient currently have...   Turning over in bed (including adjusting bedclothes, sheets and blankets)? 4   Sitting down on and standing up from a chair with arms (e.g., wheelchair, bedside commode, etc.) 4   Moving from lying on back to sitting on the side of the bed? 4   How much help from another person does the patient currently need...   Moving to and from a bed to a chair (including a wheelchair)? 3   Need to walk in a hospital room? 3   Climbing 3-5 steps with a railing? 3   6 clicks Mobility Score 21   Activity Tolerance   Comments no overt s/s fatigue   Education Group   Education Provided Role of Physical Therapist;Cardiac Precautions   Cardiac Precautions Patient Response Patient;Acceptance;Explanation;Handout;Verbal Demonstration;Action Demonstration   Role of Physical Therapist Patient Response  Patient;Acceptance;Explanation;Handout;Verbal Demonstration   Additional Comments Receptive to self management and compensatory strategies   Physical Therapy Initial Treatment Plan    Duration Evaluation only   Anticipated Discharge Equipment and Recommendations   DC Equipment Recommendations None   Discharge Recommendations   (OP Cardiac Rehab)   Interdisciplinary Plan of Care Collaboration   IDT Collaboration with  Nursing   Patient Position at End of Therapy In Bed;Bed Alarm On;Call Light within Reach;Tray Table within Reach;Phone within Reach   Collaboration Comments RN updated   Session Information   Date / Session Number  12/6: eval only, d/c PT

## 2023-12-12 ENCOUNTER — OFFICE VISIT (OUTPATIENT)
Dept: MEDICAL GROUP | Facility: CLINIC | Age: 83
End: 2023-12-12
Payer: MEDICARE

## 2023-12-12 VITALS
SYSTOLIC BLOOD PRESSURE: 93 MMHG | OXYGEN SATURATION: 94 % | HEART RATE: 63 BPM | HEIGHT: 59 IN | RESPIRATION RATE: 16 BRPM | DIASTOLIC BLOOD PRESSURE: 53 MMHG | BODY MASS INDEX: 17.34 KG/M2 | WEIGHT: 86 LBS

## 2023-12-12 DIAGNOSIS — Z00.00 MEDICARE ANNUAL WELLNESS VISIT, SUBSEQUENT: ICD-10-CM

## 2023-12-12 DIAGNOSIS — R20.2 PARESTHESIA OF LEFT UPPER AND LOWER EXTREMITY: ICD-10-CM

## 2023-12-12 PROCEDURE — 3078F DIAST BP <80 MM HG: CPT | Performed by: FAMILY MEDICINE

## 2023-12-12 PROCEDURE — 3074F SYST BP LT 130 MM HG: CPT | Performed by: FAMILY MEDICINE

## 2023-12-12 PROCEDURE — G0439 PPPS, SUBSEQ VISIT: HCPCS | Performed by: FAMILY MEDICINE

## 2023-12-12 ASSESSMENT — FIBROSIS 4 INDEX: FIB4 SCORE: 3.94

## 2023-12-12 NOTE — PATIENT INSTRUCTIONS
Fall Prevention in the Home, Adult  Falls can cause injuries and can happen to people of all ages. There are many things you can do to make your home safe and to help prevent falls. Ask for help when making these changes.  What actions can I take to prevent falls?  General Instructions  Use good lighting in all rooms. Replace any light bulbs that burn out.  Turn on the lights in dark areas. Use night-lights.  Keep items that you use often in easy-to-reach places. Lower the shelves around your home if needed.  Set up your furniture so you have a clear path. Avoid moving your furniture around.  Do not have throw rugs or other things on the floor that can make you trip.  Avoid walking on wet floors.  If any of your floors are uneven, fix them.  Add color or contrast paint or tape to clearly bud and help you see:  Grab bars or handrails.  First and last steps of staircases.  Where the edge of each step is.  If you use a stepladder:  Make sure that it is fully opened. Do not climb a closed stepladder.  Make sure the sides of the stepladder are locked in place.  Ask someone to hold the stepladder while you use it.  Know where your pets are when moving through your home.  What can I do in the bathroom?         Keep the floor dry. Clean up any water on the floor right away.  Remove soap buildup in the tub or shower.  Use nonskid mats or decals on the floor of the tub or shower.  Attach bath mats securely with double-sided, nonslip rug tape.  If you need to sit down in the shower, use a plastic, nonslip stool.  Install grab bars by the toilet and in the tub and shower. Do not use towel bars as grab bars.  What can I do in the bedroom?  Make sure that you have a light by your bed that is easy to reach.  Do not use any sheets or blankets for your bed that hang to the floor.  Have a firm chair with side arms that you can use for support when you get dressed.  What can I do in the kitchen?  Clean up any spills right away.  If  you need to reach something above you, use a step stool with a grab bar.  Keep electrical cords out of the way.  Do not use floor polish or wax that makes floors slippery.  What can I do with my stairs?  Do not leave any items on the stairs.  Make sure that you have a light switch at the top and the bottom of the stairs.  Make sure that there are handrails on both sides of the stairs. Fix handrails that are broken or loose.  Install nonslip stair treads on all your stairs.  Avoid having throw rugs at the top or bottom of the stairs.  Choose a carpet that does not hide the edge of the steps on the stairs.  Check carpeting to make sure that it is firmly attached to the stairs. Fix carpet that is loose or worn.  What can I do on the outside of my home?  Use bright outdoor lighting.  Fix the edges of walkways and driveways and fix any cracks.  Remove anything that might make you trip as you walk through a door, such as a raised step or threshold.  Trim any bushes or trees on paths to your home.  Check to see if handrails are loose or broken and that both sides of all steps have handrails.  Install guardrails along the edges of any raised decks and porches.  Clear paths of anything that can make you trip, such as tools or rocks.  Have leaves, snow, or ice cleared regularly.  Use sand or salt on paths during winter.  Clean up any spills in your garage right away. This includes grease or oil spills.  What other actions can I take?  Wear shoes that:  Have a low heel. Do not wear high heels.  Have rubber bottoms.  Feel good on your feet and fit well.  Are closed at the toe. Do not wear open-toe sandals.  Use tools that help you move around if needed. These include:  Canes.  Walkers.  Scooters.  Crutches.  Review your medicines with your doctor. Some medicines can make you feel dizzy. This can increase your chance of falling.  Ask your doctor what else you can do to help prevent falls.  Where to find more information  Centers  for Disease Control and Prevention, [place]: www.cdc.gov  National [place] on Aging: www.[place].nih.gov  Contact a doctor if:  You are afraid of falling at home.  You feel weak, drowsy, or dizzy at home.  You fall at home.  Summary  There are many simple things that you can do to make your home safe and to help prevent falls.  Ways to make your home safe include removing things that can make you trip and installing grab bars in the bathroom.  Ask for help when making these changes in your home.  This information is not intended to replace advice given to you by your health care provider. Make sure you discuss any questions you have with your health care provider.  Document Revised: 09/19/2022 Document Reviewed: 07/21/2021  [employer]vier Patient Education © 2023 [employer]

## 2023-12-12 NOTE — PROGRESS NOTES
Chief Complaint   Patient presents with    Follow-Up         HPI:  Angie Root is a 83 y.o. here for Medicare Annual Wellness Visit     Patient Active Problem List    Diagnosis Date Noted    S/P mitral valve clip implantation 12/06/2023    Ground-level fall 11/16/2023    Ureterolithiasis 11/15/2023    Paroxysmal atrial fibrillation (HCC) 11/14/2023    Pulmonary hypertension (HCC) 11/10/2023    Severe protein-calorie malnutrition (HCC) 10/19/2023    Bladder polyp 05/31/2023    Personal history of anaphylaxis 12/18/2020    Coronary artery disease involving native coronary artery of native heart without angina pectoris 03/12/2019    History of COPD 12/05/2017    Circulating anticoagulants (HCC) 12/04/2017    Osteoporosis, postmenopausal 08/24/2017    Depression with anxiety 02/15/2017    Vitamin D deficiency 07/26/2016    Insomnia 07/26/2016    Peripheral arterial disease (HCC) 03/01/2016    Cigarette nicotine dependence with nicotine-induced disorder 03/01/2016    Gastroesophageal reflux disease without esophagitis 01/20/2016    Pre-diabetes 01/20/2016    Panic attacks 01/20/2016       Current Outpatient Medications   Medication Sig Dispense Refill    aspirin 81 MG EC tablet Take 1 Tablet by mouth every day. 30 Tablet 0    furosemide (LASIX) 20 MG Tab Take 1 Tablet by mouth every day. 30 Tablet 11    Calcium-Magnesium-Vitamin D (CALCIUM 1200+D3 PO) Take 1 Tablet by mouth every day.      D-Mannose Powder Take 1 Dose by mouth every day. Mix one scoop with 4 oz of water      fluticasone (FLONASE) 50 MCG/ACT nasal spray Administer 1 Spray into affected nostril(S) every day. 16 g 3    Multiple Vitamins-Minerals (ZINC PO) Take 1 Tablet by mouth every day.      ipratropium-albuterol (DUONEB) 0.5-2.5 (3) MG/3ML nebulizer solution Take 3 mL by nebulization every four hours as needed for Shortness of Breath. 120 mL 11    ALPRAZolam (XANAX) 0.25 MG Tab TAKE ONE TABLET BY MOUTH TWICE A DAY AS NEEDED FOR ANXIETY (Patient  taking differently: Take 0.25 mg by mouth 2 times a day as needed for Anxiety.) 60 Tablet 5    lovastatin (MEVACOR) 40 MG tablet TAKE 1 TABLET AT BEDTIME 100 Tablet 3    warfarin (COUMADIN) 1 MG Tab TAKE ONE TO TWO TABLETS DAILY OR AS DIRECTED BY ANTICOAGULATION CLINIC (Patient taking differently: 1 Tablet every day. TAKE ONE EVERY NIGHT EXCEPT TUESDAY TAKES A HALF OF PILL) 180 Tablet 1    sotalol (BETAPACE) 80 MG Tab TAKE 1/2 TABLET TWICE DAILY Strength: 80 mg 90 Tablet 3    EPINEPHrine (EPIPEN) 0.3 MG/0.3ML Solution Auto-injector solution for injection epinephrine 0.3 mg/0.3 mL injection, auto-injector      Probiotic Product (PROBIOTIC PO) Take 1 Capsule by mouth every day.      ascorbic acid (ASCORBIC ACID) 500 MG Tab Take 2 Tablets by mouth every day.      vitamin D (CHOLECALCIFEROL) 1000 UNIT Tab Take 3,000 Units by mouth every morning. 3 tablets = 3000 units      albuterol 108 (90 Base) MCG/ACT Aero Soln inhalation aerosol INHALE ONE TO TWO PUFFS BY MOUTH EVERY 4 HOURS AS NEEDED FOR SHORTNESS OF BREATH (Patient not taking: Reported on 12/12/2023) 18 g 3     No current facility-administered medications for this visit.            Current supplements as per medication list.     Allergies: Penicillins; Codeine; Iodine; Bee venom; Chantix [varenicline]; Ciprofloxacin; Diphtheria,pertussis,tetanus; Flagyl [metronidazole]; Honey bee venom; Latex; Lipitor [atorvastatin calcium]; Other environmental; Shellfish allergy; and Tetanus antitoxin    Current social contact/activities: Home with      She  reports that she has been smoking cigarettes. She started smoking about 66 years ago. She has a 16.7 pack-year smoking history. She has never used smokeless tobacco. She reports that she does not drink alcohol and does not use drugs.  Ready to quit: Not Answered  Counseling given: Not Answered  Tobacco comments: 2 cigarettes per day, not interested in cessation information      ROS:    Gait: needs walker,  ordered  Ostomy: No   Other tubes: No   Amputations: No   Chronic oxygen use: No   Last eye exam: Aug 2023  Wears hearing aids: No   : Denies any urinary leakage during the last 6 months      Screening:    Depression Screening  Little interest or pleasure in doing things?  0   Feeling down, depressed , or hopeless? 0    Trouble falling or staying asleep, or sleeping too much?     Feeling tired or having little energy?     Poor appetite or overeating?     Feeling bad about yourself - or that you are a failure or have let yourself or your family down?    Trouble concentrating on things, such as reading the newspaper or watching television?    Moving or speaking so slowly that other people could have noticed.  Or the opposite - being so fidgety or restless that you have been moving around a lot more than usual?     Thoughts that you would be better off dead, or of hurting yourself?     Patient Health Questionnaire Score:      If depressive symptoms identified deferred to follow up visit unless specifically addressed in assessment and plan.    Interpretation of PHQ-9 Total Score   Score Severity   1-4 No Depression   5-9 Mild Depression   10-14 Moderate Depression   15-19 Moderately Severe Depression   20-27 Severe Depression    Screening for Cognitive Impairment  Do you or any of your friends or family members have any concern about your memory?    Three Minute Recall (Banana, Sunrise, Chair)  2/3    Donald clock face with all 12 numbers and set the hands to show 20 past 8.    correct   Cognitive concerns identified deferred for follow up unless specifically addressed in assessment and plan.    Fall Risk Assessment  Has the patient had two or more falls in the last year or any fall with injury in the last year?  yes     Safety Assessment  Do you always wear your seatbelt?  y   Any changes to home needed to function safely? n   Difficulty hearing.  n   Patient counseled about all safety risks that were  identified.    Functional Assessment ADLs  Are there any barriers preventing you from cooking for yourself or meeting nutritional needs?   no    Are there any barriers preventing you from driving safely or obtaining transportation?   no    Are there any barriers preventing you from using a telephone or calling for help?    no   Are there any barriers preventing you from shopping?   no    Are there any barriers preventing you from taking care of your own finances?   no    Are there any barriers preventing you from managing your medications?    no   Are there any barriers preventing you from showering, bathing or dressing yourself?  no    Are there any barriers preventing you from doing housework or laundry?  no    Are there any barriers preventing you from using the toilet? no    Are you currently engaging in any exercise or physical activity?   no     Self-Assessment of Health  What is your perception of your health?   Pretty good shape   Do you sleep more than six hours a night?   no   In the past 7 days, how much did pain keep you from doing your normal work?  0    Do you spend quality time with family or friends (virtually or in person)?   y   Do you usually eat a heart healthy diet that constists of a variety of fruits, vegetables, whole grains and fiber?   no   Do you eat foods high in fat and/or Fast Food more than three times per week?   no   How concerned are you that your medical conditions are not being well managed?   Not at all       Advance Care Planning  Do you have an Advance Directive, Living Will, Durable Power of , or POLST?            no       Health Maintenance Summary            Overdue - Hepatitis A Vaccine (Hep A) (1 of 2 - Risk 2-dose series) Overdue - never done      No completion history exists for this topic.              Overdue - COVID-19 Vaccine (4 - 2023-24 season) Overdue since 9/1/2023 01/24/2022  Imm Admin: MODERNA SARS-COV-2 VACCINE (12+)    03/05/2021  Imm Admin:  MODERNA SARS-COV-2 VACCINE (12+)    02/05/2021  Imm Admin: MODERNA SARS-COV-2 VACCINE (12+)              Postponed - IMM DTaP/Tdap/Td Vaccine (1 - Tdap) Postponed until 6/28/2024      No completion history exists for this topic.              Scheduled - Annual Pulmonary Function Test / Spirometry (Yearly) Scheduled for 1/11/2024      No completion history exists for this topic.              Annual Wellness Visit (Every 366 Days) Next due on 12/12/2024 12/12/2023  Visit Dx: Medicare annual wellness visit, subsequent              Bone Density Scan (Every 5 Years) Next due on 8/8/2028 08/08/2023  DS-BONE DENSITY STUDY (DEXA)    08/22/2017  DS-BONE DENSITY STUDY (DEXA)              Pneumococcal Vaccine: 65+ Years (Series Information) Completed      12/11/2018  Imm Admin: Pneumococcal polysaccharide vaccine (PPSV-23)    12/04/2017  Imm Admin: Pneumococcal Conjugate Vaccine (Prevnar/PCV-13)              Zoster (Shingles) Vaccines (Series Information) Completed      02/29/2020  Imm Admin: Zoster Vaccine Recombinant (RZV) (SHINGRIX)    11/13/2019  Imm Admin: Zoster Vaccine Recombinant (RZV) (SHINGRIX)              Influenza Vaccine (Series Information) Completed      10/18/2023  Imm Admin: Influenza Vaccine Adult HD    10/20/2022  Imm Admin: Influenza Vaccine Adult HD    01/14/2022  Imm Admin: Influenza Vaccine Adult HD    11/02/2020  Imm Admin: Influenza Vaccine Adult HD    10/15/2019  Imm Admin: Influenza, Unspecified - HISTORICAL DATA    Only the first 5 history entries have been loaded, but more history exists.              Hepatitis B Vaccine (Hep B) (Series Information) Aged Out      No completion history exists for this topic.              HPV Vaccines (Series Information) Aged Out      No completion history exists for this topic.              Polio Vaccine (Inactivated Polio) (Series Information) Aged Out      No completion history exists for this topic.              Meningococcal Immunization (Series  Information) Aged Out      No completion history exists for this topic.              Discontinued - Mammogram  Discontinued        Frequency changed to Never automatically (Topic No Longer Applies)    04/05/2022  MA-SCREENING MAMMO BILAT W/TOMOSYNTHESIS W/CAD    11/14/2020  MA-SCREENING MAMMO BILAT W/TOMOSYNTHESIS W/CAD    09/21/2019  MA-SCREENING MAMMO BILAT W/TOMOSYNTHESIS W/CAD    08/02/2018  MA-MAMMO SCREENING BILAT W/MG W/CAD    Only the first 5 history entries have been loaded, but more history exists.              Discontinued - Colorectal Cancer Screening  Discontinued        Frequency changed to Never automatically (Topic No Longer Applies)    04/09/2018  Surgical Procedure: COLONOSCOPY - ENDO    11/18/2017  Occult Blood Feces component of OCCULT BLOOD STOOL    03/19/2014  AMB REFERRAL TO GI FOR COLONOSCOPY                    Patient Care Team:  Beryl Pang M.D. as PCP - General (Family Medicine)  Richard Corona M.D. (Inactive) (Surgery)  Johnathan Morgan M.D. (Inactive) (Cardiovascular Disease (Cardiology))  CRUZ Verdugo (Dermatology)  Jamaal Patel M.D. (Otolaryngology)  Brit Rosado M.D. as Consulting Physician (Gastroenterology)  [facility]      Social History     Tobacco Use    Smoking status: Every Day     Current packs/day: 0.25     Average packs/day: 0.3 packs/day for 66.7 years (16.7 ttl pk-yrs)     Types: Cigarettes     Start date: 3/20/1958    Smokeless tobacco: Never    Tobacco comments:     2 cigarettes per day, not interested in cessation information   Vaping Use    Vaping Use: Never used   Substance Use Topics    Alcohol use: No     Alcohol/week: 0.0 oz    Drug use: Never     Family History   Problem Relation Age of Onset    Hypertension Mother     Hyperlipidemia Mother     Cancer Maternal Grandmother         tongue    Cancer Maternal Uncle         x 3, pancreas    Cancer Maternal Grandfather         unknown    Cancer Paternal Grandmother          unknown    Cancer Paternal Grandfather         unknown    Diabetes Maternal Uncle     Heart Disease Maternal Uncle     Hypertension Sister     Hyperlipidemia Sister     Heart Disease Sister     Alcohol/Drug Sister     Thyroid Sister     Psychiatric Illness Neg Hx     Stroke Neg Hx      She  has a past medical history of Arthritis, Breath shortness, CAD (coronary artery disease), Cancer ([facility]) (1982), Cancer (HCC) (2023), Cataract, COPD (chronic obstructive pulmonary disease) ([facility]), COVID-19, Croup (3 years old), Disorder of thyroid, Diverticulosis, Dyslipidemia, Emphysema of lung ([facility]), GERD (gastroesophageal reflux disease), Heart valve disease, Hiatal hernia, High cholesterol, Hypertension, Infectious disease (2018), Measles, Paroxysmal A-fib ([facility]) (01/20/2016), Paroxysmal atrial fibrillation ([facility]), Pneumonia, Prediabetes, Psychiatric problem, PVD (peripheral vascular disease) ([facility]), Renal disorder (11/2023), Rheumatic fever, and Urinary bladder disorder.   Past Surgical History:   Procedure Laterality Date    TRANSCATHETER MITRAL VALVE REPAIR Right 12/5/2023    Procedure: TRANSCATHETER MITRAL VALVE PROCEDURE;  Surgeon: Ethan Lozano M.D.;  Location: [facility];  Service: Cardiac    ECHOCARDIOGRAM, TRANSESOPHAGEAL, INTRAOPERATIVE N/A 12/5/2023    Procedure: ECHOCARDIOGRAM, TRANSESOPHAGEAL, INTRAOPERATIVE;  Surgeon: Ethan Lozano M.D.;  Location: [facility];  Service: Cardiac    NE CYSTOSCOPY,INSERT URETERAL STENT Right 11/16/2023    Procedure: CYSTOSCOPY, WITH URETERAL STENT INSERTION;  Surgeon: Alexsander Reyes M.D.;  Location: SURGERY [facility];  Service: Urology    NE CYSTO/URETERO/PYELOSCOPY, DX Right 11/16/2023    Procedure: URETEROSCOPY;  Surgeon: Alexsander Reyes M.D.;  Location: SURGERY [facility];  Service: Urology    LASERTRIPSY Right 11/16/2023    Procedure: LITHOTRIPSY, USING LASER;  Surgeon: Alexsander Reyes M.D.;  Location: [facility];  Service: Urology     "FECAL TRANSPLANT N/A 4/9/2018    Procedure: FECAL TRANSPLANT;  Surgeon: Gonzalez Cruz M.D.;  Location: ENDOSCOPY [facility];  Service: Gastroenterology    COLONOSCOPY - ENDO N/A 4/9/2018    Procedure: COLONOSCOPY - ENDO;  Surgeon: Gonzalez Cruz M.D.;  Location: ENDOSCOPY [facility];  Service: Gastroenterology    WIDE EXCISION MELANOMA, LEG, W/POSS.STSG  10/2015    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    CARDIAC CATH, RIGHT HEART  2008    stent    OTHER ORTHOPEDIC SURGERY Left 2008    hand repair    CHOLECYSTECTOMY  1993    TONSILLECTOMY  1945    OTHER CARDIAC SURGERY      r heart cath stents    STENT PLACEMENT      L iliac stent       Exam:   BP 93/53 (BP Location: Right arm, Patient Position: Sitting, BP Cuff Size: Adult)   Pulse 63   Resp 16   Ht 1.499 m (4' 11\")   Wt 39 kg (86 lb)   SpO2 94%  Body mass index is 17.37 kg/m².    Hearing good.    Dentition good  Alert, oriented in no acute distress.  Eye contact is good, speech goal directed, affect calm    Assessment and Plan. The following treatment and monitoring plan is recommended:    1. Medicare annual wellness visit, subsequent  - Patient identified as fall risk.  Appropriate orders and counseling given.      Services suggested: No services needed at this time  Health Care Screening: Age-appropriate preventive services Medicare covers discussed today and ordered if indicated.    Referrals offered: Community-based lifestyle interventions to reduce health risks and promote self-management and wellness, fall prevention, nutrition, physical activity, tobacco-use cessation, weight loss, and mental health services as per orders if indicated.    Discussion today about general wellness and lifestyle habits:    Prevent falls and reduce trip hazards; Cautioned about securing or removing rugs.  Have a working fire alarm and carbon monoxide detector;   Engage in regular physical activity and social activities     Follow-up: Return in about 1 year " (around 12/12/2024).     Addendum:    S: Patient reports that ever since her procedure last week she has noted a sensation of heaviness to L arm and L leg. No weakness, moving about as usual, but wondering if she may have had a stroke or TIA.  O: Grossly normal neurological exam without focal deficit.  A/P: We discussed getting MRI brain but pt is averse to MRI due to claustrophobia, and xanax prescribed in the past has been ineffective to overcome this. As she is already pretty well optimized from a vascular standpoint, I'm not sure this would change our management so we will defer at this time.

## 2023-12-13 ENCOUNTER — PATIENT MESSAGE (OUTPATIENT)
Dept: MEDICAL GROUP | Facility: CLINIC | Age: 83
End: 2023-12-13
Payer: MEDICARE

## 2023-12-13 ENCOUNTER — APPOINTMENT (OUTPATIENT)
Dept: CARDIOLOGY | Facility: MEDICAL CENTER | Age: 83
End: 2023-12-13
Attending: NURSE PRACTITIONER
Payer: MEDICARE

## 2023-12-13 ENCOUNTER — OFFICE VISIT (OUTPATIENT)
Dept: CARDIOLOGY | Facility: MEDICAL CENTER | Age: 83
End: 2023-12-13
Attending: INTERNAL MEDICINE
Payer: MEDICARE

## 2023-12-13 ENCOUNTER — HOSPITAL ENCOUNTER (OUTPATIENT)
Dept: LAB | Facility: MEDICAL CENTER | Age: 83
End: 2023-12-13
Attending: NURSE PRACTITIONER
Payer: MEDICARE

## 2023-12-13 VITALS
RESPIRATION RATE: 16 BRPM | HEART RATE: 65 BPM | BODY MASS INDEX: 15.06 KG/M2 | WEIGHT: 85 LBS | HEIGHT: 63 IN | OXYGEN SATURATION: 98 % | SYSTOLIC BLOOD PRESSURE: 116 MMHG | DIASTOLIC BLOOD PRESSURE: 70 MMHG

## 2023-12-13 DIAGNOSIS — Z95.818 S/P MITRAL VALVE CLIP IMPLANTATION: ICD-10-CM

## 2023-12-13 DIAGNOSIS — Z79.01 CHRONIC ANTICOAGULATION: ICD-10-CM

## 2023-12-13 DIAGNOSIS — Z98.890 S/P MITRAL VALVE CLIP IMPLANTATION: ICD-10-CM

## 2023-12-13 DIAGNOSIS — R20.2 PARESTHESIA OF LEFT UPPER AND LOWER EXTREMITY: ICD-10-CM

## 2023-12-13 LAB
EKG IMPRESSION: NORMAL
INR PPP: 1.69 (ref 0.87–1.13)
PROTHROMBIN TIME: 20.1 SEC (ref 12–14.6)

## 2023-12-13 PROCEDURE — 93005 ELECTROCARDIOGRAM TRACING: CPT | Performed by: INTERNAL MEDICINE

## 2023-12-13 PROCEDURE — 3074F SYST BP LT 130 MM HG: CPT | Performed by: INTERNAL MEDICINE

## 2023-12-13 PROCEDURE — 99214 OFFICE O/P EST MOD 30 MIN: CPT | Mod: 25 | Performed by: INTERNAL MEDICINE

## 2023-12-13 PROCEDURE — 3078F DIAST BP <80 MM HG: CPT | Performed by: INTERNAL MEDICINE

## 2023-12-13 PROCEDURE — 93010 ELECTROCARDIOGRAM REPORT: CPT | Performed by: INTERNAL MEDICINE

## 2023-12-13 PROCEDURE — 85610 PROTHROMBIN TIME: CPT

## 2023-12-13 PROCEDURE — 99214 OFFICE O/P EST MOD 30 MIN: CPT | Performed by: INTERNAL MEDICINE

## 2023-12-13 PROCEDURE — 36415 COLL VENOUS BLD VENIPUNCTURE: CPT

## 2023-12-13 ASSESSMENT — FIBROSIS 4 INDEX: FIB4 SCORE: 3.94

## 2023-12-13 NOTE — PROGRESS NOTES
Interventional cardiology Follow-up Consultation Note        CC: Post mitral valve repair 1 week follow-up    Patient ID/HPI:   83-year-old female patient here for follow-up recent mitral valve clip procedure.  She feels significantly better, not having episodes of shortness of breath, overall very pleased with the procedure.        Past Medical History:   Diagnosis Date    Arthritis     BL hands    Breath shortness     CAD (coronary artery disease)     Cancer ([facility]) 1982    melanoma    Cancer ([facility]) 2023    melanoma    Cataract     IOL implants    COPD (chronic obstructive pulmonary disease) ([facility])     COVID-19     Croup 3 years old    Disorder of thyroid     1983    Diverticulosis     Dyslipidemia     Emphysema of lung ([facility])     GERD (gastroesophageal reflux disease)     Heart valve disease     Hiatal hernia     High cholesterol     Hypertension     Infectious disease 2018    C Diff    Measles     5 years old    Paroxysmal A-fib ([facility]) 01/20/2016    Symptomatic, on a rhythm control strategy with sotalol 40 mg by mouth twice a day.     Paroxysmal atrial fibrillation ([facility])     Pneumonia     x 3    Prediabetes     Psychiatric problem     anxiety    PVD (peripheral vascular disease) ([facility])     Renal disorder 11/2023    kidney stone    Rheumatic fever     possible as a small child    Urinary bladder disorder     frequent UTIs         Current Outpatient Medications   Medication Sig Dispense Refill    furosemide (LASIX) 20 MG Tab Take 1 Tablet by mouth every day. 30 Tablet 11    Calcium-Magnesium-Vitamin D (CALCIUM 1200+D3 PO) Take 1 Tablet by mouth every day.      D-Mannose Powder Take 1 Dose by mouth every day. Mix one scoop with 4 oz of water      fluticasone (FLONASE) 50 MCG/ACT nasal spray Administer 1 Spray into affected nostril(S) every day. 16 g 3    albuterol 108 (90 Base) MCG/ACT Aero Soln inhalation aerosol INHALE ONE TO TWO PUFFS BY MOUTH EVERY 4 HOURS AS NEEDED FOR SHORTNESS OF BREATH 18 g 3    Multiple  "Vitamins-Minerals (ZINC PO) Take 1 Tablet by mouth every day.      ipratropium-albuterol (DUONEB) 0.5-2.5 (3) MG/3ML nebulizer solution Take 3 mL by nebulization every four hours as needed for Shortness of Breath. 120 mL 11    ALPRAZolam (XANAX) 0.25 MG Tab TAKE ONE TABLET BY MOUTH TWICE A DAY AS NEEDED FOR ANXIETY (Patient taking differently: Take 0.25 mg by mouth 2 times a day as needed for Anxiety.) 60 Tablet 5    lovastatin (MEVACOR) 40 MG tablet TAKE 1 TABLET AT BEDTIME 100 Tablet 3    warfarin (COUMADIN) 1 MG Tab TAKE ONE TO TWO TABLETS DAILY OR AS DIRECTED BY ANTICOAGULATION CLINIC (Patient taking differently: 1 Tablet every day. TAKE ONE EVERY NIGHT EXCEPT TUESDAY TAKES A HALF OF PILL) 180 Tablet 1    sotalol (BETAPACE) 80 MG Tab TAKE 1/2 TABLET TWICE DAILY Strength: 80 mg 90 Tablet 3    EPINEPHrine (EPIPEN) 0.3 MG/0.3ML Solution Auto-injector solution for injection epinephrine 0.3 mg/0.3 mL injection, auto-injector      Probiotic Product (PROBIOTIC PO) Take 1 Capsule by mouth every day.      ascorbic acid (ASCORBIC ACID) 500 MG Tab Take 2 Tablets by mouth every day.      vitamin D (CHOLECALCIFEROL) 1000 UNIT Tab Take 3,000 Units by mouth every morning. 3 tablets = 3000 units       No current facility-administered medications for this visit.         Physical Exam:  Ambulatory Vitals  /70 (BP Location: Left arm, Patient Position: Sitting, BP Cuff Size: Adult)   Pulse 65   Resp 16   Ht 1.588 m (5' 2.52\")   Wt 38.6 kg (85 lb)   SpO2 98%    Oxygen Therapy:  Pulse Oximetry: 98 %  BP Readings from Last 4 Encounters:   12/13/23 116/70   12/12/23 93/53   12/06/23 130/51   11/22/23 94/58       Weight/BMI: Body mass index is 15.29 kg/m².  Wt Readings from Last 4 Encounters:   12/13/23 38.6 kg (85 lb)   12/12/23 39 kg (86 lb)   12/06/23 39.3 kg (86 lb 10.3 oz)   11/22/23 39.9 kg (88 lb)       General: Well appearing and in no apparent distress  Neck: JVP absent, carotid bruits absent  Lungs: respiratory " sounds  normal, additional breath sounds absent  Heart: Regular rhythm,   No palpable thrills on palpation, murmurs absent, no rubs,   Lower extremity edema absent.       EKG normal sinus rhythm    Medical Decision Making:  Problem List Items Addressed This Visit       S/P mitral valve clip implantation    Relevant Orders    EKG (Completed)     She is doing extremely well postprocedure.  Her symptoms improved.  Continue current therapy    EKG today sinus rhythm, QTc 476.    Continue sotalol.  Will stop aspirin.    Continue warfarin. switch furosemide to as needed.    Echocardiogram in 1 month.        INterventional cardiologist  [facility]  [address] [address]  Phone: [phone]  Fax: [phone]

## 2023-12-13 NOTE — LETTER
[facility] - Operated by [facility]   [address], [address]  [place] [address]  Phone: [phone]  Fax: [phone]              On behalf of [facility]'s Structural Heart Program, we would like to thank you for allowing us to participate in the care of your patient.     She underwent a successful mitral valve transcatheter [id number] repair (Mitral DORITA) on 12/5/2023.     Your patient is scheduled to follow up with our Structural Heart Program at one month and one year post procedure.     Again, thank you for allowing us to participate in the care of your patient. If you have any questions, please do not hesitate to contact our Structural Heart team.     Sincerely,    [facility]    THI Beltran, RN, Structural Heart Program Nurse Coordinator ([phone])  THI Chilel, RN, Structural Heart Program Nurse Coordinator ([phone])      Angie Root  1940    Encounter Date: 12/13/2023    Ethan Lozano M.D.          PROGRESS NOTE:        Interventional cardiology Follow-up Consultation Note        CC: Post mitral valve repair 1 week follow-up    Patient ID/HPI:   83-year-old female patient here for follow-up recent mitral valve clip procedure.  She feels significantly better, not having episodes of shortness of breath, overall very pleased with the procedure.        Past Medical History:   Diagnosis Date    Arthritis     BL hands    Breath shortness     CAD (coronary artery disease)     Cancer (HCC) 1982    melanoma    Cancer (HCC) 2023    melanoma    Cataract     IOL implants    COPD (chronic obstructive pulmonary disease) (HCC)     COVID-19     Croup 3 years old    Disorder of thyroid     1983    Diverticulosis     Dyslipidemia     Emphysema of lung (HCC)     GERD (gastroesophageal reflux disease)     Heart valve disease     Hiatal hernia     High cholesterol     Hypertension     Infectious disease  2018    C Diff    Measles     5 years old    Paroxysmal A-fib (HCC) 01/20/2016    Symptomatic, on a rhythm control strategy with sotalol 40 mg by mouth twice a day.     Paroxysmal atrial fibrillation ([facility])     Pneumonia     x 3    Prediabetes     Psychiatric problem     anxiety    PVD (peripheral vascular disease) ([facility])     Renal disorder 11/2023    kidney stone    Rheumatic fever     possible as a small child    Urinary bladder disorder     frequent UTIs         Current Outpatient Medications   Medication Sig Dispense Refill    furosemide (LASIX) 20 MG Tab Take 1 Tablet by mouth every day. 30 Tablet 11    Calcium-Magnesium-Vitamin D (CALCIUM 1200+D3 PO) Take 1 Tablet by mouth every day.      D-Mannose Powder Take 1 Dose by mouth every day. Mix one scoop with 4 oz of water      fluticasone (FLONASE) 50 MCG/ACT nasal spray Administer 1 Spray into affected nostril(S) every day. 16 g 3    albuterol 108 (90 Base) MCG/ACT Aero Soln inhalation aerosol INHALE ONE TO TWO PUFFS BY MOUTH EVERY 4 HOURS AS NEEDED FOR SHORTNESS OF BREATH 18 g 3    Multiple Vitamins-Minerals (ZINC PO) Take 1 Tablet by mouth every day.      ipratropium-albuterol (DUONEB) 0.5-2.5 (3) MG/3ML nebulizer solution Take 3 mL by nebulization every four hours as needed for Shortness of Breath. 120 mL 11    ALPRAZolam (XANAX) 0.25 MG Tab TAKE ONE TABLET BY MOUTH TWICE A DAY AS NEEDED FOR ANXIETY (Patient taking differently: Take 0.25 mg by mouth 2 times a day as needed for Anxiety.) 60 Tablet 5    lovastatin (MEVACOR) 40 MG tablet TAKE 1 TABLET AT BEDTIME 100 Tablet 3    warfarin (COUMADIN) 1 MG Tab TAKE ONE TO TWO TABLETS DAILY OR AS DIRECTED BY ANTICOAGULATION CLINIC (Patient taking differently: 1 Tablet every day. TAKE ONE EVERY NIGHT EXCEPT TUESDAY TAKES A HALF OF PILL) 180 Tablet 1    sotalol (BETAPACE) 80 MG Tab TAKE 1/2 TABLET TWICE DAILY Strength: 80 mg 90 Tablet 3    EPINEPHrine (EPIPEN) 0.3 MG/0.3ML Solution Auto-injector solution for injection  "epinephrine 0.3 mg/0.3 mL injection, auto-injector      Probiotic Product (PROBIOTIC PO) Take 1 Capsule by mouth every day.      ascorbic acid (ASCORBIC ACID) 500 MG Tab Take 2 Tablets by mouth every day.      vitamin D (CHOLECALCIFEROL) 1000 UNIT Tab Take 3,000 Units by mouth every morning. 3 tablets = 3000 units       No current facility-administered medications for this visit.         Physical Exam:  Ambulatory Vitals  /70 (BP Location: Left arm, Patient Position: Sitting, BP Cuff Size: Adult)   Pulse 65   Resp 16   Ht 1.588 m (5' 2.52\")   Wt 38.6 kg (85 lb)   SpO2 98%    Oxygen Therapy:  Pulse Oximetry: 98 %  BP Readings from Last 4 Encounters:   12/13/23 116/70   12/12/23 93/53   12/06/23 130/51   11/22/23 94/58       Weight/BMI: Body mass index is 15.29 kg/m².  Wt Readings from Last 4 Encounters:   12/13/23 38.6 kg (85 lb)   12/12/23 39 kg (86 lb)   12/06/23 39.3 kg (86 lb 10.3 oz)   11/22/23 39.9 kg (88 lb)       General: Well appearing and in no apparent distress  Neck: JVP absent, carotid bruits absent  Lungs: respiratory sounds  normal, additional breath sounds absent  Heart: Regular rhythm,   No palpable thrills on palpation, murmurs absent, no rubs,   Lower extremity edema absent.       EKG normal sinus rhythm    Medical Decision Making:  Problem List Items Addressed This Visit       S/P mitral valve clip implantation    Relevant Orders    EKG (Completed)     She is doing extremely well postprocedure.  Her symptoms improved.  Continue current therapy    EKG today sinus rhythm, QTc 476.    Continue sotalol.  Will stop aspirin.    Continue warfarin. switch furosemide to as needed.    Echocardiogram in 1 month.        INterventional cardiologist  [facility]  [address] [address]  [place] [address]  Phone: [phone]  Fax: [phone]         Beryl Pang M.D.  [address] [place] [address]  Via In " Basket     Eric Aragon M.D.  [address]  [address]  [facility] [address]  Via In Basket

## 2023-12-14 NOTE — DOCUMENTATION QUERY
"                                                                         [facility]                                                                       Query Response Note      PATIENT:               MAY GOMEZ  ACCT #:                  [id number]  MRN:                     [id number]  :                      1940  ADMIT DATE:       2023 9:25 AM  DISCH DATE:          RESPONDING  PROVIDER #:        [id number]           QUERY TEXT:    Malnutrition is documented in the the  DCS. BMI of 15.29 per flowsheet data and previous office visit note on 10/19/2023 include dx of severe protein calorie malnutrition.    Based upon your judgment, can the severity of malnutrition be further specified and additional clinical indicators be provided to support the diagnosis.        The patient's Clinical Indicators include:  Clinical Findings:   -Historical records pulled from EPIC: 10/19/2023 Office Visit note with Dr Pang: \"Severe protein-calorie malnutrition Not much appetite. Current BMI 16.56. Weight is slowly decreasing. Temporal muscle wasting. Very little exercise tolerance.     BMI per flowsheet: 15.29      Risk Factors: Advanced age, COPD, smoking, pre-diabetes    Treatments: Cardiac diet, IV fluids, Continue oral supplements: Ascorbic acid 500 MG, Calcium 1200+D3 PO, zinc upon discharge      Contact me with any questions.    Thank you for your time and attention,  Marcie Patel RN, CDI  [email or web address]  Connect via email, Voalte or messenger.  Options provided:   -- Mild protein calorie malnutrition   -- Moderate protein calorie malnutrition, (please document additional clinical indicators)   -- Severe protein calorie malnutrition, (please document additional clinical indicators)   -- Other explanation, (please specify other explanation)      Query created by: Marcie Patel on 2023 11:21 AM    RESPONSE TEXT:    Mild protein calorie malnutrition          Electronically signed by:  ROB" TREASURE BECKHAM 12/14/2023 12:13 PM

## 2023-12-18 ENCOUNTER — TELEPHONE (OUTPATIENT)
Dept: VASCULAR LAB | Facility: MEDICAL CENTER | Age: 83
End: 2023-12-18
Payer: MEDICARE

## 2023-12-18 ENCOUNTER — ANTICOAGULATION MONITORING (OUTPATIENT)
Dept: VASCULAR LAB | Facility: MEDICAL CENTER | Age: 83
End: 2023-12-18
Payer: MEDICARE

## 2023-12-18 NOTE — TELEPHONE ENCOUNTER
Caller: Angie Root     Topic/issue: Pt called in regards to wanting to go over her INR Blood work results.  She would like a call back, Please advise     Callback Number: [phone] (home)      Thank you,    Sreekanth YU

## 2023-12-19 ENCOUNTER — HOSPITAL ENCOUNTER (OUTPATIENT)
Dept: LAB | Facility: MEDICAL CENTER | Age: 83
End: 2023-12-19
Attending: NURSE PRACTITIONER
Payer: MEDICARE

## 2023-12-19 ENCOUNTER — ANTICOAGULATION MONITORING (OUTPATIENT)
Dept: VASCULAR LAB | Facility: MEDICAL CENTER | Age: 83
End: 2023-12-19
Payer: MEDICARE

## 2023-12-19 ENCOUNTER — TELEPHONE (OUTPATIENT)
Dept: CARDIOLOGY | Facility: MEDICAL CENTER | Age: 83
End: 2023-12-19
Payer: MEDICARE

## 2023-12-19 DIAGNOSIS — Z79.01 CHRONIC ANTICOAGULATION: ICD-10-CM

## 2023-12-19 LAB
INR PPP: 2.09 (ref 0.87–1.13)
PROTHROMBIN TIME: 23.7 SEC (ref 12–14.6)

## 2023-12-19 PROCEDURE — 36415 COLL VENOUS BLD VENIPUNCTURE: CPT

## 2023-12-19 PROCEDURE — 85610 PROTHROMBIN TIME: CPT

## 2023-12-19 NOTE — PROGRESS NOTES
OP   Telephone Anticoagulation Service Note      Anticoagulation Summary  As of 2023      INR goal:  2.0-3.0   TTR:  81.5 % (5.9 y)   INR used for dosin.69 (2023)   Warfarin maintenance plan:  0.5 mg (1 mg x 0.5) every Tue; 1 mg (1 mg x 1) all other days   Weekly warfarin total:  6.5 mg   Plan last modified:  Pieter Covarrubias, PharmD (2023)   Next INR check:  2023   Target end date:  Indefinite    Indications    Paroxysmal a-fib (CMS-HCC) (Resolved) [I48.0]  Chronic anticoagulation (Resolved) [Z79.01]                 Anticoagulation Episode Summary       INR check location:  Clinic Lab    Preferred lab:  [facility]    Send INR reminders to:      Comments:  [phone] (H)  [facility]          Anticoagulation Care Providers       Provider Role Specialty Phone number    Evelio Tejeda M.D. Referring Internal Medicine [phone]    [facility] Anticoagulation Services Responsible  [phone]    Beryl Pang M.D. Responsible Family Medicine [phone]          Anticoagulation Patient Findings  Patient Findings       Negatives:  Signs/symptoms of thrombosis, Signs/symptoms of bleeding, Laboratory test error suspected, Change in health, Change in alcohol use, Change in activity, Upcoming invasive procedure, Emergency department visit, Upcoming dental procedure, Missed doses, Extra doses, Change in medications, Change in diet/appetite, Hospital admission, Bruising, Other complaints          Spoke with the patient on the phone today, reporting a SUB-therapeutic INR of 1.69 from almost a week ago.   Confirmed the current warfarin dosing regimen and patient compliance.  Patient reports she has had 2 surgeries in last several weeks.   Patient denies any interval changes to diet and/or medications. Patient denies any signs/symptoms of bleeding or clotting.  Since INR is almost a week old and likely could have changed by now, patient will continue with her current regimen for now and will  retest INR again tomorrow and further dosing will be given at that time.   Patient reminded to call the clinic if she tests her INR and does not hear from the clinic.     Celeste GrovesD

## 2023-12-19 NOTE — TELEPHONE ENCOUNTER
Received message from patient requesting call back to discuss rescheduling 1 month post Mitral DORITA echo.    Called and spoke to patient. Echo rescheduled. All questions answered.

## 2023-12-20 NOTE — PROGRESS NOTES
OP   Telephone Anticoagulation Service Note      Anticoagulation Summary  As of 2023      INR goal:  2.0-3.0   TTR:  81.3 % (5.9 y)   INR used for dosin.09 (2023)   Warfarin maintenance plan:  0.5 mg (1 mg x 0.5) every Tue; 1 mg (1 mg x 1) all other days   Weekly warfarin total:  6.5 mg   Plan last modified:  Pieter Covarrubias PharmD (2023)   Next INR check:  2024   Target end date:  Indefinite    Indications    Paroxysmal a-fib (CMS-HCC) (Resolved) [I48.0]  Chronic anticoagulation (Resolved) [Z79.01]                 Anticoagulation Episode Summary       INR check location:  Clinic Lab    Preferred lab:  [facility]    Send INR reminders to:      Comments:  [phone] (H)  [facility]          Anticoagulation Care Providers       Provider Role Specialty Phone number    Evelio Tejeda M.D. Referring Internal Medicine [phone]    [facility] Anticoagulation Services Responsible  [phone]    Beryl Pang M.D. Responsible Family Medicine [phone]          Anticoagulation Patient Findings  Patient Findings       Negatives:  Signs/symptoms of thrombosis, Signs/symptoms of bleeding, Laboratory test error suspected, Change in health, Change in alcohol use, Change in activity, Upcoming invasive procedure, Emergency department visit, Upcoming dental procedure, Missed doses, Extra doses, Change in medications, Change in diet/appetite, Hospital admission, Bruising, Other complaints          Spoke with the patient on the phone today, reporting a therapeutic INR of 2.09.   Confirmed the current warfarin dosing regimen and patient compliance.  Patient denies any interval changes to diet and/or medications. Patient denies any signs/symptoms of bleeding or clotting.  Patient instructed to continue with the current warfarin dosing regimen, and asked to follow up again in 2 weeks.     Celeste GrovesD

## 2023-12-22 ENCOUNTER — DOCUMENTATION (OUTPATIENT)
Dept: CARDIOLOGY | Facility: MEDICAL CENTER | Age: 83
End: 2023-12-22
Payer: MEDICARE

## 2023-12-22 NOTE — PROGRESS NOTES
Post Mitral DORITA notification electronically sent to PCP Dr. Beryl Pang and Dr. Saldivar along with post op office visit note.

## 2023-12-26 ENCOUNTER — APPOINTMENT (RX ONLY)
Dept: URBAN - METROPOLITAN AREA CLINIC 4 | Facility: CLINIC | Age: 83
Setting detail: DERMATOLOGY
End: 2023-12-26

## 2023-12-26 DIAGNOSIS — D485 NEOPLASM OF UNCERTAIN BEHAVIOR OF SKIN: ICD-10-CM | Status: INADEQUATELY CONTROLLED

## 2023-12-26 DIAGNOSIS — Z71.89 OTHER SPECIFIED COUNSELING: ICD-10-CM

## 2023-12-26 PROBLEM — D48.5 NEOPLASM OF UNCERTAIN BEHAVIOR OF SKIN: Status: ACTIVE | Noted: 2023-12-26

## 2023-12-26 PROCEDURE — ? BIOPSY BY SHAVE METHOD

## 2023-12-26 PROCEDURE — 11102 TANGNTL BX SKIN SINGLE LES: CPT

## 2023-12-26 PROCEDURE — ? COUNSELING

## 2023-12-26 PROCEDURE — 99212 OFFICE O/P EST SF 10 MIN: CPT | Mod: 25

## 2023-12-26 ASSESSMENT — LOCATION ZONE DERM: LOCATION ZONE: LEG

## 2023-12-26 ASSESSMENT — LOCATION SIMPLE DESCRIPTION DERM: LOCATION SIMPLE: LEFT PRETIBIAL REGION

## 2023-12-26 ASSESSMENT — LOCATION DETAILED DESCRIPTION DERM: LOCATION DETAILED: LEFT PROXIMAL PRETIBIAL REGION

## 2023-12-28 DIAGNOSIS — J43.9 PULMONARY EMPHYSEMA, UNSPECIFIED EMPHYSEMA TYPE (HCC): ICD-10-CM

## 2023-12-28 RX ORDER — ALBUTEROL SULFATE 90 UG/1
AEROSOL, METERED RESPIRATORY (INHALATION)
Qty: 18 G | Refills: 3 | Status: SHIPPED | OUTPATIENT
Start: 2023-12-28

## 2024-01-02 ENCOUNTER — TELEPHONE (OUTPATIENT)
Dept: CARDIOLOGY | Facility: MEDICAL CENTER | Age: 84
End: 2024-01-02
Payer: MEDICARE

## 2024-01-02 NOTE — TELEPHONE ENCOUNTER
Received call from patient requesting return call to discuss upcoming follow up visit.     Called and spoke to patient. All questions answered. Patient verbalizes understanding.

## 2024-01-03 ENCOUNTER — HOSPITAL ENCOUNTER (OUTPATIENT)
Dept: RADIOLOGY | Facility: MEDICAL CENTER | Age: 84
End: 2024-01-03
Attending: FAMILY MEDICINE
Payer: MEDICARE

## 2024-01-03 ENCOUNTER — HOSPITAL ENCOUNTER (OUTPATIENT)
Dept: CARDIOLOGY | Facility: MEDICAL CENTER | Age: 84
End: 2024-01-03
Attending: INTERNAL MEDICINE
Payer: MEDICARE

## 2024-01-03 DIAGNOSIS — I34.0 MITRAL VALVE INSUFFICIENCY, UNSPECIFIED ETIOLOGY: ICD-10-CM

## 2024-01-03 DIAGNOSIS — R20.2 PARESTHESIA OF LEFT UPPER AND LOWER EXTREMITY: ICD-10-CM

## 2024-01-03 PROCEDURE — 93306 TTE W/DOPPLER COMPLETE: CPT

## 2024-01-03 PROCEDURE — 93880 EXTRACRANIAL BILAT STUDY: CPT | Mod: 26 | Performed by: INTERNAL MEDICINE

## 2024-01-03 PROCEDURE — 93880 EXTRACRANIAL BILAT STUDY: CPT

## 2024-01-05 ENCOUNTER — APPOINTMENT (OUTPATIENT)
Dept: CARDIOLOGY | Facility: MEDICAL CENTER | Age: 84
End: 2024-01-05
Attending: INTERNAL MEDICINE
Payer: MEDICARE

## 2024-01-05 RX ORDER — ALPRAZOLAM 0.25 MG/1
TABLET ORAL
COMMUNITY
Start: 2023-12-17 | End: 2024-01-29 | Stop reason: SDUPTHER

## 2024-01-08 LAB
LV EJECT FRACT  99904: 60
LV EJECT FRACT MOD 2C 99903: 65.29
LV EJECT FRACT MOD 4C 99902: 54.66
LV EJECT FRACT MOD BP 99901: 57.64

## 2024-01-08 PROCEDURE — 93306 TTE W/DOPPLER COMPLETE: CPT | Mod: 26 | Performed by: INTERNAL MEDICINE

## 2024-01-09 ENCOUNTER — PATIENT MESSAGE (OUTPATIENT)
Dept: CARDIOLOGY | Facility: MEDICAL CENTER | Age: 84
End: 2024-01-09

## 2024-01-09 ENCOUNTER — OFFICE VISIT (OUTPATIENT)
Dept: CARDIOLOGY | Facility: MEDICAL CENTER | Age: 84
End: 2024-01-09
Attending: NURSE PRACTITIONER
Payer: MEDICARE

## 2024-01-09 ENCOUNTER — HOSPITAL ENCOUNTER (OUTPATIENT)
Dept: LAB | Facility: MEDICAL CENTER | Age: 84
End: 2024-01-09
Attending: NURSE PRACTITIONER
Payer: MEDICARE

## 2024-01-09 ENCOUNTER — DOCUMENTATION (OUTPATIENT)
Dept: CARDIOLOGY | Facility: MEDICAL CENTER | Age: 84
End: 2024-01-09

## 2024-01-09 ENCOUNTER — ANTICOAGULATION MONITORING (OUTPATIENT)
Dept: MEDICAL GROUP | Facility: PHYSICIAN GROUP | Age: 84
End: 2024-01-09
Payer: MEDICARE

## 2024-01-09 VITALS
WEIGHT: 88 LBS | OXYGEN SATURATION: 97 % | BODY MASS INDEX: 16.2 KG/M2 | HEART RATE: 75 BPM | SYSTOLIC BLOOD PRESSURE: 124 MMHG | HEIGHT: 62 IN | DIASTOLIC BLOOD PRESSURE: 70 MMHG

## 2024-01-09 DIAGNOSIS — I48.0 PAROXYSMAL ATRIAL FIBRILLATION (HCC): ICD-10-CM

## 2024-01-09 DIAGNOSIS — I73.9 PERIPHERAL ARTERIAL DISEASE (HCC): ICD-10-CM

## 2024-01-09 DIAGNOSIS — Z98.890 S/P MITRAL VALVE CLIP IMPLANTATION: ICD-10-CM

## 2024-01-09 DIAGNOSIS — E43 SEVERE PROTEIN-CALORIE MALNUTRITION (HCC): ICD-10-CM

## 2024-01-09 DIAGNOSIS — R73.03 PRE-DIABETES: ICD-10-CM

## 2024-01-09 DIAGNOSIS — Z95.818 S/P MITRAL VALVE CLIP IMPLANTATION: ICD-10-CM

## 2024-01-09 DIAGNOSIS — Z79.01 CHRONIC ANTICOAGULATION: ICD-10-CM

## 2024-01-09 DIAGNOSIS — I27.20 PULMONARY HYPERTENSION (HCC): ICD-10-CM

## 2024-01-09 DIAGNOSIS — I25.10 CORONARY ARTERY DISEASE INVOLVING NATIVE CORONARY ARTERY OF NATIVE HEART WITHOUT ANGINA PECTORIS: ICD-10-CM

## 2024-01-09 DIAGNOSIS — D68.318 CIRCULATING ANTICOAGULANTS (HCC): ICD-10-CM

## 2024-01-09 LAB
INR PPP: 2.29 (ref 0.87–1.13)
PROTHROMBIN TIME: 25.5 SEC (ref 12–14.6)

## 2024-01-09 PROCEDURE — 99214 OFFICE O/P EST MOD 30 MIN: CPT | Performed by: NURSE PRACTITIONER

## 2024-01-09 PROCEDURE — 85610 PROTHROMBIN TIME: CPT

## 2024-01-09 PROCEDURE — 3078F DIAST BP <80 MM HG: CPT | Performed by: NURSE PRACTITIONER

## 2024-01-09 PROCEDURE — 99213 OFFICE O/P EST LOW 20 MIN: CPT | Performed by: NURSE PRACTITIONER

## 2024-01-09 PROCEDURE — 3074F SYST BP LT 130 MM HG: CPT | Performed by: NURSE PRACTITIONER

## 2024-01-09 PROCEDURE — 36415 COLL VENOUS BLD VENIPUNCTURE: CPT

## 2024-01-09 RX ORDER — FUROSEMIDE 20 MG/1
20 TABLET ORAL DAILY
Qty: 100 TABLET | Refills: 3 | Status: SHIPPED | OUTPATIENT
Start: 2024-01-09 | End: 2024-01-15 | Stop reason: SDUPTHER

## 2024-01-09 ASSESSMENT — ENCOUNTER SYMPTOMS
DIZZINESS: 0
CLAUDICATION: 0
ORTHOPNEA: 0
FEVER: 0
COUGH: 0
ABDOMINAL PAIN: 0
PND: 0
SHORTNESS OF BREATH: 1
PALPITATIONS: 0
MYALGIAS: 0

## 2024-01-09 ASSESSMENT — FIBROSIS 4 INDEX: FIB4 SCORE: 3.94

## 2024-01-09 NOTE — PATIENT INSTRUCTIONS
RE-START furosemide at 20 mg once a day to help with your symptoms.    I will check in with you in 2 weeks with re-starting the medication to see how your feel.

## 2024-01-09 NOTE — PROGRESS NOTES
Chief Complaint   Patient presents with    Coronary Artery Disease    Other     F/v dx: 1 month post Mitral DORITA     Subjective     Angie Root is a 83 y.o. female who presents today for 1 month post nuria clip.    She is a patient of Dr. Lozano in our office. Hx of HLD with CAD with previous stenting to her LAD in '09, smoking history, HTN, PAF, and PAD with L iliac stent in '08.     She has some intermittent at rest shortness of breath not necessarily worse with exertion. This got worse since stopping lasix. She also noticed some leg swelling.    She has no other symptoms.    She has no chest pain, edema, dizziness/lightheadedness, or palpitations.    Past Medical History:   Diagnosis Date    Arthritis     BL hands    Breath shortness     CAD (coronary artery disease)     Cancer (HCC) 1982    melanoma    Cancer ([facility]) 2023    melanoma    Cataract     IOL implants    COPD (chronic obstructive pulmonary disease) (HCC)     COVID-19     Croup 3 years old    Disorder of thyroid     1983    Diverticulosis     Dyslipidemia     Emphysema of lung (HCC)     GERD (gastroesophageal reflux disease)     Heart valve disease     Hiatal hernia     High cholesterol     Hypertension     Infectious disease 2018    C Diff    Measles     5 years old    Paroxysmal A-fib (HCC) 01/20/2016    Symptomatic, on a rhythm control strategy with sotalol 40 mg by mouth twice a day.     Paroxysmal atrial fibrillation (HCC)     Pneumonia     x 3    Prediabetes     Psychiatric problem     anxiety    PVD (peripheral vascular disease) ([facility])     Renal disorder 11/2023    kidney stone    Rheumatic fever     possible as a small child    Urinary bladder disorder     frequent UTIs     Past Surgical History:   Procedure Laterality Date    TRANSCATHETER MITRAL VALVE REPAIR Right 12/5/2023    Procedure: TRANSCATHETER MITRAL VALVE PROCEDURE;  Surgeon: Ethan Lozano M.D.;  Location: SURGERY [facility];  Service: Cardiac    ECHOCARDIOGRAM,  TRANSESOPHAGEAL, INTRAOPERATIVE N/A 12/5/2023    Procedure: ECHOCARDIOGRAM, TRANSESOPHAGEAL, INTRAOPERATIVE;  Surgeon: Ethan Lozano M.D.;  Location: SURGERY [facility];  Service: Cardiac    MO CYSTOSCOPY,INSERT URETERAL STENT Right 11/16/2023    Procedure: CYSTOSCOPY, WITH URETERAL STENT INSERTION;  Surgeon: Alexsander Reyes M.D.;  Location: SURGERY [facility];  Service: Urology    MO CYSTO/URETERO/PYELOSCOPY, DX Right 11/16/2023    Procedure: URETEROSCOPY;  Surgeon: Alexsander Reyes M.D.;  Location: SURGERY [facility];  Service: Urology    LASERTRIPSY Right 11/16/2023    Procedure: LITHOTRIPSY, USING LASER;  Surgeon: Alexsander Reyes M.D.;  Location: SURGERY [facility];  Service: Urology    FECAL TRANSPLANT N/A 4/9/2018    Procedure: FECAL TRANSPLANT;  Surgeon: Gonzalez Cruz M.D.;  Location: ENDOSCOPY [facility];  Service: Gastroenterology    COLONOSCOPY - ENDO N/A 4/9/2018    Procedure: COLONOSCOPY - ENDO;  Surgeon: Gonzalez Cruz M.D.;  Location: ENDOSCOPY [facility];  Service: Gastroenterology    WIDE EXCISION MELANOMA, LEG, W/POSS.STSG  10/2015    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    CARDIAC CATH, RIGHT HEART  2008    stent    OTHER ORTHOPEDIC SURGERY Left 2008    hand repair    CHOLECYSTECTOMY  1993    TONSILLECTOMY  1945    OTHER CARDIAC SURGERY      r heart cath stents    STENT PLACEMENT      L iliac stent     Family History   Problem Relation Age of Onset    Hypertension Mother     Hyperlipidemia Mother     Cancer Maternal Grandmother         tongue    Cancer Maternal Uncle         x 3, pancreas    Cancer Maternal Grandfather         unknown    Cancer Paternal Grandmother         unknown    Cancer Paternal Grandfather         unknown    Diabetes Maternal Uncle     Heart Disease Maternal Uncle     Hypertension Sister     Hyperlipidemia Sister     Heart Disease Sister     Alcohol/Drug Sister     Thyroid Sister     Psychiatric Illness Neg Hx     Stroke Neg Hx      Social History      Socioeconomic History    Marital status:      Spouse name: Not on file    Number of children: 0    Years of education: Not on file    Highest education level: Not on file   Occupational History    Not on file   Tobacco Use    Smoking status: Some Days     Current packs/day: 0.01     Average packs/day: (0.7 ttl pk-yrs)     Types: Cigarettes     Start date: 3/20/1957    Smokeless tobacco: Never    Tobacco comments:     1 cigarette per day, not interested in cessation information   Vaping Use    Vaping Use: Never used   Substance and Sexual Activity    Alcohol use: No     Alcohol/week: 0.0 oz    Drug use: Never    Sexual activity: Not Currently     Partners: Male   Other Topics Concern    Not on file   Social History Narrative    .     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     Social Determinants of Health     Financial Resource Strain: Not on file   Food Insecurity: Not on file   Transportation Needs: Not on file   Physical Activity: Not on file   Stress: Not on file   Social Connections: Not on file   Intimate Partner Violence: Not on file   Housing Stability: Not on file     Allergies   Allergen Reactions    Penicillins Anaphylaxis and Hives    Codeine Swelling    Iodine Swelling    Bee Venom Anaphylaxis    Chantix [Varenicline]      disoriented    Ciprofloxacin      Causes C diff, recurrent and very difficult    Diphtheria,Pertussis,Tetanus     Flagyl [Metronidazole] Rash     C/O flagyl causes rash.     Honey Bee Venom     Latex Hives    Lipitor [Atorvastatin Calcium] Unspecified     Intense Stomach pain    Other Environmental Itching and Swelling    Shellfish Allergy      unknown    Tetanus Antitoxin Swelling     unknown    Vecuronium & Derivatives      Outpatient Encounter Medications as of 1/9/2024   Medication Sig Dispense Refill    furosemide (LASIX) 20 MG Tab Take 1 Tablet by mouth every day. 100 Tablet 3    ALPRAZolam (XANAX) 0.25 MG Tab       albuterol 108 (90 Base) MCG/ACT Aero Soln  inhalation aerosol INHALE ONE TO TWO PUFFS BY MOUTH EVERY 4 HOURS AS NEEDED  FOR SHORTNESS OF BREATH 18 g 3    Calcium-Magnesium-Vitamin D (CALCIUM 1200+D3 PO) Take 1 Tablet by mouth every day.      D-Mannose Powder Take 1 Dose by mouth every day. Mix one scoop with 4 oz of water      fluticasone (FLONASE) 50 MCG/ACT nasal spray Administer 1 Spray into affected nostril(S) every day. 16 g 3    Multiple Vitamins-Minerals (ZINC PO) Take 1 Tablet by mouth every day.      ipratropium-albuterol (DUONEB) 0.5-2.5 (3) MG/3ML nebulizer solution Take 3 mL by nebulization every four hours as needed for Shortness of Breath. 120 mL 11    lovastatin (MEVACOR) 40 MG tablet TAKE 1 TABLET AT BEDTIME 100 Tablet 3    warfarin (COUMADIN) 1 MG Tab TAKE ONE TO TWO TABLETS DAILY OR AS DIRECTED BY ANTICOAGULATION CLINIC (Patient taking differently: 1 Tablet every day. TAKE ONE EVERY NIGHT EXCEPT TUESDAY TAKES A HALF OF PILL) 180 Tablet 1    sotalol (BETAPACE) 80 MG Tab TAKE 1/2 TABLET TWICE DAILY Strength: 80 mg 90 Tablet 3    EPINEPHrine (EPIPEN) 0.3 MG/0.3ML Solution Auto-injector solution for injection epinephrine 0.3 mg/0.3 mL injection, auto-injector      Probiotic Product (PROBIOTIC PO) Take 1 Capsule by mouth every day.      ascorbic acid (ASCORBIC ACID) 500 MG Tab Take 2 Tablets by mouth every day.      vitamin D (CHOLECALCIFEROL) 1000 UNIT Tab Take 3,000 Units by mouth every morning. 3 tablets = 3000 units       No facility-administered encounter medications on file as of 1/9/2024.     Review of Systems   Constitutional:  Negative for fever and malaise/fatigue.   Respiratory:  Positive for shortness of breath. Negative for cough.         Shortness of breath at rest where she can't take a big breath   Cardiovascular:  Negative for chest pain, palpitations, orthopnea, claudication, leg swelling and PND.   Gastrointestinal:  Negative for abdominal pain.   Musculoskeletal:  Negative for myalgias.   Neurological:  Negative for  "dizziness.              Objective     /70 (BP Location: Right arm, Patient Position: Sitting, BP Cuff Size: Adult)   Pulse 75   Ht 1.575 m (5' 2\")   Wt 39.9 kg (88 lb)   SpO2 97%   BMI 16.10 kg/m²     Physical Exam  Vitals and nursing note reviewed.   Constitutional:       Appearance: Normal appearance. She is well-developed and normal weight.   HENT:      Head: Normocephalic and atraumatic.   Neck:      Vascular: No JVD.   Cardiovascular:      Rate and Rhythm: Normal rate and regular rhythm.      Pulses: Normal pulses.      Heart sounds: Normal heart sounds.   Pulmonary:      Effort: Pulmonary effort is normal.      Breath sounds: Normal breath sounds.   Musculoskeletal:         General: Normal range of motion.   Skin:     General: Skin is warm and dry.      Capillary Refill: Capillary refill takes less than 2 seconds.   Neurological:      General: No focal deficit present.      Mental Status: She is alert and oriented to person, place, and time. Mental status is at baseline.   Psychiatric:         Mood and Affect: Mood normal.         Behavior: Behavior normal.         Thought Content: Thought content normal.         Judgment: Judgment normal.                Assessment & Plan     1. Pre-diabetes        2. Peripheral arterial disease (HCC)        3. S/P mitral valve clip implantation  furosemide (LASIX) 20 MG Tab      4. Severe protein-calorie malnutrition (HCC)        5. Paroxysmal atrial fibrillation (HCC)        6. Coronary artery disease involving native coronary artery of native heart without angina pectoris        7. Circulating anticoagulants (HCC)        8. Pulmonary hypertension (HCC)  furosemide (LASIX) 20 MG Tab        Medical Decision Making: Today's Assessment/Status/Plan:      1. S/P Juliet Clip X1  -doing well post-op  -cont coumadin monotherapy, no ASA  -SBE prophylaxis understands  -echo 1 year with structural heart follow up  -check on patient in 2 weeks by my chart on symptoms    2. HLD " with CAD with prior PCI  -no angina, but new at rest VIDAL  -follow, could be multifactorial with lung disease  -cont statin  -LDL goal <70    3. PAF on chronic anticoagulation  -asymptomatic, rate controlled  -cont warfarin per anticoagulation clinic  -cont sotalol 80 mg BID for rhythm control    4. COPD with pulmonary HTN  -follow with pulmonary  -cont lsaix and inhalers for symptom management    Patient is to follow up with Isabel PAREDES in 1 year with echo and annual labs with PCP.

## 2024-01-09 NOTE — PROGRESS NOTES
Anticoagulation Summary  As of 2024      INR goal:  2.0-3.0   TTR:  81.5 % (6 y)   INR used for dosin.29 (2024)   Warfarin maintenance plan:  0.5 mg (1 mg x 0.5) every Tue; 1 mg (1 mg x 1) all other days   Weekly warfarin total:  6.5 mg   Plan last modified:  Pieter Covarrubias PharmD (2023)   Next INR check:  2024   Target end date:  Indefinite    Indications    Paroxysmal a-fib (CMS-HCC) (Resolved) [I48.0]  Chronic anticoagulation (Resolved) [Z79.01]                 Anticoagulation Episode Summary       INR check location:  Clinic Lab    Preferred lab:  [facility]    Send INR reminders to:      Comments:  [phone] (H)  [facility]          Anticoagulation Care Providers       Provider Role Specialty Phone number    Evelio Tejeda M.D. Referring Internal Medicine [phone]    [facility] Anticoagulation Services Responsible  [phone]    Beryl Pang M.D. Responsible Family Medicine [phone]          Anticoagulation Patient Findings    Spoke with patient today regarding therapeutic INR of 2.29.  Patient denies any signs/symptoms of bruising or bleeding or any changes in diet and medications.  Instructed patient to call clinic with any questions or concerns.  Pt is to continue with current warfarin dosing regimen.  Follow up in 2 weeks, to reduce risk of adverse events related to this high risk medication,  Warfarin.    Major Hughes, PharmD, BCACP

## 2024-01-10 NOTE — PROGRESS NOTES
Valve Program Functional Assessment:     KCCQ12   1a) Showering/bathin  1b) Walking 1 block on ground: 2  1c) Hurrying or joggin  2) Swellin  3) Fatigue: 2  4) Shortness of breath: 2  5) Sleep sitting up: 1  6) Limited enjoyment of life: 2  7) Spend the rest of your life with HF: 1  8a) Hobbies, recreational activities:1  8b) Working or doing household chores:1  8c) Visiting family or friends: 3

## 2024-01-11 ENCOUNTER — APPOINTMENT (OUTPATIENT)
Dept: SLEEP MEDICINE | Facility: MEDICAL CENTER | Age: 84
End: 2024-01-11
Attending: INTERNAL MEDICINE
Payer: MEDICARE

## 2024-01-11 DIAGNOSIS — Z95.818 S/P MITRAL VALVE CLIP IMPLANTATION: ICD-10-CM

## 2024-01-11 DIAGNOSIS — Z98.890 S/P MITRAL VALVE CLIP IMPLANTATION: ICD-10-CM

## 2024-01-11 DIAGNOSIS — I27.20 PULMONARY HYPERTENSION (HCC): ICD-10-CM

## 2024-01-11 DIAGNOSIS — F41.0 PANIC ATTACKS: ICD-10-CM

## 2024-01-12 RX ORDER — FUROSEMIDE 20 MG/1
20 TABLET ORAL DAILY
Qty: 100 TABLET | Refills: 3 | OUTPATIENT
Start: 2024-01-12

## 2024-01-15 ENCOUNTER — TELEPHONE (OUTPATIENT)
Dept: CARDIOLOGY | Facility: MEDICAL CENTER | Age: 84
End: 2024-01-15
Payer: MEDICARE

## 2024-01-15 DIAGNOSIS — Z95.818 S/P MITRAL VALVE CLIP IMPLANTATION: ICD-10-CM

## 2024-01-15 DIAGNOSIS — Z98.890 S/P MITRAL VALVE CLIP IMPLANTATION: ICD-10-CM

## 2024-01-15 DIAGNOSIS — I27.20 PULMONARY HYPERTENSION (HCC): ICD-10-CM

## 2024-01-15 RX ORDER — FUROSEMIDE 20 MG/1
20 TABLET ORAL DAILY
Qty: 90 TABLET | Refills: 3 | Status: SHIPPED | OUTPATIENT
Start: 2024-01-15

## 2024-01-15 NOTE — TELEPHONE ENCOUNTER
Received request for furosemide refill via voicemail to Maite KAYE. Furosemide refilled at this time and sent to preferred pharmacy.

## 2024-01-16 ENCOUNTER — OFFICE VISIT (OUTPATIENT)
Dept: MEDICAL GROUP | Facility: CLINIC | Age: 84
End: 2024-01-16
Payer: MEDICARE

## 2024-01-16 VITALS
RESPIRATION RATE: 16 BRPM | HEIGHT: 63 IN | BODY MASS INDEX: 15.41 KG/M2 | DIASTOLIC BLOOD PRESSURE: 82 MMHG | WEIGHT: 87 LBS | HEART RATE: 87 BPM | SYSTOLIC BLOOD PRESSURE: 146 MMHG | TEMPERATURE: 97.8 F | OXYGEN SATURATION: 91 %

## 2024-01-16 DIAGNOSIS — Z02.4 ENCOUNTER FOR EXAMINATION FOR DRIVING LICENSE: ICD-10-CM

## 2024-01-16 PROCEDURE — 3077F SYST BP >= 140 MM HG: CPT | Performed by: FAMILY MEDICINE

## 2024-01-16 PROCEDURE — 3079F DIAST BP 80-89 MM HG: CPT | Performed by: FAMILY MEDICINE

## 2024-01-16 PROCEDURE — 7105 PR DMV PHYSICAL: Performed by: FAMILY MEDICINE

## 2024-01-16 ASSESSMENT — FIBROSIS 4 INDEX: FIB4 SCORE: 3.94

## 2024-01-16 ASSESSMENT — PATIENT HEALTH QUESTIONNAIRE - PHQ9: CLINICAL INTERPRETATION OF PHQ2 SCORE: 0

## 2024-01-16 NOTE — PROGRESS NOTES
I saw this patient along with Dr Hinton PGY1. I agree with the note and assessment/plan of care as scribed below and have edited it where needed.   Participation of care:  DMV paperwork filled out for 's license renewal.   From prior appointment; clarification--Pt requests wheelchair due to severe exertion intolerance related to COPD, mitral valve regurgitation, and aortic stenosis, with chronic shortness of breath. Able to wheel herself. Can use walker for very short distances but needs wheelchair for longer distances, and needs wheelchair during exacerbations of her conditions.   15 minutes spent with patient on visit.     Subjective:     CC: for DMV paperwork    HPI:   Angie presents today with paperwork for the DMV to renew her license.     Patient does not wear glasses. No hearing aids. She denies any problems with driving. She does not have any problems with mobility. She has been taking her medications as prescribed and denies any side effects of lightheadedness or dizziness.     She reports that she is doing well overall. She did have her lasix added back on and thinks that it is helping with her shortness of breath.       Current Outpatient Medications Ordered in Epic   Medication Sig Dispense Refill    furosemide (LASIX) 20 MG Tab Take 1 Tablet by mouth every day. 90 Tablet 3    ALPRAZolam (XANAX) 0.25 MG Tab       albuterol 108 (90 Base) MCG/ACT Aero Soln inhalation aerosol INHALE ONE TO TWO PUFFS BY MOUTH EVERY 4 HOURS AS NEEDED  FOR SHORTNESS OF BREATH 18 g 3    Calcium-Magnesium-Vitamin D (CALCIUM 1200+D3 PO) Take 1 Tablet by mouth every day.      D-Mannose Powder Take 1 Dose by mouth every day. Mix one scoop with 4 oz of water      fluticasone (FLONASE) 50 MCG/ACT nasal spray Administer 1 Spray into affected nostril(S) every day. 16 g 3    Multiple Vitamins-Minerals (ZINC PO) Take 1 Tablet by mouth every day.      ipratropium-albuterol (DUONEB) 0.5-2.5 (3) MG/3ML nebulizer solution Take 3 mL by  nebulization every four hours as needed for Shortness of Breath. 120 mL 11    lovastatin (MEVACOR) 40 MG tablet TAKE 1 TABLET AT BEDTIME 100 Tablet 3    warfarin (COUMADIN) 1 MG Tab TAKE ONE TO TWO TABLETS DAILY OR AS DIRECTED BY ANTICOAGULATION CLINIC (Patient taking differently: 1 Tablet every day. TAKE ONE EVERY NIGHT EXCEPT TUESDAY TAKES A HALF OF PILL) 180 Tablet 1    sotalol (BETAPACE) 80 MG Tab TAKE 1/2 TABLET TWICE DAILY Strength: 80 mg 90 Tablet 3    EPINEPHrine (EPIPEN) 0.3 MG/0.3ML Solution Auto-injector solution for injection epinephrine 0.3 mg/0.3 mL injection, auto-injector      Probiotic Product (PROBIOTIC PO) Take 1 Capsule by mouth every day.      ascorbic acid (ASCORBIC ACID) 500 MG Tab Take 2 Tablets by mouth every day.      vitamin D (CHOLECALCIFEROL) 1000 UNIT Tab Take 3,000 Units by mouth every morning. 3 tablets = 3000 units       No current Epic-ordered facility-administered medications on file.     Family History   Problem Relation Age of Onset    Hypertension Mother     Hyperlipidemia Mother     Cancer Maternal Grandmother         tongue    Cancer Maternal Uncle         x 3, pancreas    Cancer Maternal Grandfather         unknown    Cancer Paternal Grandmother         unknown    Cancer Paternal Grandfather         unknown    Diabetes Maternal Uncle     Heart Disease Maternal Uncle     Hypertension Sister     Hyperlipidemia Sister     Heart Disease Sister     Alcohol/Drug Sister     Thyroid Sister     Psychiatric Illness Neg Hx     Stroke Neg Hx       Past Surgical History:   Procedure Laterality Date    TRANSCATHETER MITRAL VALVE REPAIR Right 12/5/2023    Procedure: TRANSCATHETER MITRAL VALVE PROCEDURE;  Surgeon: Ethan Lozano M.D.;  Location: [facility];  Service: Cardiac    ECHOCARDIOGRAM, TRANSESOPHAGEAL, INTRAOPERATIVE N/A 12/5/2023    Procedure: ECHOCARDIOGRAM, TRANSESOPHAGEAL, INTRAOPERATIVE;  Surgeon: Ethan Lozano M.D.;  Location: [facility];   Service: Cardiac    RI CYSTOSCOPY,INSERT URETERAL STENT Right 11/16/2023    Procedure: CYSTOSCOPY, WITH URETERAL STENT INSERTION;  Surgeon: Alexsander Reyes M.D.;  Location: SURGERY [facility];  Service: Urology    RI CYSTO/URETERO/PYELOSCOPY, DX Right 11/16/2023    Procedure: URETEROSCOPY;  Surgeon: Alexsander Reyes M.D.;  Location: SURGERY [facility];  Service: Urology    LASERTRIPSY Right 11/16/2023    Procedure: LITHOTRIPSY, USING LASER;  Surgeon: Alexsander Reyes M.D.;  Location: SURGERY [facility];  Service: Urology    FECAL TRANSPLANT N/A 4/9/2018    Procedure: FECAL TRANSPLANT;  Surgeon: Gonzalez Cruz M.D.;  Location: ENDOSCOPY [facility];  Service: Gastroenterology    COLONOSCOPY - ENDO N/A 4/9/2018    Procedure: COLONOSCOPY - ENDO;  Surgeon: Gonzalez Cruz M.D.;  Location: ENDOSCOPY [facility];  Service: Gastroenterology    WIDE EXCISION MELANOMA, LEG, W/POSS.STSG  10/2015    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    CARDIAC CATH, RIGHT HEART  2008    stent    OTHER ORTHOPEDIC SURGERY Left 2008    hand repair    CHOLECYSTECTOMY  1993    TONSILLECTOMY  1945    OTHER CARDIAC SURGERY      r heart cath stents    STENT PLACEMENT      L iliac stent      Social History     Tobacco Use    Smoking status: Some Days     Current packs/day: 0.01     Average packs/day: (0.7 ttl pk-yrs)     Types: Cigarettes     Start date: 3/20/1957    Smokeless tobacco: Never    Tobacco comments:     1 cigarette per day, not interested in cessation information   Vaping Use    Vaping Use: Never used   Substance Use Topics    Alcohol use: No     Alcohol/week: 0.0 oz    Drug use: Never        ROS:  Gen: no fevers/chills, no changes in weight  Pulm: no sob, no cough  CV: no chest pain, no palpitations  GI: no nausea/vomiting, no diarrhea    Objective:     Exam:  BP (!) 146/82 (BP Location: Left arm, Patient Position: Sitting, BP Cuff Size: Small adult)   Pulse 87   Temp 36.6 °C (97.8 °F) (Temporal)   Resp 16   Ht  "1.588 m (5' 2.5\")   Wt 39.5 kg (87 lb)   SpO2 91%   BMI 15.66 kg/m²  Body mass index is 15.66 kg/m².    Gen: Alert and oriented, No apparent distress.  Neck: Neck is supple without lymphadenopathy.  Lungs: Normal effort, CTA bilaterally, no wheezes, rhonchi, or rales  CV: Regular rate and rhythm.   Ext: No clubbing, cyanosis, edema.      Assessment & Plan:     83 y.o. female with the following -       Encounter for examination for driving license  Patient here for renewal of DMV driving license.     - Filled out medical portion of paperwork, reviewed medication list and medical diagnosis, do not believe either impede ability to drive safely   - Patient has visit with ophthalmologist on Thursday 1/18/24 to complete the vision screening        No follow-ups on file.    Saniya Hinton M.D.  PGY1          "

## 2024-01-16 NOTE — ASSESSMENT & PLAN NOTE
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     - Filled out medical portion of paperwork, reviewed medication list and medical diagnosis, do not believe either impede ability to drive safely   - Patient has visit with ophthalmologist on Thursday 1/18/24 to complete the vision screening

## 2024-01-23 ENCOUNTER — ANTICOAGULATION MONITORING (OUTPATIENT)
Dept: MEDICAL GROUP | Facility: PHYSICIAN GROUP | Age: 84
End: 2024-01-23
Payer: MEDICARE

## 2024-01-23 ENCOUNTER — HOSPITAL ENCOUNTER (OUTPATIENT)
Dept: LAB | Facility: MEDICAL CENTER | Age: 84
End: 2024-01-23
Attending: INTERNAL MEDICINE
Payer: MEDICARE

## 2024-01-23 DIAGNOSIS — Z79.01 CHRONIC ANTICOAGULATION: ICD-10-CM

## 2024-01-23 LAB
INR PPP: 2.16 (ref 0.87–1.13)
PROTHROMBIN TIME: 24.4 SEC (ref 12–14.6)

## 2024-01-23 PROCEDURE — 85610 PROTHROMBIN TIME: CPT

## 2024-01-23 PROCEDURE — 36415 COLL VENOUS BLD VENIPUNCTURE: CPT

## 2024-01-24 NOTE — PATIENT COMMUNICATION
KADY Becker.  You13 minutes ago (9:28 AM)     Will you make sure she sends us BP readings within the next two weeks please? SC     Patient notified via [employer].

## 2024-01-24 NOTE — PROGRESS NOTES
Anticoagulation Summary  As of 2024      INR goal:  2.0-3.0   TTR:  81.6 % (6 y)   INR used for dosin.16 (2024)   Warfarin maintenance plan:  0.5 mg (1 mg x 0.5) every Tue; 1 mg (1 mg x 1) all other days   Weekly warfarin total:  6.5 mg   Plan last modified:  Pieter Covarrubias PharmD (2023)   Next INR check:  2024   Target end date:  Indefinite    Indications    Paroxysmal a-fib (CMS-HCC) (Resolved) [I48.0]  Chronic anticoagulation (Resolved) [Z79.01]                 Anticoagulation Episode Summary       INR check location:  Clinic Lab    Preferred lab:  [facility]    Send INR reminders to:      Comments:  [phone] (H)  [facility]          Anticoagulation Care Providers       Provider Role Specialty Phone number    Evelio Tejeda M.D. Referring Internal Medicine [phone]    [facility] Anticoagulation Services Responsible  [phone]    Beryl Pang M.D. Responsible Family Medicine [phone]          Anticoagulation Patient Findings    Spoke with patient today regarding therapeutic INR of 2.16.  Patient denies any signs/symptoms of bruising or bleeding or any changes in diet and medications.  Instructed patient to call clinic with any questions or concerns.    Pt is not on antiplatelet therapy     Pt is to continue with current warfarin dosing regimen.  Follow up in 3 weeks, to reduce risk of adverse events related to this high risk medication,  Warfarin.    Major Hughes, PharmD, BCACP

## 2024-01-27 NOTE — DOCUMENTATION QUERY
[facility]                                                                       Query Response Note      PATIENT:               MAY GOMEZ  ACCT #:                  [id number]  MRN:                     [id number]  :                      1940  ADMIT DATE:       2023 9:25 AM  DISCH DATE:          RESPONDING  PROVIDER #:        [id number]           QUERY TEXT:    To ensure appropriate coding of the patient?s diagnosis, can you please clarify if the severe mitral regurgitation documented in the patient?s chart is:    Please clarify the clinical/diagnostic relevance for the documented findings.    The patient's clinical indicators include:  Clinical indicator:    This is a 83 year old female with mitral regurgitation and aortic stenosis.     The World Health Organization (WHO) and ICD 10-CM coding classification assumes a rheumatic origin when valve disease affects multiple valves and the valvular heart disease is unspecified or not described as non-rheumatic.     Treatment and monitoring:  Mitral valve replacement     Risk factors:  Persistant AFib, CAD, PVD     information:  [email or web address]  Options provided:   -- The Mitral valve disease is rheumatic in origin   -- The Mitral valve disease is non-rheumatic in origin   -- Other explanation of this condition, (please specify)      Query created by: Keli Green on 2024 7:23 AM    RESPONSE TEXT:    The Mitral valve disease is non-rheumatic in origin          Electronically signed by:  ROB AMANDA MD 2024 11:52 AM

## 2024-01-29 ENCOUNTER — TELEPHONE (OUTPATIENT)
Dept: CARDIOLOGY | Facility: MEDICAL CENTER | Age: 84
End: 2024-01-29
Payer: MEDICARE

## 2024-01-29 RX ORDER — ALPRAZOLAM 0.25 MG/1
TABLET ORAL
Qty: 30 TABLET | Refills: 3 | Status: SHIPPED | OUTPATIENT
Start: 2024-01-29 | End: 2024-02-01 | Stop reason: SDUPTHER

## 2024-01-29 NOTE — TELEPHONE ENCOUNTER
Phone Number Called: [phone]    Call outcome:  spoke to pharmacy staff    Message: Advised that this medication is not handled by our office but patient PCP. Number for PCP office provided at this time.

## 2024-01-29 NOTE — TELEPHONE ENCOUNTER
SC    Caller:    [facility]    Topic/issue: has questions on the :  ALPRAZolam (XANAX) 0.25 MG Tab [[id number]]     Callback Number: [phone]    Thank you,   Rehana ROWE

## 2024-02-01 ENCOUNTER — TELEPHONE (OUTPATIENT)
Dept: MEDICAL GROUP | Facility: CLINIC | Age: 84
End: 2024-02-01
Payer: MEDICARE

## 2024-02-01 DIAGNOSIS — F41.0 PANIC ATTACKS: ICD-10-CM

## 2024-02-01 RX ORDER — ALPRAZOLAM 0.25 MG/1
TABLET ORAL
Qty: 30 TABLET | Refills: 3 | Status: CANCELLED | OUTPATIENT
Start: 2024-02-01 | End: 2024-05-02

## 2024-02-01 RX ORDER — ALPRAZOLAM 0.25 MG/1
TABLET ORAL
Qty: 30 TABLET | Refills: 2 | Status: SHIPPED | OUTPATIENT
Start: 2024-02-01 | End: 2024-02-02 | Stop reason: SDUPTHER

## 2024-02-01 NOTE — TELEPHONE ENCOUNTER
[facility] called stating that they need a new prescription for pt's Alprazolam sent to them, the last rx that was sent did not have a day supply and they wouldn't take a verbal, please resend rx with day supply, thank you.

## 2024-02-02 DIAGNOSIS — F41.0 PANIC ATTACKS: ICD-10-CM

## 2024-02-02 RX ORDER — ALPRAZOLAM 0.25 MG/1
TABLET ORAL
Qty: 60 TABLET | Refills: 5 | Status: SHIPPED | OUTPATIENT
Start: 2024-02-02 | End: 2024-05-03

## 2024-02-05 ENCOUNTER — PATIENT MESSAGE (OUTPATIENT)
Dept: CARDIOLOGY | Facility: MEDICAL CENTER | Age: 84
End: 2024-02-05
Payer: MEDICARE

## 2024-02-13 ENCOUNTER — HOSPITAL ENCOUNTER (OUTPATIENT)
Dept: LAB | Facility: MEDICAL CENTER | Age: 84
End: 2024-02-13
Attending: NURSE PRACTITIONER
Payer: MEDICARE

## 2024-02-13 ENCOUNTER — ANTICOAGULATION MONITORING (OUTPATIENT)
Dept: VASCULAR LAB | Facility: MEDICAL CENTER | Age: 84
End: 2024-02-13
Payer: MEDICARE

## 2024-02-13 DIAGNOSIS — Z79.01 CHRONIC ANTICOAGULATION: ICD-10-CM

## 2024-02-13 LAB
INR PPP: 2.31 (ref 0.87–1.13)
PROTHROMBIN TIME: 25.7 SEC (ref 12–14.6)

## 2024-02-13 PROCEDURE — 36415 COLL VENOUS BLD VENIPUNCTURE: CPT

## 2024-02-13 PROCEDURE — 85610 PROTHROMBIN TIME: CPT

## 2024-02-14 NOTE — PROGRESS NOTES
Anticoagulation Summary  As of 2024      INR goal:  2.0-3.0   TTR:  81.8 % (6.1 y)   INR used for dosin.31 (2024)   Warfarin maintenance plan:  0.5 mg (1 mg x 0.5) every Tue; 1 mg (1 mg x 1) all other days   Weekly warfarin total:  6.5 mg   No change documented:  Zari Rosario PharmD   Plan last modified:  Pieter Covarrubias PharmD (2023)   Next INR check:  3/5/2024   Target end date:  Indefinite    Indications    Paroxysmal a-fib (CMS-HCC) (Resolved) [I48.0]  Chronic anticoagulation (Resolved) [Z79.01]                 Anticoagulation Episode Summary       INR check location:  Clinic Lab    Preferred lab:  [facility]    Send INR reminders to:      Comments:  [phone] (H)  [facility]          Anticoagulation Care Providers       Provider Role Specialty Phone number    Evelio Tejeda M.D. Referring Internal Medicine [phone]    [facility] Anticoagulation Services Responsible  [phone]    Beryl Pang M.D. Responsible Family Medicine [phone]          Anticoagulation Patient Findings  Patient Findings       Positives:  Change in medications (taking furosemide 20 mg daily)    Negatives:  Signs/symptoms of thrombosis, Signs/symptoms of bleeding, Laboratory test error suspected, Change in health, Change in alcohol use, Change in activity, Upcoming invasive procedure, Emergency department visit, Upcoming dental procedure, Missed doses, Extra doses, Change in diet/appetite, Hospital admission, Bruising, Other complaints            Called and spoke to patient.    INR therapeutic    Warfarin Plan: Continue regimen as listed above.    Next INR in 3 week(s) per pt preference    Zari Rosario PharmD, BCACP

## 2024-02-26 ENCOUNTER — TELEPHONE (OUTPATIENT)
Dept: SLEEP MEDICINE | Facility: MEDICAL CENTER | Age: 84
End: 2024-02-26
Payer: MEDICARE

## 2024-02-26 NOTE — TELEPHONE ENCOUNTER
Called pt to try and clarify her confusion. Pt stated she was trying to get in sooner for a PFT but it looked like she was scheduled for a sooner follow up with Nikhil. Pt stated she did not need to see her and she needed to complete testing. Our call dropped as our building had an outage in the middle of me explaining what happened to the patient. Called pt back to reschedule and clarify and also apologize for our disconnected phone call. When I called back I had to leave a  and I noticed pt had rescheduled her PFT and a follow up with BIPIN PAREDES for 4/17/2024. Advised pt to keep appts and call back with any further questions or concerns.

## 2024-02-26 NOTE — TELEPHONE ENCOUNTER
Caller: Angie    Topic/issue: Angie would like a call back, she is confused about a call she received about her PFT being for tomorrow morning, before her appt with Peggy Tejeda.  She would like a call to discuss and see if appt is still needed.     Callback Number: [phone]    Thank you,   Rehana ROWE

## 2024-02-27 ENCOUNTER — APPOINTMENT (OUTPATIENT)
Dept: SLEEP MEDICINE | Facility: MEDICAL CENTER | Age: 84
End: 2024-02-27
Attending: INTERNAL MEDICINE
Payer: MEDICARE

## 2024-02-29 DIAGNOSIS — I48.0 PAROXYSMAL A-FIB (HCC): ICD-10-CM

## 2024-03-01 RX ORDER — SOTALOL HYDROCHLORIDE 80 MG/1
TABLET ORAL
Qty: 90 TABLET | Refills: 3 | Status: SHIPPED | OUTPATIENT
Start: 2024-03-01

## 2024-03-07 ENCOUNTER — HOSPITAL ENCOUNTER (OUTPATIENT)
Dept: LAB | Facility: MEDICAL CENTER | Age: 84
End: 2024-03-07
Attending: FAMILY MEDICINE
Payer: MEDICARE

## 2024-03-07 ENCOUNTER — ANTICOAGULATION MONITORING (OUTPATIENT)
Dept: VASCULAR LAB | Facility: MEDICAL CENTER | Age: 84
End: 2024-03-07
Payer: MEDICARE

## 2024-03-07 DIAGNOSIS — Z79.01 CHRONIC ANTICOAGULATION: ICD-10-CM

## 2024-03-07 LAB
INR PPP: 2.3 (ref 0.87–1.13)
PROTHROMBIN TIME: 25.6 SEC (ref 12–14.6)

## 2024-03-07 PROCEDURE — 36415 COLL VENOUS BLD VENIPUNCTURE: CPT

## 2024-03-07 PROCEDURE — 85610 PROTHROMBIN TIME: CPT

## 2024-03-08 NOTE — PROGRESS NOTES
OP Anticoagulation Service Note    Date: 3/7/2024    Anticoagulation Summary  As of 3/7/2024      INR goal:  2.0-3.0   TTR:  82.0% (6.1 y)   INR used for dosin.30 (3/7/2024)   Warfarin maintenance plan:  0.5 mg (1 mg x 0.5) every Tue; 1 mg (1 mg x 1) all other days   Weekly warfarin total:  6.5 mg   Plan last modified:  Pieter Covarrubias, PharmD (2023)   Next INR check:  2024   Target end date:  Indefinite    Indications    Paroxysmal a-fib (CMS-HCC) (Resolved) [I48.0]  Chronic anticoagulation (Resolved) [Z79.01]                 Anticoagulation Episode Summary       INR check location:  Clinic Lab    Preferred lab:  [facility]    Send INR reminders to:      Comments:  [facility] Cardiology   [phone] (H)  [facility] labs          Anticoagulation Care Providers       Provider Role Specialty Phone number    Evelio Tejeda M.D. Referring Internal Medicine [phone]    [facility] Anticoagulation Services Responsible  [phone]    Beryl Pang M.D. Responsible Family Medicine [phone]          Anticoagulation Patient Findings        Patient's preferred phone number:  [phone]        HPI:   The reason for today's call is to prevent morbidity and mortality from a blood clot and/or stroke and to reduce the risk of bleeding while on a anticoagulant.     PCP:  Beryl Pang M.D.  [address] [facility] [address]    Assessment:     INR  therapeutic.     Lab Results   Component Value Date/Time    BUN 22 2023 01:37 AM    CREATININE 1.01 2023 01:37 AM     Lab Results   Component Value Date/Time    HEMOGLOBIN 12.7 2023 01:37 AM    HEMATOCRIT 40.2 2023 01:37 AM    PLATELETCT 150 (L) 2023 01:37 AM    ALKPHOSPHAT 62 2023 01:37 AM    ASTSGOT 33 2023 01:37 AM    ALTSGPT 22 2023 01:37 AM          Current Outpatient Medications:     sotalol, TAKE ONE-HALF TABLET BY MOUTH 2 TIMES DAILY    ALPRAZolam, 1 tab PO BID prn severe anxiety  Indications: Feeling  Anxious, Panic Disorder    furosemide, 20 mg, Oral, DAILY    albuterol, INHALE ONE TO TWO PUFFS BY MOUTH EVERY 4 HOURS AS NEEDED  FOR SHORTNESS OF BREATH    Calcium-Magnesium-Vitamin D (CALCIUM 1200+D3 PO), 1 Tablet, Oral, DAILY    D-Mannose, 1 Dose, Oral, DAILY    fluticasone, 1 Spray, Nasal, DAILY    Multiple Vitamins-Minerals (ZINC PO), 1 Tablet, Oral, DAILY    ipratropium-albuterol, 3 mL, Nebulization, Q4HRS PRN    lovastatin, TAKE 1 TABLET AT BEDTIME    warfarin, TAKE ONE TO TWO TABLETS DAILY OR AS DIRECTED BY ANTICOAGULATION CLINIC (Patient taking differently: 1 Tablet, DAILY, TAKE ONE EVERY NIGHT EXCEPT TUESDAY TAKES A HALF OF PILL)    EPINEPHrine, epinephrine 0.3 mg/0.3 mL injection, auto-injector    Probiotic Product (PROBIOTIC PO), 1 Capsule, Oral, DAILY    ascorbic acid, 1,000 mg, Oral, DAILY    vitamin D, 3,000 Units, Oral, QAM      Plan:     Continue the same warfarin dose, as noted above.       Follow-up:     As seen above      Additional information discussed with patient:     Asked patient to please call the anticoagulation clinic if they have any signs/symptoms of bleeding and/or thrombosis or any changes to diet or medications.      National recommendations regarding anticoagulation therapy:     The CHEST guidelines recommends frequent INR monitoring at regular intervals (a few days up to a max of 12 weeks) to ensure patients are on the proper dose of warfarin, and patients are not having any complications from therapy.  INRs can dramatically change over a short time period due to diet, medications, and medical conditions.         [facility]  Phone: [phone]  Fax: [phone]  On call: [phone]  General scheduling/information [phone]  For emergencies please dial [phone]  Please do not use [facility] for urgent matters, call the phone numbers listed above.    This note was created using voice recognition software (Dragon). The accuracy of the dictation is  limited by the abilities of the software. I have reviewed the note prior to signing, however some errors in grammar and context are still possible. If you have any questions related to this note please do not hesitate to contact our office.

## 2024-03-11 ENCOUNTER — TELEPHONE (OUTPATIENT)
Dept: VASCULAR LAB | Facility: MEDICAL CENTER | Age: 84
End: 2024-03-11
Payer: MEDICARE

## 2024-03-11 NOTE — PROGRESS NOTES
ADDENDUM 3/11/24: Received call from pt asking about INR from 3/7. She states she usually gets a call and didn't know how to read the Ring message.    Pt's INR was therapeutic. Pt to continue w current warfarin regimen 0.5mg Tuesday, 1mg AOD. F/u INR in 4 weeks    Keren Abdi, PharmD

## 2024-03-11 NOTE — TELEPHONE ENCOUNTER
Caller: Angie Root      Topic/issue: Patient was calling about her Inr readings and she was asking for a call back to discuss them      Callback Number: [phone]      Thank you    -Benigno GALLEGOS

## 2024-03-18 ENCOUNTER — APPOINTMENT (RX ONLY)
Dept: URBAN - METROPOLITAN AREA CLINIC 4 | Facility: CLINIC | Age: 84
Setting detail: DERMATOLOGY
End: 2024-03-18

## 2024-03-18 DIAGNOSIS — L81.4 OTHER MELANIN HYPERPIGMENTATION: ICD-10-CM

## 2024-03-18 DIAGNOSIS — L57.0 ACTINIC KERATOSIS: ICD-10-CM

## 2024-03-18 DIAGNOSIS — L57.8 OTHER SKIN CHANGES DUE TO CHRONIC EXPOSURE TO NONIONIZING RADIATION: ICD-10-CM

## 2024-03-18 PROCEDURE — ? COUNSELING

## 2024-03-18 PROCEDURE — 17003 DESTRUCT PREMALG LES 2-14: CPT

## 2024-03-18 PROCEDURE — 99213 OFFICE O/P EST LOW 20 MIN: CPT | Mod: 25

## 2024-03-18 PROCEDURE — ? LIQUID NITROGEN

## 2024-03-18 PROCEDURE — 17000 DESTRUCT PREMALG LESION: CPT

## 2024-03-18 ASSESSMENT — LOCATION ZONE DERM
LOCATION ZONE: LEG
LOCATION ZONE: ARM
LOCATION ZONE: TRUNK

## 2024-03-18 ASSESSMENT — LOCATION DETAILED DESCRIPTION DERM
LOCATION DETAILED: LEFT DISTAL DORSAL FOREARM
LOCATION DETAILED: RIGHT PROXIMAL RADIAL DORSAL FOREARM
LOCATION DETAILED: LEFT PROXIMAL RADIAL DORSAL FOREARM
LOCATION DETAILED: MIDDLE STERNUM
LOCATION DETAILED: RIGHT DISTAL DORSAL FOREARM
LOCATION DETAILED: LEFT DISTAL PRETIBIAL REGION
LOCATION DETAILED: LEFT MEDIAL SUPERIOR CHEST
LOCATION DETAILED: LEFT PROXIMAL DORSAL FOREARM
LOCATION DETAILED: RIGHT PROXIMAL PRETIBIAL REGION
LOCATION DETAILED: LEFT PROXIMAL PRETIBIAL REGION

## 2024-03-18 ASSESSMENT — LOCATION SIMPLE DESCRIPTION DERM
LOCATION SIMPLE: RIGHT FOREARM
LOCATION SIMPLE: LEFT PRETIBIAL REGION
LOCATION SIMPLE: LEFT FOREARM
LOCATION SIMPLE: RIGHT PRETIBIAL REGION
LOCATION SIMPLE: CHEST

## 2024-03-26 RX ORDER — LOVASTATIN 40 MG/1
TABLET ORAL
Qty: 90 TABLET | Refills: 3 | Status: SHIPPED | OUTPATIENT
Start: 2024-03-26

## 2024-03-28 ENCOUNTER — ANTICOAGULATION MONITORING (OUTPATIENT)
Dept: VASCULAR LAB | Facility: MEDICAL CENTER | Age: 84
End: 2024-03-28
Payer: MEDICARE

## 2024-03-28 ENCOUNTER — HOSPITAL ENCOUNTER (OUTPATIENT)
Dept: LAB | Facility: MEDICAL CENTER | Age: 84
End: 2024-03-28
Attending: NURSE PRACTITIONER
Payer: MEDICARE

## 2024-03-28 DIAGNOSIS — Z79.01 CHRONIC ANTICOAGULATION: ICD-10-CM

## 2024-03-28 LAB
INR PPP: 3.17 (ref 0.87–1.13)
PROTHROMBIN TIME: 33 SEC (ref 12–14.6)

## 2024-03-28 PROCEDURE — 36415 COLL VENOUS BLD VENIPUNCTURE: CPT

## 2024-03-28 PROCEDURE — 85610 PROTHROMBIN TIME: CPT

## 2024-03-28 NOTE — PROGRESS NOTES
Anticoagulation Summary  As of 3/28/2024      INR goal:  2.0-3.0   TTR:  82.0% (6.2 y)   INR used for dosing:  3.17 (3/28/2024)   Warfarin maintenance plan:  0.5 mg (1 mg x 0.5) every Tue; 1 mg (1 mg x 1) all other days   Weekly warfarin total:  6.5 mg   Plan last modified:  Yaneli KruseD (9/19/2023)   Next INR check:  4/11/2024   Target end date:  Indefinite    Indications    Paroxysmal a-fib (CMS-HCC) (Resolved) [I48.0]  Chronic anticoagulation (Resolved) [Z79.01]                 Anticoagulation Episode Summary       INR check location:  Clinic Lab    Preferred lab:  [facility]    Send INR reminders to:      Comments:  [facility] Cardiology   [phone] (H)  [facility]          Anticoagulation Care Providers       Provider Role Specialty Phone number    Evelio Tejeda M.D. Referring Internal Medicine [phone]    [facility] Anticoagulation Services Responsible  [phone]    Beryl Pang M.D. Responsible Family Medicine [phone]          Anticoagulation Patient Findings      Left voicemail message to report a SUPRA therapeutic INR of 3.17.    Will have pt take a decreased dose of 0.5 mg today and then continue on with current warfarin dosing regimen.   Requested pt contact the clinic for any s/s of unusual bleeding, bruising, clotting or any changes to diet or medication.    FU INR in 2 week(s).    Jimbo Dunlap, PharmD, BCACP

## 2024-04-02 ENCOUNTER — TELEPHONE (OUTPATIENT)
Dept: CARDIOLOGY | Facility: MEDICAL CENTER | Age: 84
End: 2024-04-02
Payer: MEDICARE

## 2024-04-02 NOTE — TELEPHONE ENCOUNTER
Caller: Angie Root      Topic/issue: Patient was calling with questions about her INR levels and what she should be doing with them to correct them and she was asking for a call back. She stated she was called back about them in the past          Callback Number: [phone]        Thank you    -Benigno GALLEGOS

## 2024-04-16 ENCOUNTER — HOSPITAL ENCOUNTER (OUTPATIENT)
Dept: LAB | Facility: MEDICAL CENTER | Age: 84
End: 2024-04-16
Attending: INTERNAL MEDICINE
Payer: MEDICARE

## 2024-04-16 ENCOUNTER — ANTICOAGULATION MONITORING (OUTPATIENT)
Dept: VASCULAR LAB | Facility: MEDICAL CENTER | Age: 84
End: 2024-04-16
Payer: MEDICARE

## 2024-04-16 DIAGNOSIS — Z79.01 CHRONIC ANTICOAGULATION: ICD-10-CM

## 2024-04-16 LAB
INR PPP: 2.28 (ref 0.87–1.13)
PROTHROMBIN TIME: 25.4 SEC (ref 12–14.6)

## 2024-04-16 PROCEDURE — 85610 PROTHROMBIN TIME: CPT

## 2024-04-16 PROCEDURE — 36415 COLL VENOUS BLD VENIPUNCTURE: CPT

## 2024-05-03 DIAGNOSIS — J43.9 PULMONARY EMPHYSEMA, UNSPECIFIED EMPHYSEMA TYPE (HCC): ICD-10-CM

## 2024-05-03 RX ORDER — ALBUTEROL SULFATE 90 UG/1
AEROSOL, METERED RESPIRATORY (INHALATION)
Qty: 18 G | Refills: 3 | Status: SHIPPED | OUTPATIENT
Start: 2024-05-03

## 2024-05-03 NOTE — TELEPHONE ENCOUNTER
Received request via: Patient    Was the patient seen in the last year in this department? Yes    Does the patient have an active prescription (recently filled or refills available) for medication(s) requested? No    Pharmacy Name: [facility]     Does the patient have [facility] and need 100 day supply (blood pressure, diabetes and cholesterol meds only)? Patient does not have SCP

## 2024-05-07 ENCOUNTER — HOSPITAL ENCOUNTER (OUTPATIENT)
Dept: LAB | Facility: MEDICAL CENTER | Age: 84
End: 2024-05-07
Attending: NURSE PRACTITIONER
Payer: MEDICARE

## 2024-05-07 DIAGNOSIS — Z79.01 CHRONIC ANTICOAGULATION: ICD-10-CM

## 2024-05-07 LAB
INR PPP: 2.48 (ref 0.87–1.13)
PROTHROMBIN TIME: 27.2 SEC (ref 12–14.6)

## 2024-05-09 ENCOUNTER — TELEPHONE (OUTPATIENT)
Dept: CARDIOLOGY | Facility: MEDICAL CENTER | Age: 84
End: 2024-05-09
Payer: MEDICARE

## 2024-05-09 NOTE — TELEPHONE ENCOUNTER
Phone number called: [phone]     Call outcome: Voicemail reached. Message left with number provided to return call.

## 2024-05-09 NOTE — TELEPHONE ENCOUNTER
SC    Caller: Angie Root    Topic/issue: Patient called inquiring how long she needs to be on her water pill.     Please advise.     Callback Number: [phone]    Thank you,     Kalyani KEEN

## 2024-05-14 ENCOUNTER — ANTICOAGULATION MONITORING (OUTPATIENT)
Dept: VASCULAR LAB | Facility: MEDICAL CENTER | Age: 84
End: 2024-05-14
Payer: MEDICARE

## 2024-05-14 ENCOUNTER — TELEPHONE (OUTPATIENT)
Dept: VASCULAR LAB | Facility: MEDICAL CENTER | Age: 84
End: 2024-05-14
Payer: MEDICARE

## 2024-05-14 ENCOUNTER — TELEPHONE (OUTPATIENT)
Dept: CARDIOLOGY | Facility: MEDICAL CENTER | Age: 84
End: 2024-05-14
Payer: MEDICARE

## 2024-05-14 DIAGNOSIS — Z79.01 CHRONIC ANTICOAGULATION: ICD-10-CM

## 2024-05-14 NOTE — LETTER
PROCEDURE/SURGERY CLEARANCE FORM      Encounter Date: 5/14/2024    Patient: Angie Root  YOB: 1940    CARDIOLOGIST:  PEPE Becker    REFERRING DOCTOR:  No ref. provider found      The following procedure/surgery: ROOT CANAL THERAPY,CROWNS,DENTAL CLEANING                                            Additional comments: Per SC - Okay to proceed with root canal and may hold warfarin up to 5 days if needing to. SC        Electronically signed        MD Signature   ANJUM BeckerP.RBEN.

## 2024-05-14 NOTE — TELEPHONE ENCOUNTER
Last OV: 01/09/2024  Proposed Surgery: ROOT CANAL THERAPY,CROWNS,DENTAL CLEANING  Surgery Date: TBD  Requesting Office Name: [facility]  Fax Number: [phone]  Preference of Location (default is surgery center unless specified by Cardiologist or BOB)  Prior Clearance Addressed: No      Anticoags/Antiplatelets: Warfarin  Anticoags/Antiplatelet managed by Cardiology? YES    Outstanding Cardiac Imaging : No  Stent, Cardiac Devices, or Catheterization: Yes  Date : 11/16/2023   Ablation, TAVR/Valve (including open heart), Cardioversion: Yes  Date: 12/05/2023  Completed within the last 6 months. Forward to provider for review  Recent Cardiac Hospitalization: Yes  Date:  12/05/2023            When: Greater than 3 months since hospitalization   History (cardiac history):   Past Medical History:   Diagnosis Date    Arthritis     BL hands    Breath shortness     CAD (coronary artery disease)     Cancer (HCC) 1982    melanoma    Cancer (HCC) 2023    melanoma    Cataract     IOL implants    COPD (chronic obstructive pulmonary disease) ([facility])     COVID-19     Croup 3 years old    Disorder of thyroid     1983    Diverticulosis     Dyslipidemia     Emphysema of lung (HCC)     GERD (gastroesophageal reflux disease)     Heart valve disease     Hiatal hernia     High cholesterol     Hypertension     Infectious disease 2018    C Diff    Measles     5 years old    Paroxysmal A-fib (HCC) 01/20/2016    Symptomatic, on a rhythm control strategy with sotalol 40 mg by mouth twice a day.     Paroxysmal atrial fibrillation ([facility])     Pneumonia     x 3    Prediabetes     Psychiatric problem     anxiety    PVD (peripheral vascular disease) ([facility])     Renal disorder 11/2023    kidney stone    Rheumatic fever     possible as a small child    Urinary bladder disorder     frequent UTIs             Surgical Clearance Letter Sent: No Provider to advise.   **Scan clearance request letter into [facility].**

## 2024-05-14 NOTE — TELEPHONE ENCOUNTER
Caller: Angie Root    Topic/issue: Patient requesting a callback to discuss her INR results from last week 5/7.     Callback Number: [phone]    Thank you,  Yaima GALLEGOS

## 2024-05-14 NOTE — PROGRESS NOTES
Anticoagulation Summary  As of 2024      INR goal:  2.0-3.0   TTR:  82.1% (6.3 y)   INR used for dosin.48 (2024)   Warfarin maintenance plan:  0.5 mg (1 mg x 0.5) every Tue; 1 mg (1 mg x 1) all other days   Weekly warfarin total:  6.5 mg   Plan last modified:  Yaneli KruseD (2023)   Next INR check:  2024   Target end date:  Indefinite    Indications    Paroxysmal a-fib (CMS-HCC) (Resolved) [I48.0]  Chronic anticoagulation (Resolved) [Z79.01]                 Anticoagulation Episode Summary       INR check location:  Clinic Lab    Preferred lab:  [facility]    Send INR reminders to:      Comments:  [facility] Cardiology   [phone] (H)  [facility]          Anticoagulation Care Providers       Provider Role Specialty Phone number    Evelio Tejeda M.D. Referring Internal Medicine [phone]    [facility] Anticoagulation Services Responsible  [phone]    Beryl Pang M.D. Responsible Family Medicine [phone]          Anticoagulation Patient Findings  Patient Findings       Positives:  Upcoming dental procedure (TBD - pt states they will be extensive procedures and will need to be off warfarin)    Negatives:  Signs/symptoms of thrombosis, Signs/symptoms of bleeding, Laboratory test error suspected, Change in health, Change in alcohol use, Change in activity, Upcoming invasive procedure, Emergency department visit, Missed doses, Extra doses, Change in medications, Change in diet/appetite, Hospital admission, Bruising, Other complaints            INR is therapeutic    Called and spoke to patient.    Warfarin Plan: Continue regimen as listed above.    Next INR in 4 week(s) from test date.    Keren Abdi, PharmD

## 2024-05-18 ENCOUNTER — OFFICE VISIT (OUTPATIENT)
Dept: URGENT CARE | Facility: PHYSICIAN GROUP | Age: 84
End: 2024-05-18
Payer: MEDICARE

## 2024-05-18 VITALS
WEIGHT: 84 LBS | HEIGHT: 62 IN | SYSTOLIC BLOOD PRESSURE: 108 MMHG | OXYGEN SATURATION: 98 % | RESPIRATION RATE: 14 BRPM | HEART RATE: 68 BPM | TEMPERATURE: 98 F | BODY MASS INDEX: 15.46 KG/M2 | DIASTOLIC BLOOD PRESSURE: 52 MMHG

## 2024-05-18 DIAGNOSIS — L50.9 URTICARIA: ICD-10-CM

## 2024-05-18 PROCEDURE — 3078F DIAST BP <80 MM HG: CPT | Performed by: FAMILY MEDICINE

## 2024-05-18 PROCEDURE — 3074F SYST BP LT 130 MM HG: CPT | Performed by: FAMILY MEDICINE

## 2024-05-18 PROCEDURE — 99213 OFFICE O/P EST LOW 20 MIN: CPT | Performed by: FAMILY MEDICINE

## 2024-05-18 RX ORDER — PREDNISONE 10 MG/1
10 TABLET ORAL DAILY
Qty: 5 TABLET | Refills: 0 | Status: SHIPPED | OUTPATIENT
Start: 2024-05-18 | End: 2024-05-23

## 2024-05-18 ASSESSMENT — ENCOUNTER SYMPTOMS: FEVER: 0

## 2024-05-18 ASSESSMENT — FIBROSIS 4 INDEX: FIB4 SCORE: 3.99

## 2024-05-18 NOTE — PROGRESS NOTES
Subjective:     Angie Root is a 84 y.o. female who presents for Allergic Reaction (Left arm x 2 days )    HPI  Pt presents for evaluation of an acute problem  Pt with rash which just recently started on the arms   Stared on the left and now on the right   Very pruritic   Has been scratching the area frequently   Has hx of many allergies   Thinks this could be 2/2 strawberry milk   Did take a Benadryl and didn't help     Review of Systems   Constitutional:  Negative for fever.   Skin:  Positive for itching and rash.       PMH:  has a past medical history of Arthritis, Breath shortness, CAD (coronary artery disease), Cancer ([facility]) (1982), Cancer ([facility]) (2023), Cataract, COPD (chronic obstructive pulmonary disease) ([facility]), COVID-19, Croup (3 years old), Disorder of thyroid, Diverticulosis, Dyslipidemia, Emphysema of lung ([facility]), GERD (gastroesophageal reflux disease), Heart valve disease, Hiatal hernia, High cholesterol, Hypertension, Infectious disease (2018), Measles, Paroxysmal A-fib ([facility]) (01/20/2016), Paroxysmal atrial fibrillation ([facility]), Pneumonia, Prediabetes, Psychiatric problem, PVD (peripheral vascular disease) ([facility]), Renal disorder (11/2023), Rheumatic fever, and Urinary bladder disorder.  MEDS:   Current Outpatient Medications:     predniSONE (DELTASONE) 10 MG Tab, Take 1 Tablet by mouth every day for 5 days., Disp: 5 Tablet, Rfl: 0    albuterol 108 (90 Base) MCG/ACT Aero Soln inhalation aerosol, INHALE ONE TO TWO PUFFS BY MOUTH EVERY 4 HOURS AS NEEDED FOR SHORTNESS OF BREATH, Disp: 18 g, Rfl: 3    lovastatin (MEVACOR) 40 MG tablet, TAKE 1 TABLET AT BEDTIME, Disp: 90 Tablet, Rfl: 3    sotalol (BETAPACE) 80 MG Tab, TAKE ONE-HALF TABLET BY MOUTH 2 TIMES DAILY, Disp: 90 Tablet, Rfl: 3    furosemide (LASIX) 20 MG Tab, Take 1 Tablet by mouth every day., Disp: 90 Tablet, Rfl: 3    Calcium-Magnesium-Vitamin D (CALCIUM 1200+D3 PO), Take 1 Tablet by mouth every day., Disp: , Rfl:     D-Mannose Powder, Take 1 Dose  by mouth every day. Mix one scoop with 4 oz of water, Disp: , Rfl:     fluticasone (FLONASE) 50 MCG/ACT nasal spray, Administer 1 Spray into affected nostril(S) every day., Disp: 16 g, Rfl: 3    Multiple Vitamins-Minerals (ZINC PO), Take 1 Tablet by mouth every day., Disp: , Rfl:     ipratropium-albuterol (DUONEB) 0.5-2.5 (3) MG/3ML nebulizer solution, Take 3 mL by nebulization every four hours as needed for Shortness of Breath., Disp: 120 mL, Rfl: 11    warfarin (COUMADIN) 1 MG Tab, TAKE ONE TO TWO TABLETS DAILY OR AS DIRECTED BY ANTICOAGULATION CLINIC (Patient taking differently: 1 Tablet every day. TAKE ONE EVERY NIGHT EXCEPT TUESDAY TAKES A HALF OF PILL), Disp: 180 Tablet, Rfl: 1    EPINEPHrine (EPIPEN) 0.3 MG/0.3ML Solution Auto-injector solution for injection, epinephrine 0.3 mg/0.3 mL injection, auto-injector, Disp: , Rfl:     Probiotic Product (PROBIOTIC PO), Take 1 Capsule by mouth every day., Disp: , Rfl:     ascorbic acid (ASCORBIC ACID) 500 MG Tab, Take 2 Tablets by mouth every day., Disp: , Rfl:     vitamin D (CHOLECALCIFEROL) 1000 UNIT Tab, Take 3,000 Units by mouth every morning. 3 tablets = 3000 units, Disp: , Rfl:   ALLERGIES:   Allergies   Allergen Reactions    Penicillins Anaphylaxis and Hives    Codeine Swelling    Iodine Swelling    Bee Venom Anaphylaxis    Chantix [Varenicline]      disoriented    Ciprofloxacin      Causes C diff, recurrent and very difficult    Diphtheria,Pertussis,Tetanus     Flagyl [Metronidazole] Rash     C/O flagyl causes rash.     Honey Bee Venom     Latex Hives    Lipitor [Atorvastatin Calcium] Unspecified     Intense Stomach pain    Other Environmental Itching and Swelling    Shellfish Allergy      unknown    Tetanus Antitoxin Swelling     unknown    Vecuronium & Derivatives      SURGHX:   Past Surgical History:   Procedure Laterality Date    TRANSCATHETER MITRAL VALVE REPAIR Right 12/5/2023    Procedure: TRANSCATHETER MITRAL VALVE PROCEDURE;  Surgeon: Ethan  "MARION Lozano;  Location: SURGERY [facility];  Service: Cardiac    ECHOCARDIOGRAM, TRANSESOPHAGEAL, INTRAOPERATIVE N/A 12/5/2023    Procedure: ECHOCARDIOGRAM, TRANSESOPHAGEAL, INTRAOPERATIVE;  Surgeon: Ethan Lozano M.D.;  Location: SURGERY [facility];  Service: Cardiac    AZ CYSTOSCOPY,INSERT URETERAL STENT Right 11/16/2023    Procedure: CYSTOSCOPY, WITH URETERAL STENT INSERTION;  Surgeon: Alexsander Reyes M.D.;  Location: SURGERY [facility];  Service: Urology    AZ CYSTO/URETERO/PYELOSCOPY, DX Right 11/16/2023    Procedure: URETEROSCOPY;  Surgeon: Alexsander Reyes M.D.;  Location: SURGERY [facility];  Service: Urology    LASERTRIPSY Right 11/16/2023    Procedure: LITHOTRIPSY, USING LASER;  Surgeon: Alexsander Reyes M.D.;  Location: SURGERY [facility];  Service: Urology    FECAL TRANSPLANT N/A 4/9/2018    Procedure: FECAL TRANSPLANT;  Surgeon: Gonzalez Cruz M.D.;  Location: ENDOSCOPY [facility];  Service: Gastroenterology    COLONOSCOPY - ENDO N/A 4/9/2018    Procedure: COLONOSCOPY - ENDO;  Surgeon: Gonzalez Cruz M.D.;  Location: ENDOSCOPY [facility];  Service: Gastroenterology    WIDE EXCISION MELANOMA, LEG, W/POSS.STSG  10/2015    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 She started smoking about 67 years ago. She has a 0.7 pack-year smoking history. She has never used smokeless tobacco. She reports that she does not drink alcohol and does not use drugs.     Objective:   /52 (BP Location: Left leg, Patient Position: Sitting, BP Cuff Size: Small adult)   Pulse 68   Temp 36.7 °C (98 °F) (Temporal)   Resp 14   Ht 1.575 m (5' 2\")   Wt 38.1 kg (84 lb)   SpO2 98%   BMI 15.36 kg/m²     Physical " Exam  Constitutional:       General: She is not in acute distress.     Appearance: She is well-developed. She is not diaphoretic.   Pulmonary:      Effort: Pulmonary effort is normal.   Skin:     Comments: Bilateral forearms with urticarial lesions.  Erythematous areas are blanchable, and multiple excoriations present.  No open lesions or bleeding    Neurological:      Mental Status: She is alert.         Assessment/Plan:   Assessment    1. Urticaria  - predniSONE (DELTASONE) 10 MG Tab; Take 1 Tablet by mouth every day for 5 days.  Dispense: 5 Tablet; Refill: 0    Pt with urticaria.  Suspect it could be from strawberry milk, and advised to avoid this in the future.  She will be treated with course of prednisone and given F/U precautions.  F/U as needed.

## 2024-05-20 ENCOUNTER — TELEPHONE (OUTPATIENT)
Dept: CARDIOLOGY | Facility: MEDICAL CENTER | Age: 84
End: 2024-05-20
Payer: MEDICARE

## 2024-05-20 ENCOUNTER — TELEPHONE (OUTPATIENT)
Dept: VASCULAR LAB | Facility: MEDICAL CENTER | Age: 84
End: 2024-05-20
Payer: MEDICARE

## 2024-05-20 NOTE — LETTER
PROCEDURE/SURGERY CLEARANCE FORM      Encounter Date: 5/20/2024    Patient: Angie Root  YOB: 1940    CARDIOLOGIST:  CINTHIA BeckerRMACHO    REFERRING DOCTOR:  No ref. provider found    Procedure/surgery: root canal therapy, crowns, dental cleaning                                            Additional comments:   Okay to proceed, needs SBE prophylaxis antibiotics per protocol. Okay to hold meds if needed.          Electronically signed        MD Signature   CINTHIA BeckerRBEN.

## 2024-05-20 NOTE — TELEPHONE ENCOUNTER
Received notification that pt will be starting prednisone which could increase INR d/t DDI with warfarin    S/w pt - pt started 5 day course of prednisone on 05/18/24. She will get INR tested 05/23/2024.    Keren Abdi, PharmD

## 2024-05-20 NOTE — TELEPHONE ENCOUNTER
Last OV: 01.09.2024  Proposed Surgery: root canal therapy, crowns, dental cleaning   Surgery Date: TBD   Requesting Office Name: [facility]  Fax Number: [phone]  Preference of Location (default is surgery center unless specified by Cardiologist or BOB)  Prior Clearance Addressed: No      Anticoags/Antiplatelets: Warfarin  Anticoags/Antiplatelet managed by Cardiology? No Provider to advise.    Outstanding Cardiac Imaging : Yes  Echo.   Clearance to provider to review  Stent, Cardiac Devices, or Catheterization: No  Ablation, TAVR/Valve (including open heart), Cardioversion: Yes  Date: 12.05.2023  Completed within the last 6 months. Forward to provider for review  Recent Cardiac Hospitalization: No            When: N/A  History (cardiac history):   Past Medical History:   Diagnosis Date    Arthritis     BL hands    Breath shortness     CAD (coronary artery disease)     Cancer (HCC) 1982    melanoma    Cancer (HCC) 2023    melanoma    Cataract     IOL implants    COPD (chronic obstructive pulmonary disease) ([facility])     COVID-19     Croup 3 years old    Disorder of thyroid     1983    Diverticulosis     Dyslipidemia     Emphysema of lung (HCC)     GERD (gastroesophageal reflux disease)     Heart valve disease     Hiatal hernia     High cholesterol     Hypertension     Infectious disease 2018    C Diff    Measles     5 years old    Paroxysmal A-fib (HCC) 01/20/2016    Symptomatic, on a rhythm control strategy with sotalol 40 mg by mouth twice a day.     Paroxysmal atrial fibrillation ([facility])     Pneumonia     x 3    Prediabetes     Psychiatric problem     anxiety    PVD (peripheral vascular disease) ([facility])     Renal disorder 11/2023    kidney stone    Rheumatic fever     possible as a small child    Urinary bladder disorder     frequent UTIs             Surgical Clearance Letter Sent: No Provider to advise.   **Scan clearance request letter into [facility].**

## 2024-05-22 ENCOUNTER — HOSPITAL ENCOUNTER (OUTPATIENT)
Dept: LAB | Facility: MEDICAL CENTER | Age: 84
End: 2024-05-22
Attending: NURSE PRACTITIONER
Payer: MEDICARE

## 2024-05-22 ENCOUNTER — ANTICOAGULATION MONITORING (OUTPATIENT)
Dept: VASCULAR LAB | Facility: MEDICAL CENTER | Age: 84
End: 2024-05-22
Payer: MEDICARE

## 2024-05-22 DIAGNOSIS — Z79.01 CHRONIC ANTICOAGULATION: ICD-10-CM

## 2024-05-22 LAB
INR PPP: 3.45 (ref 0.87–1.13)
PROTHROMBIN TIME: 35.3 SEC (ref 12–14.6)

## 2024-05-22 NOTE — PROGRESS NOTES
Anticoagulation Summary  As of 5/22/2024      INR goal:  2.0-3.0   TTR:  82.0% (6.3 y)   INR used for dosing:  3.45 (5/22/2024)   Warfarin maintenance plan:  0.5 mg (1 mg x 0.5) every Tue; 1 mg (1 mg x 1) all other days   Weekly warfarin total:  6.5 mg   Plan last modified:  Yaneli KrsueD (9/19/2023)   Next INR check:  6/5/2024   Priority:  Routine   Target end date:  Indefinite    Indications    Paroxysmal a-fib (CMS-HCC) (Resolved) [I48.0]  Chronic anticoagulation (Resolved) [Z79.01]                 Anticoagulation Episode Summary       INR check location:  Clinic Lab    Preferred lab:  [facility]    Send INR reminders to:      Comments:  [facility]   [phone] (H)  [facility]          Anticoagulation Care Providers       Provider Role Specialty Phone number    Evelio Tejeda M.D. Referring Internal Medicine [phone]    [facility] Responsible  [phone]    Beryl Pang M.D. Responsible Family Medicine [phone]          Anticoagulation Patient Findings  Patient Findings       Positives:  Change in medications (Yesterday finished 5-day course of Prednisone)    Negatives:  Signs/symptoms of thrombosis, Signs/symptoms of bleeding, Laboratory test error suspected, Change in health, Change in alcohol use, Change in activity, Upcoming invasive procedure, Emergency department visit, Upcoming dental procedure, Missed doses, Extra doses, Change in diet/appetite, Hospital admission, Bruising, Other complaints            INR is supratherapeutic    Called and spoke to patient.    Patient likely supratherapeutic from recent 5-day course of prednisone.    Warfarin Plan: Take 0.5mg today, then continue regimen as outlined above.    Pt is not on antiplatelet therapy.    Next INR in 2 week(s).    Plan Discussed with Major Hughes, PharmD.    Saniya Mercedes, Pharmacy Intern

## 2024-05-28 ENCOUNTER — OFFICE VISIT (OUTPATIENT)
Dept: URGENT CARE | Facility: PHYSICIAN GROUP | Age: 84
End: 2024-05-28
Payer: MEDICARE

## 2024-05-28 ENCOUNTER — ANTICOAGULATION MONITORING (OUTPATIENT)
Dept: MEDICAL GROUP | Facility: PHYSICIAN GROUP | Age: 84
End: 2024-05-28

## 2024-05-28 VITALS
HEART RATE: 70 BPM | BODY MASS INDEX: 15.83 KG/M2 | TEMPERATURE: 98.3 F | OXYGEN SATURATION: 97 % | WEIGHT: 86 LBS | SYSTOLIC BLOOD PRESSURE: 130 MMHG | HEIGHT: 62 IN | DIASTOLIC BLOOD PRESSURE: 84 MMHG | RESPIRATION RATE: 18 BRPM

## 2024-05-28 DIAGNOSIS — T78.40XA ALLERGIC REACTION, INITIAL ENCOUNTER: ICD-10-CM

## 2024-05-28 PROCEDURE — 3075F SYST BP GE 130 - 139MM HG: CPT

## 2024-05-28 PROCEDURE — 99214 OFFICE O/P EST MOD 30 MIN: CPT

## 2024-05-28 PROCEDURE — 3079F DIAST BP 80-89 MM HG: CPT

## 2024-05-28 RX ORDER — PREDNISONE 20 MG/1
40 TABLET ORAL DAILY
Qty: 10 TABLET | Refills: 0 | Status: SHIPPED | OUTPATIENT
Start: 2024-05-28 | End: 2024-06-02

## 2024-05-28 RX ORDER — ALPRAZOLAM 0.25 MG/1
TABLET ORAL
COMMUNITY
Start: 2024-05-22

## 2024-05-28 ASSESSMENT — ENCOUNTER SYMPTOMS
MYALGIAS: 0
SHORTNESS OF BREATH: 0
NAUSEA: 0
VOMITING: 0
CHILLS: 0
FEVER: 0
WHEEZING: 0

## 2024-05-28 ASSESSMENT — FIBROSIS 4 INDEX: FIB4 SCORE: 3.99

## 2024-05-28 NOTE — PROGRESS NOTES
Subjective:     CHIEF COMPLAINT  Chief Complaint   Patient presents with    Allergic Reaction     Left hand, pt was seen 2 weeks ago for same reason        HPI  Angie Root is a very pleasant 84 y.o. female who presents with localized redness and itching to the dorsal surface of the left hand.  She was previously seen in the urgent care on 5/18/2024 with a similar issue.  She reports a plethora of food allergies.  She reports that at her previous appointment she believes that her allergic reaction was secondary to a Boost strawberry beverage.  She states that she tried a new spiced Coca-Cola with raspberry flavor 2 days ago and developed redness and itching on her hand soon after.  She states that she typically does not tolerate antihistamines and has not taken any at this time.  She states she is otherwise feeling well at this time.  She has not had any nausea, vomiting, or shortness of breath.  She has not had any fevers.  She denies any new medications.    REVIEW OF SYSTEMS  Review of Systems   Constitutional:  Negative for chills and fever.   Respiratory:  Negative for shortness of breath and wheezing.    Gastrointestinal:  Negative for nausea and vomiting.   Musculoskeletal:  Negative for myalgias.   Skin:  Positive for itching and rash.       PAST MEDICAL HISTORY  Patient Active Problem List    Diagnosis Date Noted    Encounter for examination for driving license 01/16/2024    Paresthesia of left upper and lower extremity 12/13/2023    S/P mitral valve clip implantation 12/06/2023    Ground-level fall 11/16/2023    Ureterolithiasis 11/15/2023    Paroxysmal atrial fibrillation (HCC) 11/14/2023    Pulmonary hypertension (HCC) 11/10/2023    Severe protein-calorie malnutrition (HCC) 10/19/2023    Bladder polyp 05/31/2023    Personal history of anaphylaxis 12/18/2020    Coronary artery disease involving native coronary artery of native heart without angina pectoris 03/12/2019    History of COPD 12/05/2017     Circulating anticoagulants (HCC) 12/04/2017    Osteoporosis, postmenopausal 08/24/2017    Depression with anxiety 02/15/2017    Vitamin D deficiency 07/26/2016    Insomnia 07/26/2016    Peripheral arterial disease (HCC) 03/01/2016    Cigarette nicotine dependence with nicotine-induced disorder 03/01/2016    Gastroesophageal reflux disease without esophagitis 01/20/2016    Pre-diabetes 01/20/2016    Panic attacks 01/20/2016       SURGICAL HISTORY   has a past surgical history that includes tonsillectomy (1945); cholecystectomy (1993); cardiac cath, right heart (2008); wide excision melanoma, leg, w/poss.stsg (10/2015); stent placement; other orthopedic surgery (Left, 2008); other cardiac surgery; fecal transplant (N/A, 4/9/2018); colonoscopy - endo (N/A, 4/9/2018); cystoscopy,insert ureteral stent (Right, 11/16/2023); cysto/uretero/pyeloscopy, dx (Right, 11/16/2023); lasertripsy (Right, 11/16/2023); transcatheter mitral valve repair (Right, 12/5/2023); and echocardiogram, transesophageal, intraoperative (N/A, 12/5/2023).    ALLERGIES  Allergies   Allergen Reactions    Penicillins Anaphylaxis and Hives    Codeine Swelling    Iodine Swelling    Bee Venom Anaphylaxis    Chantix [Varenicline]      disoriented    Ciprofloxacin      Causes C diff, recurrent and very difficult    Diphtheria,Pertussis,Tetanus     Flagyl [Metronidazole] Rash     C/O flagyl causes rash.     Honey Bee Venom     Latex Hives    Lipitor [Atorvastatin Calcium] Unspecified     Intense Stomach pain    Other Environmental Itching and Swelling    Shellfish Allergy      unknown    Tetanus Antitoxin Swelling     unknown    Vecuronium & Derivatives        CURRENT MEDICATIONS  Home Medications       Reviewed by ARYAN AlmendarezCIvone (Physician Assistant) on 05/28/24 at 1102  Med List Status: <None>     Medication Last Dose Status   albuterol 108 (90 Base) MCG/ACT Aero Soln inhalation aerosol Taking Active   ALPRAZolam (XANAX) 0.25 MG Tab PRN Active    ascorbic acid (ASCORBIC ACID) 500 MG Tab Taking Active   Calcium-Magnesium-Vitamin D (CALCIUM 1200+D3 PO) Taking Active   D-Mannose Powder Taking Active   EPINEPHrine (EPIPEN) 0.3 MG/0.3ML Solution Auto-injector solution for injection Taking Active   fluticasone (FLONASE) 50 MCG/ACT nasal spray Taking Active   furosemide (LASIX) 20 MG Tab Taking Active   ipratropium-albuterol (DUONEB) 0.5-2.5 (3) MG/3ML nebulizer solution Taking Active   lovastatin (MEVACOR) 40 MG tablet Taking Active   Multiple Vitamins-Minerals (ZINC PO) Taking Active   Probiotic Product (PROBIOTIC PO) Taking Active   sotalol (BETAPACE) 80 MG Tab Taking Active   vitamin D (CHOLECALCIFEROL) 1000 UNIT Tab Taking Active   warfarin (COUMADIN) 1 MG Tab Taking Active                    SOCIAL HISTORY  Social History     Tobacco Use    Smoking status: Some Days     Current packs/day: 0.01     Average packs/day: (0.7 ttl pk-yrs)     Types: Cigarettes     Start date: 3/20/1958    Smokeless tobacco: Never    Tobacco comments:     1 cigarette per day, not interested in cessation information   Vaping Use    Vaping status: Never Used   Substance and Sexual Activity    Alcohol use: No     Alcohol/week: 0.0 oz    Drug use: Never    Sexual activity: Not Currently     Partners: Male       FAMILY HISTORY  Family History   Problem Relation Age of Onset    Hypertension Mother     Hyperlipidemia Mother     Cancer Maternal Grandmother         tongue    Cancer Maternal Uncle         x 3, pancreas    Cancer Maternal Grandfather         unknown    Cancer Paternal Grandmother         unknown    Cancer Paternal Grandfather         unknown    Diabetes Maternal Uncle     Heart Disease Maternal Uncle     Hypertension Sister     Hyperlipidemia Sister     Heart Disease Sister     Alcohol/Drug Sister     Thyroid Sister     Psychiatric Illness Neg Hx     Stroke Neg Hx           Objective:     VITAL SIGNS: /84 (BP Location: Right arm, Patient Position: Sitting, BP Cuff  "Size: Adult)   Pulse 70   Temp 36.8 °C (98.3 °F) (Temporal)   Resp 18   Ht 1.575 m (5' 2\")   Wt 39 kg (86 lb)   SpO2 97%   BMI 15.73 kg/m²     PHYSICAL EXAM  Physical Exam  Vitals reviewed.   Constitutional:       General: She is not in acute distress.     Appearance: Normal appearance. She is not ill-appearing or toxic-appearing.   HENT:      Head: Normocephalic and atraumatic.      Mouth/Throat:      Mouth: Mucous membranes are moist.   Eyes:      Conjunctiva/sclera: Conjunctivae normal.      Pupils: Pupils are equal, round, and reactive to light.   Cardiovascular:      Rate and Rhythm: Normal rate.   Pulmonary:      Effort: Pulmonary effort is normal. No respiratory distress.   Skin:     General: Skin is warm and dry.             Comments: Localized erythema on dorsal surface of left hand.  No urticaria, vesicles, or pustules present.  No punctures or abrasions.  No bony tenderness.  Full active range of motion present.   Neurological:      General: No focal deficit present.      Mental Status: She is alert and oriented to person, place, and time.   Psychiatric:         Mood and Affect: Mood normal.         Assessment/Plan:     1. Allergic reaction, initial encounter  - predniSONE (DELTASONE) 20 MG Tab; Take 2 Tablets by mouth every day for 5 days.  Dispense: 10 Tablet; Refill: 0    Other orders  - ALPRAZolam (XANAX) 0.25 MG Tab  -Monitor symptoms closely and return to clinic if symptoms worsen or fail to resolve    MDM/Comments:  I have prepared for this visit by personally reviewing the patient's prevous medical records, vitals, and labs including: most recent GFR and CMP. Patient has stable vital signs and is non-toxic appearing.  Patient initiated on 20 mg of prednisone for suspected allergic reaction. Discussed that if symptoms are not improving, we may investigate for other causes, although patient states she has had localized allergic reactions like this in the past.  discussed supportive care with " hydration, rest, Tylenol/Ibuprofen as needed. Patient demonstrated understanding of treatment plan at this time and will RTC if symptoms worsen or fail to resolve.       Differential diagnosis, natural history, supportive care, and indications for immediate follow-up discussed. All questions answered. Patient agrees with the plan of care.    Follow-up as needed if symptoms worsen or fail to improve to PCP, Urgent care or Emergency Room.    I have personally reviewed prior external notes and test results pertinent to today's visit.  I have independently reviewed and interpreted all diagnostics ordered during this urgent care acute visit.   Discussed management options (risks,benefits, and alternatives to treatment). Pt expresses understanding and the treatment plan was agreed upon. Questions were encouraged and answered to pt's satisfaction.    Please note that this dictation was created using voice recognition software. I have made a reasonable attempt to correct obvious errors, but I expect that there are errors of grammar and possibly content that I did not discover before finalizing the note.

## 2024-05-28 NOTE — PROGRESS NOTES
Patient placed on five day course of high dose prednisone.  Instructed her to decrease warfarin dosing per calendar.  Recheck INR as previously instructed.  Major Hughes, PharmD, BCACP

## 2024-06-04 ENCOUNTER — ANTICOAGULATION MONITORING (OUTPATIENT)
Dept: VASCULAR LAB | Facility: MEDICAL CENTER | Age: 84
End: 2024-06-04
Payer: MEDICARE

## 2024-06-04 ENCOUNTER — HOSPITAL ENCOUNTER (OUTPATIENT)
Dept: LAB | Facility: MEDICAL CENTER | Age: 84
End: 2024-06-04
Attending: NURSE PRACTITIONER
Payer: MEDICARE

## 2024-06-04 DIAGNOSIS — Z79.01 CHRONIC ANTICOAGULATION: ICD-10-CM

## 2024-06-04 LAB
INR PPP: 2.46 (ref 0.87–1.13)
PROTHROMBIN TIME: 27 SEC (ref 12–14.6)

## 2024-06-04 PROCEDURE — 85610 PROTHROMBIN TIME: CPT

## 2024-06-04 PROCEDURE — 36415 COLL VENOUS BLD VENIPUNCTURE: CPT

## 2024-06-04 NOTE — PROGRESS NOTES
Anticoagulation Summary  As of 2024      INR goal:  2.0-3.0   TTR:  81.8% (6.4 y)   INR used for dosin.46 (2024)   Warfarin maintenance plan:  0.5 mg (1 mg x 0.5) every Tue; 1 mg (1 mg x 1) all other days   Weekly warfarin total:  6.5 mg   Plan last modified:  Pieter Covarrubias, PharmD (2023)   Next INR check:  2024   Priority:  Routine   Target end date:  Indefinite    Indications    Paroxysmal a-fib (CMS-HCC) (Resolved) [I48.0]  Chronic anticoagulation (Resolved) [Z79.01]                 Anticoagulation Episode Summary       INR check location:  Clinic Lab    Preferred lab:  [facility]    Send INR reminders to:      Comments:  [facility]   [phone] (H)  [facility] labs          Anticoagulation Care Providers       Provider Role Specialty Phone number    Evelio Tejeda M.D. Referring Internal Medicine [phone]    [facility] Responsible  [phone]    Beryl Pang M.D. Responsible Family Medicine [phone]          Anticoagulation Patient Findings  Patient Findings       Positives:  Change in medications (finished prednisone), Change in diet/appetite (eating more asparagus)    Negatives:  Signs/symptoms of thrombosis, Signs/symptoms of bleeding, Laboratory test error suspected, Change in health, Change in alcohol use, Change in activity, Upcoming invasive procedure, Emergency department visit, Upcoming dental procedure, Missed doses, Extra doses, Hospital admission, Bruising, Other complaints            INR is therapeutic    Called and spoke to patient.    Pt is not on antiplatelet therapy.    Warfarin Plan: Continue regimen as listed above.    Next INR in 4 week(s).    Keren Abdi, YaneliD

## 2024-06-10 ENCOUNTER — HOSPITAL ENCOUNTER (OUTPATIENT)
Dept: RADIOLOGY | Facility: MEDICAL CENTER | Age: 84
End: 2024-06-10
Attending: UROLOGY
Payer: MEDICARE

## 2024-06-10 DIAGNOSIS — Z87.442 PERSONAL HISTORY OF URINARY CALCULI: ICD-10-CM

## 2024-06-13 ENCOUNTER — TELEPHONE (OUTPATIENT)
Dept: MEDICAL GROUP | Facility: CLINIC | Age: 84
End: 2024-06-13
Payer: MEDICARE

## 2024-06-13 DIAGNOSIS — Z91.89 AT RISK FOR DENTAL PROBLEMS: ICD-10-CM

## 2024-06-13 NOTE — TELEPHONE ENCOUNTER
Angie called and is getting some dental work done soon. She is getting two root canals, and some crowns. They will not work on her without antibiotics. Can we send her something in?

## 2024-06-17 RX ORDER — SULFAMETHOXAZOLE AND TRIMETHOPRIM 800; 160 MG/1; MG/1
1 TABLET ORAL 2 TIMES DAILY
Qty: 6 TABLET | Refills: 0 | Status: SHIPPED | OUTPATIENT
Start: 2024-06-17 | End: 2024-06-20

## 2024-06-23 DIAGNOSIS — R05.9 COUGH, UNSPECIFIED TYPE: ICD-10-CM

## 2024-06-24 RX ORDER — FLUTICASONE PROPIONATE 50 MCG
SPRAY, SUSPENSION (ML) NASAL
Qty: 16 ML | Refills: 3 | Status: SHIPPED | OUTPATIENT
Start: 2024-06-24

## 2024-06-24 NOTE — TELEPHONE ENCOUNTER
Received request via: Pharmacy    Was the patient seen in the last year in this department? Yes    Does the patient have an active prescription (recently filled or refills available) for medication(s) requested? No    Pharmacy Name: [facility] [address]    Does the patient have residential Plus and need 100 day supply (blood pressure, diabetes and cholesterol meds only)? Patient does not have SCP

## 2024-07-02 ENCOUNTER — ANTICOAGULATION MONITORING (OUTPATIENT)
Dept: VASCULAR LAB | Facility: MEDICAL CENTER | Age: 84
End: 2024-07-02
Payer: MEDICARE

## 2024-07-02 ENCOUNTER — HOSPITAL ENCOUNTER (OUTPATIENT)
Dept: LAB | Facility: MEDICAL CENTER | Age: 84
End: 2024-07-02
Attending: NURSE PRACTITIONER
Payer: MEDICARE

## 2024-07-02 DIAGNOSIS — Z79.01 CHRONIC ANTICOAGULATION: ICD-10-CM

## 2024-07-02 LAB
INR PPP: 2.63 (ref 0.87–1.13)
PROTHROMBIN TIME: 28.5 SEC (ref 12–14.6)

## 2024-07-02 PROCEDURE — 36415 COLL VENOUS BLD VENIPUNCTURE: CPT

## 2024-07-02 PROCEDURE — 85610 PROTHROMBIN TIME: CPT

## 2024-07-23 ENCOUNTER — TELEPHONE (OUTPATIENT)
Dept: VASCULAR LAB | Facility: MEDICAL CENTER | Age: 84
End: 2024-07-23

## 2024-07-23 ENCOUNTER — OFFICE VISIT (OUTPATIENT)
Dept: URGENT CARE | Facility: PHYSICIAN GROUP | Age: 84
End: 2024-07-23
Payer: MEDICARE

## 2024-07-23 VITALS
WEIGHT: 84 LBS | TEMPERATURE: 98.2 F | RESPIRATION RATE: 20 BRPM | SYSTOLIC BLOOD PRESSURE: 90 MMHG | BODY MASS INDEX: 14.88 KG/M2 | HEART RATE: 61 BPM | HEIGHT: 63 IN | OXYGEN SATURATION: 98 % | DIASTOLIC BLOOD PRESSURE: 60 MMHG

## 2024-07-23 DIAGNOSIS — Z88.9 HISTORY OF MULTIPLE ALLERGIES: ICD-10-CM

## 2024-07-23 DIAGNOSIS — L50.9 URTICARIA: ICD-10-CM

## 2024-07-23 PROCEDURE — 3074F SYST BP LT 130 MM HG: CPT | Performed by: NURSE PRACTITIONER

## 2024-07-23 PROCEDURE — 3078F DIAST BP <80 MM HG: CPT | Performed by: NURSE PRACTITIONER

## 2024-07-23 PROCEDURE — 99214 OFFICE O/P EST MOD 30 MIN: CPT | Performed by: NURSE PRACTITIONER

## 2024-07-23 RX ORDER — PREDNISONE 20 MG/1
TABLET ORAL
Qty: 10 TABLET | Refills: 0 | Status: SHIPPED | OUTPATIENT
Start: 2024-07-23

## 2024-07-23 ASSESSMENT — FIBROSIS 4 INDEX: FIB4 SCORE: 3.99

## 2024-07-30 ENCOUNTER — HOSPITAL ENCOUNTER (OUTPATIENT)
Dept: LAB | Facility: MEDICAL CENTER | Age: 84
End: 2024-07-30
Attending: NURSE PRACTITIONER
Payer: MEDICARE

## 2024-07-30 ENCOUNTER — ANTICOAGULATION MONITORING (OUTPATIENT)
Dept: CARDIOLOGY | Facility: MEDICAL CENTER | Age: 84
End: 2024-07-30
Payer: MEDICARE

## 2024-07-30 DIAGNOSIS — Z79.01 CHRONIC ANTICOAGULATION: ICD-10-CM

## 2024-07-30 LAB
INR PPP: 3.66 (ref 0.87–1.13)
PROTHROMBIN TIME: 36.9 SEC (ref 12–14.6)

## 2024-07-30 PROCEDURE — 85610 PROTHROMBIN TIME: CPT

## 2024-07-30 PROCEDURE — 36415 COLL VENOUS BLD VENIPUNCTURE: CPT

## 2024-08-06 ENCOUNTER — TELEPHONE (OUTPATIENT)
Dept: VASCULAR LAB | Facility: MEDICAL CENTER | Age: 84
End: 2024-08-06
Payer: MEDICARE

## 2024-08-06 ENCOUNTER — TELEPHONE (OUTPATIENT)
Dept: MEDICAL GROUP | Facility: CLINIC | Age: 84
End: 2024-08-06
Payer: MEDICARE

## 2024-08-06 DIAGNOSIS — Z79.2 NEED FOR ANTIBIOTIC PROPHYLAXIS FOR DENTAL PROCEDURE: ICD-10-CM

## 2024-08-06 RX ORDER — SULFAMETHOXAZOLE/TRIMETHOPRIM 800-160 MG
1 TABLET ORAL 2 TIMES DAILY
Qty: 6 TABLET | Refills: 0 | Status: SHIPPED | OUTPATIENT
Start: 2024-08-06 | End: 2024-08-09

## 2024-08-06 NOTE — TELEPHONE ENCOUNTER
Received notification that pt will be starting Bactrim which could increase INR d/t DDI with warfarin    S/w pt - she is not planning to start Bactrim until her dental work is scheduled. She will call us when she starts it.    Keren Abdi, YaneliD

## 2024-08-06 NOTE — TELEPHONE ENCOUNTER
Angie called and states she is going in for more dental work soon, she is wondering if we can send in abx prior to her appointment. She, also, was taking prednisone, recently, due to some allergic reactions she was having to a strawberry drink, and says it opened up her lungs and she didn't have to use her inhaler, and feels great. She is looking for recommendations of steroids that she can take without it interfering with her coumadin .    I made her an appointment in Sept.

## 2024-08-14 ENCOUNTER — TELEPHONE (OUTPATIENT)
Dept: MEDICAL GROUP | Facility: CLINIC | Age: 84
End: 2024-08-14
Payer: MEDICARE

## 2024-08-14 DIAGNOSIS — Z79.01 CHRONIC ANTICOAGULATION: ICD-10-CM

## 2024-08-14 DIAGNOSIS — L50.9 URTICARIA: ICD-10-CM

## 2024-08-14 DIAGNOSIS — I25.10 CORONARY ARTERY DISEASE INVOLVING NATIVE CORONARY ARTERY OF NATIVE HEART WITHOUT ANGINA PECTORIS: ICD-10-CM

## 2024-08-14 DIAGNOSIS — R73.03 PRE-DIABETES: ICD-10-CM

## 2024-08-14 DIAGNOSIS — Z88.9 HISTORY OF MULTIPLE ALLERGIES: ICD-10-CM

## 2024-08-14 RX ORDER — PREDNISONE 20 MG/1
TABLET ORAL
Qty: 10 TABLET | Refills: 0 | Status: SHIPPED | OUTPATIENT
Start: 2024-08-14 | End: 2024-08-30

## 2024-08-14 NOTE — TELEPHONE ENCOUNTER
Angie called this morning and was wondering about an Rx for prednisone . She also is wanting some blood work done.

## 2024-08-21 DIAGNOSIS — F41.0 PANIC ATTACKS: ICD-10-CM

## 2024-08-21 NOTE — TELEPHONE ENCOUNTER
Received request via: Patient    Was the patient seen in the last year in this department? Yes    Does the patient have an active prescription (recently filled or refills available) for medication(s) requested? No    [facility]    Does the patient have retirement Plus and need 100-day supply? (This applies to ALL medications) Patient does not have SCP

## 2024-08-22 ENCOUNTER — TELEPHONE (OUTPATIENT)
Dept: MEDICAL GROUP | Facility: CLINIC | Age: 84
End: 2024-08-22
Payer: MEDICARE

## 2024-08-22 DIAGNOSIS — F41.0 PANIC ATTACKS: ICD-10-CM

## 2024-08-22 RX ORDER — ALPRAZOLAM 0.25 MG
TABLET ORAL
Qty: 60 TABLET | Refills: 5 | Status: SHIPPED | OUTPATIENT
Start: 2024-08-22 | End: 2024-08-22 | Stop reason: SDUPTHER

## 2024-08-22 RX ORDER — ALPRAZOLAM 0.25 MG
0.25 TABLET ORAL 2 TIMES DAILY PRN
Qty: 60 TABLET | Refills: 5 | Status: SHIPPED | OUTPATIENT
Start: 2024-08-22 | End: 2025-02-18

## 2024-08-22 NOTE — TELEPHONE ENCOUNTER
Pharmacy called needing day supply for alprazolam. They would not take a verbal, and deleted the script that was previously sent.    Thank you!

## 2024-08-27 ENCOUNTER — HOSPITAL ENCOUNTER (OUTPATIENT)
Dept: LAB | Facility: MEDICAL CENTER | Age: 84
End: 2024-08-27
Attending: NURSE PRACTITIONER
Payer: MEDICARE

## 2024-08-27 ENCOUNTER — HOSPITAL ENCOUNTER (OUTPATIENT)
Dept: LAB | Facility: MEDICAL CENTER | Age: 84
End: 2024-08-27
Attending: FAMILY MEDICINE
Payer: MEDICARE

## 2024-08-27 ENCOUNTER — APPOINTMENT (RX ONLY)
Dept: URBAN - METROPOLITAN AREA CLINIC 4 | Facility: CLINIC | Age: 84
Setting detail: DERMATOLOGY
End: 2024-08-27

## 2024-08-27 ENCOUNTER — ANTICOAGULATION MONITORING (OUTPATIENT)
Dept: VASCULAR LAB | Facility: MEDICAL CENTER | Age: 84
End: 2024-08-27
Payer: MEDICARE

## 2024-08-27 DIAGNOSIS — L82.1 OTHER SEBORRHEIC KERATOSIS: ICD-10-CM

## 2024-08-27 DIAGNOSIS — Z71.89 OTHER SPECIFIED COUNSELING: ICD-10-CM

## 2024-08-27 DIAGNOSIS — D22 MELANOCYTIC NEVI: ICD-10-CM

## 2024-08-27 DIAGNOSIS — Z79.01 CHRONIC ANTICOAGULATION: ICD-10-CM

## 2024-08-27 DIAGNOSIS — D69.2 OTHER NONTHROMBOCYTOPENIC PURPURA: ICD-10-CM

## 2024-08-27 DIAGNOSIS — D18.0 HEMANGIOMA: ICD-10-CM

## 2024-08-27 DIAGNOSIS — L81.4 OTHER MELANIN HYPERPIGMENTATION: ICD-10-CM

## 2024-08-27 DIAGNOSIS — R73.03 PRE-DIABETES: ICD-10-CM

## 2024-08-27 DIAGNOSIS — Z86.007 PERSONAL HISTORY OF IN-SITU NEOPLASM OF SKIN: ICD-10-CM

## 2024-08-27 DIAGNOSIS — I25.10 CORONARY ARTERY DISEASE INVOLVING NATIVE CORONARY ARTERY OF NATIVE HEART WITHOUT ANGINA PECTORIS: ICD-10-CM

## 2024-08-27 PROBLEM — D48.5 NEOPLASM OF UNCERTAIN BEHAVIOR OF SKIN: Status: ACTIVE | Noted: 2024-08-27

## 2024-08-27 PROBLEM — D18.01 HEMANGIOMA OF SKIN AND SUBCUTANEOUS TISSUE: Status: ACTIVE | Noted: 2024-08-27

## 2024-08-27 PROBLEM — D22.5 MELANOCYTIC NEVI OF TRUNK: Status: ACTIVE | Noted: 2024-08-27

## 2024-08-27 LAB
ALBUMIN SERPL BCP-MCNC: 3.8 G/DL (ref 3.2–4.9)
ALBUMIN/GLOB SERPL: 1.4 G/DL
ALP SERPL-CCNC: 77 U/L (ref 30–99)
ALT SERPL-CCNC: 24 U/L (ref 2–50)
ANION GAP SERPL CALC-SCNC: 10 MMOL/L (ref 7–16)
AST SERPL-CCNC: 37 U/L (ref 12–45)
BASOPHILS # BLD AUTO: 0.7 % (ref 0–1.8)
BASOPHILS # BLD: 0.07 K/UL (ref 0–0.12)
BILIRUB SERPL-MCNC: 0.4 MG/DL (ref 0.1–1.5)
BUN SERPL-MCNC: 23 MG/DL (ref 8–22)
CALCIUM ALBUM COR SERPL-MCNC: 10.2 MG/DL (ref 8.5–10.5)
CALCIUM SERPL-MCNC: 10 MG/DL (ref 8.5–10.5)
CHLORIDE SERPL-SCNC: 106 MMOL/L (ref 96–112)
CHOLEST SERPL-MCNC: 194 MG/DL (ref 100–199)
CO2 SERPL-SCNC: 30 MMOL/L (ref 20–33)
CREAT SERPL-MCNC: 0.95 MG/DL (ref 0.5–1.4)
EOSINOPHIL # BLD AUTO: 0.18 K/UL (ref 0–0.51)
EOSINOPHIL NFR BLD: 1.7 % (ref 0–6.9)
ERYTHROCYTE [DISTWIDTH] IN BLOOD BY AUTOMATED COUNT: 51.4 FL (ref 35.9–50)
GFR SERPLBLD CREATININE-BSD FMLA CKD-EPI: 59 ML/MIN/1.73 M 2
GLOBULIN SER CALC-MCNC: 2.7 G/DL (ref 1.9–3.5)
GLUCOSE SERPL-MCNC: 86 MG/DL (ref 65–99)
HCT VFR BLD AUTO: 46.2 % (ref 37–47)
HDLC SERPL-MCNC: 108 MG/DL
HGB BLD-MCNC: 14.2 G/DL (ref 12–16)
IMM GRANULOCYTES # BLD AUTO: 0.09 K/UL (ref 0–0.11)
IMM GRANULOCYTES NFR BLD AUTO: 0.9 % (ref 0–0.9)
INR PPP: 2.6 (ref 0.87–1.13)
LDLC SERPL CALC-MCNC: 67 MG/DL
LYMPHOCYTES # BLD AUTO: 3.18 K/UL (ref 1–4.8)
LYMPHOCYTES NFR BLD: 30.5 % (ref 22–41)
MCH RBC QN AUTO: 29.2 PG (ref 27–33)
MCHC RBC AUTO-ENTMCNC: 30.7 G/DL (ref 32.2–35.5)
MCV RBC AUTO: 95.1 FL (ref 81.4–97.8)
MONOCYTES # BLD AUTO: 1.06 K/UL (ref 0–0.85)
MONOCYTES NFR BLD AUTO: 10.2 % (ref 0–13.4)
NEUTROPHILS # BLD AUTO: 5.86 K/UL (ref 1.82–7.42)
NEUTROPHILS NFR BLD: 56 % (ref 44–72)
NRBC # BLD AUTO: 0 K/UL
NRBC BLD-RTO: 0 /100 WBC (ref 0–0.2)
PLATELET # BLD AUTO: 224 K/UL (ref 164–446)
PMV BLD AUTO: 10.6 FL (ref 9–12.9)
POTASSIUM SERPL-SCNC: 4.3 MMOL/L (ref 3.6–5.5)
PROT SERPL-MCNC: 6.5 G/DL (ref 6–8.2)
PROTHROMBIN TIME: 28.2 SEC (ref 12–14.6)
RBC # BLD AUTO: 4.86 M/UL (ref 4.2–5.4)
SODIUM SERPL-SCNC: 146 MMOL/L (ref 135–145)
TRIGL SERPL-MCNC: 94 MG/DL (ref 0–149)
WBC # BLD AUTO: 10.4 K/UL (ref 4.8–10.8)

## 2024-08-27 PROCEDURE — ? BIOPSY BY SHAVE METHOD

## 2024-08-27 PROCEDURE — 36415 COLL VENOUS BLD VENIPUNCTURE: CPT

## 2024-08-27 PROCEDURE — 80061 LIPID PANEL: CPT

## 2024-08-27 PROCEDURE — 11102 TANGNTL BX SKIN SINGLE LES: CPT

## 2024-08-27 PROCEDURE — 99213 OFFICE O/P EST LOW 20 MIN: CPT | Mod: 25

## 2024-08-27 PROCEDURE — ? DIAGNOSIS COMMENT

## 2024-08-27 PROCEDURE — ? COUNSELING

## 2024-08-27 PROCEDURE — 85610 PROTHROMBIN TIME: CPT

## 2024-08-27 PROCEDURE — 80053 COMPREHEN METABOLIC PANEL: CPT

## 2024-08-27 PROCEDURE — 85025 COMPLETE CBC W/AUTO DIFF WBC: CPT

## 2024-08-27 ASSESSMENT — LOCATION DETAILED DESCRIPTION DERM
LOCATION DETAILED: UPPER STERNUM
LOCATION DETAILED: LEFT PROXIMAL PRETIBIAL REGION
LOCATION DETAILED: LEFT MEDIAL SUPERIOR CHEST
LOCATION DETAILED: RIGHT MID-UPPER BACK
LOCATION DETAILED: RIGHT INFERIOR UPPER BACK
LOCATION DETAILED: RIGHT SUPERIOR MEDIAL UPPER BACK
LOCATION DETAILED: RIGHT VENTRAL PROXIMAL FOREARM
LOCATION DETAILED: LEFT VENTRAL PROXIMAL FOREARM

## 2024-08-27 ASSESSMENT — LOCATION ZONE DERM
LOCATION ZONE: ARM
LOCATION ZONE: LEG
LOCATION ZONE: TRUNK

## 2024-08-27 ASSESSMENT — LOCATION SIMPLE DESCRIPTION DERM
LOCATION SIMPLE: LEFT FOREARM
LOCATION SIMPLE: CHEST
LOCATION SIMPLE: LEFT PRETIBIAL REGION
LOCATION SIMPLE: RIGHT UPPER BACK
LOCATION SIMPLE: RIGHT FOREARM

## 2024-08-27 NOTE — PROCEDURE: DIAGNOSIS COMMENT
Comment: This lesion has been biopsied before but is larger and has returned.
Detail Level: Detailed
Render Risk Assessment In Note?: no

## 2024-08-27 NOTE — PROCEDURE: BIOPSY BY SHAVE METHOD
Detail Level: Detailed
Depth Of Biopsy: dermis
Was A Bandage Applied: Yes
Size Of Lesion In Cm: 0
Biopsy Type: H and E
Biopsy Method: Dermablade
Anesthesia Type: 1% lidocaine with epinephrine and a 1:10 solution of 8.4% sodium bicarbonate
Anesthesia Volume In Cc: 1.5
Hemostasis: Drysol
Wound Care: Petrolatum
Dressing: bandage
Destruction After The Procedure: No
Type Of Destruction Used: Curettage
Curettage Text: The wound bed was treated with curettage after the biopsy was performed.
Cryotherapy Text: The wound bed was treated with cryotherapy after the biopsy was performed.
Electrodesiccation Text: The wound bed was treated with electrodesiccation after the biopsy was performed.
Electrodesiccation And Curettage Text: The wound bed was treated with electrodesiccation and curettage after the biopsy was performed.
Silver Nitrate Text: The wound bed was treated with silver nitrate after the biopsy was performed.
Lab: [id number]
Lab Facility: 
Path Notes (To The Dermatopathologist): Two specimens from the same lesion were added to the cup
Consent: Written consent was obtained and risks were reviewed including but not limited to scarring, infection, bleeding, scabbing, incomplete removal, nerve damage and allergy to anesthesia.
Post-Care Instructions: I reviewed with the patient in detail post-care instructions. Patient is to keep the biopsy site dry overnight, and then apply bacitracin twice daily until healed. Patient may apply hydrogen peroxide soaks to remove any crusting.
Notification Instructions: Patient will be notified of biopsy results. However, patient instructed to call the office if not contacted within 2 weeks.
Billing Type: Third-Party Bill
Information: Selecting Yes will display possible errors in your note based on the variables you have selected. This validation is only offered as a suggestion for you. PLEASE NOTE THAT THE VALIDATION TEXT WILL BE REMOVED WHEN YOU FINALIZE YOUR NOTE. IF YOU WANT TO FAX A PRELIMINARY NOTE YOU WILL NEED TO TOGGLE THIS TO 'NO' IF YOU DO NOT WANT IT IN YOUR FAXED NOTE.

## 2024-08-27 NOTE — PROGRESS NOTES
Anticoagulation Summary  As of 2024      INR goal:  2.0-3.0   TTR:  81.0% (6.6 y)   INR used for dosin.60 (2024)   Warfarin maintenance plan:  0.5 mg (1 mg x 0.5) every Tue; 1 mg (1 mg x 1) all other days   Weekly warfarin total:  6.5 mg   Plan last modified:  Yaneli KruseD (2023)   Next INR check:  2024   Priority:  Routine   Target end date:  Indefinite    Indications    Paroxysmal a-fib (CMS-HCC) (Resolved) [I48.0]  Chronic anticoagulation (Resolved) [Z79.01]                 Anticoagulation Episode Summary       INR check location:  Clinic Lab    Preferred lab:  [facility]    Send INR reminders to:  --    Comments:  [facility]   [phone] (H)  [facility] labs          Anticoagulation Care Providers       Provider Role Specialty Phone number    Evelio Tejeda M.D. Referring Internal Medicine [phone]    [facility] Responsible  [phone]    Beryl Pang M.D. Responsible Family Medicine [phone]          Anticoagulation Patient Findings  Patient Findings       Positives:  Missed doses (pt took a half dose due to prednisone), Change in medications (Pt started on prednisone, taking 10 mg every third day. 30 day course with the way she is taking it. Started .)    Negatives:  Signs/symptoms of thrombosis, Signs/symptoms of bleeding, Laboratory test error suspected, Change in health, Change in alcohol use, Change in activity, Upcoming invasive procedure, Emergency department visit, Upcoming dental procedure, Extra doses, Change in diet/appetite, Hospital admission, Bruising, Other complaints            INR is therapeutic    Called and spoke to patient.    Pt is not on antiplatelet therapy.    Warfarin Plan: Continue regimen as listed above.     Next INR in 3 weeks.    Cary Ramirez, PharmD

## 2024-08-29 DIAGNOSIS — L50.9 URTICARIA: ICD-10-CM

## 2024-08-29 DIAGNOSIS — Z88.9 HISTORY OF MULTIPLE ALLERGIES: ICD-10-CM

## 2024-08-29 NOTE — TELEPHONE ENCOUNTER
Received request via: Pharmacy    Was the patient seen in the last year in this department? Yes    Does the patient have an active prescription (recently filled or refills available) for medication(s) requested? No    Ph[facility]acy Name[facility]     Does the patient have senior living Plus and need 100-day supply? (This applies to ALL medications) Patient does not have SCP

## 2024-08-30 RX ORDER — PREDNISONE 20 MG/1
TABLET ORAL
Qty: 10 TABLET | Refills: 0 | Status: SHIPPED | OUTPATIENT
Start: 2024-08-30

## 2024-09-17 ENCOUNTER — ANTICOAGULATION MONITORING (OUTPATIENT)
Dept: CARDIOLOGY | Facility: MEDICAL CENTER | Age: 84
End: 2024-09-17
Payer: MEDICARE

## 2024-09-17 ENCOUNTER — HOSPITAL ENCOUNTER (OUTPATIENT)
Dept: LAB | Facility: MEDICAL CENTER | Age: 84
End: 2024-09-17
Attending: NURSE PRACTITIONER
Payer: MEDICARE

## 2024-09-17 DIAGNOSIS — Z79.01 CHRONIC ANTICOAGULATION: ICD-10-CM

## 2024-09-17 LAB
INR PPP: 3.01 (ref 0.87–1.13)
PROTHROMBIN TIME: 31.5 SEC (ref 12–14.6)

## 2024-09-17 PROCEDURE — 85610 PROTHROMBIN TIME: CPT

## 2024-09-17 PROCEDURE — 36415 COLL VENOUS BLD VENIPUNCTURE: CPT

## 2024-09-17 NOTE — PROGRESS NOTES
Anticoagulation Summary  As of 9/17/2024      INR goal:  2.0-3.0   TTR:  81.1% (6.7 y)   INR used for dosing:  3.01 (9/17/2024)   Warfarin maintenance plan:  0.5 mg (1 mg x 0.5) every Tue, Fri; 1 mg (1 mg x 1) all other days   Weekly warfarin total:  6 mg   Plan last modified:  Keren Abdi PharmD (9/17/2024)   Next INR check:  10/8/2024   Priority:  Routine   Target end date:  Indefinite    Indications    Paroxysmal a-fib (CMS-HCC) (Resolved) [I48.0]  Chronic anticoagulation (Resolved) [Z79.01]                 Anticoagulation Episode Summary       INR check location:  Clinic Lab    Preferred lab:  [facility]    Send INR reminders to:  --    Comments:  [facility]   [phone] (H)  [facility] labs          Anticoagulation Care Providers       Provider Role Specialty Phone number    Evelio Tejeda M.D. Referring Internal Medicine [phone]    [facility] Anticoagulation Services Responsible  [phone]    Beryl Pang M.D. Responsible Family Medicine [phone]          Anticoagulation Patient Findings  Patient Findings       Positives:  Change in medications (taking prednisone the past month for COPD and will be on it for 1 more week; but might continue therapy pending PCP appt)    Negatives:  Signs/symptoms of thrombosis, Signs/symptoms of bleeding, Laboratory test error suspected, Change in health, Change in alcohol use, Change in activity, Upcoming invasive procedure, Emergency department visit, Upcoming dental procedure, Missed doses, Extra doses, Change in diet/appetite, Hospital admission, Bruising, Other complaints            INR is slightly supratherapeutic  Reason(s) for out of range INR today: Drug interaction with prednisone       Called and spoke to patient.    Pt is not on antiplatelet therapy.    Warfarin Plan: Decrease to 0.5mg Tues/Fri, 1mg AOD  given pt may likely continue on prednisone    Next INR in 3 week(s).    Keren Abdi, PharmD

## 2024-09-19 DIAGNOSIS — J43.9 PULMONARY EMPHYSEMA, UNSPECIFIED EMPHYSEMA TYPE (HCC): ICD-10-CM

## 2024-09-19 NOTE — TELEPHONE ENCOUNTER
Requested Renewals     Name from pharmacy: ALBUTEROL HFA 90 MCG INHALER         Will file in chart as: albuterol 108 (90 Base) MCG/ACT Aero Soln inhalation aerosol    Sig: INHALE ONE TO TWO PUFFS BY MOUTH EVERY 4 HOURS AS NEEDED FOR SHORTNESS OF BREATH    Original sig: INHALE ONE TO TWO PUFFS BY MOUTH EVERY 4  HOURS AS NEEDED FOR SHORTNESS OF BREATH    Disp: 8.5 g    Refills: Not specified    Start: 9/19/2024    Class: Normal    Non-formulary For: Pulmonary emphysema, unspecified emphysema type (HCC)    Last ordered: 4 months ago (5/3/2024) by Beryl Pang M.D.    Last refill: 8/21/2024    Rx #: [id number]       To be filled at: [facility] [id number] - [address]

## 2024-09-20 RX ORDER — ALBUTEROL SULFATE 90 UG/1
INHALANT RESPIRATORY (INHALATION)
Qty: 8.5 G | Refills: 3 | Status: SHIPPED | OUTPATIENT
Start: 2024-09-20

## 2024-09-24 ENCOUNTER — OFFICE VISIT (OUTPATIENT)
Dept: MEDICAL GROUP | Facility: CLINIC | Age: 84
End: 2024-09-24
Payer: MEDICARE

## 2024-09-24 VITALS
BODY MASS INDEX: 15.74 KG/M2 | DIASTOLIC BLOOD PRESSURE: 58 MMHG | HEART RATE: 67 BPM | WEIGHT: 87.44 LBS | SYSTOLIC BLOOD PRESSURE: 90 MMHG | RESPIRATION RATE: 14 BRPM | OXYGEN SATURATION: 90 %

## 2024-09-24 DIAGNOSIS — I48.0 PAROXYSMAL ATRIAL FIBRILLATION (HCC): ICD-10-CM

## 2024-09-24 DIAGNOSIS — J43.2 CENTRILOBULAR EMPHYSEMA (HCC): ICD-10-CM

## 2024-09-24 DIAGNOSIS — R73.03 PRE-DIABETES: ICD-10-CM

## 2024-09-24 DIAGNOSIS — Z87.09 HISTORY OF COPD: ICD-10-CM

## 2024-09-24 DIAGNOSIS — Z72.0 TOBACCO USE: ICD-10-CM
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
## 2024-09-24 PROBLEM — R20.2 PARESTHESIA OF LEFT UPPER AND LOWER EXTREMITY: Status: RESOLVED | Noted: 2023-12-13 | Resolved: 2024-09-24

## 2024-09-24 PROBLEM — Z02.4 ENCOUNTER FOR EXAMINATION FOR DRIVING LICENSE: Status: RESOLVED | Noted: 2024-01-16 | Resolved: 2024-09-24

## 2024-09-24 PROCEDURE — 99214 OFFICE O/P EST MOD 30 MIN: CPT | Performed by: FAMILY MEDICINE

## 2024-09-24 RX ORDER — PREDNISONE 10 MG/1
10 TABLET ORAL
Qty: 30 TABLET | Refills: 3 | Status: SHIPPED | OUTPATIENT
Start: 2024-09-24

## 2024-09-24 ASSESSMENT — FIBROSIS 4 INDEX: FIB4 SCORE: 2.87

## 2024-09-24 NOTE — ASSESSMENT & PLAN NOTE
Managed by cardiology. Rate controlled on chronic anticoagulation. Awaiting watchman procedure to be done.

## 2024-09-24 NOTE — ASSESSMENT & PLAN NOTE
Stable. She has been able to come off her inhalers. She has been using prednisone 10mg every 3 days or so which she has found overall helpful with breathing and appetite. Needing albuterol only twice a week now.    We discussed use of prednisone. She agreed to titrate this off slowly, by breaking 10mg pill in half and taking this every 3 days as she has been doing. At this point, given normal labs, normal glucose, and all else being pretty stable, she can maintain the benefit she finds with this low dose regimen for now with a view to omit in the future. Despite her multiple health problems she is surprisingly robust and active, thus I consider it reasonable to continue what has been working for her.

## 2024-09-24 NOTE — PROGRESS NOTES
CC:Diagnoses of Tobacco use, History of COPD, Paroxysmal atrial fibrillation (HCC), Pre-diabetes, and Centrilobular emphysema (HCC) were pertinent to this visit.      HISTORY OF PRESENT ILLNESS: Patient is a 84 y.o. female established patient who presents today to review labs done in Aug 2024.        Paroxysmal atrial fibrillation (HCC)  Managed by cardiology. Rate controlled on chronic anticoagulation. Awaiting watchman procedure to be done.     Pre-diabetes  Most recent labs with normal fasting glucose.     Centrilobular emphysema (HCC)  Stable. She has been able to come off her inhalers. She has been using prednisone 10mg every 3 days or so which she has found overall helpful with breathing and appetite. Needing albuterol only twice a week now.    We discussed use of prednisone. She agreed to titrate this off slowly, by breaking 10mg pill in half and taking this every 3 days as she has been doing. At this point, given normal labs, normal glucose, and all else being pretty stable, she can maintain the benefit she finds with this low dose regimen for now with a view to omit in the future. Despite her multiple health problems she is surprisingly robust and active, thus I consider it reasonable to continue what has been working for her.     Patient Active Problem List    Diagnosis Date Noted    Centrilobular emphysema (HCC) 09/24/2024    S/P mitral valve clip implantation 12/06/2023    Ureterolithiasis 11/15/2023    Paroxysmal atrial fibrillation (HCC) 11/14/2023    Pulmonary hypertension (HCC) 11/10/2023    Severe protein-calorie malnutrition (HCC) 10/19/2023    Bladder polyp 05/31/2023    Personal history of anaphylaxis 12/18/2020    Coronary artery disease involving native coronary artery of native heart without angina pectoris 03/12/2019    History of COPD 12/05/2017    Circulating anticoagulants (HCC) 12/04/2017    Osteoporosis, postmenopausal 08/24/2017    Depression with anxiety 02/15/2017    Vitamin D  deficiency 07/26/2016    Insomnia 07/26/2016    Peripheral arterial disease (HCC) 03/01/2016    Cigarette nicotine dependence with nicotine-induced disorder 03/01/2016    Gastroesophageal reflux disease without esophagitis 01/20/2016    Pre-diabetes 01/20/2016    Panic attacks 01/20/2016        Allergies:Penicillins; Codeine; Iodine; Bee venom; Chantix [varenicline]; Ciprofloxacin; Diphtheria,pertussis,tetanus; Flagyl [metronidazole]; Honey bee venom; Latex; Lipitor [atorvastatin calcium]; Other environmental; Shellfish allergy; Tetanus antitoxin; and Vecuronium & derivatives    Current Outpatient Medications   Medication Sig Dispense Refill    predniSONE (DELTASONE) 10 MG Tab Take 1 Tablet by mouth every 3 days. 30 Tablet 3    albuterol 108 (90 Base) MCG/ACT Aero Soln inhalation aerosol INHALE ONE TO TWO PUFFS BY MOUTH EVERY 4  HOURS AS NEEDED FOR SHORTNESS OF BREATH 8.5 g 3    ALPRAZolam (XANAX) 0.25 MG Tab Take 1 Tablet by mouth 2 times a day as needed for Anxiety for up to 180 days. 60 Tablet 5    fluticasone (FLONASE) 50 MCG/ACT nasal spray SPRAY 1 SPRAY INTO AFFECTED NOSTRIL (S) DAILY 16 mL 3    lovastatin (MEVACOR) 40 MG tablet TAKE 1 TABLET AT BEDTIME 90 Tablet 3    sotalol (BETAPACE) 80 MG Tab TAKE ONE-HALF TABLET BY MOUTH 2 TIMES DAILY 90 Tablet 3    furosemide (LASIX) 20 MG Tab Take 1 Tablet by mouth every day. 90 Tablet 3    Calcium-Magnesium-Vitamin D (CALCIUM 1200+D3 PO) Take 1 Tablet by mouth every day.      D-Mannose Powder Take 1 Dose by mouth every day. Mix one scoop with 4 oz of water      Multiple Vitamins-Minerals (ZINC PO) Take 1 Tablet by mouth every day.      ipratropium-albuterol (DUONEB) 0.5-2.5 (3) MG/3ML nebulizer solution Take 3 mL by nebulization every four hours as needed for Shortness of Breath. 120 mL 11    warfarin (COUMADIN) 1 MG Tab TAKE ONE TO TWO TABLETS DAILY OR AS DIRECTED BY ANTICOAGULATION CLINIC 180 Tablet 1    EPINEPHrine (EPIPEN) 0.3 MG/0.3ML Solution Auto-injector  solution for injection epinephrine 0.3 mg/0.3 mL injection, auto-injector      Probiotic Product (PROBIOTIC PO) Take 1 Capsule by mouth every day.      ascorbic acid (ASCORBIC ACID) 500 MG Tab Take 2 Tablets by mouth every day.      vitamin D (CHOLECALCIFEROL) 1000 UNIT Tab Take 3,000 Units by mouth every morning. 3 tablets = 3000 units       No current facility-administered medications for this visit.       Social History     Tobacco Use    Smoking status: Some Days     Current packs/day: 0.01     Average packs/day: (0.7 ttl pk-yrs)     Types: Cigarettes     Start date: 3/20/1958    Smokeless tobacco: Never    Tobacco comments:     1 cigarette per day, not interested in cessation information   Vaping Use    Vaping status: Never Used   Substance Use Topics    Alcohol use: No     Alcohol/week: 0.0 oz    Drug use: Never     Social History     Social History Narrative    .     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 [employer]       Family History   Problem Relation Age of Onset    Hypertension Mother     Hyperlipidemia Mother     Cancer Maternal Grandmother         tongue    Cancer Maternal Uncle         x 3, pancreas    Cancer Maternal Grandfather         unknown    Cancer Paternal Grandmother         unknown    Cancer Paternal Grandfather         unknown    Diabetes Maternal Uncle     Heart Disease Maternal Uncle     Hypertension Sister     Hyperlipidemia Sister     Heart Disease Sister     Alcohol/Drug Sister     Thyroid Sister     Psychiatric Illness Neg Hx     Stroke Neg Hx        Exam:    BP 90/58 (BP Location: Left arm, Patient Position: Sitting, BP Cuff Size: Adult)   Pulse 67   Resp 14   Wt 39.7 kg (87 lb 7 oz)   SpO2 90%  Body mass index is 15.74 kg/m².    General:  Thin, immaculately groomed female in NAD  HENT: Atraumatic, normocephalic  EYES: Extraocular movements intact, pupils equal and reactive to light  NECK: Supple, FROM  CHEST: No deformities, Equal chest expansion  RESP: Unlabored, no stridor or  audible wheeze  ABD: Non-Distended  Extremities: No Clubbing, Cyanosis, or Edema; large areas of ecchymoses noted to shins and forearms, skin unbroken.   Skin: Warm/dry, without rashes  Neuro: A/O x 4, CN 2-12 Grossly intact, Motor/sensory grossly intact  Psych: Normal behavior, normal affect      LABS: Results reviewed and discussed with the patient, questions answered.        Return in about 6 months (around 3/24/2025).    My total time spent caring for the patient on the day of the encounter was 30 minutes.   This does not include time spent on separately billable procedures/tests.

## 2024-10-02 ENCOUNTER — APPOINTMENT (RX ONLY)
Dept: URBAN - METROPOLITAN AREA CLINIC 4 | Facility: CLINIC | Age: 84
Setting detail: DERMATOLOGY
End: 2024-10-02

## 2024-10-02 DIAGNOSIS — L57.0 ACTINIC KERATOSIS: ICD-10-CM

## 2024-10-02 DIAGNOSIS — Z71.89 OTHER SPECIFIED COUNSELING: ICD-10-CM

## 2024-10-02 PROCEDURE — ? COUNSELING

## 2024-10-02 PROCEDURE — ? MEDICATION COUNSELING

## 2024-10-02 PROCEDURE — ? DIAGNOSIS COMMENT

## 2024-10-02 PROCEDURE — 99213 OFFICE O/P EST LOW 20 MIN: CPT

## 2024-10-02 ASSESSMENT — LOCATION SIMPLE DESCRIPTION DERM
LOCATION SIMPLE: CHEST
LOCATION SIMPLE: RIGHT THIGH

## 2024-10-02 ASSESSMENT — LOCATION ZONE DERM
LOCATION ZONE: TRUNK
LOCATION ZONE: LEG

## 2024-10-02 ASSESSMENT — LOCATION DETAILED DESCRIPTION DERM
LOCATION DETAILED: RIGHT ANTERIOR DISTAL THIGH
LOCATION DETAILED: UPPER STERNUM

## 2024-10-02 NOTE — PROCEDURE: DIAGNOSIS COMMENT
Comment: This lesion has been biopsied before but is larger and has returned. She will used her previous presence of Efudex cream for two weeks twice a day.
Detail Level: Detailed
Render Risk Assessment In Note?: no

## 2024-10-02 NOTE — PROCEDURE: MEDICATION COUNSELING
Soolantra Pregnancy And Lactation Text: This medication is Pregnancy Category C. This medication is considered safe during breast feeding.
Finasteride Male Counseling: Finasteride Counseling:  I discussed with the patient the risks of use of finasteride including but not limited to decreased libido, decreased ejaculate volume, gynecomastia, and depression. Women should not handle medication.  All of the patient's questions and concerns were addressed.
Cibinqo Counseling: I discussed with the patient the risks of Cibinqo therapy including but not limited to common cold, nausea, headache, cold sores, increased blood CPK levels, dizziness, UTIs, fatigue, acne, and vomitting. Live vaccines should be avoided.  This medication has been linked to serious infections; higher rate of mortality; malignancy and lymphoproliferative disorders; major adverse cardiovascular events; thrombosis; thrombocytopenia and lymphopenia; lipid elevations; and retinal detachment.
Xellázaroz Pregnancy And Lactation Text: This medication is Pregnancy Category D and is not considered safe during pregnancy.  The risk during breast feeding is also uncertain.
Topical Metronidazole Counseling: Metronidazole is a topical antibiotic medication. You may experience burning, stinging, redness, or allergic reactions.  Please call our office if you develop any problems from using this medication.
Terbinafine Counseling: Patient counseling regarding adverse effects of terbinafine including but not limited to headache, diarrhea, rash, upset stomach, liver function test abnormalities, itching, taste/smell disturbance, nausea, abdominal pain, and flatulence.  There is a rare possibility of liver failure that can occur when taking terbinafine.  The patient understands that a baseline LFT and kidney function test may be required. The patient verbalized understanding of the proper use and possible adverse effects of terbinafine.  All of the patient's questions and concerns were addressed.
Olanzapine Counseling- I discussed with the patient the common side effects of olanzapine including but are not limited to: lack of energy, dry mouth, increased appetite, sleepiness, tremor, constipation, dizziness, changes in behavior, or restlessness.  Explained that teenagers are more likely to experience headaches, abdominal pain, pain in the arms or legs, tiredness, and sleepiness.  Serious side effects include but are not limited: increased risk of death in elderly patients who are confused, have memory loss, or dementia-related psychosis; hyperglycemia; increased cholesterol and triglycerides; and weight gain.
Winlevi Pregnancy And Lactation Text: This medication is considered safe during pregnancy and breastfeeding.
Detail Level: Simple
Enbrel Pregnancy And Lactation Text: This medication is Pregnancy Category B and is considered safe during pregnancy. It is unknown if this medication is excreted in breast milk.
Cephalexin Pregnancy And Lactation Text: This medication is Pregnancy Category B and considered safe during pregnancy.  It is also excreted in breast milk but can be used safely for shorter doses.
Erivedge Counseling- I discussed with the patient the risks of Erivedge including but not limited to nausea, vomiting, diarrhea, constipation, weight loss, changes in the sense of taste, decreased appetite, muscle spasms, and hair loss.  The patient verbalized understanding of the proper use and possible adverse effects of Erivedge.  All of the patient's questions and concerns were addressed.
Bimzelx Counseling:  I discussed with the patient the risks of Bimzelx including but not limited to depression, immunosuppression, allergic reactions and infections.  The patient understands that monitoring is required including a PPD at baseline and must alert us or the primary physician if symptoms of infection or other concerning signs are noted.
Humira Counseling:  I discussed with the patient the risks of adalimumab including but not limited to myelosuppression, immunosuppression, autoimmune hepatitis, demyelinating diseases, lymphoma, and serious infections.  The patient understands that monitoring is required including a PPD at baseline and must alert us or the primary physician if symptoms of infection or other concerning signs are noted.
Clindamycin Counseling: I counseled the patient regarding use of clindamycin as an antibiotic for prophylactic and/or therapeutic purposes. Clindamycin is active against numerous classes of bacteria, including skin bacteria. Side effects may include nausea, diarrhea, gastrointestinal upset, rash, hives, yeast infections, and in rare cases, colitis.
VTAMA Counseling: I discussed with the patient that VTAMA is not for use in the eyes, mouth or mouth. They should call the office if they develop any signs of allergic reactions to VTAMA. The patient verbalized understanding of the proper use and possible adverse effects of VTAMA.  All of the patient's questions and concerns were addressed.
Olanzapine Pregnancy And Lactation Text: This medication is pregnancy category C.   There are no adequate and well controlled trials with olanzapine in pregnant females.  Olanzapine should be used during pregnancy only if the potential benefit justifies the potential risk to the fetus.   In a study in lactating healthy women, olanzapine was excreted in breast milk.  It is recommended that women taking olanzapine should not breast feed.
Erivedge Pregnancy And Lactation Text: This medication is Pregnancy Category X and is absolutely contraindicated during pregnancy. It is unknown if it is excreted in breast milk.
Rituxan Pregnancy And Lactation Text: This medication is Pregnancy Category C and it isn't know if it is safe during pregnancy. It is unknown if this medication is excreted in breast milk but similar antibodies are known to be excreted.
Calcipotriene Counseling:  I discussed with the patient the risks of calcipotriene including but not limited to erythema, scaling, itching, and irritation.
Arava Counseling:  Patient counseled regarding adverse effects of Arava including but not limited to nausea, vomiting, abnormalities in liver function tests. Patients may develop mouth sores, rash, diarrhea, and abnormalities in blood counts. The patient understands that monitoring is required including LFTs and blood counts.  There is a rare possibility of scarring of the liver and lung problems that can occur when taking methotrexate. Persistent nausea, loss of appetite, pale stools, dark urine, cough, and shortness of breath should be reported immediately. Patient advised to discontinue Arava treatment and consult with a physician prior to attempting conception. The patient will have to undergo a treatment to eliminate Arava from the body prior to conception.
SSKI Counseling:  I discussed with the patient the risks of SSKI including but not limited to thyroid abnormalities, metallic taste, GI upset, fever, headache, acne, arthralgias, paraesthesias, lymphadenopathy, easy bleeding, arrhythmias, and allergic reaction.
Protopic Counseling: Patient may experience a mild burning sensation during topical application. Protopic is not approved in children less than 2 years of age. There have been case reports of hematologic and skin malignancies in patients using topical calcineurin inhibitors although causality is questionable.
Eucrisa Counseling: Patient may experience a mild burning sensation during topical application. Eucrisa is not approved in children less than 3 months of age.
Minocycline Pregnancy And Lactation Text: This medication is Pregnancy Category D and not consider safe during pregnancy. It is also excreted in breast milk.
Cyclophosphamide Counseling:  I discussed with the patient the risks of cyclophosphamide including but not limited to hair loss, hormonal abnormalities, decreased fertility, abdominal pain, diarrhea, nausea and vomiting, bone marrow suppression and infection. The patient understands that monitoring is required while taking this medication.
Stelara Counseling:  I discussed with the patient the risks of ustekinumab including but not limited to immunosuppression, malignancy, posterior leukoencephalopathy syndrome, and serious infections.  The patient understands that monitoring is required including a PPD at baseline and must alert us or the primary physician if symptoms of infection or other concerning signs are noted.
Hydroxyzine Pregnancy And Lactation Text: This medication is not safe during pregnancy and should not be taken. It is also excreted in breast milk and breast feeding isn't recommended.
Gabapentin Pregnancy And Lactation Text: This medication is Pregnancy Category C and isn't considered safe during pregnancy. It is excreted in breast milk.
Sotyktu Pregnancy And Lactation Text: There is insufficient data to evaluate whether or not Sotyktu is safe to use during pregnancy.? ?It is not known if Sotyktu passes into breast milk and whether or not it is safe to use when breastfeeding.??
Nsaids Counseling: NSAID Counseling: I discussed with the patient that NSAIDs should be taken with food. Prolonged use of NSAIDs can result in the development of stomach ulcers.  Patient advised to stop taking NSAIDs if abdominal pain occurs.  The patient verbalized understanding of the proper use and possible adverse effects of NSAIDs.  All of the patient's questions and concerns were addressed.
Glycopyrrolate Counseling:  I discussed with the patient the risks of glycopyrrolate including but not limited to skin rash, drowsiness, dry mouth, difficulty urinating, and blurred vision.
Topical Retinoid counseling:  Patient advised to apply a pea-sized amount only at bedtime and wait 30 minutes after washing their face before applying.  If too drying, patient may add a non-comedogenic moisturizer. The patient verbalized understanding of the proper use and possible adverse effects of retinoids.  All of the patient's questions and concerns were addressed.
Cibinqo Pregnancy And Lactation Text: It is unknown if this medication will adversely affect pregnancy or breast feeding.  You should not take this medication if you are currently pregnant or planning a pregnancy or while breastfeeding.
Xeljanz Counseling: I discussed with the patient the risks of Xeljanz therapy including increased risk of infection, liver issues, headache, diarrhea, or cold symptoms. Live vaccines should be avoided. They were instructed to call if they have any problems.
Bimzelx Pregnancy And Lactation Text: This medication crosses the placenta and the safety is uncertain during pregnancy. It is unknown if this medication is present in breast milk.
Fluconazole Counseling:  Patient counseled regarding adverse effects of fluconazole including but not limited to headache, diarrhea, nausea, upset stomach, liver function test abnormalities, taste disturbance, and stomach pain.  There is a rare possibility of liver failure that can occur when taking fluconazole.  The patient understands that monitoring of LFTs and kidney function test may be required, especially at baseline. The patient verbalized understanding of the proper use and possible adverse effects of fluconazole.  All of the patient's questions and concerns were addressed.
Include Pregnancy/Lactation Warning?: No
Topical Metronidazole Pregnancy And Lactation Text: This medication is Pregnancy Category B and considered safe during pregnancy.  It is also considered safe to use while breastfeeding.
Birth Control Pills Counseling: Birth Control Pill Counseling: I discussed with the patient the potential side effects of OCPs including but not limited to increased risk of stroke, heart attack, thrombophlebitis, deep venous thrombosis, hepatic adenomas, breast changes, GI upset, headaches, and depression.  The patient verbalized understanding of the proper use and possible adverse effects of OCPs. All of the patient's questions and concerns were addressed.
Cimzia Counseling:  I discussed with the patient the risks of Cimzia including but not limited to immunosuppression, allergic reactions and infections.  The patient understands that monitoring is required including a PPD at baseline and must alert us or the primary physician if symptoms of infection or other concerning signs are noted.
Clindamycin Pregnancy And Lactation Text: This medication can be used in pregnancy if certain situations. Clindamycin is also present in breast milk.
Oral Minoxidil Counseling- I discussed with the patient the risks of oral minoxidil including but not limited to shortness of breath, swelling of the feet or ankles, dizziness, lightheadedness, unwanted hair growth and allergic reaction.  The patient verbalized understanding of the proper use and possible adverse effects of oral minoxidil.  All of the patient's questions and concerns were addressed.
Finasteride Female Counseling: Finasteride Counseling:  I discussed with the patient the risks of use of finasteride including but not limited to decreased libido and sexual dysfunction. Explained the teratogenic nature of the medication and stressed the importance of not getting pregnant during treatment. All of the patient's questions and concerns were addressed.
Quinolones Counseling:  I discussed with the patient the risks of fluoroquinolones including but not limited to GI upset, allergic reaction, drug rash, diarrhea, dizziness, photosensitivity, yeast infections, liver function test abnormalities, tendonitis/tendon rupture.
Vtama Pregnancy And Lactation Text: It is unknown if this medication can cause problems during pregnancy and breastfeeding.
Birth Control Pills Pregnancy And Lactation Text: This medication should be avoided if pregnant and for the first 30 days post-partum.
Minoxidil Counseling: Minoxidil is a topical medication which can increase blood flow where it is applied. It is uncertain how this medication increases hair growth. Side effects are uncommon and include stinging and allergic reactions.
Libtayo Counseling- I discussed with the patient the risks of Libtayo including but not limited to nausea, vomiting, diarrhea, and bone or muscle pain.  The patient verbalized understanding of the proper use and possible adverse effects of Libtayo.  All of the patient's questions and concerns were addressed.
Cyclophosphamide Pregnancy And Lactation Text: This medication is Pregnancy Category D and it isn't considered safe during pregnancy. This medication is excreted in breast milk.
Aklief counseling:  Patient advised to apply a pea-sized amount only at bedtime and wait 30 minutes after washing their face before applying.  If too drying, patient may add a non-comedogenic moisturizer.  The most commonly reported side effects including irritation, redness, scaling, dryness, stinging, burning, itching, and increased risk of sunburn.  The patient verbalized understanding of the proper use and possible adverse effects of retinoids.  All of the patient's questions and concerns were addressed.
Sski Pregnancy And Lactation Text: This medication is Pregnancy Category D and isn't considered safe during pregnancy. It is excreted in breast milk.
Protopic Pregnancy And Lactation Text: This medication is Pregnancy Category C. It is unknown if this medication is excreted in breast milk when applied topically.
Siliq Counseling:  I discussed with the patient the risks of Siliq including but not limited to new or worsening depression, suicidal thoughts and behavior, immunosuppression, malignancy, posterior leukoencephalopathy syndrome, and serious infections.  The patient understands that monitoring is required including a PPD at baseline and must alert us or the primary physician if symptoms of infection or other concerning signs are noted. There is also a special program designed to monitor depression which is required with Siliq.
Calcipotriene Pregnancy And Lactation Text: The use of this medication during pregnancy or lactation is not recommended as there is insufficient data.
Eucrisa Pregnancy And Lactation Text: This medication has not been assigned a Pregnancy Risk Category but animal studies failed to show danger with the topical medication. It is unknown if the medication is excreted in breast milk.
Acitretin Counseling:  I discussed with the patient the risks of acitretin including but not limited to hair loss, dry lips/skin/eyes, liver damage, hyperlipidemia, depression/suicidal ideation, photosensitivity.  Serious rare side effects can include but are not limited to pancreatitis, pseudotumor cerebri, bony changes, clot formation/stroke/heart attack.  Patient understands that alcohol is contraindicated since it can result in liver toxicity and significantly prolong the elimination of the drug by many years.
Siliq Pregnancy And Lactation Text: The risk during pregnancy and breastfeeding is uncertain with this medication.
Qbrexza Counseling:  I discussed with the patient the risks of Qbrexza including but not limited to headache, mydriasis, blurred vision, dry eyes, nasal dryness, dry mouth, dry throat, dry skin, urinary hesitation, and constipation.  Local skin reactions including erythema, burning, stinging, and itching can also occur.
Clofazimine Counseling:  I discussed with the patient the risks of clofazimine including but not limited to skin and eye pigmentation, liver damage, nausea/vomiting, gastrointestinal bleeding and allergy.
Thalidomide Counseling: I discussed with the patient the risks of thalidomide including but not limited to birth defects, anxiety, weakness, chest pain, dizziness, cough and severe allergy.
Taltz Counseling: I discussed with the patient the risks of ixekizumab including but not limited to immunosuppression, serious infections, worsening of inflammatory bowel disease and drug reactions.  The patient understands that monitoring is required including a PPD at baseline and must alert us or the primary physician if symptoms of infection or other concerning signs are noted.
Glycopyrrolate Pregnancy And Lactation Text: This medication is Pregnancy Category B and is considered safe during pregnancy. It is unknown if it is excreted breast milk.
Topical Retinoid Pregnancy And Lactation Text: This medication is Pregnancy Category C. It is unknown if this medication is excreted in breast milk.
Litfulo Counseling: I discussed with the patient the risks of Litfulo therapy including but not limited to upper respiratory tract infections, shingles, cold sores, and nausea. Live vaccines should be avoided.  This medication has been linked to serious infections; higher rate of mortality; malignancy and lymphoproliferative disorders; major adverse cardiovascular events; thrombosis; gastrointestinal perforations; neutropenia; lymphopenia; anemia; liver enzyme elevations; and lipid elevations.
Topical Steroids Counseling: I discussed with the patient that prolonged use of topical steroids can result in the increased appearance of superficial blood vessels (telangiectasias), lightening (hypopigmentation) and thinning of the skin (atrophy).  Patient understands to avoid using high potency steroids in skin folds, the groin or the face.  The patient verbalized understanding of the proper use and possible adverse effects of topical steroids.  All of the patient's questions and concerns were addressed.
Fluconazole Pregnancy And Lactation Text: This medication is Pregnancy Category C and it isn't know if it is safe during pregnancy. It is also excreted in breast milk.
Topical Steroids Applications Pregnancy And Lactation Text: Most topical steroids are considered safe to use during pregnancy and lactation.  Any topical steroid applied to the breast or nipple should be washed off before breastfeeding.
Spironolactone Counseling: Patient advised regarding risks of diarrhea, abdominal pain, hyperkalemia, birth defects (for female patients), liver toxicity and renal toxicity. The patient may need blood work to monitor liver and kidney function and potassium levels while on therapy. The patient verbalized understanding of the proper use and possible adverse effects of spironolactone.  All of the patient's questions and concerns were addressed.
Griseofulvin Counseling:  I discussed with the patient the risks of griseofulvin including but not limited to photosensitivity, cytopenia, liver damage, nausea/vomiting and severe allergy.  The patient understands that this medication is best absorbed when taken with a fatty meal (e.g., ice cream or french fries).
Zoryve Counseling:  I discussed with the patient that Zoryve is not for use in the eyes, mouth or vagina. The most commonly reported side effects include diarrhea, headache, insomnia, application site pain, upper respiratory tract infections, and urinary tract infections.  All of the patient's questions and concerns were addressed.
Finasteride Pregnancy And Lactation Text: This medication is absolutely contraindicated during pregnancy. It is unknown if it is excreted in breast milk.
Oral Minoxidil Pregnancy And Lactation Text: This medication should only be used when clearly needed if you are pregnant, attempting to become pregnant or breast feeding.
Cimzia Pregnancy And Lactation Text: This medication crosses the placenta but can be considered safe in certain situations. Cimzia may be excreted in breast milk.
Aklief Pregnancy And Lactation Text: It is unknown if this medication is safe to use during pregnancy.  It is unknown if this medication is excreted in breast milk.  Breastfeeding women should use the topical cream on the smallest area of the skin for the shortest time needed while breastfeeding.  Do not apply to nipple and areola.
Acitretin Pregnancy And Lactation Text: This medication is Pregnancy Category X and should not be given to women who are pregnant or may become pregnant in the future. This medication is excreted in breast milk.
Doxycycline Counseling:  Patient counseled regarding possible photosensitivity and increased risk for sunburn.  Patient instructed to avoid sunlight, if possible.  When exposed to sunlight, patients should wear protective clothing, sunglasses, and sunscreen.  The patient was instructed to call the office immediately if the following severe adverse effects occur:  hearing changes, easy bruising/bleeding, severe headache, or vision changes.  The patient verbalized understanding of the proper use and possible adverse effects of doxycycline.  All of the patient's questions and concerns were addressed.
Hyrimoz Counseling:  I discussed with the patient the risks of adalimumab including but not limited to myelosuppression, immunosuppression, autoimmune hepatitis, demyelinating diseases, lymphoma, and serious infections.  The patient understands that monitoring is required including a PPD at baseline and must alert us or the primary physician if symptoms of infection or other concerning signs are noted.
Cyclosporine Counseling:  I discussed with the patient the risks of cyclosporine including but not limited to hypertension, gingival hyperplasia,myelosuppression, immunosuppression, liver damage, kidney damage, neurotoxicity, lymphoma, and serious infections. The patient understands that monitoring is required including baseline blood pressure, CBC, CMP, lipid panel and uric acid, and then 1-2 times monthly CMP and blood pressure.
Libtayo Pregnancy And Lactation Text: This medication is contraindicated in pregnancy and when breast feeding.
Albendazole Counseling:  I discussed with the patient the risks of albendazole including but not limited to cytopenia, kidney damage, nausea/vomiting and severe allergy.  The patient understands that this medication is being used in an off-label manner.
Cantharidin Counseling:  I discussed with the patient the risks of Cantharidin including but not limited to pain, redness, burning, itching, and blistering.
Hydroquinone Counseling:  Patient advised that medication may result in skin irritation, lightening (hypopigmentation), dryness, and burning.  In the event of skin irritation, the patient was advised to reduce the amount of the drug applied or use it less frequently.  Rarely, spots that are treated with hydroquinone can become darker (pseudoochronosis).  Should this occur, patient instructed to stop medication and call the office. The patient verbalized understanding of the proper use and possible adverse effects of hydroquinone.  All of the patient's questions and concerns were addressed.
Azelaic Acid Counseling: Patient counseled that medicine may cause skin irritation and to avoid applying near the eyes.  In the event of skin irritation, the patient was advised to reduce the amount of the drug applied or use it less frequently.   The patient verbalized understanding of the proper use and possible adverse effects of azelaic acid.  All of the patient's questions and concerns were addressed.
Rifampin Counseling: I discussed with the patient the risks of rifampin including but not limited to liver damage, kidney damage, red-orange body fluids, nausea/vomiting and severe allergy.
Bexarotene Counseling:  I discussed with the patient the risks of bexarotene including but not limited to hair loss, dry lips/skin/eyes, liver abnormalities, hyperlipidemia, pancreatitis, depression/suicidal ideation, photosensitivity, drug rash/allergic reactions, hypothyroidism, anemia, leukopenia, infection, cataracts, and teratogenicity.  Patient understands that they will need regular blood tests to check lipid profile, liver function tests, white blood cell count, thyroid function tests and pregnancy test if applicable.
Albendazole Pregnancy And Lactation Text: This medication is Pregnancy Category C and it isn't known if it is safe during pregnancy. It is also excreted in breast milk.
Simponi Counseling:  I discussed with the patient the risks of golimumab including but not limited to myelosuppression, immunosuppression, autoimmune hepatitis, demyelinating diseases, lymphoma, and serious infections.  The patient understands that monitoring is required including a PPD at baseline and must alert us or the primary physician if symptoms of infection or other concerning signs are noted.
Cyclosporine Pregnancy And Lactation Text: This medication is Pregnancy Category C and it isn't know if it is safe during pregnancy. This medication is excreted in breast milk.
Qbrexza Pregnancy And Lactation Text: There is no available data on Qbrexza use in pregnant women.  There is no available data on Qbrexza use in lactation.
Litfulo Pregnancy And Lactation Text: Based on animal studies, Lifulo may cause embryo-fetal harm when administered to pregnant women.  The medication should not be used in pregnancy.  Breastfeeding is not recommended during treatment.
Tazorac Counseling:  Patient advised that medication is irritating and drying.  Patient may need to apply sparingly and wash off after an hour before eventually leaving it on overnight.  The patient verbalized understanding of the proper use and possible adverse effects of tazorac.  All of the patient's questions and concerns were addressed.
Hydroxychloroquine Counseling:  I discussed with the patient that a baseline ophthalmologic exam is needed at the start of therapy and every year thereafter while on therapy. A CBC may also be warranted for monitoring.  The side effects of this medication were discussed with the patient, including but not limited to agranulocytosis, aplastic anemia, seizures, rashes, retinopathy, and liver toxicity. Patient instructed to call the office should any adverse effect occur.  The patient verbalized understanding of the proper use and possible adverse effects of Plaquenil.  All the patient's questions and concerns were addressed.
Terbinafine Pregnancy And Lactation Text: This medication is Pregnancy Category B and is considered safe during pregnancy. It is also excreted in breast milk and breast feeding isn't recommended.
Cantharidin Pregnancy And Lactation Text: This medication has not been proven safe during pregnancy. It is unknown if this medication is excreted in breast milk.
Tremfya Counseling: I discussed with the patient the risks of guselkumab including but not limited to immunosuppression, serious infections, and drug reactions.  The patient understands that monitoring is required including a PPD at baseline and must alert us or the primary physician if symptoms of infection or other concerning signs are noted.
Tazorac Pregnancy And Lactation Text: This medication is not safe during pregnancy. It is unknown if this medication is excreted in breast milk.
Olumiant Counseling: I discussed with the patient the risks of Olumiant therapy including but not limited to upper respiratory tract infections, shingles, cold sores, and nausea. Live vaccines should be avoided.  This medication has been linked to serious infections; higher rate of mortality; malignancy and lymphoproliferative disorders; major adverse cardiovascular events; thrombosis; gastrointestinal perforations; neutropenia; lymphopenia; anemia; liver enzyme elevations; and lipid elevations.
Topical Sulfur Applications Counseling: Topical Sulfur Counseling: Patient counseled that this medication may cause skin irritation or allergic reactions.  In the event of skin irritation, the patient was advised to reduce the amount of the drug applied or use it less frequently.   The patient verbalized understanding of the proper use and possible adverse effects of topical sulfur application.  All of the patient's questions and concerns were addressed.
Otezla Counseling: The side effects of Otezla were discussed with the patient, including but not limited to worsening or new depression, weight loss, diarrhea, nausea, upper respiratory tract infection, and headache. Patient instructed to call the office should any adverse effect occur.  The patient verbalized understanding of the proper use and possible adverse effects of Otezla.  All the patient's questions and concerns were addressed.
Azithromycin Counseling:  I discussed with the patient the risks of azithromycin including but not limited to GI upset, allergic reaction, drug rash, diarrhea, and yeast infections.
Mirvaso Counseling: Mirvaso is a topical medication which can decrease superficial blood flow where applied. Side effects are uncommon and include stinging, redness and allergic reactions.
Griseofulvin Pregnancy And Lactation Text: This medication is Pregnancy Category X and is known to cause serious birth defects. It is unknown if this medication is excreted in breast milk but breast feeding should be avoided.
Spironolactone Pregnancy And Lactation Text: This medication can cause feminization of the male fetus and should be avoided during pregnancy. The active metabolite is also found in breast milk.
Odomzo Counseling- I discussed with the patient the risks of Odomzo including but not limited to nausea, vomiting, diarrhea, constipation, weight loss, changes in the sense of taste, decreased appetite, muscle spasms, and hair loss.  The patient verbalized understanding of the proper use and possible adverse effects of Odomzo.  All of the patient's questions and concerns were addressed.
Cosentyx Counseling:  I discussed with the patient the risks of Cosentyx including but not limited to worsening of Crohn's disease, immunosuppression, allergic reactions and infections.  The patient understands that monitoring is required including a PPD at baseline and must alert us or the primary physician if symptoms of infection or other concerning signs are noted.
5-Fu Counseling: 5-Fluorouracil Counseling:  I discussed with the patient the risks of 5-fluorouracil including but not limited to erythema, scaling, itching, weeping, crusting, and pain.
Doxycycline Pregnancy And Lactation Text: This medication is Pregnancy Category D and not consider safe during pregnancy. It is also excreted in breast milk but is considered safe for shorter treatment courses.
Ilumya Counseling: I discussed with the patient the risks of tildrakizumab including but not limited to immunosuppression, malignancy, posterior leukoencephalopathy syndrome, and serious infections.  The patient understands that monitoring is required including a PPD at baseline and must alert us or the primary physician if symptoms of infection or other concerning signs are noted.
Mirvaso Pregnancy And Lactation Text: This medication has not been assigned a Pregnancy Risk Category. It is unknown if the medication is excreted in breast milk.
Zyclara Counseling:  I discussed with the patient the risks of imiquimod including but not limited to erythema, scaling, itching, weeping, crusting, and pain.  Patient understands that the inflammatory response to imiquimod is variable from person to person and was educated regarded proper titration schedule.  If flu-like symptoms develop, patient knows to discontinue the medication and contact us.
Erythromycin Counseling:  I discussed with the patient the risks of erythromycin including but not limited to GI upset, allergic reaction, drug rash, diarrhea, increase in liver enzymes, and yeast infections.
Otezla Pregnancy And Lactation Text: This medication is Pregnancy Category C and it isn't known if it is safe during pregnancy. It is unknown if it is excreted in breast milk.
Cimetidine Counseling:  I discussed with the patient the risks of Cimetidine including but not limited to gynecomastia, headache, diarrhea, nausea, drowsiness, arrhythmias, pancreatitis, skin rashes, psychosis, bone marrow suppression and kidney toxicity.
Bexarotene Pregnancy And Lactation Text: This medication is Pregnancy Category X and should not be given to women who are pregnant or may become pregnant. This medication should not be used if you are breast feeding.
5-Fu Pregnancy And Lactation Text: This medication is Pregnancy Category X and contraindicated in pregnancy and in women who may become pregnant. It is unknown if this medication is excreted in breast milk.
Colchicine Counseling:  Patient counseled regarding adverse effects including but not limited to stomach upset (nausea, vomiting, stomach pain, or diarrhea).  Patient instructed to limit alcohol consumption while taking this medication.  Colchicine may reduce blood counts especially with prolonged use.  The patient understands that monitoring of kidney function and blood counts may be required, especially at baseline. The patient verbalized understanding of the proper use and possible adverse effects of colchicine.  All of the patient's questions and concerns were addressed.
Methotrexate Counseling:  Patient counseled regarding adverse effects of methotrexate including but not limited to nausea, vomiting, abnormalities in liver function tests. Patients may develop mouth sores, rash, diarrhea, and abnormalities in blood counts. The patient understands that monitoring is required including LFT's and blood counts.  There is a rare possibility of scarring of the liver and lung problems that can occur when taking methotrexate. Persistent nausea, loss of appetite, pale stools, dark urine, cough, and shortness of breath should be reported immediately. Patient advised to discontinue methotrexate treatment at least three months before attempting to become pregnant.  I discussed the need for folate supplements while taking methotrexate.  These supplements can decrease side effects during methotrexate treatment. The patient verbalized understanding of the proper use and possible adverse effects of methotrexate.  All of the patient's questions and concerns were addressed.
Rhofade Counseling: Rhofade is a topical medication which can decrease superficial blood flow where applied. Side effects are uncommon and include stinging, redness and allergic reactions.
Azelaic Acid Pregnancy And Lactation Text: This medication is considered safe during pregnancy and breast feeding.
Tranexamic Acid Counseling:  Patient advised of the small risk of bleeding problems with tranexamic acid. They were also instructed to call if they developed any nausea, vomiting or diarrhea. All of the patient's questions and concerns were addressed.
Imiquimod Counseling:  I discussed with the patient the risks of imiquimod including but not limited to erythema, scaling, itching, weeping, crusting, and pain.  Patient understands that the inflammatory response to imiquimod is variable from person to person and was educated regarded proper titration schedule.  If flu-like symptoms develop, patient knows to discontinue the medication and contact us.
Rifampin Pregnancy And Lactation Text: This medication is Pregnancy Category C and it isn't know if it is safe during pregnancy. It is also excreted in breast milk and should not be used if you are breast feeding.
Hydroxychloroquine Pregnancy And Lactation Text: This medication has been shown to cause fetal harm but it isn't assigned a Pregnancy Risk Category. There are small amounts excreted in breast milk.
Ivermectin Counseling:  Patient instructed to take medication on an empty stomach with a full glass of water.  Patient informed of potential adverse effects including but not limited to nausea, diarrhea, dizziness, itching, and swelling of the extremities or lymph nodes.  The patient verbalized understanding of the proper use and possible adverse effects of ivermectin.  All of the patient's questions and concerns were addressed.
Low Dose Naltrexone Counseling- I discussed with the patient the potential risks and side effects of low dose naltrexone including but not limited to: more vivid dreams, headaches, nausea, vomiting, abdominal pain, fatigue, dizziness, and anxiety.
Olumiant Pregnancy And Lactation Text: Based on animal studies, Olumiant may cause embryo-fetal harm when administered to pregnant women.  The medication should not be used in pregnancy.  Breastfeeding is not recommended during treatment.
Topical Clindamycin Counseling: Patient counseled that this medication may cause skin irritation or allergic reactions.  In the event of skin irritation, the patient was advised to reduce the amount of the drug applied or use it less frequently.   The patient verbalized understanding of the proper use and possible adverse effects of clindamycin.  All of the patient's questions and concerns were addressed.
Itraconazole Counseling:  I discussed with the patient the risks of itraconazole including but not limited to liver damage, nausea/vomiting, neuropathy, and severe allergy.  The patient understands that this medication is best absorbed when taken with acidic beverages such as non-diet cola or ginger ale.  The patient understands that monitoring is required including baseline LFTs and repeat LFTs at intervals.  The patient understands that they are to contact us or the primary physician if concerning signs are noted.
Topical Sulfur Applications Pregnancy And Lactation Text: This medication is considered safe during pregnancy and breast feeding secondary to limited systemic absorption.
Azithromycin Pregnancy And Lactation Text: This medication is considered safe during pregnancy and is also secreted in breast milk.
Dupixent Counseling: I discussed with the patient the risks of dupilumab including but not limited to eye infection and irritation, cold sores, injection site reactions, worsening of asthma, allergic reactions and increased risk of parasitic infection.  Live vaccines should be avoided while taking dupilumab. Dupilumab will also interact with certain medications such as warfarin and cyclosporine. The patient understands that monitoring is required and they must alert us or the primary physician if symptoms of infection or other concerning signs are noted.
Wartpeel Counseling:  I discussed with the patient the risks of Wartpeel including but not limited to erythema, scaling, itching, weeping, crusting, and pain.
Benzoyl Peroxide Counseling: Patient counseled that medicine may cause skin irritation and bleach clothing.  In the event of skin irritation, the patient was advised to reduce the amount of the drug applied or use it less frequently.   The patient verbalized understanding of the proper use and possible adverse effects of benzoyl peroxide.  All of the patient's questions and concerns were addressed.
Sarecycline Counseling: Patient advised regarding possible photosensitivity and discoloration of the teeth, skin, lips, tongue and gums.  Patient instructed to avoid sunlight, if possible.  When exposed to sunlight, patients should wear protective clothing, sunglasses, and sunscreen.  The patient was instructed to call the office immediately if the following severe adverse effects occur:  hearing changes, easy bruising/bleeding, severe headache, or vision changes.  The patient verbalized understanding of the proper use and possible adverse effects of sarecycline.  All of the patient's questions and concerns were addressed.
Oxybutynin Counseling:  I discussed with the patient the risks of oxybutynin including but not limited to skin rash, drowsiness, dry mouth, difficulty urinating, and blurred vision.
Opzelura Counseling:  I discussed with the patient the risks of Opzelura including but not limited to nasopharngitis, bronchitis, ear infection, eosinophila, hives, diarrhea, folliculitis, tonsillitis, and rhinorrhea.  Taken orally, this medication has been linked to serious infections; higher rate of mortality; malignancy and lymphoproliferative disorders; major adverse cardiovascular events; thrombosis; thrombocytopenia, anemia, and neutropenia; and lipid elevations.
Drysol Counseling:  I discussed with the patient the risks of drysol/aluminum chloride including but not limited to skin rash, itching, irritation, burning.
Tranexamic Acid Pregnancy And Lactation Text: It is unknown if this medication is safe during pregnancy or breast feeding.
Methotrexate Pregnancy And Lactation Text: This medication is Pregnancy Category X and is known to cause fetal harm. This medication is excreted in breast milk.
Azathioprine Counseling:  I discussed with the patient the risks of azathioprine including but not limited to myelosuppression, immunosuppression, hepatotoxicity, lymphoma, and infections.  The patient understands that monitoring is required including baseline LFTs, Creatinine, possible TPMP genotyping and weekly CBCs for the first month and then every 2 weeks thereafter.  The patient verbalized understanding of the proper use and possible adverse effects of azathioprine.  All of the patient's questions and concerns were addressed.
Low Dose Naltrexone Pregnancy And Lactation Text: Naltrexone is pregnancy category C.  There have been no adequate and well-controlled studies in pregnant women.  It should be used in pregnancy only if the potential benefit justifies the potential risk to the fetus.   Limited data indicates that naltrexone is minimally excreted into breastmilk.
Erythromycin Pregnancy And Lactation Text: This medication is Pregnancy Category B and is considered safe during pregnancy. It is also excreted in breast milk.
Skyrizi Counseling: I discussed with the patient the risks of risankizumab-rzaa including but not limited to immunosuppression, and serious infections.  The patient understands that monitoring is required including a PPD at baseline and must alert us or the primary physician if symptoms of infection or other concerning signs are noted.
Isotretinoin Counseling: Patient should get monthly blood tests, not donate blood, not drive at night if vision affected, not share medication, and not undergo elective surgery for 6 months after tx completed. Side effects reviewed, pt to contact office should one occur.
Solaraze Counseling:  I discussed with the patient the risks of Solaraze including but not limited to erythema, scaling, itching, weeping, crusting, and pain.
Dapsone Counseling: I discussed with the patient the risks of dapsone including but not limited to hemolytic anemia, agranulocytosis, rashes, methemoglobinemia, kidney failure, peripheral neuropathy, headaches, GI upset, and liver toxicity.  Patients who start dapsone require monitoring including baseline LFTs and weekly CBCs for the first month, then every month thereafter.  The patient verbalized understanding of the proper use and possible adverse effects of dapsone.  All of the patient's questions and concerns were addressed.
Azathioprine Pregnancy And Lactation Text: This medication is Pregnancy Category D and isn't considered safe during pregnancy. It is unknown if this medication is excreted in breast milk.
Valtrex Counseling: I discussed with the patient the risks of valacyclovir including but not limited to kidney damage, nausea, vomiting and severe allergy.  The patient understands that if the infection seems to be worsening or is not improving, they are to call.
Xolair Counseling:  Patient informed of potential adverse effects including but not limited to fever, muscle aches, rash and allergic reactions.  The patient verbalized understanding of the proper use and possible adverse effects of Xolair.  All of the patient's questions and concerns were addressed.
Topical Clindamycin Pregnancy And Lactation Text: This medication is Pregnancy Category B and is considered safe during pregnancy. It is unknown if it is excreted in breast milk.
Rinvoq Counseling: I discussed with the patient the risks of Rinvoq therapy including but not limited to upper respiratory tract infections, shingles, cold sores, bronchitis, nausea, cough, fever, acne, and headache. Live vaccines should be avoided.  This medication has been linked to serious infections; higher rate of mortality; malignancy and lymphoproliferative disorders; major adverse cardiovascular events; thrombosis; thrombocytopenia, anemia, and neutropenia; lipid elevations; liver enzyme elevations; and gastrointestinal perforations.
Dutasteride Male Counseling: Dustasteride Counseling:  I discussed with the patient the risks of use of dutasteride including but not limited to decreased libido, decreased ejaculate volume, and gynecomastia. Women who can become pregnant should not handle medication.  All of the patient's questions and concerns were addressed.
Bactrim Counseling:  I discussed with the patient the risks of sulfa antibiotics including but not limited to GI upset, allergic reaction, drug rash, diarrhea, dizziness, photosensitivity, and yeast infections.  Rarely, more serious reactions can occur including but not limited to aplastic anemia, agranulocytosis, methemoglobinemia, blood dyscrasias, liver or kidney failure, lung infiltrates or desquamative/blistering drug rashes.
Adbry Counseling: I discussed with the patient the risks of tralokinumab including but not limited to eye infection and irritation, cold sores, injection site reactions, worsening of asthma, allergic reactions and increased risk of parasitic infection.  Live vaccines should be avoided while taking tralokinumab. The patient understands that monitoring is required and they must alert us or the primary physician if symptoms of infection or other concerning signs are noted.
Bactrim Pregnancy And Lactation Text: This medication is Pregnancy Category D and is known to cause fetal risk.  It is also excreted in breast milk.
Niacinamide Counseling: I recommended taking niacin or niacinamide, also know as vitamin B3, twice daily. Recent evidence suggests that taking vitamin B3 (500 mg twice daily) can reduce the risk of actinic keratoses and non-melanoma skin cancers. Side effects of vitamin B3 include flushing and headache.
Ketoconazole Counseling:   Patient counseled regarding improving absorption with orange juice.  Adverse effects include but are not limited to breast enlargement, headache, diarrhea, nausea, upset stomach, liver function test abnormalities, taste disturbance, and stomach pain.  There is a rare possibility of liver failure that can occur when taking ketoconazole. The patient understands that monitoring of LFTs may be required, especially at baseline. The patient verbalized understanding of the proper use and possible adverse effects of ketoconazole.  All of the patient's questions and concerns were addressed.
Dupixent Pregnancy And Lactation Text: This medication likely crosses the placenta but the risk for the fetus is uncertain. This medication is excreted in breast milk.
Doxepin Counseling:  Patient advised that the medication is sedating and not to drive a car after taking this medication. Patient informed of potential adverse effects including but not limited to dry mouth, urinary retention, and blurry vision.  The patient verbalized understanding of the proper use and possible adverse effects of doxepin.  All of the patient's questions and concerns were addressed.
Isotretinoin Pregnancy And Lactation Text: This medication is Pregnancy Category X and is considered extremely dangerous during pregnancy. It is unknown if it is excreted in breast milk.
Metronidazole Counseling:  I discussed with the patient the risks of metronidazole including but not limited to seizures, nausea/vomiting, a metallic taste in the mouth, nausea/vomiting and severe allergy.
Infliximab Counseling:  I discussed with the patient the risks of infliximab including but not limited to myelosuppression, immunosuppression, autoimmune hepatitis, demyelinating diseases, lymphoma, and serious infections.  The patient understands that monitoring is required including a PPD at baseline and must alert us or the primary physician if symptoms of infection or other concerning signs are noted.
Benzoyl Peroxide Pregnancy And Lactation Text: This medication is Pregnancy Category C. It is unknown if benzoyl peroxide is excreted in breast milk.
Prednisone Counseling:  I discussed with the patient the risks of prolonged use of prednisone including but not limited to weight gain, insomnia, osteoporosis, mood changes, diabetes, susceptibility to infection, glaucoma and high blood pressure.  In cases where prednisone use is prolonged, patients should be monitored with blood pressure checks, serum glucose levels and an eye exam.  Additionally, the patient may need to be placed on GI prophylaxis, PCP prophylaxis, and calcium and vitamin D supplementation and/or a bisphosphonate.  The patient verbalized understanding of the proper use and the possible adverse effects of prednisone.  All of the patient's questions and concerns were addressed.
Opzelura Pregnancy And Lactation Text: There is insufficient data to evaluate drug-associated risk for major birth defects, miscarriage, or other adverse maternal or fetal outcomes.  There is a pregnancy registry that monitors pregnancy outcomes in pregnant persons exposed to the medication during pregnancy.  It is unknown if this medication is excreted in breast milk.  Do not breastfeed during treatment and for about 4 weeks after the last dose.
Klisyri Counseling:  I discussed with the patient the risks of Klisyri including but not limited to erythema, scaling, itching, weeping, crusting, and pain.
Picato Counseling:  I discussed with the patient the risks of Picato including but not limited to erythema, scaling, itching, weeping, crusting, and pain.
Klisyri Pregnancy And Lactation Text: It is unknown if this medication can harm a developing fetus or if it is excreted in breast milk.
Cellcept Counseling:  I discussed with the patient the risks of mycophenolate mofetil including but not limited to infection/immunosuppression, GI upset, hypokalemia, hypercholesterolemia, bone marrow suppression, lymphoproliferative disorders, malignancy, GI ulceration/bleed/perforation, colitis, interstitial lung disease, kidney failure, progressive multifocal leukoencephalopathy, and birth defects.  The patient understands that monitoring is required including a baseline creatinine and regular CBC testing. In addition, patient must alert us immediately if symptoms of infection or other concerning signs are noted.
High Dose Vitamin A Counseling: Side effects reviewed, pt to contact office should one occur.
Tetracycline Counseling: Patient counseled regarding possible photosensitivity and increased risk for sunburn.  Patient instructed to avoid sunlight, if possible.  When exposed to sunlight, patients should wear protective clothing, sunglasses, and sunscreen.  The patient was instructed to call the office immediately if the following severe adverse effects occur:  hearing changes, easy bruising/bleeding, severe headache, or vision changes.  The patient verbalized understanding of the proper use and possible adverse effects of tetracycline.  All of the patient's questions and concerns were addressed. Patient understands to avoid pregnancy while on therapy due to potential birth defects.
Spevigo Counseling: I discussed with the patient the risks of Spevigo including but not limited to fatigue, nasuea, vomiting, headache, pruritus, urinary tract infection, an infusion related reactions.  The patient understands that monitoring is required including screening for tuberculosis at baseline and yearly screening thereafter while continuing Spevigo therapy. They should contact us if symptoms of infection or other concerning signs are noted.
Dapsone Pregnancy And Lactation Text: This medication is Pregnancy Category C and is not considered safe during pregnancy or breast feeding.
Valtrex Pregnancy And Lactation Text: this medication is Pregnancy Category B and is considered safe during pregnancy. This medication is not directly found in breast milk but it's metabolite acyclovir is present.
Solaraze Pregnancy And Lactation Text: This medication is Pregnancy Category B and is considered safe. There is some data to suggest avoiding during the third trimester. It is unknown if this medication is excreted in breast milk.
Rinvoq Pregnancy And Lactation Text: Based on animal studies, Rinvoq may cause embryo-fetal harm when administered to pregnant women.  The medication should not be used in pregnancy.  Breastfeeding is not recommended during treatment and for 6 days after the last dose.
Dutasteride Female Counseling: Dutasteride Counseling:  I discussed with the patient the risks of use of dutasteride including but not limited to decreased libido and sexual dysfunction. Explained the teratogenic nature of the medication and stressed the importance of not getting pregnant during treatment. All of the patient's questions and concerns were addressed.
Xolair Pregnancy And Lactation Text: This medication is Pregnancy Category B and is considered safe during pregnancy. This medication is excreted in breast milk.
Topical Ketoconazole Counseling: Patient counseled that this medication may cause skin irritation or allergic reactions.  In the event of skin irritation, the patient was advised to reduce the amount of the drug applied or use it less frequently.   The patient verbalized understanding of the proper use and possible adverse effects of ketoconazole.  All of the patient's questions and concerns were addressed.
Opioid Counseling: I discussed with the patient the potential side effects of opioids including but not limited to addiction, altered mental status, and depression. I stressed avoiding alcohol, benzodiazepines, muscle relaxants and sleep aids unless specifically okayed by a physician. The patient verbalized understanding of the proper use and possible adverse effects of opioids. All of the patient's questions and concerns were addressed. They were instructed to flush the remaining pills down the toilet if they did not need them for pain.
Adbry Pregnancy And Lactation Text: It is unknown if this medication will adversely affect pregnancy or breast feeding.
Winlevi Counseling:  I discussed with the patient the risks of topical clascoterone including but not limited to erythema, scaling, itching, and stinging. Patient voiced their understanding.
Nsaids Pregnancy And Lactation Text: These medications are considered safe up to 30 weeks gestation. It is excreted in breast milk.
Propranolol Counseling:  I discussed with the patient the risks of propranolol including but not limited to low heart rate, low blood pressure, low blood sugar, restlessness and increased cold sensitivity. They should call the office if they experience any of these side effects.
Cephalexin Counseling: I counseled the patient regarding use of cephalexin as an antibiotic for prophylactic and/or therapeutic purposes. Cephalexin (commonly prescribed under brand name Keflex) is a cephalosporin antibiotic which is active against numerous classes of bacteria, including most skin bacteria. Side effects may include nausea, diarrhea, gastrointestinal upset, rash, hives, yeast infections, and in rare cases, hepatitis, kidney disease, seizures, fever, confusion, neurologic symptoms, and others. Patients with severe allergies to penicillin medications are cautioned that there is about a 10% incidence of cross-reactivity with cephalosporins. When possible, patients with penicillin allergies should use alternatives to cephalosporins for antibiotic therapy.
Ketoconazole Pregnancy And Lactation Text: This medication is Pregnancy Category C and it isn't know if it is safe during pregnancy. It is also excreted in breast milk and breast feeding isn't recommended.
Enbrel Counseling:  I discussed with the patient the risks of etanercept including but not limited to myelosuppression, immunosuppression, autoimmune hepatitis, demyelinating diseases, lymphoma, and infections.  The patient understands that monitoring is required including a PPD at baseline and must alert us or the primary physician if symptoms of infection or other concerning signs are noted.
Doxepin Pregnancy And Lactation Text: This medication is Pregnancy Category C and it isn't known if it is safe during pregnancy. It is also excreted in breast milk and breast feeding isn't recommended.
Carac Counseling:  I discussed with the patient the risks of Carac including but not limited to erythema, scaling, itching, weeping, crusting, and pain.
Elidel Counseling: Patient may experience a mild burning sensation during topical application. Elidel is not approved in children less than 2 years of age. There have been case reports of hematologic and skin malignancies in patients using topical calcineurin inhibitors although causality is questionable.
Metronidazole Pregnancy And Lactation Text: This medication is Pregnancy Category B and considered safe during pregnancy.  It is also excreted in breast milk.
Rituxan Counseling:  I discussed with the patient the risks of Rituxan infusions. Side effects can include infusion reactions, severe drug rashes including mucocutaneous reactions, reactivation of latent hepatitis and other infections and rarely progressive multifocal leukoencephalopathy.  All of the patient's questions and concerns were addressed.
Propranolol Pregnancy And Lactation Text: This medication is Pregnancy Category C and it isn't known if it is safe during pregnancy. It is excreted in breast milk.
Minocycline Counseling: Patient advised regarding possible photosensitivity and discoloration of the teeth, skin, lips, tongue and gums.  Patient instructed to avoid sunlight, if possible.  When exposed to sunlight, patients should wear protective clothing, sunglasses, and sunscreen.  The patient was instructed to call the office immediately if the following severe adverse effects occur:  hearing changes, easy bruising/bleeding, severe headache, or vision changes.  The patient verbalized understanding of the proper use and possible adverse effects of minocycline.  All of the patient's questions and concerns were addressed.
High Dose Vitamin A Pregnancy And Lactation Text: High dose vitamin A therapy is contraindicated during pregnancy and breast feeding.
Hydroxyzine Counseling: Patient advised that the medication is sedating and not to drive a car after taking this medication.  Patient informed of potential adverse effects including but not limited to dry mouth, urinary retention, and blurry vision.  The patient verbalized understanding of the proper use and possible adverse effects of hydroxyzine.  All of the patient's questions and concerns were addressed.
Gabapentin Counseling: I discussed with the patient the risks of gabapentin including but not limited to dizziness, somnolence, fatigue and ataxia.
Sotyktu Counseling:  I discussed the most common side effects of Sotyktu including: common cold, sore throat, sinus infections, cold sores, canker sores, folliculitis, and acne.? I also discussed more serious side effects of Sotyktu including but not limited to: serious allergic reactions; increased risk for infections such as TB; cancers such as lymphomas; rhabdomyolysis and elevated CPK; and elevated triglycerides and liver enzymes.?
Spevigo Pregnancy And Lactation Text: The risk during pregnancy and breastfeeding is uncertain with this medication. This medication does cross the placenta. It is unknown if this medication is found in breast milk.
Soolantra Counseling: I discussed with the patients the risks of topial Soolantra. This is a medicine which decreases the number of mites and inflammation in the skin. You experience burning, stinging, eye irritation or allergic reactions.  Please call our office if you develop any problems from using this medication.
Opioid Pregnancy And Lactation Text: These medications can lead to premature delivery and should be avoided during pregnancy. These medications are also present in breast milk in small amounts.
Niacinamide Pregnancy And Lactation Text: These medications are considered safe during pregnancy.
Dutasteride Pregnancy And Lactation Text: This medication is absolutely contraindicated in women, especially during pregnancy and breast feeding. Feminization of male fetuses is possible if taking while pregnant.

## 2024-10-07 ENCOUNTER — ANTICOAGULATION MONITORING (OUTPATIENT)
Dept: VASCULAR LAB | Facility: MEDICAL CENTER | Age: 84
End: 2024-10-07
Payer: MEDICARE

## 2024-10-07 ENCOUNTER — HOSPITAL ENCOUNTER (OUTPATIENT)
Dept: LAB | Facility: MEDICAL CENTER | Age: 84
End: 2024-10-07
Attending: NURSE PRACTITIONER
Payer: MEDICARE

## 2024-10-07 DIAGNOSIS — Z79.01 CHRONIC ANTICOAGULATION: ICD-10-CM

## 2024-10-07 LAB
INR PPP: 3.12 (ref 0.87–1.13)
PROTHROMBIN TIME: 32.4 SEC (ref 12–14.6)

## 2024-10-07 PROCEDURE — 85610 PROTHROMBIN TIME: CPT

## 2024-10-07 PROCEDURE — 36415 COLL VENOUS BLD VENIPUNCTURE: CPT

## 2024-10-28 ENCOUNTER — ANTICOAGULATION MONITORING (OUTPATIENT)
Dept: MEDICAL GROUP | Facility: PHYSICIAN GROUP | Age: 84
End: 2024-10-28
Payer: MEDICARE

## 2024-10-28 ENCOUNTER — HOSPITAL ENCOUNTER (OUTPATIENT)
Dept: LAB | Facility: MEDICAL CENTER | Age: 84
End: 2024-10-28
Attending: NURSE PRACTITIONER
Payer: MEDICARE

## 2024-10-28 DIAGNOSIS — Z79.01 CHRONIC ANTICOAGULATION: ICD-10-CM

## 2024-10-28 LAB
INR PPP: 2.47 (ref 0.87–1.13)
PROTHROMBIN TIME: 26.9 SEC (ref 12–14.6)

## 2024-10-28 PROCEDURE — 36415 COLL VENOUS BLD VENIPUNCTURE: CPT

## 2024-10-28 PROCEDURE — 85610 PROTHROMBIN TIME: CPT

## 2024-10-28 RX ORDER — WARFARIN SODIUM 1 MG/1
TABLET ORAL
Qty: 180 TABLET | Refills: 1 | Status: SHIPPED | OUTPATIENT
Start: 2024-10-28

## 2024-10-30 DIAGNOSIS — Z79.01 CHRONIC ANTICOAGULATION: ICD-10-CM

## 2024-11-05 ENCOUNTER — TELEPHONE (OUTPATIENT)
Dept: VASCULAR LAB | Facility: MEDICAL CENTER | Age: 84
End: 2024-11-05
Payer: MEDICARE

## 2024-11-05 DIAGNOSIS — Z79.01 CHRONIC ANTICOAGULATION: ICD-10-CM

## 2024-11-05 RX ORDER — WARFARIN SODIUM 1 MG/1
TABLET ORAL
Qty: 100 TABLET | Refills: 1 | Status: SHIPPED | OUTPATIENT
Start: 2024-11-05

## 2024-11-05 NOTE — TELEPHONE ENCOUNTER
Received request via: Patient    Was the patient seen in the last year in this department? Yes    Does the patient have an active prescription (recently filled or refills available) for medication(s) requested? Yes.     Pharmacy Name: [facility] - [facility] [address] [[id number]]     Does the patient have care home Plus and need 100-day supply? (This applies to ALL medications) Patient does not have SCP    Thank you,  Loraine LOPEZ

## 2024-11-07 DIAGNOSIS — Z79.2 NEED FOR ANTIBIOTIC PROPHYLAXIS FOR DENTAL PROCEDURE: ICD-10-CM

## 2024-11-07 RX ORDER — SULFAMETHOXAZOLE AND TRIMETHOPRIM 800; 160 MG/1; MG/1
TABLET ORAL
Qty: 6 TABLET | Refills: 0 | Status: SHIPPED | OUTPATIENT
Start: 2024-11-07

## 2024-11-07 NOTE — TELEPHONE ENCOUNTER
Received request via: Pharmacy    Was the patient seen in the last year in this department? Yes    Does the patient have an active prescription (recently filled or refills available) for medication(s) requested? No    Ph[facility] Name: [facility]    Does the patient have retirement Plus and need 100-day supply? (This applies to ALL medications) Patient does not have SCP

## 2024-11-18 ENCOUNTER — ANTICOAGULATION MONITORING (OUTPATIENT)
Dept: VASCULAR LAB | Facility: MEDICAL CENTER | Age: 84
End: 2024-11-18
Payer: MEDICARE

## 2024-11-18 ENCOUNTER — HOSPITAL ENCOUNTER (OUTPATIENT)
Dept: LAB | Facility: MEDICAL CENTER | Age: 84
End: 2024-11-18
Attending: NURSE PRACTITIONER
Payer: MEDICARE

## 2024-11-18 DIAGNOSIS — Z79.01 CHRONIC ANTICOAGULATION: ICD-10-CM

## 2024-11-18 LAB
INR PPP: 2.04 (ref 0.87–1.13)
PROTHROMBIN TIME: 23.2 SEC (ref 12–14.6)

## 2024-11-18 PROCEDURE — 36415 COLL VENOUS BLD VENIPUNCTURE: CPT

## 2024-11-18 PROCEDURE — 85610 PROTHROMBIN TIME: CPT

## 2024-11-19 NOTE — PROGRESS NOTES
Anticoagulation Summary  As of 2024      INR goal:  2.0-3.0   TTR:  80.6% (6.8 y)   INR used for dosin.04 (2024)   Warfarin maintenance plan:  0.5 mg (1 mg x 0.5) every Mon, Wed, Fri; 1 mg (1 mg x 1) all other days   Weekly warfarin total:  5.5 mg   Plan last modified:  Sally Ogden, Pharmacy Intern (10/7/2024)   Next INR check:  2024   Priority:  Routine   Target end date:  Indefinite    Indications    Paroxysmal a-fib (CMS-HCC) (Resolved) [I48.0]  Chronic anticoagulation (Resolved) [Z79.01]                 Anticoagulation Episode Summary       INR check location:  Clinic Lab    Preferred lab:  [facility]    Send INR reminders to:  --    Comments:  [facility] Cardiology   [phone] (H)  [facility] labs          Anticoagulation Care Providers       Provider Role Specialty Phone number    Evelio Tejeda M.D. Referring Internal Medicine [phone]    [facility] Anticoagulation Services Responsible  [phone]    Beryl Pang M.D. Responsible Family Medicine [phone]            Refer to Anticoagulation Patient Findings for HPI  Patient Findings       Negatives:  Signs/symptoms of thrombosis, Signs/symptoms of bleeding, Laboratory test error suspected, Change in health, Change in alcohol use, Change in activity, Upcoming invasive procedure, Emergency department visit, Upcoming dental procedure, Missed doses, Extra doses, Change in medications, Change in diet/appetite, Hospital admission, Bruising, Other complaints            Spoke with patient to report a therapeutic INR.      Pt instructed to continue with current warfarin dosing regimen, confirms dosing.     Will follow up in 4 week(s).     Jimbo Dunlap, PharmD, BCACP

## 2024-11-26 ENCOUNTER — HOSPITAL ENCOUNTER (OUTPATIENT)
Dept: CARDIOLOGY | Facility: MEDICAL CENTER | Age: 84
End: 2024-11-26
Attending: INTERNAL MEDICINE
Payer: MEDICARE

## 2024-11-26 DIAGNOSIS — I34.0 MITRAL VALVE INSUFFICIENCY, UNSPECIFIED ETIOLOGY: ICD-10-CM

## 2024-11-26 LAB
LV EJECT FRACT  99904: 75
LV EJECT FRACT MOD 2C 99903: 59.76
LV EJECT FRACT MOD 4C 99902: 62.58
LV EJECT FRACT MOD BP 99901: 59.94

## 2024-11-26 PROCEDURE — 93306 TTE W/DOPPLER COMPLETE: CPT

## 2024-12-10 ENCOUNTER — HOSPITAL ENCOUNTER (OUTPATIENT)
Dept: LAB | Facility: MEDICAL CENTER | Age: 84
End: 2024-12-10
Attending: NURSE PRACTITIONER
Payer: MEDICARE

## 2024-12-10 ENCOUNTER — ANTICOAGULATION MONITORING (OUTPATIENT)
Dept: CARDIOLOGY | Facility: MEDICAL CENTER | Age: 84
End: 2024-12-10
Payer: MEDICARE

## 2024-12-10 DIAGNOSIS — Z79.01 CHRONIC ANTICOAGULATION: ICD-10-CM

## 2024-12-10 LAB
INR PPP: 2.33 (ref 0.87–1.13)
PROTHROMBIN TIME: 25.7 SEC (ref 12–14.6)

## 2024-12-10 PROCEDURE — 36415 COLL VENOUS BLD VENIPUNCTURE: CPT

## 2024-12-10 PROCEDURE — 85610 PROTHROMBIN TIME: CPT

## 2024-12-11 NOTE — PROGRESS NOTES
OP Anticoagulation Service Note    Date: 12/10/2024    Anticoagulation Summary  As of 12/10/2024      INR goal:  2.0-3.0   TTR:  80.8% (6.9 y)   INR used for dosin.33 (12/10/2024)   Warfarin maintenance plan:  0.5 mg (1 mg x 0.5) every Mon, Wed, Fri; 1 mg (1 mg x 1) all other days   Weekly warfarin total:  5.5 mg   Plan last modified:  Sally Ogden, Pharmacy Intern (10/7/2024)   Next INR check:  2025   Priority:  Routine   Target end date:  Indefinite    Indications    Paroxysmal a-fib (CMS-HCC) (Resolved) [I48.0]  Chronic anticoagulation (Resolved) [Z79.01]                 Anticoagulation Episode Summary       INR check location:  Clinic Lab    Preferred lab:  [facility]    Send INR reminders to:  --    Comments:  [facility] Cardiology   [phone] (H)  [facility] labs          Anticoagulation Care Providers       Provider Role Specialty Phone number    Evelio Tejeda M.D. Referring Internal Medicine [phone]    [facility] Anticoagulation Services Responsible  [phone]    Beryl Pang M.D. Responsible Family Medicine [phone]          Anticoagulation Patient Findings        Patient's preferred phone number:  Angie Root  [address] [place] [address] [phone]      HPI:   The reason for today's call is to prevent morbidity and mortality from a blood clot and/or stroke and to reduce the risk of bleeding while on a anticoagulant.     PCP:  Beryl Pang M.D.  [address] [facility] [place] [phone]    Assessment:     INR  therapeutic.     Lab Results   Component Value Date/Time    BUN 23 (H) 2024 09:46 AM    CREATININE 0.95 2024 09:46 AM     Lab Results   Component Value Date/Time    HEMOGLOBIN 14.2 2024 09:46 AM    HEMATOCRIT 46.2 2024 09:46 AM    PLATELETCT 224 2024 09:46 AM    ALKPHOSPHAT 77 2024 09:46 AM    ASTSGOT 37 2024 09:46 AM    ALTSGPT 24 2024 09:46 AM          Current Outpatient Medications:     warfarin, TAKE 0.5 TO 1  TABLET DAILY OR AS DIRECTED BY ANTICOAGULATION CLINIC    predniSONE, 10 mg, Oral, Q3 DAYS    albuterol, INHALE ONE TO TWO PUFFS BY MOUTH EVERY 4  HOURS AS NEEDED FOR SHORTNESS OF BREATH    ALPRAZolam, 0.25 mg, Oral, BID PRN    fluticasone, SPRAY 1 SPRAY INTO AFFECTED NOSTRIL (S) DAILY    lovastatin, TAKE 1 TABLET AT BEDTIME    sotalol, TAKE ONE-HALF TABLET BY MOUTH 2 TIMES DAILY    furosemide, 20 mg, Oral, DAILY    Calcium-Magnesium-Vitamin D (CALCIUM 1200+D3 PO), 1 Tablet, Oral, DAILY    D-Mannose, 1 Dose, Oral, DAILY    Multiple Vitamins-Minerals (ZINC PO), 1 Tablet, Oral, DAILY    ipratropium-albuterol, 3 mL, Nebulization, Q4HRS PRN    EPINEPHrine, epinephrine 0.3 mg/0.3 mL injection, auto-injector    Probiotic Product (PROBIOTIC PO), 1 Capsule, Oral, DAILY    ascorbic acid, 1,000 mg, Oral, DAILY    vitamin D, 3,000 Units, Oral, QAM      [id number] Stroke Risk Points: 4   Values used to calculate this score:    Points  Metrics       0        Has Congestive Heart Failure: No       0        Has Hypertension: No       2        Age: 84       0        Has Diabetes: No       0        Had Stroke: No                 Had TIA: No                 Had Thromboembolism: No       1        Has Vascular Disease: Yes       1        Clinically Relevant Sex: Female     No data recorded     Plan:     Continue the same warfarin dose, as noted above.       Follow-up:     As seen above      Additional information discussed with patient:     Asked patient to please call the anticoagulation clinic if they have any signs/symptoms of bleeding and/or thrombosis or any changes to diet or medications.      National recommendations regarding anticoagulation therapy:     The CHEST guidelines recommends frequent INR monitoring at regular intervals (a few days up to a max of 12 weeks) to ensure patients are on the proper dose of warfarin, and patients are not having any complications from therapy.  INRs can dramatically change over a short time  period due to diet, medications, and medical conditions.         [facility]  Phone: [phone]  Fax: [phone]  On call: [phone]  General scheduling/information [phone]  For emergencies please dial 911  Please do not use [facility] for urgent matters, call the phone numbers listed above.    This note was created using voice recognition software (Dragon). The accuracy of the dictation is limited by the abilities of the software. I have reviewed the note prior to signing, however some errors in grammar and context are still possible. If you have any questions related to this note please do not hesitate to contact our office.

## 2024-12-16 ENCOUNTER — TELEPHONE (OUTPATIENT)
Dept: CARDIOLOGY | Facility: MEDICAL CENTER | Age: 84
End: 2024-12-16
Payer: MEDICARE

## 2024-12-16 NOTE — TELEPHONE ENCOUNTER
"SC    Caller: Angie Root    Topic/issue: Patching called to cancel her appointment with Isabel Wolfe for tomorrow 12.17.24. Notes on the appointment say \"Do not cancel/reschedule without approval.\"     Please advise.     Callback Number: [phone]    Thank you,     Kalyani TYSON.    "

## 2024-12-16 NOTE — TELEPHONE ENCOUNTER
Called and spoke to patient. 1 year post Mitral DORITA appointment rescheduled to 1/8/2024. All questions answered. Patient verbalizes understanding.

## 2024-12-17 ENCOUNTER — APPOINTMENT (OUTPATIENT)
Dept: CARDIOLOGY | Facility: MEDICAL CENTER | Age: 84
End: 2024-12-17
Attending: NURSE PRACTITIONER
Payer: MEDICARE

## 2024-12-17 DIAGNOSIS — Z98.890 S/P MITRAL VALVE CLIP IMPLANTATION: ICD-10-CM

## 2024-12-17 DIAGNOSIS — J43.2 CENTRILOBULAR EMPHYSEMA (HCC): ICD-10-CM

## 2024-12-17 DIAGNOSIS — I27.20 PULMONARY HYPERTENSION (HCC): ICD-10-CM

## 2024-12-17 DIAGNOSIS — I73.9 PERIPHERAL ARTERIAL DISEASE (HCC): ICD-10-CM

## 2024-12-17 DIAGNOSIS — I25.10 CORONARY ARTERY DISEASE INVOLVING NATIVE CORONARY ARTERY OF NATIVE HEART WITHOUT ANGINA PECTORIS: ICD-10-CM

## 2024-12-17 DIAGNOSIS — D68.318 CIRCULATING ANTICOAGULANTS (HCC): ICD-10-CM

## 2024-12-17 DIAGNOSIS — R73.03 PRE-DIABETES: ICD-10-CM

## 2024-12-17 DIAGNOSIS — I48.0 PAROXYSMAL ATRIAL FIBRILLATION (HCC): ICD-10-CM

## 2024-12-17 DIAGNOSIS — Z95.818 S/P MITRAL VALVE CLIP IMPLANTATION: ICD-10-CM

## 2024-12-31 ENCOUNTER — APPOINTMENT (OUTPATIENT)
Dept: LAB | Facility: MEDICAL CENTER | Age: 84
End: 2024-12-31
Attending: NURSE PRACTITIONER
Payer: MEDICARE

## 2025-01-02 ENCOUNTER — ANTICOAGULATION MONITORING (OUTPATIENT)
Dept: VASCULAR LAB | Facility: MEDICAL CENTER | Age: 85
End: 2025-01-02
Payer: MEDICARE

## 2025-01-02 ENCOUNTER — HOSPITAL ENCOUNTER (OUTPATIENT)
Dept: LAB | Facility: MEDICAL CENTER | Age: 85
End: 2025-01-02
Attending: NURSE PRACTITIONER
Payer: MEDICARE

## 2025-01-02 DIAGNOSIS — Z79.01 CHRONIC ANTICOAGULATION: ICD-10-CM

## 2025-01-02 LAB
INR PPP: 2.29 (ref 0.87–1.13)
PROTHROMBIN TIME: 25.4 SEC (ref 12–14.6)

## 2025-01-02 PROCEDURE — 85610 PROTHROMBIN TIME: CPT

## 2025-01-02 PROCEDURE — 36415 COLL VENOUS BLD VENIPUNCTURE: CPT

## 2025-01-03 NOTE — PROGRESS NOTES
Anticoagulation Summary  As of 2025      INR goal:  2.0-3.0   TTR:  81.0% (7 y)   INR used for dosin.29 (2025)   Warfarin maintenance plan:  0.5 mg (1 mg x 0.5) every Mon, Wed, Fri; 1 mg (1 mg x 1) all other days   Weekly warfarin total:  5.5 mg   Plan last modified:  Sally Ogden, Pharmacy Intern (10/7/2024)   Next INR check:  2025   Priority:  Routine   Target end date:  Indefinite    Indications    Paroxysmal a-fib (CMS-HCC) (Resolved) [I48.0]  Chronic anticoagulation (Resolved) [Z79.01]                 Anticoagulation Episode Summary       INR check location:  Clinic Lab    Preferred lab:  [facility]    Send INR reminders to:  --    Comments:  [facility] Cardiology   [phone] (H)  [facility] labs          Anticoagulation Care Providers       Provider Role Specialty Phone number    Evelio Tejeda M.D. Referring Internal Medicine [phone]    [facility] Anticoagulation Services Responsible  [phone]    Beryl Pang M.D. Responsible Family Medicine [phone]            Refer to Anticoagulation Patient Findings for HPI  Patient Findings       Positives:  Bruising    Negatives:  Signs/symptoms of thrombosis, Signs/symptoms of bleeding, Laboratory test error suspected, Change in health, Change in alcohol use, Change in activity, Upcoming invasive procedure, Emergency department visit, Upcoming dental procedure, Missed doses, Extra doses, Change in medications, Change in diet/appetite, Hospital admission, Other complaints            Spoke with patient to report a therapeutic INR.      Pt instructed to continue with current warfarin dosing regimen, confirms dosing.     Will follow up in 3 week(s).     Jimbo Dunlap, PharmD, BCACP

## 2025-01-07 DIAGNOSIS — J43.9 PULMONARY EMPHYSEMA, UNSPECIFIED EMPHYSEMA TYPE (HCC): ICD-10-CM

## 2025-01-07 RX ORDER — ALBUTEROL SULFATE 90 UG/1
2 INHALANT RESPIRATORY (INHALATION) 4 TIMES DAILY PRN
Qty: 1 EACH | Refills: 3 | Status: SHIPPED | OUTPATIENT
Start: 2025-01-07 | End: 2025-01-08 | Stop reason: SDUPTHER

## 2025-01-07 NOTE — TELEPHONE ENCOUNTER
Received request via: Patient    Was the patient seen in the last year in this department? Yes    Does the patient have an active prescription (recently filled or refills available) for medication(s) requested? No    [id number] - [place] - [address] [[id number]]     Does the patient have long term Plus and need 100-day supply? (This applies to ALL medications)

## 2025-01-08 ENCOUNTER — TELEPHONE (OUTPATIENT)
Dept: MEDICAL GROUP | Facility: CLINIC | Age: 85
End: 2025-01-08

## 2025-01-08 ENCOUNTER — OFFICE VISIT (OUTPATIENT)
Dept: CARDIOLOGY | Facility: MEDICAL CENTER | Age: 85
End: 2025-01-08
Attending: NURSE PRACTITIONER
Payer: MEDICARE

## 2025-01-08 ENCOUNTER — DOCUMENTATION (OUTPATIENT)
Dept: CARDIOLOGY | Facility: MEDICAL CENTER | Age: 85
End: 2025-01-08
Payer: MEDICARE

## 2025-01-08 VITALS
RESPIRATION RATE: 12 BRPM | SYSTOLIC BLOOD PRESSURE: 110 MMHG | BODY MASS INDEX: 16.2 KG/M2 | WEIGHT: 88 LBS | HEIGHT: 62 IN | DIASTOLIC BLOOD PRESSURE: 50 MMHG | OXYGEN SATURATION: 94 % | HEART RATE: 64 BPM

## 2025-01-08 DIAGNOSIS — I48.0 PAROXYSMAL A-FIB (HCC): ICD-10-CM

## 2025-01-08 DIAGNOSIS — I48.0 PAROXYSMAL ATRIAL FIBRILLATION (HCC): ICD-10-CM

## 2025-01-08 DIAGNOSIS — J43.9 PULMONARY EMPHYSEMA, UNSPECIFIED EMPHYSEMA TYPE (HCC): ICD-10-CM

## 2025-01-08 DIAGNOSIS — I27.20 PULMONARY HYPERTENSION (HCC): ICD-10-CM

## 2025-01-08 DIAGNOSIS — J43.2 CENTRILOBULAR EMPHYSEMA (HCC): ICD-10-CM

## 2025-01-08 DIAGNOSIS — E78.5 HYPERLIPIDEMIA, UNSPECIFIED HYPERLIPIDEMIA TYPE: ICD-10-CM

## 2025-01-08 DIAGNOSIS — F17.219 CIGARETTE NICOTINE DEPENDENCE WITH NICOTINE-INDUCED DISORDER: ICD-10-CM

## 2025-01-08 DIAGNOSIS — Z95.818 S/P MITRAL VALVE CLIP IMPLANTATION: ICD-10-CM

## 2025-01-08 DIAGNOSIS — I25.10 CORONARY ARTERY DISEASE INVOLVING NATIVE CORONARY ARTERY OF NATIVE HEART WITHOUT ANGINA PECTORIS: ICD-10-CM

## 2025-01-08 DIAGNOSIS — I73.9 PERIPHERAL ARTERIAL DISEASE (HCC): ICD-10-CM

## 2025-01-08 DIAGNOSIS — Z98.890 S/P MITRAL VALVE CLIP IMPLANTATION: ICD-10-CM

## 2025-01-08 DIAGNOSIS — R73.03 PRE-DIABETES: ICD-10-CM

## 2025-01-08 DIAGNOSIS — D68.318 CIRCULATING ANTICOAGULANTS (HCC): ICD-10-CM

## 2025-01-08 LAB — EKG IMPRESSION: NORMAL

## 2025-01-08 PROCEDURE — 3074F SYST BP LT 130 MM HG: CPT | Performed by: NURSE PRACTITIONER

## 2025-01-08 PROCEDURE — 99214 OFFICE O/P EST MOD 30 MIN: CPT | Performed by: NURSE PRACTITIONER

## 2025-01-08 PROCEDURE — 99213 OFFICE O/P EST LOW 20 MIN: CPT | Performed by: NURSE PRACTITIONER

## 2025-01-08 PROCEDURE — 93005 ELECTROCARDIOGRAM TRACING: CPT | Mod: TC | Performed by: NURSE PRACTITIONER

## 2025-01-08 PROCEDURE — 3078F DIAST BP <80 MM HG: CPT | Performed by: NURSE PRACTITIONER

## 2025-01-08 PROCEDURE — 93010 ELECTROCARDIOGRAM REPORT: CPT | Performed by: INTERNAL MEDICINE

## 2025-01-08 RX ORDER — LOVASTATIN 40 MG/1
40 TABLET ORAL
Qty: 90 TABLET | Refills: 3 | Status: SHIPPED | OUTPATIENT
Start: 2025-01-08 | End: 2025-01-14 | Stop reason: SDUPTHER

## 2025-01-08 RX ORDER — FUROSEMIDE 20 MG/1
20 TABLET ORAL DAILY
Qty: 90 TABLET | Refills: 3 | Status: SHIPPED | OUTPATIENT
Start: 2025-01-08 | End: 2025-01-14 | Stop reason: SDUPTHER

## 2025-01-08 RX ORDER — ALBUTEROL SULFATE 90 UG/1
2 INHALANT RESPIRATORY (INHALATION) 4 TIMES DAILY PRN
Qty: 1 EACH | Refills: 3 | Status: SHIPPED | OUTPATIENT
Start: 2025-01-08 | End: 2025-01-27 | Stop reason: SDUPTHER

## 2025-01-08 RX ORDER — SOTALOL HYDROCHLORIDE 80 MG/1
TABLET ORAL
Qty: 90 TABLET | Refills: 3 | Status: SHIPPED | OUTPATIENT
Start: 2025-01-08 | End: 2025-01-14 | Stop reason: SDUPTHER

## 2025-01-08 ASSESSMENT — ENCOUNTER SYMPTOMS
CLAUDICATION: 0
SHORTNESS OF BREATH: 1
FEVER: 0
COUGH: 0
ORTHOPNEA: 0
MYALGIAS: 0
PALPITATIONS: 0
PND: 0
DIZZINESS: 0
ABDOMINAL PAIN: 0

## 2025-01-08 ASSESSMENT — FIBROSIS 4 INDEX: FIB4 SCORE: 2.87

## 2025-01-08 NOTE — PROGRESS NOTES
Chief Complaint   Patient presents with    Follow-Up     1 year follow up DORITA     Subjective     Angie Root is a 84 y.o. female who presents today for 1 year post nuria clip.    She is a patient of Dr. Lozano in our office. Hx of HLD with CAD with previous stenting to her LAD in '09, smoking history with COPD, HTN, PAF, and PAD with L iliac stent in '08.     She has some intermittent at rest shortness of breath not necessarily worse with exertion.     She has no other symptoms. Interested in Watchman consultation, will arrange this. She has bleeding and bruising frequently.    She has no chest pain, edema, dizziness/lightheadedness, or palpitations.    Past Medical History:   Diagnosis Date    Arthritis     BL hands    Breath shortness     CAD (coronary artery disease)     Cancer (HCC) 1982    melanoma    Cancer (HCC) 2023    melanoma    Cataract     IOL implants    COPD (chronic obstructive pulmonary disease) (HCC)     COVID-19     Croup 3 years old    Disorder of thyroid     1983    Diverticulosis     Dyslipidemia     Emphysema of lung (HCC)     GERD (gastroesophageal reflux disease)     Heart valve disease     Hiatal hernia     High cholesterol     Hypertension     Infectious disease 2018    C Diff    Measles     5 years old    Paroxysmal A-fib (HCC) 01/20/2016    Symptomatic, on a rhythm control strategy with sotalol 40 mg by mouth twice a day.     Paroxysmal atrial fibrillation (HCC)     Pneumonia     x 3    Prediabetes     Psychiatric problem     anxiety    PVD (peripheral vascular disease) ([facility])     Renal disorder 11/2023    kidney stone    Rheumatic fever     possible as a small child    Urinary bladder disorder     frequent UTIs     Past Surgical History:   Procedure Laterality Date    TRANSCATHETER MITRAL VALVE REPAIR Right 12/5/2023    Procedure: TRANSCATHETER MITRAL VALVE PROCEDURE;  Surgeon: Ethan Lozano M.D.;  Location: SURGERY [facility];  Service: Cardiac    ECHOCARDIOGRAM,  TRANSESOPHAGEAL, INTRAOPERATIVE N/A 12/5/2023    Procedure: ECHOCARDIOGRAM, TRANSESOPHAGEAL, INTRAOPERATIVE;  Surgeon: Ethan Lozano M.D.;  Location: SURGERY [facility];  Service: Cardiac    DE CYSTOSCOPY,INSERT URETERAL STENT Right 11/16/2023    Procedure: CYSTOSCOPY, WITH URETERAL STENT INSERTION;  Surgeon: Alexsander Reyes M.D.;  Location: SURGERY [facility];  Service: Urology    DE CYSTO/URETERO/PYELOSCOPY, DX Right 11/16/2023    Procedure: URETEROSCOPY;  Surgeon: Alexsander Reyes M.D.;  Location: SURGERY [facility];  Service: Urology    LASERTRIPSY Right 11/16/2023    Procedure: LITHOTRIPSY, USING LASER;  Surgeon: Alexsander Reyes M.D.;  Location: SURGERY [facility];  Service: Urology    FECAL TRANSPLANT N/A 4/9/2018    Procedure: FECAL TRANSPLANT;  Surgeon: Gonzalez Cruz M.D.;  Location: ENDOSCOPY [facility];  Service: Gastroenterology    COLONOSCOPY - ENDO N/A 4/9/2018    Procedure: COLONOSCOPY - ENDO;  Surgeon: Gonzalez Cruz M.D.;  Location: ENDOSCOPY [facility];  Service: Gastroenterology    WIDE EXCISION MELANOMA, LEG, W/POSS.STSG  10/2015    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    CARDIAC CATH, RIGHT HEART  2008    stent    OTHER ORTHOPEDIC SURGERY Left 2008    hand repair    CHOLECYSTECTOMY  1993    TONSILLECTOMY  1945    OTHER CARDIAC SURGERY      r heart cath stents    STENT PLACEMENT      L iliac stent     Family History   Problem Relation Age of Onset    Hypertension Mother     Hyperlipidemia Mother     Cancer Maternal Grandmother         tongue    Cancer Maternal Uncle         x 3, pancreas    Cancer Maternal Grandfather         unknown    Cancer Paternal Grandmother         unknown    Cancer Paternal Grandfather         unknown    Diabetes Maternal Uncle     Heart Disease Maternal Uncle     Hypertension Sister     Hyperlipidemia Sister     Heart Disease Sister     Alcohol/Drug Sister     Thyroid Sister     Psychiatric Illness Neg Hx     Stroke Neg Hx      Social History      Socioeconomic History    Marital status:      Spouse name: Not on file    Number of children: 0    Years of education: Not on file    Highest education level: Not on file   Occupational History    Not on file   Tobacco Use    Smoking status: Some Days     Current packs/day: 0.01     Average packs/day: (0.7 ttl pk-yrs)     Types: Cigarettes     Start date: 3/20/1957    Smokeless tobacco: Never    Tobacco comments:     1 cigarette per day, not interested in cessation information   Vaping Use    Vaping status: Never Used   Substance and Sexual Activity    Alcohol use: No     Alcohol/week: 0.0 oz    Drug use: Never    Sexual activity: Not Currently     Partners: Male   Other Topics Concern    Not on file   Social History Narrative    .     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 [employer]     Social Drivers of Health     Financial Resource Strain: Not on file   Food Insecurity: Not on file   Transportation Needs: Not on file   Physical Activity: Not on file   Stress: Not on file   Social Connections: Not on file   Intimate Partner Violence: Not on file   Housing Stability: Not on file     Allergies   Allergen Reactions    Penicillins Anaphylaxis and Hives    Codeine Swelling    Iodine Swelling    Bee Venom Anaphylaxis    Chantix [Varenicline]      disoriented    Ciprofloxacin      Causes C diff, recurrent and very difficult    Diphtheria,Pertussis,Tetanus     Flagyl [Metronidazole] Rash     C/O flagyl causes rash.     Honey Bee Venom     Latex Hives    Lipitor [Atorvastatin Calcium] Unspecified     Intense Stomach pain    Other Environmental Itching and Swelling    Shellfish Allergy      unknown    Tetanus Antitoxin Swelling     unknown    Vecuronium & Derivatives      Outpatient Encounter Medications as of 1/8/2025   Medication Sig Dispense Refill    furosemide (LASIX) 20 MG Tab Take 1 Tablet by mouth every day. 90 Tablet 3    sotalol (BETAPACE) 80 MG Tab TAKE ONE-HALF TABLET BY MOUTH 2 TIMES DAILY 90 Tablet  3    lovastatin (MEVACOR) 40 MG tablet Take 1 Tablet by mouth at bedtime. 90 Tablet 3    albuterol 108 (90 Base) MCG/ACT Aero Soln inhalation aerosol Inhale 2 Puffs 4 times a day as needed for Shortness of Breath. 1 Each 3    warfarin (COUMADIN) 1 MG Tab TAKE 0.5 TO 1 TABLET DAILY OR AS DIRECTED BY ANTICOAGULATION CLINIC 100 Tablet 1    predniSONE (DELTASONE) 10 MG Tab Take 1 Tablet by mouth every 3 days. 30 Tablet 3    ALPRAZolam (XANAX) 0.25 MG Tab Take 1 Tablet by mouth 2 times a day as needed for Anxiety for up to 180 days. 60 Tablet 5    fluticasone (FLONASE) 50 MCG/ACT nasal spray SPRAY 1 SPRAY INTO AFFECTED NOSTRIL (S) DAILY 16 mL 3    [DISCONTINUED] lovastatin (MEVACOR) 40 MG tablet TAKE 1 TABLET AT BEDTIME 90 Tablet 3    [DISCONTINUED] sotalol (BETAPACE) 80 MG Tab TAKE ONE-HALF TABLET BY MOUTH 2 TIMES DAILY 90 Tablet 3    [DISCONTINUED] furosemide (LASIX) 20 MG Tab Take 1 Tablet by mouth every day. 90 Tablet 3    Calcium-Magnesium-Vitamin D (CALCIUM 1200+D3 PO) Take 1 Tablet by mouth every day.      D-Mannose Powder Take 1 Dose by mouth every day. Mix one scoop with 4 oz of water      Multiple Vitamins-Minerals (ZINC PO) Take 1 Tablet by mouth every day.      ipratropium-albuterol (DUONEB) 0.5-2.5 (3) MG/3ML nebulizer solution Take 3 mL by nebulization every four hours as needed for Shortness of Breath. 120 mL 11    EPINEPHrine (EPIPEN) 0.3 MG/0.3ML Solution Auto-injector solution for injection epinephrine 0.3 mg/0.3 mL injection, auto-injector      Probiotic Product (PROBIOTIC PO) Take 1 Capsule by mouth every day.      ascorbic acid (ASCORBIC ACID) 500 MG Tab Take 2 Tablets by mouth every day.      vitamin D (CHOLECALCIFEROL) 1000 UNIT Tab Take 3,000 Units by mouth every morning. 3 tablets = 3000 units       No facility-administered encounter medications on file as of 1/8/2025.     Review of Systems   Constitutional:  Negative for fever and malaise/fatigue.   Respiratory:  Positive for shortness of  "breath. Negative for cough.         Shortness of breath at rest where she can't take a big breath   Cardiovascular:  Negative for chest pain, palpitations, orthopnea, claudication, leg swelling and PND.   Gastrointestinal:  Negative for abdominal pain.   Musculoskeletal:  Negative for myalgias.   Neurological:  Negative for dizziness.              Objective     /50 (BP Location: Left arm, Patient Position: Sitting, BP Cuff Size: Adult)   Pulse 64   Resp 12   Ht 1.575 m (5' 2\")   Wt 39.9 kg (88 lb)   SpO2 94%   BMI 16.10 kg/m²     Physical Exam  Vitals and nursing note reviewed.   Constitutional:       Appearance: Normal appearance. She is well-developed and normal weight.   HENT:      Head: Normocephalic and atraumatic.   Neck:      Vascular: No JVD.   Cardiovascular:      Rate and Rhythm: Normal rate and regular rhythm.      Pulses: Normal pulses.      Heart sounds: Normal heart sounds.   Pulmonary:      Effort: Pulmonary effort is normal.      Breath sounds: Normal breath sounds.   Musculoskeletal:         General: Normal range of motion.   Skin:     General: Skin is warm and dry.      Capillary Refill: Capillary refill takes less than 2 seconds.   Neurological:      General: No focal deficit present.      Mental Status: She is alert and oriented to person, place, and time. Mental status is at baseline.   Psychiatric:         Mood and Affect: Mood normal.         Behavior: Behavior normal.         Thought Content: Thought content normal.         Judgment: Judgment normal.                Assessment & Plan     1. S/P mitral valve clip implantation  EC-ECHOCARDIOGRAM COMPLETE W/O CONT    furosemide (LASIX) 20 MG Tab      2. Centrilobular emphysema (HCC)        3. Circulating anticoagulants (HCC)  CBC WITHOUT DIFFERENTIAL      4. Coronary artery disease involving native coronary artery of native heart without angina pectoris        5. Paroxysmal atrial fibrillation (HCC)  EKG - Clinic Performed      6. " Peripheral arterial disease (HCC)  lovastatin (MEVACOR) 40 MG tablet    Lipid Profile      7. Pre-diabetes        8. Pulmonary hypertension (HCC)  furosemide (LASIX) 20 MG Tab      9. Cigarette nicotine dependence with nicotine-induced disorder        10. Paroxysmal a-fib (CMS-HCC)  sotalol (BETAPACE) 80 MG Tab    Comp Metabolic Panel      11. Hyperlipidemia, unspecified hyperlipidemia type  Lipid Profile          Medical Decision Making: Today's Assessment/Status/Plan:      1. S/P Juliet Clip X1  -doing well post-op  -cont coumadin monotherapy, no ASA  -SBE prophylaxis understands lifelong  -echo 1 year reviewed, repeat in 1 year    2. HLD with CAD with prior PCI  -no angina, but new at rest VIDAL  -follow, could be multifactorial with lung disease  -cont statin  -LDL goal <70    3. PAF on chronic anticoagulation  -asymptomatic, rate controlled  -cont warfarin per anticoagulation clinic  -wants Watchman consultation  -cont sotalol 40 mg BID for rhythm control    4. COPD with pulmonary HTN  -follow with pulmonary  -cont lasix and inhalers for symptom management    Patient is to follow up with Dr. Ewing in 4 months with labs. Echo in 1 year.

## 2025-01-08 NOTE — PROGRESS NOTES
Valve Program Functional Assessment:     [id number]   1a) Showering/bathin  1b) Walking 1 block on ground: 5  1c) Hurrying or jogging:3  2) Swellin  3) Fatigue: 4  4) Shortness of breath: 2  5) Sleep sitting up: 1  6) Limited enjoyment of life: 3  7) Spend the rest of your life with HF: 2  8a) Hobbies, recreational activities:4  8b) Working or doing household chores:4  8c) Visiting family or friends: 4

## 2025-01-08 NOTE — PATIENT INSTRUCTIONS
Obtain labs prior to your next appointment with Dr. Lozano for Watchman consultation.    I have refilled your sotalol, furosemide, and lovastatin for one year to [facility].

## 2025-01-14 ENCOUNTER — TELEPHONE (OUTPATIENT)
Dept: CARDIOLOGY | Facility: MEDICAL CENTER | Age: 85
End: 2025-01-14
Payer: MEDICARE

## 2025-01-14 DIAGNOSIS — I48.0 PAROXYSMAL A-FIB (HCC): ICD-10-CM

## 2025-01-14 DIAGNOSIS — I73.9 PERIPHERAL ARTERIAL DISEASE (HCC): ICD-10-CM

## 2025-01-14 DIAGNOSIS — I27.20 PULMONARY HYPERTENSION (HCC): ICD-10-CM

## 2025-01-14 DIAGNOSIS — Z98.890 S/P MITRAL VALVE CLIP IMPLANTATION: ICD-10-CM

## 2025-01-14 DIAGNOSIS — Z95.818 S/P MITRAL VALVE CLIP IMPLANTATION: ICD-10-CM

## 2025-01-14 RX ORDER — LOVASTATIN 40 MG/1
40 TABLET ORAL
Qty: 90 TABLET | Refills: 3 | Status: SHIPPED | OUTPATIENT
Start: 2025-01-14 | End: 2025-01-17

## 2025-01-14 RX ORDER — SOTALOL HYDROCHLORIDE 80 MG/1
TABLET ORAL
Qty: 90 TABLET | Refills: 3 | Status: SHIPPED | OUTPATIENT
Start: 2025-01-14

## 2025-01-14 RX ORDER — FUROSEMIDE 20 MG/1
20 TABLET ORAL DAILY
Qty: 90 TABLET | Refills: 3 | Status: SHIPPED | OUTPATIENT
Start: 2025-01-14

## 2025-01-14 NOTE — TELEPHONE ENCOUNTER
FW: After Hours Call  Received: Today  NIKKI Montenegro R.N.         Previous Messages       ----- Message -----  From: Alok العراقي Ass't  Sent: 2025  11:22 AM PST  To: mAy Hernandez R.N.  Subject: FW: After Hours Call                              ----- Message -----  From: Analia Peraza  Sent: 2025   9:34 AM PST  To: Jakob Cruz  Subject: After Hours Call                                PATIENT NAME:Angie Root  CALLER NAME: Angie Rojas  REASON FOR CALL: Caller wanted to let Isabel Thomas know that the [facility] has not received her prescription refill requests as of yet. She is wondering if Isabel can resend the requests over again. To the [facility]  PHONE NUMBER: [phone] (Angie Root)  [phone] ([facility])  CARDIO PROVIDER: Isabel Thomas  : 1940  MRN: [id number]  ADVISED 1-2 BUSINESS DAYS FOR CALL BACK Y/N: N. Caller does need a callback.  --------------------------------------------------------------    Rx's  resent

## 2025-01-16 DIAGNOSIS — I73.9 PERIPHERAL ARTERIAL DISEASE (HCC): ICD-10-CM

## 2025-01-16 NOTE — TELEPHONE ENCOUNTER
Received request via: Pharmacy    Was the patient seen in the last year in this department? Yes    Does the patient have an active prescription (recently filled or refills available) for medication(s) requested? No    Pharmacy Name: [facility]    Does the patient have retirement Plus and need 100-day supply? (This applies to ALL medications) Patient does not have SCP

## 2025-01-17 RX ORDER — LOVASTATIN 40 MG/1
40 TABLET ORAL
Qty: 100 TABLET | Refills: 3 | Status: SHIPPED | OUTPATIENT
Start: 2025-01-17

## 2025-01-23 ENCOUNTER — ANTICOAGULATION MONITORING (OUTPATIENT)
Dept: VASCULAR LAB | Facility: MEDICAL CENTER | Age: 85
End: 2025-01-23
Payer: MEDICARE

## 2025-01-23 ENCOUNTER — HOSPITAL ENCOUNTER (OUTPATIENT)
Dept: LAB | Facility: MEDICAL CENTER | Age: 85
End: 2025-01-23
Attending: INTERNAL MEDICINE
Payer: MEDICARE

## 2025-01-23 DIAGNOSIS — Z79.01 CHRONIC ANTICOAGULATION: ICD-10-CM

## 2025-01-23 LAB
INR PPP: 2.25 (ref 0.87–1.13)
PROTHROMBIN TIME: 25 SEC (ref 12–14.6)

## 2025-01-23 PROCEDURE — 36415 COLL VENOUS BLD VENIPUNCTURE: CPT

## 2025-01-23 PROCEDURE — 85610 PROTHROMBIN TIME: CPT

## 2025-01-23 NOTE — PROGRESS NOTES
OP Anticoagulation Service Note    Date: 2025    Anticoagulation Summary  As of 2025      INR goal:  2.0-3.0   TTR:  81.1% (7 y)   INR used for dosin.25 (2025)   Warfarin maintenance plan:  0.5 mg (1 mg x 0.5) every Mon, Wed, Fri; 1 mg (1 mg x 1) all other days   Weekly warfarin total:  5.5 mg   Plan last modified:  Sally Ogden, Pharmacy Intern (10/7/2024)   Next INR check:  2025   Priority:  Routine   Target end date:  Indefinite    Indications    Paroxysmal a-fib (CMS-HCC) (Resolved) [I48.0]  Chronic anticoagulation (Resolved) [Z79.01]                 Anticoagulation Episode Summary       INR check location:  Clinic Lab    Preferred lab:  [facility]    Send INR reminders to:  --    Comments:  [facility] Cardiology   [phone] (H)  [facility] labs          Anticoagulation Care Providers       Provider Role Specialty Phone number    Evelio Tejeda M.D. Referring Internal Medicine [phone]    [facility] Anticoagulation Services Responsible  [phone]    Beryl Pang M.D. Responsible Family Medicine [phone]          Anticoagulation Patient Findings        Patient's preferred phone number:  Angie Root  [address] [place] [address] [phone]        HPI:   The reason for today's call is to prevent morbidity and mortality from a blood clot and/or stroke and to reduce the risk of bleeding while on a anticoagulant.     Care Team    PCP:  Beryl Pang M.D.  [address] [place] [address] [phone]      Patient Care Team                Beryl Pang M.D. PCP - General, Family Medicine [phone]     [address] [place] [address]    Richard Corona M.D. (Inactive) Surgery [phone]     [address] [place] [address] [address] [place] [address]    Johnathan Morgan M.D. (Inactive) Cardiovascular Disease (Cardiology) [phone]     [address] [facility] [address] [place] [address]    [facility] [phone]     [address] [facility]  [address]    Jamaal Patel M.D. Otolaryngology [phone]     [address] [facility] [address]    Brit Rosado M.D. Consulting Physician, Gastroenterology [phone]     [address] [facility] [address]    [facility]ticoagulation Services  [phone]     [address] [facility] [address]            Assessment:     INR  therapeutic.     Lab Results   Component Value Date/Time    BUN 23 (H) 08/27/2024 09:46 AM    CREATININE 0.95 08/27/2024 09:46 AM     Lab Results   Component Value Date/Time    HEMOGLOBIN 14.2 08/27/2024 09:46 AM    HEMATOCRIT 46.2 08/27/2024 09:46 AM    PLATELETCT 224 08/27/2024 09:46 AM    ALKPHOSPHAT 77 08/27/2024 09:46 AM    ASTSGOT 37 08/27/2024 09:46 AM    ALTSGPT 24 08/27/2024 09:46 AM          Current Outpatient Medications:     furosemide, 20 mg, Oral, DAILY    sotalol, TAKE ONE-HALF TABLET BY MOUTH 2 TIMES DAILY    albuterol, 2 Puff, Inhalation, 4X/DAY PRN    lovastatin, 40 mg, Oral, QHS    warfarin, TAKE 0.5 TO 1 TABLET DAILY OR AS DIRECTED BY ANTICOAGULATION CLINIC    predniSONE, 10 mg, Oral, Q3 DAYS    ALPRAZolam, 0.25 mg, Oral, BID PRN    fluticasone, SPRAY 1 SPRAY INTO AFFECTED NOSTRIL (S) DAILY    Calcium-Magnesium-Vitamin D (CALCIUM 1200+D3 PO), 1 Tablet, Oral, DAILY    D-Mannose, 1 Dose, Oral, DAILY    Multiple Vitamins-Minerals (ZINC PO), 1 Tablet, Oral, DAILY    ipratropium-albuterol, 3 mL, Nebulization, Q4HRS PRN    EPINEPHrine, epinephrine 0.3 mg/0.3 mL injection, auto-injector    Probiotic Product (PROBIOTIC PO), 1 Capsule, Oral, DAILY    ascorbic acid, 1,000 mg, Oral, DAILY    vitamin D, 3,000 Units, Oral, QAM      [id number] Stroke Risk Points: 4   Values used to calculate this score:    Points  Metrics       0        Has Congestive Heart Failure: No       0        Has Hypertension: No       2        Age: 84       0        Has Diabetes: No       0        Had Stroke: No                 Had TIA: No                 Had Thromboembolism: No       1        Has  Vascular Disease: Yes       1        Clinically Relevant Sex: Female     No data recorded     Plan:     Continue the same warfarin dose, as noted above.       Follow-up:     As seen above      Additional information discussed with patient:     Asked patient to please call the anticoagulation clinic if they have any signs/symptoms of bleeding and/or thrombosis or any changes to diet or medications.      National recommendations regarding anticoagulation therapy:     The CHEST guidelines recommends frequent INR monitoring at regular intervals (a few days up to a max of 12 weeks) to ensure patients are on the proper dose of warfarin, and patients are not having any complications from therapy.  INRs can dramatically change over a short time period due to diet, medications, and medical conditions.     [facility]  Phone: [phone]  Fax: [phone]  On call: [phone]  General scheduling/information [phone]  For emergencies please dial [phone]  Please do not use [place] for urgent matters, call the phone numbers listed above.    This note was created using voice recognition software (Dragon). The accuracy of the dictation is limited by the abilities of the software. I have reviewed the note prior to signing, however some errors in grammar and context are still possible. If you have any questions related to this note please do not hesitate to contact our office.

## 2025-01-27 DIAGNOSIS — J43.9 PULMONARY EMPHYSEMA, UNSPECIFIED EMPHYSEMA TYPE (HCC): ICD-10-CM

## 2025-01-27 RX ORDER — ALBUTEROL SULFATE 90 UG/1
2 INHALANT RESPIRATORY (INHALATION) 4 TIMES DAILY PRN
Qty: 1 EACH | Refills: 3 | Status: SHIPPED | OUTPATIENT
Start: 2025-01-27

## 2025-01-27 NOTE — TELEPHONE ENCOUNTER
Can we send [facility] to [facility]. The last refill got sent through the [facility] and they charge her more than [facility].    Thank you!

## 2025-02-06 ENCOUNTER — APPOINTMENT (OUTPATIENT)
Dept: URBAN - METROPOLITAN AREA CLINIC 4 | Facility: CLINIC | Age: 85
Setting detail: DERMATOLOGY
End: 2025-02-06

## 2025-02-06 DIAGNOSIS — D485 NEOPLASM OF UNCERTAIN BEHAVIOR OF SKIN: ICD-10-CM

## 2025-02-06 DIAGNOSIS — S31000A OPEN WOUND(S) (MULTIPLE) OF UNSPECIFIED SITE(S), WITHOUT MENTION OF COMPLICATION: ICD-10-CM

## 2025-02-06 DIAGNOSIS — L57.0 ACTINIC KERATOSIS: ICD-10-CM

## 2025-02-06 PROBLEM — D48.5 NEOPLASM OF UNCERTAIN BEHAVIOR OF SKIN: Status: ACTIVE | Noted: 2025-02-06

## 2025-02-06 PROBLEM — T07.XXXA UNSPECIFIED MULTIPLE INJURIES, INITIAL ENCOUNTER: Status: ACTIVE | Noted: 2025-02-06

## 2025-02-06 PROCEDURE — ? ADDITIONAL NOTES

## 2025-02-06 PROCEDURE — 99213 OFFICE O/P EST LOW 20 MIN: CPT | Mod: 25

## 2025-02-06 PROCEDURE — ? DIAGNOSIS COMMENT

## 2025-02-06 PROCEDURE — ? COUNSELING

## 2025-02-06 PROCEDURE — ? MEDICATION COUNSELING

## 2025-02-06 PROCEDURE — 11102 TANGNTL BX SKIN SINGLE LES: CPT

## 2025-02-06 PROCEDURE — ? BIOPSY BY SHAVE METHOD

## 2025-02-06 ASSESSMENT — LOCATION SIMPLE DESCRIPTION DERM
LOCATION SIMPLE: RIGHT THIGH
LOCATION SIMPLE: CHEST

## 2025-02-06 ASSESSMENT — LOCATION DETAILED DESCRIPTION DERM
LOCATION DETAILED: RIGHT ANTERIOR DISTAL THIGH
LOCATION DETAILED: UPPER STERNUM

## 2025-02-06 ASSESSMENT — LOCATION ZONE DERM
LOCATION ZONE: LEG
LOCATION ZONE: TRUNK

## 2025-02-06 NOTE — PROCEDURE: ADDITIONAL NOTES
Render Risk Assessment In Note?: no
Additional Notes: Healing well, continue wound care.
Detail Level: Simple

## 2025-02-06 NOTE — PROCEDURE: DIAGNOSIS COMMENT
Comment: This lesion has been biopsied before but is larger and has returned. She will used her previous presence of Efudex cream for two weeks twice a day.\\nSince last visit patient has not used Efudex however we let her know she should use that twice daily for 2 weeks.
Detail Level: Detailed
Render Risk Assessment In Note?: no

## 2025-02-11 NOTE — ADDENDUM NOTE
Addended by: CATHRYN AIKEN on: 12/24/2019 12:21 PM     Modules accepted: Orders    
IV discontinued, cath removed intact

## 2025-02-19 ENCOUNTER — HOSPITAL ENCOUNTER (OUTPATIENT)
Dept: LAB | Facility: MEDICAL CENTER | Age: 85
End: 2025-02-19
Attending: NURSE PRACTITIONER
Payer: MEDICARE

## 2025-02-19 DIAGNOSIS — Z79.01 CHRONIC ANTICOAGULATION: ICD-10-CM

## 2025-02-19 LAB
INR PPP: 1.97 (ref 0.87–1.13)
PROTHROMBIN TIME: 22.5 SEC (ref 12–14.6)

## 2025-02-19 PROCEDURE — 85610 PROTHROMBIN TIME: CPT

## 2025-02-19 PROCEDURE — 36415 COLL VENOUS BLD VENIPUNCTURE: CPT

## 2025-02-21 ENCOUNTER — ANTICOAGULATION MONITORING (OUTPATIENT)
Dept: MEDICAL GROUP | Facility: PHYSICIAN GROUP | Age: 85
End: 2025-02-21
Payer: MEDICARE

## 2025-02-23 DIAGNOSIS — F41.0 PANIC ATTACKS: ICD-10-CM

## 2025-02-25 RX ORDER — ALPRAZOLAM 0.25 MG
0.25 TABLET ORAL 2 TIMES DAILY PRN
Qty: 60 TABLET | Refills: 5 | Status: SHIPPED | OUTPATIENT
Start: 2025-02-25 | End: 2025-08-24

## 2025-02-26 ENCOUNTER — APPOINTMENT (OUTPATIENT)
Dept: URBAN - METROPOLITAN AREA CLINIC 4 | Facility: CLINIC | Age: 85
Setting detail: DERMATOLOGY
End: 2025-02-26

## 2025-02-26 PROBLEM — C44.529 SQUAMOUS CELL CARCINOMA OF SKIN OF OTHER PART OF TRUNK: Status: ACTIVE | Noted: 2025-02-26

## 2025-02-26 PROCEDURE — ? EXCISION

## 2025-02-26 PROCEDURE — 13101 CMPLX RPR TRUNK 2.6-7.5 CM: CPT

## 2025-02-26 PROCEDURE — 11604 EXC TR-EXT MAL+MARG 3.1-4 CM: CPT

## 2025-02-26 NOTE — PROCEDURE: EXCISION
Referring Physician (Optional): GENARO Haq
Surgeon (Optional): Paulina
Biopsy Photograph Reviewed: Yes
Previous Accession (Optional): [id number].
Date Of Previous Biopsy (Optional): 02/06/2025
Size Of Lesion In Cm: 2.9
Size Of Margin In Cm: 0.2
Anesthesia Volume In Cc: 12
Was An Eye Clamp Used?: No
Eye Clamp Note Details: An eye clamp was used during the procedure.
Excision Method: Elliptical
Saucerization Depth: dermis and superficial adipose tissue
Repair Type: Complex
Intermediate / Complex Repair - Final Wound Length In Cm: 6.6
Deep Sutures: 2-0 Vicryl
Epidermal Sutures: 4-0 Vicryl Rapide
Suturegard Retention Suture: 2-0 Nylon
Retention Suture Bite Size: 3 mm
Length To Time In Minutes Device Was In Place: 10
Number Of Hemigard Strips Per Side: 1
Width Of Defect Perpendicular To Closure In Cm (Required): 2.6
Distance Of Undermining In Cm (Required): 3.1
Undermining Type: Entire Wound
Debridement Text: The wound edges were debrided prior to proceeding with the closure to facilitate wound healing.
Helical Rim Text: The closure involved the helical rim.
Vermilion Border Text: The closure involved the vermilion border.
Nostril Rim Text: The closure involved the nostril rim.
Retention Suture Text: Retention sutures were placed to support the closure and prevent dehiscence.
Primary Defect Length (In Cm): 0
Lab: [id number]
Lab Facility: 
Graft Donor Site Bandage (Optional-Leave Blank If You Don't Want In Note): Steri-strips and a pressure bandage were applied to the donor site.
Epidermal Closure Graft Donor Site (Optional): simple interrupted
Billing Type: Third-Party Bill
Excision Depth: adipose tissue
Scalpel Size: 15 blade
Anesthesia Type: 1% lidocaine with epinephrine
Additional Anesthesia Volume In Cc: 6
Hemostasis: Electrocautery
Estimated Blood Loss (Cc): minimal
Detail Level: Detailed
Repair Depth: use same depth as excision depth
Repair Anesthesia Method: local infiltration
Dermal Closure: buried vertical mattress
Epidermal Closure: running subcuticular
Wound Care: Bacitracin
Dressing: dry sterile dressing
Suturegard Intro: Intraoperative tissue expansion was performed, utilizing the SUTUREGARD device, in order to reduce wound tension.
Suturegard Body: The suture ends were repeatedly re-tightened and re-clamped to achieve the desired tissue expansion.
Hemigard Intro: Due to skin fragility and wound tension, it was decided to use HEMIGARD adhesive retention suture devices to permit a linear closure. The skin was cleaned and dried for a 6cm distance away from the wound. Excessive hair, if present, was removed to allow for adhesion.
Hemigard Postcare Instructions: The HEMIGARD strips are to remain completely dry for at least 5-7 days.
Positioning (Leave Blank If You Do Not Want): The patient was placed in a comfortable position exposing the surgical site.
Pre-Excision Curettage Text (Leave Blank If You Do Not Want): Prior to drawing the surgical margin the visible lesion was removed with electrodesiccation and curettage to clearly define the lesion size.
Complex Repair Preamble Text (Leave Blank If You Do Not Want): Extensive wide undermining was performed.
Intermediate Repair Preamble Text (Leave Blank If You Do Not Want): Undermining was performed with blunt dissection.
Curvilinear Excision Additional Text (Leave Blank If You Do Not Want): The margin was drawn around the clinically apparent lesion.  A curvilinear shape was then drawn on the skin incorporating the lesion and margins.  Incisions were then made along these lines to the appropriate tissue plane and the lesion was extirpated.
Fusiform Excision Additional Text (Leave Blank If You Do Not Want): The margin was drawn around the clinically apparent lesion.  A fusiform shape was then drawn on the skin incorporating the lesion and margins.  Incisions were then made along these lines to the appropriate tissue plane and the lesion was extirpated.
Elliptical Excision Additional Text (Leave Blank If You Do Not Want): The margin was drawn around the clinically apparent lesion.  An elliptical shape was then drawn on the skin incorporating the lesion and margins.  Incisions were then made along these lines to the appropriate tissue plane and the lesion was extirpated.
Saucerization Excision Additional Text (Leave Blank If You Do Not Want): The margin was drawn around the clinically apparent lesion.  Incisions were then made along these lines, in a tangential fashion, to the appropriate tissue plane and the lesion was extirpated.
Slit Excision Additional Text (Leave Blank If You Do Not Want): A linear line was drawn on the skin overlying the lesion. An incision was made slowly until the lesion was visualized.  Once visualized, the lesion was removed with blunt dissection.
Excisional Biopsy Additional Text (Leave Blank If You Do Not Want): The margin was drawn around the clinically apparent lesion. An elliptical shape was then drawn on the skin incorporating the lesion and margins.  Incisions were then made along these lines to the appropriate tissue plane and the lesion was extirpated.
Perilesional Excision Additional Text (Leave Blank If You Do Not Want): The margin was drawn around the clinically apparent lesion. Incisions were then made along these lines to the appropriate tissue plane and the lesion was extirpated.
Repair Performed By Another Provider Text (Leave Blank If You Do Not Want): After the tissue was excised the defect was repaired by another provider.
No Repair - Repaired With Adjacent Surgical Defect Text (Leave Blank If You Do Not Want): After the excision the defect was repaired concurrently with another surgical defect which was in close approximation.
Adjacent Tissue Transfer Text: The defect edges were debeveled with a #15 scalpel blade. Given the location of the defect and the proximity to free margins an adjacent tissue transfer was deemed most appropriate. Using a sterile surgical marker, an appropriate flap was drawn incorporating the defect and placing the expected incisions within the relaxed skin tension lines where possible. The area thus outlined was incised deep to adipose tissue with a #15 scalpel blade. The skin margins were undermined to an appropriate distance in all directions utilizing iris scissors and carried over to close the primary defect.
Advancement Flap (Single) Text: The defect edges were debeveled with a #15 scalpel blade.  Given the location of the defect and the proximity to free margins a single advancement flap was deemed most appropriate.  Using a sterile surgical marker, an appropriate advancement flap was drawn incorporating the defect and placing the expected incisions within the relaxed skin tension lines where possible.    The area thus outlined was incised deep to adipose tissue with a #15 scalpel blade.  The skin margins were undermined to an appropriate distance in all directions utilizing iris scissors.
Advancement Flap (Double) Text: The defect edges were debeveled with a #15 scalpel blade.  Given the location of the defect and the proximity to free margins a double advancement flap was deemed most appropriate.  Using a sterile surgical marker, the appropriate advancement flaps were drawn incorporating the defect and placing the expected incisions within the relaxed skin tension lines where possible.    The area thus outlined was incised deep to adipose tissue with a #15 scalpel blade.  The skin margins were undermined to an appropriate distance in all directions utilizing iris scissors.
Burow's Advancement Flap Text: The defect edges were debeveled with a #15 scalpel blade.  Given the location of the defect and the proximity to free margins a Burow's advancement flap was deemed most appropriate.  Using a sterile surgical marker, the appropriate advancement flap was drawn incorporating the defect and placing the expected incisions within the relaxed skin tension lines where possible.    The area thus outlined was incised deep to adipose tissue with a #15 scalpel blade.  The skin margins were undermined to an appropriate distance in all directions utilizing iris scissors.
Chonodrocutaneous Helical Advancement Flap Text: The defect edges were debeveled with a #15 scalpel blade. Given the location of the defect and the proximity to free margins a chondrocutaneous helical advancement flap was deemed most appropriate. Using a sterile surgical marker, the appropriate advancement flap was drawn incorporating the defect and placing the expected incisions within the relaxed skin tension lines where possible. The area thus outlined was incised deep to adipose tissue with a #15 scalpel blade. The skin margins were undermined to an appropriate distance in all directions utilizing iris scissors. Following this, the designed flap was advanced and carried over into the primary defect and sutured into place.
Crescentic Advancement Flap Text: The defect edges were debeveled with a #15 scalpel blade.  Given the location of the defect and the proximity to free margins a crescentic advancement flap was deemed most appropriate.  Using a sterile surgical marker, the appropriate advancement flap was drawn incorporating the defect and placing the expected incisions within the relaxed skin tension lines where possible.    The area thus outlined was incised deep to adipose tissue with a #15 scalpel blade.  The skin margins were undermined to an appropriate distance in all directions utilizing iris scissors.
A-T Advancement Flap Text: The defect edges were debeveled with a #15 scalpel blade.  Given the location of the defect, shape of the defect and the proximity to free margins an A-T advancement flap was deemed most appropriate.  Using a sterile surgical marker, an appropriate advancement flap was drawn incorporating the defect and placing the expected incisions within the relaxed skin tension lines where possible.    The area thus outlined was incised deep to adipose tissue with a #15 scalpel blade.  The skin margins were undermined to an appropriate distance in all directions utilizing iris scissors.
O-T Advancement Flap Text: The defect edges were debeveled with a #15 scalpel blade.  Given the location of the defect, shape of the defect and the proximity to free margins an O-T advancement flap was deemed most appropriate.  Using a sterile surgical marker, an appropriate advancement flap was drawn incorporating the defect and placing the expected incisions within the relaxed skin tension lines where possible.    The area thus outlined was incised deep to adipose tissue with a #15 scalpel blade.  The skin margins were undermined to an appropriate distance in all directions utilizing iris scissors.
O-L Flap Text: The defect edges were debeveled with a #15 scalpel blade.  Given the location of the defect, shape of the defect and the proximity to free margins an O-L flap was deemed most appropriate.  Using a sterile surgical marker, an appropriate advancement flap was drawn incorporating the defect and placing the expected incisions within the relaxed skin tension lines where possible.    The area thus outlined was incised deep to adipose tissue with a #15 scalpel blade.  The skin margins were undermined to an appropriate distance in all directions utilizing iris scissors.
O-Z Flap Text: The defect edges were debeveled with a #15 scalpel blade. Given the location of the defect, shape of the defect and the proximity to free margins an O-Z flap was deemed most appropriate. Using a sterile surgical marker, an appropriate transposition flap was drawn incorporating the defect and placing the expected incisions within the relaxed skin tension lines where possible. The area thus outlined was incised deep to adipose tissue with a #15 scalpel blade. The skin margins were undermined to an appropriate distance in all directions utilizing iris scissors. Following this, the designed flap was carried over into the primary defect and sutured into place.
Double O-Z Flap Text: The defect edges were debeveled with a #15 scalpel blade. Given the location of the defect, shape of the defect and the proximity to free margins a Double O-Z flap was deemed most appropriate. Using a sterile surgical marker, an appropriate transposition flap was drawn incorporating the defect and placing the expected incisions within the relaxed skin tension lines where possible. The area thus outlined was incised deep to adipose tissue with a #15 scalpel blade. The skin margins were undermined to an appropriate distance in all directions utilizing iris scissors. Following this, the designed flap was carried over into the primary defect and sutured into place.
V-Y Flap Text: The defect edges were debeveled with a #15 scalpel blade.  Given the location of the defect, shape of the defect and the proximity to free margins a V-Y flap was deemed most appropriate.  Using a sterile surgical marker, an appropriate advancement flap was drawn incorporating the defect and placing the expected incisions within the relaxed skin tension lines where possible.    The area thus outlined was incised deep to adipose tissue with a #15 scalpel blade.  The skin margins were undermined to an appropriate distance in all directions utilizing iris scissors.
Advancement-Rotation Flap Text: The defect edges were debeveled with a #15 scalpel blade.  Given the location of the defect, shape of the defect and the proximity to free margins an advancement-rotation flap was deemed most appropriate.  Using a sterile surgical marker, an appropriate flap was drawn incorporating the defect and placing the expected incisions within the relaxed skin tension lines where possible. The area thus outlined was incised deep to adipose tissue with a #15 scalpel blade.  The skin margins were undermined to an appropriate distance in all directions utilizing iris scissors.
Mercedes Flap Text: The defect edges were debeveled with a #15 scalpel blade. Given the location of the defect, shape of the defect and the proximity to free margins a Mercedes flap was deemed most appropriate. Using a sterile surgical marker, an appropriate advancement flap was drawn incorporating the defect and placing the expected incisions within the relaxed skin tension lines where possible. The area thus outlined was incised deep to adipose tissue with a #15 scalpel blade. The skin margins were undermined to an appropriate distance in all directions utilizing iris scissors. Following this, the designed flap was advanced and carried over into the primary defect and sutured into place.
Modified Advancement Flap Text: The defect edges were debeveled with a #15 scalpel blade.  Given the location of the defect, shape of the defect and the proximity to free margins a modified advancement flap was deemed most appropriate.  Using a sterile surgical marker, an appropriate advancement flap was drawn incorporating the defect and placing the expected incisions within the relaxed skin tension lines where possible.    The area thus outlined was incised deep to adipose tissue with a #15 scalpel blade.  The skin margins were undermined to an appropriate distance in all directions utilizing iris scissors.
Mucosal Advancement Flap Text: Given the location of the defect, shape of the defect and the proximity to free margins a mucosal advancement flap was deemed most appropriate. Incisions were made with a 15 blade scalpel in the appropriate fashion along the cutaneous vermilion border and the mucosal lip. The remaining actinically damaged mucosal tissue was excised.  The mucosal advancement flap was then elevated to the gingival sulcus with care taken to preserve the neurovascular structures and advanced into the primary defect. Care was taken to ensure that precise realignment of the vermilion border was achieved.
Peng Advancement Flap Text: The defect edges were debeveled with a #15 scalpel blade. Given the location of the defect, shape of the defect and the proximity to free margins a Peng advancement flap was deemed most appropriate. Using a sterile surgical marker, an appropriate advancement flap was drawn incorporating the defect and placing the expected incisions within the relaxed skin tension lines where possible. The area thus outlined was incised deep to adipose tissue with a #15 scalpel blade. The skin margins were undermined to an appropriate distance in all directions utilizing iris scissors. Following this, the designed flap was advanced and carried over into the primary defect and sutured into place.
Hatchet Flap Text: The defect edges were debeveled with a #15 scalpel blade.  Given the location of the defect, shape of the defect and the proximity to free margins a hatchet flap was deemed most appropriate.  Using a sterile surgical marker, an appropriate hatchet flap was drawn incorporating the defect and placing the expected incisions within the relaxed skin tension lines where possible.    The area thus outlined was incised deep to adipose tissue with a #15 scalpel blade.  The skin margins were undermined to an appropriate distance in all directions utilizing iris scissors.
Rotation Flap Text: The defect edges were debeveled with a #15 scalpel blade.  Given the location of the defect, shape of the defect and the proximity to free margins a rotation flap was deemed most appropriate.  Using a sterile surgical marker, an appropriate rotation flap was drawn incorporating the defect and placing the expected incisions within the relaxed skin tension lines where possible.    The area thus outlined was incised deep to adipose tissue with a #15 scalpel blade.  The skin margins were undermined to an appropriate distance in all directions utilizing iris scissors.
Bilateral Rotation Flap Text: The defect edges were debeveled with a #15 scalpel blade. Given the location of the defect, shape of the defect and the proximity to free margins a bilateral rotation flap was deemed most appropriate. Using a sterile surgical marker, an appropriate rotation flap was drawn incorporating the defect and placing the expected incisions within the relaxed skin tension lines where possible. The area thus outlined was incised deep to adipose tissue with a #15 scalpel blade. The skin margins were undermined to an appropriate distance in all directions utilizing iris scissors. Following this, the designed flap was carried over into the primary defect and sutured into place.
Spiral Flap Text: The defect edges were debeveled with a #15 scalpel blade.  Given the location of the defect, shape of the defect and the proximity to free margins a spiral flap was deemed most appropriate.  Using a sterile surgical marker, an appropriate rotation flap was drawn incorporating the defect and placing the expected incisions within the relaxed skin tension lines where possible. The area thus outlined was incised deep to adipose tissue with a #15 scalpel blade.  The skin margins were undermined to an appropriate distance in all directions utilizing iris scissors.
Staged Advancement Flap Text: The defect edges were debeveled with a #15 scalpel blade. Given the location of the defect, shape of the defect and the proximity to free margins a staged advancement flap was deemed most appropriate. Using a sterile surgical marker, an appropriate advancement flap was drawn incorporating the defect and placing the expected incisions within the relaxed skin tension lines where possible. The area thus outlined was incised deep to adipose tissue with a #15 scalpel blade. The skin margins were undermined to an appropriate distance in all directions utilizing iris scissors. Following this, the designed flap was carried over into the primary defect and sutured into place.
Star Wedge Flap Text: The defect edges were debeveled with a #15 scalpel blade.  Given the location of the defect, shape of the defect and the proximity to free margins a star wedge flap was deemed most appropriate.  Using a sterile surgical marker, an appropriate rotation flap was drawn incorporating the defect and placing the expected incisions within the relaxed skin tension lines where possible. The area thus outlined was incised deep to adipose tissue with a #15 scalpel blade.  The skin margins were undermined to an appropriate distance in all directions utilizing iris scissors.
Transposition Flap Text: The defect edges were debeveled with a #15 scalpel blade.  Given the location of the defect and the proximity to free margins a transposition flap was deemed most appropriate.  Using a sterile surgical marker, an appropriate transposition flap was drawn incorporating the defect.    The area thus outlined was incised deep to adipose tissue with a #15 scalpel blade.  The skin margins were undermined to an appropriate distance in all directions utilizing iris scissors.
Muscle Hinge Flap Text: The defect edges were debeveled with a #15 scalpel blade.  Given the size, depth and location of the defect and the proximity to free margins a muscle hinge flap was deemed most appropriate.  Using a sterile surgical marker, an appropriate hinge flap was drawn incorporating the defect. The area thus outlined was incised with a #15 scalpel blade.  The skin margins were undermined to an appropriate distance in all directions utilizing iris scissors.
Mustarde Flap Text: The defect edges were debeveled with a #15 scalpel blade.  Given the size, depth and location of the defect and the proximity to free margins a Mustarde flap was deemed most appropriate. Using a sterile surgical marker, an appropriate flap was drawn incorporating the defect. The area thus outlined was incised with a #15 scalpel blade. The skin margins were undermined to an appropriate distance in all directions utilizing iris scissors. Following this, the designed flap was carried into the primary defect and sutured into place.
Nasal Turnover Hinge Flap Text: The defect edges were debeveled with a #15 scalpel blade.  Given the size, depth, location of the defect and the defect being full thickness a nasal turnover hinge flap was deemed most appropriate. Using a sterile surgical marker, an appropriate hinge flap was drawn incorporating the defect. The area thus outlined was incised with a #15 scalpel blade. The flap was designed to recreate the nasal mucosal lining and the alar rim. The skin margins were undermined to an appropriate distance in all directions utilizing iris scissors. Following this, the designed flap was carried over into the primary defect and sutured into place
Nasalis-Muscle-Based Myocutaneous Island Pedicle Flap Text: Using a #15 blade, an incision was made around the donor flap to the level of the nasalis muscle. Wide lateral undermining was then performed in both the subcutaneous plane above the nasalis muscle, and in a submuscular plane just above periosteum. This allowed the formation of a free nasalis muscle axial pedicle (based on the angular artery) which was still attached to the actual cutaneous flap, increasing its mobility and vascular viability. Hemostasis was obtained with pinpoint electrocoagulation. The flap was mobilized into position and the pivotal anchor points positioned and stabilized with buried interrupted sutures. Subcutaneous and dermal tissues were closed in a multilayered fashion with sutures. Tissue redundancies were excised, and the epidermal edges were apposed without significant tension and sutured with sutures.
Nasalis Myocutaneous Flap Text: Using a #15 blade, an incision was made around the donor flap to the level of the nasalis muscle. Wide lateral undermining was then performed in both the subcutaneous plane above the nasalis muscle, and in a submuscular plane just above periosteum. This allowed the formation of a free nasalis muscle axial pedicle which was still attached to the actual cutaneous flap, increasing its mobility and vascular viability. Hemostasis was obtained with pinpoint electrocoagulation. The flap was mobilized into position and the pivotal anchor points positioned and stabilized with buried interrupted sutures. Subcutaneous and dermal tissues were closed in a multilayered fashion with sutures. Tissue redundancies were excised, and the epidermal edges were apposed without significant tension and sutured with sutures.
Nasolabial Transposition Flap Text: The defect edges were debeveled with a #15 scalpel blade.  Given the size, depth and location of the defect and the proximity to free margins a nasolabial transposition flap was deemed most appropriate. Using a sterile surgical marker, an appropriate flap was drawn incorporating the defect. The area thus outlined was incised with a #15 scalpel blade. The skin margins were undermined to an appropriate distance in all directions utilizing iris scissors. Following this, the designed flap was carried into the primary defect and sutured into place.
Orbicularis Oris Muscle Flap Text: The defect edges were debeveled with a #15 scalpel blade.  Given that the defect affected the competency of the oral sphincter an orbicularis oris muscle flap was deemed most appropriate to restore this competency and normal muscle function.  Using a sterile surgical marker, an appropriate flap was drawn incorporating the defect. The area thus outlined was incised with a #15 scalpel blade. Following this, the designed flap was carried over into the primary defect and sutured into place.
Melolabial Transposition Flap Text: The defect edges were debeveled with a #15 scalpel blade.  Given the location of the defect and the proximity to free margins a melolabial flap was deemed most appropriate.  Using a sterile surgical marker, an appropriate melolabial transposition flap was drawn incorporating the defect.    The area thus outlined was incised deep to adipose tissue with a #15 scalpel blade.  The skin margins were undermined to an appropriate distance in all directions utilizing iris scissors.
Rectangular Flap Text: The defect edges were debeveled with a #15 scalpel blade. Given the location of the defect and the proximity to free margins a rectangular flap was deemed most appropriate. Using a sterile surgical marker, an appropriate rectangular flap was drawn incorporating the defect. The area thus outlined was incised deep to adipose tissue with a #15 scalpel blade. The skin margins were undermined to an appropriate distance in all directions utilizing iris scissors. Following this, the designed flap was carried over into the primary defect and sutured into place.
Rhombic Flap Text: The defect edges were debeveled with a #15 scalpel blade.  Given the location of the defect and the proximity to free margins a rhombic flap was deemed most appropriate.  Using a sterile surgical marker, an appropriate rhombic flap was drawn incorporating the defect.    The area thus outlined was incised deep to adipose tissue with a #15 scalpel blade.  The skin margins were undermined to an appropriate distance in all directions utilizing iris scissors.
Rhomboid Transposition Flap Text: The defect edges were debeveled with a #15 scalpel blade. Given the location of the defect and the proximity to free margins a rhomboid transposition flap was deemed most appropriate. Using a sterile surgical marker, an appropriate rhomboid flap was drawn incorporating the defect. The area thus outlined was incised deep to adipose tissue with a #15 scalpel blade. The skin margins were undermined to an appropriate distance in all directions utilizing iris scissors. Following this, the designed flap was carried over into the primary defect and sutured into place.
Bi-Rhombic Flap Text: The defect edges were debeveled with a #15 scalpel blade.  Given the location of the defect and the proximity to free margins a bi-rhombic flap was deemed most appropriate.  Using a sterile surgical marker, an appropriate rhombic flap was drawn incorporating the defect. The area thus outlined was incised deep to adipose tissue with a #15 scalpel blade.  The skin margins were undermined to an appropriate distance in all directions utilizing iris scissors.
Helical Rim Advancement Flap Text: The defect edges were debeveled with a #15 blade scalpel.  Given the location of the defect and the proximity to free margins (helical rim) a double helical rim advancement flap was deemed most appropriate.  Using a sterile surgical marker, the appropriate advancement flaps were drawn incorporating the defect and placing the expected incisions between the helical rim and antihelix where possible.  The area thus outlined was incised through and through with a #15 scalpel blade.  With a skin hook and iris scissors, the flaps were gently and sharply undermined and freed up.
Bilateral Helical Rim Advancement Flap Text: The defect edges were debeveled with a #15 blade scalpel.  Given the location of the defect and the proximity to free margins (helical rim) a bilateral helical rim advancement flap was deemed most appropriate.  Using a sterile surgical marker, the appropriate advancement flaps were drawn incorporating the defect and placing the expected incisions between the helical rim and antihelix where possible.  The area thus outlined was incised through and through with a #15 scalpel blade.  With a skin hook and iris scissors, the flaps were gently and sharply undermined and freed up.
Ear Star Wedge Flap Text: The defect edges were debeveled with a #15 blade scalpel.  Given the location of the defect and the proximity to free margins (helical rim) an ear star wedge flap was deemed most appropriate.  Using a sterile surgical marker, the appropriate flap was drawn incorporating the defect and placing the expected incisions between the helical rim and antihelix where possible.  The area thus outlined was incised through and through with a #15 scalpel blade.
Flip-Flop Flap Text: The defect edges were debeveled with a #15 blade scalpel.  Given the location of the defect and the proximity to free margins a flip-flop flap was deemed most appropriate. Using a sterile surgical marker, the appropriate flap was drawn incorporating the defect and placing the expected incisions between the helical rim and antihelix where possible.  The area thus outlined was incised through and through with a #15 scalpel blade. Following this, the designed flap was carried over into the primary defect and sutured into place.
Banner Transposition Flap Text: The defect edges were debeveled with a #15 scalpel blade. Given the location of the defect and the proximity to free margins a Banner transposition flap was deemed most appropriate. Using a sterile surgical marker, an appropriate flap was drawn around the defect. The area thus outlined was incised deep to adipose tissue with a #15 scalpel blade. The skin margins were undermined to an appropriate distance in all directions utilizing iris scissors. Following this, the designed flap was carried into the primary defect and sutured into place.
Bilobed Flap Text: The defect edges were debeveled with a #15 scalpel blade.  Given the location of the defect and the proximity to free margins a bilobe flap was deemed most appropriate.  Using a sterile surgical marker, an appropriate bilobe flap drawn around the defect.    The area thus outlined was incised deep to adipose tissue with a #15 scalpel blade.  The skin margins were undermined to an appropriate distance in all directions utilizing iris scissors.
Bilobed Transposition Flap Text: The defect edges were debeveled with a #15 scalpel blade.  Given the location of the defect and the proximity to free margins a bilobed transposition flap was deemed most appropriate.  Using a sterile surgical marker, an appropriate bilobe flap drawn around the defect.    The area thus outlined was incised deep to adipose tissue with a #15 scalpel blade.  The skin margins were undermined to an appropriate distance in all directions utilizing iris scissors.
Trilobed Flap Text: The defect edges were debeveled with a #15 scalpel blade.  Given the location of the defect and the proximity to free margins a trilobed flap was deemed most appropriate.  Using a sterile surgical marker, an appropriate trilobed flap drawn around the defect.    The area thus outlined was incised deep to adipose tissue with a #15 scalpel blade.  The skin margins were undermined to an appropriate distance in all directions utilizing iris scissors.
Dorsal Nasal Flap Text: The defect edges were debeveled with a #15 scalpel blade.  Given the location of the defect and the proximity to free margins a dorsal nasal flap was deemed most appropriate.  Using a sterile surgical marker, an appropriate dorsal nasal flap was drawn around the defect.    The area thus outlined was incised deep to adipose tissue with a #15 scalpel blade.  The skin margins were undermined to an appropriate distance in all directions utilizing iris scissors.
Island Pedicle Flap Text: The defect edges were debeveled with a #15 scalpel blade.  Given the location of the defect, shape of the defect and the proximity to free margins an island pedicle advancement flap was deemed most appropriate.  Using a sterile surgical marker, an appropriate advancement flap was drawn incorporating the defect, outlining the appropriate donor tissue and placing the expected incisions within the relaxed skin tension lines where possible.    The area thus outlined was incised deep to adipose tissue with a #15 scalpel blade.  The skin margins were undermined to an appropriate distance in all directions around the primary defect and laterally outward around the island pedicle utilizing iris scissors.  There was minimal undermining beneath the pedicle flap.
Island Pedicle Flap With Canthal Suspension Text: The defect edges were debeveled with a #15 scalpel blade.  Given the location of the defect, shape of the defect and the proximity to free margins an island pedicle advancement flap was deemed most appropriate.  Using a sterile surgical marker, an appropriate advancement flap was drawn incorporating the defect, outlining the appropriate donor tissue and placing the expected incisions within the relaxed skin tension lines where possible. The area thus outlined was incised deep to adipose tissue with a #15 scalpel blade.  The skin margins were undermined to an appropriate distance in all directions around the primary defect and laterally outward around the island pedicle utilizing iris scissors.  There was minimal undermining beneath the pedicle flap. A suspension suture was placed in the canthal tendon to prevent tension and prevent ectropion.
Alar Island Pedicle Flap Text: The defect edges were debeveled with a #15 scalpel blade.  Given the location of the defect, shape of the defect and the proximity to the alar rim an island pedicle advancement flap was deemed most appropriate.  Using a sterile surgical marker, an appropriate advancement flap was drawn incorporating the defect, outlining the appropriate donor tissue and placing the expected incisions within the nasal ala running parallel to the alar rim. The area thus outlined was incised with a #15 scalpel blade.  The skin margins were undermined minimally to an appropriate distance in all directions around the primary defect and laterally outward around the island pedicle utilizing iris scissors.  There was minimal undermining beneath the pedicle flap.
Double Island Pedicle Flap Text: The defect edges were debeveled with a #15 scalpel blade.  Given the location of the defect, shape of the defect and the proximity to free margins a double island pedicle advancement flap was deemed most appropriate.  Using a sterile surgical marker, an appropriate advancement flap was drawn incorporating the defect, outlining the appropriate donor tissue and placing the expected incisions within the relaxed skin tension lines where possible.    The area thus outlined was incised deep to adipose tissue with a #15 scalpel blade.  The skin margins were undermined to an appropriate distance in all directions around the primary defect and laterally outward around the island pedicle utilizing iris scissors.  There was minimal undermining beneath the pedicle flap.
Island Pedicle Flap-Requiring Vessel Identification Text: The defect edges were debeveled with a #15 scalpel blade.  Given the location of the defect, shape of the defect and the proximity to free margins an island pedicle advancement flap was deemed most appropriate.  Using a sterile surgical marker, an appropriate advancement flap was drawn, based on the axial vessel mentioned above, incorporating the defect, outlining the appropriate donor tissue and placing the expected incisions within the relaxed skin tension lines where possible.    The area thus outlined was incised deep to adipose tissue with a #15 scalpel blade.  The skin margins were undermined to an appropriate distance in all directions around the primary defect and laterally outward around the island pedicle utilizing iris scissors.  There was minimal undermining beneath the pedicle flap.
Keystone Flap Text: The defect edges were debeveled with a #15 scalpel blade.  Given the location of the defect, shape of the defect a keystone flap was deemed most appropriate.  Using a sterile surgical marker, an appropriate keystone flap was drawn incorporating the defect, outlining the appropriate donor tissue and placing the expected incisions within the relaxed skin tension lines where possible. The area thus outlined was incised deep to adipose tissue with a #15 scalpel blade.  The skin margins were undermined to an appropriate distance in all directions around the primary defect and laterally outward around the flap utilizing iris scissors.
O-T Plasty Text: The defect edges were debeveled with a #15 scalpel blade.  Given the location of the defect, shape of the defect and the proximity to free margins an O-T plasty was deemed most appropriate.  Using a sterile surgical marker, an appropriate O-T plasty was drawn incorporating the defect and placing the expected incisions within the relaxed skin tension lines where possible.    The area thus outlined was incised deep to adipose tissue with a #15 scalpel blade.  The skin margins were undermined to an appropriate distance in all directions utilizing iris scissors.
O-Z Plasty Text: The defect edges were debeveled with a #15 scalpel blade.  Given the location of the defect, shape of the defect and the proximity to free margins an O-Z plasty (double transposition flap) was deemed most appropriate.  Using a sterile surgical marker, the appropriate transposition flaps were drawn incorporating the defect and placing the expected incisions within the relaxed skin tension lines where possible.    The area thus outlined was incised deep to adipose tissue with a #15 scalpel blade.  The skin margins were undermined to an appropriate distance in all directions utilizing iris scissors.  Hemostasis was achieved with electrocautery.  The flaps were then transposed into place, one clockwise and the other counterclockwise, and anchored with interrupted buried subcutaneous sutures.
Double O-Z Plasty Text: The defect edges were debeveled with a #15 scalpel blade. Given the location of the defect, shape of the defect and the proximity to free margins a Double O-Z plasty (double transposition flap) was deemed most appropriate. Using a sterile surgical marker, the appropriate transposition flaps were drawn incorporating the defect and placing the expected incisions within the relaxed skin tension lines where possible. The area thus outlined was incised deep to adipose tissue with a #15 scalpel blade. The skin margins were undermined to an appropriate distance in all directions utilizing iris scissors. Hemostasis was achieved with electrocautery. The flaps were then transposed and carried over into place, one clockwise and the other counterclockwise, and anchored with interrupted buried subcutaneous sutures.
V-Y Plasty Text: The defect edges were debeveled with a #15 scalpel blade.  Given the location of the defect, shape of the defect and the proximity to free margins an V-Y advancement flap was deemed most appropriate.  Using a sterile surgical marker, an appropriate advancement flap was drawn incorporating the defect and placing the expected incisions within the relaxed skin tension lines where possible.    The area thus outlined was incised deep to adipose tissue with a #15 scalpel blade.  The skin margins were undermined to an appropriate distance in all directions utilizing iris scissors.
H Plasty Text: Given the location of the defect, shape of the defect and the proximity to free margins a H-plasty was deemed most appropriate for repair.  Using a sterile surgical marker, the appropriate advancement arms of the H-plasty were drawn incorporating the defect and placing the expected incisions within the relaxed skin tension lines where possible. The area thus outlined was incised deep to adipose tissue with a #15 scalpel blade. The skin margins were undermined to an appropriate distance in all directions utilizing iris scissors.  The opposing advancement arms were then advanced into place in opposite direction and anchored with interrupted buried subcutaneous sutures.
W Plasty Text: The lesion was extirpated to the level of the fat with a #15 scalpel blade.  Given the location of the defect, shape of the defect and the proximity to free margins a W-plasty was deemed most appropriate for repair.  Using a sterile surgical marker, the appropriate transposition arms of the W-plasty were drawn incorporating the defect and placing the expected incisions within the relaxed skin tension lines where possible.    The area thus outlined was incised deep to adipose tissue with a #15 scalpel blade.  The skin margins were undermined to an appropriate distance in all directions utilizing iris scissors.  The opposing transposition arms were then transposed into place in opposite direction and anchored with interrupted buried subcutaneous sutures.
Z Plasty Text: The lesion was extirpated to the level of the fat with a #15 scalpel blade.  Given the location of the defect, shape of the defect and the proximity to free margins a Z-plasty was deemed most appropriate for repair.  Using a sterile surgical marker, the appropriate transposition arms of the Z-plasty were drawn incorporating the defect and placing the expected incisions within the relaxed skin tension lines where possible.    The area thus outlined was incised deep to adipose tissue with a #15 scalpel blade.  The skin margins were undermined to an appropriate distance in all directions utilizing iris scissors.  The opposing transposition arms were then transposed into place in opposite direction and anchored with interrupted buried subcutaneous sutures.
Double Z Plasty Text: The lesion was extirpated to the level of the fat with a #15 scalpel blade. Given the location of the defect, shape of the defect and the proximity to free margins a double Z-plasty was deemed most appropriate for repair. Using a sterile surgical marker, the appropriate transposition arms of the double Z-plasty were drawn incorporating the defect and placing the expected incisions within the relaxed skin tension lines where possible. The area thus outlined was incised deep to adipose tissue with a #15 scalpel blade. The skin margins were undermined to an appropriate distance in all directions utilizing iris scissors. The opposing transposition arms were then transposed and carried over into place in opposite direction and anchored with interrupted buried subcutaneous sutures.
Zygomaticofacial Flap Text: Given the location of the defect, shape of the defect and the proximity to free margins a zygomaticofacial flap was deemed most appropriate for repair. Using a sterile surgical marker, the appropriate flap was drawn incorporating the defect and placing the expected incisions within the relaxed skin tension lines where possible. The area thus outlined was incised deep to adipose tissue with a #15 scalpel blade with preservation of a vascular pedicle.  The skin margins were undermined to an appropriate distance in all directions utilizing iris scissors. The flap was then carried over into the defect and anchored with interrupted buried subcutaneous sutures.
Cheek Interpolation Flap Text: A decision was made to reconstruct the defect utilizing an interpolation axial flap and a staged reconstruction.  A telfa template was made of the defect.  This telfa template was then used to outline the Cheek Interpolation flap.  The donor area for the pedicle flap was then injected with anesthesia.  The flap was excised through the skin and subcutaneous tissue down to the layer of the underlying musculature.  The interpolation flap was carefully excised within this deep plane to maintain its blood supply.  The edges of the donor site were undermined.   The donor site was closed in a primary fashion.  The pedicle was then rotated into position and sutured.  Once the tube was sutured into place, adequate blood supply was confirmed with blanching and refill.  The pedicle was then wrapped with xeroform gauze and dressed appropriately with a telfa and gauze bandage to ensure continued blood supply and protect the attached pedicle.
Cheek-To-Nose Interpolation Flap Text: A decision was made to reconstruct the defect utilizing an interpolation axial flap and a staged reconstruction.  A telfa template was made of the defect.  This telfa template was then used to outline the Cheek-To-Nose Interpolation flap.  The donor area for the pedicle flap was then injected with anesthesia.  The flap was excised through the skin and subcutaneous tissue down to the layer of the underlying musculature.  The interpolation flap was carefully excised within this deep plane to maintain its blood supply.  The edges of the donor site were undermined.   The donor site was closed in a primary fashion.  The pedicle was then rotated into position and sutured.  Once the tube was sutured into place, adequate blood supply was confirmed with blanching and refill.  The pedicle was then wrapped with xeroform gauze and dressed appropriately with a telfa and gauze bandage to ensure continued blood supply and protect the attached pedicle.
Interpolation Flap Text: A decision was made to reconstruct the defect utilizing an interpolation axial flap and a staged reconstruction.  A telfa template was made of the defect.  This telfa template was then used to outline the interpolation flap.  The donor area for the pedicle flap was then injected with anesthesia.  The flap was excised through the skin and subcutaneous tissue down to the layer of the underlying musculature.  The interpolation flap was carefully excised within this deep plane to maintain its blood supply.  The edges of the donor site were undermined.   The donor site was closed in a primary fashion.  The pedicle was then rotated into position and sutured.  Once the tube was sutured into place, adequate blood supply was confirmed with blanching and refill.  The pedicle was then wrapped with xeroform gauze and dressed appropriately with a telfa and gauze bandage to ensure continued blood supply and protect the attached pedicle.
Melolabial Interpolation Flap Text: A decision was made to reconstruct the defect utilizing an interpolation axial flap and a staged reconstruction.  A telfa template was made of the defect.  This telfa template was then used to outline the melolabial interpolation flap.  The donor area for the pedicle flap was then injected with anesthesia.  The flap was excised through the skin and subcutaneous tissue down to the layer of the underlying musculature.  The pedicle flap was carefully excised within this deep plane to maintain its blood supply.  The edges of the donor site were undermined.   The donor site was closed in a primary fashion.  The pedicle was then rotated into position and sutured.  Once the tube was sutured into place, adequate blood supply was confirmed with blanching and refill.  The pedicle was then wrapped with xeroform gauze and dressed appropriately with a telfa and gauze bandage to ensure continued blood supply and protect the attached pedicle.
Mastoid Interpolation Flap Text: A decision was made to reconstruct the defect utilizing an interpolation axial flap and a staged reconstruction.  A telfa template was made of the defect.  This telfa template was then used to outline the mastoid interpolation flap.  The donor area for the pedicle flap was then injected with anesthesia.  The flap was excised through the skin and subcutaneous tissue down to the layer of the underlying musculature.  The pedicle flap was carefully excised within this deep plane to maintain its blood supply.  The edges of the donor site were undermined.   The donor site was closed in a primary fashion.  The pedicle was then rotated into position and sutured.  Once the tube was sutured into place, adequate blood supply was confirmed with blanching and refill.  The pedicle was then wrapped with xeroform gauze and dressed appropriately with a telfa and gauze bandage to ensure continued blood supply and protect the attached pedicle.
Posterior Auricular Interpolation Flap Text: A decision was made to reconstruct the defect utilizing an interpolation axial flap and a staged reconstruction.  A telfa template was made of the defect.  This telfa template was then used to outline the posterior auricular interpolation flap.  The donor area for the pedicle flap was then injected with anesthesia.  The flap was excised through the skin and subcutaneous tissue down to the layer of the underlying musculature.  The pedicle flap was carefully excised within this deep plane to maintain its blood supply.  The edges of the donor site were undermined.   The donor site was closed in a primary fashion.  The pedicle was then rotated into position and sutured.  Once the tube was sutured into place, adequate blood supply was confirmed with blanching and refill.  The pedicle was then wrapped with xeroform gauze and dressed appropriately with a telfa and gauze bandage to ensure continued blood supply and protect the attached pedicle.
Paramedian Forehead Flap Text: A decision was made to reconstruct the defect utilizing an interpolation axial flap and a staged reconstruction.  A telfa template was made of the defect.  This telfa template was then used to outline the paramedian forehead pedicle flap.  The donor area for the pedicle flap was then injected with anesthesia.  The flap was excised through the skin and subcutaneous tissue down to the layer of the underlying musculature.  The pedicle flap was carefully excised within this deep plane to maintain its blood supply.  The edges of the donor site were undermined.   The donor site was closed in a primary fashion.  The pedicle was then rotated into position and sutured.  Once the tube was sutured into place, adequate blood supply was confirmed with blanching and refill.  The pedicle was then wrapped with xeroform gauze and dressed appropriately with a telfa and gauze bandage to ensure continued blood supply and protect the attached pedicle.
Abbe Flap (Upper To Lower Lip) Text: The defect of the lower lip was assessed and measured.  Given the location and size of the defect, an Abbe flap was deemed most appropriate. Using a sterile surgical marker, an appropriate Abbe flap was measured and drawn on the upper lip. Local anesthesia was then infiltrated.  A scalpel was then used to incise the upper lip through and through the skin, vermilion, muscle and mucosa, leaving the flap pedicled on the opposite side.  The flap was then rotated and transferred to the lower lip defect.  The flap was then sutured into place with a three layer technique, closing the orbicularis oris muscle layer with subcutaneous buried sutures, followed by a mucosal layer and an epidermal layer.
Abbe Flap (Lower To Upper Lip) Text: The defect of the upper lip was assessed and measured.  Given the location and size of the defect, an Abbe flap was deemed most appropriate. Using a sterile surgical marker, an appropriate Abbe flap was measured and drawn on the lower lip. Local anesthesia was then infiltrated. A scalpel was then used to incise the upper lip through and through the skin, vermilion, muscle and mucosa, leaving the flap pedicled on the opposite side.  The flap was then rotated and transferred to the lower lip defect.  The flap was then sutured into place with a three layer technique, closing the orbicularis oris muscle layer with subcutaneous buried sutures, followed by a mucosal layer and an epidermal layer.
Estlander Flap (Upper To Lower Lip) Text: The defect of the lower lip was assessed and measured.  Given the location and size of the defect, an Estlander flap was deemed most appropriate. Using a sterile surgical marker, an appropriate Estlander flap was measured and drawn on the upper lip. Local anesthesia was then infiltrated. A scalpel was then used to incise the lateral aspect of the flap, through skin, muscle and mucosa, leaving the flap pedicled medially.  The flap was then rotated and positioned to fill the lower lip defect.  The flap was then sutured into place with a three layer technique, closing the orbicularis oris muscle layer with subcutaneous buried sutures, followed by a mucosal layer and an epidermal layer.
Lip Wedge Excision Repair Text: Given the location of the defect and the proximity to free margins a full thickness wedge repair was deemed most appropriate.  Using a sterile surgical marker, the appropriate repair was drawn incorporating the defect and placing the expected incisions perpendicular to the vermilion border.  The vermilion border was also meticulously outlined to ensure appropriate reapproximation during the repair.  The area thus outlined was incised through and through with a #15 scalpel blade.  The muscularis and dermis were reaproximated with deep sutures following hemostasis. Care was taken to realign the vermilion border before proceeding with the superficial closure.  Once the vermilion was realigned the superfical and mucosal closure was finished.
Ftsg Text: The defect edges were debeveled with a #15 scalpel blade.  Given the location of the defect, shape of the defect and the proximity to free margins a full thickness skin graft was deemed most appropriate.  Using a sterile surgical marker, the primary defect shape was transferred to the donor site. The area thus outlined was incised deep to adipose tissue with a #15 scalpel blade.  The harvested graft was then trimmed of adipose tissue until only dermis and epidermis was left.  The skin margins of the secondary defect were undermined to an appropriate distance in all directions utilizing iris scissors.  The secondary defect was closed with interrupted buried subcutaneous sutures.  The skin edges were then re-apposed with running  sutures.  The skin graft was then placed in the primary defect and oriented appropriately.
Split-Thickness Skin Graft Text: The defect edges were debeveled with a #15 scalpel blade.  Given the location of the defect, shape of the defect and the proximity to free margins a split thickness skin graft was deemed most appropriate.  Using a sterile surgical marker, the primary defect shape was transferred to the donor site. The split thickness graft was then harvested.  The skin graft was then placed in the primary defect and oriented appropriately.
Pinch Graft Text: The defect edges were debeveled with a #15 scalpel blade. Given the location of the defect, shape of the defect and the proximity to free margins a pinch graft was deemed most appropriate. Using a sterile surgical marker, the primary defect shape was transferred to the donor site. The area thus outlined was incised deep to adipose tissue with a #15 scalpel blade.  The harvested graft was then trimmed of adipose tissue until only dermis and epidermis was left. The skin graft was then placed in the primary defect and oriented appropriately.
Burow's Graft Text: The defect edges were debeveled with a #15 scalpel blade. Given the location of the defect, shape of the defect, the proximity to free margins and the presence of a standing cone deformity a Burow's skin graft was deemed most appropriate. The standing cone was removed and this tissue was then trimmed to the shape of the primary defect. The adipose tissue was also removed until only dermis and epidermis were left.  The skin graft was then placed in the primary defect and oriented appropriately.
Cartilage Graft Text: The defect edges were debeveled with a #15 scalpel blade.  Given the location of the defect, shape of the defect, the fact the defect involved a full thickness cartilage defect a cartilage graft was deemed most appropriate.  An appropriate donor site was identified, cleansed, and anesthetized. The cartilage graft was then harvested and transferred to the recipient site, oriented appropriately and then sutured into place.  The secondary defect was then repaired using a primary closure.
Composite Graft Text: The defect edges were debeveled with a #15 scalpel blade.  Given the location of the defect, shape of the defect, the proximity to free margins and the fact the defect was full thickness a composite graft was deemed most appropriate.  The defect was outline and then transferred to the donor site.  A full thickness graft was then excised from the donor site. The graft was then placed in the primary defect, oriented appropriately and then sutured into place.  The secondary defect was then repaired using a primary closure.
Epidermal Autograft Text: The defect edges were debeveled with a #15 scalpel blade.  Given the location of the defect, shape of the defect and the proximity to free margins an epidermal autograft was deemed most appropriate.  Using a sterile surgical marker, the primary defect shape was transferred to the donor site. The epidermal graft was then harvested.  The skin graft was then placed in the primary defect and oriented appropriately.
Dermal Autograft Text: The defect edges were debeveled with a #15 scalpel blade.  Given the location of the defect, shape of the defect and the proximity to free margins a dermal autograft was deemed most appropriate.  Using a sterile surgical marker, the primary defect shape was transferred to the donor site. The area thus outlined was incised deep to adipose tissue with a #15 scalpel blade.  The harvested graft was then trimmed of adipose and epidermal tissue until only dermis was left.  The skin graft was then placed in the primary defect and oriented appropriately.
Skin Substitute Text: The defect edges were debeveled with a #15 scalpel blade.  Given the location of the defect, shape of the defect and the proximity to free margins a skin substitute graft was deemed most appropriate.  The graft material was trimmed to fit the size of the defect. The graft was then placed in the primary defect and oriented appropriately.
Tissue Cultured Epidermal Autograft Text: The defect edges were debeveled with a #15 scalpel blade.  Given the location of the defect, shape of the defect and the proximity to free margins a tissue cultured epidermal autograft was deemed most appropriate.  The graft was then trimmed to fit the size of the defect.  The graft was then placed in the primary defect and oriented appropriately.
Xenograft Text: The defect edges were debeveled with a #15 scalpel blade.  Given the location of the defect, shape of the defect and the proximity to free margins a xenograft was deemed most appropriate.  The graft was then trimmed to fit the size of the defect.  The graft was then placed in the primary defect and oriented appropriately.
Purse String (Intermediate) Text: Given the location of the defect and the characteristics of the surrounding skin a purse string intermediate closure was deemed most appropriate.  Undermining was performed circumfirentially around the surgical defect.  A purse string suture was then placed and tightened.
Purse String (Simple) Text: Given the location of the defect and the characteristics of the surrounding skin a purse string simple closure was deemed most appropriate.  Undermining was performed circumferentially around the surgical defect.  A purse string suture was then placed and tightened.
Partial Purse String (Intermediate) Text: Given the location of the defect and the characteristics of the surrounding skin an intermediate purse string closure was deemed most appropriate.  Undermining was performed circumferentially around the surgical defect.  A purse string suture was then placed and tightened. Wound tension of the circular defect prevented complete closure of the wound.
Partial Purse String (Simple) Text: Given the location of the defect and the characteristics of the surrounding skin a simple purse string closure was deemed most appropriate.  Undermining was performed circumferentially around the surgical defect.  A purse string suture was then placed and tightened. Wound tension of the circular defect prevented complete closure of the wound.
Complex Repair And Single Advancement Flap Text: The defect edges were debeveled with a #15 scalpel blade.  The primary defect was closed partially with a complex linear closure.  Given the location of the remaining defect, shape of the defect and the proximity to free margins a single advancement flap was deemed most appropriate for complete closure of the defect.  Using a sterile surgical marker, an appropriate advancement flap was drawn incorporating the defect and placing the expected incisions within the relaxed skin tension lines where possible.    The area thus outlined was incised deep to adipose tissue with a #15 scalpel blade.  The skin margins were undermined to an appropriate distance in all directions utilizing iris scissors.
Complex Repair And Double Advancement Flap Text: The defect edges were debeveled with a #15 scalpel blade.  The primary defect was closed partially with a complex linear closure.  Given the location of the remaining defect, shape of the defect and the proximity to free margins a double advancement flap was deemed most appropriate for complete closure of the defect.  Using a sterile surgical marker, an appropriate advancement flap was drawn incorporating the defect and placing the expected incisions within the relaxed skin tension lines where possible.    The area thus outlined was incised deep to adipose tissue with a #15 scalpel blade.  The skin margins were undermined to an appropriate distance in all directions utilizing iris scissors.
Complex Repair And Modified Advancement Flap Text: The defect edges were debeveled with a #15 scalpel blade.  The primary defect was closed partially with a complex linear closure.  Given the location of the remaining defect, shape of the defect and the proximity to free margins a modified advancement flap was deemed most appropriate for complete closure of the defect.  Using a sterile surgical marker, an appropriate advancement flap was drawn incorporating the defect and placing the expected incisions within the relaxed skin tension lines where possible.    The area thus outlined was incised deep to adipose tissue with a #15 scalpel blade.  The skin margins were undermined to an appropriate distance in all directions utilizing iris scissors.
Complex Repair And A-T Advancement Flap Text: The defect edges were debeveled with a #15 scalpel blade.  The primary defect was closed partially with a complex linear closure.  Given the location of the remaining defect, shape of the defect and the proximity to free margins an A-T advancement flap was deemed most appropriate for complete closure of the defect.  Using a sterile surgical marker, an appropriate advancement flap was drawn incorporating the defect and placing the expected incisions within the relaxed skin tension lines where possible.    The area thus outlined was incised deep to adipose tissue with a #15 scalpel blade.  The skin margins were undermined to an appropriate distance in all directions utilizing iris scissors.
Complex Repair And O-T Advancement Flap Text: The defect edges were debeveled with a #15 scalpel blade.  The primary defect was closed partially with a complex linear closure.  Given the location of the remaining defect, shape of the defect and the proximity to free margins an O-T advancement flap was deemed most appropriate for complete closure of the defect.  Using a sterile surgical marker, an appropriate advancement flap was drawn incorporating the defect and placing the expected incisions within the relaxed skin tension lines where possible.    The area thus outlined was incised deep to adipose tissue with a #15 scalpel blade.  The skin margins were undermined to an appropriate distance in all directions utilizing iris scissors.
Complex Repair And O-L Flap Text: The defect edges were debeveled with a #15 scalpel blade.  The primary defect was closed partially with a complex linear closure.  Given the location of the remaining defect, shape of the defect and the proximity to free margins an O-L flap was deemed most appropriate for complete closure of the defect.  Using a sterile surgical marker, an appropriate flap was drawn incorporating the defect and placing the expected incisions within the relaxed skin tension lines where possible.    The area thus outlined was incised deep to adipose tissue with a #15 scalpel blade.  The skin margins were undermined to an appropriate distance in all directions utilizing iris scissors.
Complex Repair And Bilobe Flap Text: The defect edges were debeveled with a #15 scalpel blade.  The primary defect was closed partially with a complex linear closure.  Given the location of the remaining defect, shape of the defect and the proximity to free margins a bilobe flap was deemed most appropriate for complete closure of the defect.  Using a sterile surgical marker, an appropriate advancement flap was drawn incorporating the defect and placing the expected incisions within the relaxed skin tension lines where possible.    The area thus outlined was incised deep to adipose tissue with a #15 scalpel blade.  The skin margins were undermined to an appropriate distance in all directions utilizing iris scissors.
Complex Repair And Melolabial Flap Text: The defect edges were debeveled with a #15 scalpel blade.  The primary defect was closed partially with a complex linear closure.  Given the location of the remaining defect, shape of the defect and the proximity to free margins a melolabial flap was deemed most appropriate for complete closure of the defect.  Using a sterile surgical marker, an appropriate advancement flap was drawn incorporating the defect and placing the expected incisions within the relaxed skin tension lines where possible.    The area thus outlined was incised deep to adipose tissue with a #15 scalpel blade.  The skin margins were undermined to an appropriate distance in all directions utilizing iris scissors.
Complex Repair And Rotation Flap Text: The defect edges were debeveled with a #15 scalpel blade.  The primary defect was closed partially with a complex linear closure.  Given the location of the remaining defect, shape of the defect and the proximity to free margins a rotation flap was deemed most appropriate for complete closure of the defect.  Using a sterile surgical marker, an appropriate advancement flap was drawn incorporating the defect and placing the expected incisions within the relaxed skin tension lines where possible.    The area thus outlined was incised deep to adipose tissue with a #15 scalpel blade.  The skin margins were undermined to an appropriate distance in all directions utilizing iris scissors.
Complex Repair And Rhombic Flap Text: The defect edges were debeveled with a #15 scalpel blade.  The primary defect was closed partially with a complex linear closure.  Given the location of the remaining defect, shape of the defect and the proximity to free margins a rhombic flap was deemed most appropriate for complete closure of the defect.  Using a sterile surgical marker, an appropriate advancement flap was drawn incorporating the defect and placing the expected incisions within the relaxed skin tension lines where possible.    The area thus outlined was incised deep to adipose tissue with a #15 scalpel blade.  The skin margins were undermined to an appropriate distance in all directions utilizing iris scissors.
Complex Repair And Transposition Flap Text: The defect edges were debeveled with a #15 scalpel blade.  The primary defect was closed partially with a complex linear closure.  Given the location of the remaining defect, shape of the defect and the proximity to free margins a transposition flap was deemed most appropriate for complete closure of the defect.  Using a sterile surgical marker, an appropriate advancement flap was drawn incorporating the defect and placing the expected incisions within the relaxed skin tension lines where possible.    The area thus outlined was incised deep to adipose tissue with a #15 scalpel blade.  The skin margins were undermined to an appropriate distance in all directions utilizing iris scissors.
Complex Repair And V-Y Plasty Text: The defect edges were debeveled with a #15 scalpel blade.  The primary defect was closed partially with a complex linear closure.  Given the location of the remaining defect, shape of the defect and the proximity to free margins a V-Y plasty was deemed most appropriate for complete closure of the defect.  Using a sterile surgical marker, an appropriate advancement flap was drawn incorporating the defect and placing the expected incisions within the relaxed skin tension lines where possible.    The area thus outlined was incised deep to adipose tissue with a #15 scalpel blade.  The skin margins were undermined to an appropriate distance in all directions utilizing iris scissors.
Complex Repair And M Plasty Text: The defect edges were debeveled with a #15 scalpel blade.  The primary defect was closed partially with a complex linear closure.  Given the location of the remaining defect, shape of the defect and the proximity to free margins an M plasty was deemed most appropriate for complete closure of the defect.  Using a sterile surgical marker, an appropriate advancement flap was drawn incorporating the defect and placing the expected incisions within the relaxed skin tension lines where possible.    The area thus outlined was incised deep to adipose tissue with a #15 scalpel blade.  The skin margins were undermined to an appropriate distance in all directions utilizing iris scissors.
Complex Repair And Double M Plasty Text: The defect edges were debeveled with a #15 scalpel blade.  The primary defect was closed partially with a complex linear closure.  Given the location of the remaining defect, shape of the defect and the proximity to free margins a double M plasty was deemed most appropriate for complete closure of the defect.  Using a sterile surgical marker, an appropriate advancement flap was drawn incorporating the defect and placing the expected incisions within the relaxed skin tension lines where possible.    The area thus outlined was incised deep to adipose tissue with a #15 scalpel blade.  The skin margins were undermined to an appropriate distance in all directions utilizing iris scissors.
Complex Repair And W Plasty Text: The defect edges were debeveled with a #15 scalpel blade.  The primary defect was closed partially with a complex linear closure.  Given the location of the remaining defect, shape of the defect and the proximity to free margins a W plasty was deemed most appropriate for complete closure of the defect.  Using a sterile surgical marker, an appropriate advancement flap was drawn incorporating the defect and placing the expected incisions within the relaxed skin tension lines where possible.    The area thus outlined was incised deep to adipose tissue with a #15 scalpel blade.  The skin margins were undermined to an appropriate distance in all directions utilizing iris scissors.
Complex Repair And Z Plasty Text: The defect edges were debeveled with a #15 scalpel blade.  The primary defect was closed partially with a complex linear closure.  Given the location of the remaining defect, shape of the defect and the proximity to free margins a Z plasty was deemed most appropriate for complete closure of the defect.  Using a sterile surgical marker, an appropriate advancement flap was drawn incorporating the defect and placing the expected incisions within the relaxed skin tension lines where possible.    The area thus outlined was incised deep to adipose tissue with a #15 scalpel blade.  The skin margins were undermined to an appropriate distance in all directions utilizing iris scissors.
Complex Repair And Dorsal Nasal Flap Text: The defect edges were debeveled with a #15 scalpel blade.  The primary defect was closed partially with a complex linear closure.  Given the location of the remaining defect, shape of the defect and the proximity to free margins a dorsal nasal flap was deemed most appropriate for complete closure of the defect.  Using a sterile surgical marker, an appropriate flap was drawn incorporating the defect and placing the expected incisions within the relaxed skin tension lines where possible.    The area thus outlined was incised deep to adipose tissue with a #15 scalpel blade.  The skin margins were undermined to an appropriate distance in all directions utilizing iris scissors.
Complex Repair And Ftsg Text: The defect edges were debeveled with a #15 scalpel blade.  The primary defect was closed partially with a complex linear closure.  Given the location of the defect, shape of the defect and the proximity to free margins a full thickness skin graft was deemed most appropriate to repair the remaining defect.  The graft was trimmed to fit the size of the remaining defect.  The graft was then placed in the primary defect, oriented appropriately, and sutured into place.
Complex Repair And Burow's Graft Text: The defect edges were debeveled with a #15 scalpel blade.  The primary defect was closed partially with a complex linear closure.  Given the location of the defect, shape of the defect, the proximity to free margins and the presence of a standing cone deformity a Burow's graft was deemed most appropriate to repair the remaining defect.  The graft was trimmed to fit the size of the remaining defect.  The graft was then placed in the primary defect, oriented appropriately, and sutured into place.
Complex Repair And Split-Thickness Skin Graft Text: The defect edges were debeveled with a #15 scalpel blade.  The primary defect was closed partially with a complex linear closure.  Given the location of the defect, shape of the defect and the proximity to free margins a split thickness skin graft was deemed most appropriate to repair the remaining defect.  The graft was trimmed to fit the size of the remaining defect.  The graft was then placed in the primary defect, oriented appropriately, and sutured into place.
Complex Repair And Epidermal Autograft Text: The defect edges were debeveled with a #15 scalpel blade.  The primary defect was closed partially with a complex linear closure.  Given the location of the defect, shape of the defect and the proximity to free margins an epidermal autograft was deemed most appropriate to repair the remaining defect.  The graft was trimmed to fit the size of the remaining defect.  The graft was then placed in the primary defect, oriented appropriately, and sutured into place.
Complex Repair And Dermal Autograft Text: The defect edges were debeveled with a #15 scalpel blade.  The primary defect was closed partially with a complex linear closure.  Given the location of the defect, shape of the defect and the proximity to free margins an dermal autograft was deemed most appropriate to repair the remaining defect.  The graft was trimmed to fit the size of the remaining defect.  The graft was then placed in the primary defect, oriented appropriately, and sutured into place.
Complex Repair And Tissue Cultured Epidermal Autograft Text: The defect edges were debeveled with a #15 scalpel blade.  The primary defect was closed partially with a complex linear closure.  Given the location of the defect, shape of the defect and the proximity to free margins an tissue cultured epidermal autograft was deemed most appropriate to repair the remaining defect.  The graft was trimmed to fit the size of the remaining defect.  The graft was then placed in the primary defect, oriented appropriately, and sutured into place.
Complex Repair And Xenograft Text: The defect edges were debeveled with a #15 scalpel blade.  The primary defect was closed partially with a complex linear closure.  Given the location of the defect, shape of the defect and the proximity to free margins a xenograft was deemed most appropriate to repair the remaining defect.  The graft was trimmed to fit the size of the remaining defect.  The graft was then placed in the primary defect, oriented appropriately, and sutured into place.
Complex Repair And Skin Substitute Graft Text: The defect edges were debeveled with a #15 scalpel blade.  The primary defect was closed partially with a complex linear closure.  Given the location of the remaining defect, shape of the defect and the proximity to free margins a skin substitute graft was deemed most appropriate to repair the remaining defect.  The graft was trimmed to fit the size of the remaining defect.  The graft was then placed in the primary defect, oriented appropriately, and sutured into place.
Include Anticoagulation In Mohs Note?: Please Select the Appropriate Response
Path Notes (To The Dermatopathologist): Please check margins.
Consent was obtained from the patient. The risks and benefits to therapy were discussed in detail. Specifically, the risks of infection, scarring, bleeding, prolonged wound healing, incomplete removal, allergy to anesthesia, nerve injury and recurrence were addressed. Prior to the procedure, the treatment site was clearly identified and confirmed by the patient. All components of Universal Protocol/PAUSE Rule completed.
Post-Care Instructions: I reviewed with the patient in detail post-care instructions:\\n1. Apply bacitracin to incision line with a Q-Tip \\n2. Apply non stick pad \\n3. Apply paper tape \\n\\nChange once per day for 7 days\\nShower with bandage on, change bandage after shower\\nIce and Tylenol as needed for pain \\n\\nPatient is not to engage in any heavy lifting, exercise, hot tub, or swimming for the next 14 days. Should the patient develop any fevers, chills, bleeding, severe pain patient will contact the office immediately.
Home Suture Removal Text: Patient was provided a home suture removal kit and will remove their sutures at home.  If they have any questions or difficulties they will call the office.
Where Do You Want The Question To Include Opioid Counseling Located?: Case Summary Tab
Information: Selecting Yes will display possible errors in your note based on the variables you have selected. This validation is only offered as a suggestion for you. PLEASE NOTE THAT THE VALIDATION TEXT WILL BE REMOVED WHEN YOU FINALIZE YOUR NOTE. IF YOU WANT TO FAX A PRELIMINARY NOTE YOU WILL NEED TO TOGGLE THIS TO 'NO' IF YOU DO NOT WANT IT IN YOUR FAXED NOTE.

## 2025-03-12 ENCOUNTER — HOSPITAL ENCOUNTER (OUTPATIENT)
Dept: LAB | Facility: MEDICAL CENTER | Age: 85
End: 2025-03-12
Attending: NURSE PRACTITIONER
Payer: MEDICARE

## 2025-03-12 ENCOUNTER — ANTICOAGULATION MONITORING (OUTPATIENT)
Dept: VASCULAR LAB | Facility: MEDICAL CENTER | Age: 85
End: 2025-03-12
Payer: MEDICARE

## 2025-03-12 LAB
INR PPP: 1.94 (ref 0.87–1.13)
PROTHROMBIN TIME: 22.2 SEC (ref 12–14.6)

## 2025-03-12 PROCEDURE — 36415 COLL VENOUS BLD VENIPUNCTURE: CPT

## 2025-03-12 PROCEDURE — 85610 PROTHROMBIN TIME: CPT

## 2025-03-12 NOTE — PROGRESS NOTES
Anticoagulation Summary  As of 3/12/2025      INR goal:  2.0-3.0   TTR:  80.6% (7.1 y)   INR used for dosin.94 (3/12/2025)   Warfarin maintenance plan:  0.5 mg (1 mg x 0.5) every Mon, Fri; 1 mg (1 mg x 1) all other days   Weekly warfarin total:  6 mg   Plan last modified:  Jimbo Dunlap PharmD (3/12/2025)   Next INR check:  2025   Priority:  Routine   Target end date:  Indefinite    Indications    Paroxysmal a-fib (CMS-HCC) (Resolved) [I48.0]  Chronic anticoagulation (Resolved) [Z79.01]                 Anticoagulation Episode Summary       INR check location:  Clinic Lab    Preferred lab:  [facility]    Send INR reminders to:  --    Comments:  [facility] Cardiology   [phone] (H)  [facility]          Anticoagulation Care Providers       Provider Role Specialty Phone number    Evelio Tejeda M.D. Referring Internal Medicine [phone]    [facility] Anticoagulation Services Responsible  [phone]    Beryl Pang M.D. Responsible Family Medicine [phone]            Refer to Anticoagulation Patient Findings for HPI  Patient Findings       Negatives:  Signs/symptoms of thrombosis, Signs/symptoms of bleeding, Laboratory test error suspected, Change in health, Change in alcohol use, Change in activity, Upcoming invasive procedure, Emergency department visit, Upcoming dental procedure, Missed doses, Extra doses, Change in medications, Change in diet/appetite, Hospital admission, Bruising, Other complaints            Spoke with pt.  INR is SUB therapeutic.     Pt verifies warfarin weekly dosing.     Will have pt begin newly increased regimen of 0.5 mg Mon/Fri and 1 mg ROW.    Repeat INR in 2 weeks - pt declines and states she will retest in 3 weeks.     Jimbo Dunlap, PharmD, BCACP

## 2025-03-17 ENCOUNTER — TELEPHONE (OUTPATIENT)
Dept: CARDIOLOGY | Facility: MEDICAL CENTER | Age: 85
End: 2025-03-17
Payer: MEDICARE

## 2025-03-17 NOTE — TELEPHONE ENCOUNTER
Received VM from patient 3/15 requesting a refill of ipratropium bromide for her nebulizer.     Reviewed chart, refill request has been sent to [facility] family medicine 3/15.

## 2025-03-17 NOTE — TELEPHONE ENCOUNTER
Received request via: Pharmacy    Was the patient seen in the last year in this department? Yes    Does the patient have an active prescription (recently filled or refills available) for medication(s) requested? No    Pharmacy Name: [facility] [id number] [place] [address] [address]     Does the patient have assisted Plus and need 100-day supply? (This applies to ALL medications) Patient does not have SCP

## 2025-03-18 NOTE — TELEPHONE ENCOUNTER
Patient called stating she was at the pharmacy and she needs a PA for the prescription. Advised to reach out to her PCP office who authorized the refill because this is not a cardiac med. She verbalizes understanding.

## 2025-03-24 ENCOUNTER — PATIENT MESSAGE (OUTPATIENT)
Dept: MEDICAL GROUP | Facility: CLINIC | Age: 85
End: 2025-03-24

## 2025-03-26 DIAGNOSIS — J44.9 CHRONIC OBSTRUCTIVE PULMONARY DISEASE, UNSPECIFIED COPD TYPE (HCC): ICD-10-CM

## 2025-03-26 RX ORDER — IPRATROPIUM BROMIDE AND ALBUTEROL SULFATE 2.5; .5 MG/3ML; MG/3ML
3 SOLUTION RESPIRATORY (INHALATION) EVERY 4 HOURS PRN
Qty: 120 ML | Refills: 11 | Status: SHIPPED | OUTPATIENT
Start: 2025-03-26

## 2025-03-26 NOTE — TELEPHONE ENCOUNTER
Received request via: Pharmacy    Was the patient seen in the last year in this department? Yes    Does the patient have an active prescription (recently filled or refills available) for medication(s) requested? No    [facility]: [facility] [id number] - [place] - [address]     Does the patient have jail Plus and need 100-day supply? (This applies to ALL medications) Patient does not have SCP

## 2025-03-27 DIAGNOSIS — Z79.2 NEED FOR ANTIBIOTIC PROPHYLAXIS FOR DENTAL PROCEDURE: ICD-10-CM

## 2025-03-27 RX ORDER — SULFAMETHOXAZOLE AND TRIMETHOPRIM 800; 160 MG/1; MG/1
1 TABLET ORAL 2 TIMES DAILY
Qty: 6 TABLET | Refills: 0 | Status: SHIPPED | OUTPATIENT
Start: 2025-03-27 | End: 2025-03-30

## 2025-03-27 NOTE — PROGRESS NOTES
Called pt regarding warfarin DDI w/ Bactrim. Pt states she already had her dental procedure and will not be taking Bactrim.     She states her pharmacy requires dx codes for her ipratropium-albuterol - will notify pt's PCP.    Jimbo Dunlap, PharmD, BCACP     CC: Dr. Pang

## 2025-03-30 DIAGNOSIS — Z79.01 CHRONIC ANTICOAGULATION: ICD-10-CM

## 2025-03-30 DIAGNOSIS — R05.9 COUGH, UNSPECIFIED TYPE: ICD-10-CM

## 2025-03-31 RX ORDER — WARFARIN SODIUM 1 MG/1
TABLET ORAL
Qty: 90 TABLET | Refills: 1 | Status: SHIPPED | OUTPATIENT
Start: 2025-03-31

## 2025-04-01 RX ORDER — FLUTICASONE PROPIONATE 50 MCG
SPRAY, SUSPENSION (ML) NASAL
Qty: 16 ML | Refills: 3 | Status: SHIPPED | OUTPATIENT
Start: 2025-04-01

## 2025-04-01 NOTE — TELEPHONE ENCOUNTER
Received request via: Pharmacy    Was the patient seen in the last year in this department? Yes    Does the patient have an active prescription (recently filled or refills available) for medication(s) requested? No    Pharmacy Name:   [facility] [id number] - [place] - [address] [address]  Phone: [phone] Fax: [phone]    Does the patient have penitentiary Plus and need 100-day supply? (This applies to ALL medications) Patient does not have SCP

## 2025-04-02 ENCOUNTER — HOSPITAL ENCOUNTER (OUTPATIENT)
Dept: CARDIOLOGY | Facility: MEDICAL CENTER | Age: 85
End: 2025-04-02
Attending: NURSE PRACTITIONER
Payer: MEDICARE

## 2025-04-02 DIAGNOSIS — Z98.890 S/P MITRAL VALVE CLIP IMPLANTATION: ICD-10-CM

## 2025-04-02 DIAGNOSIS — Z95.818 S/P MITRAL VALVE CLIP IMPLANTATION: ICD-10-CM

## 2025-04-02 LAB
LV EJECT FRACT MOD 2C 99903: 77.61
LV EJECT FRACT MOD 4C 99902: 80.35
LV EJECT FRACT MOD BP 99901: 80.15

## 2025-04-02 PROCEDURE — 93306 TTE W/DOPPLER COMPLETE: CPT

## 2025-04-02 PROCEDURE — 93306 TTE W/DOPPLER COMPLETE: CPT | Mod: 26 | Performed by: INTERNAL MEDICINE

## 2025-04-03 ENCOUNTER — RESULTS FOLLOW-UP (OUTPATIENT)
Dept: CARDIOLOGY | Facility: MEDICAL CENTER | Age: 85
End: 2025-04-03
Payer: MEDICARE

## 2025-04-09 ENCOUNTER — ANTICOAGULATION MONITORING (OUTPATIENT)
Dept: MEDICAL GROUP | Facility: PHYSICIAN GROUP | Age: 85
End: 2025-04-09
Payer: MEDICARE

## 2025-04-09 ENCOUNTER — HOSPITAL ENCOUNTER (OUTPATIENT)
Dept: LAB | Facility: MEDICAL CENTER | Age: 85
End: 2025-04-09
Attending: NURSE PRACTITIONER
Payer: MEDICARE

## 2025-04-09 DIAGNOSIS — D68.318 CIRCULATING ANTICOAGULANTS (HCC): ICD-10-CM

## 2025-04-09 DIAGNOSIS — E78.5 HYPERLIPIDEMIA, UNSPECIFIED HYPERLIPIDEMIA TYPE: ICD-10-CM

## 2025-04-09 DIAGNOSIS — I48.0 PAROXYSMAL A-FIB (HCC): ICD-10-CM

## 2025-04-09 DIAGNOSIS — I73.9 PERIPHERAL ARTERIAL DISEASE (HCC): ICD-10-CM

## 2025-04-09 DIAGNOSIS — Z79.01 CHRONIC ANTICOAGULATION: ICD-10-CM

## 2025-04-09 LAB
ALBUMIN SERPL BCP-MCNC: 3.8 G/DL (ref 3.2–4.9)
ALBUMIN/GLOB SERPL: 1.4 G/DL
ALP SERPL-CCNC: 69 U/L (ref 30–99)
ALT SERPL-CCNC: 28 U/L (ref 2–50)
ANION GAP SERPL CALC-SCNC: 10 MMOL/L (ref 7–16)
AST SERPL-CCNC: 39 U/L (ref 12–45)
BILIRUB SERPL-MCNC: 0.5 MG/DL (ref 0.1–1.5)
BUN SERPL-MCNC: 15 MG/DL (ref 8–22)
CALCIUM ALBUM COR SERPL-MCNC: 9.9 MG/DL (ref 8.5–10.5)
CALCIUM SERPL-MCNC: 9.7 MG/DL (ref 8.5–10.5)
CHLORIDE SERPL-SCNC: 106 MMOL/L (ref 96–112)
CHOLEST SERPL-MCNC: 180 MG/DL (ref 100–199)
CO2 SERPL-SCNC: 29 MMOL/L (ref 20–33)
CREAT SERPL-MCNC: 0.82 MG/DL (ref 0.5–1.4)
ERYTHROCYTE [DISTWIDTH] IN BLOOD BY AUTOMATED COUNT: 50.3 FL (ref 35.9–50)
GFR SERPLBLD CREATININE-BSD FMLA CKD-EPI: 70 ML/MIN/1.73 M 2
GLOBULIN SER CALC-MCNC: 2.7 G/DL (ref 1.9–3.5)
GLUCOSE SERPL-MCNC: 89 MG/DL (ref 65–99)
HCT VFR BLD AUTO: 45.2 % (ref 37–47)
HDLC SERPL-MCNC: 103 MG/DL
HGB BLD-MCNC: 14.2 G/DL (ref 12–16)
INR PPP: 2.2 (ref 0.87–1.13)
LDLC SERPL CALC-MCNC: 60 MG/DL
MCH RBC QN AUTO: 30.1 PG (ref 27–33)
MCHC RBC AUTO-ENTMCNC: 31.4 G/DL (ref 32.2–35.5)
MCV RBC AUTO: 95.8 FL (ref 81.4–97.8)
PLATELET # BLD AUTO: 216 K/UL (ref 164–446)
PMV BLD AUTO: 10.6 FL (ref 9–12.9)
POTASSIUM SERPL-SCNC: 4.1 MMOL/L (ref 3.6–5.5)
PROT SERPL-MCNC: 6.5 G/DL (ref 6–8.2)
PROTHROMBIN TIME: 24.6 SEC (ref 12–14.6)
RBC # BLD AUTO: 4.72 M/UL (ref 4.2–5.4)
SODIUM SERPL-SCNC: 145 MMOL/L (ref 135–145)
TRIGL SERPL-MCNC: 87 MG/DL (ref 0–149)
WBC # BLD AUTO: 8.4 K/UL (ref 4.8–10.8)

## 2025-04-09 PROCEDURE — 85027 COMPLETE CBC AUTOMATED: CPT

## 2025-04-09 PROCEDURE — 80061 LIPID PANEL: CPT

## 2025-04-09 PROCEDURE — 80053 COMPREHEN METABOLIC PANEL: CPT

## 2025-04-09 PROCEDURE — 85610 PROTHROMBIN TIME: CPT

## 2025-04-09 PROCEDURE — 36415 COLL VENOUS BLD VENIPUNCTURE: CPT

## 2025-04-09 NOTE — PROGRESS NOTES
Anticoagulation Summary  As of 2025      INR goal:  2.0-3.0   TTR:  80.5% (7.2 y)   INR used for dosin.20 (2025)   Warfarin maintenance plan:  0.5 mg (1 mg x 0.5) every Mon, Fri; 1 mg (1 mg x 1) all other days   Weekly warfarin total:  6 mg   Plan last modified:  Yaneli MackenzieD (3/12/2025)   Next INR check:  2025   Priority:  Routine   Target end date:  Indefinite    Indications    Paroxysmal a-fib (CMS-HCC) (Resolved) [I48.0]  Chronic anticoagulation (Resolved) [Z79.01]                 Anticoagulation Episode Summary       INR check location:  Clinic Lab    Preferred lab:  [facility]    Send INR reminders to:  --    Comments:  [facility] Cardiology   [phone] (H)  [facility] labs          Anticoagulation Care Providers       Provider Role Specialty Phone number    Evelio Tejeda M.D. Referring Internal Medicine [phone]    [facility] Anticoagulation Services Responsible  [phone]    Beryl Pang M.D. Responsible Family Medicine [phone]          Anticoagulation Patient Findings  Patient Findings       Negatives:  Signs/symptoms of thrombosis, Signs/symptoms of bleeding, Laboratory test error suspected, Change in health, Change in alcohol use, Change in activity, Upcoming invasive procedure, Emergency department visit, Upcoming dental procedure, Missed doses, Extra doses, Change in medications, Change in diet/appetite, Hospital admission, Bruising, Other complaints          Clinical Outcomes       Negatives:  Major bleeding event, Thromboembolic event, Anticoagulation-related hospital admission, Anticoagulation-related ED visit, Anticoagulation-related fatality        Spoke with patient today regarding therapeutic INR of 2.2.  Patient denies any signs/symptoms of bruising or bleeding or any changes in diet and medications.  Instructed patient to call clinic with any questions or concerns.    Pt is to continue with current warfarin dosing regimen.  Follow up in 4-5 weeks, to reduce  risk of adverse events related to this high risk medication,  Warfarin.    Major Hughes, PharmD, BCACP

## 2025-04-11 ENCOUNTER — RESULTS FOLLOW-UP (OUTPATIENT)
Dept: CARDIOLOGY | Facility: MEDICAL CENTER | Age: 85
End: 2025-04-11
Payer: MEDICARE

## 2025-04-16 ENCOUNTER — TELEPHONE (OUTPATIENT)
Dept: CARDIOLOGY | Facility: MEDICAL CENTER | Age: 85
End: 2025-04-16

## 2025-04-16 ENCOUNTER — OFFICE VISIT (OUTPATIENT)
Dept: CARDIOLOGY | Facility: MEDICAL CENTER | Age: 85
End: 2025-04-16
Attending: INTERNAL MEDICINE
Payer: MEDICARE

## 2025-04-16 VITALS
RESPIRATION RATE: 16 BRPM | BODY MASS INDEX: 16.97 KG/M2 | OXYGEN SATURATION: 95 % | HEART RATE: 71 BPM | DIASTOLIC BLOOD PRESSURE: 50 MMHG | HEIGHT: 62 IN | WEIGHT: 92.2 LBS | SYSTOLIC BLOOD PRESSURE: 104 MMHG

## 2025-04-16 DIAGNOSIS — I48.91 ATRIAL FIBRILLATION, UNSPECIFIED TYPE (HCC): ICD-10-CM

## 2025-04-16 DIAGNOSIS — Z98.890 S/P MITRAL VALVE CLIP IMPLANTATION: ICD-10-CM

## 2025-04-16 DIAGNOSIS — Z91.041 CONTRAST MEDIA ALLERGY: ICD-10-CM

## 2025-04-16 DIAGNOSIS — Z87.09 HISTORY OF COPD: ICD-10-CM

## 2025-04-16 DIAGNOSIS — Z95.818 S/P MITRAL VALVE CLIP IMPLANTATION: ICD-10-CM

## 2025-04-16 PROCEDURE — 3074F SYST BP LT 130 MM HG: CPT | Performed by: INTERNAL MEDICINE

## 2025-04-16 PROCEDURE — 99214 OFFICE O/P EST MOD 30 MIN: CPT | Performed by: INTERNAL MEDICINE

## 2025-04-16 PROCEDURE — 3078F DIAST BP <80 MM HG: CPT | Performed by: INTERNAL MEDICINE

## 2025-04-16 PROCEDURE — 99213 OFFICE O/P EST LOW 20 MIN: CPT | Performed by: INTERNAL MEDICINE

## 2025-04-16 ASSESSMENT — FIBROSIS 4 INDEX: FIB4 SCORE: 2.93

## 2025-04-16 NOTE — PROGRESS NOTES
Interventional cardiology Follow-up Consultation Note        CC: Severe mitral regurgitation status post MitraClip, atrial fibrillation, bleeding    Patient ID/HPI:   85-year-old female patient with severe mitral regurgitation status post MitraClip here for follow-up.  She has been having significant bruising, easy bleeding in her legs.  She is wondering about Watchman device.  Otherwise she feels well        Past Medical History:   Diagnosis Date    Arthritis     BL hands    Breath shortness     CAD (coronary artery disease)     Cancer ([facility]) 1982    melanoma    Cancer ([facility]) 2023    melanoma    Cataract     IOL implants    COPD (chronic obstructive pulmonary disease) ([facility])     COVID-19     Croup 3 years old    Disorder of thyroid     1983    Diverticulosis     Dyslipidemia     Emphysema of lung ([facility])     GERD (gastroesophageal reflux disease)     Heart valve disease     Hiatal hernia     High cholesterol     Hypertension     Infectious disease 2018    C Diff    Measles     5 years old    Paroxysmal A-fib ([facility]) 01/20/2016    Symptomatic, on a rhythm control strategy with sotalol 40 mg by mouth twice a day.     Paroxysmal atrial fibrillation ([facility])     Pneumonia     x 3    Prediabetes     Psychiatric problem     anxiety    PVD (peripheral vascular disease) ([facility])     Renal disorder 11/2023    kidney stone    Rheumatic fever     possible as a small child    Urinary bladder disorder     frequent UTIs         Current Outpatient Medications   Medication Sig Dispense Refill    fluticasone (FLONASE) 50 MCG/ACT nasal spray SPRAY 1 SPRAY INTO AFFECTED NOSTRIL (S) DAILY 16 mL 3    warfarin (COUMADIN) 1 MG Tab TAKE 1/2 TO 1 TABLET DAILY OR AS DIRECTED BY ANTICOAGULATION CLINIC 90 Tablet 1    ipratropium (ATROVENT) 0.02 % Solution Take 1 mL by nebulization 3 times a day. 187.5 mL 3    ipratropium-albuterol (DUONEB) 0.5-2.5 (3) MG/3ML nebulizer solution Take 3 mL by nebulization every four hours as needed for Shortness of  "Breath. 120 mL 11    ALPRAZolam (XANAX) 0.25 MG Tab Take 1 Tablet by mouth 2 times a day as needed for Anxiety for up to 180 days. 60 Tablet 5    albuterol 108 (90 Base) MCG/ACT Aero Soln inhalation aerosol Inhale 2 Puffs 4 times a day as needed for Shortness of Breath. 1 Each 3    lovastatin (MEVACOR) 40 MG tablet TAKE 1 TABLET AT BEDTIME 100 Tablet 3    furosemide (LASIX) 20 MG Tab Take 1 Tablet by mouth every day. 90 Tablet 3    sotalol (BETAPACE) 80 MG Tab TAKE ONE-HALF TABLET BY MOUTH 2 TIMES DAILY 90 Tablet 3    predniSONE (DELTASONE) 10 MG Tab Take 1 Tablet by mouth every 3 days. 30 Tablet 3    Calcium-Magnesium-Vitamin D (CALCIUM 1200+D3 PO) Take 1 Tablet by mouth every day.      D-Mannose Powder Take 1 Dose by mouth every day. Mix one scoop with 4 oz of water      Multiple Vitamins-Minerals (ZINC PO) Take 1 Tablet by mouth every day.      EPINEPHrine (EPIPEN) 0.3 MG/0.3ML Solution Auto-injector solution for injection epinephrine 0.3 mg/0.3 mL injection, auto-injector      Probiotic Product (PROBIOTIC PO) Take 1 Capsule by mouth every day.      ascorbic acid (ASCORBIC ACID) 500 MG Tab Take 2 Tablets by mouth every day.      vitamin D (CHOLECALCIFEROL) 1000 UNIT Tab Take 3,000 Units by mouth every morning. 3 tablets = 3000 units       No current facility-administered medications for this visit.         Physical Exam:  Ambulatory Vitals  /50 (BP Location: Left arm, Patient Position: Sitting, BP Cuff Size: Adult)   Pulse 71   Resp 16   Ht 1.575 m (5' 2\")   Wt 41.8 kg (92 lb 3.2 oz)   SpO2 95%    Oxygen Therapy:  Pulse Oximetry: 95 %  BP Readings from Last 4 Encounters:   04/16/25 104/50   01/08/25 110/50   09/24/24 90/58   07/23/24 90/60       Weight/BMI: Body mass index is 16.86 kg/m².  Wt Readings from Last 4 Encounters:   04/16/25 41.8 kg (92 lb 3.2 oz)   01/08/25 39.9 kg (88 lb)   09/24/24 39.7 kg (87 lb 7 oz)   07/23/24 38.1 kg (84 lb)       General: Well appearing and in no apparent " distress  Neck: JVP absent, carotid bruits absent  Lungs: respiratory sounds  normal, additional breath sounds absent  Heart: Regular rhythm,   No palpable thrills on palpation, murmurs absent, no rubs,   Lower extremity edema absent.       EKG sinus rhythm    Echocardiogram April 2025 reviewed, independently interpreted normal LV function, stable mitral valve collapse, moderate residual eccentric mitral regurgitation.      Medical Decision Making:  Problem List Items Addressed This Visit       S/P mitral valve clip implantation    History of COPD     Other Visit Diagnoses         Atrial fibrillation, unspecified type (HCC)        Relevant Orders    EC-NIRAJ W/O CONT    CL-LEFT ATRIAL APPENDAGE CLOSURE    EC-NIRAJ W/O CONT          She is doing well from mitral regurgitation standpoint.  She has some residual mitral regurgitation but overall moderate, clinically she is feeling well.    I feel she is a good candidate for Watchman device given her high bleeding risk, easy bruising, bleeding from the legs.  Risk benefits of the procedure discussed in detail.    Risk factors for chronic anticoagulation:  History of significant bleeding and Increased bleeding risk    CHADS2 vasc score: Congestive heart failure, Age more than 75 (2 points), Female, and Vascular disease  Total score 5(yearly stroke risk 6.7%)     Has bled score:  Bleeding (2 points) and Age> 65  Total score 3    NYHA functional class III         Ethan ENRIQUEZ  Interventional cardiologist  [facility]  [address] [address]  Phone: [phone]  Fax: [phone]

## 2025-04-18 NOTE — TELEPHONE ENCOUNTER
Patient scheduled for post watchman NIRAJ w/anesthesia on 6-27-25 with Dr. Post. Patient has been instructed to check in at 7:00 for 9:00 case time. Message sent to betsy HANNON to Dr. Post

## 2025-04-18 NOTE — TELEPHONE ENCOUNTER
Patient scheduled for watchman w/NIRAJ on 5-15-25 with Dr. Lozano. Patient has been instructed to check in at 7:30 for 9:30 case time. Message sent to authorizations. Watchman rep is aware of this date.

## 2025-04-21 RX ORDER — PREDNISONE 50 MG/1
TABLET ORAL
Qty: 3 TABLET | Refills: 0 | Status: SHIPPED | OUTPATIENT
Start: 2025-04-21

## 2025-04-21 NOTE — TELEPHONE ENCOUNTER
Ethan Lozano M.D.  You; Wendie Rizzo, Med Ass't3 days ago     Can we do contrast allergy protocol please

## 2025-04-23 ENCOUNTER — APPOINTMENT (OUTPATIENT)
Dept: ADMISSIONS | Facility: MEDICAL CENTER | Age: 85
End: 2025-04-23
Attending: INTERNAL MEDICINE
Payer: MEDICARE

## 2025-04-25 ENCOUNTER — PATIENT MESSAGE (OUTPATIENT)
Dept: CARDIOLOGY | Facility: MEDICAL CENTER | Age: 85
End: 2025-04-25
Payer: MEDICARE

## 2025-05-02 ENCOUNTER — PRE-ADMISSION TESTING (OUTPATIENT)
Dept: ADMISSIONS | Facility: MEDICAL CENTER | Age: 85
DRG: 274 | End: 2025-05-02
Attending: INTERNAL MEDICINE
Payer: MEDICARE

## 2025-05-02 NOTE — OR NURSING
Spoke with Wendie procedure . Patient should continue Coumadin, Probiotics and Vit D as prescribed.

## 2025-05-02 NOTE — PREADMIT AVS NOTE
Current Medications   Medication Instructions    fluticasone (FLONASE) 50 MCG/ACT nasal spray Continue taking medication as prescribed, including morning of procedure     warfarin (COUMADIN) 1 MG Tab Continue taking medication as prescribed, including morning of procedure     ipratropium (ATROVENT) 0.02 % Solution Continue taking medication as prescribed, including morning of procedure     ipratropium-albuterol (DUONEB) 0.5-2.5 (3) MG/3ML nebulizer solution Continue taking medication as prescribed, including morning of procedure     ALPRAZolam (XANAX) 0.25 MG Tab Continue taking medication as prescribed, including morning of procedure     albuterol 108 (90 Base) MCG/ACT Aero Soln inhalation aerosol Continue taking medication as prescribed, including morning of procedure     lovastatin (MEVACOR) 40 MG tablet Continue taking medication as prescribed, including morning of procedure     furosemide (LASIX) 20 MG Tab Hold medication day of procedure    sotalol (BETAPACE) 80 MG Tab Continue taking medication as prescribed, including morning of procedure     Calcium-Magnesium-Vitamin D (CALCIUM 1200+D3 PO) Stop 7 days before surgery    Multiple Vitamins-Minerals (ZINC PO) Stop 7 days before surgery    EPINEPHrine (EPIPEN) 0.3 MG/0.3ML Solution Auto-injector solution for injection Continue taking medication as prescribed, including morning of procedure     Probiotic Product (PROBIOTIC PO) Continue taking medication as prescribed, including morning of procedure     ascorbic acid (ASCORBIC ACID) 500 MG Tab Stop 7 days before surgery    vitamin D (CHOLECALCIFEROL) 1000 UNIT Tab Continue taking medication as prescribed, including morning of procedure     predniSONE (DELTASONE) 50 MG Tab Follow instructions from surgeon or specialist.    predniSONE (DELTASONE) 10 MG Tab Follow instructions from surgeon or specialist.    D-Mannose Powder Continue taking medication as prescribed, including morning of procedure

## 2025-05-07 ENCOUNTER — ANTICOAGULATION MONITORING (OUTPATIENT)
Dept: VASCULAR LAB | Facility: MEDICAL CENTER | Age: 85
End: 2025-05-07
Payer: MEDICARE

## 2025-05-07 ENCOUNTER — HOSPITAL ENCOUNTER (OUTPATIENT)
Dept: LAB | Facility: MEDICAL CENTER | Age: 85
End: 2025-05-07
Attending: NURSE PRACTITIONER
Payer: MEDICARE

## 2025-05-07 DIAGNOSIS — Z79.01 CHRONIC ANTICOAGULATION: ICD-10-CM

## 2025-05-07 LAB
INR PPP: 1.86 (ref 0.87–1.13)
PROTHROMBIN TIME: 21.6 SEC (ref 12–14.6)

## 2025-05-07 PROCEDURE — 85610 PROTHROMBIN TIME: CPT

## 2025-05-07 PROCEDURE — 36415 COLL VENOUS BLD VENIPUNCTURE: CPT

## 2025-05-07 NOTE — PROGRESS NOTES
Anticoagulation Summary  As of 2025      INR goal:  2.0-3.0   TTR:  80.3% (7.3 y)   INR used for dosin.86 (2025)   Warfarin maintenance plan:  0.5 mg (1 mg x 0.5) every Mon, Fri; 1 mg (1 mg x 1) all other days   Weekly warfarin total:  6 mg   Plan last modified:  Jimbo Dunlap PharmD (3/12/2025)   Next INR check:  2025   Priority:  Routine   Target end date:  Indefinite    Indications    Paroxysmal a-fib (CMS-HCC) (Resolved) [I48.0]  Chronic anticoagulation (Resolved) [Z79.01]                 Anticoagulation Episode Summary       INR check location:  Clinic Lab    Preferred lab:  [facility]    Send INR reminders to:  --    Comments:  [facility]   [phone] (H)  [facility] labs          Anticoagulation Care Providers       Provider Role Specialty Phone number    Evelio Tejeda M.D. Referring Internal Medicine [phone]    [facility] Responsible  [phone]    Beryl Pang M.D. Responsible Family Medicine [phone]          Anticoagulation Patient Findings  Patient Findings       Positives:  Upcoming invasive procedure (wathcman procedure)    Negatives:  Signs/symptoms of thrombosis, Signs/symptoms of bleeding, Laboratory test error suspected, Change in health, Change in alcohol use, Change in activity, Emergency department visit, Upcoming dental procedure, Missed doses, Extra doses, Change in medications, Change in diet/appetite, Hospital admission, Bruising, Other complaints          Clinical Outcomes       Negatives:  Major bleeding event, Thromboembolic event, Anticoagulation-related hospital admission, Anticoagulation-related ED visit, Anticoagulation-related fatality            INR is slightly subtherapeutic  Reason(s) for out of range INR today: No obvious causes      Called and spoke to patient.        Warfarin Plan: Increase to Warfarin 1.5 mg and then continue previous warfarin dosing.     Next INR in 2 week(s).    Yaneli HollandD

## 2025-05-14 ENCOUNTER — PRE-ADMISSION TESTING (OUTPATIENT)
Dept: ADMISSIONS | Facility: MEDICAL CENTER | Age: 85
DRG: 274 | End: 2025-05-14
Attending: INTERNAL MEDICINE
Payer: MEDICARE

## 2025-05-14 DIAGNOSIS — Z01.812 PRE-OPERATIVE LABORATORY EXAMINATION: ICD-10-CM

## 2025-05-14 DIAGNOSIS — Z01.810 PRE-OPERATIVE CARDIOVASCULAR EXAMINATION: ICD-10-CM

## 2025-05-14 LAB
ANION GAP SERPL CALC-SCNC: 6 MMOL/L (ref 7–16)
BASOPHILS # BLD AUTO: 0.7 % (ref 0–1.8)
BASOPHILS # BLD: 0.06 K/UL (ref 0–0.12)
BUN SERPL-MCNC: 19 MG/DL (ref 8–22)
CALCIUM SERPL-MCNC: 10.1 MG/DL (ref 8.5–10.5)
CHLORIDE SERPL-SCNC: 105 MMOL/L (ref 96–112)
CO2 SERPL-SCNC: 30 MMOL/L (ref 20–33)
CREAT SERPL-MCNC: 0.91 MG/DL (ref 0.5–1.4)
EKG IMPRESSION: NORMAL
EOSINOPHIL # BLD AUTO: 0.4 K/UL (ref 0–0.51)
EOSINOPHIL NFR BLD: 4.8 % (ref 0–6.9)
ERYTHROCYTE [DISTWIDTH] IN BLOOD BY AUTOMATED COUNT: 49.8 FL (ref 35.9–50)
GFR SERPLBLD CREATININE-BSD FMLA CKD-EPI: 61 ML/MIN/1.73 M 2
GLUCOSE SERPL-MCNC: 89 MG/DL (ref 65–99)
HCT VFR BLD AUTO: 44.9 % (ref 37–47)
HGB BLD-MCNC: 13.9 G/DL (ref 12–16)
IMM GRANULOCYTES # BLD AUTO: 0.02 K/UL (ref 0–0.11)
IMM GRANULOCYTES NFR BLD AUTO: 0.2 % (ref 0–0.9)
LYMPHOCYTES # BLD AUTO: 2.32 K/UL (ref 1–4.8)
LYMPHOCYTES NFR BLD: 27.9 % (ref 22–41)
MCH RBC QN AUTO: 29.8 PG (ref 27–33)
MCHC RBC AUTO-ENTMCNC: 31 G/DL (ref 32.2–35.5)
MCV RBC AUTO: 96.1 FL (ref 81.4–97.8)
MONOCYTES # BLD AUTO: 0.79 K/UL (ref 0–0.85)
MONOCYTES NFR BLD AUTO: 9.5 % (ref 0–13.4)
NEUTROPHILS # BLD AUTO: 4.72 K/UL (ref 1.82–7.42)
NEUTROPHILS NFR BLD: 56.9 % (ref 44–72)
NRBC # BLD AUTO: 0 K/UL
NRBC BLD-RTO: 0 /100 WBC (ref 0–0.2)
PLATELET # BLD AUTO: 171 K/UL (ref 164–446)
PMV BLD AUTO: 10.6 FL (ref 9–12.9)
POTASSIUM SERPL-SCNC: 4.5 MMOL/L (ref 3.6–5.5)
RBC # BLD AUTO: 4.67 M/UL (ref 4.2–5.4)
SODIUM SERPL-SCNC: 141 MMOL/L (ref 135–145)
WBC # BLD AUTO: 8.3 K/UL (ref 4.8–10.8)

## 2025-05-14 PROCEDURE — 36415 COLL VENOUS BLD VENIPUNCTURE: CPT

## 2025-05-14 PROCEDURE — 93010 ELECTROCARDIOGRAM REPORT: CPT | Performed by: INTERNAL MEDICINE

## 2025-05-14 PROCEDURE — 80048 BASIC METABOLIC PNL TOTAL CA: CPT

## 2025-05-14 PROCEDURE — 93005 ELECTROCARDIOGRAM TRACING: CPT | Mod: TC

## 2025-05-14 PROCEDURE — 85610 PROTHROMBIN TIME: CPT

## 2025-05-14 PROCEDURE — 85730 THROMBOPLASTIN TIME PARTIAL: CPT

## 2025-05-14 PROCEDURE — 85025 COMPLETE CBC W/AUTO DIFF WBC: CPT

## 2025-05-15 ENCOUNTER — ANESTHESIA (OUTPATIENT)
Dept: CARDIOLOGY | Facility: MEDICAL CENTER | Age: 85
DRG: 274 | End: 2025-05-15
Payer: MEDICARE

## 2025-05-15 ENCOUNTER — APPOINTMENT (OUTPATIENT)
Dept: CARDIOLOGY | Facility: MEDICAL CENTER | Age: 85
DRG: 274 | End: 2025-05-15
Attending: INTERNAL MEDICINE
Payer: MEDICARE

## 2025-05-15 ENCOUNTER — TELEPHONE (OUTPATIENT)
Dept: CARDIOLOGY | Facility: MEDICAL CENTER | Age: 85
End: 2025-05-15

## 2025-05-15 ENCOUNTER — ANESTHESIA EVENT (OUTPATIENT)
Dept: CARDIOLOGY | Facility: MEDICAL CENTER | Age: 85
DRG: 274 | End: 2025-05-15
Payer: MEDICARE

## 2025-05-15 ENCOUNTER — HOSPITAL ENCOUNTER (INPATIENT)
Facility: MEDICAL CENTER | Age: 85
LOS: 1 days | DRG: 274 | End: 2025-05-16
Attending: INTERNAL MEDICINE | Admitting: INTERNAL MEDICINE
Payer: MEDICARE

## 2025-05-15 DIAGNOSIS — I48.91 ATRIAL FIBRILLATION, UNSPECIFIED TYPE (HCC): ICD-10-CM

## 2025-05-15 LAB
ACT BLD: 308 SEC (ref 74–137)
APTT PPP: 33.8 SEC (ref 24.7–36)
INR PPP: 1.81 (ref 0.87–1.13)
INR PPP: 1.82 (ref 0.87–1.13)
LV EJECT FRACT  99904: 55
PROTHROMBIN TIME: 21 SEC (ref 12–14.6)
PROTHROMBIN TIME: 21.1 SEC (ref 12–14.6)

## 2025-05-15 PROCEDURE — 700111 HCHG RX REV CODE 636 W/ 250 OVERRIDE (IP)

## 2025-05-15 PROCEDURE — 02L73DK OCCLUSION OF LEFT ATRIAL APPENDAGE WITH INTRALUMINAL DEVICE, PERCUTANEOUS APPROACH: ICD-10-PCS | Performed by: INTERNAL MEDICINE

## 2025-05-15 PROCEDURE — 700105 HCHG RX REV CODE 258: Performed by: ANESTHESIOLOGY

## 2025-05-15 PROCEDURE — 85347 COAGULATION TIME ACTIVATED: CPT

## 2025-05-15 PROCEDURE — 85610 PROTHROMBIN TIME: CPT

## 2025-05-15 PROCEDURE — C1760 CLOSURE DEV, VASC: HCPCS

## 2025-05-15 PROCEDURE — 700101 HCHG RX REV CODE 250

## 2025-05-15 PROCEDURE — B245ZZ4 ULTRASONOGRAPHY OF LEFT HEART, TRANSESOPHAGEAL: ICD-10-PCS | Performed by: INTERNAL MEDICINE

## 2025-05-15 PROCEDURE — 160035 HCHG PACU - 1ST 60 MINS PHASE I

## 2025-05-15 PROCEDURE — 700111 HCHG RX REV CODE 636 W/ 250 OVERRIDE (IP): Performed by: ANESTHESIOLOGY

## 2025-05-15 PROCEDURE — 160046 HCHG PACU - 1ST 60 MINS PHASE II

## 2025-05-15 PROCEDURE — A9270 NON-COVERED ITEM OR SERVICE: HCPCS | Performed by: INTERNAL MEDICINE

## 2025-05-15 PROCEDURE — 94640 AIRWAY INHALATION TREATMENT: CPT

## 2025-05-15 PROCEDURE — 700102 HCHG RX REV CODE 250 W/ 637 OVERRIDE(OP): Performed by: INTERNAL MEDICINE

## 2025-05-15 PROCEDURE — 160002 HCHG RECOVERY MINUTES (STAT)

## 2025-05-15 PROCEDURE — 93355 ECHO TRANSESOPHAGEAL (TEE): CPT

## 2025-05-15 PROCEDURE — 700117 HCHG RX CONTRAST REV CODE 255: Performed by: INTERNAL MEDICINE

## 2025-05-15 PROCEDURE — 33340 PERQ CLSR TCAT L ATR APNDGE: CPT | Mod: Q0 | Performed by: INTERNAL MEDICINE

## 2025-05-15 PROCEDURE — 700101 HCHG RX REV CODE 250: Performed by: INTERNAL MEDICINE

## 2025-05-15 PROCEDURE — 770020 HCHG ROOM/CARE - TELE (206)

## 2025-05-15 PROCEDURE — 700101 HCHG RX REV CODE 250: Performed by: ANESTHESIOLOGY

## 2025-05-15 RX ORDER — DIPHENHYDRAMINE HYDROCHLORIDE 50 MG/ML
12.5 INJECTION, SOLUTION INTRAMUSCULAR; INTRAVENOUS
Status: DISCONTINUED | OUTPATIENT
Start: 2025-05-15 | End: 2025-05-15 | Stop reason: HOSPADM

## 2025-05-15 RX ORDER — SOTALOL HYDROCHLORIDE 80 MG/1
40 TABLET ORAL TWICE DAILY
Status: DISCONTINUED | OUTPATIENT
Start: 2025-05-15 | End: 2025-05-15

## 2025-05-15 RX ORDER — WARFARIN SODIUM 1 MG/1
1 TABLET ORAL DAILY
Status: DISCONTINUED | OUTPATIENT
Start: 2025-05-15 | End: 2025-05-15

## 2025-05-15 RX ORDER — WARFARIN SODIUM 1 MG/1
1 TABLET ORAL
Status: DISCONTINUED | OUTPATIENT
Start: 2025-05-15 | End: 2025-05-16 | Stop reason: HOSPADM

## 2025-05-15 RX ORDER — WARFARIN SODIUM 1 MG/1
0.5 TABLET ORAL
Status: DISCONTINUED | OUTPATIENT
Start: 2025-05-16 | End: 2025-05-16

## 2025-05-15 RX ORDER — SODIUM CHLORIDE, SODIUM LACTATE, POTASSIUM CHLORIDE, CALCIUM CHLORIDE 600; 310; 30; 20 MG/100ML; MG/100ML; MG/100ML; MG/100ML
INJECTION, SOLUTION INTRAVENOUS
Status: DISCONTINUED | OUTPATIENT
Start: 2025-05-15 | End: 2025-05-15 | Stop reason: SURG

## 2025-05-15 RX ORDER — SODIUM CHLORIDE, SODIUM LACTATE, POTASSIUM CHLORIDE, CALCIUM CHLORIDE 600; 310; 30; 20 MG/100ML; MG/100ML; MG/100ML; MG/100ML
INJECTION, SOLUTION INTRAVENOUS CONTINUOUS
Status: DISCONTINUED | OUTPATIENT
Start: 2025-05-15 | End: 2025-05-16 | Stop reason: HOSPADM

## 2025-05-15 RX ORDER — HALOPERIDOL 5 MG/ML
1 INJECTION INTRAMUSCULAR
Status: DISCONTINUED | OUTPATIENT
Start: 2025-05-15 | End: 2025-05-15 | Stop reason: HOSPADM

## 2025-05-15 RX ORDER — ALBUTEROL SULFATE 5 MG/ML
2.5 SOLUTION RESPIRATORY (INHALATION)
Status: DISCONTINUED | OUTPATIENT
Start: 2025-05-15 | End: 2025-05-15 | Stop reason: HOSPADM

## 2025-05-15 RX ORDER — HEPARIN SODIUM 200 [USP'U]/100ML
INJECTION, SOLUTION INTRAVENOUS
Status: COMPLETED
Start: 2025-05-15 | End: 2025-05-15

## 2025-05-15 RX ORDER — LOVASTATIN 20 MG/1
40 TABLET ORAL
Status: DISCONTINUED | OUTPATIENT
Start: 2025-05-15 | End: 2025-05-16 | Stop reason: HOSPADM

## 2025-05-15 RX ORDER — LIDOCAINE HYDROCHLORIDE 40 MG/ML
SOLUTION TOPICAL
Status: COMPLETED
Start: 2025-05-15 | End: 2025-05-15

## 2025-05-15 RX ORDER — ALPRAZOLAM 0.25 MG
0.25 TABLET ORAL 2 TIMES DAILY PRN
Status: DISCONTINUED | OUTPATIENT
Start: 2025-05-15 | End: 2025-05-16 | Stop reason: HOSPADM

## 2025-05-15 RX ORDER — BUPIVACAINE HYDROCHLORIDE 5 MG/ML
INJECTION, SOLUTION EPIDURAL; INTRACAUDAL; PERINEURAL
Status: COMPLETED
Start: 2025-05-15 | End: 2025-05-15

## 2025-05-15 RX ORDER — FUROSEMIDE 20 MG/1
20 TABLET ORAL DAILY
Status: DISCONTINUED | OUTPATIENT
Start: 2025-05-15 | End: 2025-05-15

## 2025-05-15 RX ORDER — SOTALOL HYDROCHLORIDE 80 MG/1
40 TABLET ORAL TWICE DAILY
Status: DISCONTINUED | OUTPATIENT
Start: 2025-05-15 | End: 2025-05-16 | Stop reason: HOSPADM

## 2025-05-15 RX ORDER — CEFAZOLIN SODIUM 1 G/3ML
INJECTION, POWDER, FOR SOLUTION INTRAMUSCULAR; INTRAVENOUS PRN
Status: DISCONTINUED | OUTPATIENT
Start: 2025-05-15 | End: 2025-05-15 | Stop reason: SURG

## 2025-05-15 RX ORDER — FUROSEMIDE 20 MG/1
20 TABLET ORAL DAILY
Status: DISCONTINUED | OUTPATIENT
Start: 2025-05-15 | End: 2025-05-16 | Stop reason: HOSPADM

## 2025-05-15 RX ORDER — DEXAMETHASONE SODIUM PHOSPHATE 4 MG/ML
INJECTION, SOLUTION INTRA-ARTICULAR; INTRALESIONAL; INTRAMUSCULAR; INTRAVENOUS; SOFT TISSUE PRN
Status: DISCONTINUED | OUTPATIENT
Start: 2025-05-15 | End: 2025-05-15 | Stop reason: SURG

## 2025-05-15 RX ORDER — ONDANSETRON 2 MG/ML
4 INJECTION INTRAMUSCULAR; INTRAVENOUS
Status: DISCONTINUED | OUTPATIENT
Start: 2025-05-15 | End: 2025-05-15 | Stop reason: HOSPADM

## 2025-05-15 RX ORDER — HEPARIN SODIUM 1000 [USP'U]/ML
INJECTION, SOLUTION INTRAVENOUS; SUBCUTANEOUS
Status: COMPLETED
Start: 2025-05-15 | End: 2025-05-15

## 2025-05-15 RX ORDER — ALBUTEROL SULFATE 90 UG/1
2 INHALANT RESPIRATORY (INHALATION) 4 TIMES DAILY PRN
Status: DISCONTINUED | OUTPATIENT
Start: 2025-05-15 | End: 2025-05-16 | Stop reason: HOSPADM

## 2025-05-15 RX ORDER — LIDOCAINE HYDROCHLORIDE 20 MG/ML
INJECTION, SOLUTION INFILTRATION; PERINEURAL
Status: COMPLETED
Start: 2025-05-15 | End: 2025-05-15

## 2025-05-15 RX ORDER — LIDOCAINE HYDROCHLORIDE 40 MG/ML
SOLUTION TOPICAL PRN
Status: DISCONTINUED | OUTPATIENT
Start: 2025-05-15 | End: 2025-05-15 | Stop reason: SURG

## 2025-05-15 RX ORDER — EPHEDRINE SULFATE 50 MG/ML
INJECTION, SOLUTION INTRAVENOUS PRN
Status: DISCONTINUED | OUTPATIENT
Start: 2025-05-15 | End: 2025-05-15 | Stop reason: SURG

## 2025-05-15 RX ORDER — ONDANSETRON 2 MG/ML
INJECTION INTRAMUSCULAR; INTRAVENOUS PRN
Status: DISCONTINUED | OUTPATIENT
Start: 2025-05-15 | End: 2025-05-15 | Stop reason: SURG

## 2025-05-15 RX ADMIN — WARFARIN SODIUM 1 MG: 1 TABLET ORAL at 16:44

## 2025-05-15 RX ADMIN — HEPARIN SODIUM 2000 UNITS: 200 INJECTION, SOLUTION INTRAVENOUS at 09:39

## 2025-05-15 RX ADMIN — PROPOFOL 200 MG: 10 INJECTION, EMULSION INTRAVENOUS at 09:40

## 2025-05-15 RX ADMIN — EPHEDRINE SULFATE 10 MG: 50 INJECTION, SOLUTION INTRAVENOUS at 10:32

## 2025-05-15 RX ADMIN — FUROSEMIDE 20 MG: 20 TABLET ORAL at 16:44

## 2025-05-15 RX ADMIN — SODIUM CHLORIDE, POTASSIUM CHLORIDE, SODIUM LACTATE AND CALCIUM CHLORIDE: 600; 310; 30; 20 INJECTION, SOLUTION INTRAVENOUS at 09:33

## 2025-05-15 RX ADMIN — SOTALOL HYDROCHLORIDE 40 MG: 80 TABLET ORAL at 16:44

## 2025-05-15 RX ADMIN — LIDOCAINE HYDROCHLORIDE: 20 INJECTION, SOLUTION INFILTRATION; PERINEURAL at 09:39

## 2025-05-15 RX ADMIN — IOHEXOL 5 ML: 350 INJECTION, SOLUTION INTRAVENOUS at 11:02

## 2025-05-15 RX ADMIN — CEFAZOLIN 2 G: 1 INJECTION, POWDER, FOR SOLUTION INTRAMUSCULAR; INTRAVENOUS at 09:46

## 2025-05-15 RX ADMIN — ROCURONIUM BROMIDE 50 MG: 10 INJECTION, SOLUTION INTRAVENOUS at 09:40

## 2025-05-15 RX ADMIN — DEXAMETHASONE SODIUM PHOSPHATE 4 MG: 4 INJECTION INTRA-ARTICULAR; INTRALESIONAL; INTRAMUSCULAR; INTRAVENOUS; SOFT TISSUE at 09:40

## 2025-05-15 RX ADMIN — EPHEDRINE SULFATE 20 MG: 50 INJECTION, SOLUTION INTRAVENOUS at 10:27

## 2025-05-15 RX ADMIN — ROCURONIUM BROMIDE 10 MG: 10 INJECTION, SOLUTION INTRAVENOUS at 10:38

## 2025-05-15 RX ADMIN — SUGAMMADEX 200 MG: 100 INJECTION, SOLUTION INTRAVENOUS at 11:09

## 2025-05-15 RX ADMIN — IPRATROPIUM BROMIDE 0.2 MG: 0.5 SOLUTION RESPIRATORY (INHALATION) at 19:14

## 2025-05-15 RX ADMIN — ONDANSETRON 4 MG: 2 INJECTION INTRAMUSCULAR; INTRAVENOUS at 09:40

## 2025-05-15 RX ADMIN — BUPIVACAINE HYDROCHLORIDE: 5 INJECTION, SOLUTION EPIDURAL; INTRACAUDAL; PERINEURAL at 09:39

## 2025-05-15 RX ADMIN — LIDOCAINE HYDROCHLORIDE 4 ML: 40 SOLUTION TOPICAL at 09:42

## 2025-05-15 RX ADMIN — LOVASTATIN 40 MG: 20 TABLET ORAL at 20:33

## 2025-05-15 RX ADMIN — HEPARIN SODIUM: 1000 INJECTION, SOLUTION INTRAVENOUS; SUBCUTANEOUS at 09:40

## 2025-05-15 ASSESSMENT — CHA2DS2 SCORE
SEX: FEMALE
AGE 65 TO 74: NO
CHA2DS2 VASC SCORE: 5
CHF OR LEFT VENTRICULAR DYSFUNCTION: YES
DIABETES: NO
AGE 75 OR GREATER: YES
VASCULAR DISEASE: YES
PRIOR STROKE OR TIA OR THROMBOEMBOLISM: NO

## 2025-05-15 ASSESSMENT — FIBROSIS 4 INDEX: FIB4 SCORE: 3.71

## 2025-05-15 NOTE — ANESTHESIA POSTPROCEDURE EVALUATION
Patient: Angie Root    Procedure Summary       Date: 05/15/25 Room / Location: [facility] - Cath Lab [facility]    Anesthesia Start: 0933 Anesthesia Stop: 1125    Procedure: CL-LEFT ATRIAL APPENDAGE CLOSURE Diagnosis:       Atrial fibrillation, unspecified type (HCC)      (See Associated Dx)    Scheduled Providers: Ethan Lozano M.D.; Vini Archibald M.D. Responsible Provider: Vini Archibald M.D.    Anesthesia Type: general ASA Status: 3            Final Anesthesia Type: general  Last vitals  BP   Blood Pressure : (!) 140/67    Temp   (!) 35.3 °C (95.6 °F)    Pulse   69   Resp   16    SpO2   100 %      Anesthesia Post Evaluation    Patient location during evaluation: PACU  Patient participation: complete - patient participated  Level of consciousness: awake and alert    Airway patency: patent  Anesthetic complications: no  Cardiovascular status: hemodynamically stable  Respiratory status: face mask    PONV: none          No notable events documented.     Nurse Pain Score: 0 (NPRS)           done

## 2025-05-15 NOTE — OR NURSING
1123 - Patient arrival to PPU after watchmen. Bedside report  with Nam KAYE and Dr. Patterson. Pt subdued, OPA in place. Dressing to right groin is C/D/soft. VSS.   1134 - OPA removed, patient more wakeful. Denies pain at this time.  Educated patient on plan of care and bedrest instructions.   1136 - left VM with patient's  Nav  1140 - Tolerating oral intake.  1210 - updated pt's  Time  1237 - called report to Heraclio KAYE. Any questions/concerns addressed.  1243 - patient transported to tele on continuous monitoring with Rj KAYE and all personal belongings present.  Right groin site assessed, it is C/D/soft.

## 2025-05-15 NOTE — ANESTHESIA PROCEDURE NOTES
Airway    Date/Time: 5/15/2025 9:42 AM    Performed by: Vini Archibald M.D.  Authorized by: Vini Archibald M.D.    Location:  OR  Urgency:  Elective  Indications for Airway Management:  Anesthesia      Spontaneous Ventilation: absent    Sedation Level:  Deep  Preoxygenated: Yes    Patient Position:  Sniffing  Mask Difficulty Assessment:  0 - not attempted  Final Airway Type:  Endotracheal airway  Final Endotracheal Airway:  ETT  Cuffed: Yes    Technique Used for Successful ETT Placement:  Direct laryngoscopy    Insertion Site:  Oral  Blade Type:  Rafael  Laryngoscope Blade/Videolaryngoscope Blade Size:  3  ETT Size (mm):  6.5  Measured from:  Teeth  ETT to Teeth (cm):  21  Placement Verified by: auscultation and capnometry    Cormack-Lehane Classification:  Grade I - full view of glottis  Number of Attempts at Approach:  1

## 2025-05-15 NOTE — ANESTHESIA TIME REPORT
Anesthesia Start and Stop Event Times       Date Time Event    5/15/2025 0917 Ready for Procedure     0933 Anesthesia Start     1125 Anesthesia Stop          Responsible Staff  05/15/25      Name Role Begin End    Vini Archibald M.D. Anesth 0933 1125          Overtime Reason:  no overtime (within assigned shift)    Comments:

## 2025-05-15 NOTE — PROCEDURES
"[facility] Electrophysiology/Structural Heart Procedure Note     Procedure(s) Performed:   1) Watchman MARKIE closure    Indication(s): Atrial fibrillation with high bleeding risk    Physician(s): Ethan Lozano     Anesthesia: General anaesthesia and transesophageal echocardiogram with Dr. Archibald     Specimen(s) Removed: None     Estimated Blood Loss:  30cc     Complications:  None     Description of Procedure:   After informed written consent, the patient was brought to the cath lab in the fasting, non-sedated state. The patient was prepped and draped in the usual sterile fashion. Femoral venous access was obtained using the modified Seldinger technique. This was done under direct US guidance to visualize the needle insertion. Images were saved to the PACS system. In the right femoral vein, an 8 Fr sheath were inserted over 0.035” guidewire and exchanged for an 8.5 Fr Idanha trans-septal sheath. NIRAJ was used to identify the atrial septum, and left atrial appendage.  A Idanha trans-septal needle was inserted to into the trans-septal sheath, and under ultrasound guidance a inferior/posterior trans-septal location was used to cross into the left atrium. Intravenous heparin was given to target an -300. A stiff exchange length 0.035\" wire was inserted into the left atrium/left pulmonary veins, over which we exchanged to the WATCHMAN delivery sheath. The WATCHMAN device was prepped and flushed. A pigtail catheter was advanced into the delivery sheath into the left atrium and then after counter clockwise torque maneuvered into the body of the left atrial appendage. Cine was taken to verify the location of the pigtail in the appendage and to assess for depth. The sheath was then advanced over the pigtail until sufficient depth was reached.  The pigtail was removed, and under wet to wet connection 27 mm WATCHMAN device was advanced through the delivery sheath until markers were aligned. The sheath was " retracted slowly to deploy the device.It was a shallow chicken wing with pectinate muscles.  It was not staying in place and tug test showed unstable device. This was removed and a 24 mm device advanced to left atrial appendage, after multiple attempts, finally able to deploy it. After the device was deployed we assessed again for leak with contrast injected through the sheath as well as a tug test. We verified good position, anchoring, size, and seal with no/minimal leak with NIRAJ. After all criteria were met we detached the device from the delivery system.  There was some shoulder on mitral side but device was stable,still compressed and monitored for 15 minutes. At the end of the procedure, heparin was reversed with protamine, the catheter and sheaths were removed, and hemostasis was achieved by manual compression along with a figure of 8 silk suture. Following recovery from anesthesia, the patient was transferred to the PPU in good condition.        Contrast used: 30 cc     Device implanted:  Size  24  mm  Lot # [id number]  Max appendage pre-measurement 18 mm  Max device measurement 20.6 mm (14-19%) compression     Impressions:    1. Successful left atrial appendage closure     Recommendations:  Admit overnight, limited echo in am  Follow-up transesophageal echocardiogram in 45 days.

## 2025-05-15 NOTE — ANESTHESIA PROCEDURE NOTES
NIRAJ    Date/Time: 5/15/2025 9:46 AM    Performed by: Vini Archibald M.D.  Authorized by: Vini Archibald M.D.    Start Time:5/15/2025 9:46 AM  Preanesthetic Checklist: patient identified, IV checked, site marked, risks and benefits discussed, surgical consent, monitors and equipment checked, pre-op evaluation and timeout performed    Indication for NIRAJ: diagnostic   Patient Location: OR  Intubated: Yes  Bite Block: Yes  Heart Visualized: Yes  Insertion: atraumatic    **See FULL NIRAJ report in patient's chart via CV Synapse**

## 2025-05-15 NOTE — ANESTHESIA PREPROCEDURE EVALUATION
" Date/Time: 05/15/25 1000    Scheduled providers: Ethan Lozano M.D.; Vini Archibald M.D.    Procedure: CL-LEFT ATRIAL APPENDAGE CLOSURE    Diagnosis: Atrial fibrillation, unspecified type (HCC) [I48.91]    Indications: See Associated Dx    Location: [facility] - Cath Lab - [facility]            Relevant Problems   PULMONARY   (positive) Centrilobular emphysema (HCC)      CARDIAC   (positive) Coronary artery disease involving native coronary artery of native heart without angina pectoris   (positive) Paroxysmal atrial fibrillation (HCC)   (positive) Peripheral arterial disease (HCC)   (positive) Pulmonary hypertension (HCC)      GI   (positive) Gastroesophageal reflux disease without esophagitis     /64   Pulse 73   Temp 36.3 °C (97.3 °F) (Temporal)   Resp 16   Ht 1.588 m (5' 2.5\")   Wt 42.1 kg (92 lb 13 oz)   SpO2 93%   BMI 16.71 kg/m²       Physical Exam    Airway   Mallampati: II  TM distance: >3 FB  Neck ROM: full       Cardiovascular - normal exam  Rhythm: regular  Rate: normal    (-) murmur     Dental - normal exam           Pulmonary - normal examBreath sounds clear to auscultation     Abdominal    Neurological - normal exam                   Anesthesia Plan    ASA 3       Plan - general       Airway plan will be ETT  NIRAJ Planned        Induction: intravenous    Postoperative Plan: Postoperative administration of opioids is intended.    Pertinent diagnostic labs and testing reviewed    Informed Consent:    Anesthetic plan and risks discussed with patient.    Use of blood products discussed with: patient whom consented to blood products.           "

## 2025-05-15 NOTE — PROGRESS NOTES
Inpatient Anticoagulation Service Note for 5/15/2025      Reason for Anticoagulation: Atrial Fibrillation   [id number] VASc Score: 5      High risk for bleed: Yes        Target INR: 2.0 to 3.0     Date/Time INR Value    05/15/25 0831 1.81       Date/Time Dose (mg)    05/15/25 1335 1      Significant Interactions: Not Applicable  Bridge Therapy: No (If less than 5 days and overlap therapy discontinued -- document reason (i.e. Bleed Risk))    (If still on overlap therapy, if No -- document reason (i.e. Bleed Risk))    Comments: Patient takes 1mg daily except for Mon and Fri she takes 0.5mg    Plan:  1mg tonight   Education Material Provided?: No (Hx of warfarin use)    Pharmacist suggested discharge dosing: M/F 0.5mg and 1 mg all other days      Willy Tanner, PharmD

## 2025-05-16 ENCOUNTER — RESULTS FOLLOW-UP (OUTPATIENT)
Dept: CARDIOLOGY | Facility: MEDICAL CENTER | Age: 85
End: 2025-05-16

## 2025-05-16 ENCOUNTER — APPOINTMENT (OUTPATIENT)
Dept: CARDIOLOGY | Facility: MEDICAL CENTER | Age: 85
DRG: 274 | End: 2025-05-16
Attending: PHYSICIAN ASSISTANT
Payer: MEDICARE

## 2025-05-16 VITALS
DIASTOLIC BLOOD PRESSURE: 46 MMHG | BODY MASS INDEX: 16.88 KG/M2 | HEART RATE: 61 BPM | TEMPERATURE: 97.3 F | RESPIRATION RATE: 17 BRPM | WEIGHT: 95.24 LBS | HEIGHT: 63 IN | SYSTOLIC BLOOD PRESSURE: 90 MMHG | OXYGEN SATURATION: 94 %

## 2025-05-16 DIAGNOSIS — J43.2 CENTRILOBULAR EMPHYSEMA (HCC): Primary | ICD-10-CM

## 2025-05-16 LAB
ANION GAP SERPL CALC-SCNC: 13 MMOL/L (ref 7–16)
BUN SERPL-MCNC: 30 MG/DL (ref 8–22)
CALCIUM SERPL-MCNC: 9.2 MG/DL (ref 8.5–10.5)
CHLORIDE SERPL-SCNC: 103 MMOL/L (ref 96–112)
CO2 SERPL-SCNC: 20 MMOL/L (ref 20–33)
CREAT SERPL-MCNC: 1.05 MG/DL (ref 0.5–1.4)
GFR SERPLBLD CREATININE-BSD FMLA CKD-EPI: 52 ML/MIN/1.73 M 2
GLUCOSE SERPL-MCNC: 116 MG/DL (ref 65–99)
INR PPP: 2.56 (ref 0.87–1.13)
LV EJECT FRACT  99904: 65
LV EJECT FRACT MOD 2C 99903: 56.67
LV EJECT FRACT MOD 4C 99902: 55.67
LV EJECT FRACT MOD BP 99901: 54.05
MAGNESIUM SERPL-MCNC: 1.8 MG/DL (ref 1.5–2.5)
POTASSIUM SERPL-SCNC: 4.5 MMOL/L (ref 3.6–5.5)
PROTHROMBIN TIME: 27.7 SEC (ref 12–14.6)
SODIUM SERPL-SCNC: 136 MMOL/L (ref 135–145)

## 2025-05-16 PROCEDURE — 93308 TTE F-UP OR LMTD: CPT | Mod: 26 | Performed by: INTERNAL MEDICINE

## 2025-05-16 PROCEDURE — 80048 BASIC METABOLIC PNL TOTAL CA: CPT

## 2025-05-16 PROCEDURE — 85610 PROTHROMBIN TIME: CPT

## 2025-05-16 PROCEDURE — A9270 NON-COVERED ITEM OR SERVICE: HCPCS | Performed by: INTERNAL MEDICINE

## 2025-05-16 PROCEDURE — 700102 HCHG RX REV CODE 250 W/ 637 OVERRIDE(OP): Performed by: INTERNAL MEDICINE

## 2025-05-16 PROCEDURE — 93308 TTE F-UP OR LMTD: CPT

## 2025-05-16 PROCEDURE — 36415 COLL VENOUS BLD VENIPUNCTURE: CPT

## 2025-05-16 PROCEDURE — 83735 ASSAY OF MAGNESIUM: CPT

## 2025-05-16 RX ORDER — WARFARIN SODIUM 1 MG/1
0.5 TABLET ORAL
Status: DISCONTINUED | OUTPATIENT
Start: 2025-05-19 | End: 2025-05-16 | Stop reason: HOSPADM

## 2025-05-16 RX ADMIN — ALBUTEROL SULFATE 2 PUFF: 90 AEROSOL, METERED RESPIRATORY (INHALATION) at 12:00

## 2025-05-16 RX ADMIN — SOTALOL HYDROCHLORIDE 40 MG: 80 TABLET ORAL at 05:28

## 2025-05-16 RX ADMIN — ALPRAZOLAM 0.25 MG: 0.25 TABLET ORAL at 05:39

## 2025-05-16 RX ADMIN — FUROSEMIDE 20 MG: 20 TABLET ORAL at 05:28

## 2025-05-16 ASSESSMENT — FIBROSIS 4 INDEX: FIB4 SCORE: 3.71

## 2025-05-16 ASSESSMENT — PAIN DESCRIPTION - PAIN TYPE: TYPE: ACUTE PAIN

## 2025-05-16 NOTE — DISCHARGE SUMMARY
CHIEF COMPLAINT ON ADMISSION  Elective admission for WATCHMAN procedure     CODE STATUS  Full Code     HPI & HOSPITAL COURSE  Angie Root is a very pleasant 85 y.o. year old female electively admitted for left atrial appendage closure due to her history of intolerance of anticoagulation     Patient underwent successful MARKIE closure via WATCHMEN with Dr. Lozano on 5/15/2025.     Patient has been seen and examined in rounds this AM. Monitored rhythm is sinus rhythm. EKG and vital signs have been reviewed and are stable and within normal limits. Patient denies any chest pain, dyspnea, dizziness, paraesthesias, or other complaints. Physical exam is without acute abnormality; specifically his right femoral access site is clean and dry. There is no evidence of hematoma or bleeding.      Post WATCHMEN discharge instructions were reviewed with patient, all questions were answered.      Therefore, she is discharged in good and stable condition with close outpatient follow-up as listed below.      PROCEDURES  MARVIN procedure, Dr. Lozano.  Please see full procedure note for details.      CONSULTATIONS  None      Future Appointments   Date Time Provider Department Center   5/20/2025  9:45 AM [facility]   6/3/2025  9:00 AM [facility]   6/27/2025  9:00 AM [facility] EXAM 3 NIRAJ [facility]   7/7/2025  7:45 AM [facility] None         Medication List        CONTINUE taking these medications        Instructions   albuterol 108 (90 Base) MCG/ACT Aers inhalation aerosol   Doctor's comments: May substitute brand per formulary  Inhale 2 Puffs 4 times a day as needed for Shortness of Breath.  Dose: 2 Puff     ALPRAZolam 0.25 MG Tabs  Commonly known as: Xanax   Take 1 Tablet by mouth 2 times a day as needed for Anxiety for up to 180 days.  Dose: 0.25 mg     ascorbic acid 500 MG Tabs  Commonly known as: Ascorbic Acid   Take 2 Tablets by mouth every day.  Dose: 1,000 mg      CALCIUM 1200+D3 PO   Take 1 Tablet by mouth every day.  Dose: 1 Tablet     EPINEPHrine 0.3 MG/0.3ML Soaj solution for injection  Commonly known as: Epipen   epinephrine 0.3 mg/0.3 mL injection, auto-injector     fluticasone 50 MCG/ACT nasal spray  Commonly known as: Flonase   SPRAY 1 SPRAY INTO AFFECTED NOSTRIL (S) DAILY     furosemide 20 MG Tabs  Commonly known as: Lasix   Take 1 Tablet by mouth every day.  Dose: 20 mg     ipratropium 0.02 % Soln  Commonly known as: Atrovent   Take 1 mL by nebulization 3 times a day.  Dose: 0.2 mg     ipratropium-albuterol 0.5-2.5 (3) MG/3ML nebulizer solution  Commonly known as: Duoneb   Take 3 mL by nebulization every four hours as needed for Shortness of Breath.  Dose: 3 mL     lovastatin 40 MG tablet  Commonly known as: Mevacor   TAKE 1 TABLET AT BEDTIME  Dose: 40 mg     * predniSONE 50 MG Tabs  Commonly known as: Deltasone   Doctor's comments:    Take one tablet by mouth 13 hours prior, one tablet by mouth 7 hours prior, and one tablet in the car before walking in for check in (approximately 1 hour prior). You will be given benadryl immediately prior to your procedure.     PROBIOTIC PO   Take 1 Capsule by mouth every day.  Dose: 1 Capsule     sotalol 80 MG Tabs  Commonly known as: Betapace   TAKE ONE-HALF TABLET BY MOUTH 2 TIMES DAILY     vitamin D 1000 Unit (25 mcg) Tabs  Commonly known as: cholecalciferol   Take 3,000 Units by mouth every morning. 3 tablets = 3000 units  Dose: 3,000 Units     warfarin 1 MG Tabs  Commonly known as: Coumadin   TAKE 1/2 TO 1 TABLET DAILY OR AS DIRECTED BY ANTICOAGULATION CLINIC     ZINC PO   Take 1 Tablet by mouth every day.  Dose: 1 Tablet           * This list has 1 medication(s) that are the same as other medications prescribed for you. Read the directions carefully, and ask your doctor or other care provider to review them with you.                ASK your doctor about these medications        Instructions   D-Mannose Powd   Take 1 Dose  by mouth every day. Mix one scoop with 4 oz of water  Dose: 1 Dose     * predniSONE 10 MG Tabs  Commonly known as: Deltasone   Take 1 Tablet by mouth every 3 days.  Dose: 10 mg           * This list has 1 medication(s) that are the same as other medications prescribed for you. Read the directions carefully, and ask your doctor or other care provider to review them with you.                 Lab Results   Component Value Date/Time    SODIUM 136 05/16/2025 02:15 AM    POTASSIUM 4.5 05/16/2025 02:15 AM    CHLORIDE 103 05/16/2025 02:15 AM    CO2 20 05/16/2025 02:15 AM    GLUCOSE 116 (H) 05/16/2025 02:15 AM    BUN 30 (H) 05/16/2025 02:15 AM    CREATININE 1.05 05/16/2025 02:15 AM       Lab Results   Component Value Date/Time    PROTHROMBTM 27.7 (H) 05/16/2025 02:15 AM    INR 2.56 (H) 05/16/2025 02:15 AM       Lab Results   Component Value Date/Time    WBC 8.3 05/14/2025 09:09 AM    RBC 4.67 05/14/2025 09:09 AM    HEMOGLOBIN 13.9 05/14/2025 09:09 AM    HEMATOCRIT 44.9 05/14/2025 09:09 AM    MCV 96.1 05/14/2025 09:09 AM    MCH 29.8 05/14/2025 09:09 AM    MCHC 31.0 (L) 05/14/2025 09:09 AM    MPV 10.6 05/14/2025 09:09 AM    NEUTSPOLYS 56.90 05/14/2025 09:09 AM    LYMPHOCYTES 27.90 05/14/2025 09:09 AM    MONOCYTES 9.50 05/14/2025 09:09 AM    EOSINOPHILS 4.80 05/14/2025 09:09 AM    BASOPHILS 0.70 05/14/2025 09:09 AM    ANISOCYTOSIS 1+ 11/16/2023 08:13 AM

## 2025-05-16 NOTE — CARE PLAN
The patient is Stable - Low risk of patient condition declining or worsening    Shift Goals  Clinical Goals: safety, VSS, check incision site, manage pain and itching  Patient Goals: sleep  Family Goals: jacob    Progress made toward(s) clinical / shift goals:    Problem: Communication  Goal: The ability to communicate needs accurately and effectively will improve  Outcome: Progressing     Problem: Safety  Goal: Will remain free from injury  Outcome: Progressing  Goal: Will remain free from falls  Outcome: Progressing     Problem: Pain Management  Goal: Pain level will decrease to patient's comfort goal  Outcome: Progressing     Problem: Psychosocial Needs:  Goal: Level of anxiety will decrease  Outcome: Progressing     Problem: Mobility  Goal: Risk for activity intolerance will decrease  Outcome: Progressing     Problem: Medication  Goal: Compliance with prescribed medication will improve  Outcome: Progressing       Patient is not progressing towards the following goals:

## 2025-05-16 NOTE — PROGRESS NOTES
Monitor Summary  Rhythm: SR  Rate: 69-81  Ectopy: none  Measurements: .15/.07/.39  ---12 hr Chart Review---

## 2025-05-16 NOTE — PROGRESS NOTES
Bedside report received, assumed care of patient at change of shift. Chart, labs, and orders reviewed. Pt resting in bed, breathing even and unlabored, denies pain and in no acute distress. A&O x4. Tele monitoring in place. Fall precautions in place and education provided. Call light within reach, bed locked and in lowest position, denies other needs at this time.

## 2025-05-16 NOTE — PROGRESS NOTES
Bedside shift report received from outgoing RN and pt care assumed. Pt is AAOx4, VSS, on room air satting 92, c/o 0/10 pain. Pt updated on plan of care, no questions or concerns at this time.  Tele Box on, rate and rhythm verified and being monitored.  Bed in lowest position, brakes on, call light and pt belongings within reach. Fall precautions maintained. Hourly rounding initiated. Care ongoing.

## 2025-05-16 NOTE — PROGRESS NOTES
Pt given discharge instructions.  Discussed diet, activity, follow up appointments, symptoms and management, and prescriptions provided.  Packet sent with patient.  IV d/c'd, tele box off, and all questions answered.  Transported to car with .

## 2025-05-16 NOTE — PROGRESS NOTES
Went to sit pt up after two hour bed rest was over. Site clean dry and intact. Once I sat patient up site started bleeding simon red blood. Immediately held manual pressure and called for help. Ciara KAYE came in to help hold mannual pressure and redress site. We layed pt back down. Both assessing the site there was no perclose as I received in report. Site was open and oozing. We redressed site and continued to hold pressure. Faustino VANCE came to bedside and told us to continue to hold mannual pressure for 15 minutes.   We did so and then put a sand bag on top of site. I checked in with pt 1 minutes later. She was still flat and dressing was completely saturated with blood. Held more mannual pressure to the site for a total of 45min, Faustino VANCE at bedside. Redressed the dressing and sat pt up.   1600  Site in clean dry and intact at this time.

## 2025-05-16 NOTE — PROGRESS NOTES
4 Eyes Skin Assessment Completed by VARGAS De Leon and EDGAR Garcia.    Head WDL  Ears WDL  Nose WDL  Mouth WDL  Neck WDL  Breast/Chest Redness  Shoulder Blades Redness  Spine WDL  (R) Arm/Elbow/Hand Redness  (L) Arm/Elbow/Hand Redness  Abdomen WDL  Groin Incision  Scrotum/Coccyx/Buttocks Moisture Fissure  (R) Leg Bruising  (L) Leg Bruising  (R) Heel/Foot/Toe WDL  (L) Heel/Foot/Toe WDL          Devices In Places Tele Box      Interventions In Place Sacral Mepilex and Pillows    Possible Skin Injury Yes    Pictures Uploaded Into Epic Yes  Wound Consult Placed Yes  RN Wound Prevention Protocol Ordered Yes

## 2025-05-16 NOTE — PROGRESS NOTES
Inpatient Anticoagulation Service Note for 5/16/2025      Reason for Anticoagulation: Atrial Fibrillation   [id number] VASc Score: 5      High risk for bleed: Yes        Target INR: 2.0 to 3.0     Date/Time INR Value    05/16/25 0215 2.56     05/15/25 0831 1.81       Date/Time Dose (mg)    05/16/25 0838 0     05/15/25 1335 1      Significant Interactions: Not Applicable  Bridge Therapy: No (If less than 5 days and overlap therapy discontinued -- document reason (i.e. Bleed Risk))    (If still on overlap therapy, if No -- document reason (i.e. Bleed Risk))    Comments: Patient takes 1mg daily except for Mon and Fri she takes 0.5mg  Holding dose today and resuming home dose tomorrow due to large increase in INR    Plan:  Hold dose today   Education Material Provided?: No    Pharmacist suggested discharge dosing: M/F 0.5mg and 1 mg all other days      Willy Tanner, YaneliD

## 2025-05-20 ENCOUNTER — APPOINTMENT (OUTPATIENT)
Dept: LAB | Facility: MEDICAL CENTER | Age: 85
End: 2025-05-20
Payer: MEDICARE

## 2025-05-21 ENCOUNTER — PATIENT MESSAGE (OUTPATIENT)
Dept: MEDICAL GROUP | Facility: CLINIC | Age: 85
End: 2025-05-21
Payer: MEDICARE

## 2025-05-21 ENCOUNTER — TELEPHONE (OUTPATIENT)
Dept: CARDIOLOGY | Facility: MEDICAL CENTER | Age: 85
End: 2025-05-21
Payer: MEDICARE

## 2025-05-21 DIAGNOSIS — Z87.09 HISTORY OF COPD: Primary | ICD-10-CM

## 2025-05-21 NOTE — TELEPHONE ENCOUNTER
Phone Number Called: [phone]    Call outcome: Did not leave a detailed message. Requested patient to call back.    Message: Called to check on patient 1 week post watchman placement. Unable to reach pt, LVM to call back.

## 2025-05-22 ENCOUNTER — PATIENT MESSAGE (OUTPATIENT)
Dept: MEDICAL GROUP | Facility: CLINIC | Age: 85
End: 2025-05-22
Payer: MEDICARE

## 2025-05-22 NOTE — TELEPHONE ENCOUNTER
Phone Number Called: [phone]     Call outcome: Did not leave a detailed message. Requested patient to call back.    Message: Called to inquire about Watchman device follow up. Unable to reach. Left VM for call back.

## 2025-05-23 ENCOUNTER — ANTICOAGULATION MONITORING (OUTPATIENT)
Dept: VASCULAR LAB | Facility: MEDICAL CENTER | Age: 85
End: 2025-05-23
Payer: MEDICARE

## 2025-05-23 ENCOUNTER — HOSPITAL ENCOUNTER (OUTPATIENT)
Dept: LAB | Facility: MEDICAL CENTER | Age: 85
End: 2025-05-23
Attending: NURSE PRACTITIONER
Payer: MEDICARE

## 2025-05-23 DIAGNOSIS — Z79.01 CHRONIC ANTICOAGULATION: ICD-10-CM

## 2025-05-23 LAB
INR PPP: 1.9 (ref 0.87–1.13)
INR PPP: 1.9 (ref 2–3.5)
PROTHROMBIN TIME: 21.9 SEC (ref 12–14.6)

## 2025-05-23 PROCEDURE — 36415 COLL VENOUS BLD VENIPUNCTURE: CPT

## 2025-05-23 PROCEDURE — 85610 PROTHROMBIN TIME: CPT

## 2025-05-23 NOTE — TELEPHONE ENCOUNTER
Phone Number Called: [phone]     Call outcome: Spoke to patient regarding message below.    Message: Called to follow up with patient after Watchman's device. Patient verbalized that her incision was never sealed after procedure, but the hospital staff was able to fix the incision site. She verbalized that her groin is very sore/bruised. She also verbalized that she had abdominal pain but she believes this was gas and has since subsided. She denies any chest pain, shortness of breath, dizziness or other symptoms aside from her access site. No further questions at this time. ER precautions advised. Patient verbalized understanding. No further questions at this time.

## 2025-05-23 NOTE — PROGRESS NOTES
Anticoagulation Summary  As of 2025      INR goal:  2.0-3.0   TTR:  80.1% (7.3 y)   INR used for dosin.90 (2025)   Warfarin maintenance plan:  0.5 mg (1 mg x 0.5) every Mon, Fri; 1 mg (1 mg x 1) all other days   Weekly warfarin total:  6 mg   Plan last modified:  Yaneli MackenzieD (3/12/2025)   Next INR check:  2025   Priority:  Routine   Target end date:  Indefinite    Indications    Paroxysmal a-fib (CMS-HCC) (Resolved) [I48.0]  Chronic anticoagulation (Resolved) [Z79.01]                 Anticoagulation Episode Summary       INR check location:  Clinic Lab    Preferred lab:  [facility]    Send INR reminders to:  --    Comments:  [facility] Cardiology   [phone] (H)  [facility]          Anticoagulation Care Providers       Provider Role Specialty Phone number    Evelio Tejeda M.D. Referring Internal Medicine [phone]    [facility] Anticoagulation Services Responsible  [phone]    Beryl Pang M.D. Responsible Family Medicine [phone]          Anticoagulation Patient Findings      Left voicemail message to report a SUB therapeutic INR of 1.9.    Will have pt BOLUS x 1 dose today w/ 1 mg and then continue on with current warfarin dosing regimen.   Requested pt contact the clinic for any s/s of unusual bleeding, bruising, clotting or any changes to diet or medication.    FU INR in 1 week(s).    Jimbo Dunlap, PharmD, BCACP

## 2025-05-27 ENCOUNTER — PATIENT MESSAGE (OUTPATIENT)
Dept: MEDICAL GROUP | Facility: CLINIC | Age: 85
End: 2025-05-27
Payer: MEDICARE

## 2025-05-27 ENCOUNTER — TELEPHONE (OUTPATIENT)
Dept: VASCULAR LAB | Facility: MEDICAL CENTER | Age: 85
End: 2025-05-27
Payer: MEDICARE

## 2025-05-27 NOTE — TELEPHONE ENCOUNTER
Called pt back - no answer. LVM re-iterating dosing instructions outlined in 5/23/25 anticoag encounter.    Jimbo Dunlap, YaneliD, BCACP

## 2025-05-27 NOTE — TELEPHONE ENCOUNTER
Caller: Angie Root     Topic/issue: Pt is calling back wanting to go over her INR results and changes to her warfrin please advise.     Callback Number: [phone] (home)      Thank you,     Sreekanth YU

## 2025-05-28 NOTE — TELEPHONE ENCOUNTER
Caller:  Angie     Topic/issue:   Angie is returning call, regarding her INR dosage.     Callback Number: [phone]    Thank you,   Rehana ROWE

## 2025-05-28 NOTE — PROGRESS NOTES
Addendum 5/28/25:     Pt c/b - (called back for the 3rd time) pt states she never received our VM's. Updated contact information for pt to receive VM's appropriately.    Requested pt to BOLUS x 1 dose w/ 1.5 mg today and to then continue on w/ her current regimen.     Repeat INR on 5/30/25.    Jimbo Dunlap, PharmD, BCACP

## 2025-05-28 NOTE — TELEPHONE ENCOUNTER
Called pt back for the 3rd time. Re-iterated warfarin dosing instructions. See anticoag encounter.    Jimbo Dunlap, YaneliD, BCACP

## 2025-06-03 ENCOUNTER — HOSPITAL ENCOUNTER (OUTPATIENT)
Dept: LAB | Facility: MEDICAL CENTER | Age: 85
End: 2025-06-03
Attending: NURSE PRACTITIONER
Payer: MEDICARE

## 2025-06-03 ENCOUNTER — ANTICOAGULATION MONITORING (OUTPATIENT)
Dept: VASCULAR LAB | Facility: MEDICAL CENTER | Age: 85
End: 2025-06-03
Payer: MEDICARE

## 2025-06-03 ENCOUNTER — PATIENT MESSAGE (OUTPATIENT)
Dept: MEDICAL GROUP | Facility: CLINIC | Age: 85
End: 2025-06-03
Payer: MEDICARE

## 2025-06-03 DIAGNOSIS — Z79.01 CHRONIC ANTICOAGULATION: ICD-10-CM

## 2025-06-03 LAB
INR PPP: 2.53 (ref 0.87–1.13)
PROTHROMBIN TIME: 27.4 SEC (ref 12–14.6)

## 2025-06-03 PROCEDURE — 36415 COLL VENOUS BLD VENIPUNCTURE: CPT

## 2025-06-03 PROCEDURE — 85610 PROTHROMBIN TIME: CPT

## 2025-06-03 NOTE — PROGRESS NOTES
OP   Telephone Anticoagulation Service Note      Anticoagulation Summary  As of 6/3/2025      INR goal:  2.0-3.0   TTR:  80.1% (7.3 y)   INR used for dosin.53 (6/3/2025)   Warfarin maintenance plan:  0.5 mg (1 mg x 0.5) every Mon, Fri; 1 mg (1 mg x 1) all other days   Weekly warfarin total:  6 mg   Plan last modified:  Jimbo Dunlap PharmD (3/12/2025)   Next INR check:  2025   Priority:  Routine   Target end date:  Indefinite    Indications    Paroxysmal a-fib (CMS-HCC) (Resolved) [I48.0]  Chronic anticoagulation (Resolved) [Z79.01]                 Anticoagulation Episode Summary       INR check location:  Clinic Lab    Preferred lab:  [facility]    Send INR reminders to:  AMB ANTICOAG POOL    Comments:  [facility] Cardiology   [phone] (H)  [facility] labs          Anticoagulation Care Providers       Provider Role Specialty Phone number    Evelio Tejeda M.D. Referring Internal Medicine [phone]    [facility] Anticoagulation Services Responsible  [phone]    Beryl Pang M.D. Responsible Family Medicine [phone]          Anticoagulation Patient Findings  Patient Findings       Negatives:  Signs/symptoms of thrombosis, Signs/symptoms of bleeding, Laboratory test error suspected, Change in health, Change in alcohol use, Change in activity, Upcoming invasive procedure, Emergency department visit, Upcoming dental procedure, Missed doses, Extra doses, Change in medications, Change in diet/appetite, Hospital admission, Bruising, Other complaints          Spoke with the patient on the phone today, reporting a therapeutic INR of 2.53.  Confirmed the current warfarin dosing regimen and patient compliance.  Patient denies any interval changes to diet and/or medications.   Patient denies any signs/symptoms of bleeding or clotting.  Patient instructed to continue with the current warfarin dosing regimen, and asked to follow up again in 3 weeks.   Patient states she has follow up ECHO (post watchmans) and  may be able to get off of warfarin. She will keep us posted.     Celeste Licea PharmD

## 2025-06-20 ENCOUNTER — APPOINTMENT (OUTPATIENT)
Dept: ADMISSIONS | Facility: MEDICAL CENTER | Age: 85
End: 2025-06-20
Attending: INTERNAL MEDICINE
Payer: MEDICARE

## 2025-06-24 ENCOUNTER — ANTICOAGULATION MONITORING (OUTPATIENT)
Dept: VASCULAR LAB | Facility: MEDICAL CENTER | Age: 85
End: 2025-06-24
Payer: MEDICARE

## 2025-06-24 ENCOUNTER — HOSPITAL ENCOUNTER (OUTPATIENT)
Dept: LAB | Facility: MEDICAL CENTER | Age: 85
End: 2025-06-24
Attending: NURSE PRACTITIONER
Payer: MEDICARE

## 2025-06-24 ENCOUNTER — PRE-ADMISSION TESTING (OUTPATIENT)
Dept: ADMISSIONS | Facility: MEDICAL CENTER | Age: 85
End: 2025-06-24
Attending: INTERNAL MEDICINE
Payer: MEDICARE

## 2025-06-24 DIAGNOSIS — Z79.01 CHRONIC ANTICOAGULATION: ICD-10-CM

## 2025-06-24 LAB
INR PPP: 2.49 (ref 0.87–1.13)
PROTHROMBIN TIME: 27.1 SEC (ref 12–14.6)

## 2025-06-24 PROCEDURE — 85610 PROTHROMBIN TIME: CPT

## 2025-06-24 PROCEDURE — 36415 COLL VENOUS BLD VENIPUNCTURE: CPT

## 2025-06-24 NOTE — PREADMIT AVS NOTE
Current Medications    Instructions         fluticasone (FLONASE) 50 MCG/ACT nasal spray Continue taking as prescribed.    warfarin (COUMADIN) 1 MG Tab Follow instructions from surgeon or specialist.         ipratropium-albuterol (DUONEB) 0.5-2.5 (3) MG/3ML nebulizer solution As needed medication, may take if needed, including morning of procedure     ALPRAZolam (XANAX) 0.25 MG Tab As needed medication, may take if needed, including morning of procedure     albuterol 108 (90 Base) MCG/ACT Aero Soln inhalation aerosol As needed medication, may take if needed, including morning of procedure     lovastatin (MEVACOR) 40 MG tablet Continue taking as prescribed.    furosemide (LASIX) 20 MG Tab Hold medication day of procedure    sotalol (BETAPACE) 80 MG Tab Continue taking as prescribed.    predniSONE (DELTASONE) 10 MG Tab Continue taking as prescribed.    Calcium-Magnesium-Vitamin D (CALCIUM 1200+D3 PO) Stop 7 days before surgery    D-Mannose Powder Stop 7 days before surgery    Multiple Vitamins-Minerals (ZINC PO) Stop 7 days before surgery    EPINEPHrine (EPIPEN) 0.3 MG/0.3ML Solution Auto-injector solution for injection As needed medication, may take if needed, including morning of procedure     Probiotic Product (PROBIOTIC PO) Hold medication day of procedure    ascorbic acid (ASCORBIC ACID) 500 MG Tab Stop 7 days before surgery    vitamin D (CHOLECALCIFEROL) 1000 UNIT Tab Stop 7 days before surgery

## 2025-06-24 NOTE — PROGRESS NOTES
Anticoagulation Summary  As of 2025      INR goal:  2.0-3.0   TTR:  80.2% (7.4 y)   INR used for dosin.49 (2025)   Warfarin maintenance plan:  0.5 mg (1 mg x 0.5) every Mon, Fri; 1 mg (1 mg x 1) all other days   Weekly warfarin total:  6 mg   Plan last modified:  Yaneli MackenzieD (3/12/2025)   Next INR check:  2025   Priority:  Routine   Target end date:  Indefinite    Indications    Paroxysmal a-fib (CMS-HCC) (Resolved) [I48.0]  Chronic anticoagulation (Resolved) [Z79.01]                 Anticoagulation Episode Summary       INR check location:  Clinic Lab    Preferred lab:  [facility]    Send INR reminders to:  AMB ANTICOAG POOL    Comments:  [facility] Cardiology   [phone] (H)  [facility] labs          Anticoagulation Care Providers       Provider Role Specialty Phone number    Evelio Tejeda M.D. Referring Internal Medicine [phone]    [facility] Anticoagulation Services Responsible  [phone]    Beryl Pang M.D. Responsible Family Medicine [phone]            Refer to Anticoagulation Patient Findings for HPI  Patient Findings       Negatives:  Signs/symptoms of thrombosis, Signs/symptoms of bleeding, Laboratory test error suspected, Change in health, Change in alcohol use, Change in activity, Upcoming invasive procedure, Emergency department visit, Upcoming dental procedure, Missed doses, Extra doses, Change in medications, Change in diet/appetite, Hospital admission, Bruising, Other complaints          Clinical Outcomes       Negatives:  Major bleeding event, Thromboembolic event, Anticoagulation-related hospital admission, Anticoagulation-related ED visit, Anticoagulation-related fatality            Spoke with patient to report a therapeutic INR.      Pt is NOT on antiplatelet therapy.    Pt instructed to continue with current warfarin dosing regimen, confirms dosing. Of note, NIRAJ scheduled ; unsure whether to hold warfarin; pt will clarify with Cardiology.        Peggy Elias, Student

## 2025-06-27 ENCOUNTER — APPOINTMENT (OUTPATIENT)
Dept: CARDIOLOGY | Facility: MEDICAL CENTER | Age: 85
End: 2025-06-27
Attending: INTERNAL MEDICINE
Payer: MEDICARE

## 2025-06-27 ENCOUNTER — ANESTHESIA EVENT (OUTPATIENT)
Dept: CARDIOLOGY | Facility: MEDICAL CENTER | Age: 85
End: 2025-06-27
Payer: MEDICARE

## 2025-06-27 ENCOUNTER — HOSPITAL ENCOUNTER (OUTPATIENT)
Facility: MEDICAL CENTER | Age: 85
End: 2025-06-27
Attending: INTERNAL MEDICINE | Admitting: INTERNAL MEDICINE
Payer: MEDICARE

## 2025-06-27 ENCOUNTER — ANESTHESIA (OUTPATIENT)
Dept: CARDIOLOGY | Facility: MEDICAL CENTER | Age: 85
End: 2025-06-27
Payer: MEDICARE

## 2025-06-27 VITALS
BODY MASS INDEX: 16.13 KG/M2 | TEMPERATURE: 96.8 F | DIASTOLIC BLOOD PRESSURE: 57 MMHG | SYSTOLIC BLOOD PRESSURE: 147 MMHG | OXYGEN SATURATION: 95 % | RESPIRATION RATE: 14 BRPM | HEART RATE: 60 BPM | HEIGHT: 63 IN | WEIGHT: 91.05 LBS

## 2025-06-27 DIAGNOSIS — I48.91 ATRIAL FIBRILLATION, UNSPECIFIED TYPE (HCC): ICD-10-CM

## 2025-06-27 LAB
INR PPP: 1.89 (ref 0.87–1.13)
PROTHROMBIN TIME: 21.8 SEC (ref 12–14.6)

## 2025-06-27 PROCEDURE — 93312 ECHO TRANSESOPHAGEAL: CPT | Mod: 26 | Performed by: INTERNAL MEDICINE

## 2025-06-27 PROCEDURE — 160193 HCHG PACU STANDARD - 1ST 60 MINS

## 2025-06-27 PROCEDURE — 99221 1ST HOSP IP/OBS SF/LOW 40: CPT | Performed by: INTERNAL MEDICINE

## 2025-06-27 PROCEDURE — 4410588 EC-TEE W/O CONT

## 2025-06-27 PROCEDURE — 93319 3D ECHO IMG CGEN CAR ANOMAL: CPT | Performed by: INTERNAL MEDICINE

## 2025-06-27 PROCEDURE — 700111 HCHG RX REV CODE 636 W/ 250 OVERRIDE (IP): Performed by: ANESTHESIOLOGY

## 2025-06-27 PROCEDURE — 160002 HCHG RECOVERY MINUTES (STAT)

## 2025-06-27 PROCEDURE — 700105 HCHG RX REV CODE 258: Performed by: ANESTHESIOLOGY

## 2025-06-27 PROCEDURE — 85610 PROTHROMBIN TIME: CPT

## 2025-06-27 RX ORDER — HYDROMORPHONE HYDROCHLORIDE 1 MG/ML
0.2 INJECTION, SOLUTION INTRAMUSCULAR; INTRAVENOUS; SUBCUTANEOUS
Status: DISCONTINUED | OUTPATIENT
Start: 2025-06-27 | End: 2025-06-27 | Stop reason: HOSPADM

## 2025-06-27 RX ORDER — HYDROMORPHONE HYDROCHLORIDE 1 MG/ML
0.4 INJECTION, SOLUTION INTRAMUSCULAR; INTRAVENOUS; SUBCUTANEOUS
Status: DISCONTINUED | OUTPATIENT
Start: 2025-06-27 | End: 2025-06-27 | Stop reason: HOSPADM

## 2025-06-27 RX ORDER — OXYCODONE HCL 5 MG/5 ML
10 SOLUTION, ORAL ORAL
Status: DISCONTINUED | OUTPATIENT
Start: 2025-06-27 | End: 2025-06-27 | Stop reason: HOSPADM

## 2025-06-27 RX ORDER — EPHEDRINE SULFATE 50 MG/ML
5 INJECTION, SOLUTION INTRAVENOUS
Status: DISCONTINUED | OUTPATIENT
Start: 2025-06-27 | End: 2025-06-27 | Stop reason: HOSPADM

## 2025-06-27 RX ORDER — HYDRALAZINE HYDROCHLORIDE 20 MG/ML
5 INJECTION INTRAMUSCULAR; INTRAVENOUS
Status: DISCONTINUED | OUTPATIENT
Start: 2025-06-27 | End: 2025-06-27 | Stop reason: HOSPADM

## 2025-06-27 RX ORDER — OXYCODONE HCL 5 MG/5 ML
5 SOLUTION, ORAL ORAL
Status: DISCONTINUED | OUTPATIENT
Start: 2025-06-27 | End: 2025-06-27 | Stop reason: HOSPADM

## 2025-06-27 RX ORDER — ALBUTEROL SULFATE 5 MG/ML
2.5 SOLUTION RESPIRATORY (INHALATION)
Status: DISCONTINUED | OUTPATIENT
Start: 2025-06-27 | End: 2025-06-27 | Stop reason: HOSPADM

## 2025-06-27 RX ORDER — SODIUM CHLORIDE, SODIUM LACTATE, POTASSIUM CHLORIDE, CALCIUM CHLORIDE 600; 310; 30; 20 MG/100ML; MG/100ML; MG/100ML; MG/100ML
INJECTION, SOLUTION INTRAVENOUS CONTINUOUS
Status: DISCONTINUED | OUTPATIENT
Start: 2025-06-27 | End: 2025-06-27 | Stop reason: HOSPADM

## 2025-06-27 RX ORDER — HALOPERIDOL 5 MG/ML
1 INJECTION INTRAMUSCULAR
Status: DISCONTINUED | OUTPATIENT
Start: 2025-06-27 | End: 2025-06-27 | Stop reason: HOSPADM

## 2025-06-27 RX ORDER — SODIUM CHLORIDE, SODIUM LACTATE, POTASSIUM CHLORIDE, CALCIUM CHLORIDE 600; 310; 30; 20 MG/100ML; MG/100ML; MG/100ML; MG/100ML
INJECTION, SOLUTION INTRAVENOUS
Status: DISCONTINUED | OUTPATIENT
Start: 2025-06-27 | End: 2025-06-27 | Stop reason: SURG

## 2025-06-27 RX ORDER — ONDANSETRON 2 MG/ML
4 INJECTION INTRAMUSCULAR; INTRAVENOUS
Status: DISCONTINUED | OUTPATIENT
Start: 2025-06-27 | End: 2025-06-27 | Stop reason: HOSPADM

## 2025-06-27 RX ORDER — LABETALOL HYDROCHLORIDE 5 MG/ML
5 INJECTION, SOLUTION INTRAVENOUS
Status: DISCONTINUED | OUTPATIENT
Start: 2025-06-27 | End: 2025-06-27 | Stop reason: HOSPADM

## 2025-06-27 RX ORDER — HYDROMORPHONE HYDROCHLORIDE 1 MG/ML
0.1 INJECTION, SOLUTION INTRAMUSCULAR; INTRAVENOUS; SUBCUTANEOUS
Status: DISCONTINUED | OUTPATIENT
Start: 2025-06-27 | End: 2025-06-27 | Stop reason: HOSPADM

## 2025-06-27 RX ORDER — DIPHENHYDRAMINE HYDROCHLORIDE 50 MG/ML
12.5 INJECTION, SOLUTION INTRAMUSCULAR; INTRAVENOUS
Status: DISCONTINUED | OUTPATIENT
Start: 2025-06-27 | End: 2025-06-27 | Stop reason: HOSPADM

## 2025-06-27 RX ORDER — METOPROLOL TARTRATE 1 MG/ML
1 INJECTION, SOLUTION INTRAVENOUS
Status: DISCONTINUED | OUTPATIENT
Start: 2025-06-27 | End: 2025-06-27 | Stop reason: HOSPADM

## 2025-06-27 RX ADMIN — PROPOFOL 50 MG: 10 INJECTION, EMULSION INTRAVENOUS at 09:01

## 2025-06-27 RX ADMIN — PROPOFOL 50 MG: 10 INJECTION, EMULSION INTRAVENOUS at 09:03

## 2025-06-27 RX ADMIN — PROPOFOL 50 MG: 10 INJECTION, EMULSION INTRAVENOUS at 09:09

## 2025-06-27 RX ADMIN — SODIUM CHLORIDE, POTASSIUM CHLORIDE, SODIUM LACTATE AND CALCIUM CHLORIDE: 600; 310; 30; 20 INJECTION, SOLUTION INTRAVENOUS at 08:56

## 2025-06-27 ASSESSMENT — FIBROSIS 4 INDEX: FIB4 SCORE: 3.71

## 2025-06-27 ASSESSMENT — PAIN SCALES - GENERAL: PAIN_LEVEL: 0

## 2025-06-27 NOTE — H&P
CARDIOLOGY pre procedure H and P NOTE        Referring Physician: Dr Portillo        HPI:  85 F with MR post MitraClip, A fib bleeding, Watchman 5/15/25, here for NIRAJ         Past Medical History[1]    Medications Ordered Prior to Encounter[2]    No current facility-administered medications for this encounter.     Last reviewed on 6/24/2025  2:40 PM by Cynthia Ribeiro R.N.     Penicillins; Codeine; Iodine; Bee venom; Chantix [varenicline]; Ciprofloxacin; Diphtheria,pertussis,tetanus; Flagyl [metronidazole]; Honey bee venom; Latex; Lipitor [atorvastatin calcium]; Other environmental; Shellfish allergy; Tetanus antitoxin; and Vecuronium & derivatives    Family History   Problem Relation Age of Onset    Hypertension Mother     Hyperlipidemia Mother     Cancer Maternal Grandmother         tongue    Cancer Maternal Uncle         x 3, pancreas    Cancer Maternal Grandfather         unknown    Cancer Paternal Grandmother         unknown    Cancer Paternal Grandfather         unknown    Diabetes Maternal Uncle     Heart Disease Maternal Uncle     Hypertension Sister     Hyperlipidemia Sister     Heart Disease Sister     Alcohol/Drug Sister     Thyroid Sister     Psychiatric Illness Neg Hx     Stroke Neg Hx        ROS: As per HPI all other systems reviewed and negative     Physical Exam   There were no vitals taken for this visit.  There were no vitals filed for this visit.  There is no height or weight on file to calculate BMI.  Oxygen Therapy:       General: Well-appearing, no acute distress   Neurological: Alert and oriented, no gross focal motor deficits  Psychiatric: Normal affect      No intake or output data in the 24 hours ending 06/27/25 0723            Recent Labs     06/24/25  0937   INR 2.49*                     Impressions / Assessment:  S/p MitraClip  S/p Watchman device 5/2025  On Warfarin    Recommendations:  NIRAJ    Discussed risks and benefits and she agrees to proceed      Nakul Post MD, MPH FACC  [facility]  Interventional Cardiologist  [facility]   of Clinical Internal Medicine - [facility]        [1]   Past Medical History:  Diagnosis Date    Arthritis     BL hands    Awareness under anesthesia     Breath shortness     CAD (coronary artery disease)     Cancer ([facility]) 1982    melanoma    Cancer ([facility]) 2023    melanoma    Cataract     IOL implants    COPD (chronic obstructive pulmonary disease) ([facility])     COVID-19     Croup 3 years old    Disorder of thyroid     1983 not medicated    Diverticulosis     Dyslipidemia     Emphysema of lung (HCC)     GERD (gastroesophageal reflux disease)     Heart burn     Heart murmur     Heart valve disease     Hemorrhagic disorder ([facility])     coumdin    Hiatal hernia     High cholesterol     Hypertension     Infectious disease 2018    C Diff    Measles     5 years old    Paroxysmal A-fib ([facility]) 01/20/2016    Symptomatic, on a rhythm control strategy with sotalol 40 mg by mouth twice a day.     Paroxysmal atrial fibrillation ([facility])     Pneumonia     x 3    Prediabetes     Psychiatric problem     anxiety    PVD (peripheral vascular disease) ([facility])     Renal disorder 11/2023    kidney stone    Rheumatic fever     possible as a small child    Urinary bladder disorder     frequent UTIs   [2]   No current facility-administered medications on file prior to encounter.     Current Outpatient Medications on File Prior to Encounter   Medication Sig Dispense Refill    predniSONE (DELTASONE) 50 MG Tab Take one tablet by mouth 13 hours prior, one tablet by mouth 7 hours prior, and one tablet in the car before walking in for check in (approximately 1 hour prior). You will be given benadryl immediately prior to your procedure. (Patient not taking: Reported on 6/24/2025) 3 Tablet 0    fluticasone (FLONASE) 50 MCG/ACT nasal spray SPRAY 1 SPRAY INTO AFFECTED NOSTRIL (S) DAILY 16 mL 3    warfarin (COUMADIN) 1 MG Tab TAKE 1/2 TO 1  TABLET DAILY OR AS DIRECTED BY ANTICOAGULATION CLINIC (Patient taking differently: Pt takes 1mg nightly except Monday and Friday pt takes 1/2 tab. OR AS DIRECTED BY ANTICOAGULATION CLINIC) 90 Tablet 1    ipratropium (ATROVENT) 0.02 % Solution Take 1 mL by nebulization 3 times a day. (Patient not taking: Reported on 6/24/2025) 187.5 mL 3    ipratropium-albuterol (DUONEB) 0.5-2.5 (3) MG/3ML nebulizer solution Take 3 mL by nebulization every four hours as needed for Shortness of Breath. 120 mL 11    ALPRAZolam (XANAX) 0.25 MG Tab Take 1 Tablet by mouth 2 times a day as needed for Anxiety for up to 180 days. 60 Tablet 5    albuterol 108 (90 Base) MCG/ACT Aero Soln inhalation aerosol Inhale 2 Puffs 4 times a day as needed for Shortness of Breath. 1 Each 3    lovastatin (MEVACOR) 40 MG tablet TAKE 1 TABLET AT BEDTIME 100 Tablet 3    furosemide (LASIX) 20 MG Tab Take 1 Tablet by mouth every day. 90 Tablet 3    sotalol (BETAPACE) 80 MG Tab TAKE ONE-HALF TABLET BY MOUTH 2 TIMES DAILY 90 Tablet 3    predniSONE (DELTASONE) 10 MG Tab Take 1 Tablet by mouth every 3 days. (Patient taking differently: Take 10 mg by mouth two times a week.) 30 Tablet 3    Calcium-Magnesium-Vitamin D (CALCIUM 1200+D3 PO) Take 1 Tablet by mouth every day.      D-Mannose Powder Take 1 Dose by mouth every day. Mix one scoop with 4 oz of water (Patient not taking: Reported on 5/2/2025)      Multiple Vitamins-Minerals (ZINC PO) Take 1 Tablet by mouth every day.      EPINEPHrine (EPIPEN) 0.3 MG/0.3ML Solution Auto-injector solution for injection epinephrine 0.3 mg/0.3 mL injection, auto-injector      Probiotic Product (PROBIOTIC PO) Take 2 Capsules by mouth every day.      ascorbic acid (ASCORBIC ACID) 500 MG Tab Take 2 Tablets by mouth every day.      vitamin D (CHOLECALCIFEROL) 1000 UNIT Tab Take 3,000 Units by mouth every morning. 3 tablets = 3000 units

## 2025-06-27 NOTE — ANESTHESIA PREPROCEDURE EVALUATION
Date/Time: 06/27/25 0900    Scheduled providers: Nakul Post M.D.; Forrest Joiner M.D.    Procedure: EC-NIRAJ W/O CONT    Diagnosis: Atrial fibrillation, unspecified type (HCC) [I48.91]    Indications: Atrial Fibrillation    Location: [facility] Imaging - Echocardiology [facility]            Relevant Problems   PULMONARY   (positive) Centrilobular emphysema (HCC)      CARDIAC   (positive) Coronary artery disease involving native coronary artery of native heart without angina pectoris   (positive) Paroxysmal atrial fibrillation (HCC)   (positive) Peripheral arterial disease (HCC)   (positive) Pulmonary hypertension (HCC)      GI   (positive) Gastroesophageal reflux disease without esophagitis       Physical Exam    Airway   Mallampati: II  TM distance: >3 FB  Neck ROM: full       Cardiovascular - normal exam  Rhythm: regular  Rate: normal    (-) murmur     Dental - normal exam           Pulmonary - normal examBreath sounds clear to auscultation     Abdominal    Neurological - normal exam                   Anesthesia Plan    ASA 3       Plan - general       Airway plan will be mask          Induction: intravenous    Postoperative Plan: Postoperative administration of opioids is intended.    Pertinent diagnostic labs and testing reviewed    Informed Consent:    Anesthetic plan and risks discussed with patient.    Use of blood products discussed with: patient whom consented to blood products.

## 2025-06-27 NOTE — DISCHARGE INSTRUCTIONS
Discharge Instructions:  Transesophageal Echocardiogram (NIRAJ)    NIRAJ is a test that uses sound waves to take pictures of your heart. NIRAJ is done by passing a small probe attached to a flexible tube down the part of the body that moves food from your mouth to your stomach (esophagus).    The pictures give clear images of your heart. This can help your doctor see if there are problems with your heart.   General instructions    Follow instructions from your doctor about what you cannot eat or drink.   You will take out any dentures or dental retainers.   Plan to have a responsible adult take you home from the hospital or clinic.   Plan to have a responsible adult care for you for the time you are told after you leave the hospital or clinic. This is important.  What can I expect after the procedure?    You will be monitored until you leave the hospital or clinic. This includes checking your blood pressure, heart rate, breathing rate, and blood oxygen level.   Your throat may feel sore and numb. This will get better over time. You will not be allowed to eat or drink until the numbness has gone away.   It is common to have a sore throat for a day or two.   It is up to you to get the results of your procedure. Ask how to get your results when they are ready.   Pink tinge sputum is common after your procedure  Follow these instructions at home:    If you were given a sedative during your procedure, do not drive or use machines until your doctor says that it is safe.   Return to your normal activities when your doctor says that it is safe.   Keep all follow-up visits.  Go to ER / call 911:   If you cough up bright red blood or vomit blood   If you have severe pain in throat/stomach.   If you have difficulty breathing that does not go away with rest  Summary    NIRAJ is a test that uses sound waves to take pictures of your heart.   You will be given a medicine to help you relax.   Pink tinge sputum is common after your  procedure      What to Expect Post Anesthesia    Rest and take it easy for the first 24 hours.  A responsible adult is recommended to remain with you during that time.  It is normal to feel sleepy.  We encourage you to not do anything that requires balance, judgment or coordination.    FOR 24 HOURS DO NOT:  Drive, operate machinery or run household appliances.  Drink beer or alcoholic beverages.  Make important decisions or sign legal documents.    To avoid nausea, slowly advance diet as tolerated, avoiding spicy or greasy foods for the first day.  Add more substantial food to your diet according to your provider's instructions.  INCREASE FLUIDS AND FIBER TO AVOID CONSTIPATION.    MILD FLU-LIKE SYMPTOMS ARE NORMAL.  YOU MAY EXPERIENCE GENERALIZED MUSCLE ACHES, THROAT IRRITATION, HEADACHE AND/OR SOME NAUSEA.    If any questions arise, call your provider.  If your provider is not available, please feel free to call the Surgical Center at [phone].    MEDICATIONS: Resume taking daily medication.  Take prescribed pain medication with food.  If no medication is prescribed, you may take non-aspirin pain medication if needed.  PAIN MEDICATION CAN BE VERY CONSTIPATING.  Take a stool softener or laxative such as senokot, pericolace, or milk of magnesia if needed.

## 2025-06-27 NOTE — ANESTHESIA POSTPROCEDURE EVALUATION
Patient: Angie Root    Procedure Summary       Date: 06/27/25 Room / Location: [facility] Imaging - Echocardiology [facility]    Anesthesia Start: 0856 Anesthesia Stop: 0919    Procedure: EC-NIRAJ W/O CONT Diagnosis:       Atrial fibrillation, unspecified type (HCC)      (Atrial Fibrillation)    Scheduled Providers: Nakul Post M.D.; Forrest Joiner M.D. Responsible Provider: Forrest Joiner M.D.    Anesthesia Type: general ASA Status: 3            Final Anesthesia Type: general  Last vitals  BP   Blood Pressure : (!) 78/36    Temp   (!) 35.8 °C (96.4 °F)    Pulse   (!) 50   Resp   14    SpO2   100 %      Anesthesia Post Evaluation    Patient location during evaluation: PACU  Patient participation: complete - patient participated  Level of consciousness: responsive to physical stimuli and sleepy but conscious  Pain score: 0    Airway patency: patent  Anesthetic complications: no  Cardiovascular status: hemodynamically stable  Respiratory status: acceptable and oral airway  Hydration status: euvolemic    PONV: none          No notable events documented.     Nurse Pain Score: 0 (NPRS)

## 2025-06-27 NOTE — OR NURSING
0916 Pt over from procedure room post NIRAJ. Pt sleeping.  0945 Pt family to bedside  0955 Tolerating orals  1003 Criteria met  1010 Discharge instructions provided to pt and spouse. Discussed diet, activity, follow up, medications and symptom management. Pt and spouse state understanding. Pt and spouse state all questions have been answered. Copy of discharge provided to pt.    1025 Pt wheeled off of unit with all belongings by RN without incident.

## 2025-07-10 ENCOUNTER — OFFICE VISIT (OUTPATIENT)
Dept: CARDIOLOGY | Facility: MEDICAL CENTER | Age: 85
End: 2025-07-10
Attending: NURSE PRACTITIONER
Payer: MEDICARE

## 2025-07-10 ENCOUNTER — TELEPHONE (OUTPATIENT)
Dept: CARDIOLOGY | Facility: MEDICAL CENTER | Age: 85
End: 2025-07-10

## 2025-07-10 VITALS
RESPIRATION RATE: 14 BRPM | SYSTOLIC BLOOD PRESSURE: 110 MMHG | WEIGHT: 88.7 LBS | OXYGEN SATURATION: 98 % | HEIGHT: 62 IN | HEART RATE: 62 BPM | BODY MASS INDEX: 16.32 KG/M2 | DIASTOLIC BLOOD PRESSURE: 60 MMHG

## 2025-07-10 DIAGNOSIS — I73.9 PERIPHERAL ARTERIAL DISEASE (HCC): ICD-10-CM

## 2025-07-10 DIAGNOSIS — Z95.818 S/P MITRAL VALVE CLIP IMPLANTATION: Primary | ICD-10-CM

## 2025-07-10 DIAGNOSIS — I48.0 PAROXYSMAL ATRIAL FIBRILLATION (HCC): ICD-10-CM

## 2025-07-10 DIAGNOSIS — R73.03 PRE-DIABETES: ICD-10-CM

## 2025-07-10 DIAGNOSIS — Z98.890 S/P MITRAL VALVE CLIP IMPLANTATION: Primary | ICD-10-CM

## 2025-07-10 DIAGNOSIS — I25.10 CORONARY ARTERY DISEASE INVOLVING NATIVE CORONARY ARTERY OF NATIVE HEART WITHOUT ANGINA PECTORIS: ICD-10-CM

## 2025-07-10 DIAGNOSIS — I27.20 PULMONARY HYPERTENSION (HCC): ICD-10-CM

## 2025-07-10 DIAGNOSIS — D68.318 CIRCULATING ANTICOAGULANTS (HCC): ICD-10-CM

## 2025-07-10 DIAGNOSIS — J43.2 CENTRILOBULAR EMPHYSEMA (HCC): ICD-10-CM

## 2025-07-10 PROCEDURE — 3074F SYST BP LT 130 MM HG: CPT | Performed by: NURSE PRACTITIONER

## 2025-07-10 PROCEDURE — 3078F DIAST BP <80 MM HG: CPT | Performed by: NURSE PRACTITIONER

## 2025-07-10 PROCEDURE — 99214 OFFICE O/P EST MOD 30 MIN: CPT | Performed by: NURSE PRACTITIONER

## 2025-07-10 PROCEDURE — 99212 OFFICE O/P EST SF 10 MIN: CPT | Performed by: NURSE PRACTITIONER

## 2025-07-10 RX ORDER — ASPIRIN 81 MG/1
81 TABLET ORAL DAILY
COMMUNITY
Start: 2025-07-10

## 2025-07-10 ASSESSMENT — ENCOUNTER SYMPTOMS
MYALGIAS: 0
DIZZINESS: 0
ORTHOPNEA: 0
SHORTNESS OF BREATH: 1
COUGH: 0
ABDOMINAL PAIN: 0
PND: 0
PALPITATIONS: 0
CLAUDICATION: 0
FEVER: 0

## 2025-07-10 ASSESSMENT — FIBROSIS 4 INDEX: FIB4 SCORE: 3.71

## 2025-07-10 NOTE — TELEPHONE ENCOUNTER
Phone Number Called: [phone]      Call outcome: Spoke to patient regarding message below.    Message: Called to inform patient of SC instructions to stop taking the coumadin and start aspirin. Pt said she will not take the coumadin and will start aspirin.

## 2025-07-10 NOTE — PROGRESS NOTES
Chief Complaint   Patient presents with    Hospital Follow-up     F/V Dx: S/P mitral valve clip implantation     Subjective     Angie Root is a 85 y.o. female who presents today for 2 months post Watchman NIRAJ.    She is a patient of Dr. Lozano in our office. Hx of HLD with CAD with previous stenting to her LAD in '09, smoking history with COPD, HTN, PAF, and PAD with L iliac stent in '08.     She has some intermittent at rest shortness of breath not necessarily worse with exertion.     She has no other symptoms. Watchman in place and doing well, ready to come off warfarin now.    She has no chest pain, edema, dizziness/lightheadedness, or palpitations.    Past Medical History:   Diagnosis Date    Arthritis     BL hands    Awareness under anesthesia     Breath shortness     CAD (coronary artery disease)     Cancer (HCC) 1982    melanoma    Cancer ([facility]) 2023    melanoma    Cataract     IOL implants    COPD (chronic obstructive pulmonary disease) (HCC)     COVID-19     Croup 3 years old    Disorder of thyroid     1983 not medicated    Diverticulosis     Dyslipidemia     Emphysema of lung (HCC)     GERD (gastroesophageal reflux disease)     Heart burn     Heart murmur     Heart valve disease     Hemorrhagic disorder (HCC)     coumdin    Hiatal hernia     High cholesterol     Hypertension     Infectious disease 2018    C Diff    Measles     5 years old    Paroxysmal A-fib ([facility]) 01/20/2016    Symptomatic, on a rhythm control strategy with sotalol 40 mg by mouth twice a day.     Paroxysmal atrial fibrillation ([facility])     Pneumonia     x 3    Prediabetes     Psychiatric problem     anxiety    PVD (peripheral vascular disease) ([facility])     Renal disorder 11/2023    kidney stone    Rheumatic fever     possible as a small child    Urinary bladder disorder     frequent UTIs     Past Surgical History:   Procedure Laterality Date    TRANSCATHETER MITRAL VALVE REPAIR Right 12/05/2023    Procedure: TRANSCATHETER MITRAL VALVE  PROCEDURE;  Surgeon: Ethan Lozano M.D.;  Location: SURGERY [facility];  Service: Cardiac    ECHOCARDIOGRAM, TRANSESOPHAGEAL, INTRAOPERATIVE N/A 12/05/2023    Procedure: ECHOCARDIOGRAM, TRANSESOPHAGEAL, INTRAOPERATIVE;  Surgeon: Ethan Lozano M.D.;  Location: SURGERY [facility];  Service: Cardiac    AZ CYSTOSCOPY,INSERT URETERAL STENT Right 11/16/2023    Procedure: CYSTOSCOPY, WITH URETERAL STENT INSERTION;  Surgeon: Alexsander Reyes M.D.;  Location: SURGERY [facility];  Service: Urology    AZ CYSTO/URETERO/PYELOSCOPY, DX Right 11/16/2023    Procedure: URETEROSCOPY;  Surgeon: Alexsander Reyes M.D.;  Location: SURGERY [facility];  Service: Urology    LASERTRIPSY Right 11/16/2023    Procedure: LITHOTRIPSY, USING LASER;  Surgeon: Alexsander Reyes M.D.;  Location: SURGERY [facility];  Service: Urology    FECAL TRANSPLANT N/A 04/09/2018    Procedure: FECAL TRANSPLANT;  Surgeon: Gonzalez Cruz M.D.;  Location: ENDOSCOPY [facility];  Service: Gastroenterology    COLONOSCOPY - ENDO N/A 04/09/2018    Procedure: COLONOSCOPY - ENDO;  Surgeon: Gonzalez Cruz M.D.;  Location: ENDOSCOPY [facility];  Service: Gastroenterology    WIDE EXCISION MELANOMA, LEG, W/[email or web address]  10/2015    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    CARDIAC CATH, RIGHT HEART  2008    stent    OTHER ORTHOPEDIC SURGERY Left 2008    hand repair    CHOLECYSTECTOMY  1993    TONSILLECTOMY  1945    OTHER      watchmen    OTHER CARDIAC SURGERY      r heart cath stents    STENT PLACEMENT      L iliac stent     Family History   Problem Relation Age of Onset    Hypertension Mother     Hyperlipidemia Mother     Cancer Maternal Grandmother         tongue    Cancer Maternal Uncle         x 3, pancreas    Cancer Maternal Grandfather         unknown    Cancer Paternal Grandmother         unknown    Cancer Paternal Grandfather         unknown    Diabetes Maternal Uncle     Heart Disease Maternal Uncle     Hypertension Sister     Hyperlipidemia  Sister     Heart Disease Sister     Alcohol/Drug Sister     Thyroid Sister     Psychiatric Illness Neg Hx     Stroke Neg Hx      Social History     Socioeconomic History    Marital status:      Spouse name: Not on file    Number of children: 0    Years of education: Not on file    Highest education level: Not on file   Occupational History    Not on file   Tobacco Use    Smoking status: Every Day     Types: Cigarettes    Smokeless tobacco: Never    Tobacco comments:     1 cigarette per day, not interested in cessation information   Vaping Use    Vaping status: Never Used   Substance and Sexual Activity    Alcohol use: No     Alcohol/week: 0.0 oz    Drug use: Never    Sexual activity: Not Currently     Partners: Male   Other Topics Concern    Not on file   Social History Narrative    .     Children: no    Work: [employer]     Social Drivers of Health     Financial Resource Strain: Not on file   Food Insecurity: Not on file   Transportation Needs: Not on file   Physical Activity: Not on file   Stress: Not on file   Social Connections: Not on file   Intimate Partner Violence: Not on file   Housing Stability: Not on file     Allergies   Allergen Reactions    Penicillins Anaphylaxis and Hives    Codeine Swelling    Iodine Swelling     topical    Bee Venom Anaphylaxis    Chantix [Varenicline]      disoriented    Ciprofloxacin      Causes C diff, recurrent and very difficult    Diphtheria,Pertussis,Tetanus     Flagyl [Metronidazole] Rash     C/O flagyl causes rash.     Honey Bee Venom     Latex Hives    Lipitor [Atorvastatin Calcium] Unspecified     Intense Stomach pain    Other Environmental Itching and Swelling    Shellfish Allergy      unknown    Tetanus Antitoxin Swelling     unknown    Vecuronium & Derivatives      Outpatient Encounter Medications as of 7/10/2025   Medication Sig Dispense Refill    aspirin 81 MG EC tablet Take 1 Tablet by mouth every day.      fluticasone (FLONASE) 50 MCG/ACT  nasal spray SPRAY 1 SPRAY INTO AFFECTED NOSTRIL (S) DAILY 16 mL 3    ipratropium (ATROVENT) 0.02 % Solution Take 1 mL by nebulization 3 times a day. 187.5 mL 3    ipratropium-albuterol (DUONEB) 0.5-2.5 (3) MG/3ML nebulizer solution Take 3 mL by nebulization every four hours as needed for Shortness of Breath. 120 mL 11    ALPRAZolam (XANAX) 0.25 MG Tab Take 1 Tablet by mouth 2 times a day as needed for Anxiety for up to 180 days. 60 Tablet 5    albuterol 108 (90 Base) MCG/ACT Aero Soln inhalation aerosol Inhale 2 Puffs 4 times a day as needed for Shortness of Breath. 1 Each 3    lovastatin (MEVACOR) 40 MG tablet TAKE 1 TABLET AT BEDTIME 100 Tablet 3    furosemide (LASIX) 20 MG Tab Take 1 Tablet by mouth every day. 90 Tablet 3    sotalol (BETAPACE) 80 MG Tab TAKE ONE-HALF TABLET BY MOUTH 2 TIMES DAILY 90 Tablet 3    Calcium-Magnesium-Vitamin D (CALCIUM 1200+D3 PO) Take 1 Tablet by mouth every day.      Multiple Vitamins-Minerals (ZINC PO) Take 1 Tablet by mouth every day.      EPINEPHrine (EPIPEN) 0.3 MG/0.3ML Solution Auto-injector solution for injection epinephrine 0.3 mg/0.3 mL injection, auto-injector      Probiotic Product (PROBIOTIC PO) Take 2 Capsules by mouth every day.      ascorbic acid (ASCORBIC ACID) 500 MG Tab Take 2 Tablets by mouth every day.      vitamin D (CHOLECALCIFEROL) 1000 UNIT Tab Take 3,000 Units by mouth every morning. 3 tablets = 3000 units      [DISCONTINUED] predniSONE (DELTASONE) 50 MG Tab Take one tablet by mouth 13 hours prior, one tablet by mouth 7 hours prior, and one tablet in the car before walking in for check in (approximately 1 hour prior). You will be given benadryl immediately prior to your procedure. (Patient not taking: Reported on 7/10/2025) 3 Tablet 0    [DISCONTINUED] warfarin (COUMADIN) 1 MG Tab TAKE 1/2 TO 1 TABLET DAILY OR AS DIRECTED BY ANTICOAGULATION CLINIC (Patient taking differently: Pt takes 1mg nightly except Monday and Friday pt takes 1/2 tab. OR AS DIRECTED BY  "ANTICOAGULATION CLINIC) 90 Tablet 1    predniSONE (DELTASONE) 10 MG Tab Take 1 Tablet by mouth every 3 days. (Patient taking differently: Take 10 mg by mouth two times a week.) 30 Tablet 3    [DISCONTINUED] D-Mannose Powder Take 1 Dose by mouth every day. Mix one scoop with 4 oz of water (Patient not taking: Reported on 5/2/2025)       No facility-administered encounter medications on file as of 7/10/2025.     Review of Systems   Constitutional:  Negative for fever and malaise/fatigue.   Respiratory:  Positive for shortness of breath. Negative for cough.         Shortness of breath at rest where she can't take a big breath   Cardiovascular:  Negative for chest pain, palpitations, orthopnea, claudication, leg swelling and PND.   Gastrointestinal:  Negative for abdominal pain.   Musculoskeletal:  Negative for myalgias.   Neurological:  Negative for dizziness.              Objective     /60 (BP Location: Left arm, Patient Position: Sitting, BP Cuff Size: Adult)   Pulse 62   Resp 14   Ht 1.575 m (5' 2\")   Wt 40.2 kg (88 lb 11.2 oz)   SpO2 98%   BMI 16.22 kg/m²     Physical Exam  Vitals and nursing note reviewed.   Constitutional:       Appearance: Normal appearance. She is well-developed and normal weight.   HENT:      Head: Normocephalic and atraumatic.   Neck:      Vascular: No JVD.   Cardiovascular:      Rate and Rhythm: Normal rate and regular rhythm.      Pulses: Normal pulses.      Heart sounds: Normal heart sounds.   Pulmonary:      Effort: Pulmonary effort is normal.      Breath sounds: Normal breath sounds.   Musculoskeletal:         General: Normal range of motion.   Skin:     General: Skin is warm and dry.      Capillary Refill: Capillary refill takes less than 2 seconds.   Neurological:      General: No focal deficit present.      Mental Status: She is alert and oriented to person, place, and time. Mental status is at baseline.   Psychiatric:         Mood and Affect: Mood normal.         " Behavior: Behavior normal.         Thought Content: Thought content normal.         Judgment: Judgment normal.                Assessment & Plan     1. S/P mitral valve clip implantation        2. Centrilobular emphysema (HCC)        3. Circulating anticoagulants (HCC)        4. Coronary artery disease involving native coronary artery of native heart without angina pectoris        5. Paroxysmal atrial fibrillation (HCC)        6. Peripheral arterial disease (HCC)        7. Pre-diabetes        8. Pulmonary hypertension (HCC)            Medical Decision Making: Today's Assessment/Status/Plan:      1. S/P Juliet Clip X1  -doing well post-op  -cont asa 81 mg QD  -SBE prophylaxis understands lifelong  -recent NIRAJ, no concerns, follow up annually    2. HLD with CAD with prior PCI  -no angina, but new at rest VIDAL  -follow, could be multifactorial with lung disease  -cont statin, asa  -LDL goal <70    3. PAF on chronic anticoagulation  -asymptomatic, rate controlled  -cont asa, no OAC with watchman  -cont sotalol 40 mg BID for rhythm control    4. COPD with pulmonary HTN  -follow with pulmonary  -cont lasix and inhalers for symptom management    Patient is to follow up with Isabel PAREDES in 6 months.

## 2025-07-10 NOTE — TELEPHONE ENCOUNTER
----- Message from Nurse Practitioner PEPE Bonilla sent at 7/10/2025  7:35 AM PDT -----  Regarding: stop warfarin post watchman  Patient is okay to stop warfarin at this time. Start aspirin 81 mg once daily tomorrow.

## 2025-07-16 ENCOUNTER — TELEPHONE (OUTPATIENT)
Dept: MEDICAL GROUP | Facility: CLINIC | Age: 85
End: 2025-07-16
Payer: MEDICARE

## 2025-07-16 NOTE — TELEPHONE ENCOUNTER
Pharm faxed in req stating pt says she's uses fluticasone prop 50mcg spray 2-3 times daily, they are req the rx be adjusted to use and re-sent please. Please advise, thank you.

## 2025-07-17 DIAGNOSIS — J30.9 ALLERGIC RHINITIS, UNSPECIFIED SEASONALITY, UNSPECIFIED TRIGGER: Primary | ICD-10-CM

## 2025-07-17 RX ORDER — FLUTICASONE PROPIONATE 50 MCG
2 SPRAY, SUSPENSION (ML) NASAL DAILY
Qty: 16 G | Refills: 3 | Status: SHIPPED | OUTPATIENT
Start: 2025-07-17

## 2025-07-26 DIAGNOSIS — Z87.09 HISTORY OF COPD: ICD-10-CM

## 2025-07-28 RX ORDER — PREDNISONE 10 MG/1
10 TABLET ORAL
Qty: 24 TABLET | Refills: 3 | Status: SHIPPED | OUTPATIENT
Start: 2025-07-28

## 2025-07-28 NOTE — TELEPHONE ENCOUNTER
Received request via: Pharmacy    Was the patient seen in the last year in this department? Yes    Does the patient have an active prescription (recently filled or refills available) for medication(s) requested? No    Pharmacy Name:  [facility] [id number] - [place] - [address]     Does the patient have residential Plus and need 100-day supply? (This applies to ALL medications) Patient does not have SCP

## 2025-08-07 ENCOUNTER — APPOINTMENT (OUTPATIENT)
Dept: URBAN - METROPOLITAN AREA CLINIC 4 | Facility: CLINIC | Age: 85
Setting detail: DERMATOLOGY
End: 2025-08-07

## 2025-08-07 DIAGNOSIS — L82.1 OTHER SEBORRHEIC KERATOSIS: ICD-10-CM

## 2025-08-07 DIAGNOSIS — L81.4 OTHER MELANIN HYPERPIGMENTATION: ICD-10-CM

## 2025-08-07 DIAGNOSIS — D485 NEOPLASM OF UNCERTAIN BEHAVIOR OF SKIN: ICD-10-CM

## 2025-08-07 DIAGNOSIS — L57.0 ACTINIC KERATOSIS: ICD-10-CM

## 2025-08-07 DIAGNOSIS — D18.0 HEMANGIOMA: ICD-10-CM

## 2025-08-07 DIAGNOSIS — Z71.89 OTHER SPECIFIED COUNSELING: ICD-10-CM

## 2025-08-07 DIAGNOSIS — Z86.007 PERSONAL HISTORY OF IN-SITU NEOPLASM OF SKIN: ICD-10-CM

## 2025-08-07 DIAGNOSIS — D22 MELANOCYTIC NEVI: ICD-10-CM

## 2025-08-07 PROBLEM — D48.5 NEOPLASM OF UNCERTAIN BEHAVIOR OF SKIN: Status: ACTIVE | Noted: 2025-08-07

## 2025-08-07 PROBLEM — D22.5 MELANOCYTIC NEVI OF TRUNK: Status: ACTIVE | Noted: 2025-08-07

## 2025-08-07 PROBLEM — D18.01 HEMANGIOMA OF SKIN AND SUBCUTANEOUS TISSUE: Status: ACTIVE | Noted: 2025-08-07

## 2025-08-07 PROCEDURE — ? COUNSELING

## 2025-08-07 PROCEDURE — ? BIOPSY BY SHAVE METHOD

## 2025-08-07 PROCEDURE — ? LIQUID NITROGEN

## 2025-08-07 PROCEDURE — ? ADDITIONAL NOTES

## 2025-08-07 ASSESSMENT — LOCATION DETAILED DESCRIPTION DERM
LOCATION DETAILED: RIGHT ANTERIOR MEDIAL DISTAL THIGH
LOCATION DETAILED: RIGHT SUPERIOR MEDIAL UPPER BACK
LOCATION DETAILED: LEFT PROXIMAL PRETIBIAL REGION
LOCATION DETAILED: MIDDLE STERNUM
LOCATION DETAILED: UPPER STERNUM
LOCATION DETAILED: LEFT DISTAL PRETIBIAL REGION
LOCATION DETAILED: RIGHT MEDIAL UPPER BACK
LOCATION DETAILED: LEFT MEDIAL SUPERIOR CHEST

## 2025-08-07 ASSESSMENT — LOCATION ZONE DERM
LOCATION ZONE: LEG
LOCATION ZONE: TRUNK

## 2025-08-07 ASSESSMENT — LOCATION SIMPLE DESCRIPTION DERM
LOCATION SIMPLE: LEFT PRETIBIAL REGION
LOCATION SIMPLE: CHEST
LOCATION SIMPLE: RIGHT THIGH
LOCATION SIMPLE: RIGHT UPPER BACK

## 2025-08-13 ENCOUNTER — RX ONLY (RX ONLY)
Age: 85
End: 2025-08-13

## 2025-08-13 RX ORDER — FLUOROURACIL 2 G/40G
CREAM TOPICAL
Qty: 40 | Refills: 0 | Status: ERX | COMMUNITY
Start: 2025-08-13

## 2025-08-20 DIAGNOSIS — Z79.01 CHRONIC ANTICOAGULATION: ICD-10-CM

## (undated) DEVICE — COVER LIGHT HANDLE ALC PLUS DISP (18EA/BX)

## (undated) DEVICE — TOWEL STOP TIMEOUT SAFETY FLAG (40EA/CA)

## (undated) DEVICE — FIBER LASER MOSES 365 UM (1/EA)

## (undated) DEVICE — SET FLUID WARMING STANDARD FLOW - (10/CA)

## (undated) DEVICE — KIT INTROPERCUTANEOUS SHEATH - 8.5 FR W/MAX BARRIER AND BIOPATCH  (5/CA)

## (undated) DEVICE — CON SEDATION EA ADDL 15 MIN

## (undated) DEVICE — KIT CUSTOM PROCEDURE SINGLE FOR ENDO  (15/CA)

## (undated) DEVICE — BAG URODRAIN WITH TUBING - (20/CA)

## (undated) DEVICE — D-5-W INJ. 500 ML - (24EA/CA)

## (undated) DEVICE — WATER IRRIG. STER 3000 ML - (4/CA)

## (undated) DEVICE — SODIUM CHL. IRRIGATION 0.9% 3000ML (4EA/CA 65CA/PF)

## (undated) DEVICE — CONTAINER SPECIMEN BAG OR - STERILE 4 OZ W/LID (100EA/CA)

## (undated) DEVICE — PACK SINGLE BASIN - (6/CA)

## (undated) DEVICE — SYS DLV COST CLS RM TEMP - INJECTATE (CO-SET II) (10EA/CA)

## (undated) DEVICE — KIT NRG RF TRANSSEPTAL WITH STANDARD CURVE NEEDLE

## (undated) DEVICE — GOWN SURGEONS X-LARGE - DISP. (30/CA)

## (undated) DEVICE — STOPCOCK MALE 4-WAY - (50/CA)

## (undated) DEVICE — CATHETER THERMALDILUTION SWAN - (5EA/CA)

## (undated) DEVICE — LACTATED RINGERS INJ 1000 ML - (14EA/CA 60CA/PF)

## (undated) DEVICE — SLEEVE, VASO, THIGH, MED

## (undated) DEVICE — CONNECTOR HOSE NEPTUNE FOR CYSTO ROOM

## (undated) DEVICE — SUTURE OHS

## (undated) DEVICE — SODIUM CHL IRRIGATION 0.9% 1000ML (12EA/CA)

## (undated) DEVICE — SODIUM CHL. INJ. 0.9% 500ML (24EA/CA 50CA/PF)

## (undated) DEVICE — SUTURE DEVICE CLOSURE REPAIR SYSTEM PERCLOSE PROSTYLE (10EA/BX)

## (undated) DEVICE — BASIN EMESIS DISP. - (250/CA)

## (undated) DEVICE — GOWN WARMING STANDARD FLEX - (30/CA)

## (undated) DEVICE — CANISTER SUCTION 3000ML MECHANICAL FILTER AUTO SHUTOFF MEDI-VAC NONSTERILE LF DISP  (40EA/CA)

## (undated) DEVICE — TUBING PRSS MNTR 72IN M/ M LL - (25/BX) MONIT. LINE W/MALE L/L

## (undated) DEVICE — SUCTION INSTRUMENT YANKAUER BULBOUS TIP W/O VENT (50EA/CA)

## (undated) DEVICE — BASKET ZERO 1.9FR X 120CM

## (undated) DEVICE — STOPCOCK IV 400 PSI 3W ROT (50EA/BX)

## (undated) DEVICE — FIBER LASER MOSES 200 UM (1/EA)

## (undated) DEVICE — SOD. CHL. INJ. 0.9% 1000 ML - (14EA/CA 60CA/PF)

## (undated) DEVICE — ELECTRODE RADIOLUCNT SOLID GEL DEFIB PADS (12EA/CA)

## (undated) DEVICE — SENSOR OXIMETER ADULT SPO2 RD SET (20EA/BX)

## (undated) DEVICE — KIT RADIAL ARTERY 20GA W/MAX BARRIER AND BIOPATCH  (5EA/CA) #10740 IS FOR THE SET RADIAL ARTERIAL

## (undated) DEVICE — DRESSING TRANSPARENT FILM TEGADERM 4 X 4.75" (50EA/BX)"

## (undated) DEVICE — PACK CYSTO III (2EA/CA)

## (undated) DEVICE — SLEEVE VASO CALF MED - (10PR/CA)

## (undated) DEVICE — SYRINGE DISP. 50CC LS - (40/BX)

## (undated) DEVICE — PACK TAVR (3EA/CA)

## (undated) DEVICE — SET BIFURCATED BLOOD - (48EA/CS)

## (undated) DEVICE — GLOVE BIOGEL PI INDICATOR SZ 7.5 SURGICAL PF LF -(50/BX 4BX/CA)

## (undated) DEVICE — SPONGE GAUZESTER 4 X 4 4PLY - (128PK/CA)

## (undated) DEVICE — CHLORAPREP 26 ML APPLICATOR - ORANGE TINT(25/CA)

## (undated) DEVICE — DILATOR NOTTINGHAM 6F-10FRX70CM

## (undated) DEVICE — SOD. CHL 10CC SYRINGE PREFILL - W/10 CC (30/BX)

## (undated) DEVICE — NEEDLE SAFETY 18 GA X 1 1/2 IN (100EA/BX)

## (undated) DEVICE — COVER FOOT UNIVERSAL DISP. - (25EA/CA)

## (undated) DEVICE — INTRODUCER CATHETER  DILATOR PROTRUDING 8FR 2.5CM (10EA/BX)

## (undated) DEVICE — PAD LAP STERILE 18 X 18 - (5/PK 40PK/CA)

## (undated) DEVICE — KIT VASCULAR DILATOR

## (undated) DEVICE — TRAY CATHETER FOLEY URINE METER W/STATLOCK 350ML (10EA/CA)

## (undated) DEVICE — TOWELS CLOTH SURGICAL - (4/PK 20PK/CA)

## (undated) DEVICE — DRAPE CLEAR W/ELASTIC BAND RAD CARM 40 X40" (20EA/CA)"

## (undated) DEVICE — MICRODRIP PRIMARY VENTED 60 (48EA/CA) THIS WAS PART #2C8428 WHICH WAS DISCONTINUED

## (undated) DEVICE — STAPLER SKIN DISP - (6/BX 10BX/CA) VISISTAT

## (undated) DEVICE — SET LEADWIRE 5 LEAD BEDSIDE DISPOSABLE ECG (1SET OF 5/EA)

## (undated) DEVICE — CONTAINER, SPECIMEN, STERILE

## (undated) DEVICE — KIT ULTRASND COVER - (20EA/CA)

## (undated) DEVICE — ELECTRODE DUAL RETURN W/ CORD - (50/PK)

## (undated) DEVICE — TUBING CLEARLINK DUO-VENT - C-FLO (48EA/CA)

## (undated) DEVICE — SET EXTENSION WITH 2 PORTS (48EA/CA) ***PART #2C8610 IS A SUBSTITUTE*****

## (undated) DEVICE — VESSELOOP MAXI BLUE STERILE- SURG-I-LOOP (10EA/BX)

## (undated) DEVICE — PACK CV DRAPING/BASIN 2PART - (1/CA)

## (undated) DEVICE — SPONGE GAUZE NON-STERILE 4X4 - (2000/CA 10PK/CA)

## (undated) DEVICE — BAG RESUSCITATION DISPOSABLE - WITH MASK (10 EA/CA)

## (undated) DEVICE — TRANSDUCER BIFURCATED MONITORING KIT (10EA/CA)

## (undated) DEVICE — FILM CASSETTE ENDO

## (undated) DEVICE — COVER LIGHT HANDLE FLEXIBLE - SOFT (2EA/PK 80PK/CA)

## (undated) DEVICE — TUBE CONNECTING SUCTION - CLEAR PLASTIC STERILE 72 IN (50EA/CA)

## (undated) DEVICE — DECANTER FLD BLS - (50/CA)

## (undated) DEVICE — CON SEDATION/>5 YR 1ST 15 MIN

## (undated) DEVICE — WIRE GUIDE SENSOR DUAL FLEX - 5/BX

## (undated) DEVICE — SYRINGE 6 CC 20 GA X 1 1/2 - NDL SAFETY  (50/BX)

## (undated) DEVICE — CANISTER SUCTION RIGID RED 1500CC (40EA/CA)

## (undated) DEVICE — SYRINGE 3 CC 22 GA X 1-1/2 - NDL SAFETY (50/BX 8BX/CA)